# Patient Record
Sex: MALE | Race: WHITE | Employment: OTHER | URBAN - METROPOLITAN AREA
[De-identification: names, ages, dates, MRNs, and addresses within clinical notes are randomized per-mention and may not be internally consistent; named-entity substitution may affect disease eponyms.]

---

## 2017-03-08 PROBLEM — J96.02 ACUTE HYPERCAPNIC RESPIRATORY FAILURE (HCC): Status: ACTIVE | Noted: 2017-03-08

## 2017-06-15 ENCOUNTER — OFFICE VISIT (OUTPATIENT)
Dept: PULMONOLOGY | Age: 77
End: 2017-06-15

## 2017-06-15 VITALS
RESPIRATION RATE: 20 BRPM | TEMPERATURE: 97.5 F | HEIGHT: 72 IN | HEART RATE: 77 BPM | OXYGEN SATURATION: 96 % | DIASTOLIC BLOOD PRESSURE: 60 MMHG | BODY MASS INDEX: 26.57 KG/M2 | SYSTOLIC BLOOD PRESSURE: 110 MMHG | WEIGHT: 196.2 LBS

## 2017-06-15 DIAGNOSIS — J96.11 CHRONIC HYPOXEMIC RESPIRATORY FAILURE (HCC): ICD-10-CM

## 2017-06-15 DIAGNOSIS — J44.9 CHRONIC OBSTRUCTIVE PULMONARY DISEASE, UNSPECIFIED COPD TYPE (HCC): Primary | ICD-10-CM

## 2017-06-15 PROCEDURE — 1036F TOBACCO NON-USER: CPT | Performed by: INTERNAL MEDICINE

## 2017-06-15 PROCEDURE — G8427 DOCREV CUR MEDS BY ELIG CLIN: HCPCS | Performed by: INTERNAL MEDICINE

## 2017-06-15 PROCEDURE — 99204 OFFICE O/P NEW MOD 45 MIN: CPT | Performed by: INTERNAL MEDICINE

## 2017-06-15 PROCEDURE — 1123F ACP DISCUSS/DSCN MKR DOCD: CPT | Performed by: INTERNAL MEDICINE

## 2017-06-15 PROCEDURE — 3023F SPIROM DOC REV: CPT | Performed by: INTERNAL MEDICINE

## 2017-06-15 PROCEDURE — G8419 CALC BMI OUT NRM PARAM NOF/U: HCPCS | Performed by: INTERNAL MEDICINE

## 2017-06-15 PROCEDURE — G8926 SPIRO NO PERF OR DOC: HCPCS | Performed by: INTERNAL MEDICINE

## 2017-06-15 PROCEDURE — 4040F PNEUMOC VAC/ADMIN/RCVD: CPT | Performed by: INTERNAL MEDICINE

## 2017-06-15 RX ORDER — ALBUTEROL SULFATE 90 UG/1
2 AEROSOL, METERED RESPIRATORY (INHALATION) EVERY 6 HOURS PRN
Qty: 1 INHALER | Refills: 5 | Status: ON HOLD | OUTPATIENT
Start: 2017-06-15 | End: 2018-01-02 | Stop reason: SDUPTHER

## 2017-07-13 ENCOUNTER — HOSPITAL ENCOUNTER (OUTPATIENT)
Dept: PULMONOLOGY | Age: 77
Discharge: HOME | End: 2017-07-13
Attending: INTERNAL MEDICINE | Admitting: INTERNAL MEDICINE

## 2017-07-13 ENCOUNTER — OFFICE VISIT (OUTPATIENT)
Dept: PULMONOLOGY | Age: 77
End: 2017-07-13

## 2017-07-13 VITALS
TEMPERATURE: 97.5 F | BODY MASS INDEX: 26.95 KG/M2 | OXYGEN SATURATION: 98 % | SYSTOLIC BLOOD PRESSURE: 110 MMHG | WEIGHT: 199 LBS | RESPIRATION RATE: 18 BRPM | HEART RATE: 79 BPM | HEIGHT: 72 IN | DIASTOLIC BLOOD PRESSURE: 70 MMHG

## 2017-07-13 DIAGNOSIS — J96.11 CHRONIC HYPOXEMIC RESPIRATORY FAILURE (HCC): ICD-10-CM

## 2017-07-13 DIAGNOSIS — J44.9 COPD, VERY SEVERE (HCC): Primary | ICD-10-CM

## 2017-07-13 DIAGNOSIS — J44.9 CHRONIC OBSTRUCTIVE PULMONARY DISEASE (HCC): ICD-10-CM

## 2017-07-13 DIAGNOSIS — R06.09 DOE (DYSPNEA ON EXERTION): ICD-10-CM

## 2017-07-13 PROCEDURE — G8419 CALC BMI OUT NRM PARAM NOF/U: HCPCS | Performed by: INTERNAL MEDICINE

## 2017-07-13 PROCEDURE — G8926 SPIRO NO PERF OR DOC: HCPCS | Performed by: INTERNAL MEDICINE

## 2017-07-13 PROCEDURE — 1123F ACP DISCUSS/DSCN MKR DOCD: CPT | Performed by: INTERNAL MEDICINE

## 2017-07-13 PROCEDURE — 4040F PNEUMOC VAC/ADMIN/RCVD: CPT | Performed by: INTERNAL MEDICINE

## 2017-07-13 PROCEDURE — G8427 DOCREV CUR MEDS BY ELIG CLIN: HCPCS | Performed by: INTERNAL MEDICINE

## 2017-07-13 PROCEDURE — 99214 OFFICE O/P EST MOD 30 MIN: CPT | Performed by: INTERNAL MEDICINE

## 2017-07-13 PROCEDURE — 3023F SPIROM DOC REV: CPT | Performed by: INTERNAL MEDICINE

## 2017-07-13 PROCEDURE — 1036F TOBACCO NON-USER: CPT | Performed by: INTERNAL MEDICINE

## 2017-07-13 RX ORDER — ALBUTEROL SULFATE 2.5 MG/3ML
2.5 SOLUTION RESPIRATORY (INHALATION) ONCE
Status: COMPLETED | OUTPATIENT
Start: 2017-07-13 | End: 2017-07-13

## 2017-07-13 RX ORDER — FLUTICASONE FUROATE AND VILANTEROL 100; 25 UG/1; UG/1
1 POWDER RESPIRATORY (INHALATION) DAILY
Qty: 1 EACH | Refills: 5 | Status: ON HOLD | OUTPATIENT
Start: 2017-07-13 | End: 2018-01-02 | Stop reason: SDUPTHER

## 2017-07-13 RX ORDER — ALBUTEROL SULFATE 2.5 MG/3ML
2.5 SOLUTION RESPIRATORY (INHALATION) 4 TIMES DAILY
Qty: 120 EACH | Refills: 5 | Status: ON HOLD | OUTPATIENT
Start: 2017-07-13 | End: 2017-12-29 | Stop reason: ALTCHOICE

## 2017-07-13 RX ADMIN — ALBUTEROL SULFATE 2.5 MG: 2.5 SOLUTION RESPIRATORY (INHALATION) at 09:12

## 2017-07-19 ENCOUNTER — TELEPHONE (OUTPATIENT)
Dept: PULMONOLOGY | Age: 77
End: 2017-07-19

## 2017-07-19 DIAGNOSIS — R93.89 ABNORMAL CXR: Primary | ICD-10-CM

## 2017-07-21 ENCOUNTER — HOSPITAL ENCOUNTER (OUTPATIENT)
Dept: CT IMAGING | Facility: MEDICAL CENTER | Age: 77
Discharge: HOME | End: 2017-07-21
Attending: INTERNAL MEDICINE | Admitting: INTERNAL MEDICINE

## 2017-07-21 DIAGNOSIS — R93.89 ABNORMAL FINDINGS ON DIAGNOSTIC IMAGING OF OTHER SPECIFIED BODY STRUCTURES: ICD-10-CM

## 2017-07-21 DIAGNOSIS — R93.89 ABNORMAL CXR: ICD-10-CM

## 2017-07-24 ENCOUNTER — TELEPHONE (OUTPATIENT)
Dept: PULMONOLOGY | Age: 77
End: 2017-07-24

## 2017-07-24 RX ORDER — DOXYCYCLINE HYCLATE 100 MG/1
100 CAPSULE ORAL 2 TIMES DAILY
Qty: 14 CAPSULE | Refills: 0 | Status: SHIPPED | OUTPATIENT
Start: 2017-07-24 | End: 2017-07-31

## 2017-07-24 RX ORDER — PREDNISONE 10 MG/1
TABLET ORAL
Qty: 30 TABLET | Refills: 0 | Status: SHIPPED | OUTPATIENT
Start: 2017-07-24 | End: 2017-08-03

## 2017-07-28 ENCOUNTER — OFFICE VISIT (OUTPATIENT)
Dept: PULMONOLOGY | Age: 77
End: 2017-07-28

## 2017-07-28 VITALS
BODY MASS INDEX: 26.68 KG/M2 | WEIGHT: 197 LBS | TEMPERATURE: 97.6 F | SYSTOLIC BLOOD PRESSURE: 124 MMHG | DIASTOLIC BLOOD PRESSURE: 82 MMHG | HEIGHT: 72 IN | HEART RATE: 79 BPM | RESPIRATION RATE: 20 BRPM | OXYGEN SATURATION: 97 %

## 2017-07-28 DIAGNOSIS — R93.89 ABNORMAL CXR: Primary | ICD-10-CM

## 2017-07-28 DIAGNOSIS — J44.9 COPD, VERY SEVERE (HCC): ICD-10-CM

## 2017-07-28 DIAGNOSIS — J96.11 CHRONIC HYPOXEMIC RESPIRATORY FAILURE (HCC): ICD-10-CM

## 2017-07-28 PROCEDURE — 1123F ACP DISCUSS/DSCN MKR DOCD: CPT | Performed by: INTERNAL MEDICINE

## 2017-07-28 PROCEDURE — 1036F TOBACCO NON-USER: CPT | Performed by: INTERNAL MEDICINE

## 2017-07-28 PROCEDURE — G8419 CALC BMI OUT NRM PARAM NOF/U: HCPCS | Performed by: INTERNAL MEDICINE

## 2017-07-28 PROCEDURE — G8926 SPIRO NO PERF OR DOC: HCPCS | Performed by: INTERNAL MEDICINE

## 2017-07-28 PROCEDURE — 99214 OFFICE O/P EST MOD 30 MIN: CPT | Performed by: INTERNAL MEDICINE

## 2017-07-28 PROCEDURE — 3023F SPIROM DOC REV: CPT | Performed by: INTERNAL MEDICINE

## 2017-07-28 PROCEDURE — G8427 DOCREV CUR MEDS BY ELIG CLIN: HCPCS | Performed by: INTERNAL MEDICINE

## 2017-07-28 PROCEDURE — 4040F PNEUMOC VAC/ADMIN/RCVD: CPT | Performed by: INTERNAL MEDICINE

## 2017-07-28 RX ORDER — ALBUTEROL SULFATE 2.5 MG/3ML
2.5 SOLUTION RESPIRATORY (INHALATION) EVERY 6 HOURS PRN
Qty: 360 EACH | Refills: 3 | Status: SHIPPED | OUTPATIENT
Start: 2017-07-28 | End: 2018-06-15 | Stop reason: SDUPTHER

## 2017-12-28 ENCOUNTER — TELEPHONE (OUTPATIENT)
Dept: PULMONOLOGY | Age: 77
End: 2017-12-28

## 2017-12-28 RX ORDER — AZITHROMYCIN 250 MG/1
TABLET, FILM COATED ORAL
Qty: 1 PACKET | Refills: 0 | Status: ON HOLD | OUTPATIENT
Start: 2017-12-28 | End: 2018-01-03 | Stop reason: HOSPADM

## 2017-12-28 RX ORDER — PREDNISONE 10 MG/1
30 TABLET ORAL DAILY
Qty: 15 TABLET | Refills: 0 | Status: ON HOLD | OUTPATIENT
Start: 2017-12-28 | End: 2018-01-03 | Stop reason: HOSPADM

## 2017-12-28 NOTE — TELEPHONE ENCOUNTER
Do you have the following symptoms? Shortness of Breath  yes  Wheezing  yes  Cough  yes                  Cough Characteristics:                           Productive    yes                           Sputum Color    green                           Hemoptysis   No                            Consistency of sputum   Medium thick     Fever:    No  Temp:N/a  Chills/Sweats:  No  What other symptoms are you having?:  Chest pain    How long have you had these symptoms? 1 day     Pharmacy: [address] [place] [address] Review medications and allergies: Allergies? NKDA                   Currently on Antibiotics? (Drug/Dose/Frequency and how long on?) No                   Systemic Steroids? (Drug/Dose/Frequency and how long on?) No      Last sick call taken on N/a.   Meds prescribed were N/a    LOV: 7/28/17      ASSESSMENT:  · Very Severe COPD with exacerbation  · Abnormal CXR: chest ct showed a granuloma  · Chronic hypoxic respiratory failure  SUTTON due to the above  · Not addressed: Anibalib, DM2     PLAN:   · Incruse -needs refill  · Continue Breo 100mcg  · PRN albuterol  · Change  Albuterol nebs to q4hr PRN  · Supplemental O2 to 3lpm with exertion  · Pulmonary rehab referral placed in the past, but pt is not interested   · Prevnar 13 in October  · F/u in 6 months

## 2017-12-29 PROBLEM — J10.1 INFLUENZA A: Status: ACTIVE | Noted: 2017-12-29

## 2017-12-29 PROBLEM — J96.90 RESPIRATORY FAILURE (HCC): Status: ACTIVE | Noted: 2017-12-29

## 2018-01-02 RX ORDER — FLUTICASONE FUROATE AND VILANTEROL TRIFENATATE 100; 25 UG/1; UG/1
1 POWDER RESPIRATORY (INHALATION) DAILY
Qty: 1 EACH | Refills: 5 | Status: SHIPPED | OUTPATIENT
Start: 2018-01-02 | End: 2018-06-29 | Stop reason: SDUPTHER
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
## 2018-01-24 PROBLEM — R53.1 GENERALIZED WEAKNESS: Status: ACTIVE | Noted: 2018-01-24

## 2018-01-24 PROBLEM — D64.9 CHRONIC ANEMIA: Chronic | Status: ACTIVE | Noted: 2018-01-24

## 2018-01-24 PROBLEM — J96.02 ACUTE HYPERCAPNIC RESPIRATORY FAILURE (HCC): Status: RESOLVED | Noted: 2017-03-08 | Resolved: 2018-01-24

## 2018-01-24 PROBLEM — Z87.891 FORMER SMOKER: Chronic | Status: ACTIVE | Noted: 2018-01-24

## 2018-01-24 PROBLEM — J96.12 CHRONIC RESPIRATORY FAILURE WITH HYPOXIA AND HYPERCAPNIA (HCC): Chronic | Status: ACTIVE | Noted: 2017-07-13

## 2018-01-24 PROBLEM — R73.9 ACUTE HYPERGLYCEMIA: Status: ACTIVE | Noted: 2018-01-24

## 2018-01-24 PROBLEM — J44.9 COPD, VERY SEVERE (HCC): Status: RESOLVED | Noted: 2017-07-13 | Resolved: 2018-01-24

## 2018-01-24 PROBLEM — R26.2 AMBULATORY DYSFUNCTION: Chronic | Status: ACTIVE | Noted: 2018-01-24

## 2018-01-24 PROBLEM — J96.90 RESPIRATORY FAILURE (HCC): Status: RESOLVED | Noted: 2017-12-29 | Resolved: 2018-01-24

## 2018-01-24 PROBLEM — D69.6 THROMBOCYTOPENIA (HCC): Chronic | Status: ACTIVE | Noted: 2018-01-24
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
## 2018-01-24 PROBLEM — J96.11 CHRONIC HYPOXEMIC RESPIRATORY FAILURE (HCC): Chronic | Status: ACTIVE | Noted: 2017-07-13

## 2018-01-24 PROBLEM — J10.1 INFLUENZA A: Status: RESOLVED | Noted: 2017-12-29 | Resolved: 2018-01-24

## 2018-01-26 ENCOUNTER — TELEPHONE (OUTPATIENT)
Dept: PULMONOLOGY | Age: 78
End: 2018-01-26

## 2018-01-26 NOTE — TELEPHONE ENCOUNTER
Patients spouse cancelled appointment on 1/30/18 with  for 2-3 week hospital fua. Reason: IP at [facility]    Patient did not reschedule appointment.  Spouse stated that they will call to reschedule after patient is d/c      Last OV   ASSESSMENT:7/28/17  · Very Severe COPD with exacerbation  · Abnormal CXR: chest ct showed a granuloma  · Chronic hypoxic respiratory failure  SUTTON due to the above  · Not addressed: Afib, DM2     PLAN:   · Incruse -needs refill  · Continue Breo 100mcg  · PRN albuterol  · Change  Albuterol nebs to q4hr PRN  · Supplemental O2 to 3lpm with exertion  · Pulmonary rehab referral placed in the past, but pt is not interested   · Prevnar 13 in October  · F/u in 6 months

## 2018-01-31 RX ORDER — ALBUTEROL SULFATE 2.5 MG/3ML
2.5 SOLUTION RESPIRATORY (INHALATION) 4 TIMES DAILY
Qty: 300 ML | Refills: 5 | Status: SHIPPED | OUTPATIENT
Start: 2018-01-31 | End: 2018-02-14 | Stop reason: SDUPTHER

## 2018-01-31 RX ORDER — UMECLIDINIUM 62.5 UG/1
AEROSOL, POWDER ORAL
Qty: 1 EACH | Refills: 5 | Status: SHIPPED | OUTPATIENT
Start: 2018-01-31 | End: 2018-08-01 | Stop reason: SDUPTHER

## 2018-02-14 ENCOUNTER — OFFICE VISIT (OUTPATIENT)
Dept: PULMONOLOGY | Age: 78
End: 2018-02-14

## 2018-02-14 VITALS
SYSTOLIC BLOOD PRESSURE: 124 MMHG | DIASTOLIC BLOOD PRESSURE: 60 MMHG | TEMPERATURE: 98.4 F | BODY MASS INDEX: 27.09 KG/M2 | OXYGEN SATURATION: 97 % | RESPIRATION RATE: 18 BRPM | HEIGHT: 72 IN | HEART RATE: 76 BPM | WEIGHT: 200 LBS

## 2018-02-14 DIAGNOSIS — J44.1 COPD EXACERBATION (HCC): ICD-10-CM

## 2018-02-14 DIAGNOSIS — J43.8 OTHER EMPHYSEMA (HCC): Chronic | ICD-10-CM

## 2018-02-14 DIAGNOSIS — Z23 NEED FOR PNEUMOCOCCAL VACCINATION: Primary | ICD-10-CM

## 2018-02-14 PROCEDURE — G8598 ASA/ANTIPLAT THER USED: HCPCS | Performed by: INTERNAL MEDICINE

## 2018-02-14 PROCEDURE — G8926 SPIRO NO PERF OR DOC: HCPCS | Performed by: INTERNAL MEDICINE

## 2018-02-14 PROCEDURE — 3023F SPIROM DOC REV: CPT | Performed by: INTERNAL MEDICINE

## 2018-02-14 PROCEDURE — 1036F TOBACCO NON-USER: CPT | Performed by: INTERNAL MEDICINE

## 2018-02-14 PROCEDURE — 90670 PCV13 VACCINE IM: CPT | Performed by: INTERNAL MEDICINE

## 2018-02-14 PROCEDURE — 1111F DSCHRG MED/CURRENT MED MERGE: CPT | Performed by: INTERNAL MEDICINE

## 2018-02-14 PROCEDURE — G8427 DOCREV CUR MEDS BY ELIG CLIN: HCPCS | Performed by: INTERNAL MEDICINE

## 2018-02-14 PROCEDURE — 1123F ACP DISCUSS/DSCN MKR DOCD: CPT | Performed by: INTERNAL MEDICINE

## 2018-02-14 PROCEDURE — 4040F PNEUMOC VAC/ADMIN/RCVD: CPT | Performed by: INTERNAL MEDICINE

## 2018-02-14 PROCEDURE — G0009 ADMIN PNEUMOCOCCAL VACCINE: HCPCS | Performed by: INTERNAL MEDICINE

## 2018-02-14 PROCEDURE — 99214 OFFICE O/P EST MOD 30 MIN: CPT | Performed by: INTERNAL MEDICINE

## 2018-02-14 PROCEDURE — G8482 FLU IMMUNIZE ORDER/ADMIN: HCPCS | Performed by: INTERNAL MEDICINE

## 2018-02-14 PROCEDURE — G8419 CALC BMI OUT NRM PARAM NOF/U: HCPCS | Performed by: INTERNAL MEDICINE

## 2018-02-14 RX ORDER — ALBUTEROL SULFATE 2.5 MG/3ML
2.5 SOLUTION RESPIRATORY (INHALATION) 4 TIMES DAILY
Qty: 300 ML | Refills: 5 | Status: SHIPPED | OUTPATIENT
Start: 2018-02-14 | End: 2019-07-18 | Stop reason: SDUPTHER

## 2018-02-14 NOTE — PROGRESS NOTES
0.083% nebulizer solution, Take 3 mLs by nebulization every 6 hours as needed for Wheezing or Shortness of Breath [facility] hospital, Disp: 360 each, Rfl: 3    aspirin 81 MG tablet, Take 81 mg by mouth daily, Disp: , Rfl:     atenolol (TENORMIN) 100 MG tablet, Take 100 mg by mouth daily, Disp: , Rfl:     lisinopril (PRINIVIL;ZESTRIL) 20 MG tablet, Take 30 mg by mouth daily, Disp: , Rfl:     gabapentin (NEURONTIN) 300 MG capsule, Take 600 mg by mouth 3 times daily . , Disp: , Rfl:     OXYGEN, Inhale 2.5 L into the lungs nightly Pt wears 2- 2/12 L at night. 3 L while walking/ moving around, Disp: , Rfl:     doxazosin (CARDURA) 1 MG tablet, Take 1 tablet by mouth daily, Disp: 30 tablet, Rfl: 3    diltiazem (CARTIA XT) 240 MG ER capsule, Take 1 capsule by mouth daily, Disp: 30 capsule, Rfl: 3    metformin (GLUCOPHAGE) 500 MG tablet, Take 500 mg by mouth 4 times daily , Disp: , Rfl:     esomeprazole (NEXIUM) 40 MG capsule,  Take 40 mg by mouth every morning (before breakfast) Indications: take dos , Disp: , Rfl:     alendronate (FOSAMAX) 70 MG tablet, Take 70 mg by mouth every 7 days. , Disp: , Rfl:       Objective:   PHYSICAL EXAM:      VITALS:  /60   Pulse 76   Temp 98.4 °F (36.9 °C) (Oral)   Resp 18   Ht 6' (1.829 m)   Wt 200 lb (90.7 kg)   SpO2 97% Comment: 3 LPM NC  BMI 27.12 kg/m²   Constitutional: In no acute distress. Appears stated age. Well developed and nourished  Eyes: PERRL. No sclera icterus. No conjunctival injection. ENT: Oropharynx clear. Neck: Trachea midline. No thyroid tenderness. Lymph: No cervical LAD. No supraclavicular LAD. Resp: No accessory muscle use. No crackles. no wheezes. No rhonchi. CV: Regular rate. Regular rhythm. No murmur or rub. No lower extremity edema. Skin: Warm and dry. No nodules on exposed extremities. No rash on exposed extremities. Musc: No clubbing. No cyanosis. No synovitis or joint deformity in digits. Psych: Oriented x 3.  Mood and Severe COPD with recent exacerbation  · Chronic hypoxic respiratory failure  SUTTON due to the above  · Not addressed: Afib, DM2     PLAN:   · Incruse  · Continue Breo   · Albuterol nebs to q4hr PRN  · Prn albuterol nebs - send Rx to [facility]  · Supplemental O2 to 3lpm with exertion  Pulmonary rehab referral placed in the past, but pt is not interested   · Prevnar 13   · F/u in 3-4 months

## 2018-03-27 ENCOUNTER — TELEPHONE (OUTPATIENT)
Dept: PULMONOLOGY | Age: 78
End: 2018-03-27

## 2018-05-31 ENCOUNTER — OFFICE VISIT (OUTPATIENT)
Dept: PULMONOLOGY | Age: 78
End: 2018-05-31

## 2018-05-31 VITALS
BODY MASS INDEX: 27.55 KG/M2 | HEART RATE: 96 BPM | WEIGHT: 203.4 LBS | OXYGEN SATURATION: 98 % | HEIGHT: 72 IN | DIASTOLIC BLOOD PRESSURE: 60 MMHG | SYSTOLIC BLOOD PRESSURE: 130 MMHG | RESPIRATION RATE: 20 BRPM | TEMPERATURE: 97.9 F

## 2018-05-31 DIAGNOSIS — J44.9 COPD, SEVERE (HCC): Primary | ICD-10-CM

## 2018-05-31 DIAGNOSIS — J96.12 CHRONIC RESPIRATORY FAILURE WITH HYPOXIA AND HYPERCAPNIA (HCC): ICD-10-CM

## 2018-05-31 DIAGNOSIS — J96.11 CHRONIC RESPIRATORY FAILURE WITH HYPOXIA AND HYPERCAPNIA (HCC): ICD-10-CM

## 2018-05-31 PROCEDURE — 1123F ACP DISCUSS/DSCN MKR DOCD: CPT | Performed by: INTERNAL MEDICINE

## 2018-05-31 PROCEDURE — 99214 OFFICE O/P EST MOD 30 MIN: CPT | Performed by: INTERNAL MEDICINE

## 2018-05-31 PROCEDURE — 4040F PNEUMOC VAC/ADMIN/RCVD: CPT | Performed by: INTERNAL MEDICINE

## 2018-05-31 PROCEDURE — G8417 CALC BMI ABV UP PARAM F/U: HCPCS | Performed by: INTERNAL MEDICINE

## 2018-05-31 PROCEDURE — G8427 DOCREV CUR MEDS BY ELIG CLIN: HCPCS | Performed by: INTERNAL MEDICINE

## 2018-05-31 PROCEDURE — 1036F TOBACCO NON-USER: CPT | Performed by: INTERNAL MEDICINE

## 2018-05-31 PROCEDURE — 3023F SPIROM DOC REV: CPT | Performed by: INTERNAL MEDICINE

## 2018-05-31 PROCEDURE — G8598 ASA/ANTIPLAT THER USED: HCPCS | Performed by: INTERNAL MEDICINE

## 2018-05-31 PROCEDURE — G8926 SPIRO NO PERF OR DOC: HCPCS | Performed by: INTERNAL MEDICINE

## 2018-06-15 ENCOUNTER — TELEPHONE (OUTPATIENT)
Dept: PULMONOLOGY | Age: 78
End: 2018-06-15

## 2018-06-15 DIAGNOSIS — J44.9 COPD, SEVERE (HCC): Primary | Chronic | ICD-10-CM

## 2018-06-15 RX ORDER — ALBUTEROL SULFATE 2.5 MG/3ML
2.5 SOLUTION RESPIRATORY (INHALATION) EVERY 6 HOURS PRN
Qty: 360 EACH | Refills: 3 | Status: SHIPPED | OUTPATIENT
Start: 2018-06-15 | End: 2018-06-20 | Stop reason: SDUPTHER

## 2018-06-20 DIAGNOSIS — J44.9 COPD, SEVERE (HCC): Chronic | ICD-10-CM

## 2018-06-20 RX ORDER — ALBUTEROL SULFATE 2.5 MG/3ML
2.5 SOLUTION RESPIRATORY (INHALATION) EVERY 4 HOURS
Qty: 360 EACH | Refills: 3 | Status: SHIPPED | OUTPATIENT
Start: 2018-06-20 | End: 2018-06-29

## 2018-06-29 RX ORDER — FLUTICASONE FUROATE AND VILANTEROL TRIFENATATE 100; 25 UG/1; UG/1
1 POWDER RESPIRATORY (INHALATION) DAILY
Qty: 60 EACH | Refills: 5 | Status: SHIPPED | OUTPATIENT
Start: 2018-06-29 | End: 2018-09-18 | Stop reason: ALTCHOICE

## 2018-08-01 RX ORDER — UMECLIDINIUM 62.5 UG/1
AEROSOL, POWDER ORAL
Qty: 1 EACH | Refills: 5 | Status: SHIPPED | OUTPATIENT
Start: 2018-08-01 | End: 2018-09-18 | Stop reason: ALTCHOICE

## 2018-09-18 ENCOUNTER — OFFICE VISIT (OUTPATIENT)
Dept: PULMONOLOGY | Age: 78
End: 2018-09-18

## 2018-09-18 VITALS
DIASTOLIC BLOOD PRESSURE: 60 MMHG | OXYGEN SATURATION: 96 % | WEIGHT: 212 LBS | HEIGHT: 72 IN | BODY MASS INDEX: 28.71 KG/M2 | HEART RATE: 69 BPM | SYSTOLIC BLOOD PRESSURE: 120 MMHG

## 2018-09-18 DIAGNOSIS — Z23 NEED FOR INFLUENZA VACCINATION: Primary | ICD-10-CM

## 2018-09-18 DIAGNOSIS — J96.11 CHRONIC RESPIRATORY FAILURE WITH HYPOXIA AND HYPERCAPNIA (HCC): ICD-10-CM

## 2018-09-18 DIAGNOSIS — D69.6 THROMBOCYTOPENIA (HCC): ICD-10-CM

## 2018-09-18 DIAGNOSIS — J44.9 COPD, SEVERE (HCC): ICD-10-CM

## 2018-09-18 DIAGNOSIS — J96.12 CHRONIC RESPIRATORY FAILURE WITH HYPOXIA AND HYPERCAPNIA (HCC): ICD-10-CM

## 2018-09-18 PROCEDURE — 99214 OFFICE O/P EST MOD 30 MIN: CPT | Performed by: INTERNAL MEDICINE

## 2018-09-18 PROCEDURE — G0008 ADMIN INFLUENZA VIRUS VAC: HCPCS | Performed by: INTERNAL MEDICINE

## 2018-09-18 PROCEDURE — 1101F PT FALLS ASSESS-DOCD LE1/YR: CPT | Performed by: INTERNAL MEDICINE

## 2018-09-18 PROCEDURE — 4040F PNEUMOC VAC/ADMIN/RCVD: CPT | Performed by: INTERNAL MEDICINE

## 2018-09-18 PROCEDURE — G8926 SPIRO NO PERF OR DOC: HCPCS | Performed by: INTERNAL MEDICINE

## 2018-09-18 PROCEDURE — 1036F TOBACCO NON-USER: CPT | Performed by: INTERNAL MEDICINE

## 2018-09-18 PROCEDURE — G8417 CALC BMI ABV UP PARAM F/U: HCPCS | Performed by: INTERNAL MEDICINE

## 2018-09-18 PROCEDURE — 1123F ACP DISCUSS/DSCN MKR DOCD: CPT | Performed by: INTERNAL MEDICINE

## 2018-09-18 PROCEDURE — 90662 IIV NO PRSV INCREASED AG IM: CPT | Performed by: INTERNAL MEDICINE

## 2018-09-18 PROCEDURE — 3023F SPIROM DOC REV: CPT | Performed by: INTERNAL MEDICINE

## 2018-09-18 PROCEDURE — G8598 ASA/ANTIPLAT THER USED: HCPCS | Performed by: INTERNAL MEDICINE

## 2018-09-18 PROCEDURE — G8427 DOCREV CUR MEDS BY ELIG CLIN: HCPCS | Performed by: INTERNAL MEDICINE

## 2018-09-18 NOTE — PROGRESS NOTES
Vaccine Information Sheet, \"Influenza - Inactivated\"  given to Zenobia Asp, or parent/legal guardian of  Zenobia Willingham and verbalized understanding. Patient responses:    Have you ever had a reaction to a flu vaccine? No  Are you able to eat eggs without adverse effects? Yes  Do you have any current illness? No  Have you ever had Guillian Alloway Syndrome? No    Flu vaccine given per order. Please see immunization tab.

## 2018-09-18 NOTE — PROGRESS NOTES
[facility] Pulmonary, Critical Care, and Sleep    Outpatient Follow Up Note    CC: COPD  Consulting provider: [place] *    Interval History: 76 y.o. male no exacerbations or hospitalizations since last January influenza A. Dyspnea on exertion is unchanged. He uses his oxygen as prescribed. He drives benefit from increase and preop. He asked why he was taking Pulmicort which I was unaware. Initial HPI: regarding SOB. He was admittted several months ago at [facility] for a presumed COPD exacerbation. The patient has a history of COPD, Afib, DM2. Wheezes intermittently. Has a daily intermittent cough that is usually dry or sometimes productive of white to yellow sputum. The patient does not have SOB at rest but has SUTTON. Exercise tolerance on level ground is 1/2 block. He has trouble with showering and getting dressed. The patient has smoked 1.5 -2 PPD for 45 years. Quit in 2002.   Current Medications:    Current Outpatient Prescriptions:     budesonide (PULMICORT) 90 MCG/ACT AEPB inhaler, Inhale 1 puff into the lungs as needed, Disp: , Rfl:     INCRUSE ELLIPTA 62.5 MCG/INH AEPB, USE 1 INHALATION DAILY, Disp: 1 each, Rfl: 5    BREO ELLIPTA 100-25 MCG/INH AEPB inhaler, Inhale 1 puff into the lungs daily, Disp: 60 each, Rfl: 5    VENTOLIN  (90 Base) MCG/ACT inhaler, Inhale 2 puffs into the lungs every 6 hours as needed for Wheezing orShortness of Breath, Disp: 18 g, Rfl: 5    albuterol (PROVENTIL) (2.5 MG/3ML) 0.083% nebulizer solution, Take 3 mLs by nebulization 4 times daily COPD J44.9 [facility], Disp: 300 mL, Rfl: 5    vitamin D3 (CHOLECALCIFEROL) 5000 units TABS tablet, Take 1 tablet by mouth daily, Disp: 30 tablet, Rfl: 1    pravastatin (PRAVACHOL) 10 MG tablet, Take 10 mg by mouth daily, Disp: , Rfl:     atenolol (TENORMIN) 100 MG tablet, Take 100 mg by mouth daily, Disp: , Rfl:     lisinopril (PRINIVIL;ZESTRIL) 20 MG tablet, Take 20 mg by mouth daily , Disp: , Rfl:     gabapentin DM2     PLAN:   · Change Incruse and Breo to Trelegy  · Ok to stop Pulmicort as he is already on an inhaled steroid  · Albuterol nebs q4hr PRN  · Prn albuterol nebs   · Supplemental O2 to 3lpm with exertion  Pulmonary rehab referral placed in the past, but pt is not interested   · Pneumovax 23 and prevnar 13 UTD;  flu vaccine today  · F/u in 4 months

## 2018-11-08 ENCOUNTER — TELEPHONE (OUTPATIENT)
Dept: PULMONOLOGY | Age: 78
End: 2018-11-08

## 2018-11-12 ENCOUNTER — OFFICE VISIT (OUTPATIENT)
Dept: PULMONOLOGY | Age: 78
End: 2018-11-12
Payer: MEDICARE

## 2018-11-12 VITALS
WEIGHT: 204 LBS | HEIGHT: 70 IN | RESPIRATION RATE: 22 BRPM | HEART RATE: 75 BPM | OXYGEN SATURATION: 96 % | BODY MASS INDEX: 29.2 KG/M2 | SYSTOLIC BLOOD PRESSURE: 120 MMHG | TEMPERATURE: 97.6 F | DIASTOLIC BLOOD PRESSURE: 60 MMHG

## 2018-11-12 DIAGNOSIS — J06.9 ACUTE URI: Primary | ICD-10-CM

## 2018-11-12 DIAGNOSIS — J96.11 CHRONIC RESPIRATORY FAILURE WITH HYPOXIA AND HYPERCAPNIA (HCC): ICD-10-CM

## 2018-11-12 DIAGNOSIS — J96.12 CHRONIC RESPIRATORY FAILURE WITH HYPOXIA AND HYPERCAPNIA (HCC): ICD-10-CM

## 2018-11-12 DIAGNOSIS — J44.9 COPD, SEVERE (HCC): ICD-10-CM

## 2018-11-12 PROCEDURE — G8427 DOCREV CUR MEDS BY ELIG CLIN: HCPCS | Performed by: INTERNAL MEDICINE

## 2018-11-12 PROCEDURE — 1036F TOBACCO NON-USER: CPT | Performed by: INTERNAL MEDICINE

## 2018-11-12 PROCEDURE — 4040F PNEUMOC VAC/ADMIN/RCVD: CPT | Performed by: INTERNAL MEDICINE

## 2018-11-12 PROCEDURE — G8926 SPIRO NO PERF OR DOC: HCPCS | Performed by: INTERNAL MEDICINE

## 2018-11-12 PROCEDURE — 1101F PT FALLS ASSESS-DOCD LE1/YR: CPT | Performed by: INTERNAL MEDICINE

## 2018-11-12 PROCEDURE — 1123F ACP DISCUSS/DSCN MKR DOCD: CPT | Performed by: INTERNAL MEDICINE

## 2018-11-12 PROCEDURE — G8417 CALC BMI ABV UP PARAM F/U: HCPCS | Performed by: INTERNAL MEDICINE

## 2018-11-12 PROCEDURE — 3023F SPIROM DOC REV: CPT | Performed by: INTERNAL MEDICINE

## 2018-11-12 PROCEDURE — G8598 ASA/ANTIPLAT THER USED: HCPCS | Performed by: INTERNAL MEDICINE

## 2018-11-12 PROCEDURE — 99214 OFFICE O/P EST MOD 30 MIN: CPT | Performed by: INTERNAL MEDICINE

## 2018-11-12 PROCEDURE — G8482 FLU IMMUNIZE ORDER/ADMIN: HCPCS | Performed by: INTERNAL MEDICINE

## 2018-11-12 RX ORDER — DOXYCYCLINE HYCLATE 100 MG
100 TABLET ORAL 2 TIMES DAILY
Qty: 14 TABLET | Refills: 0 | Status: SHIPPED | OUTPATIENT
Start: 2018-11-12 | End: 2018-11-19

## 2018-11-12 NOTE — PROGRESS NOTES
  doxazosin (CARDURA) 1 MG tablet, Take 1 tablet by mouth daily, Disp: 30 tablet, Rfl: 3    diltiazem (CARTIA XT) 240 MG ER capsule, Take 1 capsule by mouth daily, Disp: 30 capsule, Rfl: 3    metformin (GLUCOPHAGE) 500 MG tablet, Take 500 mg by mouth 4 times daily , Disp: , Rfl:     esomeprazole (NEXIUM) 40 MG capsule,  Take 40 mg by mouth every morning (before breakfast) Indications: take dos , Disp: , Rfl:     alendronate (FOSAMAX) 70 MG tablet, Take 70 mg by mouth every 7 days. , Disp: , Rfl:     Objective:   PHYSICAL EXAM:    VITALS:  /60 (Site: Left Upper Arm, Position: Sitting, Cuff Size: Medium Adult)   Pulse 75   Temp 97.6 °F (36.4 °C) (Oral)   Resp 22   Ht 5' 10\" (1.778 m)   Wt 204 lb (92.5 kg)   SpO2 96% Comment: 3LPM pulsed  BMI 29.27 kg/m²   Constitutional: In no acute distress. Appears stated age. Well developed and nourished  Eyes: PERRL. No sclera icterus. No conjunctival injection. ENT: Oropharynx clear. Neck: Trachea midline. No thyroid tenderness. Lymph: No cervical LAD. No supraclavicular LAD. Resp: No accessory muscle use. No crackles. No wheezes. No rhonchi. CV: Regular rate. Regular rhythm. No murmur or rub. No lower extremity edema. Skin: Warm and dry. No nodules on exposed extremities. No rash on exposed extremities. Musc: No clubbing. No cyanosis. No synovitis or joint deformity in digits. Psych: Oriented x 3. Mood and affect normal. Intact judgment and insight. LABS:  Reviewed any pertinent new labs that are available. PFTs 7/13/17  FVC  (35%) FEV1 0.57 (18%)  FEV1/FVC ratio 37  TLC  (146%)  RV  (300%)   DLCO (25%) Bronchodilator response:+++    6MWT: 360ft, 3lpm     IMAGING:  I personally reviewed and interpreted the following today in the office:   7/21/17 Chest CT:   Mediastinum: Limited evaluation without IV contrast.  Heavy coronary artery   calcifications are noted. Elifredia Mew prominent precarinal lymph node measures 8   mm short axis diameter.

## 2019-03-21 ENCOUNTER — OFFICE VISIT (OUTPATIENT)
Dept: PULMONOLOGY | Age: 79
End: 2019-03-21
Payer: MEDICARE

## 2019-03-21 VITALS
OXYGEN SATURATION: 96 % | TEMPERATURE: 98 F | WEIGHT: 213 LBS | SYSTOLIC BLOOD PRESSURE: 116 MMHG | DIASTOLIC BLOOD PRESSURE: 68 MMHG | RESPIRATION RATE: 20 BRPM | BODY MASS INDEX: 29.82 KG/M2 | HEIGHT: 71 IN | HEART RATE: 74 BPM

## 2019-03-21 DIAGNOSIS — D69.6 THROMBOCYTOPENIA (HCC): ICD-10-CM

## 2019-03-21 DIAGNOSIS — J44.9 COPD, SEVERE (HCC): Primary | ICD-10-CM

## 2019-03-21 PROCEDURE — 99214 OFFICE O/P EST MOD 30 MIN: CPT | Performed by: INTERNAL MEDICINE

## 2019-03-21 PROCEDURE — G8417 CALC BMI ABV UP PARAM F/U: HCPCS | Performed by: INTERNAL MEDICINE

## 2019-03-21 PROCEDURE — G8482 FLU IMMUNIZE ORDER/ADMIN: HCPCS | Performed by: INTERNAL MEDICINE

## 2019-03-21 PROCEDURE — 1036F TOBACCO NON-USER: CPT | Performed by: INTERNAL MEDICINE

## 2019-03-21 PROCEDURE — G8427 DOCREV CUR MEDS BY ELIG CLIN: HCPCS | Performed by: INTERNAL MEDICINE

## 2019-03-21 PROCEDURE — G8926 SPIRO NO PERF OR DOC: HCPCS | Performed by: INTERNAL MEDICINE

## 2019-03-21 PROCEDURE — G8599 NO ASA/ANTIPLAT THER USE RNG: HCPCS | Performed by: INTERNAL MEDICINE

## 2019-03-21 PROCEDURE — 1123F ACP DISCUSS/DSCN MKR DOCD: CPT | Performed by: INTERNAL MEDICINE

## 2019-03-21 PROCEDURE — 3023F SPIROM DOC REV: CPT | Performed by: INTERNAL MEDICINE

## 2019-03-21 PROCEDURE — 1101F PT FALLS ASSESS-DOCD LE1/YR: CPT | Performed by: INTERNAL MEDICINE

## 2019-03-21 PROCEDURE — 4040F PNEUMOC VAC/ADMIN/RCVD: CPT | Performed by: INTERNAL MEDICINE

## 2019-04-26 NOTE — PROGRESS NOTES
PRE OP INSTRUCTION SHEET   1. Do not eat or drink anything after 12 midnight  prior to surgery. This includes no water, chewing gum or mints. 2. Take the following pills will a small sip of water (see MAR)                                        3. Aspirin, Ibuprofen, Advil, Naproxen, Vitamin E, fish oil and other Anti-inflammatory products should be stopped for 5 days before surgery or as directed by your physician. 4. Check with your Doctor regarding stopping Plavix, Coumadin, Lovenox, Fragmin or other blood thinners   5. Do not smoke, and do not drink any alcoholic beverages 24 hours prior to surgery. This includes NA Beer. 6. You may brush your teeth and gargle the morning of surgery. DO NOT SWALLOW WATER   7. You MUST make arrangements for a responsible adult to take you home after your surgery. You will not be allowed to leave alone or drive yourself home. It is strongly suggested someone stay with you the first 24 hrs. Your surgery will be cancelled if you do not have a ride home. 8. A parent/legal guardian must accompany a child scheduled for surgery and plan to stay at the hospital until the child is discharged. Please do not bring other children with you. 9. Please wear simple, loose fitting clothing to the hospital.  Rajan Nones not bring valuables (money, credit cards, checkbooks, etc.) Do not wear any makeup (including no eye makeup) or nail polish on your fingers or toes. 10. DO NOT wear any jewelry or piercings on day of surgery. All body piercing jewelry must be removed. 11. If you have dentures,glasses, or contacts they will be removed before going to the OR; we will provide you a container. 12. Please see your family doctor/and cardiologist for a history & physical and/or concerning medications. Bring any test results/reports from your physician's office. Have history and labs faxed to [phone].  Remember to bring Blood Bank bracelet on the day of surgery. 14. If you have a Living Will and Durable Power of  for Healthcare, please bring in a copy. 13. Notify your Surgeon if you develop any illness between now and surgery  time, cough, cold, fever, sore throat, nausea, vomiting, etc.  Please notify your surgeon if you experience dizziness, shortness of breath or blurred vision between now & the time of your surgery   16. DO NOT shave your operative site 96 hours prior to surgery. For face & neck surgery, men may use an electric razor 48 hours prior to surgery. 17. Shower with _x__Antibacterial soap (x_chlorhexidine for total joint  Pt's) shower two times before surgery.(the morning of and the night before. 18. To provide excellent care visitors will be limited to one in the room at any given time.   Please call pre admission testing if you any further questions [phone] or [phone]

## 2019-04-30 ENCOUNTER — ANESTHESIA EVENT (OUTPATIENT)
Dept: OPERATING ROOM | Age: 79
End: 2019-04-30
Payer: MEDICARE

## 2019-05-01 ENCOUNTER — HOSPITAL ENCOUNTER (OUTPATIENT)
Age: 79
Setting detail: OUTPATIENT SURGERY
Discharge: HOME | End: 2019-05-01
Attending: UROLOGY | Admitting: UROLOGY
Payer: MEDICARE

## 2019-05-01 ENCOUNTER — ANESTHESIA (OUTPATIENT)
Dept: OPERATING ROOM | Age: 79
End: 2019-05-01
Payer: MEDICARE

## 2019-05-01 VITALS
SYSTOLIC BLOOD PRESSURE: 98 MMHG | RESPIRATION RATE: 9 BRPM | DIASTOLIC BLOOD PRESSURE: 56 MMHG | OXYGEN SATURATION: 100 %

## 2019-05-01 VITALS
HEART RATE: 70 BPM | WEIGHT: 209 LBS | TEMPERATURE: 97 F | RESPIRATION RATE: 19 BRPM | BODY MASS INDEX: 28.31 KG/M2 | SYSTOLIC BLOOD PRESSURE: 114 MMHG | HEIGHT: 72 IN | DIASTOLIC BLOOD PRESSURE: 59 MMHG | OXYGEN SATURATION: 99 %

## 2019-05-01 LAB
ANION GAP SERPL CALCULATED.3IONS-SCNC: 10 MMOL/L (ref 3–16)
BUN BLDV-MCNC: 20 MG/DL (ref 7–20)
CALCIUM SERPL-MCNC: 9.1 MG/DL (ref 8.3–10.6)
CHLORIDE BLD-SCNC: 98 MMOL/L (ref 99–110)
CO2: 34 MMOL/L (ref 21–32)
CREAT SERPL-MCNC: 0.9 MG/DL (ref 0.8–1.3)
GFR AFRICAN AMERICAN: >60
GFR NON-AFRICAN AMERICAN: >60
GLUCOSE BLD-MCNC: 133 MG/DL (ref 70–99)
GLUCOSE BLD-MCNC: 167 MG/DL (ref 70–99)
GLUCOSE BLD-MCNC: 183 MG/DL (ref 70–99)
PERFORMED ON: ABNORMAL
PERFORMED ON: ABNORMAL
POTASSIUM SERPL-SCNC: 4.2 MMOL/L (ref 3.5–5.1)
SODIUM BLD-SCNC: 142 MMOL/L (ref 136–145)

## 2019-05-01 PROCEDURE — 3600000014 HC SURGERY LEVEL 4 ADDTL 15MIN: Performed by: UROLOGY

## 2019-05-01 PROCEDURE — 3600000004 HC SURGERY LEVEL 4 BASE: Performed by: UROLOGY

## 2019-05-01 PROCEDURE — 6370000000 HC RX 637 (ALT 250 FOR IP): Performed by: ANESTHESIOLOGY

## 2019-05-01 PROCEDURE — 7100000010 HC PHASE II RECOVERY - FIRST 15 MIN: Performed by: UROLOGY

## 2019-05-01 PROCEDURE — 7100000000 HC PACU RECOVERY - FIRST 15 MIN: Performed by: UROLOGY

## 2019-05-01 PROCEDURE — 2500000003 HC RX 250 WO HCPCS: Performed by: NURSE ANESTHETIST, CERTIFIED REGISTERED

## 2019-05-01 PROCEDURE — 36415 COLL VENOUS BLD VENIPUNCTURE: CPT

## 2019-05-01 PROCEDURE — 2580000003 HC RX 258: Performed by: ANESTHESIOLOGY

## 2019-05-01 PROCEDURE — 80048 BASIC METABOLIC PNL TOTAL CA: CPT

## 2019-05-01 PROCEDURE — 2580000003 HC RX 258: Performed by: UROLOGY

## 2019-05-01 PROCEDURE — 7100000001 HC PACU RECOVERY - ADDTL 15 MIN: Performed by: UROLOGY

## 2019-05-01 PROCEDURE — 6360000002 HC RX W HCPCS: Performed by: NURSE ANESTHETIST, CERTIFIED REGISTERED

## 2019-05-01 PROCEDURE — 3700000001 HC ADD 15 MINUTES (ANESTHESIA): Performed by: UROLOGY

## 2019-05-01 PROCEDURE — 3700000000 HC ANESTHESIA ATTENDED CARE: Performed by: UROLOGY

## 2019-05-01 PROCEDURE — 6370000000 HC RX 637 (ALT 250 FOR IP): Performed by: UROLOGY

## 2019-05-01 PROCEDURE — 6360000002 HC RX W HCPCS: Performed by: UROLOGY

## 2019-05-01 PROCEDURE — 2709999900 HC NON-CHARGEABLE SUPPLY: Performed by: UROLOGY

## 2019-05-01 PROCEDURE — 7100000011 HC PHASE II RECOVERY - ADDTL 15 MIN: Performed by: UROLOGY

## 2019-05-01 RX ORDER — LIDOCAINE HYDROCHLORIDE 10 MG/ML
1 INJECTION, SOLUTION EPIDURAL; INFILTRATION; INTRACAUDAL; PERINEURAL
Status: DISCONTINUED | OUTPATIENT
Start: 2019-05-01 | End: 2019-05-01 | Stop reason: HOSPADM

## 2019-05-01 RX ORDER — SODIUM CHLORIDE, SODIUM LACTATE, POTASSIUM CHLORIDE, CALCIUM CHLORIDE 600; 310; 30; 20 MG/100ML; MG/100ML; MG/100ML; MG/100ML
INJECTION, SOLUTION INTRAVENOUS CONTINUOUS
Status: DISCONTINUED | OUTPATIENT
Start: 2019-05-01 | End: 2019-05-01 | Stop reason: HOSPADM

## 2019-05-01 RX ORDER — SODIUM CHLORIDE 0.9 % (FLUSH) 0.9 %
10 SYRINGE (ML) INJECTION PRN
Status: DISCONTINUED | OUTPATIENT
Start: 2019-05-01 | End: 2019-05-01 | Stop reason: HOSPADM

## 2019-05-01 RX ORDER — OXYCODONE HYDROCHLORIDE AND ACETAMINOPHEN 5; 325 MG/1; MG/1
1 TABLET ORAL PRN
Status: COMPLETED | OUTPATIENT
Start: 2019-05-01 | End: 2019-05-01

## 2019-05-01 RX ORDER — PROPOFOL 10 MG/ML
INJECTION, EMULSION INTRAVENOUS PRN
Status: DISCONTINUED | OUTPATIENT
Start: 2019-05-01 | End: 2019-05-01 | Stop reason: SDUPTHER

## 2019-05-01 RX ORDER — CELECOXIB 200 MG/1
200 CAPSULE ORAL DAILY
Status: ON HOLD | COMMUNITY
End: 2020-04-09 | Stop reason: HOSPADM

## 2019-05-01 RX ORDER — OXYCODONE HYDROCHLORIDE AND ACETAMINOPHEN 5; 325 MG/1; MG/1
2 TABLET ORAL PRN
Status: COMPLETED | OUTPATIENT
Start: 2019-05-01 | End: 2019-05-01

## 2019-05-01 RX ORDER — LIDOCAINE HYDROCHLORIDE 20 MG/ML
JELLY TOPICAL PRN
Status: DISCONTINUED | OUTPATIENT
Start: 2019-05-01 | End: 2019-05-01 | Stop reason: ALTCHOICE

## 2019-05-01 RX ORDER — SULFAMETHOXAZOLE AND TRIMETHOPRIM 400; 80 MG/1; MG/1
1 TABLET ORAL 2 TIMES DAILY
Qty: 8 TABLET | Refills: 0 | Status: SHIPPED | OUTPATIENT
Start: 2019-05-01 | End: 2019-05-05

## 2019-05-01 RX ORDER — HYDRALAZINE HYDROCHLORIDE 20 MG/ML
5 INJECTION INTRAMUSCULAR; INTRAVENOUS EVERY 30 MIN PRN
Status: DISCONTINUED | OUTPATIENT
Start: 2019-05-01 | End: 2019-05-01 | Stop reason: HOSPADM

## 2019-05-01 RX ORDER — PHENAZOPYRIDINE HYDROCHLORIDE 200 MG/1
200 TABLET, FILM COATED ORAL 3 TIMES DAILY PRN
Qty: 15 TABLET | Refills: 0 | Status: SHIPPED | OUTPATIENT
Start: 2019-05-01 | End: 2019-05-06

## 2019-05-01 RX ORDER — LABETALOL HYDROCHLORIDE 5 MG/ML
5 INJECTION, SOLUTION INTRAVENOUS
Status: DISCONTINUED | OUTPATIENT
Start: 2019-05-01 | End: 2019-05-01 | Stop reason: HOSPADM

## 2019-05-01 RX ORDER — LIDOCAINE HYDROCHLORIDE 20 MG/ML
INJECTION, SOLUTION INFILTRATION; PERINEURAL PRN
Status: DISCONTINUED | OUTPATIENT
Start: 2019-05-01 | End: 2019-05-01 | Stop reason: SDUPTHER

## 2019-05-01 RX ORDER — MEPERIDINE HYDROCHLORIDE 25 MG/ML
12.5 INJECTION INTRAMUSCULAR; INTRAVENOUS; SUBCUTANEOUS EVERY 5 MIN PRN
Status: DISCONTINUED | OUTPATIENT
Start: 2019-05-01 | End: 2019-05-01 | Stop reason: HOSPADM

## 2019-05-01 RX ORDER — DIPHENHYDRAMINE HYDROCHLORIDE 50 MG/ML
6.25 INJECTION INTRAMUSCULAR; INTRAVENOUS
Status: DISCONTINUED | OUTPATIENT
Start: 2019-05-01 | End: 2019-05-01 | Stop reason: HOSPADM

## 2019-05-01 RX ORDER — MAGNESIUM HYDROXIDE 1200 MG/15ML
LIQUID ORAL PRN
Status: DISCONTINUED | OUTPATIENT
Start: 2019-05-01 | End: 2019-05-01 | Stop reason: ALTCHOICE

## 2019-05-01 RX ORDER — ONDANSETRON 2 MG/ML
INJECTION INTRAMUSCULAR; INTRAVENOUS PRN
Status: DISCONTINUED | OUTPATIENT
Start: 2019-05-01 | End: 2019-05-01 | Stop reason: SDUPTHER

## 2019-05-01 RX ORDER — SODIUM CHLORIDE 0.9 % (FLUSH) 0.9 %
10 SYRINGE (ML) INJECTION EVERY 12 HOURS SCHEDULED
Status: DISCONTINUED | OUTPATIENT
Start: 2019-05-01 | End: 2019-05-01 | Stop reason: HOSPADM

## 2019-05-01 RX ORDER — ONDANSETRON 2 MG/ML
4 INJECTION INTRAMUSCULAR; INTRAVENOUS EVERY 30 MIN PRN
Status: DISCONTINUED | OUTPATIENT
Start: 2019-05-01 | End: 2019-05-01 | Stop reason: HOSPADM

## 2019-05-01 RX ADMIN — PROPOFOL 100 MG: 10 INJECTION, EMULSION INTRAVENOUS at 13:30

## 2019-05-01 RX ADMIN — Medication 2 G: at 13:25

## 2019-05-01 RX ADMIN — SODIUM CHLORIDE, POTASSIUM CHLORIDE, SODIUM LACTATE AND CALCIUM CHLORIDE: 600; 310; 30; 20 INJECTION, SOLUTION INTRAVENOUS at 13:59

## 2019-05-01 RX ADMIN — OXYCODONE HYDROCHLORIDE AND ACETAMINOPHEN 1 TABLET: 5; 325 TABLET ORAL at 14:54

## 2019-05-01 RX ADMIN — ONDANSETRON 4 MG: 2 INJECTION, SOLUTION INTRAMUSCULAR; INTRAVENOUS at 13:37

## 2019-05-01 RX ADMIN — LIDOCAINE HYDROCHLORIDE 60 MG: 20 INJECTION, SOLUTION INFILTRATION; PERINEURAL at 13:30

## 2019-05-01 RX ADMIN — SODIUM CHLORIDE, POTASSIUM CHLORIDE, SODIUM LACTATE AND CALCIUM CHLORIDE: 600; 310; 30; 20 INJECTION, SOLUTION INTRAVENOUS at 13:23

## 2019-05-01 ASSESSMENT — PULMONARY FUNCTION TESTS
PIF_VALUE: 3
PIF_VALUE: 1
PIF_VALUE: 3
PIF_VALUE: 4
PIF_VALUE: 20
PIF_VALUE: 3
PIF_VALUE: 1
PIF_VALUE: 2
PIF_VALUE: 3
PIF_VALUE: 16
PIF_VALUE: 3
PIF_VALUE: 3
PIF_VALUE: 2
PIF_VALUE: 1
PIF_VALUE: 5
PIF_VALUE: 4

## 2019-05-01 ASSESSMENT — PAIN SCALES - GENERAL
PAINLEVEL_OUTOF10: 4
PAINLEVEL_OUTOF10: 0
PAINLEVEL_OUTOF10: 4

## 2019-05-01 ASSESSMENT — COPD QUESTIONNAIRES: CAT_SEVERITY: SEVERE

## 2019-05-01 ASSESSMENT — ENCOUNTER SYMPTOMS: SHORTNESS OF BREATH: 1

## 2019-05-01 NOTE — PROGRESS NOTES
Bedside report received from [facility] 26 and [address] [facility],Fourth Floor CRNA. Patient sleepy easy to arouse,BP on low side IV fluids, open wide, patient on 3 liters at the time at home. RN at bedside. Mara Shaikh

## 2019-05-01 NOTE — ANESTHESIA POSTPROCEDURE EVALUATION
05/01/2019 11:48 AM        K                        4.2                 05/01/2019 11:48 AM        CL                       98 (L)              05/01/2019 11:48 AM        CO2                      34 (H)              05/01/2019 11:48 AM        BUN                      20                  05/01/2019 11:48 AM        CREATININE               0.9                 05/01/2019 11:48 AM        GLUCOSE                  183 (H)             05/01/2019 11:48 AM   COAGS  Lab Results       Component                Value               Date/Time                  PROTIME                  13.1 (H)            01/25/2018 05:55 AM        INR                      1.16 (H)            01/25/2018 05:55 AM        APTT                     29.8                01/25/2018 05:55 AM     Intake & Output: In: 500 (I.V.:500)  Out: 0     Nausea & Vomiting:  No    Level of Consciousness:  Awake    Pain Assessment:  Adequate analgesia    Anesthesia Complications:  No apparent anesthetic complications    SUMMARY      Vital signs stable  OK to discharge from Stage I post anesthesia care.   Care transferred from Anesthesiology department on discharge from perioperative area

## 2019-05-01 NOTE — PROGRESS NOTES
Pt is alert and oriented, pt stable for discharge to home, iv out, cath intact pressure and dressing applied, pt tolerated well.  Pt and family received printed and verbal instructions for post opcare at per dr Elmer Gonzalez orders, pt fsbs was 133 in pacu phase, he drank 2 small diet ginerales,, snacked on amanda crackers and received one percocet po for pain rate 4 described as burning feeling, pt was placed back on his own home o2 oxygen tank and wheeled out via wheelchair by myself, pt stable for discharge

## 2019-05-01 NOTE — BRIEF OP NOTE
Brief Postoperative Note  ______________________________________________________________    Patient: Kelly Damon  YOB: 1940  MRN: [id number]  Date of Procedure: 5/1/2019    Pre-Op Diagnosis: URGENCY OF URINATION    Post-Op Diagnosis: Same       Procedure(s):  CYSTOSCOPY, RESECTION OF BLADDER NECK, URETHRAL DILATION    Anesthesia: General    Surgeon(s):  Meggan Gambino MD    Assistant:     Estimated Blood Loss (mL): less than 50     Complications: None    Specimens:   * No specimens in log *    Implants:  * No implants in log *      Drains: * No LDAs found *    Findings: BPH regrowth with ball valve, bladder neck contracture    Favio Michelle MD  Date: 5/1/2019  Time: 1:43 PM

## 2019-05-01 NOTE — ANESTHESIA PRE PROCEDURE
Department of Anesthesiology  Preprocedure Note       Name:  Sarita Valente   Age:  [de-identified] y.o.  :  1940                                          MRN:  [id number]         Date:  2019      Surgeon: Contreras Jerome):  Duane Flowers MD    Procedure: CYSTOSCOPY POSSIBLE BLADDER BIOPSY, POSSIBLE URETHRAL DILATION (N/A Bladder)    Medications prior to admission:   Prior to Admission medications    Medication Sig Start Date End Date Taking? Authorizing Provider   Fluticasone-Umeclidin-Vilant (Chapito Gambles) 100-62.5-25 MCG/INH AEPB INHALE 1 PUFF EVERY DAY 3/29/19  Yes John Koenig MD   VENTOLIN  (90 Base) MCG/ACT inhaler Inhale 2 puffs into the lungs every 6 hours as needed for Wheezing orShortness of Breath 18  Yes Saint Friar, MD   albuterol (PROVENTIL) (2.5 MG/3ML) 0.083% nebulizer solution Take 3 mLs by nebulization 4 times daily COPD J44.9 [facility] 18  Yes John Koenig MD   lactobacillus (CULTURELLE) capsule Take 1 capsule by mouth 2 times daily 18  Yes Vitor Guzman MD   vitamin D3 (CHOLECALCIFEROL) 5000 units TABS tablet Take 1 tablet by mouth daily 18  Yes Vitor Guzman MD   pravastatin (PRAVACHOL) 10 MG tablet Take 10 mg by mouth daily   Yes Historical Provider, MD   atenolol (TENORMIN) 100 MG tablet Take 100 mg by mouth daily   Yes Historical Provider, MD   lisinopril (PRINIVIL;ZESTRIL) 20 MG tablet Take 20 mg by mouth daily    Yes Historical Provider, MD   gabapentin (NEURONTIN) 300 MG capsule Take 600 mg by mouth 3 times daily . Yes Historical Provider, MD   OXYGEN Inhale 2.5 L into the lungs nightly Pt wears 2- 2/12 L at night.  3 L while walking/ moving around   Yes Historical Provider, MD   diltiazem (CARTIA XT) 240 MG ER capsule Take 1 capsule by mouth daily 10/12/15  Yes Kwasi Mejía MD   metformin (GLUCOPHAGE) 500 MG tablet Take 500 mg by mouth 4 times daily    Yes Historical Provider, MD   esomeprazole (NEXIUM) 40 MG capsule   Take 40 mg by mouth every morning (before breakfast) Indications: take dos    Yes Historical Provider, MD   alendronate (FOSAMAX) 70 MG tablet Take 70 mg by mouth every 7 days.      Yes Historical Provider, MD   doxazosin (CARDURA) 1 MG tablet Take 1 tablet by mouth daily 10/12/15   Edita Griffiths MD       Current medications:    Current Facility-Administered Medications   Medication Dose Route Frequency Provider Last Rate Last Dose    ceFAZolin (ANCEF) 2 g in sterile water 20 mL IV syringe  2 g Intravenous Once Duane Flowers MD        lactated ringers infusion   Intravenous Continuous Franca Darling MD        sodium chloride flush 0.9 % injection 10 mL  10 mL Intravenous 2 times per day Franca Darling MD        sodium chloride flush 0.9 % injection 10 mL  10 mL Intravenous PRN Franca Darling MD        lidocaine PF 1 % injection 1 mL  1 mL Intradermal Once PRN Franca Darling MD        oxyCODONE-acetaminophen (PERCOCET) 5-325 MG per tablet 1 tablet  1 tablet Oral PRN Farhan Rankin MD        Or    oxyCODONE-acetaminophen (PERCOCET) 5-325 MG per tablet 2 tablet  2 tablet Oral PRN Farhan Rankin MD        diphenhydrAMINE (BENADRYL) injection 6.25 mg  6.25 mg Intravenous Once PRN Farhan Rankin MD        ondansetron [facility]) injection 4 mg  4 mg Intravenous Q30 Min PRN Farhan Rankin MD        labetalol (NORMODYNE;TRANDATE) injection 5 mg  5 mg Intravenous Q15 Min PRN Farhan Rankin MD        hydrALAZINE (APRESOLINE) injection 5 mg  5 mg Intravenous Q30 Min PRN Farhan Rankin MD        meperidine (DEMEROL) injection 12.5 mg  12.5 mg Intravenous Q5 Min PRN Farhan Rankin MD           Allergies:  No Known Allergies    Problem List:    Patient Active Problem List   Diagnosis Code    HTN (hypertension) I10    COPD, severe ([facility] [address] J44.9    DM2 (diabetes mellitus, type 2) ([facility] [address] E11.9    HLD (hyperlipidemia) E78.5    PAF (paroxysmal atrial fibrillation) ([facility] [address] I48.0  Chronic respiratory failure with hypoxia and hypercapnia 2.5 L home O2 J96.11, J96.12    COPD exacerbation (HCC) J44.1    CAD (coronary artery disease) I25.10    Former smoker Z87.891    Acute hyperglycemia R73.9    Chronic normocytic anemia D64.9    Chronic thrombocytopenia D69.6    Falls at home W19. 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    ELAINE on CPAP     Osteoporosis     Skin cancer of face        Past Surgical History:        Procedure Laterality Date    BACK SURGERY      repair broken back L4 & L5    CYSTOSCOPY Right 10/9/15    RIght Ureteroscopy, Holmium Laser Lithotripsy with Stone Removal, Right Stent Placement    EYE SURGERY Left 2016    cataract removal    JOINT REPLACEMENT  2011    right knee    JOINT REPLACEMENT  2004    left knee    OTHER SURGICAL HISTORY  2015    wide excision basal cell carcinoma on the nose and bilateral auricles with frozen sections, plastic closure, full thickness skin graft    OTHER SURGICAL HISTORY  12/1/15    excision of lesion of lip    PROSTATE SURGERY      TURP  10/23/2015    with cystoscopy       Social History:    Social History     Tobacco Use    Smoking status: Former Smoker     Packs/day: 1.50     Years: 45.00     Pack years: 67.50     Last attempt to quit: 2005     Years since quittin.9    Smokeless tobacco: Never Used   Substance Use Topics    Alcohol use:  No                                Counseling given: Not Answered      Vital Signs (Current):   Vitals:    19 1509   Weight: 209 lb (94.8 kg)   Height: 6' (1.829 m)                                              BP Readings from Last 3 Encounters:   03/21/19 116/68   11/12/18 120/60   09/18/18 120/60       NPO Status:                                                                                 BMI:   Wt Readings from Last 3 Encounters:   04/26/19 209 lb (94.8 kg)   03/21/19 213 lb (96.6 kg)   11/12/18 204 lb (92.5 kg)     Body mass index is 28.35 kg/m². CBC:   Lab Results   Component Value Date    WBC 4.6 01/26/2018    RBC 3.52 01/26/2018    HGB 9.6 01/26/2018    HCT 28.6 01/26/2018    MCV 81.4 01/26/2018    RDW 16.2 01/26/2018    PLT 94 01/26/2018       CMP:   Lab Results   Component Value Date     01/26/2018    K 4.8 01/26/2018    CL 98 01/26/2018    CO2 29 01/26/2018    BUN 20 01/26/2018    CREATININE 0.8 01/26/2018    GFRAA >60 01/26/2018    GFRAA >60 07/01/2011    AGRATIO 1.3 01/24/2018    LABGLOM >60 01/26/2018    GLUCOSE 383 01/26/2018    PROT 6.4 01/24/2018    PROT 7.0 08/10/2010    CALCIUM 8.4 01/26/2018    BILITOT 0.4 01/24/2018    ALKPHOS 44 01/24/2018    AST 14 01/24/2018    ALT 17 01/24/2018       POC Tests: No results for input(s): POCGLU, POCNA, POCK, POCCL, POCBUN, POCHEMO, POCHCT in the last 72 hours.     Coags:   Lab Results   Component Value Date    PROTIME 13.1 01/25/2018    INR 1.16 01/25/2018    APTT 29.8 01/25/2018       HCG (If Applicable): No results found for: PREGTESTUR, PREGSERUM, HCG, HCGQUANT     ABGs:   Lab Results   Component Value Date    PHART 7.337 12/31/2017    PO2ART 43.7 12/31/2017    GEA5BXH 72.8 12/31/2017    VPY1SEI 38.1 12/31/2017    BEART 9.9 12/31/2017    W8NFXQVC 74.5 12/31/2017        Type & Screen (If Applicable):  Lab Results   Component Value Date    LABABO B 06/22/2011    [address] Positive 06/22/2011       Anesthesia Evaluation  Patient summary reviewed and Nursing notes reviewed no history of anesthetic complications:   Airway: Mallampati: II     Neck ROM: full   Dental:          Pulmonary:   (+) COPD (02 dependent): severe,  shortness of breath: chronic,  sleep apnea:                             Cardiovascular:    (+) hypertension:, CAD:,                   Neuro/Psych:               GI/Hepatic/Renal:             Endo/Other:    (+) Diabetes, . Abdominal:           Vascular:                                        Anesthesia Plan      general     ASA 4       Induction: intravenous. Anesthetic plan and risks discussed with patient. Plan discussed with CRNA.                   Marlon Banks MD   5/1/2019

## 2019-05-03 NOTE — OP NOTE
[address]                 [address] [facility], [address]                                OPERATIVE REPORT    PATIENT NAME: Roberth Jones                      :        1940  MED REC NO:   [id number]                          ROOM:  ACCOUNT NO:   [de-identified]                           ADMIT DATE: 2019  PROVIDER:     Ion Camejo MD    DATE OF PROCEDURE:  2019    PREOPERATIVE DIAGNOSIS:  Incomplete bladder emptying with outlet  obstruction. POSTOPERATIVE DIAGNOSIS:  Incomplete bladder emptying with outlet  obstruction. OPERATION PERFORMED:  Cystoscopy with transurethral resection and  incision of bladder neck contracture, ablation of prostate tissue. PRIMARY SURGEON:  Ion Camejo MD    ANESTHESIA:  General.    OPERATIVE FINDINGS:  1.  Bladder neck contracture. 2.  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 HISTORY:  This is an 42-year-old white male with a history of  obstructive and irritative voiding symptoms. He has a long history of  BPH-related problems and had undergone previous resection. He has now  presented with worsening obstructive voiding symptoms and was therefore  scheduled for a cystourethroscopy. DETAILS OF THE PROCEDURE:  After obtaining informed consent, the patient  was taken to the operative suite. He was given a general anesthetic and  placed on the operative table in a modified dorsal lithotomy position. Prepping and draping was done in a sterile fashion. Cystourethroscopy  was then performed with both 30 and 70-degree lenses. A ball valve of  regrowth of prostate tissue was noted at the bladder neck as well as a  tight bladder neck secondary to scar tissue. The scope was able to be  advanced beyond this after first dilated with [place] Jeannette sounds and  panendoscopy of the bladder was done with both 30 and 70-degree lenses. No significant mucosal lesions were noted.   The patient's bladder did  show diffuse trabeculation, but a biopsy was not needed to be performed  at this time. Ureteral orifices were orthotopic with clear efflux. At this point, using a Bugbee electrode, both cutting and cauterization  was done at the bladder neck contracture as well as ablation of the  small ball valve of prostate tissue. This effectively opened up the  outlet for a good urine flow. I decided not to place a Magana catheter  at this time as there was no further bleeding and the amount of  dissection was kept to the lateral aspects widening the bladder neck  contracture without difficulty. The patient's bladder was then drained  and lidocaine jelly was applied. He was taken back to the postop  recovery room in stable condition.         Tony Vazquez MD    D: 05/02/2019 17:52:40       T: 05/03/2019 2:47:47     [email or web address]  Job#: [id number]     Doc#: [id number]    CC:

## 2019-05-04 ENCOUNTER — HOSPITAL ENCOUNTER (EMERGENCY)
Age: 79
Discharge: HOME | End: 2019-05-04
Attending: EMERGENCY MEDICINE
Payer: MEDICARE

## 2019-05-04 ENCOUNTER — APPOINTMENT (OUTPATIENT)
Dept: GENERAL RADIOLOGY | Age: 79
End: 2019-05-04
Payer: MEDICARE

## 2019-05-04 VITALS
OXYGEN SATURATION: 94 % | BODY MASS INDEX: 39.28 KG/M2 | HEART RATE: 78 BPM | SYSTOLIC BLOOD PRESSURE: 110 MMHG | RESPIRATION RATE: 20 BRPM | HEIGHT: 72 IN | DIASTOLIC BLOOD PRESSURE: 56 MMHG | WEIGHT: 290 LBS

## 2019-05-04 DIAGNOSIS — S93.492A SPRAIN OF ANTERIOR TALOFIBULAR LIGAMENT OF LEFT ANKLE, INITIAL ENCOUNTER: Primary | ICD-10-CM

## 2019-05-04 PROCEDURE — 99283 EMERGENCY DEPT VISIT LOW MDM: CPT

## 2019-05-04 PROCEDURE — 73610 X-RAY EXAM OF ANKLE: CPT

## 2019-05-04 ASSESSMENT — PAIN DESCRIPTION - LOCATION: LOCATION: ANKLE;FOOT

## 2019-05-04 ASSESSMENT — PAIN DESCRIPTION - FREQUENCY: FREQUENCY: CONTINUOUS

## 2019-05-04 ASSESSMENT — PAIN DESCRIPTION - PAIN TYPE
TYPE: ACUTE PAIN
TYPE: ACUTE PAIN

## 2019-05-04 ASSESSMENT — PAIN SCALES - GENERAL
PAINLEVEL_OUTOF10: 10
PAINLEVEL_OUTOF10: 5

## 2019-05-04 ASSESSMENT — PAIN DESCRIPTION - PROGRESSION: CLINICAL_PROGRESSION: GRADUALLY WORSENING

## 2019-05-04 NOTE — ED PROVIDER NOTES
[facility]  eMERGENCYdEPARTMENT eNCOUnter      Pt Name: Leesa Chua  MRN: [id number]  [place] 1940  Date of evaluation: 5/4/2019  Provider:Chang Fuentes MD    [address] [place] [address]       Chief Complaint   Patient presents with    Ankle Pain     Twisted ankle a few days ago, pain continues to get worse and swollen          HISTORY OF PRESENT ILLNESS    Leesa Chua is a [de-identified] y.o. male who presents to the emergency department with L ankle pain. 2d ago he slipped as he was walking from the bathroom, rolled his L ankle. Now has 5/10 constant pain worse with walking, needs to use walker which is abnormal for him. No other associated symptoms. Not better with anything. Nursing Notes were reviewed. REVIEW OF SYSTEMS       Review of Systems    10 point review of systems was performed and was negative exceptas specifically noted in the HPI.       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    ELAINE on CPAP     Osteoporosis     Skin cancer of face          SURGICAL HISTORY       Past Surgical History:   Procedure Laterality Date    BACK SURGERY      repair broken back L4 & L5    CYSTOSCOPY Right 10/9/15    RIght Ureteroscopy, Holmium Laser Lithotripsy with Stone Removal, Right Stent Placement    CYSTOSCOPY  05/01/2019    CYSTOSCOPY, RESECTION OF BLADDER NECK, URETHRAL DILATION    CYSTOSCOPY W BIOPSY OF BLADDER N/A 5/1/2019    CYSTOSCOPY, RESECTION OF BLADDER NECK, URETHRAL DILATION performed by Arun Rubio MD at [facility] Left 6/12/2016    cataract removal    JOINT REPLACEMENT  6/29/2011    right knee    JOINT REPLACEMENT 11/2004    left knee    OTHER SURGICAL HISTORY  08/31/2015    wide excision basal cell carcinoma on the nose and bilateral auricles with frozen sections, plastic closure, full thickness skin graft    OTHER SURGICAL HISTORY  12/1/15    excision of lesion of lip    PROSTATE SURGERY      TURP  10/23/2015    with cystoscopy         CURRENT MEDICATIONS       Discharge Medication List as of 5/4/2019 12:07 PM      CONTINUE these medications which have NOT CHANGED    Details   celecoxib (CELEBREX) 200 MG capsule Take 200 mg by mouth dailyHistorical Med      phenazopyridine (PYRIDIUM) 200 MG tablet Take 1 tablet by mouth 3 times daily as needed for Pain, Disp-15 tablet, R-0Print      sulfamethoxazole-trimethoprim (BACTRIM) 400-80 MG per tablet Take 1 tablet by mouth 2 times daily for 4 days, Disp-8 tablet, R-0Print      Fluticasone-Umeclidin-Vilant (TRELEGY ELLIPTA) 100-62.5-25 MCG/INH AEPB INHALE 1 PUFF EVERY DAY, Disp-1 each, R-5Normal      VENTOLIN  (90 Base) MCG/ACT inhaler Inhale 2 puffs into the lungs every 6 hours as needed for Wheezing orShortness of Breath, Disp-18 g, R-5Normal      albuterol (PROVENTIL) (2.5 MG/3ML) 0.083% nebulizer solution Take 3 mLs by nebulization 4 times daily COPD J44.9 [employer], Disp-300 mL, R-5Print      lactobacillus (CULTURELLE) capsule Take 1 capsule by mouth 2 times daily, Disp-20 capsule, R-0Print      vitamin D3 (CHOLECALCIFEROL) 5000 units TABS tablet Take 1 tablet by mouth daily, Disp-30 tablet, R-1Print      pravastatin (PRAVACHOL) 10 MG tablet Take 10 mg by mouth dailyHistorical Med      atenolol (TENORMIN) 100 MG tablet Take 100 mg by mouth daily      lisinopril (PRINIVIL;ZESTRIL) 20 MG tablet Take 20 mg by mouth daily Historical Med      gabapentin (NEURONTIN) 300 MG capsule Take 600 mg by mouth 3 times daily . Historical Med      OXYGEN Inhale 2.5 L into the lungs nightly Pt wears 2- 2/12 L at night.  3 L while walking/ moving aroundHistorical Med      doxazosin sexual activity: None   Other Topics Concern    None   Social History Narrative    None       SCREENINGS   @MN([id number])@         PHYSICAL EXAM       ED Triage Vitals [05/04/19 1103]   BP Temp Temp src Pulse Resp SpO2 Height Weight   (!) 106/53 -- -- 82 18 94 % 6' (1.829 m) 290 lb (131.5 kg)       Physical Exam  General appearance: Alert, cooperative, no distress, appears stated age. Head:  Normocephalic, without obvious abnormality, atraumatic. HEENT: Mucous membranes moist.  Neck: Full ROM, trachea midline, no JVD  Lungs: No respiratory distress  Cardiovasular: Perfusing extremities  Abdomen: Nontender, no guarding  Extremities: TTP at the L Lateral malleolus of ankle and ATFL ligament, associated swelling without erythema or effusion  Skin: No rashes or lesions to exposed skin  Neurologic: Alert and oriented x3, motor grossly normal, clear speech    DIAGNOSTIC RESULTS     EKG:     RADIOLOGY:   Non-plain film images such as CT, Ultrasound and MRI are read by the radiologist.Plain radiographic images are visualized and preliminarily interpreted by the emergency physician with the below findings:    No fx    Interpretation per the Radiologist below, if available at the time of this note:    XR ANKLE LEFT (MIN 3 VIEWS)   Final Result   Soft tissue edema presumably reactive or posttraumatic. No acute osseous abnormality is identified. Note that if pain persists or worsens, follow-up imaging may be indicated. EDBEDSIDE ULTRASOUND:   Performed by Joanne Hendricks - none    LABS:  Labs Reviewed - No data to display    All other labs were within normal range or not returned as of this dictation. EMERGENCY DEPARTMENT COURSE and DIFFERENTIAL DIAGNOSIS/MDM:   Vitals:    Vitals:    05/04/19 1103 05/04/19 1213   BP: (!) 106/53 (!) 110/56   Pulse: 82 78   Resp: 18 20   SpO2: 94%    Weight: 290 lb (131.5 kg)    Height: 6' (1.829 m)        MDM  Pt presents with rolled ankle. At presentation VSS.  On exam there is swelling without gross deformity. Will perform XR. No fx on XR. Will dc to home with ace wrap. REASSESSMENT          CRITICAL CARE TIME   Total Critical Care time was 0 minutes, excluding separately reportable procedures. There was a high probability of clinically significant/life threatening deteriorationin the patient's condition which required my urgent intervention. CONSULTS:  None     PROCEDURES:  Unless otherwise noted below, none     Procedures    FINAL IMPRESSION      1.  Sprain of anterior talofibular ligament of left ankle, initial encounter          DISPOSITION/PLAN   DISPOSITION Decision To Discharge 05/04/2019 12:06:30 PM      PATIENT REFERRED TO:  Peyman Escalera MD  [address] [place] [address]  [phone]    Schedule an appointment as soon as possible for a visit         DISCHARGE MEDICATIONS:  Discharge Medication List as of 5/4/2019 12:07 PM             (Please note that portions of this note were completed with a voicerecognition program.  Efforts were made to edit the dictations but occasionally words are mis-transcribed.)    Zainab Parsons MD (electronically signed)  Attending Emergency Physician            Zainab Parsons MD  05/04/19 0222

## 2019-05-04 NOTE — ED NOTES
Ambulatory from department without difficulty. No distress noted. Pt instructed to follow up with family doctor or doctor referred by ED physician. Pt also given number to the Pt advocate. Pt reports no further needs or questions prior to discharge.      Bertha Onofre RN  05/04/19 6091

## 2019-07-18 RX ORDER — ALBUTEROL SULFATE 2.5 MG/3ML
2.5 SOLUTION RESPIRATORY (INHALATION) 4 TIMES DAILY
Qty: 300 ML | Refills: 5 | Status: SHIPPED | OUTPATIENT
Start: 2019-07-18 | End: 2019-07-22 | Stop reason: SDUPTHER

## 2019-07-18 RX ORDER — ALBUTEROL SULFATE 2.5 MG/3ML
2.5 SOLUTION RESPIRATORY (INHALATION) 4 TIMES DAILY
Qty: 300 ML | Refills: 5 | Status: CANCELLED | OUTPATIENT
Start: 2019-07-18

## 2019-07-22 DIAGNOSIS — J44.9 CHRONIC OBSTRUCTIVE PULMONARY DISEASE, UNSPECIFIED COPD TYPE (HCC): Primary | ICD-10-CM

## 2019-07-22 RX ORDER — ALBUTEROL SULFATE 2.5 MG/3ML
2.5 SOLUTION RESPIRATORY (INHALATION) 4 TIMES DAILY
Qty: 300 ML | Refills: 5 | Status: SHIPPED | OUTPATIENT
Start: 2019-07-22 | End: 2020-09-28

## 2019-07-22 NOTE — TELEPHONE ENCOUNTER
Patient needs refill of albuterol (PROVENTIL) (2.5 MG/3ML) 0.083% nebulizer solution sent to [facility]. Script sent to [facility] pharmacy was denied due to need for PA.     LOV: 3/21/19    ASSESSMENT:  · Very Severe COPD   · Chronic hypoxic respiratory failure  · SUTTON due to the above  · Chronic Thrombcytopenia  · Not addressed: Afib, DM2     PLAN:   · Continue Trelegy  · Albuterol nebs q4hr PRN  · Prn albuterol nebs   · Supplemental O2 to 3lpm with exertion  · Pulmonary rehab declined.    · Pneumovax 23 and prevnar 13 UTD;  flu vaccine UTD  · F/u in 6 months

## 2019-09-13 ENCOUNTER — OFFICE VISIT (OUTPATIENT)
Dept: PULMONOLOGY | Age: 79
End: 2019-09-13
Payer: MEDICARE

## 2019-09-13 VITALS
OXYGEN SATURATION: 97 % | HEIGHT: 72 IN | DIASTOLIC BLOOD PRESSURE: 72 MMHG | HEART RATE: 66 BPM | WEIGHT: 215 LBS | RESPIRATION RATE: 18 BRPM | BODY MASS INDEX: 29.12 KG/M2 | SYSTOLIC BLOOD PRESSURE: 132 MMHG

## 2019-09-13 DIAGNOSIS — J96.11 CHRONIC RESPIRATORY FAILURE WITH HYPOXIA AND HYPERCAPNIA (HCC): ICD-10-CM

## 2019-09-13 DIAGNOSIS — D69.6 THROMBOCYTOPENIA (HCC): ICD-10-CM

## 2019-09-13 DIAGNOSIS — J96.12 CHRONIC RESPIRATORY FAILURE WITH HYPOXIA AND HYPERCAPNIA (HCC): ICD-10-CM

## 2019-09-13 DIAGNOSIS — Z23 NEED FOR INFLUENZA VACCINATION: ICD-10-CM

## 2019-09-13 DIAGNOSIS — J44.9 COPD, SEVERE (HCC): Primary | ICD-10-CM

## 2019-09-13 PROCEDURE — G8926 SPIRO NO PERF OR DOC: HCPCS | Performed by: INTERNAL MEDICINE

## 2019-09-13 PROCEDURE — G8417 CALC BMI ABV UP PARAM F/U: HCPCS | Performed by: INTERNAL MEDICINE

## 2019-09-13 PROCEDURE — G0008 ADMIN INFLUENZA VIRUS VAC: HCPCS | Performed by: INTERNAL MEDICINE

## 2019-09-13 PROCEDURE — 1036F TOBACCO NON-USER: CPT | Performed by: INTERNAL MEDICINE

## 2019-09-13 PROCEDURE — G8427 DOCREV CUR MEDS BY ELIG CLIN: HCPCS | Performed by: INTERNAL MEDICINE

## 2019-09-13 PROCEDURE — 99214 OFFICE O/P EST MOD 30 MIN: CPT | Performed by: INTERNAL MEDICINE

## 2019-09-13 PROCEDURE — G8599 NO ASA/ANTIPLAT THER USE RNG: HCPCS | Performed by: INTERNAL MEDICINE

## 2019-09-13 PROCEDURE — 90662 IIV NO PRSV INCREASED AG IM: CPT | Performed by: INTERNAL MEDICINE

## 2019-09-13 PROCEDURE — 1123F ACP DISCUSS/DSCN MKR DOCD: CPT | Performed by: INTERNAL MEDICINE

## 2019-09-13 PROCEDURE — 3023F SPIROM DOC REV: CPT | Performed by: INTERNAL MEDICINE

## 2019-09-13 PROCEDURE — 4040F PNEUMOC VAC/ADMIN/RCVD: CPT | Performed by: INTERNAL MEDICINE

## 2019-09-13 NOTE — PROGRESS NOTES
MG tablet, Take 1 tablet by mouth daily, Disp: 30 tablet, Rfl: 3    diltiazem (CARTIA XT) 240 MG ER capsule, Take 1 capsule by mouth daily, Disp: 30 capsule, Rfl: 3    metformin (GLUCOPHAGE) 500 MG tablet, Take 500 mg by mouth 4 times daily , Disp: , Rfl:     esomeprazole (NEXIUM) 40 MG capsule,  Take 40 mg by mouth every morning (before breakfast) Indications: take dos , Disp: , Rfl:     alendronate (FOSAMAX) 70 MG tablet, Take 70 mg by mouth every 7 days. , Disp: , Rfl:     Objective:   PHYSICAL EXAM:    VITALS:  /72   Pulse 66   Resp 18   Ht 6' (1.829 m)   Wt 215 lb (97.5 kg)   SpO2 97% Comment: 3LPM  BMI 29.16 kg/m²   Constitutional: In no acute distress. Appears stated age. Well developed and nourished  Eyes: PERRL. No sclera icterus. No conjunctival injection. ENT: Oropharynx clear. Neck: Trachea midline. No thyroid tenderness. Lymph: No cervical LAD. No supraclavicular LAD. Resp: No accessory muscle use. Decreased BS. No crackles. No wheezes. No rhonchi. CV: Regular rate. Regular rhythm. No murmur or rub. No lower extremity edema. Skin: Warm and dry. No nodules on exposed extremities. No rash on exposed extremities. Musc: No clubbing. No cyanosis. No synovitis or joint deformity in digits. Psych: Oriented x 3. Mood and affect normal. Intact judgment and insight. LABS:  Reviewed any pertinent new labs that are available.     PFTs 7/13/17  FVC  (35%) FEV1 0.57 (18%)  FEV1/FVC ratio 37  TLC  (146%)  RV  (300%)   DLCO (25%) Bronchodilator response:+++    6MWT: 360ft, 3lpm     IMAGING:  I personally reviewed and interpreted the following today in the office:   7/21/17 Chest CT:   Mediastinum: Limited evaluation without IV contrast.  Heavy coronary artery   calcifications are noted. Marie Sree prominent precarinal lymph node measures 8   mm short axis diameter.       Lungs/pleura: Retained secretions are noted in the lower trachea and right   main stem bronchus.  Small calcified granuloma right upper lobe measures 8   mm, accounting for the plain film finding reported recent.  Mild bronchial   wall thickening in the lower lobes.  Minimal subpleural reticular opacities   right middle lobe, likely postinflammatory.  Moderate to severe centrilobular   type emphysematous changes are noted bilaterally.  Mild fibrotic changes   posterior right lower lobe.  No pleural effusion appreciated.       Upper Abdomen: Limited evaluation without acute finding detected.  Shrunken   gallbladder containing stones.       Soft Tissues/Bones: Incidental hemangioma in the T12 vertebral body.  No   acute bony abnormality detected.  Moderate diffuse degenerative disc disease. Moderate compressive deformity T5 vertebral body.  This appears similar to   appearance from chest x-ray 03/07/2017.           Impression   The questioned right suprahilar nodular density corresponds to a benign   calcified granuloma by chest CT scan.  No additional follow-up is necessary   for this.       Moderate to severe centrilobular type emphysema is noted.       Retained secretions within the lower trachea and right mainstem bronchus. Mild bronchial wall thickening in the lower lobes.       Scattered postinflammatory changes are noted within the lungs.        ASSESSMENT:  · Very Severe COPD   · Chronic hypoxic respiratory failure  · SUTTON due to the above  · Chronic Thrombcytopenia  · Not addressed: Afib, DM2     PLAN:   · Continue Trelegy  · Albuterol nebs q4hr PRN  · Prn albuterol nebs   · Supplemental O2 to 3lpm with exertion  Pulmonary rehab declined.    · Pneumovax 23 and prevnar 13 UTD;  flu vaccine UTD  · F/u in 6 months

## 2019-09-13 NOTE — PATIENT INSTRUCTIONS
Vaccine Information Statement    Influenza (Flu) Vaccine (Inactivated or Recombinant): What you need to know    Many Vaccine Information Statements are available in Greek and other languages. See www.immunize.org/vis  Hojas de Información Sobre Vacunas están disponibles en Español y en muchos otros idiomas. Visite www.immunize.org/vis    1. Why get vaccinated? Influenza (flu) is a contagious disease that spreads around the [place] every year, usually between October and May. Flu is caused by influenza viruses, and is spread mainly by coughing, sneezing, and close contact. Anyone can get flu. Flu strikes suddenly and can last several days. Symptoms vary by age, but can include:   fever/chills   sore throat   muscle aches   fatigue   cough   headache    runny or stuffy nose    Flu can also lead to pneumonia and blood infections, and cause diarrhea and seizures in children. If you have a medical condition, such as heart or lung disease, flu can make it worse. Flu is more dangerous for some people. Infants and young children, people 72years of age and older, pregnant women, and people with certain health conditions or a weakened immune system are at greatest risk. Each year thousands of people in the [facility] die from flu, and many more are hospitalized. Flu vaccine can:   keep you from getting flu,   make flu less severe if you do get it, and   keep you from spreading flu to your family and other people. 2. Inactivated and recombinant flu vaccines    A dose of flu vaccine is recommended every flu season. Children 6 months through 6years of age may need two doses during the same flu season. Everyone else needs only one dose each flu season.        Some inactivated flu vaccines contain a very small amount of a mercury-based preservative called thimerosal. Studies have not shown thimerosal in vaccines to be harmful, but flu vaccines that do not contain thimerosal are or 2 days. More serious problems following a flu shot can include the following:     There may be a small increased risk of Guillain-Barré Syndrome (GBS) after inactivated flu vaccine. This risk has been estimated at 1 or 2 additional cases per million people vaccinated. This is much lower than the risk of severe complications from flu, which can be prevented by flu vaccine.  Young children who get the flu shot along with pneumococcal vaccine (PCV13) and/or DTaP vaccine at the same time might be slightly more likely to have a seizure caused by fever. Ask your doctor for more information. Tell your doctor if a child who is getting flu vaccine has ever had a seizure. Problems that could happen after any injected vaccine:      People sometimes faint after a medical procedure, including vaccination. Sitting or lying down for about 15 minutes can help prevent fainting, and injuries caused by a fall. Tell your doctor if you feel dizzy, or have vision changes or ringing in the ears.  Some people get severe pain in the shoulder and have difficulty moving the arm where a shot was given. This happens very rarely.  Any medication can cause a severe allergic reaction. Such reactions from a vaccine are very rare, estimated at about 1 in a million doses, and would happen within a few minutes to a few hours after the vaccination. As with any medicine, there is a very remote chance of a vaccine causing a serious injury or death. The safety of vaccines is always being monitored. For more information, visit: www.cdc.gov/vaccinesafety/    5. What if there is a serious reaction? What should I look for?  Look for anything that concerns you, such as signs of a severe allergic reaction, very high fever, or unusual behavior.     Signs of a severe allergic reaction can include hives, swelling of the face and throat, difficulty breathing, a fast heartbeat, dizziness, and weakness - usually within a few minutes to a few hours after the vaccination. What should I do?  If you think it is a severe allergic reaction or other emergency that cant wait, call 9-1-1 and get the person to the nearest hospital. Otherwise, call your doctor.  Reactions should be reported to the Vaccine Adverse Event Reporting System (VAERS). Your doctor should file this report, or you can do it yourself through  the VAERS web site at www.vaers. [facility].gov, or by calling 9-101.941.2491. VAERS does not give medical advice. 6. The National Vaccine Injury Compensation Program    The [facility] Vaccine Injury Compensation Program (VICP) is a federal program that was created to compensate people who may have been injured by certain vaccines. Persons who believe they may have been injured by a vaccine can learn about the program and about filing a claim by calling 9-812.719.6023 or visiting the [employer] website at www.[facility].gov/vaccinecompensation. There is a time limit to file a claim for compensation. 7. How can I learn more?  Ask your healthcare provider. He or she can give you the vaccine package insert or suggest other sources of information.  Call your local or state health department.  Contact the Centers for Disease Control and Prevention (CDC):  - Call 0-760.763.8445 (1-800-CDC-INFO) or  - Visit CDCs website at www.cdc.gov/flu    Vaccine Information Statement   Inactivated Influenza Vaccine   8/7/2015  42 KAVITA Zia Wilian [id number]    Department of Health and Human Services  Centers for Disease Control and Prevention    Office Use Only

## 2020-02-12 ENCOUNTER — TELEPHONE (OUTPATIENT)
Dept: ENT CLINIC | Age: 80
End: 2020-02-12

## 2020-02-12 NOTE — TELEPHONE ENCOUNTER
Spoke with patient about referral we received from [address] [place] [address], pt will call back when he is ready to schedule.

## 2020-02-28 ENCOUNTER — OFFICE VISIT (OUTPATIENT)
Dept: ENT CLINIC | Age: 80
End: 2020-02-28
Payer: MEDICARE

## 2020-02-28 VITALS
DIASTOLIC BLOOD PRESSURE: 73 MMHG | BODY MASS INDEX: 29.93 KG/M2 | SYSTOLIC BLOOD PRESSURE: 143 MMHG | HEART RATE: 91 BPM | WEIGHT: 221 LBS | HEIGHT: 72 IN

## 2020-02-28 PROCEDURE — 99203 OFFICE O/P NEW LOW 30 MIN: CPT | Performed by: OTOLARYNGOLOGY

## 2020-02-28 PROCEDURE — 92511 NASOPHARYNGOSCOPY: CPT | Performed by: OTOLARYNGOLOGY

## 2020-02-28 PROCEDURE — G8482 FLU IMMUNIZE ORDER/ADMIN: HCPCS | Performed by: OTOLARYNGOLOGY

## 2020-02-28 PROCEDURE — 4040F PNEUMOC VAC/ADMIN/RCVD: CPT | Performed by: OTOLARYNGOLOGY

## 2020-02-28 PROCEDURE — G8427 DOCREV CUR MEDS BY ELIG CLIN: HCPCS | Performed by: OTOLARYNGOLOGY

## 2020-02-28 PROCEDURE — G8417 CALC BMI ABV UP PARAM F/U: HCPCS | Performed by: OTOLARYNGOLOGY

## 2020-02-28 PROCEDURE — 1123F ACP DISCUSS/DSCN MKR DOCD: CPT | Performed by: OTOLARYNGOLOGY

## 2020-02-28 PROCEDURE — 1036F TOBACCO NON-USER: CPT | Performed by: OTOLARYNGOLOGY

## 2020-02-28 PROCEDURE — 11104 PUNCH BX SKIN SINGLE LESION: CPT | Performed by: OTOLARYNGOLOGY

## 2020-02-28 RX ORDER — DOXAZOSIN MESYLATE 4 MG/1
TABLET ORAL
COMMUNITY
Start: 2020-01-31 | End: 2020-02-28

## 2020-02-28 RX ORDER — PIOGLITAZONEHYDROCHLORIDE 30 MG/1
30 TABLET ORAL DAILY
COMMUNITY

## 2020-02-28 ASSESSMENT — ENCOUNTER SYMPTOMS
VOICE CHANGE: 0
APNEA: 0
TROUBLE SWALLOWING: 0
EYE ITCHING: 0
SHORTNESS OF BREATH: 0
FACIAL SWELLING: 0
SINUS PRESSURE: 0
SORE THROAT: 0
COUGH: 0

## 2020-02-28 NOTE — PROGRESS NOTES
to take a sample from the pigmented lesion of the right temple. This was placed into formalin for pathological evaluation. The site was closed with 5-0 fast absorbing gut in interrupted fashion. *Patient tolerated the procedure well with no complications  *Patient instructed to place antibiotic ointment over the site three times daily for the next 4-5 days, then use vaseline    Rigid Nasopharyngoscopy    Preop:right serous otitis media  Postop:same  Anes: topical lidocaine with afrin  Consent: verbal  Description:  After obtaining verbal consent from the patient 4% lidocaine with afrin was sprayed into the nasal cavities. After allowing a time for anesthesia, a nasal endoscope was placed into the naris. The septum, inferior, and middle turbinates were examined. The middle meatus, and sphenoethmoid recess was examined bilaterally. The scope was passed to the nasopharynx. Examination revealed normal torus tubarius and eustachian tube opening. There was no evidence of concerning masses or lesions. The posterior nasopharyngeal wall was clear. The scope was removed. There were no complications with the procedure. The patient tolerated the procedure well. Assessment and Plan     1. Non-recurrent acute serous otitis media of right ear  -nasopharyngoscopy normal  -has already been given oral steroids and started on flonase  -continue flonase  -advised that if it does not resolve after 3 months we can perform in office myringotomy to drain persistent fluid    2. Pigmented skin lesion of uncertain nature  -biopsy performed today  -will call patient with results  - SURGICAL PATHOLOGY      I will call with results. Vinny Cadena, DO  2/28/20      Portions of this note were dictated using Dragon.  There may be linguistic errors secondary to the use of this program.

## 2020-03-17 ENCOUNTER — TELEPHONE (OUTPATIENT)
Dept: PULMONOLOGY | Age: 80
End: 2020-03-17

## 2020-03-31 RX ORDER — FLUTICASONE FUROATE, UMECLIDINIUM BROMIDE AND VILANTEROL TRIFENATATE 100; 62.5; 25 UG/1; UG/1; UG/1
POWDER RESPIRATORY (INHALATION)
Qty: 1 EACH | Refills: 5 | Status: SHIPPED | OUTPATIENT
Start: 2020-03-31 | End: 2020-10-01

## 2020-04-01 ENCOUNTER — HOSPITAL ENCOUNTER (INPATIENT)
Age: 80
LOS: 8 days | Discharge: HOME | DRG: 208 | End: 2020-04-09
Attending: EMERGENCY MEDICINE | Admitting: INTERNAL MEDICINE
Payer: MEDICARE

## 2020-04-01 ENCOUNTER — APPOINTMENT (OUTPATIENT)
Dept: GENERAL RADIOLOGY | Age: 80
DRG: 208 | End: 2020-04-01
Payer: MEDICARE

## 2020-04-01 PROBLEM — J96.22 ACUTE ON CHRONIC RESPIRATORY FAILURE WITH HYPOXIA AND HYPERCAPNIA (HCC): Status: ACTIVE | Noted: 2020-04-01

## 2020-04-01 PROBLEM — J96.21 ACUTE ON CHRONIC RESPIRATORY FAILURE WITH HYPOXIA AND HYPERCAPNIA (HCC): Status: ACTIVE | Noted: 2020-04-01

## 2020-04-01 LAB
A/G RATIO: 1.8 (ref 1.1–2.2)
ALBUMIN SERPL-MCNC: 4.2 G/DL (ref 3.4–5)
ALP BLD-CCNC: 48 U/L (ref 40–129)
ALT SERPL-CCNC: 12 U/L (ref 10–40)
ANION GAP SERPL CALCULATED.3IONS-SCNC: 10 MMOL/L (ref 3–16)
AST SERPL-CCNC: 14 U/L (ref 15–37)
BASE EXCESS ARTERIAL: 5.1 MMOL/L (ref -3–3)
BASE EXCESS VENOUS: 8.9 MMOL/L (ref -3–3)
BASOPHILS ABSOLUTE: 0 K/UL (ref 0–0.2)
BASOPHILS RELATIVE PERCENT: 0.2 %
BILIRUB SERPL-MCNC: 0.7 MG/DL (ref 0–1)
BUN BLDV-MCNC: 22 MG/DL (ref 7–20)
CALCIUM SERPL-MCNC: 8.3 MG/DL (ref 8.3–10.6)
CARBOXYHEMOGLOBIN ARTERIAL: 0.7 % (ref 0–1.5)
CARBOXYHEMOGLOBIN: 4 % (ref 0–1.5)
CHLORIDE BLD-SCNC: 98 MMOL/L (ref 99–110)
CO2: 37 MMOL/L (ref 21–32)
CREAT SERPL-MCNC: 1.3 MG/DL (ref 0.8–1.3)
EOSINOPHILS ABSOLUTE: 0 K/UL (ref 0–0.6)
EOSINOPHILS RELATIVE PERCENT: 0.3 %
GFR AFRICAN AMERICAN: >60
GFR NON-AFRICAN AMERICAN: 53
GLOBULIN: 2.3 G/DL
GLUCOSE BLD-MCNC: 195 MG/DL (ref 70–99)
HCO3 ARTERIAL: 34.6 MMOL/L (ref 21–29)
HCO3 VENOUS: 39 MMOL/L (ref 23–29)
HCT VFR BLD CALC: 30 % (ref 40.5–52.5)
HEMOGLOBIN, ART, EXTENDED: 10.1 G/DL (ref 13.5–17.5)
HEMOGLOBIN: 9.3 G/DL (ref 13.5–17.5)
LACTIC ACID, SEPSIS: 1.3 MMOL/L (ref 0.4–1.9)
LYMPHOCYTES ABSOLUTE: 1.1 K/UL (ref 1–5.1)
LYMPHOCYTES RELATIVE PERCENT: 20.1 %
MCH RBC QN AUTO: 26.3 PG (ref 26–34)
MCHC RBC AUTO-ENTMCNC: 31 G/DL (ref 31–36)
MCV RBC AUTO: 85 FL (ref 80–100)
METHEMOGLOBIN ARTERIAL: 0 %
METHEMOGLOBIN VENOUS: 0.3 %
MONOCYTES ABSOLUTE: 0.5 K/UL (ref 0–1.3)
MONOCYTES RELATIVE PERCENT: 8.7 %
NEUTROPHILS ABSOLUTE: 3.8 K/UL (ref 1.7–7.7)
NEUTROPHILS RELATIVE PERCENT: 70.7 %
O2 CONTENT ARTERIAL: 14 ML/DL
O2 CONTENT, VEN: 8 VOL %
O2 SAT, ARTERIAL: 96.4 %
O2 SAT, VEN: 44 %
O2 THERAPY: ABNORMAL
O2 THERAPY: ABNORMAL
PCO2 ARTERIAL: 85 MMHG (ref 35–45)
PCO2, VEN: 94.3 MMHG (ref 40–50)
PDW BLD-RTO: 17.6 % (ref 12.4–15.4)
PH ARTERIAL: 7.23 (ref 7.35–7.45)
PH VENOUS: 7.23 (ref 7.35–7.45)
PLATELET # BLD: 85 K/UL (ref 135–450)
PLATELET SLIDE REVIEW: ABNORMAL
PMV BLD AUTO: 8.8 FL (ref 5–10.5)
PO2 ARTERIAL: 104.7 MMHG (ref 75–108)
PO2, VEN: 30.1 MMHG (ref 25–40)
POTASSIUM REFLEX MAGNESIUM: 4.3 MMOL/L (ref 3.5–5.1)
PRO-BNP: 1618 PG/ML (ref 0–449)
PROCALCITONIN: 0.19 NG/ML (ref 0–0.15)
PROCALCITONIN: 0.26 NG/ML (ref 0–0.15)
RAPID INFLUENZA  B AGN: NEGATIVE
RAPID INFLUENZA A AGN: NEGATIVE
RBC # BLD: 3.53 M/UL (ref 4.2–5.9)
SODIUM BLD-SCNC: 145 MMOL/L (ref 136–145)
TCO2 ARTERIAL: 37.2 MMOL/L
TCO2 CALC VENOUS: 42 MMOL/L
TOTAL PROTEIN: 6.5 G/DL (ref 6.4–8.2)
TROPONIN: <0.01 NG/ML
WBC # BLD: 5.4 K/UL (ref 4–11)

## 2020-04-01 PROCEDURE — 84145 PROCALCITONIN (PCT): CPT

## 2020-04-01 PROCEDURE — 85025 COMPLETE CBC W/AUTO DIFF WBC: CPT

## 2020-04-01 PROCEDURE — 83880 ASSAY OF NATRIURETIC PEPTIDE: CPT

## 2020-04-01 PROCEDURE — 87804 INFLUENZA ASSAY W/OPTIC: CPT

## 2020-04-01 PROCEDURE — 83036 HEMOGLOBIN GLYCOSYLATED A1C: CPT

## 2020-04-01 PROCEDURE — 6370000000 HC RX 637 (ALT 250 FOR IP): Performed by: INTERNAL MEDICINE

## 2020-04-01 PROCEDURE — 2580000003 HC RX 258: Performed by: PHYSICIAN ASSISTANT

## 2020-04-01 PROCEDURE — 82803 BLOOD GASES ANY COMBINATION: CPT

## 2020-04-01 PROCEDURE — 36415 COLL VENOUS BLD VENIPUNCTURE: CPT

## 2020-04-01 PROCEDURE — 84484 ASSAY OF TROPONIN QUANT: CPT

## 2020-04-01 PROCEDURE — 6370000000 HC RX 637 (ALT 250 FOR IP): Performed by: PHYSICIAN ASSISTANT

## 2020-04-01 PROCEDURE — 71045 X-RAY EXAM CHEST 1 VIEW: CPT

## 2020-04-01 PROCEDURE — 96374 THER/PROPH/DIAG INJ IV PUSH: CPT

## 2020-04-01 PROCEDURE — 96375 TX/PRO/DX INJ NEW DRUG ADDON: CPT

## 2020-04-01 PROCEDURE — 36600 WITHDRAWAL OF ARTERIAL BLOOD: CPT

## 2020-04-01 PROCEDURE — 94761 N-INVAS EAR/PLS OXIMETRY MLT: CPT

## 2020-04-01 PROCEDURE — 83605 ASSAY OF LACTIC ACID: CPT

## 2020-04-01 PROCEDURE — 87040 BLOOD CULTURE FOR BACTERIA: CPT

## 2020-04-01 PROCEDURE — 2000000000 HC ICU R&B

## 2020-04-01 PROCEDURE — 2700000000 HC OXYGEN THERAPY PER DAY

## 2020-04-01 PROCEDURE — 80053 COMPREHEN METABOLIC PANEL: CPT

## 2020-04-01 PROCEDURE — 6360000002 HC RX W HCPCS: Performed by: PHYSICIAN ASSISTANT

## 2020-04-01 PROCEDURE — 94660 CPAP INITIATION&MGMT: CPT

## 2020-04-01 PROCEDURE — 93005 ELECTROCARDIOGRAM TRACING: CPT | Performed by: PHYSICIAN ASSISTANT

## 2020-04-01 PROCEDURE — 99285 EMERGENCY DEPT VISIT HI MDM: CPT

## 2020-04-01 RX ORDER — DEXTROSE MONOHYDRATE 25 G/50ML
12.5 INJECTION, SOLUTION INTRAVENOUS PRN
Status: DISCONTINUED | OUTPATIENT
Start: 2020-04-01 | End: 2020-04-09 | Stop reason: HOSPADM

## 2020-04-01 RX ORDER — ALBUTEROL SULFATE 90 UG/1
2 AEROSOL, METERED RESPIRATORY (INHALATION)
Status: DISCONTINUED | OUTPATIENT
Start: 2020-04-02 | End: 2020-04-04

## 2020-04-01 RX ORDER — 0.9 % SODIUM CHLORIDE 0.9 %
500 INTRAVENOUS SOLUTION INTRAVENOUS ONCE
Status: COMPLETED | OUTPATIENT
Start: 2020-04-01 | End: 2020-04-01

## 2020-04-01 RX ORDER — DOXYCYCLINE HYCLATE 100 MG
100 TABLET ORAL ONCE
Status: COMPLETED | OUTPATIENT
Start: 2020-04-01 | End: 2020-04-01

## 2020-04-01 RX ORDER — METHYLPREDNISOLONE SODIUM SUCCINATE 40 MG/ML
40 INJECTION, POWDER, LYOPHILIZED, FOR SOLUTION INTRAMUSCULAR; INTRAVENOUS EVERY 12 HOURS
Status: COMPLETED | OUTPATIENT
Start: 2020-04-02 | End: 2020-04-03

## 2020-04-01 RX ORDER — DEXTROSE MONOHYDRATE 50 MG/ML
100 INJECTION, SOLUTION INTRAVENOUS PRN
Status: DISCONTINUED | OUTPATIENT
Start: 2020-04-01 | End: 2020-04-09 | Stop reason: HOSPADM

## 2020-04-01 RX ORDER — ALENDRONATE SODIUM 70 MG/1
TABLET ORAL
COMMUNITY
Start: 2020-03-31

## 2020-04-01 RX ORDER — GABAPENTIN 300 MG/1
300 CAPSULE ORAL 3 TIMES DAILY
Status: DISCONTINUED | OUTPATIENT
Start: 2020-04-01 | End: 2020-04-09 | Stop reason: HOSPADM

## 2020-04-01 RX ORDER — ATORVASTATIN CALCIUM 10 MG/1
10 TABLET, FILM COATED ORAL NIGHTLY
Status: DISCONTINUED | OUTPATIENT
Start: 2020-04-01 | End: 2020-04-09 | Stop reason: HOSPADM

## 2020-04-01 RX ORDER — ACETAMINOPHEN 325 MG/1
650 TABLET ORAL EVERY 6 HOURS PRN
Status: DISCONTINUED | OUTPATIENT
Start: 2020-04-01 | End: 2020-04-09 | Stop reason: HOSPADM

## 2020-04-01 RX ORDER — ATORVASTATIN CALCIUM 10 MG/1
TABLET, FILM COATED ORAL
COMMUNITY
Start: 2020-03-31 | End: 2021-10-05 | Stop reason: SDUPTHER

## 2020-04-01 RX ORDER — PROMETHAZINE HYDROCHLORIDE 25 MG/1
12.5 TABLET ORAL EVERY 6 HOURS PRN
Status: DISCONTINUED | OUTPATIENT
Start: 2020-04-01 | End: 2020-04-09 | Stop reason: HOSPADM

## 2020-04-01 RX ORDER — FLUTICASONE PROPIONATE 50 MCG
2 SPRAY, SUSPENSION (ML) NASAL DAILY
COMMUNITY
Start: 2020-02-12

## 2020-04-01 RX ORDER — NICOTINE POLACRILEX 4 MG
15 LOZENGE BUCCAL PRN
Status: DISCONTINUED | OUTPATIENT
Start: 2020-04-01 | End: 2020-04-09 | Stop reason: HOSPADM

## 2020-04-01 RX ORDER — METHYLPREDNISOLONE SODIUM SUCCINATE 125 MG/2ML
60 INJECTION, POWDER, LYOPHILIZED, FOR SOLUTION INTRAMUSCULAR; INTRAVENOUS ONCE
Status: COMPLETED | OUTPATIENT
Start: 2020-04-01 | End: 2020-04-01

## 2020-04-01 RX ORDER — PANTOPRAZOLE SODIUM 40 MG/1
40 TABLET, DELAYED RELEASE ORAL
Status: DISCONTINUED | OUTPATIENT
Start: 2020-04-02 | End: 2020-04-09 | Stop reason: HOSPADM

## 2020-04-01 RX ORDER — ACETAMINOPHEN 650 MG/1
650 SUPPOSITORY RECTAL EVERY 6 HOURS PRN
Status: DISCONTINUED | OUTPATIENT
Start: 2020-04-01 | End: 2020-04-09 | Stop reason: HOSPADM

## 2020-04-01 RX ORDER — ONDANSETRON 2 MG/ML
4 INJECTION INTRAMUSCULAR; INTRAVENOUS EVERY 6 HOURS PRN
Status: DISCONTINUED | OUTPATIENT
Start: 2020-04-01 | End: 2020-04-09 | Stop reason: HOSPADM

## 2020-04-01 RX ORDER — SODIUM CHLORIDE 0.9 % (FLUSH) 0.9 %
10 SYRINGE (ML) INJECTION PRN
Status: DISCONTINUED | OUTPATIENT
Start: 2020-04-01 | End: 2020-04-09 | Stop reason: HOSPADM

## 2020-04-01 RX ORDER — GUAIFENESIN/DEXTROMETHORPHAN 100-10MG/5
5 SYRUP ORAL EVERY 4 HOURS PRN
Status: DISCONTINUED | OUTPATIENT
Start: 2020-04-01 | End: 2020-04-09 | Stop reason: HOSPADM

## 2020-04-01 RX ORDER — POLYETHYLENE GLYCOL 3350 17 G/17G
17 POWDER, FOR SOLUTION ORAL DAILY PRN
Status: DISCONTINUED | OUTPATIENT
Start: 2020-04-01 | End: 2020-04-09 | Stop reason: HOSPADM

## 2020-04-01 RX ORDER — SODIUM CHLORIDE 9 MG/ML
INJECTION, SOLUTION INTRAVENOUS CONTINUOUS
Status: DISCONTINUED | OUTPATIENT
Start: 2020-04-01 | End: 2020-04-02

## 2020-04-01 RX ORDER — DOXAZOSIN MESYLATE 4 MG/1
TABLET ORAL
Status: ON HOLD | COMMUNITY
Start: 2020-03-31 | End: 2020-04-09 | Stop reason: HOSPADM

## 2020-04-01 RX ORDER — ALBUTEROL SULFATE 90 UG/1
2 AEROSOL, METERED RESPIRATORY (INHALATION) EVERY 6 HOURS PRN
Status: DISCONTINUED | OUTPATIENT
Start: 2020-04-01 | End: 2020-04-07

## 2020-04-01 RX ORDER — ALBUTEROL SULFATE 90 UG/1
2 AEROSOL, METERED RESPIRATORY (INHALATION)
Status: DISCONTINUED | OUTPATIENT
Start: 2020-04-01 | End: 2020-04-01

## 2020-04-01 RX ORDER — SODIUM CHLORIDE 0.9 % (FLUSH) 0.9 %
10 SYRINGE (ML) INJECTION EVERY 12 HOURS SCHEDULED
Status: DISCONTINUED | OUTPATIENT
Start: 2020-04-01 | End: 2020-04-09 | Stop reason: HOSPADM

## 2020-04-01 RX ORDER — ASPIRIN 81 MG/1
81 TABLET, CHEWABLE ORAL DAILY
Status: DISCONTINUED | OUTPATIENT
Start: 2020-04-02 | End: 2020-04-08

## 2020-04-01 RX ORDER — LISINOPRIL 40 MG/1
TABLET ORAL
Status: ON HOLD | COMMUNITY
Start: 2020-03-31 | End: 2020-04-09 | Stop reason: HOSPADM

## 2020-04-01 RX ORDER — ALBUTEROL SULFATE 90 UG/1
2 AEROSOL, METERED RESPIRATORY (INHALATION) ONCE
Status: COMPLETED | OUTPATIENT
Start: 2020-04-01 | End: 2020-04-01

## 2020-04-01 RX ORDER — PREDNISONE 20 MG/1
40 TABLET ORAL
Status: DISCONTINUED | OUTPATIENT
Start: 2020-04-04 | End: 2020-04-04

## 2020-04-01 RX ADMIN — CEFTRIAXONE SODIUM 1 G: 1 INJECTION, POWDER, FOR SOLUTION INTRAMUSCULAR; INTRAVENOUS at 20:25

## 2020-04-01 RX ADMIN — SODIUM CHLORIDE 500 ML: 9 INJECTION, SOLUTION INTRAVENOUS at 20:26

## 2020-04-01 RX ADMIN — ALBUTEROL SULFATE 2 PUFF: 90 AEROSOL, METERED RESPIRATORY (INHALATION) at 19:01

## 2020-04-01 RX ADMIN — METHYLPREDNISOLONE SODIUM SUCCINATE 60 MG: 125 INJECTION, POWDER, FOR SOLUTION INTRAMUSCULAR; INTRAVENOUS at 19:38

## 2020-04-01 RX ADMIN — DOXYCYCLINE HYCLATE 100 MG: 100 TABLET, COATED ORAL at 20:25

## 2020-04-01 ASSESSMENT — ENCOUNTER SYMPTOMS
SORE THROAT: 0
COUGH: 1
SHORTNESS OF BREATH: 1
SPUTUM PRODUCTION: 1
VOMITING: 0
ABDOMINAL PAIN: 0

## 2020-04-01 NOTE — ED PROVIDER NOTES
excision of lesion of lip    PROSTATE SURGERY      TURP  10/23/2015    with cystoscopy         CURRENTMEDICATIONS       Previous Medications    ALBUTEROL (PROVENTIL) (2.5 MG/3ML) 0.083% NEBULIZER SOLUTION    Take 3 mLs by nebulization 4 times daily COPD J44.9 [employer]    ALENDRONATE (FOSAMAX) 70 MG TABLET        ASPIRIN 81 MG TABLET    Take 81 mg by mouth daily    ATENOLOL (TENORMIN) 100 MG TABLET    Take 100 mg by mouth daily    ATORVASTATIN (LIPITOR) 10 MG TABLET        CELECOXIB (CELEBREX) 200 MG CAPSULE    Take 200 mg by mouth daily    DOXAZOSIN (CARDURA) 4 MG TABLET        ESOMEPRAZOLE (NEXIUM) 40 MG CAPSULE      Take 40 mg by mouth every morning (before breakfast) Indications: take dos     FLUTICASONE (FLONASE) 50 MCG/ACT NASAL SPRAY        FLUTICASONE-UMECLIDIN-VILANT (TRELEGY ELLIPTA) 100-62.5-25 MCG/INH AEPB    INHALE 1 PUFF EVERY DAY    GABAPENTIN (NEURONTIN) 300 MG CAPSULE    Take 600 mg by mouth 3 times daily . LISINOPRIL (PRINIVIL;ZESTRIL) 40 MG TABLET        METFORMIN (GLUCOPHAGE) 500 MG TABLET    Take 500 mg by mouth 4 times daily     OXYGEN    Inhale 2.5 L into the lungs nightly Pt wears 2- 2/12 L at night. 3 L while walking/ moving around    PIOGLITAZONE (ACTOS) 30 MG TABLET    Take 30 mg by mouth daily    VENTOLIN  (90 BASE) MCG/ACT INHALER    Inhale 2 puffs into the lungs every 6 hours as needed for Wheezing orShortness of Breath         ALLERGIES     Patient has no known allergies.     FAMILYHISTORY       Family History   Problem Relation Age of Onset    Cancer Mother     Diabetes Mother     Heart Disease Mother     Cancer Father     Diabetes Sister     Cancer Brother           SOCIAL HISTORY       Social History     Socioeconomic History    Marital status:      Spouse name: Guy Wing    Number of children: 4    Years of education: None    Highest education level: None   Occupational History    None   Social Needs    Financial resource strain: None   Bent-Ángela range of motion and neck supple. Cardiovascular:      Rate and Rhythm: Normal rate and regular rhythm. Pulses:           Radial pulses are 2+ on the right side and 2+ on the left side. Posterior tibial pulses are 2+ on the right side and 2+ on the left side. Heart sounds: Normal heart sounds. Pulmonary:      Effort: Pulmonary effort is normal. No respiratory distress. Breath sounds: No stridor. Decreased breath sounds and wheezing (expiratory) present. No rales. Abdominal:      General: Bowel sounds are normal.      Palpations: Abdomen is soft. Abdomen is not rigid. Tenderness: There is no abdominal tenderness. There is no guarding or rebound. Musculoskeletal: Normal range of motion. Comments: Mild bilateral ankle edema, symmetric   Skin:     General: Skin is warm and dry. Neurological:      Mental Status: He is alert and oriented to person, place, and time. GCS: GCS eye subscore is 4. GCS verbal subscore is 5. GCS motor subscore is 6. Cranial Nerves: No cranial nerve deficit. Sensory: No sensory deficit. Motor: No abnormal muscle tone. Coordination: Coordination normal.   Psychiatric:         Speech: Speech normal.         Behavior: Behavior normal.         Thought Content: Thought content normal.         DIAGNOSTIC RESULTS   LABS:    Labs Reviewed   CBC WITH AUTO DIFFERENTIAL - Abnormal; Notable for the following components:       Result Value    RBC 3.53 (*)     Hemoglobin 9.3 (*)     Hematocrit 30.0 (*)     RDW 17.6 (*)     Platelets 85 (*)     All other components within normal limits    Narrative:     Performed at:  [facility]. [facility]  [address] [place] [address].  [place] [address] [address]   Phone [phone]   COMPREHENSIVE METABOLIC PANEL W/ REFLEX TO MG FOR LOW K - Abnormal; Notable for the following components:    Chloride 98 (*)     CO2 37 (*)     Glucose 195 (*)     BUN 22 (*)     GFR Non- 53 (*) AST 14 (*)     All other components within normal limits    Narrative:     Performed at:  [facility]. [facility]  [address] [place] [address]. [place], [facility] [address]   Phone [phone] - Abnormal; Notable for the following components:    Pro-BNP 1,618 (*)     All other components within normal limits    Narrative:     Performed at:  [facility]. [facility]  [address] [place] [address]. [place] [facility] [address]   Phone [phone]   BLOOD GAS, VENOUS - Abnormal; Notable for the following components:    pH, Joey 7.234 (*)     pCO2, Joey 94.3 (*)     HCO3, Venous 39.0 (*)     Base Excess, Joey 8.9 (*)     Carboxyhemoglobin 4.0 (*)     All other components within normal limits    Narrative:     Performed at:  [facility]. [facility]  [address] [place] [address]. [place] [facility] [address] [place]   Phone [phone]   RAPID INFLUENZA A/B ANTIGENS    Narrative:     Performed at:  [facility]. [facility]  [address] [place] [address]. [place] [facility] [address] [place]   Phone [phone]   CULTURE, BLOOD 1   CULTURE, BLOOD 2   LACTATE, SEPSIS    Narrative:     Performed at:  [facility]. [facility]  [address] [place] [address]. [place] [facility] [address] [place]   Phone [phone]   TROPONIN    Narrative:     Performed at:  [facility]. [facility]  [address] [place] [address].  [place] [facility]   Phone [phone]   LACTATE, SEPSIS   URINE RT REFLEX TO CULTURE   PROCALCITONIN   EMERGENT DISEASE PANEL     Results for orders placed or performed during the hospital encounter of 04/01/20   Rapid influenza A/B antigens   Result Value Ref Range    Rapid Influenza A Ag Negative Negative    Rapid Influenza B Ag Negative Negative   Lactate, Sepsis   Result Value Ref Range    Lactic Acid, Sepsis 1.3 0.4 - 1.9 mmol/L   CBC Auto Differential   Result Value Ref Range    WBC 5.4 4.0 - 11.0 EKG 12 Lead   Result Value Ref Range    Ventricular Rate 86 BPM    Atrial Rate 87 BPM    QRS Duration 66 ms    Q-T Interval 348 ms    QTc Calculation (Bazett) 416 ms    R Axis 73 degrees    T Axis 73 degrees    Diagnosis       Accelerated Junctional rhythmNonspecific ST abnormalityAbnormal ECGWhen compared with ECG of 02-JAN-2018 14:11,Junctional rhythm has replaced Sinus rhythm         All other labs were within normal range or not returned as of this dictation. EKG: All EKG's are interpreted by the Emergency Department Physician in the absence of a cardiologist.  Please see their note for interpretation of EKG. RADIOLOGY:   Non-plain film images such as CT, Ultrasound and MRI are read by the radiologist. Angela Fill radiographic images are visualized andpreliminarily interpreted by the  ED Provider with the below findings:        Interpretation perthe Radiologist below, if available at the time of this note:    XR CHEST PORTABLE   Final Result   Right pleural effusion and right basilar airspace opacification could   represent atelectasis or pneumonia           Xr Chest Portable    Result Date: 4/1/2020  EXAMINATION: ONE XRAY VIEW OF THE CHEST 4/1/2020 6:12 pm COMPARISON: 01/24/2018 HISTORY: ORDERING SYSTEM PROVIDED HISTORY: sob TECHNOLOGIST PROVIDED HISTORY: Reason for exam:->sob Reason for Exam: sob Acuity: Acute Type of Exam: Initial FINDINGS: The heart size is stable. Right basilar airspace opacification. No pulmonary vascular congestion or edema. Possible right pleural effusion.      Right pleural effusion and right basilar airspace opacification could represent atelectasis or pneumonia           PROCEDURES   Unless otherwise noted below, none     Procedures    CRITICAL CARE TIME   N/A    CONSULTS:  IP CONSULT TO HOSPITALIST      EMERGENCY DEPARTMENT COURSE and DIFFERENTIAL DIAGNOSIS/MDM:   Vitals:    Vitals:    04/01/20 1811 04/01/20 1841 04/01/20 1905 04/01/20 2006   BP:  (!) 100/58 111/89 (!) 104/50

## 2020-04-02 ENCOUNTER — APPOINTMENT (OUTPATIENT)
Dept: GENERAL RADIOLOGY | Age: 80
DRG: 208 | End: 2020-04-02
Payer: MEDICARE

## 2020-04-02 LAB
AMORPHOUS: ABNORMAL /HPF
BACTERIA: ABNORMAL /HPF
BASE EXCESS ARTERIAL: 8.3 MMOL/L (ref -3–3)
BASOPHILS ABSOLUTE: 0 K/UL (ref 0–0.2)
BASOPHILS RELATIVE PERCENT: 0 %
BILIRUBIN URINE: ABNORMAL
BLOOD, URINE: NEGATIVE
CARBOXYHEMOGLOBIN ARTERIAL: 1.1 % (ref 0–1.5)
CLARITY: CLEAR
COARSE CASTS, UA: ABNORMAL /LPF (ref 0–2)
COLOR: YELLOW
EKG ATRIAL RATE: 87 BPM
EKG DIAGNOSIS: NORMAL
EKG Q-T INTERVAL: 348 MS
EKG QRS DURATION: 66 MS
EKG QTC CALCULATION (BAZETT): 416 MS
EKG R AXIS: 73 DEGREES
EKG T AXIS: 73 DEGREES
EKG VENTRICULAR RATE: 86 BPM
EOSINOPHILS ABSOLUTE: 0 K/UL (ref 0–0.6)
EOSINOPHILS RELATIVE PERCENT: 0.1 %
EPITHELIAL CELLS, UA: ABNORMAL /HPF (ref 0–5)
GLUCOSE BLD-MCNC: 184 MG/DL (ref 70–99)
GLUCOSE BLD-MCNC: 188 MG/DL (ref 70–99)
GLUCOSE BLD-MCNC: 222 MG/DL (ref 70–99)
GLUCOSE BLD-MCNC: 225 MG/DL (ref 70–99)
GLUCOSE BLD-MCNC: 228 MG/DL (ref 70–99)
GLUCOSE URINE: NEGATIVE MG/DL
HCO3 ARTERIAL: 35.6 MMOL/L (ref 21–29)
HCT VFR BLD CALC: 28.7 % (ref 40.5–52.5)
HEMOGLOBIN, ART, EXTENDED: 9.4 G/DL (ref 13.5–17.5)
HEMOGLOBIN: 9 G/DL (ref 13.5–17.5)
HYALINE CASTS: ABNORMAL /LPF (ref 0–2)
KETONES, URINE: ABNORMAL MG/DL
LEUKOCYTE ESTERASE, URINE: NEGATIVE
LYMPHOCYTES ABSOLUTE: 0.5 K/UL (ref 1–5.1)
LYMPHOCYTES RELATIVE PERCENT: 14.4 %
MCH RBC QN AUTO: 26.7 PG (ref 26–34)
MCHC RBC AUTO-ENTMCNC: 31.3 G/DL (ref 31–36)
MCV RBC AUTO: 85.3 FL (ref 80–100)
METHEMOGLOBIN ARTERIAL: 0.3 %
MICROSCOPIC EXAMINATION: YES
MONOCYTES ABSOLUTE: 0.1 K/UL (ref 0–1.3)
MONOCYTES RELATIVE PERCENT: 3.8 %
MUCUS: ABNORMAL /LPF
NEUTROPHILS ABSOLUTE: 2.8 K/UL (ref 1.7–7.7)
NEUTROPHILS RELATIVE PERCENT: 81.7 %
NITRITE, URINE: NEGATIVE
O2 CONTENT ARTERIAL: 13 ML/DL
O2 SAT, ARTERIAL: 95.1 %
O2 THERAPY: ABNORMAL
PCO2 ARTERIAL: 67.2 MMHG (ref 35–45)
PDW BLD-RTO: 17.4 % (ref 12.4–15.4)
PERFORMED ON: ABNORMAL
PH ARTERIAL: 7.34 (ref 7.35–7.45)
PH UA: 5.5 (ref 5–8)
PLATELET # BLD: 69 K/UL (ref 135–450)
PMV BLD AUTO: 8.9 FL (ref 5–10.5)
PO2 ARTERIAL: 81.9 MMHG (ref 75–108)
PROTEIN UA: ABNORMAL MG/DL
RBC # BLD: 3.36 M/UL (ref 4.2–5.9)
RBC UA: ABNORMAL /HPF (ref 0–4)
SPECIFIC GRAVITY UA: >=1.03 (ref 1–1.03)
TCO2 ARTERIAL: 37.7 MMOL/L
URINE REFLEX TO CULTURE: ABNORMAL
URINE TYPE: ABNORMAL
UROBILINOGEN, URINE: 1 E.U./DL
WBC # BLD: 3.5 K/UL (ref 4–11)
WBC UA: ABNORMAL /HPF (ref 0–5)

## 2020-04-02 PROCEDURE — 2000000000 HC ICU R&B

## 2020-04-02 PROCEDURE — 87205 SMEAR GRAM STAIN: CPT

## 2020-04-02 PROCEDURE — 6360000002 HC RX W HCPCS: Performed by: INTERNAL MEDICINE

## 2020-04-02 PROCEDURE — 6370000000 HC RX 637 (ALT 250 FOR IP): Performed by: INTERNAL MEDICINE

## 2020-04-02 PROCEDURE — 99233 SBSQ HOSP IP/OBS HIGH 50: CPT | Performed by: INTERNAL MEDICINE

## 2020-04-02 PROCEDURE — 51798 US URINE CAPACITY MEASURE: CPT

## 2020-04-02 PROCEDURE — 81001 URINALYSIS AUTO W/SCOPE: CPT

## 2020-04-02 PROCEDURE — 87070 CULTURE OTHR SPECIMN AEROBIC: CPT

## 2020-04-02 PROCEDURE — 36600 WITHDRAWAL OF ARTERIAL BLOOD: CPT

## 2020-04-02 PROCEDURE — 82803 BLOOD GASES ANY COMBINATION: CPT

## 2020-04-02 PROCEDURE — 93010 ELECTROCARDIOGRAM REPORT: CPT | Performed by: INTERNAL MEDICINE

## 2020-04-02 PROCEDURE — 85025 COMPLETE CBC W/AUTO DIFF WBC: CPT

## 2020-04-02 PROCEDURE — 99291 CRITICAL CARE FIRST HOUR: CPT | Performed by: INTERNAL MEDICINE

## 2020-04-02 PROCEDURE — 94150 VITAL CAPACITY TEST: CPT

## 2020-04-02 PROCEDURE — 51702 INSERT TEMP BLADDER CATH: CPT

## 2020-04-02 PROCEDURE — 94761 N-INVAS EAR/PLS OXIMETRY MLT: CPT

## 2020-04-02 PROCEDURE — 94640 AIRWAY INHALATION TREATMENT: CPT

## 2020-04-02 PROCEDURE — 36415 COLL VENOUS BLD VENIPUNCTURE: CPT

## 2020-04-02 PROCEDURE — 2700000000 HC OXYGEN THERAPY PER DAY

## 2020-04-02 PROCEDURE — 2580000003 HC RX 258: Performed by: INTERNAL MEDICINE

## 2020-04-02 RX ADMIN — Medication 2 PUFF: at 23:11

## 2020-04-02 RX ADMIN — GABAPENTIN 300 MG: 300 CAPSULE ORAL at 20:42

## 2020-04-02 RX ADMIN — ATORVASTATIN CALCIUM 10 MG: 10 TABLET, FILM COATED ORAL at 20:42

## 2020-04-02 RX ADMIN — Medication 2 PUFF: at 23:10

## 2020-04-02 RX ADMIN — METHYLPREDNISOLONE SODIUM SUCCINATE 40 MG: 40 INJECTION, POWDER, FOR SOLUTION INTRAMUSCULAR; INTRAVENOUS at 20:42

## 2020-04-02 RX ADMIN — Medication 2 PUFF: at 19:45

## 2020-04-02 RX ADMIN — Medication 2 PUFF: at 15:47

## 2020-04-02 RX ADMIN — ATORVASTATIN CALCIUM 10 MG: 10 TABLET, FILM COATED ORAL at 00:39

## 2020-04-02 RX ADMIN — MUPIROCIN: 20 OINTMENT TOPICAL at 21:00

## 2020-04-02 RX ADMIN — Medication 2 PUFF: at 11:15

## 2020-04-02 RX ADMIN — INSULIN LISPRO 2 UNITS: 100 INJECTION, SOLUTION INTRAVENOUS; SUBCUTANEOUS at 00:40

## 2020-04-02 RX ADMIN — METHYLPREDNISOLONE SODIUM SUCCINATE 40 MG: 40 INJECTION, POWDER, FOR SOLUTION INTRAMUSCULAR; INTRAVENOUS at 08:46

## 2020-04-02 RX ADMIN — Medication 10 ML: at 20:53

## 2020-04-02 RX ADMIN — Medication 10 ML: at 20:48

## 2020-04-02 RX ADMIN — Medication 2 PUFF: at 08:07

## 2020-04-02 RX ADMIN — AZITHROMYCIN DIHYDRATE 500 MG: 500 INJECTION, POWDER, LYOPHILIZED, FOR SOLUTION INTRAVENOUS at 08:46

## 2020-04-02 RX ADMIN — GABAPENTIN 300 MG: 300 CAPSULE ORAL at 00:39

## 2020-04-02 RX ADMIN — INSULIN LISPRO 1 UNITS: 100 INJECTION, SOLUTION INTRAVENOUS; SUBCUTANEOUS at 20:44

## 2020-04-02 RX ADMIN — INSULIN LISPRO 2 UNITS: 100 INJECTION, SOLUTION INTRAVENOUS; SUBCUTANEOUS at 12:06

## 2020-04-02 RX ADMIN — INSULIN LISPRO 2 UNITS: 100 INJECTION, SOLUTION INTRAVENOUS; SUBCUTANEOUS at 16:09

## 2020-04-02 RX ADMIN — ENOXAPARIN SODIUM 40 MG: 40 INJECTION SUBCUTANEOUS at 16:09

## 2020-04-02 RX ADMIN — MUPIROCIN: 20 OINTMENT TOPICAL at 12:05

## 2020-04-02 RX ADMIN — CEFTRIAXONE SODIUM 1 G: 1 INJECTION, POWDER, FOR SOLUTION INTRAMUSCULAR; INTRAVENOUS at 20:42

## 2020-04-02 RX ADMIN — INSULIN LISPRO 2 UNITS: 100 INJECTION, SOLUTION INTRAVENOUS; SUBCUTANEOUS at 05:04

## 2020-04-02 RX ADMIN — Medication 10 ML: at 08:46

## 2020-04-02 RX ADMIN — SODIUM CHLORIDE: 9 INJECTION, SOLUTION INTRAVENOUS at 00:40

## 2020-04-02 RX ADMIN — Medication 10 ML: at 00:39

## 2020-04-02 ASSESSMENT — PAIN SCALES - GENERAL: PAINLEVEL_OUTOF10: 0

## 2020-04-02 NOTE — CARE COORDINATION
Case Management Assessment  Initial Evaluation    Date/Time of Evaluation: 4/2/2020 10:42 AM  Assessment Completed by: Suzanna Wilson    Patient Name: Vivian Urrutia  YOB: 1940  Diagnosis: Acute on chronic respiratory failure with hypoxia and hypercapnia ([facility] [address] [J96.21, J96.22]  Acute on chronic respiratory failure with hypoxia and hypercapnia ([facility] [address] [A35.02, J96.22]  Date / Time: 4/1/2020  6:06 PM  Admission status/Date:INPT 4/1/2020  Chart Reviewed: Yes      Patient Interviewed: No   Family Interviewed:  Yes - wife      Hospitalization in the last 30 days:  No    Contacts  :     Relationship to Patient:   Phone Number:    Alternate Contact:     Relationship to Patient:     Phone Number:    Met with:    Current PCP Chelsey    Financial  Medicare  Precert required for SNF : Y, N        3 night stay required - Y, N    ADLS  Support Systems: Spouse/Significant Other, Family Members, Children  Transportation: family    Meal Preparation: family    Housing  Home Environment: home with wife. Steps: 1  Plans to Return to Present Housing: Yes  Other Identified Issues: -    Home Care Information  Currently active with [address] : Yes - [facility] for hha/housekeeping     Passport/Waiver : Yes : [facility]                    Phone Number:  [phone]  Passport/Waiver Services: -   HHA       Durable Medical Equipment   DME Provider: Mirian Brady  Equipment: Walker_X_Cane_X__RTS___ BSC___Shower Chair_X__  02_X_ at _3.5___Liter(s)---Uses_cont_____HHN_X__ CPAP___ BiPap___ Hospital Bed___W/C____Other_pulse ox and port O2_______      Has Home O2 in place on admit:  Yes  Informed of need to bring portable home O2 tank on day of discharge for nursing to connect prior to leaving:   Yes  Verbalized agreement/Understanding:   Yes    Community Service Affiliation  Dialysis:  No    · Name:  · Location  · Dialysis Schedule:  · Phone:   · Fax:     Outpatient PT/OT: No Cancer Center: No     CHF Clinic: No     Pulmonary Rehab: No  Pain Clinic: No  Community Mental Health: No    Wound Clinic: No     Other: -    The Plan for Transition of Care is related to the following treatment goals: return home at discharge    DISCHARGE PLAN: reviewed chart and spoke with pt wife via telephone. Pt remains in ICU at this time. Pt wife states that pt lives at home with her and that pt is active with [facility] for [address]. Pt is active with [facility] for home O2, hhn. Wife states that plan is to return home at discharge and resume current services. Will follow for poss increase in home care needs PT/Nrsg. Explained Case Management role/services.

## 2020-04-02 NOTE — PROGRESS NOTES
Patient provided pillow. Report given to [facility] RN for transfer of care. Patient denies any needs currently.

## 2020-04-02 NOTE — H&P
Yes Historical Provider, MD   OXYGEN Inhale 2.5 L into the lungs nightly Pt wears 2- 2/12 L at night. 3 L while walking/ moving around   Yes Historical Provider, MD   metformin (GLUCOPHAGE) 500 MG tablet Take 500 mg by mouth 4 times daily    Yes Historical Provider, MD   esomeprazole (NEXIUM) 40 MG capsule   Take 40 mg by mouth every morning (before breakfast) Indications: take dos    Yes Historical Provider, MD       Allergies:  Patient has no known allergies. Social History:    TOBACCO:   reports that he quit smoking about 14 years ago. He has a 67.50 pack-year smoking history. He has never used smokeless tobacco.  ETOH:   reports no history of alcohol use. Family History:  Reviewed in detail and negative for DM, Early CAD, Cancer (except as below). Positive as follows:        Problem Relation Age of Onset    Cancer Mother     Diabetes Mother     Heart Disease Mother     Cancer Father     Diabetes Sister     Cancer Brother        REVIEW OF SYSTEMS:   Pertinent positives/negatives as follows: SOB/SUTTON, MS change, productive cough, pedal edema, myalgias, fatigue, and as discussed in HPI, otherwise a complete ROS performed and all other systems are negative  PHYSICAL EXAM PERFORMED:    BP (!) 104/50   Pulse 78   Temp 97.7 °F (36.5 °C) (Oral)   Resp 16   Ht 6' (1.829 m)   Wt 220 lb (99.8 kg)   SpO2 100%   BMI 29.84 kg/m²     GEN:  A&Ox3, mild increased WOB. Frequent coughing. HEENT:  NC/AT,EOMI, dry MM, no erythema/exudates or visible masses. CVS:  Normal S1,S2. RRR. Without M/G/R.   LUNG:   Bilat wheezes, diminished at bilat bases, R>L. No rales or rhonchi  ABD:  Soft, ND/NT, BS+ x4. Without G/R.  EXT: 2+ pulses, no c/c.  Bilat symmetrical LE edema to ankles. Brisk cap refill. PSY:  Thought process intact, affect appropriate. TERRA:  CN III-XII intact, moves all 4 spontaneously, sensory grossly intact. SKIN: No rash or lesions on visible skin.     Chart review shows recent radiographs:  Xr

## 2020-04-02 NOTE — PROGRESS NOTES
Patient has been unable to urinate in the urinal so patient was bladder scanned and showed 665 cc. Patient was agreeable to a vázquez catheter. 12 F vázquez catheter placed at this time and patient tolerated very well. Instant urine return noted 500 cc. Stat lock in place. Call light in reach will monitor. Patient states he feels much better and has more relief.

## 2020-04-02 NOTE — PROGRESS NOTES
Patient  is not able to demonstrated the ability to move from a reclining position to an upright position within the recliner. however patient is alert, oriented and able to provide informed consent.   [facility]

## 2020-04-02 NOTE — PROGRESS NOTES
Request to send COVID specimen to [facility] ([facility]) approved by ALISSA Cervantes [place] number [id number]. Will send today.

## 2020-04-02 NOTE — PROGRESS NOTES
Respiratory sputum collected and sent down to the lab per orders. Updated given to [facility], patients daughter via telephone. Call questions answered. She stated she would call back later in the day for more updates. Will monitor.

## 2020-04-02 NOTE — PROGRESS NOTES
Admit: 2020    Name:  Sahra Minor  Room:  3013013-  MRN:    [id number]    Critical Care Daily Progress Note for 2020     Interval History:       Currently on Vapotherm    Scheduled Meds:   aspirin  81 mg Oral Daily    atorvastatin  10 mg Oral Nightly    pantoprazole  40 mg Oral QAM AC    sodium chloride flush  10 mL Intravenous 2 times per day    azithromycin  500 mg Intravenous Q24H    And    cefTRIAXone (ROCEPHIN) IV  1 g Intravenous Q24H    insulin lispro  0-6 Units Subcutaneous Q6H    methylPREDNISolone  40 mg Intravenous Q12H    Followed by   Yamilet Abel ON 2020] predniSONE  40 mg Oral Daily with breakfast    gabapentin  300 mg Oral TID    albuterol sulfate HFA  2 puff Inhalation Q4H While awake    And    ipratropium  2 puff Inhalation Q4H While awake       Continuous Infusions:   sodium chloride 100 mL/hr at 20 0040    dextrose         PRN Meds:  albuterol sulfate HFA, sodium chloride flush, acetaminophen **OR** acetaminophen, polyethylene glycol, promethazine **OR** ondansetron, glucose, dextrose, glucagon (rDNA), dextrose, guaiFENesin-dextromethorphan                  Objective:     Temp  Av.5 °F (36.4 °C)  Min: 97.4 °F (36.3 °C)  Max: 97.7 °F (36.5 °C)  Pulse  Av.6  Min: 66  Max: 88  BP  Min: 91/62  Max: 132/69  SpO2  Av.8 %  Min: 89 %  Max: 100 %  FiO2   Av %  Min: 40 %  Max: 40 %  Patient Vitals for the past 4 hrs:   BP Temp Temp src Pulse Resp SpO2   20 1000 121/67 -- -- 81 22 98 %   20 0900 132/69 -- -- 85 26 93 %   20 0800 122/67 -- -- -- 24 95 %   20 0700 (!) 112/56 97.5 °F (36.4 °C) Axillary 84 27 94 %         Intake/Output Summary (Last 24 hours) at 2020 1053  Last data filed at 2020 0851  Gross per 24 hour   Intake 785 ml   Output --   Net 785 ml       Physical Exam:  General:  Awake, alert and oriented.  Appears to be not in any distress  Mucous Membranes:  Pink , anicteric  Neck: No JVD, no carotid bruit, no

## 2020-04-02 NOTE — PROGRESS NOTES
Patient resting in bed with call light in reach. Patient now on 8 LHF and tolerating well with o2 sats staying 98%. Will monitor.

## 2020-04-02 NOTE — PROGRESS NOTES
Patient resting in bed, remains on 8L per NC. NPC noted. Lungs decreased. BS+. Magana cath noted with yellow urine. No acute distress noted. Call light in reach. Patient denies any needs. Will continue to monitor.

## 2020-04-02 NOTE — CONSULTS
BLADDER N/A 5/1/2019    CYSTOSCOPY, RESECTION OF BLADDER NECK, URETHRAL DILATION performed by Sheron Power MD at [address] Left 6/12/2016    cataract removal    JOINT REPLACEMENT  6/29/2011    right knee    JOINT REPLACEMENT  11/2004    left knee    OTHER SURGICAL HISTORY  08/31/2015    wide excision basal cell carcinoma on the nose and bilateral auricles with frozen sections, plastic closure, full thickness skin graft    OTHER SURGICAL HISTORY  12/1/15    excision of lesion of lip    PROSTATE SURGERY      TURP  10/23/2015    with cystoscopy       FAMILY HISTORY:  family history includes Cancer in his brother, father, and mother; Diabetes in his mother and sister; Heart Disease in his mother. SOCIAL HISTORY:   reports that he quit smoking about 14 years ago. He has a 67.50 pack-year smoking history. He has never used smokeless tobacco.    Scheduled Meds:   aspirin  81 mg Oral Daily    atorvastatin  10 mg Oral Nightly    pantoprazole  40 mg Oral QAM AC    sodium chloride flush  10 mL Intravenous 2 times per day    azithromycin  500 mg Intravenous Q24H    And    cefTRIAXone (ROCEPHIN) IV  1 g Intravenous Q24H    insulin lispro  0-6 Units Subcutaneous Q6H    methylPREDNISolone  40 mg Intravenous Q12H    Followed by   Radha Tracy ON 4/4/2020] predniSONE  40 mg Oral Daily with breakfast    gabapentin  300 mg Oral TID    albuterol sulfate HFA  2 puff Inhalation Q4H While awake    And    ipratropium  2 puff Inhalation Q4H While awake     Continuous Infusions:   sodium chloride 100 mL/hr at 04/02/20 0040    dextrose       PRN Meds:  albuterol sulfate HFA, sodium chloride flush, acetaminophen **OR** acetaminophen, polyethylene glycol, promethazine **OR** ondansetron, glucose, dextrose, glucagon (rDNA), dextrose, guaiFENesin-dextromethorphan    ALLERGIES:  Patient has No Known Allergies.     REVIEW OF SYSTEMS:  Constitutional: Negative for fever  HENT: Negative for sore throat  Eyes: Negative for redness   Respiratory: + for dyspnea, + cough  Cardiovascular: Negative for chest pain  Gastrointestinal: Negative for vomiting, diarrhea   Genitourinary: Negative for hematuria   Musculoskeletal: Negative for arthralgias   Skin: Negative for rash  Neurological: Negative for syncope  Hematological: Negative for adenopathy  Psychiatric/Behavorial: Negative for anxiety    PHYSICAL EXAM:  Blood pressure 121/62, pulse 81, temperature 97.4 °F (36.3 °C), temperature source Axillary, resp. rate 20, height 6' (1.829 m), weight 215 lb (97.5 kg), SpO2 95 %.' on vapotherm 40%  Gen: mild distress. Normocephalic, atraumatic. Obese. Eyes: PERRL. No sclera icterus. No conjunctival injection. ENT: No discharge. Pharynx clear. Neck: Trachea midline. No obvious mass. Resp: + accessory muscle use. No crackles. few wheezes. No rhonchi. No dullness on percussion. CV: Regular rate. Regular rhythm. No murmur or rub. No edema. Peripheral pulses are 2+. Capillary refill is less than 3 seconds. GI: Non-tender. Non-distended. No hernia. Skin: Warm and dry. No nodule on exposed extremities. Lymph: No cervical LAD. No supraclavicular LAD. M/S: No cyanosis. No joint deformity. No clubbing. Neuro: Awake. Alert. Moves all four extremities. Psych: Oriented x 3. No anxiety. LABS:  CBC:   Recent Labs     04/01/20 1820 04/02/20  0424   WBC 5.4 3.5*   HGB 9.3* 9.0*   HCT 30.0* 28.7*   MCV 85.0 85.3   PLT 85* 69*     BMP:   Recent Labs     04/01/20 1820      K 4.3   CL 98*   CO2 37*   BUN 22*   CREATININE 1.3     LIVER PROFILE:   Recent Labs     04/01/20 1820   AST 14*   ALT 12   BILITOT 0.7   ALKPHOS 48     PT/INR: No results for input(s): PROTIME, INR in the last 72 hours. APTT: No results for input(s): APTT in the last 72 hours.   UA:No results for input(s): NITRITE, COLORU, PHUR, LABCAST, WBCUA, RBCUA, MUCUS, TRICHOMONAS, YEAST, BACTERIA, CLARITYU, SPECGRAV, LEUKOCYTESUR, UROBILINOGEN,

## 2020-04-02 NOTE — PROGRESS NOTES
Speech Language Pathology  SLP Eval and Treat       SLP acknowledged order for BSE, reviewed pt's chart, spoke with RN and Dr. Puma Rivas via phone. Will hold ST at present pending testing results. Will follow-up tomorrow (4/3) pending test results. RN aware.     Tracey Gonzales M.S. [address] [facility], [place]  Speech-language pathologist  [phone]

## 2020-04-02 NOTE — PROGRESS NOTES
RESPIRATORY THERAPY ASSESSMENT    Name:  [address] Record Number:  [id number]  Age: 78 y.o. Gender: male  : 1940  Today's Date:  2020  Room:  3013/3013-01    Assessment     Is the patient being admitted for a COPD or Asthma exacerbation? No   (If yes the patient will be seen every 4 hours for the first 24 hours and then reassessed)    Patient Admission Diagnosis      Allergies  No Known Allergies    Minimum Predicted Vital Capacity:               Actual Vital Capacity:                    Pulmonary History:COPD  Home Oxygen Therapy:  3.5 liters/min via nasal cannula  Home Respiratory Therapy:Albuterol, Umeclidinium Bromide/Vilanterol and Vilanterol/Fluticasone Furoate   Current Respiratory Therapy:  Albuterol 2PUFFS Q4W/A and Q6PRN,  Atrovent 2PUFFS  Q4W/A. Respiratory Severity Index(RSI)   Patients with orders for inhalation medications, oxygen, or any therapeutic treatment modality will be placed on Respiratory Protocol. They will be assessed with the first treatment and at least every 72 hours thereafter. The following severity scale will be used to determine frequency of treatment intervention.     Smoking History: Pulmonary Disease or Smoking History, Greater than 15 pack year = 2    Social History  Social History     Tobacco Use    Smoking status: Former Smoker     Packs/day: 1.50     Years: 45.00     Pack years: 67.50     Last attempt to quit: 2005     Years since quittin.8    Smokeless tobacco: Never Used   Substance Use Topics    Alcohol use: No    Drug use: No       Recent Surgical History: None = 0  Past Surgical History  Past Surgical History:   Procedure Laterality Date    BACK SURGERY      repair broken back L4 & L5    CYSTOSCOPY Right 10/9/15    RIght Ureteroscopy, Holmium Laser Lithotripsy with Stone Removal, Right Stent Placement    CYSTOSCOPY  2019    CYSTOSCOPY, RESECTION OF BLADDER NECK, URETHRAL DILATION    CYSTOSCOPY W BIOPSY OF

## 2020-04-03 ENCOUNTER — APPOINTMENT (OUTPATIENT)
Dept: GENERAL RADIOLOGY | Age: 80
DRG: 208 | End: 2020-04-03
Payer: MEDICARE

## 2020-04-03 LAB
ANION GAP SERPL CALCULATED.3IONS-SCNC: 10 MMOL/L (ref 3–16)
ANION GAP SERPL CALCULATED.3IONS-SCNC: 12 MMOL/L (ref 3–16)
BASE EXCESS ARTERIAL: 14.1 MMOL/L (ref -3–3)
BASOPHILS ABSOLUTE: 0 K/UL (ref 0–0.2)
BASOPHILS RELATIVE PERCENT: 0.2 %
BUN BLDV-MCNC: 28 MG/DL (ref 7–20)
BUN BLDV-MCNC: 29 MG/DL (ref 7–20)
CALCIUM SERPL-MCNC: 8.6 MG/DL (ref 8.3–10.6)
CALCIUM SERPL-MCNC: 8.6 MG/DL (ref 8.3–10.6)
CARBOXYHEMOGLOBIN ARTERIAL: 0.3 % (ref 0–1.5)
CHLORIDE BLD-SCNC: 96 MMOL/L (ref 99–110)
CHLORIDE BLD-SCNC: 97 MMOL/L (ref 99–110)
CO2: 31 MMOL/L (ref 21–32)
CO2: 36 MMOL/L (ref 21–32)
CREAT SERPL-MCNC: 0.7 MG/DL (ref 0.8–1.3)
CREAT SERPL-MCNC: 0.8 MG/DL (ref 0.8–1.3)
EKG ATRIAL RATE: 105 BPM
EKG DIAGNOSIS: NORMAL
EKG P AXIS: 55 DEGREES
EKG P-R INTERVAL: 186 MS
EKG Q-T INTERVAL: 318 MS
EKG QRS DURATION: 70 MS
EKG QTC CALCULATION (BAZETT): 420 MS
EKG R AXIS: 62 DEGREES
EKG T AXIS: 85 DEGREES
EKG VENTRICULAR RATE: 105 BPM
EOSINOPHILS ABSOLUTE: 0 K/UL (ref 0–0.6)
EOSINOPHILS RELATIVE PERCENT: 0 %
ESTIMATED AVERAGE GLUCOSE: 139.9 MG/DL
GFR AFRICAN AMERICAN: >60
GFR AFRICAN AMERICAN: >60
GFR NON-AFRICAN AMERICAN: >60
GFR NON-AFRICAN AMERICAN: >60
GLUCOSE BLD-MCNC: 171 MG/DL (ref 70–99)
GLUCOSE BLD-MCNC: 181 MG/DL (ref 70–99)
GLUCOSE BLD-MCNC: 188 MG/DL (ref 70–99)
GLUCOSE BLD-MCNC: 207 MG/DL (ref 70–99)
GLUCOSE BLD-MCNC: 218 MG/DL (ref 70–99)
GLUCOSE BLD-MCNC: 234 MG/DL (ref 70–99)
HBA1C MFR BLD: 6.5 %
HCO3 ARTERIAL: 43.3 MMOL/L (ref 21–29)
HCT VFR BLD CALC: 27.2 % (ref 40.5–52.5)
HCT VFR BLD CALC: 30.9 % (ref 40.5–52.5)
HEMOGLOBIN, ART, EXTENDED: 9.5 G/DL (ref 13.5–17.5)
HEMOGLOBIN: 8.7 G/DL (ref 13.5–17.5)
HEMOGLOBIN: 9.6 G/DL (ref 13.5–17.5)
LYMPHOCYTES ABSOLUTE: 0.7 K/UL (ref 1–5.1)
LYMPHOCYTES RELATIVE PERCENT: 15.1 %
MAGNESIUM: 1.9 MG/DL (ref 1.8–2.4)
MCH RBC QN AUTO: 26.3 PG (ref 26–34)
MCH RBC QN AUTO: 26.7 PG (ref 26–34)
MCHC RBC AUTO-ENTMCNC: 31 G/DL (ref 31–36)
MCHC RBC AUTO-ENTMCNC: 31.9 G/DL (ref 31–36)
MCV RBC AUTO: 83.7 FL (ref 80–100)
MCV RBC AUTO: 84.7 FL (ref 80–100)
METHEMOGLOBIN ARTERIAL: 0 %
MONOCYTES ABSOLUTE: 0.5 K/UL (ref 0–1.3)
MONOCYTES RELATIVE PERCENT: 9.7 %
NEUTROPHILS ABSOLUTE: 3.5 K/UL (ref 1.7–7.7)
NEUTROPHILS RELATIVE PERCENT: 75 %
O2 CONTENT ARTERIAL: 13 ML/DL
O2 SAT, ARTERIAL: 94.6 %
O2 THERAPY: ABNORMAL
PCO2 ARTERIAL: 90.9 MMHG (ref 35–45)
PDW BLD-RTO: 16.7 % (ref 12.4–15.4)
PDW BLD-RTO: 17.5 % (ref 12.4–15.4)
PERFORMED ON: ABNORMAL
PH ARTERIAL: 7.3 (ref 7.35–7.45)
PLATELET # BLD: 87 K/UL (ref 135–450)
PLATELET # BLD: 90 K/UL (ref 135–450)
PMV BLD AUTO: 7.9 FL (ref 5–10.5)
PMV BLD AUTO: 8.9 FL (ref 5–10.5)
PO2 ARTERIAL: 84.8 MMHG (ref 75–108)
POTASSIUM REFLEX MAGNESIUM: 4.2 MMOL/L (ref 3.5–5.1)
POTASSIUM SERPL-SCNC: 4.6 MMOL/L (ref 3.5–5.1)
RBC # BLD: 3.25 M/UL (ref 4.2–5.9)
RBC # BLD: 3.65 M/UL (ref 4.2–5.9)
SODIUM BLD-SCNC: 140 MMOL/L (ref 136–145)
SODIUM BLD-SCNC: 142 MMOL/L (ref 136–145)
TCO2 ARTERIAL: 46.1 MMOL/L
TROPONIN: <0.01 NG/ML
WBC # BLD: 4.7 K/UL (ref 4–11)
WBC # BLD: 5.8 K/UL (ref 4–11)

## 2020-04-03 PROCEDURE — 85027 COMPLETE CBC AUTOMATED: CPT

## 2020-04-03 PROCEDURE — 36415 COLL VENOUS BLD VENIPUNCTURE: CPT

## 2020-04-03 PROCEDURE — 6360000002 HC RX W HCPCS: Performed by: INTERNAL MEDICINE

## 2020-04-03 PROCEDURE — 6370000000 HC RX 637 (ALT 250 FOR IP): Performed by: INTERNAL MEDICINE

## 2020-04-03 PROCEDURE — 2700000000 HC OXYGEN THERAPY PER DAY

## 2020-04-03 PROCEDURE — 71045 X-RAY EXAM CHEST 1 VIEW: CPT

## 2020-04-03 PROCEDURE — 94640 AIRWAY INHALATION TREATMENT: CPT

## 2020-04-03 PROCEDURE — 84484 ASSAY OF TROPONIN QUANT: CPT

## 2020-04-03 PROCEDURE — 83735 ASSAY OF MAGNESIUM: CPT

## 2020-04-03 PROCEDURE — 85025 COMPLETE CBC W/AUTO DIFF WBC: CPT

## 2020-04-03 PROCEDURE — 93010 ELECTROCARDIOGRAM REPORT: CPT | Performed by: INTERNAL MEDICINE

## 2020-04-03 PROCEDURE — 99232 SBSQ HOSP IP/OBS MODERATE 35: CPT | Performed by: INTERNAL MEDICINE

## 2020-04-03 PROCEDURE — 94002 VENT MGMT INPAT INIT DAY: CPT

## 2020-04-03 PROCEDURE — 2000000000 HC ICU R&B

## 2020-04-03 PROCEDURE — 82803 BLOOD GASES ANY COMBINATION: CPT

## 2020-04-03 PROCEDURE — 5A1945Z RESPIRATORY VENTILATION, 24-96 CONSECUTIVE HOURS: ICD-10-PCS | Performed by: INTERNAL MEDICINE

## 2020-04-03 PROCEDURE — 0BH17EZ INSERTION OF ENDOTRACHEAL AIRWAY INTO TRACHEA, VIA NATURAL OR ARTIFICIAL OPENING: ICD-10-PCS | Performed by: INTERNAL MEDICINE

## 2020-04-03 PROCEDURE — 99291 CRITICAL CARE FIRST HOUR: CPT | Performed by: INTERNAL MEDICINE

## 2020-04-03 PROCEDURE — 93005 ELECTROCARDIOGRAM TRACING: CPT | Performed by: INTERNAL MEDICINE

## 2020-04-03 PROCEDURE — 80048 BASIC METABOLIC PNL TOTAL CA: CPT

## 2020-04-03 PROCEDURE — 94761 N-INVAS EAR/PLS OXIMETRY MLT: CPT

## 2020-04-03 PROCEDURE — 94750 HC PULMONARY COMPLIANCE STUDY: CPT

## 2020-04-03 PROCEDURE — 31500 INSERT EMERGENCY AIRWAY: CPT

## 2020-04-03 PROCEDURE — 2580000003 HC RX 258: Performed by: INTERNAL MEDICINE

## 2020-04-03 RX ORDER — POTASSIUM CHLORIDE 7.45 MG/ML
10 INJECTION INTRAVENOUS PRN
Status: DISCONTINUED | OUTPATIENT
Start: 2020-04-03 | End: 2020-04-09 | Stop reason: HOSPADM

## 2020-04-03 RX ORDER — MAGNESIUM SULFATE 1 G/100ML
1 INJECTION INTRAVENOUS PRN
Status: DISCONTINUED | OUTPATIENT
Start: 2020-04-03 | End: 2020-04-09 | Stop reason: HOSPADM

## 2020-04-03 RX ORDER — CARBOXYMETHYLCELLULOSE SODIUM 10 MG/ML
1 GEL OPHTHALMIC EVERY 4 HOURS
Status: DISCONTINUED | OUTPATIENT
Start: 2020-04-03 | End: 2020-04-07

## 2020-04-03 RX ORDER — MIDAZOLAM HYDROCHLORIDE 1 MG/ML
2 INJECTION INTRAMUSCULAR; INTRAVENOUS
Status: DISCONTINUED | OUTPATIENT
Start: 2020-04-03 | End: 2020-04-07

## 2020-04-03 RX ORDER — MAGNESIUM SULFATE 1 G/100ML
1 INJECTION INTRAVENOUS ONCE
Status: COMPLETED | OUTPATIENT
Start: 2020-04-03 | End: 2020-04-04

## 2020-04-03 RX ORDER — CHLORHEXIDINE GLUCONATE 0.12 MG/ML
15 RINSE ORAL 2 TIMES DAILY
Status: DISCONTINUED | OUTPATIENT
Start: 2020-04-03 | End: 2020-04-07

## 2020-04-03 RX ORDER — FENTANYL CITRATE 50 UG/ML
25 INJECTION, SOLUTION INTRAMUSCULAR; INTRAVENOUS
Status: DISCONTINUED | OUTPATIENT
Start: 2020-04-03 | End: 2020-04-07

## 2020-04-03 RX ORDER — MINERAL OIL AND WHITE PETROLATUM 150; 830 MG/G; MG/G
OINTMENT OPHTHALMIC EVERY 4 HOURS
Status: DISCONTINUED | OUTPATIENT
Start: 2020-04-03 | End: 2020-04-07

## 2020-04-03 RX ORDER — PROPOFOL 10 MG/ML
10 INJECTION, EMULSION INTRAVENOUS CONTINUOUS
Status: DISCONTINUED | OUTPATIENT
Start: 2020-04-03 | End: 2020-04-07

## 2020-04-03 RX ADMIN — Medication 10 ML: at 22:03

## 2020-04-03 RX ADMIN — Medication 2 PUFF: at 07:32

## 2020-04-03 RX ADMIN — MAGNESIUM SULFATE HEPTAHYDRATE 1 G: 1 INJECTION, SOLUTION INTRAVENOUS at 22:37

## 2020-04-03 RX ADMIN — PROPOFOL 50 MCG/KG/MIN: 10 INJECTION, EMULSION INTRAVENOUS at 20:46

## 2020-04-03 RX ADMIN — FENTANYL CITRATE 25 MCG: 50 INJECTION INTRAMUSCULAR; INTRAVENOUS at 20:03

## 2020-04-03 RX ADMIN — INSULIN LISPRO 1 UNITS: 100 INJECTION, SOLUTION INTRAVENOUS; SUBCUTANEOUS at 22:38

## 2020-04-03 RX ADMIN — GABAPENTIN 300 MG: 300 CAPSULE ORAL at 08:36

## 2020-04-03 RX ADMIN — Medication 2 PUFF: at 11:36

## 2020-04-03 RX ADMIN — INSULIN LISPRO 2 UNITS: 100 INJECTION, SOLUTION INTRAVENOUS; SUBCUTANEOUS at 16:00

## 2020-04-03 RX ADMIN — Medication 2 PUFF: at 15:24

## 2020-04-03 RX ADMIN — AZITHROMYCIN DIHYDRATE 500 MG: 500 INJECTION, POWDER, LYOPHILIZED, FOR SOLUTION INTRAVENOUS at 08:35

## 2020-04-03 RX ADMIN — ENOXAPARIN SODIUM 40 MG: 40 INJECTION SUBCUTANEOUS at 08:36

## 2020-04-03 RX ADMIN — METHYLPREDNISOLONE SODIUM SUCCINATE 40 MG: 40 INJECTION, POWDER, FOR SOLUTION INTRAMUSCULAR; INTRAVENOUS at 08:36

## 2020-04-03 RX ADMIN — PANTOPRAZOLE SODIUM 40 MG: 40 TABLET, DELAYED RELEASE ORAL at 08:36

## 2020-04-03 RX ADMIN — INSULIN LISPRO 2 UNITS: 100 INJECTION, SOLUTION INTRAVENOUS; SUBCUTANEOUS at 12:00

## 2020-04-03 RX ADMIN — Medication 10 ML: at 08:36

## 2020-04-03 RX ADMIN — MUPIROCIN: 20 OINTMENT TOPICAL at 08:35

## 2020-04-03 RX ADMIN — CEFTRIAXONE SODIUM 1 G: 1 INJECTION, POWDER, FOR SOLUTION INTRAMUSCULAR; INTRAVENOUS at 22:03

## 2020-04-03 RX ADMIN — ASPIRIN 81 MG 81 MG: 81 TABLET ORAL at 08:36

## 2020-04-03 RX ADMIN — GABAPENTIN 300 MG: 300 CAPSULE ORAL at 15:55

## 2020-04-03 RX ADMIN — INSULIN LISPRO 2 UNITS: 100 INJECTION, SOLUTION INTRAVENOUS; SUBCUTANEOUS at 08:00

## 2020-04-03 RX ADMIN — FENTANYL CITRATE 25 MCG: 50 INJECTION INTRAMUSCULAR; INTRAVENOUS at 22:23

## 2020-04-03 RX ADMIN — Medication 2 PUFF: at 22:55

## 2020-04-03 RX ADMIN — METHYLPREDNISOLONE SODIUM SUCCINATE 40 MG: 40 INJECTION, POWDER, FOR SOLUTION INTRAMUSCULAR; INTRAVENOUS at 22:02

## 2020-04-03 RX ADMIN — PROPOFOL 50 MCG/KG/MIN: 10 INJECTION, EMULSION INTRAVENOUS at 22:03

## 2020-04-03 ASSESSMENT — PAIN SCALES - GENERAL
PAINLEVEL_OUTOF10: 5
PAINLEVEL_OUTOF10: 10
PAINLEVEL_OUTOF10: 5

## 2020-04-03 ASSESSMENT — PULMONARY FUNCTION TESTS: PIF_VALUE: 33

## 2020-04-03 NOTE — PROGRESS NOTES
300 mg Oral TID    albuterol sulfate HFA  2 puff Inhalation Q4H While awake    And    ipratropium  2 puff Inhalation Q4H While awake     PRN Meds:  albuterol sulfate HFA, sodium chloride flush, acetaminophen **OR** acetaminophen, polyethylene glycol, promethazine **OR** ondansetron, glucose, dextrose, glucagon (rDNA), dextrose, guaiFENesin-dextromethorphan    Results:  CBC:   Recent Labs     04/01/20 1820 04/02/20  0424 04/03/20  0405   WBC 5.4 3.5* 5.8   HGB 9.3* 9.0* 9.6*   HCT 30.0* 28.7* 30.9*   MCV 85.0 85.3 84.7   PLT 85* 69* 87*     BMP:   Recent Labs     04/01/20 1820 04/03/20  0405    140   K 4.3 4.6   CL 98* 97*   CO2 37* 31   BUN 22* 28*   CREATININE 1.3 0.7*     LIVER PROFILE:   Recent Labs     04/01/20 1820   AST 14*   ALT 12   BILITOT 0.7   ALKPHOS 48     PT/INR: No results for input(s): PROTIME, INR in the last 72 hours. APTT: No results for input(s): APTT in the last 72 hours.   UA:  Recent Labs     04/02/20  1240   COLORU Yellow   PHUR 5.5   WBCUA 3-5   RBCUA 3-4   MUCUS 2+*   BACTERIA Rare*   CLARITYU Clear   SPECGRAV >=1.030   LEUKOCYTESUR Negative   UROBILINOGEN 1.0   BILIRUBINUR SMALL*   BLOODU Negative   GLUCOSEU Negative   AMORPHOUS 2+       Cultures:  Bld: ngtd  Sputum: pending  Rapid flu neg  covid19 pending      Films:  CXR: Patchy right greater than left bibasilar airspace opacities with right-sided small to moderate pleural effusion; in comparison to prior study from January 2020 right side is clearly worse with left side appears similar           ASSESSMENT:  ·  Acute respiratory failure with hypoxemia and hypercapnia  · [facility] associated pneumonia - probable gram negative, risk for methicillin resistant Staph aureus as well   · COPD with AE  · Acute encephalopathy likely from hypercarbia  · Acute kidney injury  · Thrombocytopenia  · DM with hyperglycemia  · CAD     PLAN:  · High flow Supplemental oxygen to maintain SaO2 >92%; wean as tolerated   · Antibiotics to cover

## 2020-04-03 NOTE — PROGRESS NOTES
home O2    Acute on chronic respiratory failure with hypercapnia (HCC)    COPD exacerbation (HCC)    Pneumonia, unspecified organism    Chronic thrombocytopenia    Acute on chronic respiratory failure with hypoxia and hypercapnia (HCC)  Resolved Problems:    * No resolved hospital problems. *    1. Acute on chronic hypoxic and hypercarbic respiratory failure. Secondary to acute COPD exacerbation and possible pneumonia. Currently on Vapotherm. Pulmonary consult. Now on high flow O2 NC. May have to go back on vapotherm    2. Acute COPD exacerbation. Continue IV Solu-Medrol. Use inhalers instead of nebulizers because of suspicion of possible COVID. 3.  Gram-positive pneumonia. Continue Rocephin and azithromycin. Obtain sputum cultures. Flu screen negative. Procalcitonin only mildly elevated. COVID pending. 4.  Acute metabolic encephalopathy. Likely related to hypercarbia. Resolved. 5.  Mild acute kidney injury secondary to dehydration. Resolved with IV fluids. 6.  Acute on chronic thrombocytopenia. Likely secondary to infection. Follow platelet levels. Hold heparin products. 7.  Hypertension. Hold home hypertension medications given soft blood pressure readings. 8.  Type 2 diabetes. Controlled. Sliding scale insulin. SCDs for DVT prophylaxis. Pepcid for GI prophylaxis. Droplet plus precautions.   Full code        Man Marx

## 2020-04-03 NOTE — CARE COORDINATION
INTERDISCIPLINARY PLAN OF CARE CONFERENCE    Date/Time: 4/3/2020 11:26 AM  Completed by: Kit Busch Case Management      Patient Name:  Mary Mulligan  YOB: 1940  Admitting Diagnosis: Acute on chronic respiratory failure with hypoxia and hypercapnia ([facility] [address] [J96.21, J96.22]  Acute on chronic respiratory failure with hypoxia and hypercapnia ([facility] [address] [U81.08, J96.22]     Admit Date/Time:  4/1/2020  6:06 PM    Chart reviewed. Interdisciplinary team met to discuss patient progress and discharge plans. Disciplines included Case Management, Nursing, and Dietitian. Current Status:ongoing,ICU monitoring    PT/OT recommendation:tbd    Anticipated Discharge Date: tbd  Expected D/C Disposition:  Home  Discharge Plan Comments: Reviewed chart. Pt from home with spouse. Pt now on 15 liters high flow O2. C-19 Pending results. CM to follow and develop d/c plan pending medical progress. The Plan for Transition of Care is related to the following treatment goals: home        Home O2 in place on admit: Yes  Pt informed of need to bring portable home O2 tank on day of discharge for nursing to connect prior to leaving:  Yes  Verbalized agreement/Understanding:   Yes

## 2020-04-03 NOTE — PROGRESS NOTES
Speech Language Pathology  SLP Eval and Treat       Hold evaluation today per RN due to pt's current respiratory status regardless of COVID-19 rule out. Will hold ST at present pending testing results. Will need permission from MD prior to seeing this patient due to COVID-19 rule out. Will follow-up tomorrow (4/4) pending test results. RN aware.     Nyasia Layton, [address] [facility] [address] Sapp  [id number]  Speech-Language Pathologist  Phone: [phone]  Speech Desk: [phone]

## 2020-04-04 ENCOUNTER — APPOINTMENT (OUTPATIENT)
Dept: GENERAL RADIOLOGY | Age: 80
DRG: 208 | End: 2020-04-04
Payer: MEDICARE

## 2020-04-04 LAB
BASE EXCESS ARTERIAL: 9.1 MMOL/L (ref -3–3)
CARBOXYHEMOGLOBIN ARTERIAL: 0.4 % (ref 0–1.5)
GLUCOSE BLD-MCNC: 179 MG/DL (ref 70–99)
GLUCOSE BLD-MCNC: 198 MG/DL (ref 70–99)
GLUCOSE BLD-MCNC: 200 MG/DL (ref 70–99)
GLUCOSE BLD-MCNC: 225 MG/DL (ref 70–99)
GLUCOSE BLD-MCNC: 230 MG/DL (ref 70–99)
GLUCOSE BLD-MCNC: 231 MG/DL (ref 70–99)
HCO3 ARTERIAL: 34.3 MMOL/L (ref 21–29)
HEMOGLOBIN, ART, EXTENDED: 9.1 G/DL (ref 13.5–17.5)
METHEMOGLOBIN ARTERIAL: 0.3 %
O2 CONTENT ARTERIAL: 12 ML/DL
O2 SAT, ARTERIAL: 93.9 %
O2 THERAPY: ABNORMAL
PCO2 ARTERIAL: 50.8 MMHG (ref 35–45)
PERFORMED ON: ABNORMAL
PH ARTERIAL: 7.45 (ref 7.35–7.45)
PO2 ARTERIAL: 67.6 MMHG (ref 75–108)
TCO2 ARTERIAL: 35.8 MMOL/L
TROPONIN: <0.01 NG/ML
TROPONIN: <0.01 NG/ML

## 2020-04-04 PROCEDURE — 6360000002 HC RX W HCPCS: Performed by: INTERNAL MEDICINE

## 2020-04-04 PROCEDURE — 87205 SMEAR GRAM STAIN: CPT

## 2020-04-04 PROCEDURE — 6370000000 HC RX 637 (ALT 250 FOR IP): Performed by: INTERNAL MEDICINE

## 2020-04-04 PROCEDURE — 36415 COLL VENOUS BLD VENIPUNCTURE: CPT

## 2020-04-04 PROCEDURE — 2700000000 HC OXYGEN THERAPY PER DAY

## 2020-04-04 PROCEDURE — 94003 VENT MGMT INPAT SUBQ DAY: CPT

## 2020-04-04 PROCEDURE — 84484 ASSAY OF TROPONIN QUANT: CPT

## 2020-04-04 PROCEDURE — 51702 INSERT TEMP BLADDER CATH: CPT

## 2020-04-04 PROCEDURE — 94640 AIRWAY INHALATION TREATMENT: CPT

## 2020-04-04 PROCEDURE — 31500 INSERT EMERGENCY AIRWAY: CPT

## 2020-04-04 PROCEDURE — 71045 X-RAY EXAM CHEST 1 VIEW: CPT

## 2020-04-04 PROCEDURE — 87070 CULTURE OTHR SPECIMN AEROBIC: CPT

## 2020-04-04 PROCEDURE — 2580000003 HC RX 258: Performed by: INTERNAL MEDICINE

## 2020-04-04 PROCEDURE — 94761 N-INVAS EAR/PLS OXIMETRY MLT: CPT

## 2020-04-04 PROCEDURE — 94750 HC PULMONARY COMPLIANCE STUDY: CPT

## 2020-04-04 PROCEDURE — 82803 BLOOD GASES ANY COMBINATION: CPT

## 2020-04-04 PROCEDURE — 99291 CRITICAL CARE FIRST HOUR: CPT | Performed by: INTERNAL MEDICINE

## 2020-04-04 PROCEDURE — 2000000000 HC ICU R&B

## 2020-04-04 RX ORDER — ALBUTEROL SULFATE 90 UG/1
2 AEROSOL, METERED RESPIRATORY (INHALATION) EVERY 4 HOURS
Status: DISCONTINUED | OUTPATIENT
Start: 2020-04-04 | End: 2020-04-07

## 2020-04-04 RX ADMIN — INSULIN LISPRO 4 UNITS: 100 INJECTION, SOLUTION INTRAVENOUS; SUBCUTANEOUS at 11:53

## 2020-04-04 RX ADMIN — INSULIN LISPRO 2 UNITS: 100 INJECTION, SOLUTION INTRAVENOUS; SUBCUTANEOUS at 17:52

## 2020-04-04 RX ADMIN — Medication 2 PUFF: at 03:17

## 2020-04-04 RX ADMIN — AZITHROMYCIN DIHYDRATE 500 MG: 500 INJECTION, POWDER, LYOPHILIZED, FOR SOLUTION INTRAVENOUS at 09:42

## 2020-04-04 RX ADMIN — Medication 15 ML: at 09:41

## 2020-04-04 RX ADMIN — PROPOFOL 50 MCG/KG/MIN: 10 INJECTION, EMULSION INTRAVENOUS at 11:51

## 2020-04-04 RX ADMIN — CARBOXYMETHYLCELLULOSE SODIUM 1 DROP: 10 GEL OPHTHALMIC at 23:31

## 2020-04-04 RX ADMIN — CARBOXYMETHYLCELLULOSE SODIUM 1 DROP: 10 GEL OPHTHALMIC at 00:57

## 2020-04-04 RX ADMIN — Medication 2 PUFF: at 19:30

## 2020-04-04 RX ADMIN — WHITE PETROLATUM 57.7 %-MINERAL OIL 31.9 % EYE OINTMENT: at 20:09

## 2020-04-04 RX ADMIN — CARBOXYMETHYLCELLULOSE SODIUM 1 DROP: 10 GEL OPHTHALMIC at 15:00

## 2020-04-04 RX ADMIN — Medication 2 PUFF: at 07:54

## 2020-04-04 RX ADMIN — CEFTRIAXONE SODIUM 1 G: 1 INJECTION, POWDER, FOR SOLUTION INTRAMUSCULAR; INTRAVENOUS at 20:36

## 2020-04-04 RX ADMIN — CARBOXYMETHYLCELLULOSE SODIUM 1 DROP: 10 GEL OPHTHALMIC at 11:52

## 2020-04-04 RX ADMIN — ASPIRIN 81 MG 81 MG: 81 TABLET ORAL at 09:42

## 2020-04-04 RX ADMIN — INSULIN LISPRO 2 UNITS: 100 INJECTION, SOLUTION INTRAVENOUS; SUBCUTANEOUS at 08:00

## 2020-04-04 RX ADMIN — ATORVASTATIN CALCIUM 10 MG: 10 TABLET, FILM COATED ORAL at 22:17

## 2020-04-04 RX ADMIN — GABAPENTIN 300 MG: 300 CAPSULE ORAL at 09:41

## 2020-04-04 RX ADMIN — Medication 2 PUFF: at 23:08

## 2020-04-04 RX ADMIN — WHITE PETROLATUM 57.7 %-MINERAL OIL 31.9 % EYE OINTMENT: at 03:13

## 2020-04-04 RX ADMIN — PROPOFOL 50 MCG/KG/MIN: 10 INJECTION, EMULSION INTRAVENOUS at 22:38

## 2020-04-04 RX ADMIN — Medication 10 ML: at 09:41

## 2020-04-04 RX ADMIN — ENOXAPARIN SODIUM 40 MG: 40 INJECTION SUBCUTANEOUS at 09:41

## 2020-04-04 RX ADMIN — Medication 10 ML: at 06:11

## 2020-04-04 RX ADMIN — GABAPENTIN 300 MG: 300 CAPSULE ORAL at 22:17

## 2020-04-04 RX ADMIN — PROPOFOL 50 MCG/KG/MIN: 10 INJECTION, EMULSION INTRAVENOUS at 16:20

## 2020-04-04 RX ADMIN — Medication 2 PUFF: at 07:53

## 2020-04-04 RX ADMIN — PROPOFOL 50 MCG/KG/MIN: 10 INJECTION, EMULSION INTRAVENOUS at 05:12

## 2020-04-04 RX ADMIN — Medication 10 ML: at 22:18

## 2020-04-04 RX ADMIN — CARBOXYMETHYLCELLULOSE SODIUM 1 DROP: 10 GEL OPHTHALMIC at 22:17

## 2020-04-04 RX ADMIN — WHITE PETROLATUM 57.7 %-MINERAL OIL 31.9 % EYE OINTMENT: at 11:52

## 2020-04-04 RX ADMIN — MUPIROCIN: 20 OINTMENT TOPICAL at 22:18

## 2020-04-04 RX ADMIN — CARBOXYMETHYLCELLULOSE SODIUM 1 DROP: 10 GEL OPHTHALMIC at 09:43

## 2020-04-04 RX ADMIN — Medication 2 PUFF: at 15:31

## 2020-04-04 RX ADMIN — Medication 15 ML: at 03:17

## 2020-04-04 RX ADMIN — WHITE PETROLATUM 57.7 %-MINERAL OIL 31.9 % EYE OINTMENT: at 16:21

## 2020-04-04 RX ADMIN — PREDNISONE 40 MG: 20 TABLET ORAL at 09:42

## 2020-04-04 RX ADMIN — PROPOFOL 50 MCG/KG/MIN: 10 INJECTION, EMULSION INTRAVENOUS at 01:42

## 2020-04-04 RX ADMIN — Medication 2 PUFF: at 11:23

## 2020-04-04 RX ADMIN — CARBOXYMETHYLCELLULOSE SODIUM 1 DROP: 10 GEL OPHTHALMIC at 11:51

## 2020-04-04 RX ADMIN — WHITE PETROLATUM 57.7 %-MINERAL OIL 31.9 % EYE OINTMENT: at 06:42

## 2020-04-04 RX ADMIN — WHITE PETROLATUM 57.7 %-MINERAL OIL 31.9 % EYE OINTMENT: at 23:32

## 2020-04-04 RX ADMIN — Medication 15 ML: at 22:17

## 2020-04-04 RX ADMIN — MUPIROCIN: 20 OINTMENT TOPICAL at 09:41

## 2020-04-04 RX ADMIN — FENTANYL CITRATE 25 MCG: 50 INJECTION INTRAMUSCULAR; INTRAVENOUS at 06:11

## 2020-04-04 RX ADMIN — CARBOXYMETHYLCELLULOSE SODIUM 1 DROP: 10 GEL OPHTHALMIC at 06:29

## 2020-04-04 ASSESSMENT — PULMONARY FUNCTION TESTS
PIF_VALUE: 25
PIF_VALUE: 20
PIF_VALUE: 32
PIF_VALUE: 22
PIF_VALUE: 25
PIF_VALUE: 19

## 2020-04-04 ASSESSMENT — PAIN SCALES - GENERAL: PAINLEVEL_OUTOF10: 5

## 2020-04-04 NOTE — PROGRESS NOTES
Shift assessment completed, see flowsheet. Pt sedated and mechanically ventilated. Lung sounds dimnished, Heart Sounds s1s2, NSR  on the monitor, Bowel Sounds +x4 , Pulses +2x4 , Foely patent to bsd. No signs of  pain or discomfort. IV's infusing per order . Call light within reach, bed in lowest position, side rails x 3 . Will continue to monitor.

## 2020-04-04 NOTE — PROGRESS NOTES
Pulmonary & Critical Care Medicine ICU Progress Note  CC: Acute respiratory failure with hypoxemia    Events of Last 24 hours: He had worsening resp distress and hypoxemia, emergently intubated. CXR shows bilateral ASD    Invasive Lines:   IV Line: Pivs    MV:  4/3  Vent Mode: AC/VC Rate Set: 18 bmp/Vt Ordered: 450 mL/ /FiO2 : 50 %  Recent Labs     04/03/20 2030 04/04/20  0455   PHART 7.296* 7.447   LXI2TLR 90.9* 50.8*   PO2ART 84.8 67.6*       IV:   propofol 50 mcg/kg/min (04/04/20 0512)    dextrose         EXAM:  BP (!) 124/57   Pulse 57   Temp 97.5 °F (36.4 °C) (Axillary)   Resp 18   Ht 6' (1.829 m)   Wt 202 lb 6.4 oz (91.8 kg)   SpO2 96%   BMI 27.45 kg/m²  on vent  Tmax: 97.6  CVP:      Intake/Output Summary (Last 24 hours) at 4/4/2020 0812  Last data filed at 4/4/2020 0512  Gross per 24 hour   Intake 390 ml   Output 975 ml   Net -585 ml     Gen: No distress. Normocephalic, atraumatic. Obese. Eyes: PERRL. No sclera icterus. No conjunctival injection. ENT: No discharge. Pharynx clear. ETT in place  Neck: Trachea midline. No obvious mass. Resp: No accessory muscle use. bilateral crackles. no wheezes. No rhonchi. No dullness on percussion. CV: Regular rate. Regular rhythm. No murmur or rub. No edema. GI: Non-tender. Non-distended. No hernia. Skin: Warm and dry. No nodule on exposed extremities. Lymph: No cervical LAD. No supraclavicular LAD. M/S: No cyanosis. No joint deformity. No clubbing.    Neuro: sedated, not moving extremities    Medications:   albuterol sulfate HFA  2 puff Inhalation Q4H    And    ipratropium  2 puff Inhalation Q4H    chlorhexidine  15 mL Mouth/Throat BID    carboxymethylcellulose PF  1 drop Both Eyes Q4H    And    artificial tears   Both Eyes Q4H    enoxaparin  40 mg Subcutaneous Daily    mupirocin   Nasal BID    insulin lispro  0-12 Units Subcutaneous TID WC    insulin lispro  0-6 Units Subcutaneous Nightly    aspirin  81 mg Oral Daily   

## 2020-04-04 NOTE — PROGRESS NOTES
04/03/20 2255   Vent Information   Vent Type 840   Vent Mode AC/VC   Vt Ordered 450 mL   Rate Set 18 bmp   Peak Flow 60 L/min   FiO2  60 %   Sensitivity 3   PEEP/CPAP 5   Humidification Source Heated wire   Humidification Temp 37   Humidification Temp Measured 37   Circuit Condensation Drained   Vent Patient Data   Peak Inspiratory Pressure 33 cmH2O   Mean Airway Pressure 11 cmH20   Rate Measured 18 br/min   Vt Exhaled 513 mL   Minute Volume 9.24 Liters   I:E Ratio 1:3.1   Plateau Pressure 19 FPU89   Static Compliance 37 mL/cmH2O   Dynamic Compliance 18 mL/cmH2O   Cough/Sputum   Sputum How Obtained Suctioned;Endotracheal   Cough Productive   Sputum Amount Small   Sputum Color Creamy   Tenacity Thick   Spontaneous Breathing Trial (SBT) RT Doc   Pulse 65   SpO2 100 %   Breath Sounds   Right Upper Lobe Diminished   Right Middle Lobe Diminished   Right Lower Lobe Diminished   Left Upper Lobe Diminished   Left Lower Lobe Diminished   Additional Respiratory  Assessments   Resp (!) 0   Position Semi-Sam's   Oral Care Completed? Yes   Oral Care Mouth suctioned   Subglottic Suction Done?  Yes   Alarm Settings   High Pressure Alarm 40 cmH2O   Low Minute Volume Alarm 5 L/min   Apnea (secs) 20 secs   High Respiratory Rate 40 br/min   Low Exhaled Vt  350 mL   ETT (adult)   Placement Date/Time: 04/03/20 1945   Preoxygenation: Yes  Tube Size: 8 mm  Laryngoscope: GlideScope  Location: Oral  Secured at: 23 cm  Measured From: Lips   Secured at 23 cm   Measured From Lips   ET Placement Left   Secured By Commercial tube galarza   Site Condition Dry

## 2020-04-04 NOTE — PROGRESS NOTES
RESPIRATORY THERAPY ASSESSMENT    Name:  [address] Record Number:  [id number]  Age: 78 y.o. Gender: male  : 1940  Today's Date:  2020  Room:  [facility]3013-01    Assessment     Is the patient being admitted for a COPD or Asthma exacerbation? No   (If yes the patient will be seen every 4 hours for the first 24 hours and then reassessed)    Patient Admission Diagnosis      Allergies  No Known Allergies    Minimum Predicted Vital Capacity:     On vent          Actual Vital Capacity:      On vent              Pulmonary History:COPD  Home Oxygen Therapy:  3.5L  Home Respiratory Therapy:albuterol hhn qid/trelegy ellipta/albuterol mdi prn   Current Respiratory Therapy:  Albuterol/atrovent 2 puffs q4wa  Treatment Type: MDI  Medications: Ipratropium Bromide    Respiratory Severity Index(RSI)   Patients with orders for inhalation medications, oxygen, or any therapeutic treatment modality will be placed on Respiratory Protocol. They will be assessed with the first treatment and at least every 72 hours thereafter. The following severity scale will be used to determine frequency of treatment intervention.     Smoking History: Pulmonary Disease or Smoking History, Greater than 15 pack year = 2    Social History  Social History     Tobacco Use    Smoking status: Former Smoker     Packs/day: 1.50     Years: 45.00     Pack years: 67.50     Last attempt to quit: 2005     Years since quittin.8    Smokeless tobacco: Never Used   Substance Use Topics    Alcohol use: No    Drug use: No       Recent Surgical History: None = 0  Past Surgical History  Past Surgical History:   Procedure Laterality Date    BACK SURGERY      repair broken back L4 & L5    CYSTOSCOPY Right 10/9/15    RIght Ureteroscopy, Holmium Laser Lithotripsy with Stone Removal, Right Stent Placement    CYSTOSCOPY  2019    CYSTOSCOPY, RESECTION OF BLADDER NECK, URETHRAL DILATION    CYSTOSCOPY W BIOPSY OF BLADDER N/A 2019 CYSTOSCOPY, RESECTION OF BLADDER NECK, URETHRAL DILATION performed by Brooke France MD at [address] Left 6/12/2016    cataract removal    JOINT REPLACEMENT  6/29/2011    right knee    JOINT REPLACEMENT  11/2004    left knee    OTHER SURGICAL HISTORY  08/31/2015    wide excision basal cell carcinoma on the nose and bilateral auricles with frozen sections, plastic closure, full thickness skin graft    OTHER SURGICAL HISTORY  12/1/15    excision of lesion of lip    PROSTATE SURGERY      TURP  10/23/2015    with cystoscopy       Level of Consciousness: Comatose = 4    Level of Activity: Bedridden, unresponsive or quadriplegic = 4    Respiratory Pattern: Regular Pattern; RR 8-20 = 0    Breath Sounds: Diminshed bilaterally and/or crackles = 2    Sputum  Sputum Color: Creamy, Tenacity: Thick, Sputum How Obtained: Suctioned, Endotracheal  Cough: Strong, productive = 1    Vital Signs   /65   Pulse 74   Temp 97.6 °F (36.4 °C) (Axillary)   Resp 18   Ht 6' (1.829 m)   Wt 214 lb 9.6 oz (97.3 kg)   SpO2 97%   BMI 29.10 kg/m²   SPO2 (COPD values may differ): Less than 86% on room air or greater than 92% on FiO2 greater than 50% = 4    Peak Flow (asthma only): not applicable = 0    RSI: 81-32 = Q4 (every four hours)        Plan       Goals: medication delivery and improve oxygenation    Patient/caregiver was educated on the proper method of use for Respiratory Care Devices: On vent      Level of patient/caregiver understanding able to:   ? Verbalize understanding   ? Demonstrate understanding       ? Teach back        ? Needs reinforcement       ? No available caregiver               ? Other:     Response to education:  On vent     Is patient being placed on Home Treatment Regimen? No     Does the patient have everything they need prior to discharge?   NA     Comments: chart reviewed and patient assessed    Plan of Care: change albuterol/atrovent q4wa to albuterol/atrovent q4 per assessment    Electronically signed by Rody Robin RCP on 4/4/2020 at 12:23 AM    Respiratory Protocol Guidelines     1. Assessment and treatment by Respiratory Therapy will be initiated for medication and therapeutic interventions upon initiation of aerosolized medication. 2. Physician will be contacted for respiratory rate (RR) greater than 35 breaths per minute. Therapy will be held for heart rate (HR) greater than 140 beats per minute, pending direction from physician. 3. Bronchodilators will be administered via Metered Dose Inhaler (MDI) with spacer when the following criteria are met:  a. Alert and cooperative     b. HR < 140 bpm  c. RR < 30 bpm                d. Can demonstrate a 2-3 second inspiratory hold  4. Bronchodilators will be administered via Hand Held Nebulizer [facility]) to patients when ANY of the following criteria are met  a. Incognizant or uncooperative          b. Patients treated with HHN at Home        c. Unable to demonstrate proper use of MDI with spacer     d. RR > 30 bpm   5. Bronchodilators will be delivered via Metered Dose Inhaler (MDI), HHN, Aerogen to intubated patients on mechanical ventilation. 6. Inhalation medication orders will be delivered and/or substituted as outlined below. Aerosolized Medications Ordering and Administration Guidelines:    1. All Medications will be ordered by a physician, and their frequency and/or modality will be adjusted as defined by the patients Respiratory Severity Index (RSI) score. 2. If the patient does not have documented COPD, consider discontinuing anticholinergics when RSI is less than 9.  3. If the bronchospasm worsens (increased RSI), then the bronchodilator frequency can be increased to a maximum of every 4 hours. If greater than every 4 hours is required, the physician will be contacted.   4. If the bronchospasm improves, the frequency of the bronchodilator can be decreased, based on the patient's RSI, but not less than home treatment

## 2020-04-04 NOTE — PROGRESS NOTES
Shift Summary    2030 Shift summary begins after intubation event. See notes from L. [address] [facility], RN. Abg drawn from right radial artery and sent to lab. 2049 Pco2 on Abg 90.9. Is going in and out of narrow complex SVT vs A fib.  2200 Sedated on propofol. ETT to ventilator. No further episodes of irregular or tachy rhythm. Care assumed by Worthy  with Jesus Torres as back up.

## 2020-04-04 NOTE — PROGRESS NOTES
Speech Language Pathology  Attempt Note    Attempted follow up evaluation. Spoke with RN, Patient intubated d/t decline in respiratory condition. Will follow up with patient after extubation. Thank you. Roman Rowland. RONALD.  [id number] [facility]  Speech-Language Pathologist [phone]

## 2020-04-04 NOTE — PROCEDURES
Pt was emergently intubated secondary resp distress. Pt was sedated with 80 mg Propofol and 100 mg succinylcholine. Pt was intubated in a single attempt using a Glide scope with a size 4 MAC in place. The tip of a size 8 ETT was inserted between the cords (visualized on Glide Scope) and then advanced off of the stylette into the air way to 24 cm from the lip. Cuff was inflated and secured. Since pt is a COVID r/o pt was rapidly intubated w/o ambubag or CO2 detector. Bilateral breath sounds auscultated. Vent settings provided to respiratory staff. OG was passed to 60 cm from the lip. CXR ordered and pending to verify tube placement. Addendum: tube retracted 1 cm. OG advanced 5 cm.

## 2020-04-04 NOTE — PROGRESS NOTES
04/04/20 1930   Vent Information   $Ventilation $Subsequent Day   Skin Assessment Clean, dry, & intact   Vent Type 840   Vent Mode AC/VC   Vt Ordered 400 mL   Rate Set 18 bmp   Peak Flow 60 L/min   FiO2  40 %   Sensitivity 3   PEEP/CPAP 5   Humidification Source Heated wire   Humidification Temp Measured 36.6   Circuit Condensation Drained   Vent Patient Data   Peak Inspiratory Pressure 25 cmH2O   Mean Airway Pressure 8.9 cmH20   Rate Measured 18 br/min   Vt Exhaled 433 mL   Minute Volume 8.8 Liters   I:E Ratio 1:3.1   Plateau Pressure 14 SYX23   Static Compliance 46 mL/cmH2O   Dynamic Compliance 23 mL/cmH2O   Cough/Sputum   Sputum How Obtained Endotracheal   Cough Productive   Sputum Amount Small   Sputum Color Creamy   Tenacity Thick   Spontaneous Breathing Trial (SBT) RT Doc   Pulse 58   SpO2 95 %   Breath Sounds   Right Upper Lobe Diminished   Right Middle Lobe Diminished   Right Lower Lobe Diminished   Left Upper Lobe Diminished   Left Lower Lobe Diminished   Additional Respiratory  Assessments   Resp 16   Alarm Settings   High Pressure Alarm 40 cmH2O   Low Minute Volume Alarm 5 L/min   Apnea (secs) 20 secs   High Respiratory Rate 40 br/min   Low Exhaled Vt  300 mL   Patient Observation   Observations 8ett 23 at lip

## 2020-04-05 ENCOUNTER — APPOINTMENT (OUTPATIENT)
Dept: GENERAL RADIOLOGY | Age: 80
DRG: 208 | End: 2020-04-05
Payer: MEDICARE

## 2020-04-05 LAB
ANION GAP SERPL CALCULATED.3IONS-SCNC: 11 MMOL/L (ref 3–16)
BASE EXCESS ARTERIAL: 12.4 MMOL/L (ref -3–3)
BASOPHILS ABSOLUTE: 0 K/UL (ref 0–0.2)
BASOPHILS RELATIVE PERCENT: 0.1 %
BLOOD CULTURE, ROUTINE: NORMAL
BUN BLDV-MCNC: 28 MG/DL (ref 7–20)
CALCIUM SERPL-MCNC: 8.7 MG/DL (ref 8.3–10.6)
CARBOXYHEMOGLOBIN ARTERIAL: 0.2 % (ref 0–1.5)
CHLORIDE BLD-SCNC: 95 MMOL/L (ref 99–110)
CO2: 35 MMOL/L (ref 21–32)
CREAT SERPL-MCNC: 0.7 MG/DL (ref 0.8–1.3)
CULTURE, BLOOD 2: NORMAL
CULTURE, RESPIRATORY: NORMAL
EOSINOPHILS ABSOLUTE: 0 K/UL (ref 0–0.6)
EOSINOPHILS RELATIVE PERCENT: 0.1 %
GFR AFRICAN AMERICAN: >60
GFR NON-AFRICAN AMERICAN: >60
GLUCOSE BLD-MCNC: 144 MG/DL (ref 70–99)
GLUCOSE BLD-MCNC: 156 MG/DL (ref 70–99)
GLUCOSE BLD-MCNC: 158 MG/DL (ref 70–99)
GLUCOSE BLD-MCNC: 162 MG/DL (ref 70–99)
GLUCOSE BLD-MCNC: 170 MG/DL (ref 70–99)
GLUCOSE BLD-MCNC: 176 MG/DL (ref 70–99)
GLUCOSE BLD-MCNC: 194 MG/DL (ref 70–99)
GLUCOSE BLD-MCNC: 198 MG/DL (ref 70–99)
GRAM STAIN RESULT: NORMAL
HCO3 ARTERIAL: 37.7 MMOL/L (ref 21–29)
HCT VFR BLD CALC: 28 % (ref 40.5–52.5)
HEMOGLOBIN, ART, EXTENDED: 9.6 G/DL (ref 13.5–17.5)
HEMOGLOBIN: 8.9 G/DL (ref 13.5–17.5)
LYMPHOCYTES ABSOLUTE: 1.2 K/UL (ref 1–5.1)
LYMPHOCYTES RELATIVE PERCENT: 29.1 %
MAGNESIUM: 2.1 MG/DL (ref 1.8–2.4)
MCH RBC QN AUTO: 26.5 PG (ref 26–34)
MCHC RBC AUTO-ENTMCNC: 31.8 G/DL (ref 31–36)
MCV RBC AUTO: 83.3 FL (ref 80–100)
METHEMOGLOBIN ARTERIAL: 0.3 %
MONOCYTES ABSOLUTE: 0.4 K/UL (ref 0–1.3)
MONOCYTES RELATIVE PERCENT: 10.5 %
NEUTROPHILS ABSOLUTE: 2.4 K/UL (ref 1.7–7.7)
NEUTROPHILS RELATIVE PERCENT: 60.2 %
O2 CONTENT ARTERIAL: 13 ML/DL
O2 SAT, ARTERIAL: 94.1 %
O2 THERAPY: ABNORMAL
PCO2 ARTERIAL: 53.9 MMHG (ref 35–45)
PDW BLD-RTO: 16.9 % (ref 12.4–15.4)
PERFORMED ON: ABNORMAL
PH ARTERIAL: 7.46 (ref 7.35–7.45)
PLATELET # BLD: 90 K/UL (ref 135–450)
PMV BLD AUTO: 8.5 FL (ref 5–10.5)
PO2 ARTERIAL: 67.8 MMHG (ref 75–108)
POTASSIUM REFLEX MAGNESIUM: 3.5 MMOL/L (ref 3.5–5.1)
RBC # BLD: 3.36 M/UL (ref 4.2–5.9)
SODIUM BLD-SCNC: 141 MMOL/L (ref 136–145)
TCO2 ARTERIAL: 39.4 MMOL/L
WBC # BLD: 4 K/UL (ref 4–11)

## 2020-04-05 PROCEDURE — 99291 CRITICAL CARE FIRST HOUR: CPT | Performed by: INTERNAL MEDICINE

## 2020-04-05 PROCEDURE — 2000000000 HC ICU R&B

## 2020-04-05 PROCEDURE — 2700000000 HC OXYGEN THERAPY PER DAY

## 2020-04-05 PROCEDURE — 6360000002 HC RX W HCPCS: Performed by: INTERNAL MEDICINE

## 2020-04-05 PROCEDURE — 6370000000 HC RX 637 (ALT 250 FOR IP): Performed by: INTERNAL MEDICINE

## 2020-04-05 PROCEDURE — 85025 COMPLETE CBC W/AUTO DIFF WBC: CPT

## 2020-04-05 PROCEDURE — 71045 X-RAY EXAM CHEST 1 VIEW: CPT

## 2020-04-05 PROCEDURE — 80048 BASIC METABOLIC PNL TOTAL CA: CPT

## 2020-04-05 PROCEDURE — 2580000003 HC RX 258: Performed by: INTERNAL MEDICINE

## 2020-04-05 PROCEDURE — 94761 N-INVAS EAR/PLS OXIMETRY MLT: CPT

## 2020-04-05 PROCEDURE — 94003 VENT MGMT INPAT SUBQ DAY: CPT

## 2020-04-05 PROCEDURE — 82803 BLOOD GASES ANY COMBINATION: CPT

## 2020-04-05 PROCEDURE — 36415 COLL VENOUS BLD VENIPUNCTURE: CPT

## 2020-04-05 PROCEDURE — 94640 AIRWAY INHALATION TREATMENT: CPT

## 2020-04-05 PROCEDURE — 83735 ASSAY OF MAGNESIUM: CPT

## 2020-04-05 PROCEDURE — 94750 HC PULMONARY COMPLIANCE STUDY: CPT

## 2020-04-05 RX ADMIN — ATORVASTATIN CALCIUM 10 MG: 10 TABLET, FILM COATED ORAL at 20:35

## 2020-04-05 RX ADMIN — Medication 2 PUFF: at 19:25

## 2020-04-05 RX ADMIN — WHITE PETROLATUM 57.7 %-MINERAL OIL 31.9 % EYE OINTMENT: at 06:05

## 2020-04-05 RX ADMIN — Medication 2 PUFF: at 03:08

## 2020-04-05 RX ADMIN — CARBOXYMETHYLCELLULOSE SODIUM 1 DROP: 10 GEL OPHTHALMIC at 20:35

## 2020-04-05 RX ADMIN — GABAPENTIN 300 MG: 300 CAPSULE ORAL at 07:54

## 2020-04-05 RX ADMIN — Medication 2 PUFF: at 03:09

## 2020-04-05 RX ADMIN — INSULIN LISPRO 2 UNITS: 100 INJECTION, SOLUTION INTRAVENOUS; SUBCUTANEOUS at 00:49

## 2020-04-05 RX ADMIN — Medication 10 ML: at 20:36

## 2020-04-05 RX ADMIN — PROPOFOL 50 MCG/KG/MIN: 10 INJECTION, EMULSION INTRAVENOUS at 08:50

## 2020-04-05 RX ADMIN — INSULIN LISPRO 2 UNITS: 100 INJECTION, SOLUTION INTRAVENOUS; SUBCUTANEOUS at 12:00

## 2020-04-05 RX ADMIN — Medication 10 ML: at 07:53

## 2020-04-05 RX ADMIN — MIDAZOLAM HYDROCHLORIDE 2 MG: 1 INJECTION, SOLUTION INTRAMUSCULAR; INTRAVENOUS at 17:14

## 2020-04-05 RX ADMIN — Medication 2 PUFF: at 11:34

## 2020-04-05 RX ADMIN — Medication 2 PUFF: at 22:44

## 2020-04-05 RX ADMIN — Medication 2 PUFF: at 11:35

## 2020-04-05 RX ADMIN — CARBOXYMETHYLCELLULOSE SODIUM 1 DROP: 10 GEL OPHTHALMIC at 07:55

## 2020-04-05 RX ADMIN — CARBOXYMETHYLCELLULOSE SODIUM 1 DROP: 10 GEL OPHTHALMIC at 12:00

## 2020-04-05 RX ADMIN — INSULIN LISPRO 2 UNITS: 100 INJECTION, SOLUTION INTRAVENOUS; SUBCUTANEOUS at 17:20

## 2020-04-05 RX ADMIN — ASPIRIN 81 MG 81 MG: 81 TABLET ORAL at 07:54

## 2020-04-05 RX ADMIN — AZITHROMYCIN DIHYDRATE 500 MG: 500 INJECTION, POWDER, LYOPHILIZED, FOR SOLUTION INTRAVENOUS at 07:54

## 2020-04-05 RX ADMIN — Medication 2 PUFF: at 15:13

## 2020-04-05 RX ADMIN — CEFTRIAXONE SODIUM 1 G: 1 INJECTION, POWDER, FOR SOLUTION INTRAMUSCULAR; INTRAVENOUS at 20:35

## 2020-04-05 RX ADMIN — PROPOFOL 50 MCG/KG/MIN: 10 INJECTION, EMULSION INTRAVENOUS at 05:32

## 2020-04-05 RX ADMIN — PROPOFOL 55 MCG/KG/MIN: 10 INJECTION, EMULSION INTRAVENOUS at 14:59

## 2020-04-05 RX ADMIN — GABAPENTIN 300 MG: 300 CAPSULE ORAL at 20:35

## 2020-04-05 RX ADMIN — Medication 2 PUFF: at 07:26

## 2020-04-05 RX ADMIN — MUPIROCIN: 20 OINTMENT TOPICAL at 20:36

## 2020-04-05 RX ADMIN — WHITE PETROLATUM 57.7 %-MINERAL OIL 31.9 % EYE OINTMENT: at 21:07

## 2020-04-05 RX ADMIN — ENOXAPARIN SODIUM 40 MG: 40 INJECTION SUBCUTANEOUS at 07:54

## 2020-04-05 RX ADMIN — CARBOXYMETHYLCELLULOSE SODIUM 1 DROP: 10 GEL OPHTHALMIC at 17:15

## 2020-04-05 RX ADMIN — Medication 15 ML: at 20:36

## 2020-04-05 RX ADMIN — WHITE PETROLATUM 57.7 %-MINERAL OIL 31.9 % EYE OINTMENT: at 03:00

## 2020-04-05 RX ADMIN — CARBOXYMETHYLCELLULOSE SODIUM 1 DROP: 10 GEL OPHTHALMIC at 05:14

## 2020-04-05 RX ADMIN — MUPIROCIN: 20 OINTMENT TOPICAL at 10:04

## 2020-04-05 RX ADMIN — MIDAZOLAM HYDROCHLORIDE 2 MG: 1 INJECTION, SOLUTION INTRAMUSCULAR; INTRAVENOUS at 10:03

## 2020-04-05 RX ADMIN — INSULIN LISPRO 2 UNITS: 100 INJECTION, SOLUTION INTRAVENOUS; SUBCUTANEOUS at 06:13

## 2020-04-05 RX ADMIN — Medication 15 ML: at 07:53

## 2020-04-05 RX ADMIN — PROPOFOL 50 MCG/KG/MIN: 10 INJECTION, EMULSION INTRAVENOUS at 01:37

## 2020-04-05 RX ADMIN — WHITE PETROLATUM 57.7 %-MINERAL OIL 31.9 % EYE OINTMENT: at 12:02

## 2020-04-05 ASSESSMENT — PULMONARY FUNCTION TESTS
PIF_VALUE: 23
PIF_VALUE: 22
PIF_VALUE: 21
PIF_VALUE: 24
PIF_VALUE: 26
PIF_VALUE: 19

## 2020-04-05 NOTE — PROGRESS NOTES
04/04/20 2308   Vent Information   Vent Type 840   Vent Mode AC/VC   Vt Ordered 400 mL   Rate Set 18 bmp   Peak Flow 60 L/min   FiO2  40 %   Sensitivity 3   PEEP/CPAP 5   Humidification Source Heated wire   Humidification Temp Measured 37   Circuit Condensation Drained   Vent Patient Data   Peak Inspiratory Pressure 22 cmH2O   Mean Airway Pressure 9 cmH20   Rate Measured 19 br/min   Vt Exhaled 475 mL   Minute Volume 8.9 Liters   I:E Ratio 1:3.3   Cough/Sputum   Sputum How Obtained Endotracheal   Cough Non-productive   Sputum Amount None   Spontaneous Breathing Trial (SBT) RT Doc   Pulse 56   SpO2 95 %   Breath Sounds   Right Upper Lobe Diminished   Right Middle Lobe Diminished   Right Lower Lobe Diminished   Left Upper Lobe Diminished   Left Lower Lobe Diminished   Additional Respiratory  Assessments   Resp 20   Alarm Settings   High Pressure Alarm 40 cmH2O   Low Minute Volume Alarm 5 L/min   Apnea (secs) 20 secs   High Respiratory Rate 40 br/min   Low Exhaled Vt  300 mL   Patient Observation   Observations 8ett 23 at lip

## 2020-04-05 NOTE — PROGRESS NOTES
present. Aortic valve appears tricuspid and sclerotic but opens adequately. There is mild tricuspid regurgitation. Systolic pulmonary artery pressure (SPAP) is estimated at 58 mmHg consistent   with moderate pulmonary hypertension (RA pressure 8 mmHg). Assessment & Plan: Active Problems:    Chronic respiratory failure with hypoxia and hypercapnia 2.5 L home O2    Acute on chronic respiratory failure with hypercapnia (HCC)    COPD exacerbation (HCC)    Pneumonia, unspecified organism    Chronic thrombocytopenia    Acute on chronic respiratory failure with hypoxia and hypercapnia (HCC)  Resolved Problems:    * No resolved hospital problems. *      Acute on chronic hypoxic and hypercarbic respiratory failure. Secondary to acute COPD exacerbation and possible pneumonia. Resp status decompensated and intubated night of 4/3-4/4        Acute COPD exacerbation. IV Solu-Medrol. Empiric Zithro and Rocephin. Gram-positive pneumonia. Continue Rocephin and azithromycin. Flu screen negative. Procalcitonin only mildly elevated. CXR with   cardiomegaly, bilateral effusions,   and diffuse airspace opacities. COVID reported negative. Acute metabolic encephalopathy. Likely related to hypercarbia. Awake, anxious during SBT, but able to follow commands. Mild acute kidney injury secondary to dehydration. Resolved with IV fluids. Acute on chronic thrombocytopenia. Likely secondary to infection. Follow platelet levels - stable in 90s. Hypertension. Hold home hypertension medications given soft blood pressure readings. Type 2 diabetes. Controlled. Sliding scale insulin. Lovenox for DVT prophylaxis. Pepcid for GI prophylaxis.       Full code        [employer]

## 2020-04-05 NOTE — PROGRESS NOTES
Shift Summary    2000 Assessment completed. Continues to be intubated to ventilator and sedated with Propofol. See assessment notes/flowsheet. 0000 No assessment changes. Remians sedated with Propofol 50 mcg/kg. min while intubated to ventilator. O2 sat has remained >90% on FiO2 40%. 0400 Labs drawn by . Continues to be sedated with propofol and intubated to ventilator. No other assessment changes at this time. 0520 ABG drawn from right radial artery and sent to lab.  0600 No further changes at this time.

## 2020-04-05 NOTE — PROGRESS NOTES
Pulmonary & Critical Care Medicine ICU Progress Note  CC: Acute respiratory failure with hypoxemia    Events of Last 24 hours: Patient lasted about 50 minutes on SBT. He has more thick resp secretions. Invasive Lines:   IV Line: Pivs    MV:  4/3  Vent Mode: AC/VC Rate Set: 18 bmp/Vt Ordered: 400 mL/ /FiO2 : 40 %  Recent Labs     04/04/20  0455 04/05/20  0520   PHART 7.447 7.463*   KVR2TLM 50.8* 53.9*   PO2ART 67.6* 67.8*       IV:   propofol Stopped (04/05/20 0745)    dextrose         EXAM:  BP (!) 123/59   Pulse 57   Temp 97.2 °F (36.2 °C) (Axillary)   Resp 19   Ht 5' 9\" (1.753 m)   Wt 201 lb 6.4 oz (91.4 kg)   SpO2 95%   BMI 29.74 kg/m²  on vent  Tmax: 98F  CVP:      Intake/Output Summary (Last 24 hours) at 4/5/2020 0827  Last data filed at 4/5/2020 0532  Gross per 24 hour   Intake 991.99 ml   Output 1475 ml   Net -483.01 ml     Gen: Mild distress. Normocephalic, atraumatic. Obese. Eyes: PERRL. No sclera icterus. No conjunctival injection. ENT: No discharge. Pharynx clear. ETT in place  Neck: Trachea midline. No obvious mass. Resp: + accessory muscle use. bilateral crackles. no wheezes. + rhonchi. No dullness on percussion. CV: Regular rate. Regular rhythm. No murmur or rub. No edema. GI: Non-tender. Non-distended. No hernia. Skin: Warm and dry. No nodule on exposed extremities. Lymph: No cervical LAD. No supraclavicular LAD. M/S: No cyanosis. No joint deformity. No clubbing.    Neuro: sedated, not moving extremities    Medications:   albuterol sulfate HFA  2 puff Inhalation Q4H    And    ipratropium  2 puff Inhalation Q4H    insulin lispro  0-12 Units Subcutaneous Q6H    chlorhexidine  15 mL Mouth/Throat BID    carboxymethylcellulose PF  1 drop Both Eyes Q4H    And    artificial tears   Both Eyes Q4H    enoxaparin  40 mg Subcutaneous Daily    mupirocin   Nasal BID    aspirin  81 mg Oral Daily    atorvastatin  10 mg Oral Nightly    pantoprazole  40 mg Oral QAM AC   

## 2020-04-06 ENCOUNTER — APPOINTMENT (OUTPATIENT)
Dept: GENERAL RADIOLOGY | Age: 80
DRG: 208 | End: 2020-04-06
Payer: MEDICARE

## 2020-04-06 LAB
ALBUMIN SERPL-MCNC: 3.4 G/DL (ref 3.4–5)
ANION GAP SERPL CALCULATED.3IONS-SCNC: 11 MMOL/L (ref 3–16)
ANION GAP SERPL CALCULATED.3IONS-SCNC: 12 MMOL/L (ref 3–16)
BASE EXCESS ARTERIAL: 11.5 MMOL/L (ref -3–3)
BASE EXCESS ARTERIAL: 13.1 MMOL/L (ref -3–3)
BASOPHILS ABSOLUTE: 0 K/UL (ref 0–0.2)
BASOPHILS RELATIVE PERCENT: 0.6 %
BUN BLDV-MCNC: 16 MG/DL (ref 7–20)
BUN BLDV-MCNC: 22 MG/DL (ref 7–20)
CALCIUM SERPL-MCNC: 8.5 MG/DL (ref 8.3–10.6)
CALCIUM SERPL-MCNC: 9.1 MG/DL (ref 8.3–10.6)
CARBOXYHEMOGLOBIN ARTERIAL: 0 % (ref 0–1.5)
CARBOXYHEMOGLOBIN ARTERIAL: 0.4 % (ref 0–1.5)
CHLORIDE BLD-SCNC: 93 MMOL/L (ref 99–110)
CHLORIDE BLD-SCNC: 95 MMOL/L (ref 99–110)
CO2: 35 MMOL/L (ref 21–32)
CO2: 36 MMOL/L (ref 21–32)
CREAT SERPL-MCNC: 0.6 MG/DL (ref 0.8–1.3)
CREAT SERPL-MCNC: 0.6 MG/DL (ref 0.8–1.3)
CULTURE, RESPIRATORY: NORMAL
EOSINOPHILS ABSOLUTE: 0 K/UL (ref 0–0.6)
EOSINOPHILS RELATIVE PERCENT: 0.9 %
GFR AFRICAN AMERICAN: >60
GFR AFRICAN AMERICAN: >60
GFR NON-AFRICAN AMERICAN: >60
GFR NON-AFRICAN AMERICAN: >60
GLUCOSE BLD-MCNC: 167 MG/DL (ref 70–99)
GLUCOSE BLD-MCNC: 174 MG/DL (ref 70–99)
GLUCOSE BLD-MCNC: 187 MG/DL (ref 70–99)
GLUCOSE BLD-MCNC: 225 MG/DL (ref 70–99)
GLUCOSE BLD-MCNC: 245 MG/DL (ref 70–99)
GLUCOSE BLD-MCNC: 254 MG/DL (ref 70–99)
GLUCOSE BLD-MCNC: 264 MG/DL (ref 70–99)
GLUCOSE BLD-MCNC: 264 MG/DL (ref 70–99)
GRAM STAIN RESULT: NORMAL
HCO3 ARTERIAL: 36.9 MMOL/L (ref 21–29)
HCO3 ARTERIAL: 38.5 MMOL/L (ref 21–29)
HCT VFR BLD CALC: 29.7 % (ref 40.5–52.5)
HEMOGLOBIN, ART, EXTENDED: 10.1 G/DL (ref 13.5–17.5)
HEMOGLOBIN, ART, EXTENDED: 10.5 G/DL (ref 13.5–17.5)
HEMOGLOBIN: 9.3 G/DL (ref 13.5–17.5)
LYMPHOCYTES ABSOLUTE: 1 K/UL (ref 1–5.1)
LYMPHOCYTES RELATIVE PERCENT: 25.2 %
Lab: NORMAL
MAGNESIUM: 1.7 MG/DL (ref 1.8–2.4)
MAGNESIUM: 1.7 MG/DL (ref 1.8–2.4)
MCH RBC QN AUTO: 25.3 PG (ref 26–34)
MCHC RBC AUTO-ENTMCNC: 31.3 G/DL (ref 31–36)
MCV RBC AUTO: 80.9 FL (ref 80–100)
METHEMOGLOBIN ARTERIAL: 0 %
METHEMOGLOBIN ARTERIAL: 0.3 %
MONOCYTES ABSOLUTE: 0.4 K/UL (ref 0–1.3)
MONOCYTES RELATIVE PERCENT: 9.3 %
NEUTROPHILS ABSOLUTE: 2.5 K/UL (ref 1.7–7.7)
NEUTROPHILS RELATIVE PERCENT: 64 %
O2 CONTENT ARTERIAL: 13 ML/DL
O2 CONTENT ARTERIAL: 14 ML/DL
O2 SAT, ARTERIAL: 94.2 %
O2 SAT, ARTERIAL: 94.7 %
O2 THERAPY: ABNORMAL
O2 THERAPY: ABNORMAL
PCO2 ARTERIAL: 53.5 MMHG (ref 35–45)
PCO2 ARTERIAL: 54.3 MMHG (ref 35–45)
PDW BLD-RTO: 17.1 % (ref 12.4–15.4)
PERFORMED ON: ABNORMAL
PH ARTERIAL: 7.46 (ref 7.35–7.45)
PH ARTERIAL: 7.47 (ref 7.35–7.45)
PHOSPHORUS: 3.4 MG/DL (ref 2.5–4.9)
PLATELET # BLD: 96 K/UL (ref 135–450)
PMV BLD AUTO: 8.5 FL (ref 5–10.5)
PO2 ARTERIAL: 68.5 MMHG (ref 75–108)
PO2 ARTERIAL: 70.4 MMHG (ref 75–108)
POTASSIUM REFLEX MAGNESIUM: 3.4 MMOL/L (ref 3.5–5.1)
POTASSIUM SERPL-SCNC: 3.9 MMOL/L (ref 3.5–5.1)
RBC # BLD: 3.67 M/UL (ref 4.2–5.9)
REPORT: NORMAL
SARS-COV-2: NOT DETECTED
SODIUM BLD-SCNC: 140 MMOL/L (ref 136–145)
SODIUM BLD-SCNC: 142 MMOL/L (ref 136–145)
TCO2 ARTERIAL: 38.6 MMOL/L
TCO2 ARTERIAL: 40.2 MMOL/L
THIS TEST SENT TO: NORMAL
TROPONIN: <0.01 NG/ML
WBC # BLD: 3.9 K/UL (ref 4–11)

## 2020-04-06 PROCEDURE — 6360000002 HC RX W HCPCS: Performed by: INTERNAL MEDICINE

## 2020-04-06 PROCEDURE — 2700000000 HC OXYGEN THERAPY PER DAY

## 2020-04-06 PROCEDURE — 83735 ASSAY OF MAGNESIUM: CPT

## 2020-04-06 PROCEDURE — 2580000003 HC RX 258: Performed by: HOSPITALIST

## 2020-04-06 PROCEDURE — 94640 AIRWAY INHALATION TREATMENT: CPT

## 2020-04-06 PROCEDURE — 93005 ELECTROCARDIOGRAM TRACING: CPT | Performed by: INTERNAL MEDICINE

## 2020-04-06 PROCEDURE — 99233 SBSQ HOSP IP/OBS HIGH 50: CPT | Performed by: INTERNAL MEDICINE

## 2020-04-06 PROCEDURE — 6370000000 HC RX 637 (ALT 250 FOR IP): Performed by: INTERNAL MEDICINE

## 2020-04-06 PROCEDURE — 2500000003 HC RX 250 WO HCPCS: Performed by: INTERNAL MEDICINE

## 2020-04-06 PROCEDURE — 84484 ASSAY OF TROPONIN QUANT: CPT

## 2020-04-06 PROCEDURE — 92610 EVALUATE SWALLOWING FUNCTION: CPT

## 2020-04-06 PROCEDURE — 82803 BLOOD GASES ANY COMBINATION: CPT

## 2020-04-06 PROCEDURE — 2580000003 HC RX 258: Performed by: INTERNAL MEDICINE

## 2020-04-06 PROCEDURE — 85025 COMPLETE CBC W/AUTO DIFF WBC: CPT

## 2020-04-06 PROCEDURE — 2000000000 HC ICU R&B

## 2020-04-06 PROCEDURE — 80069 RENAL FUNCTION PANEL: CPT

## 2020-04-06 PROCEDURE — 36415 COLL VENOUS BLD VENIPUNCTURE: CPT

## 2020-04-06 PROCEDURE — 94003 VENT MGMT INPAT SUBQ DAY: CPT

## 2020-04-06 PROCEDURE — 94761 N-INVAS EAR/PLS OXIMETRY MLT: CPT

## 2020-04-06 PROCEDURE — 71045 X-RAY EXAM CHEST 1 VIEW: CPT

## 2020-04-06 PROCEDURE — 99291 CRITICAL CARE FIRST HOUR: CPT | Performed by: INTERNAL MEDICINE

## 2020-04-06 RX ORDER — MAGNESIUM SULFATE IN WATER 40 MG/ML
2 INJECTION, SOLUTION INTRAVENOUS ONCE
Status: DISCONTINUED | OUTPATIENT
Start: 2020-04-06 | End: 2020-04-06

## 2020-04-06 RX ORDER — POTASSIUM CHLORIDE 7.45 MG/ML
10 INJECTION INTRAVENOUS
Status: DISPENSED | OUTPATIENT
Start: 2020-04-06 | End: 2020-04-06

## 2020-04-06 RX ORDER — SODIUM CHLORIDE 9 MG/ML
INJECTION, SOLUTION INTRAVENOUS ONCE
Status: COMPLETED | OUTPATIENT
Start: 2020-04-06 | End: 2020-04-06

## 2020-04-06 RX ADMIN — POTASSIUM CHLORIDE 10 MEQ: 7.46 INJECTION, SOLUTION INTRAVENOUS at 12:43

## 2020-04-06 RX ADMIN — POTASSIUM CHLORIDE 10 MEQ: 7.46 INJECTION, SOLUTION INTRAVENOUS at 15:58

## 2020-04-06 RX ADMIN — Medication 2 PUFF: at 07:35

## 2020-04-06 RX ADMIN — Medication 2 PUFF: at 23:02

## 2020-04-06 RX ADMIN — MAGNESIUM SULFATE 1 G: 1 INJECTION INTRAVENOUS at 22:00

## 2020-04-06 RX ADMIN — Medication 2 PUFF: at 10:53

## 2020-04-06 RX ADMIN — Medication 2 PUFF: at 07:36

## 2020-04-06 RX ADMIN — INSULIN LISPRO 9 UNITS: 100 INJECTION, SOLUTION INTRAVENOUS; SUBCUTANEOUS at 12:43

## 2020-04-06 RX ADMIN — CEFTRIAXONE SODIUM 1 G: 1 INJECTION, POWDER, FOR SOLUTION INTRAMUSCULAR; INTRAVENOUS at 20:47

## 2020-04-06 RX ADMIN — INSULIN LISPRO 3 UNITS: 100 INJECTION, SOLUTION INTRAVENOUS; SUBCUTANEOUS at 17:55

## 2020-04-06 RX ADMIN — WHITE PETROLATUM 57.7 %-MINERAL OIL 31.9 % EYE OINTMENT: at 00:09

## 2020-04-06 RX ADMIN — Medication 2 PUFF: at 19:45

## 2020-04-06 RX ADMIN — INSULIN LISPRO 2 UNITS: 100 INJECTION, SOLUTION INTRAVENOUS; SUBCUTANEOUS at 00:51

## 2020-04-06 RX ADMIN — INSULIN LISPRO 6 UNITS: 100 INJECTION, SOLUTION INTRAVENOUS; SUBCUTANEOUS at 22:50

## 2020-04-06 RX ADMIN — POTASSIUM CHLORIDE 10 MEQ: 7.46 INJECTION, SOLUTION INTRAVENOUS at 14:13

## 2020-04-06 RX ADMIN — Medication 10 ML: at 10:43

## 2020-04-06 RX ADMIN — MUPIROCIN: 20 OINTMENT TOPICAL at 14:30

## 2020-04-06 RX ADMIN — MAGNESIUM SULFATE 1 G: 1 INJECTION INTRAVENOUS at 20:46

## 2020-04-06 RX ADMIN — DEXMEDETOMIDINE HYDROCHLORIDE 0.2 MCG/KG/HR: 100 INJECTION, SOLUTION INTRAVENOUS at 05:51

## 2020-04-06 RX ADMIN — ENOXAPARIN SODIUM 40 MG: 40 INJECTION SUBCUTANEOUS at 14:28

## 2020-04-06 RX ADMIN — Medication 10 ML: at 22:58

## 2020-04-06 RX ADMIN — WHITE PETROLATUM 57.7 %-MINERAL OIL 31.9 % EYE OINTMENT: at 03:00

## 2020-04-06 RX ADMIN — Medication 2 PUFF: at 03:16

## 2020-04-06 RX ADMIN — SODIUM CHLORIDE: 9 INJECTION, SOLUTION INTRAVENOUS at 22:50

## 2020-04-06 RX ADMIN — Medication 2 PUFF: at 15:03

## 2020-04-06 RX ADMIN — INSULIN LISPRO 4 UNITS: 100 INJECTION, SOLUTION INTRAVENOUS; SUBCUTANEOUS at 06:57

## 2020-04-06 RX ADMIN — PROPOFOL 50 MCG/KG/MIN: 10 INJECTION, EMULSION INTRAVENOUS at 00:09

## 2020-04-06 RX ADMIN — PROPOFOL 50 MCG/KG/MIN: 10 INJECTION, EMULSION INTRAVENOUS at 03:40

## 2020-04-06 RX ADMIN — AZITHROMYCIN DIHYDRATE 500 MG: 500 INJECTION, POWDER, LYOPHILIZED, FOR SOLUTION INTRAVENOUS at 08:24

## 2020-04-06 RX ADMIN — CARBOXYMETHYLCELLULOSE SODIUM 1 DROP: 10 GEL OPHTHALMIC at 01:00

## 2020-04-06 RX ADMIN — Medication 2 PUFF: at 03:15

## 2020-04-06 RX ADMIN — POTASSIUM CHLORIDE 10 MEQ: 7.46 INJECTION, SOLUTION INTRAVENOUS at 10:41

## 2020-04-06 ASSESSMENT — PULMONARY FUNCTION TESTS
PIF_VALUE: 27
PIF_VALUE: 23

## 2020-04-06 NOTE — PROGRESS NOTES
flush  10 mL Intravenous 2 times per day    azithromycin  500 mg Intravenous Q24H    And    cefTRIAXone (ROCEPHIN) IV  1 g Intravenous Q24H    gabapentin  300 mg Oral TID     PRN Meds:  fentanNYL, midazolam, magnesium sulfate, potassium chloride, albuterol sulfate HFA, sodium chloride flush, acetaminophen **OR** acetaminophen, polyethylene glycol, promethazine **OR** ondansetron, glucose, dextrose, glucagon (rDNA), dextrose, guaiFENesin-dextromethorphan    Results:  CBC:   Recent Labs     04/03/20 2232 04/05/20 0420 04/06/20  0734   WBC 4.7 4.0 3.9*   HGB 8.7* 8.9* 9.3*   HCT 27.2* 28.0* 29.7*   MCV 83.7 83.3 80.9   PLT 90* 90* 96*     BMP:   Recent Labs     04/03/20 2232 04/05/20 0420 04/06/20  0734    141 140   K 4.2 3.5 3.4*   CL 96* 95* 93*   CO2 36* 35* 36*   BUN 29* 28* 22*   CREATININE 0.8 0.7* 0.6*     Cultures:  Bld: ngtd  Sputum 4/2: NRF  Rapid flu neg  Sputum 4/4: pending  covid19 negative        Films:  CXR 4/6:   Perhaps slightly improved aeration in the left base.  Otherwise no change in   bilateral pleuroparenchymal disease. ASSESSMENT:  · Acute respiratory failure with hypoxemia and hypercapnia  · RLL Healthcare associated pneumonia - probable gram negative, risk for methicillin resistant Staph aureus as well   · ARDS  · COPD with AE  · Acute encephalopathy likely from hypercarbia  · Acute kidney injury  · Thrombocytopenia  · DM with hyperglycemia  · CAD     PLAN:  · Mechanical ventilation per my orders. The ventilator was adjusted by me at the bedside for unstable, life threatening respiratory failure. · Target tidal volume to 4-6 ml/kg IBW per ARDSnet ARMA study (N Engl J Med. 2000;342(18):1301)  · Target plateau pressures <03 cm H2O  · Goal to pursue conservative fluid strategy.  Goal to keep patient I/Os even if possible  · HOB greater than 30 degrees at all times  · Daily SBT once FIO2<60% and PEEP = 5, patient arousable, hemodynamically stable  · IV Sedation with propofol

## 2020-04-06 NOTE — PROGRESS NOTES
this afternoon for a clinical bedside swallowing evaluation. He was able to participate and follow directions although he was often slow to respond. Overall swallow onset was generally timely. However, the pt demonstrated symptoms of reduced pharyngeal clearing and overt s/s of aspiration after the swallow in the form of wet coughing. This eventually after all presentations. His RR also tended to increase from low to mid 20 to high 20's to low 30's after PO trials. At this time it is recommended that the pt remain NPO with a swallowing re-evaluation to be performed tomorrow. See the rest of this report for more details. Treatment Plan  Requires SLP Intervention: Yes  Duration/Frequency of Treatment: 3-5 x week for LOS  D/C Recommendations: To be determined     Recommended Diet and Intervention  Diet Solids Recommendation: NPO  Liquid Consistency Recommendation: NPO  Recommended Form of Meds: Via alternative means of nutrition  Recommendations: NPO;Dysphagia treatment  Therapeutic Interventions: Patient/Family education; Therapeutic PO trials with SLP;Laryngeal exercises; Pharyngeal exercises    Compensatory Swallowing Strategies  Compensatory Swallowing Strategies: Other (comments)(Keep head of bed elevated; Good oral care at least 3 x day)    Treatment/Goals  Short-term Goals  Timeframe for Short-term Goals: 5 days (4/11/20)  Goal 1: The pt will tolerate PO trials of puree without s/s of aspiration 10/10  Goal 2: The pt will tolerate PO trials of honey-thick liquids without s/s of aspiration 10/10  Long-term Goals  Timeframe for Long-term Goals: 1 week (4/13/20)  Goal 1: The pt will tolerate his safest and least restrictive diet  Dysphagia Goals: The patient/caregiver will demonstrate understanding of compensatory strategies for improved swallowing safety. General  Chart Reviewed: Yes  Subjective  Subjective: The pt was able to generally participate.  He was slow to respond but able to follow some basic

## 2020-04-06 NOTE — PROGRESS NOTES
Admit: 2020    Name:  Larissa Kim  Room:  [facility]– [place]-  MRN:    [id number]    Critical Care Daily Progress Note for 2020     Interval History:       4/3  off vapotherm  Now on 15 L of O2  Very Short of breath at rest         resp status decompensated and he was intubated last night. Sedated with propofol - would not respond.   SBT this am - very anxious, tachycardia. Awake and follows commands during SBT. - he is on SBT. Awake and alert. No fever. Follows simple commands.         Scheduled Meds:   albuterol sulfate HFA  2 puff Inhalation Q4H    And    ipratropium  2 puff Inhalation Q4H    insulin lispro  0-12 Units Subcutaneous Q6H    chlorhexidine  15 mL Mouth/Throat BID    carboxymethylcellulose PF  1 drop Both Eyes Q4H    And    artificial tears   Both Eyes Q4H    enoxaparin  40 mg Subcutaneous Daily    mupirocin   Nasal BID    aspirin  81 mg Oral Daily    atorvastatin  10 mg Oral Nightly    pantoprazole  40 mg Oral QAM AC    sodium chloride flush  10 mL Intravenous 2 times per day    azithromycin  500 mg Intravenous Q24H    And    cefTRIAXone (ROCEPHIN) IV  1 g Intravenous Q24H    gabapentin  300 mg Oral TID       Continuous Infusions:   dexmedetomidine (PRECEDEX) IV infusion 0.6 mcg/kg/hr (20 0834)    propofol Stopped (20 0746)    dextrose         PRN Meds:  fentanNYL, midazolam, magnesium sulfate, potassium chloride, albuterol sulfate HFA, sodium chloride flush, acetaminophen **OR** acetaminophen, polyethylene glycol, promethazine **OR** ondansetron, glucose, dextrose, glucagon (rDNA), dextrose, guaiFENesin-dextromethorphan             Objective:     Temp  Av.4 °F (36.9 °C)  Min: 97.3 °F (36.3 °C)  Max: 99.6 °F (37.6 °C)  Pulse  Av.4  Min: 71  Max: 108  BP  Min: 115/65  Max: 151/88  SpO2  Av.9 %  Min: 93 %  Max: 100 %  FiO2   Av %  Min: 40 %  Max: 40 %  Patient Vitals for the past 4 hrs:   BP Temp Temp src Pulse Resp SpO2   20 0800 -- 97.3 °F (36.3 °C) Axillary -- -- --   04/06/20 0757 (!) 146/78 -- -- 102 (!) 32 96 %   04/06/20 0750 -- -- -- 90 26 95 %   04/06/20 0702 115/65 -- -- 88 19 94 %   04/06/20 0602 137/69 -- -- 92 30 93 %   04/06/20 0502 129/69 -- -- 91 14 97 %         Intake/Output Summary (Last 24 hours) at 4/6/2020 0837  Last data filed at 4/6/2020 0749  Gross per 24 hour   Intake 1487 ml   Output 2225 ml   Net -738 ml       Physical Exam:  General:  Intubated and sedated, no distress. Awake. Mucous Membranes:  Pink , anicteric  Neck: No JVD, no carotid bruit, no thyromegaly  Chest:  Mils diffuse wheeze, no crackles  Cardiovascular:  RRR S1S2 heard, no murmurs or gallops  Abdomen:  Soft, undistended, non tender, no organomegaly, BS present  Extremities: No edema or cyanosis. Distal pulses well felt  Neurological : unresponsive - awake on the vent. Can follow simple commands. Lab Data:  CBC:   Recent Labs     04/03/20 2232 04/05/20 0420 04/06/20  0734   WBC 4.7 4.0 3.9*   RBC 3.25* 3.36* 3.67*   HGB 8.7* 8.9* 9.3*   HCT 27.2* 28.0* 29.7*   MCV 83.7 83.3 80.9   RDW 16.7* 16.9* 17.1*   PLT 90* 90* 96*     BMP:   Recent Labs     04/03/20 2232 04/05/20 0420 04/06/20  0734    141 140   K 4.2 3.5 3.4*   CL 96* 95* 93*   CO2 36* 35* 36*   BUN 29* 28* 22*   CREATININE 0.8 0.7* 0.6*     BNP: No results for input(s): BNP in the last 72 hours. PT/INR: No results for input(s): PROTIME, INR in the last 72 hours. APTT:No results for input(s): APTT in the last 72 hours. CARDIAC ENZYMES:   Recent Labs     04/03/20  2202 04/04/20  0413 04/04/20  0958   TROPONINI <0.01 <0.01 <0.01     FASTING LIPID PANEL:No results found for: CHOL, HDL, TRIG  LIVER PROFILE:   No results for input(s): AST, ALT, ALB, BILIDIR, BILITOT, ALKPHOS in the last 72 hours.    CXR 4/1   Right pleural effusion and right basilar airspace opacification could  represent atelectasis or pneumonia    EKG 4/1  Normal sinus rhythmBaseline artifactNonspecific ST Controlled. Sliding scale insulin. Lovenox for DVT prophylaxis. Pepcid for GI prophylaxis.       Full code        Arlene Shelby 4/6/2020 8:40 AM

## 2020-04-06 NOTE — PROGRESS NOTES
Pt family, Niru Olga, called for update. Updated and aware that pt is on SAT/SBT and waiting for covid results still.      Pt having a lot of secretions in ETT that are being suctioned per request.

## 2020-04-06 NOTE — PROGRESS NOTES
04/06/20 0750   Vent Information   Skin Assessment Clean, dry, & intact   Vent Type 840   Vent Mode PS   Rate Set 33 bmp   Pressure Support 5 cmH20   FiO2  40 %   Sensitivity 2   PEEP/CPAP 5   Humidification Source Heated wire   Vent Patient Data   Rate Measured 29 br/min   Spontaneous  mL   Minute Volume 13.5 Liters   Spontaneous Breathing Trial (SBT) RT Doc   Pulse 90   SpO2 95 %   RSBI Calculated 51.33   Additional Respiratory  Assessments   Resp 26

## 2020-04-07 ENCOUNTER — APPOINTMENT (OUTPATIENT)
Dept: GENERAL RADIOLOGY | Age: 80
DRG: 208 | End: 2020-04-07
Payer: MEDICARE

## 2020-04-07 PROBLEM — I48.91 RAPID ATRIAL FIBRILLATION (HCC): Status: ACTIVE | Noted: 2020-04-07

## 2020-04-07 LAB
ANION GAP SERPL CALCULATED.3IONS-SCNC: 11 MMOL/L (ref 3–16)
BASOPHILS ABSOLUTE: 0 K/UL (ref 0–0.2)
BASOPHILS RELATIVE PERCENT: 0.2 %
BUN BLDV-MCNC: 15 MG/DL (ref 7–20)
CALCIUM SERPL-MCNC: 8.5 MG/DL (ref 8.3–10.6)
CHLORIDE BLD-SCNC: 98 MMOL/L (ref 99–110)
CO2: 31 MMOL/L (ref 21–32)
CREAT SERPL-MCNC: 0.6 MG/DL (ref 0.8–1.3)
EKG ATRIAL RATE: 312 BPM
EKG DIAGNOSIS: NORMAL
EKG Q-T INTERVAL: 290 MS
EKG QRS DURATION: 66 MS
EKG QTC CALCULATION (BAZETT): 429 MS
EKG R AXIS: 76 DEGREES
EKG T AXIS: 92 DEGREES
EKG VENTRICULAR RATE: 132 BPM
EOSINOPHILS ABSOLUTE: 0.1 K/UL (ref 0–0.6)
EOSINOPHILS RELATIVE PERCENT: 1.5 %
GFR AFRICAN AMERICAN: >60
GFR NON-AFRICAN AMERICAN: >60
GLUCOSE BLD-MCNC: 174 MG/DL (ref 70–99)
GLUCOSE BLD-MCNC: 185 MG/DL (ref 70–99)
GLUCOSE BLD-MCNC: 187 MG/DL (ref 70–99)
GLUCOSE BLD-MCNC: 192 MG/DL (ref 70–99)
GLUCOSE BLD-MCNC: 194 MG/DL (ref 70–99)
GLUCOSE BLD-MCNC: 200 MG/DL (ref 70–99)
GLUCOSE BLD-MCNC: 211 MG/DL (ref 70–99)
HCT VFR BLD CALC: 32.5 % (ref 40.5–52.5)
HCT VFR BLD CALC: 33.4 % (ref 40.5–52.5)
HEMOGLOBIN: 10.3 G/DL (ref 13.5–17.5)
HEMOGLOBIN: 10.5 G/DL (ref 13.5–17.5)
LV EF: 58 %
LVEF MODALITY: NORMAL
LYMPHOCYTES ABSOLUTE: 1.3 K/UL (ref 1–5.1)
LYMPHOCYTES RELATIVE PERCENT: 24.3 %
MCH RBC QN AUTO: 25.8 PG (ref 26–34)
MCHC RBC AUTO-ENTMCNC: 31.5 G/DL (ref 31–36)
MCV RBC AUTO: 82 FL (ref 80–100)
MONOCYTES ABSOLUTE: 0.5 K/UL (ref 0–1.3)
MONOCYTES RELATIVE PERCENT: 8.7 %
NEUTROPHILS ABSOLUTE: 3.6 K/UL (ref 1.7–7.7)
NEUTROPHILS RELATIVE PERCENT: 65.3 %
PDW BLD-RTO: 16.7 % (ref 12.4–15.4)
PERFORMED ON: ABNORMAL
PLATELET # BLD: 110 K/UL (ref 135–450)
PMV BLD AUTO: 8.3 FL (ref 5–10.5)
POTASSIUM REFLEX MAGNESIUM: 3.9 MMOL/L (ref 3.5–5.1)
RBC # BLD: 4.08 M/UL (ref 4.2–5.9)
SODIUM BLD-SCNC: 140 MMOL/L (ref 136–145)
TROPONIN: 0.01 NG/ML
TROPONIN: <0.01 NG/ML
TSH SERPL DL<=0.05 MIU/L-ACNC: 1.74 UIU/ML (ref 0.27–4.2)
WBC # BLD: 5.5 K/UL (ref 4–11)

## 2020-04-07 PROCEDURE — 99291 CRITICAL CARE FIRST HOUR: CPT | Performed by: INTERNAL MEDICINE

## 2020-04-07 PROCEDURE — 99233 SBSQ HOSP IP/OBS HIGH 50: CPT | Performed by: INTERNAL MEDICINE

## 2020-04-07 PROCEDURE — 93306 TTE W/DOPPLER COMPLETE: CPT

## 2020-04-07 PROCEDURE — 85018 HEMOGLOBIN: CPT

## 2020-04-07 PROCEDURE — 6370000000 HC RX 637 (ALT 250 FOR IP): Performed by: INTERNAL MEDICINE

## 2020-04-07 PROCEDURE — 99222 1ST HOSP IP/OBS MODERATE 55: CPT | Performed by: INTERNAL MEDICINE

## 2020-04-07 PROCEDURE — 93010 ELECTROCARDIOGRAM REPORT: CPT | Performed by: INTERNAL MEDICINE

## 2020-04-07 PROCEDURE — 80048 BASIC METABOLIC PNL TOTAL CA: CPT

## 2020-04-07 PROCEDURE — 84484 ASSAY OF TROPONIN QUANT: CPT

## 2020-04-07 PROCEDURE — 94640 AIRWAY INHALATION TREATMENT: CPT

## 2020-04-07 PROCEDURE — 2060000000 HC ICU INTERMEDIATE R&B

## 2020-04-07 PROCEDURE — 85014 HEMATOCRIT: CPT

## 2020-04-07 PROCEDURE — 6360000002 HC RX W HCPCS: Performed by: INTERNAL MEDICINE

## 2020-04-07 PROCEDURE — 84443 ASSAY THYROID STIM HORMONE: CPT

## 2020-04-07 PROCEDURE — 2700000000 HC OXYGEN THERAPY PER DAY

## 2020-04-07 PROCEDURE — 92526 ORAL FUNCTION THERAPY: CPT

## 2020-04-07 PROCEDURE — 2580000003 HC RX 258: Performed by: INTERNAL MEDICINE

## 2020-04-07 PROCEDURE — 2580000003 HC RX 258: Performed by: HOSPITALIST

## 2020-04-07 PROCEDURE — 94761 N-INVAS EAR/PLS OXIMETRY MLT: CPT

## 2020-04-07 PROCEDURE — 2500000003 HC RX 250 WO HCPCS: Performed by: INTERNAL MEDICINE

## 2020-04-07 PROCEDURE — 85025 COMPLETE CBC W/AUTO DIFF WBC: CPT

## 2020-04-07 PROCEDURE — 36415 COLL VENOUS BLD VENIPUNCTURE: CPT

## 2020-04-07 PROCEDURE — 2500000003 HC RX 250 WO HCPCS: Performed by: HOSPITALIST

## 2020-04-07 RX ORDER — ALBUTEROL SULFATE 90 UG/1
2 AEROSOL, METERED RESPIRATORY (INHALATION)
Status: DISCONTINUED | OUTPATIENT
Start: 2020-04-07 | End: 2020-04-09 | Stop reason: HOSPADM

## 2020-04-07 RX ORDER — ALBUTEROL SULFATE 90 UG/1
2 AEROSOL, METERED RESPIRATORY (INHALATION) EVERY 4 HOURS PRN
Status: DISCONTINUED | OUTPATIENT
Start: 2020-04-07 | End: 2020-04-09 | Stop reason: HOSPADM

## 2020-04-07 RX ADMIN — Medication 2 PUFF: at 11:34

## 2020-04-07 RX ADMIN — Medication 2 PUFF: at 20:31

## 2020-04-07 RX ADMIN — Medication 2 PUFF: at 14:53

## 2020-04-07 RX ADMIN — Medication 2 PUFF: at 07:17

## 2020-04-07 RX ADMIN — Medication 2 PUFF: at 20:33

## 2020-04-07 RX ADMIN — Medication 2 PUFF: at 07:16

## 2020-04-07 RX ADMIN — DILTIAZEM HYDROCHLORIDE 5 MG/HR: 5 INJECTION INTRAVENOUS at 00:15

## 2020-04-07 RX ADMIN — Medication 2 PUFF: at 23:54

## 2020-04-07 RX ADMIN — Medication 10 ML: at 21:20

## 2020-04-07 RX ADMIN — ENOXAPARIN SODIUM 90 MG: 100 INJECTION SUBCUTANEOUS at 21:19

## 2020-04-07 RX ADMIN — ENOXAPARIN SODIUM 40 MG: 40 INJECTION SUBCUTANEOUS at 08:39

## 2020-04-07 RX ADMIN — INSULIN LISPRO 3 UNITS: 100 INJECTION, SOLUTION INTRAVENOUS; SUBCUTANEOUS at 08:40

## 2020-04-07 RX ADMIN — Medication 10 ML: at 08:39

## 2020-04-07 RX ADMIN — INSULIN LISPRO 6 UNITS: 100 INJECTION, SOLUTION INTRAVENOUS; SUBCUTANEOUS at 05:31

## 2020-04-07 RX ADMIN — DILTIAZEM HYDROCHLORIDE 10 MG/HR: 5 INJECTION INTRAVENOUS at 21:35

## 2020-04-07 RX ADMIN — INSULIN LISPRO 6 UNITS: 100 INJECTION, SOLUTION INTRAVENOUS; SUBCUTANEOUS at 17:35

## 2020-04-07 RX ADMIN — DILTIAZEM HYDROCHLORIDE 10 MG/HR: 5 INJECTION INTRAVENOUS at 07:38

## 2020-04-07 RX ADMIN — INSULIN LISPRO 3 UNITS: 100 INJECTION, SOLUTION INTRAVENOUS; SUBCUTANEOUS at 12:55

## 2020-04-07 RX ADMIN — INSULIN LISPRO 3 UNITS: 100 INJECTION, SOLUTION INTRAVENOUS; SUBCUTANEOUS at 21:21

## 2020-04-07 RX ADMIN — CEFTRIAXONE SODIUM 1 G: 1 INJECTION, POWDER, FOR SOLUTION INTRAMUSCULAR; INTRAVENOUS at 21:19

## 2020-04-07 RX ADMIN — Medication 2 PUFF: at 23:52

## 2020-04-07 ASSESSMENT — PAIN SCALES - GENERAL
PAINLEVEL_OUTOF10: 0
PAINLEVEL_OUTOF10: 0

## 2020-04-07 NOTE — PROGRESS NOTES
given. Laryngeal elevation continues to appear to be reduced per palpitation. The pt tolerated initial trials but delayed wet coughing persisted following various PO textures given. With increasing number of trials the pt also began demonstrating watery eyes another indicator of possible aspiration. There was intermittent slight belching sounds post-swallow that may indicate possible backflow as a contributing factor. Reduced pharyngeal clearing is a concern. Considering that the pt's s/s of aspiration are continuing, despite given 24 hours post extubation and improved level of response, an MBS is recommended to R/O aspiration. Recommendations:  1. NPO at this time  2. MBS to R/O aspiration    Patient/Family/Caregiver Education:  Education was given re: recommendations, nature of dysphagia and recommendation for MBS    Compensatory Strategies:  · Keep head of bed elevated  · Good oral care at least 3 x day    Plan:  Continued daily Dysphagia treatment with goals per plan of care. If pt is discharged prior to next follow-up, this treatment note will serve as discharge summary. Treatment time: 23 minutes ([id number]);  Dysphagia treatment    Daphne Weldon, CCC-SLP  [id number]  Speech-Language Pathologist  Phone: [phone]

## 2020-04-07 NOTE — PROGRESS NOTES
Patient admitted to room 302-1 from ICU. Patient oriented to room, call light, bed rails, phone, lights and bathroom. Patient instructed about the schedule of the day including: vital sign frequency, lab draws, possible tests, frequency of MD and staff rounds, daily weights, I &O's and prescribed diet NPO, bed alarm in place, patient aware of placement and reason. Telemetry box in place, patient aware of placement and reason. Bed locked, in lowest position, side rails up 2/4, call light within reach. Recliner Assessment  Patient is able to demonstrated the ability to move from a reclining position to an upright position within the recliner. 4 Eyes Skin Assessment     The patient is being assess for   Transfer to New Unit    I agree that 2 RN's have performed a thorough Head to Toe Skin Assessment on the patient. ALL assessment sites listed below have been assessed. Areas assessed by both nurses:   [x]   Head, Face, and Ears   [x]   Shoulders, Back, and Chest, Abdomen  [x]   Arms, Elbows, and Hands   [x]   Coccyx, Sacrum, and Ischium  [x]   Legs, Feet, and Heels        Bruising to BUE.    **SHARE this note so that the co-signing nurse is able to place an eSignature      Co-signer eSignature: Electronically signed by Linda Ying RN on 4/7/20 at 7:55 PM EDT    Does the Patient have Skin Breakdown?   No          Santiago Prevention initiated:  Yes   Wound Care Orders initiated:  No      [facility] nurse consulted for Pressure Injury (Stage 3,4, Unstageable, DTI, NWPT, Complex wounds)and New or Established Ostomies:  No      Primary Nurse eSignature: Electronically signed by Ab Hinton RN on 4/7/20 at 5:07 PM EDT

## 2020-04-07 NOTE — PROGRESS NOTES
Admit: 2020    Name:  Shivam Rob  Room:  3013/3013-01  MRN:    [id number]    Critical Care Daily Progress Note for 2020     Interval History:       4/3  off vapotherm  Now on 15 L of O2  Very Short of breath at rest         resp status decompensated and he was intubated last night. Sedated with propofol - would not respond.   SBT this am - very anxious, tachycardia. Awake and follows commands during SBT. - he is on SBT. Awake and alert. No fever. Follows simple commands. -patient was extubated on 2020. He is awake and alert. Is on 4 L of oxygen. Last night patient went to rapid A. fib. Review of his chart shows that he has a history of A. fib in the past.  He saw cardiology 5 years ago. They recommended anticoagulation. I am not sure why the patient is not on anticoagulation. He denies any knowledge of A. fib.     Scheduled Meds:   albuterol sulfate HFA  2 puff Inhalation Q4H While awake    And    ipratropium  2 puff Inhalation Q4H While awake    enoxaparin  1 mg/kg Subcutaneous BID    insulin lispro  0-18 Units Subcutaneous Q4H    aspirin  81 mg Oral Daily    atorvastatin  10 mg Oral Nightly    pantoprazole  40 mg Oral QAM AC    sodium chloride flush  10 mL Intravenous 2 times per day    cefTRIAXone (ROCEPHIN) IV  1 g Intravenous Q24H    gabapentin  300 mg Oral TID       Continuous Infusions:   dilTIAZem (CARDIZEM) 125 mg in dextrose 5% 125 mL infusion 10 mg/hr (20 0738)    dextrose         PRN Meds:  albuterol sulfate HFA, magnesium sulfate, potassium chloride, sodium chloride flush, acetaminophen **OR** acetaminophen, polyethylene glycol, promethazine **OR** ondansetron, glucose, dextrose, glucagon (rDNA), dextrose, guaiFENesin-dextromethorphan             Objective:     Temp  Av.5 °F (36.9 °C)  Min: 98 °F (36.7 °C)  Max: 99.1 °F (37.3 °C)  Pulse  Av.9  Min: 85  Max: 156  BP  Min: 114/85  Max: 152/85  SpO2  Av.1 %  Min: 88 %  Max: 100

## 2020-04-07 NOTE — PROGRESS NOTES
RESPIRATORY THERAPY ASSESSMENT    Name:  [address] Record Number:  [id number]  Age: 78 y.o. Gender: male  : 1940  Today's Date:  2020  Room:  3013/3013-01    Assessment     Is the patient being admitted for a COPD or Asthma exacerbation? No   (If yes the patient will be seen every 4 hours for the first 24 hours and then reassessed)    Patient Admission Diagnosis      Allergies  No Known Allergies    Minimum Predicted Vital Capacity:               Actual Vital Capacity:                    Pulmonary History: COPD, ELAINE  Home Oxygen Therapy:   3 lpm  Home Respiratory Therapy: Albuterol QID, Albuterol prn, Trelegy Daily   Current Respiratory Therapy:   Albuterol Q4, Atrovent Q4, Albuterol prn   Treatment Type: MDI  Medications: Ipratropium Bromide    Respiratory Severity Index(RSI)   Patients with orders for inhalation medications, oxygen, or any therapeutic treatment modality will be placed on Respiratory Protocol. They will be assessed with the first treatment and at least every 72 hours thereafter. The following severity scale will be used to determine frequency of treatment intervention.     Smoking History: Mild Exacerbation = 3    Social History  Social History     Tobacco Use    Smoking status: Former Smoker     Packs/day: 1.50     Years: 45.00     Pack years: 67.50     Last attempt to quit: 2005     Years since quittin.8    Smokeless tobacco: Never Used   Substance Use Topics    Alcohol use: No    Drug use: No       Recent Surgical History: None = 0  Past Surgical History  Past Surgical History:   Procedure Laterality Date    BACK SURGERY      repair broken back L4 & L5    CYSTOSCOPY Right 10/9/15    RIght Ureteroscopy, Holmium Laser Lithotripsy with Stone Removal, Right Stent Placement    CYSTOSCOPY  2019    CYSTOSCOPY, RESECTION OF BLADDER NECK, URETHRAL DILATION    CYSTOSCOPY W BIOPSY OF BLADDER N/A 2019    CYSTOSCOPY, RESECTION OF BLADDER NECK, URETHRAL DILATION performed by Maia Navas MD at [address] Left 6/12/2016    cataract removal    JOINT REPLACEMENT  6/29/2011    right knee    JOINT REPLACEMENT  11/2004    left knee    OTHER SURGICAL HISTORY  08/31/2015    wide excision basal cell carcinoma on the nose and bilateral auricles with frozen sections, plastic closure, full thickness skin graft    OTHER SURGICAL HISTORY  12/1/15    excision of lesion of lip    PROSTATE SURGERY      TURP  10/23/2015    with cystoscopy       Level of Consciousness: Alert, Oriented, and Cooperative = 0    Level of Activity: Mostly sedentary, minimal walking = 2    Respiratory Pattern: Dyspnea with exertion;Irregular pattern;or RR less than 6 = 2    Breath Sounds: Diminshed bilaterally and/or crackles = 2    Sputum  Sputum Color: Yellow, Clear, Cloudy, Tenacity: Thick, Sputum How Obtained: Spontaneous cough  Cough: Strong, productive = 1    Vital Signs   /75   Pulse 141   Temp 98.2 °F (36.8 °C) (Oral)   Resp 25   Ht 5' 9\" (1.753 m)   Wt 201 lb 6.4 oz (91.4 kg)   SpO2 97%   BMI 29.74 kg/m²   SPO2 (COPD values may differ): 86-87% on room air or greater than 92% on FiO2 35- 50% = 3    Peak Flow (asthma only): not applicable = 0    RSI: 04-19 = Q6H or QID and Q4HPRN for dyspnea        Plan       Goals:  Medication delivery     Patient/caregiver was educated on the proper method of use for Respiratory Care Devices:  Yes      Level of patient/caregiver understanding able to:   ? Verbalize understanding   ? Demonstrate understanding       ? Teach back        ? Needs reinforcement       ? No available caregiver               ? Other:     Response to education:  Very Good     Is patient being placed on Home Treatment Regimen? No     Does the patient have everything they need prior to discharge? NA     Comments:  Chart reviewed, patient assessed    Plan of Care:  Albuterol Q4WA, Atrovent Q4WA, Albuterol prn     Electronically signed by frequency. 5. Bronchodilator(s) will be discontinued if patient has a RSI less than 9 and has received no scheduled or as needed treatment for 72  Hrs. Patients Ordered on a Mucolytic Agent:    1. Must always be administered with a bronchodilator. 2. Discontinue if patient experiences worsened bronchospasm, or secretions have lessened to the point that the patient is able to clear them with a cough. Anti-inflammatory and Combination Medications:    1. If the patient lacks prior history of lung disease, is not using inhaled anti-inflammatory medication at home, and lacks wheezing by examination or by history for at least 24 hours, contact physician for possible discontinuation.

## 2020-04-07 NOTE — FLOWSHEET NOTE
04/07/20 1635   Vital Signs   Temp 98.2 °F (36.8 °C)   Temp Source Oral   Pulse 102   Heart Rate Source Monitor   Resp 18   /88   BP Location Left Arm   BP Upper/Lower Upper   MAP (mmHg) 103   Patient Position Semi fowlers   Level of Consciousness 0   MEWS Score 2   Patient Currently in Pain No   Pain Assessment   Pain Assessment 0-10   Pain Level 0   Oxygen Therapy   SpO2 96 %   Pulse Oximeter Device Mode Intermittent   Pulse Oximeter Device Location Finger   O2 Device Nasal cannula   O2 Flow Rate (L/min) 4 L/min   Patient resting quietly in bed. No s/s of distress noted. Denies needs at this time. Call light within reach. Will continue to monitor.

## 2020-04-07 NOTE — PROGRESS NOTES
Pulmonary & Critical Care Medicine ICU Progress Note    CC:Acute hypoxic respiratory failure    Events of Last 24 hours: Tolerated extubation well. Developed A. fib RVR overnight. Dilt gtt    Invasive Lines: IV: piv    MV: 4/3-4/6  Vent Mode: PS Rate Set: 33 bmp/Vt Ordered: 400 mL/ /FiO2 : 40 %  Recent Labs     04/06/20  0505 04/06/20  1004   PHART 7.457* 7.469*   UDF3YYI 53.5* 54.3*   PO2ART 68.5* 70.4*       IV:   dilTIAZem (CARDIZEM) 125 mg in dextrose 5% 125 mL infusion 10 mg/hr (04/07/20 0738)    dexmedetomidine (PRECEDEX) IV infusion 0.6 mcg/kg/hr (04/06/20 0834)    propofol Stopped (04/06/20 0746)    dextrose         Vitals:  /62   Pulse 111   Temp 98.4 °F (36.9 °C) (Oral)   Resp 26   Ht 5' 9\" (1.753 m)   Wt 201 lb 6.4 oz (91.4 kg)   SpO2 95%   BMI 29.74 kg/m²   on 4lpm    Intake/Output Summary (Last 24 hours) at 4/7/2020 0806  Last data filed at 4/7/2020 0354  Gross per 24 hour   Intake 947 ml   Output 2480 ml   Net -1533 ml     EXAM:  Constitutional: ill appearing. Eyes: PERRL. No sclera icterus. ENT: Normal Nose. Normal Tongue. Neck:  Trachea is midline. No thyroid tenderness. Respiratory: No accessory muscle usage. decreased breath sounds. No wheezes. No rales. No Rhonchi. Cardiovascular: Normal S1S2. Rolando Standish No murmur. No digit cyanosis. No digit clubbing. No LE edema. GI: Non-tender. Non-distended. Normal bowel sounds. No masses. No umbilical hernia. Skin: No rash on the exposed extremities. No Nodules on exposed extremities. Neuro: Alert and oriented. Follows commands. Moves all extremities. Psych:  No agitation, no anxiety.     Scheduled Meds:   albuterol sulfate HFA  2 puff Inhalation Q4H While awake    And    ipratropium  2 puff Inhalation Q4H While awake    insulin lispro  0-18 Units Subcutaneous Q4H    chlorhexidine  15 mL Mouth/Throat BID    enoxaparin  40 mg Subcutaneous Daily    mupirocin   Nasal BID    aspirin  81 mg Oral Daily    atorvastatin  10 mg Oral Nightly    pantoprazole  40 mg Oral QAM AC    sodium chloride flush  10 mL Intravenous 2 times per day    cefTRIAXone (ROCEPHIN) IV  1 g Intravenous Q24H    gabapentin  300 mg Oral TID     PRN Meds:  albuterol sulfate HFA, magnesium sulfate, potassium chloride, sodium chloride flush, acetaminophen **OR** acetaminophen, polyethylene glycol, promethazine **OR** ondansetron, glucose, dextrose, glucagon (rDNA), dextrose, guaiFENesin-dextromethorphan    Results:  CBC:   Recent Labs     04/05/20  0420 04/06/20  0734 04/07/20  0006 04/07/20  0753   WBC 4.0 3.9*  --  5.5   HGB 8.9* 9.3* 10.3* 10.5*   HCT 28.0* 29.7* 32.5* 33.4*   MCV 83.3 80.9  --  82.0   PLT 90* 96*  --  110*     BMP:   Recent Labs     04/05/20 0420 04/06/20  0734 04/06/20 2009    140 142   K 3.5 3.4* 3.9   CL 95* 93* 95*   CO2 35* 36* 35*   PHOS  --   --  3.4   BUN 28* 22* 16   CREATININE 0.7* 0.6* 0.6*     Cultures:  Bld: ngtd  Sputum 4/2: NRF  Rapid flu neg  Sputum 4/4: NRF  covid19 negative        Films:  CXR 4/6:   Perhaps slightly improved aeration in the left base.  Otherwise no change in   bilateral pleuroparenchymal disease.        ASSESSMENT:  · Acute respiratory failure with hypoxemia and hypercapnia  · RLL Healthcare associated pneumonia - probable gram negative, risk for methicillin resistant Staph aureus as well   · ARDS  · COPD with AE  · Acute encephalopathy likely from hypercarbia  · Acute kidney injury  · Thrombocytopenia  · Afib RVR  · DM with hyperglycemia  · CAD     PLAN:  Supplemental oxygen to maintain SaO2 >92%; wean as tolerated   · Antibiotics to cover CAP (D5 Ctx and azithromycin)  · Follow sputum and blood cultures  · Inhaled bronchodilators - MDI or duonebs  · MSSI and fsbg  · Dilt gtt  · ICU care - enoxaparin, bactroban  · Total critical care time caring for this patient with life threatening, unstable organ failure, including direct patient contact, management of life support systems, review of data including imaging and labs, discussions with other team members and physicians at least 32 minutes so far today, excluding procedures.

## 2020-04-07 NOTE — PROGRESS NOTES
today  · Wound Type: None  · Current Nutrition Therapies:  · Oral Diet Orders: NPO   · Oral Diet intake: NPO  · Oral Nutrition Supplement (ONS) Orders: None  · ONS intake: NPO  · Anthropometric Measures:  · Ht: 5' 9\" (175.3 cm)   · Current Body Wt: 201 lb 6 oz (91.3 kg)(bed)  · Admission Body Wt: 202 lb (91.6 kg)  · Usual Body Wt: (unknown)  · % Weight Change: wt stable during admission; wt hx with variance   · Ideal Body Wt: 178 lb (80.7 kg), % Ideal Body 113%  · BMI Classification: BMI 25.0 - 29.9 Overweight(29.8kg/m2)    Nutrition Interventions:   Continue NPO  Continued Inpatient Monitoring, Coordination of Care, Coordination of Community Care    Nutrition Evaluation:   · Evaluation: Progressing toward goals   · Goals: Pt to adhere to NPO. Wt stability and skin intact.     · Monitoring: Nutrition Progression, Skin Integrity, Weight, Pertinent Labs, Chewing/Swallowing, Monitor Bowel Function      Electronically signed by Leopoldo Avila RD, LD on 4/7/20 at 1:54 PM EDT    Contact Number: [phone]

## 2020-04-07 NOTE — PLAN OF CARE
Nutrition Problem: Inadequate oral intake  Intervention: Food and/or Nutrient Delivery: Continue NPO until medically cleared for diet advancement. Nutritional Goals: Pt to adhere to NPO. Wt stability and skin intact.

## 2020-04-07 NOTE — CONSULTS
endocrine, Psychiatric  Physical Examination:    Vitals:    04/07/20 1000   BP: (!) 145/65   Pulse: 104   Resp: 26   Temp:    SpO2: 92%    Weight: 201 lb 6.4 oz (91.4 kg)         General Appearance:  Alert, cooperative, no distress, appears stated age   Head:  Normocephalic, without obvious abnormality, atraumatic   Eyes:  PERRL, conjunctiva/corneas clear       Nose: Nares normal, no drainage or sinus tenderness   Throat: Lips, mucosa, and tongue normal   Neck: Supple, symmetrical, trachea midline, no adenopathy, thyroid: not enlarged, symmetric, no tenderness/mass/nodules, no carotid bruit or JVD       Lungs:   Clear to auscultation on left but diffuse crackles inright lung, respirations unlabored   Chest Wall:  No tenderness or deformity   Heart:  Irreg irregular rate and rhythm, S1, S2 normal, no murmur, rub or gallop   Abdomen:   Soft, obese non-tender, bowel sounds active all four quadrants,  no masses, no organomegaly           Extremities: Extremities normal, atraumatic, no cyanosis or edema   Pulses: 2+ and symmetric   Skin: Skin color, texture, turgor normal, no rashes or lesions   Pysch: Normal mood and affect   Neurologic: Normal gross motor and sensory exam.         Labs  CBC:   Lab Results   Component Value Date    WBC 5.5 04/07/2020    RBC 4.08 04/07/2020    HGB 10.5 04/07/2020    HCT 33.4 04/07/2020    MCV 82.0 04/07/2020    RDW 16.7 04/07/2020     04/07/2020     CMP:    Lab Results   Component Value Date     04/07/2020    K 3.9 04/07/2020    CL 98 04/07/2020    CO2 31 04/07/2020    BUN 15 04/07/2020    CREATININE 0.6 04/07/2020    GFRAA >60 04/07/2020    GFRAA >60 07/01/2011    AGRATIO 1.8 04/01/2020    LABGLOM >60 04/07/2020    GLUCOSE 194 04/07/2020    PROT 6.5 04/01/2020    PROT 7.0 08/10/2010    CALCIUM 8.5 04/07/2020    BILITOT 0.7 04/01/2020    ALKPHOS 48 04/01/2020    AST 14 04/01/2020    ALT 12 04/01/2020     PT/INR:  No results found for: PTINR  I have reviewed the following

## 2020-04-07 NOTE — PROGRESS NOTES
Spoke with the patients wife and updated her on the patients move to the PCU she verbalized understanding. After she hung up the patient stated that his stomach was upset and then vomited. After vomiting he verbalized that he was feeling better. Linens changed and the patient was cleaned up.

## 2020-04-08 ENCOUNTER — APPOINTMENT (OUTPATIENT)
Dept: GENERAL RADIOLOGY | Age: 80
DRG: 208 | End: 2020-04-08
Payer: MEDICARE

## 2020-04-08 LAB
ANION GAP SERPL CALCULATED.3IONS-SCNC: 13 MMOL/L (ref 3–16)
BASOPHILS ABSOLUTE: 0 K/UL (ref 0–0.2)
BASOPHILS RELATIVE PERCENT: 0.5 %
BUN BLDV-MCNC: 23 MG/DL (ref 7–20)
CALCIUM SERPL-MCNC: 8.7 MG/DL (ref 8.3–10.6)
CHLORIDE BLD-SCNC: 97 MMOL/L (ref 99–110)
CO2: 30 MMOL/L (ref 21–32)
CREAT SERPL-MCNC: 0.7 MG/DL (ref 0.8–1.3)
EOSINOPHILS ABSOLUTE: 0.1 K/UL (ref 0–0.6)
EOSINOPHILS RELATIVE PERCENT: 1.5 %
GFR AFRICAN AMERICAN: >60
GFR NON-AFRICAN AMERICAN: >60
GLUCOSE BLD-MCNC: 175 MG/DL (ref 70–99)
GLUCOSE BLD-MCNC: 183 MG/DL (ref 70–99)
GLUCOSE BLD-MCNC: 191 MG/DL (ref 70–99)
GLUCOSE BLD-MCNC: 195 MG/DL (ref 70–99)
GLUCOSE BLD-MCNC: 238 MG/DL (ref 70–99)
HCT VFR BLD CALC: 33.2 % (ref 40.5–52.5)
HEMOGLOBIN: 10.5 G/DL (ref 13.5–17.5)
LYMPHOCYTES ABSOLUTE: 1.4 K/UL (ref 1–5.1)
LYMPHOCYTES RELATIVE PERCENT: 23.3 %
MCH RBC QN AUTO: 25.9 PG (ref 26–34)
MCHC RBC AUTO-ENTMCNC: 31.6 G/DL (ref 31–36)
MCV RBC AUTO: 81.8 FL (ref 80–100)
MONOCYTES ABSOLUTE: 0.5 K/UL (ref 0–1.3)
MONOCYTES RELATIVE PERCENT: 9.1 %
NEUTROPHILS ABSOLUTE: 3.9 K/UL (ref 1.7–7.7)
NEUTROPHILS RELATIVE PERCENT: 65.6 %
PDW BLD-RTO: 16.3 % (ref 12.4–15.4)
PERFORMED ON: ABNORMAL
PLATELET # BLD: 128 K/UL (ref 135–450)
PMV BLD AUTO: 8.9 FL (ref 5–10.5)
POTASSIUM REFLEX MAGNESIUM: 4 MMOL/L (ref 3.5–5.1)
RBC # BLD: 4.06 M/UL (ref 4.2–5.9)
SODIUM BLD-SCNC: 140 MMOL/L (ref 136–145)
WBC # BLD: 6 K/UL (ref 4–11)

## 2020-04-08 PROCEDURE — 99232 SBSQ HOSP IP/OBS MODERATE 35: CPT | Performed by: INTERNAL MEDICINE

## 2020-04-08 PROCEDURE — 6370000000 HC RX 637 (ALT 250 FOR IP): Performed by: INTERNAL MEDICINE

## 2020-04-08 PROCEDURE — 74230 X-RAY XM SWLNG FUNCJ C+: CPT

## 2020-04-08 PROCEDURE — 2580000003 HC RX 258: Performed by: INTERNAL MEDICINE

## 2020-04-08 PROCEDURE — 36415 COLL VENOUS BLD VENIPUNCTURE: CPT

## 2020-04-08 PROCEDURE — 94761 N-INVAS EAR/PLS OXIMETRY MLT: CPT

## 2020-04-08 PROCEDURE — 2060000000 HC ICU INTERMEDIATE R&B

## 2020-04-08 PROCEDURE — 2700000000 HC OXYGEN THERAPY PER DAY

## 2020-04-08 PROCEDURE — 6360000002 HC RX W HCPCS: Performed by: INTERNAL MEDICINE

## 2020-04-08 PROCEDURE — 97530 THERAPEUTIC ACTIVITIES: CPT

## 2020-04-08 PROCEDURE — 92611 MOTION FLUOROSCOPY/SWALLOW: CPT

## 2020-04-08 PROCEDURE — 99233 SBSQ HOSP IP/OBS HIGH 50: CPT | Performed by: INTERNAL MEDICINE

## 2020-04-08 PROCEDURE — 2580000003 HC RX 258

## 2020-04-08 PROCEDURE — 97162 PT EVAL MOD COMPLEX 30 MIN: CPT

## 2020-04-08 PROCEDURE — 94640 AIRWAY INHALATION TREATMENT: CPT

## 2020-04-08 PROCEDURE — 85025 COMPLETE CBC W/AUTO DIFF WBC: CPT

## 2020-04-08 PROCEDURE — 80048 BASIC METABOLIC PNL TOTAL CA: CPT

## 2020-04-08 PROCEDURE — 97116 GAIT TRAINING THERAPY: CPT

## 2020-04-08 PROCEDURE — 2500000003 HC RX 250 WO HCPCS: Performed by: INTERNAL MEDICINE

## 2020-04-08 RX ORDER — SODIUM CHLORIDE 9 MG/ML
INJECTION, SOLUTION INTRAVENOUS
Status: COMPLETED
Start: 2020-04-08 | End: 2020-04-08

## 2020-04-08 RX ORDER — DILTIAZEM HYDROCHLORIDE 120 MG/1
120 CAPSULE, COATED, EXTENDED RELEASE ORAL DAILY
Status: DISCONTINUED | OUTPATIENT
Start: 2020-04-08 | End: 2020-04-09 | Stop reason: HOSPADM

## 2020-04-08 RX ADMIN — ATORVASTATIN CALCIUM 10 MG: 10 TABLET, FILM COATED ORAL at 20:56

## 2020-04-08 RX ADMIN — GABAPENTIN 300 MG: 300 CAPSULE ORAL at 20:56

## 2020-04-08 RX ADMIN — Medication 10 ML: at 20:57

## 2020-04-08 RX ADMIN — INSULIN LISPRO 3 UNITS: 100 INJECTION, SOLUTION INTRAVENOUS; SUBCUTANEOUS at 20:58

## 2020-04-08 RX ADMIN — ENOXAPARIN SODIUM 90 MG: 100 INJECTION SUBCUTANEOUS at 09:45

## 2020-04-08 RX ADMIN — Medication 2 PUFF: at 23:00

## 2020-04-08 RX ADMIN — CEFTRIAXONE SODIUM 1 G: 1 INJECTION, POWDER, FOR SOLUTION INTRAMUSCULAR; INTRAVENOUS at 20:57

## 2020-04-08 RX ADMIN — INSULIN LISPRO 3 UNITS: 100 INJECTION, SOLUTION INTRAVENOUS; SUBCUTANEOUS at 09:46

## 2020-04-08 RX ADMIN — GABAPENTIN 300 MG: 300 CAPSULE ORAL at 15:26

## 2020-04-08 RX ADMIN — Medication 2 PUFF: at 06:58

## 2020-04-08 RX ADMIN — INSULIN LISPRO 6 UNITS: 100 INJECTION, SOLUTION INTRAVENOUS; SUBCUTANEOUS at 17:04

## 2020-04-08 RX ADMIN — Medication 2 PUFF: at 11:06

## 2020-04-08 RX ADMIN — Medication 2 PUFF: at 11:05

## 2020-04-08 RX ADMIN — Medication 2 PUFF: at 15:53

## 2020-04-08 RX ADMIN — Medication 2 PUFF: at 19:13

## 2020-04-08 RX ADMIN — INSULIN LISPRO 3 UNITS: 100 INJECTION, SOLUTION INTRAVENOUS; SUBCUTANEOUS at 11:50

## 2020-04-08 RX ADMIN — APIXABAN 5 MG: 5 TABLET, FILM COATED ORAL at 20:56

## 2020-04-08 RX ADMIN — INSULIN LISPRO 3 UNITS: 100 INJECTION, SOLUTION INTRAVENOUS; SUBCUTANEOUS at 00:23

## 2020-04-08 RX ADMIN — DILTIAZEM HYDROCHLORIDE 10 MG/HR: 5 INJECTION INTRAVENOUS at 07:08

## 2020-04-08 RX ADMIN — INSULIN LISPRO 3 UNITS: 100 INJECTION, SOLUTION INTRAVENOUS; SUBCUTANEOUS at 05:11

## 2020-04-08 RX ADMIN — DILTIAZEM HYDROCHLORIDE 120 MG: 120 CAPSULE, COATED, EXTENDED RELEASE ORAL at 17:04

## 2020-04-08 RX ADMIN — Medication 2 PUFF: at 19:12

## 2020-04-08 RX ADMIN — SODIUM CHLORIDE: 9 INJECTION, SOLUTION INTRAVENOUS at 21:00

## 2020-04-08 ASSESSMENT — PAIN DESCRIPTION - LOCATION: LOCATION: BACK

## 2020-04-08 ASSESSMENT — PAIN DESCRIPTION - ONSET: ONSET: ON-GOING

## 2020-04-08 ASSESSMENT — PAIN DESCRIPTION - ORIENTATION: ORIENTATION: LOWER

## 2020-04-08 ASSESSMENT — PAIN - FUNCTIONAL ASSESSMENT: PAIN_FUNCTIONAL_ASSESSMENT: ACTIVITIES ARE NOT PREVENTED

## 2020-04-08 ASSESSMENT — PAIN DESCRIPTION - PROGRESSION: CLINICAL_PROGRESSION: NOT CHANGED

## 2020-04-08 ASSESSMENT — PAIN DESCRIPTION - PAIN TYPE: TYPE: ACUTE PAIN

## 2020-04-08 ASSESSMENT — PAIN SCALES - GENERAL
PAINLEVEL_OUTOF10: 6
PAINLEVEL_OUTOF10: 0

## 2020-04-08 ASSESSMENT — PAIN DESCRIPTION - FREQUENCY: FREQUENCY: INTERMITTENT

## 2020-04-08 ASSESSMENT — PAIN DESCRIPTION - DESCRIPTORS: DESCRIPTORS: ACHING

## 2020-04-08 NOTE — PROGRESS NOTES
Pt is lying in bed with their eyes closed. Respirations are easy and even. Call light within reach bed in lowest position with the wheels locked. Will continue to monitor.  Sharan Owen

## 2020-04-08 NOTE — PROGRESS NOTES
(36.6 °C)  Max: 98.7 °F (37.1 °C)  Pulse  Av  Min: 70  Max: 116  BP  Min: 131/70  Max: 143/78  SpO2  Av.3 %  Min: 93 %  Max: 97 %  Patient Vitals for the past 4 hrs:   BP Temp Temp src Pulse Resp SpO2   20 1433 131/70 97.9 °F (36.6 °C) Oral 79 18 95 %         Intake/Output Summary (Last 24 hours) at 2020 1552  Last data filed at 2020 1241  Gross per 24 hour   Intake 0 ml   Output 900 ml   Net -900 ml       Physical Exam:  General: He is extubated. He is awake and alert. Mucous Membranes:  Pink , anicteric  Neck: No JVD, no carotid bruit, no thyromegaly  Chest: Wheezes or rhonchi. Diminished breath sounds at bases. Cardiovascular: regular rate irregularly irregular rhythm. S1S2 heard, no murmurs or gallops  Abdomen:  Soft, undistended, non tender, no organomegaly, BS present  Extremities: No edema or cyanosis. Distal pulses well felt  Neurological.  Awake and alert. Moves all 4 extremities      Lab Data:  CBC:   Recent Labs     20  0734 20  0006 20  0753 20  0440   WBC 3.9*  --  5.5 6.0   RBC 3.67*  --  4.08* 4.06*   HGB 9.3* 10.3* 10.5* 10.5*   HCT 29.7* 32.5* 33.4* 33.2*   MCV 80.9  --  82.0 81.8   RDW 17.1*  --  16.7* 16.3*   PLT 96*  --  110* 128*     BMP:   Recent Labs     20  2009 20  0753 20  0440    140 140   K 3.9 3.9 4.0   CL 95* 98* 97*   CO2 35* 31 30   PHOS 3.4  --   --    BUN 16 15 23*   CREATININE 0.6* 0.6* 0.7*     BNP: No results for input(s): BNP in the last 72 hours. PT/INR: No results for input(s): PROTIME, INR in the last 72 hours. APTT:No results for input(s): APTT in the last 72 hours. CARDIAC ENZYMES:   Recent Labs     20  0132 20  0753   TROPONINI <0.01 0.01 <0.01     FASTING LIPID PANEL:No results found for: CHOL, HDL, TRIG  LIVER PROFILE:   No results for input(s): AST, ALT, ALB, BILIDIR, BILITOT, ALKPHOS in the last 72 hours.    CXR    Right pleural effusion and right basilar airspace opacification could  represent atelectasis or pneumonia    EKG 4/1  Normal sinus rhythmBaseline artifactNonspecific ST abnormality             Atrial fibrillation with rapid ventricular responseNonspecific ST abnormality , probably digitalis effectAbnormal ECGWhen compared with ECG of 03-APR-2020 20:03,Atrial fibrillation has replaced Sinus rhythmConfirmed by Krzysztof James MD, Rinda Smoker ([id number]) on 4/7/2020 6:49:24        ECHO  10/2015   Normal left ventricle size, wall thickness and systolic function with an   estimated ejection fraction of 55%. No regional wall motion abnormalities   are seen. Normal diastolic function. Mild mitral regurgitation is present. Aortic valve appears tricuspid and sclerotic but opens adequately. There is mild tricuspid regurgitation. Systolic pulmonary artery pressure (SPAP) is estimated at 58 mmHg consistent   with moderate pulmonary hypertension (RA pressure 8 mmHg). Assessment & Plan:     Principal Problem:    Acute on chronic respiratory failure with hypercapnia (HCC)  Active Problems:    Chronic respiratory failure with hypoxia and hypercapnia 2.5 L home O2    COPD exacerbation (HCC)    Pneumonia, unspecified organism    Chronic thrombocytopenia    Acute on chronic respiratory failure with hypoxia and hypercapnia (HCC)    Pleural effusion    Rapid atrial fibrillation (HCC)  Resolved Problems:    * No resolved hospital problems. *      Acute on chronic hypoxic and hypercarbic respiratory failure. Secondary to acute COPD exacerbation and possible pneumonia. Resp status decompensated and intubated night of 4/3-4/4  -He was extubated on 4/6/2020   atrial fibrillation. Review of his chart shows that he has had prior A. fib. He was started on  Lovenox 1 mg/kg twice daily. Cardiology consulted. Change to Eliquis. Stop IV Cardizem and start p.o. Cardizem. Acute COPD exacerbation. Empiric Zithro and Rocephin. Finished with antibiotics. Gram-positive pneumonia. Continue Rocephin and azithromycin. Flu screen negative. Procalcitonin only mildly elevated. CXR with   cardiomegaly, bilateral effusions,   and diffuse airspace opacities. COVID reported negative. Acute metabolic encephalopathy. Appears resolved. Likely related to hypercarbia. Mild acute kidney injury secondary to dehydration. Resolved with IV fluids. Acute on chronic thrombocytopenia. Likely secondary to infection. Follow platelet levels -        Hypertension. Hold home hypertension medications given soft blood pressure readings. Type 2 diabetes. Controlled. Sliding scale insulin. Personally spoke to the radiologist.  Jose Miguel King was ordered. He will be able to start a diet now. Discharge planning. Lovenox for DVT prophylaxis. Pepcid for GI prophylaxis. Full code      Transfer to PCU.     Hannah Alex 4/8/2020 3:52 PM

## 2020-04-08 NOTE — PLAN OF CARE
Problem: Falls - Risk of:  Goal: Will remain free from falls  Description: Will remain free from falls  Outcome: Ongoing  Goal: Absence of physical injury  Description: Absence of physical injury  Outcome: Ongoing     Problem: Restraint Use - Nonviolent/Non-Self-Destructive Behavior:  Goal: Absence of restraint indications  Description: Absence of restraint indications  Outcome: Ongoing  Goal: Absence of restraint-related injury  Description: Absence of restraint-related injury  Outcome: Ongoing     Problem: Nutrition  Goal: Optimal nutrition therapy  Outcome: Ongoing  Goal: Understanding of nutritional guidelines  Outcome: Ongoing     Problem: Pain:  Goal: Pain level will decrease  Description: Pain level will decrease  Outcome: Ongoing  Goal: Control of acute pain  Description: Control of acute pain  Outcome: Ongoing  Goal: Control of chronic pain  Description: Control of chronic pain  Outcome: Ongoing

## 2020-04-08 NOTE — PROGRESS NOTES
CL 95* 98* 97*   CO2 35* 31 30   PHOS 3.4  --   --    BUN 16 15 23*   CREATININE 0.6* 0.6* 0.7*     Cultures:  Bld: ngtd  Sputum 4/2: NRF  Rapid flu neg  Sputum 4/4: NRF  covid19 negative        Films:  CXR 4/6:   Perhaps slightly improved aeration in the left base.  Otherwise no change in   bilateral pleuroparenchymal disease.        ASSESSMENT:  · Acute respiratory failure with hypoxemia and hypercapnia  · RLL Healthcare associated pneumonia - probable gram negative, risk for methicillin resistant Staph aureus as well   · ARDS  · COPD with AE  · Acute encephalopathy likely from hypercarbia  · Acute kidney injury  · Thrombocytopenia  · Afib RVR  · DM with hyperglycemia  · CAD     PLAN:  Supplemental oxygen to maintain SaO2 >92%; wean as tolerated   · Antibiotics to cover CAP (D6 Ctx and azithromycin is completed)  · Follow sputum and blood cultures  · Inhaled bronchodilators - MDI or duonebs  · MSSI and fsbg  · Dilt gtt per cards

## 2020-04-08 NOTE — PROGRESS NOTES
, share responsibility of cooking   HHA 4x/wk - assist with cleaning, laundry, grocery shopping  Pt. Fully independent for transfers and gait and walked with No Device  History of falls No - denies in past 3 mo    Pain   Yes  Location: Back - chronic  Ratin /10  Pain Medicine Status: Denies need    Cognition    A&O Person, Place and Time (year, not month)  Able to follow 2 step commands    Subjective  Patient sitting up in chair with no family present  Pt agreeable to this PT eval & tx. Lower Extremity ROM / Strength    AROM WFL: Yes  ROM limitations:     Strength Assessment (measured on a 0-5 scale):  R LE   Quad   4+   Ant Tib  4+   Hamstring 4+   Iliopsoas 4  L LE  Quad   4+   Ant Tib  4+   Hamstring 4+   Iliopsoas 4    Lower Extremity Sensation    Diminished in bilateral feet. Pt reports history of neuropathy    Lower Extremity Proprioception:   WFL    Coordination and Tone  WFL    Balance  Static Sitting:  Good -  in chair   Tolerance:   Dynamic Sitting:  Fair +  Static Standing: Fair +   Tolerance:   Dynamic Standing: Fair     Bed Mobility   Supine to Sit:   Not Tested  Sit to Supine:  Not Tested  Rolling:   Not Tested  Scooting at EOB: Not Tested  Scooting to [facility]:  Not Tested    Transfer Training     Sit to stand:   CGA  Stand to sit:   CGA  Bed to Chair:  Not Tested with use of N/A    Gait gait completed as indicated below  Distance:  32 ft  Deviations (firm surface/linoleum): decreased cindy, Decreased step length on R, Decreased step length on L, Decreased step height on R, Decreased step height on L and forward flexed posture   Assistive Device Used:  rolling walker (RW)  Level of Assist: CGA  Comment: Pt self terminating ambulation distance 2/2 to weakness and fatigue in LEs    Stair Training deferred, pt unsafe/not appropriate to complete stairs at this time  Pt ascended/descended  stairs with N/A with N/A, and use of N/A.   Pattern: N/A    Activity Tolerance   Pt completed therapy session with progressive gait training, balance training, and family/patient education. Miles Minor, PT, DPT #[id number]    If patient discharges from this facility prior to next visit, this note will serve as the Discharge Summary.

## 2020-04-08 NOTE — PROGRESS NOTES
[address] Daily Progress Note      Admit Date:  4/1/2020    Subjective:  Mr. Viola Bishop is seen for paroxysmal atrial fibrillations  Patient failed swallow eval yesterday. He is waiting for reeval today. Denies chest pain, shortness of breath, edema, dizziness, palpitations and syncope.       On admission by Dr. Curtis Mullins  Reason for Consultation/Chief Complaint: \"I have been having  Shortness of breath\"     On admission by Dr. 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 He is being treated for acute exacerbation of COPD. Last night he went in to afib RVR. He is currently on cardizem drip rate is controlled. He is NPO waiting swallow eval.  He is known to have PAF documented in 2015  Denies chest pain, shortness of breath, edema, dizziness, palpitations and syncope.     ROS:  12 point ROS negative in all areas as listed below except as in [address]  Constitutional, EENT, Cardiovascular, pulmonary, GI, , Musculoskeletal, skin, neurological, hematological, endocrine, Psychiatric    Past Medical History:   Diagnosis Date    Arthritis     CAD (coronary artery disease)     [facility]     Diabetes mellitus 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    ELAINE on CPAP     Osteoporosis     Skin cancer of face      Past Surgical History:   Procedure Laterality Date    BACK SURGERY      repair broken back L4 & L5    CYSTOSCOPY Right 10/9/15    RIght Ureteroscopy, Holmium Laser Lithotripsy with Stone Removal, Right Stent Placement    CYSTOSCOPY  05/01/2019    CYSTOSCOPY, RESECTION OF BLADDER NECK, URETHRAL DILATION    CYSTOSCOPY W BIOPSY OF BLADDER N/A 5/1/2019    CYSTOSCOPY, RESECTION OF BLADDER NECK, URETHRAL DILATION performed by Steve Crockett MD at [facility] Left 6/12/2016    cataract removal    JOINT REPLACEMENT  6/29/2011    right knee    JOINT REPLACEMENT  11/2004    left knee    OTHER SURGICAL HISTORY  08/31/2015    wide excision basal cell carcinoma on the nose and bilateral auricles with frozen sections, plastic closure, full thickness skin graft    OTHER SURGICAL HISTORY  12/1/15    excision of lesion of lip    PROSTATE SURGERY      TURP  10/23/2015    with cystoscopy       Objective:   /70   Pulse 88   Temp 98.7 °F (37.1 °C) (Oral)   Resp 16   Ht 5' 9\" (1.753 m)   Wt 201 lb 4.5 oz (91.3 kg)   SpO2 93%   BMI 29.72 kg/m²       Intake/Output Summary (Last 24 hours) at 4/8/2020 0537  Last data filed at 4/8/2020 0359  Gross per 24 hour   Intake --   Output 450 ml   Net -450 ml       TELEMETRY: afib rate is controlled    Physical Exam:  General: No Respiratory distress, appears well developed and well nourished. Eyes:  Sclera nonicteric  Nose/Sinuses:  negative findings: nose shows no deformity, asymmetry, or inflammation, nasal mucosa normal, septum midline with no perforation or bleeding  Back:  no pain to palpation  Joint:  no active joint inflammation  Musculoskeletal:  negative  Skin:  Warm and dry  Neck:  Negative for JVD and Carotid Bruits. Chest:  Exp rhonchi  to auscultation, respiration easy  Cardiovascular: irreg irreg S2 normal, no murmur, no rub or thrill.   Abdomen:  Soft normal liver and spleen  Extremities:   No edema, clubbing, cyanosis,  Neuro: intact    Medications:    albuterol sulfate HFA  2 puff Inhalation Q4H While awake    And    ipratropium  2 puff Inhalation Q4H While awake    enoxaparin  1 mg/kg Subcutaneous BID    insulin lispro  0-18 Units Subcutaneous Q4H    aspirin  81 mg Oral Daily    atorvastatin  10 mg Oral Nightly    pantoprazole  40 mg Oral QAM AC    sodium chloride flush  10 mL Intravenous 2 times per day    cefTRIAXone (ROCEPHIN) IV  1 g Intravenous Q24H    gabapentin  300 mg Oral TID      dilTIAZem (CARDIZEM) 125 mg in dextrose 5% 125 mL infusion 10 mg/hr (20 2135)    dextrose       albuterol sulfate HFA, magnesium sulfate, potassium chloride, sodium chloride flush, acetaminophen **OR** acetaminophen, polyethylene glycol, promethazine **OR** ondansetron, glucose, dextrose, glucagon (rDNA), dextrose, guaiFENesin-dextromethorphan    Lab Data:  CBC:   Recent Labs     20  0734 20  0006 20  0753 20  0440   WBC 3.9*  --  5.5 6.0   HGB 9.3* 10.3* 10.5* 10.5*   HCT 29.7* 32.5* 33.4* 33.2*   MCV 80.9  --  82.0 81.8   PLT 96*  --  110* 128*     BMP:   Recent Labs     20  2009 20  0753 20  0440    140 140   K 3.9 3.9 4.0   CL 95* 98* 97*   CO2 35* 31 30   PHOS 3.4  --   --    BUN 16 15 23*   CREATININE 0.6* 0.6* 0.7*     LIVER PROFILE: No results for input(s): AST, ALT, LIPASE, BILIDIR, BILITOT, ALKPHOS in the last 72 hours. Invalid input(s): AMYLASE,  ALB  PT/INR: No results for input(s): PROTIME, INR in the last 72 hours. APTT: No results for input(s): APTT in the last 72 hours. BNP:  No results for input(s): BNP in the last 72 hours. IMAGING:   I have reviewed the following tests and documented in this encounter as follows:     Summary of echo doppler of heart 20   Left ventricular systolic function is normal with ejection fraction   estimated at 55-60 %.   No regional wall motion abnormalities are noted.   The left ventricular size and wall thickness were not well visualized for   measurement.   Normal left ventricular diastolic filling pressure.   The right atrium is mildly dilated.   Mild mitral regurgitation.   Aortic valve sclerosis without aortic stenosis.   Mild tricuspid regurgitation.   Normal systolic pulmonary artery pressure (SPAP) estimated at 36 mmHg (RA   pressure 8 mmHg).      [id number]  I have reviewed EKG with the following interpretation:  Impression: Atrial

## 2020-04-08 NOTE — PROGRESS NOTES
Bedside report and Pt care transferred to [facility]. Pt denies any assistance at this time.

## 2020-04-08 NOTE — PROCEDURES
INSTRUMENTAL SWALLOW REPORT  MODIFIED BARIUM SWALLOW    NAME: Shireen Cuevas   : 1940  MRN: [id number]       Date of Eval: 2020              Referring Diagnosis(es): Referring Diagnosis: Oropharyngeal Dysphagia (R13.12)    Past Medical History:  has a past medical history of Arthritis, CAD (coronary artery disease), COPD (chronic obstructive pulmonary disease) ([facility] [address] Diabetes mellitus ([facility] [address] Hyperlipidemia, Hypertension, Influenza A, Kidney calculi, MDRO (multiple drug resistant organisms) resistance, ELAINE on CPAP, Osteoporosis, and Skin cancer of face. Past Surgical History:  has a past surgical history that includes Prostate surgery; eye surgery (Left, 2016); other surgical history (2015); Cystoscopy (Right, 10/9/15); joint replacement (2011); joint replacement (2004); TURP (10/23/2015); other surgical history (12/1/15); back surgery; Cystocopy (2019); and cystoscopy w biopsy of bladder (N/A, 2019). Current Diet Solid Consistency: NPO  Current Diet Liquid Consistency: NPO    Type of Study: Initial MBS     Recent CXR/CT of Chest: 20  Impression   Perhaps slightly improved aeration in the left base.  Otherwise no change in   bilateral pleuroparenchymal disease.         Patient Complaints/Reason for Referral:  Shireen Cuveas was referred for a MBS to assess the efficiency of his/her swallow function, assess for aspiration, and to make recommendations regarding safe dietary consistencies, effective compensatory strategies, and safe eating environment. Patient complaints: n/a    Behavior/Cognition/Vision/Hearing:  Behavior/Cognition: Alert;Distractible;Requires cueing    Impressions: The pt demonstrates mild oropharyngeal dysphagia marked by premature pharyngeal entry prior to swallow onset. He presented with trace laryngeal penetration when taking rapid and successive sips via cup.  This penetration was shallow and self-clearing with no laryngeal stain monitoring    Education:   Patient Education: Education was given re: results and recommendations including recommended diet and swallowing precautions  Patient Education Response: Demonstrated understanding    Prognosis  Prognosis for safe diet advancement: good  Barriers to reach goals: age;behavior  Duration/Frequency of Treatment  Duration/Frequency of Treatment: 3-5 x week for LOS  Safety Devices  Safety Devices in place: Not Applicable    Goals:    Long Term:   Timeframe for Long-term Goals: 1 week (4/13/20)  Goal 1: The pt will tolerate his safest and least restrictive diet     Short Term:     Goal 1: The pt will tolerate his current diet (Soft and Bite sized with thins liquids) without s/s of aspiration or other dysphagia 10/10  Goal 2: The pt will tolerate regular solids trials without s/s of aspiration or significant dysphagia 10/10    Oral Preparation / Oral Phase  Oral Phase: Impaired  Oral Phase - Major Contributing Deficits  Poor Mastication: Reg solid; Soft solid  Weak Lingual Manipulation: Reg solid; Soft solid  Reduced Bolus Control: Thin cup; Thin straw  Premature Bolus Loss to Pharynx: Thin cup; Thin straw    Pharyngeal Phase  Pharyngeal Phase: Impaired  Pharyngeal Phase - Major Contributing Deficits  Premature Spillage to Valleculae: Thin straw; Thin cup;Reg solid(In rapid successive sips)  Pooling Valleculae: Thin cup; Thin straw;Reg solid  Shallow Penetration During: Thin cup(Only in successive sips)  Deep Penetration During: Thin cup  No Cough Reflex: Thin cup  Pharyngeal Residue - Valleculae: Thin cup; Thin straw(Minimal)  Pharyngeal Residue - Pyriform: Thin cup; Thin straw(Minimal)     Esophageal Phase  Esophageal Screen: WNL    Pain   Patient Currently in Pain: Denies  Pain Level: 6  Pain Type: Acute pain  Pain Location: Back      Therapy Time:   Individual Concurrent Group Co-treatment   Time In 1122         Time Out 1138         Minutes [address], [place], CCC-SLP [id number]  Speech-Language Pathologist  Phone: [phone]  Speech Desk: [phone]    4/8/2020, 1:54 PM

## 2020-04-09 VITALS
TEMPERATURE: 98 F | WEIGHT: 198.5 LBS | BODY MASS INDEX: 29.4 KG/M2 | SYSTOLIC BLOOD PRESSURE: 137 MMHG | HEART RATE: 92 BPM | HEIGHT: 69 IN | RESPIRATION RATE: 18 BRPM | DIASTOLIC BLOOD PRESSURE: 63 MMHG | OXYGEN SATURATION: 98 %

## 2020-04-09 LAB
ANION GAP SERPL CALCULATED.3IONS-SCNC: 11 MMOL/L (ref 3–16)
BASOPHILS ABSOLUTE: 0 K/UL (ref 0–0.2)
BASOPHILS RELATIVE PERCENT: 0.5 %
BUN BLDV-MCNC: 24 MG/DL (ref 7–20)
CALCIUM SERPL-MCNC: 8.5 MG/DL (ref 8.3–10.6)
CHLORIDE BLD-SCNC: 96 MMOL/L (ref 99–110)
CO2: 32 MMOL/L (ref 21–32)
CREAT SERPL-MCNC: 0.7 MG/DL (ref 0.8–1.3)
EOSINOPHILS ABSOLUTE: 0.1 K/UL (ref 0–0.6)
EOSINOPHILS RELATIVE PERCENT: 2.2 %
GFR AFRICAN AMERICAN: >60
GFR NON-AFRICAN AMERICAN: >60
GLUCOSE BLD-MCNC: 170 MG/DL (ref 70–99)
GLUCOSE BLD-MCNC: 171 MG/DL (ref 70–99)
GLUCOSE BLD-MCNC: 176 MG/DL (ref 70–99)
GLUCOSE BLD-MCNC: 200 MG/DL (ref 70–99)
GLUCOSE BLD-MCNC: 257 MG/DL (ref 70–99)
HCT VFR BLD CALC: 31.5 % (ref 40.5–52.5)
HEMOGLOBIN: 10 G/DL (ref 13.5–17.5)
LYMPHOCYTES ABSOLUTE: 1.4 K/UL (ref 1–5.1)
LYMPHOCYTES RELATIVE PERCENT: 28.6 %
MCH RBC QN AUTO: 26.1 PG (ref 26–34)
MCHC RBC AUTO-ENTMCNC: 31.6 G/DL (ref 31–36)
MCV RBC AUTO: 82.7 FL (ref 80–100)
MONOCYTES ABSOLUTE: 0.6 K/UL (ref 0–1.3)
MONOCYTES RELATIVE PERCENT: 11.4 %
NEUTROPHILS ABSOLUTE: 2.9 K/UL (ref 1.7–7.7)
NEUTROPHILS RELATIVE PERCENT: 57.3 %
PDW BLD-RTO: 16.4 % (ref 12.4–15.4)
PERFORMED ON: ABNORMAL
PLATELET # BLD: 112 K/UL (ref 135–450)
PMV BLD AUTO: 9 FL (ref 5–10.5)
POTASSIUM REFLEX MAGNESIUM: 3.6 MMOL/L (ref 3.5–5.1)
RBC # BLD: 3.81 M/UL (ref 4.2–5.9)
SODIUM BLD-SCNC: 139 MMOL/L (ref 136–145)
WBC # BLD: 5 K/UL (ref 4–11)

## 2020-04-09 PROCEDURE — 97535 SELF CARE MNGMENT TRAINING: CPT

## 2020-04-09 PROCEDURE — 80048 BASIC METABOLIC PNL TOTAL CA: CPT

## 2020-04-09 PROCEDURE — 85025 COMPLETE CBC W/AUTO DIFF WBC: CPT

## 2020-04-09 PROCEDURE — 6370000000 HC RX 637 (ALT 250 FOR IP): Performed by: INTERNAL MEDICINE

## 2020-04-09 PROCEDURE — 94761 N-INVAS EAR/PLS OXIMETRY MLT: CPT

## 2020-04-09 PROCEDURE — 92526 ORAL FUNCTION THERAPY: CPT

## 2020-04-09 PROCEDURE — 97166 OT EVAL MOD COMPLEX 45 MIN: CPT

## 2020-04-09 PROCEDURE — 97530 THERAPEUTIC ACTIVITIES: CPT

## 2020-04-09 PROCEDURE — 36415 COLL VENOUS BLD VENIPUNCTURE: CPT

## 2020-04-09 PROCEDURE — 99233 SBSQ HOSP IP/OBS HIGH 50: CPT | Performed by: INTERNAL MEDICINE

## 2020-04-09 PROCEDURE — 99239 HOSP IP/OBS DSCHRG MGMT >30: CPT | Performed by: INTERNAL MEDICINE

## 2020-04-09 PROCEDURE — 99232 SBSQ HOSP IP/OBS MODERATE 35: CPT | Performed by: INTERNAL MEDICINE

## 2020-04-09 PROCEDURE — 2700000000 HC OXYGEN THERAPY PER DAY

## 2020-04-09 PROCEDURE — 94640 AIRWAY INHALATION TREATMENT: CPT

## 2020-04-09 RX ORDER — DILTIAZEM HYDROCHLORIDE 180 MG/1
180 CAPSULE, COATED, EXTENDED RELEASE ORAL DAILY
Qty: 30 CAPSULE | Refills: 3 | Status: SHIPPED | OUTPATIENT
Start: 2020-04-09 | End: 2020-05-07

## 2020-04-09 RX ADMIN — PANTOPRAZOLE SODIUM 40 MG: 40 TABLET, DELAYED RELEASE ORAL at 06:13

## 2020-04-09 RX ADMIN — Medication 2 PUFF: at 11:02

## 2020-04-09 RX ADMIN — APIXABAN 5 MG: 5 TABLET, FILM COATED ORAL at 08:48

## 2020-04-09 RX ADMIN — INSULIN LISPRO 9 UNITS: 100 INJECTION, SOLUTION INTRAVENOUS; SUBCUTANEOUS at 12:01

## 2020-04-09 RX ADMIN — Medication 2 PUFF: at 07:13

## 2020-04-09 RX ADMIN — Medication 2 PUFF: at 15:11

## 2020-04-09 RX ADMIN — Medication 2 PUFF: at 11:03

## 2020-04-09 RX ADMIN — GABAPENTIN 300 MG: 300 CAPSULE ORAL at 14:52

## 2020-04-09 RX ADMIN — GABAPENTIN 300 MG: 300 CAPSULE ORAL at 08:48

## 2020-04-09 RX ADMIN — INSULIN LISPRO 6 UNITS: 100 INJECTION, SOLUTION INTRAVENOUS; SUBCUTANEOUS at 00:14

## 2020-04-09 RX ADMIN — DILTIAZEM HYDROCHLORIDE 120 MG: 120 CAPSULE, COATED, EXTENDED RELEASE ORAL at 08:48

## 2020-04-09 RX ADMIN — INSULIN LISPRO 3 UNITS: 100 INJECTION, SOLUTION INTRAVENOUS; SUBCUTANEOUS at 04:27

## 2020-04-09 RX ADMIN — INSULIN LISPRO 3 UNITS: 100 INJECTION, SOLUTION INTRAVENOUS; SUBCUTANEOUS at 08:48

## 2020-04-09 NOTE — DISCHARGE INSTR - COC
Continuity of Care Form    Patient Name: Michelle Bradshaw   :  1940  MRN:  [id number]    Admit date:  2020  Discharge date:  2020    Code Status Order: Full Code   Advance Directives:   Advance Care Flowsheet Documentation     Date/Time Healthcare Directive Type of Healthcare Directive Copy in [address] Agent's Name Healthcare Agent's Phone Number    20 [phone]  No, patient does not have an advance directive for healthcare treatment -- -- -- -- --          Admitting Physician:  Daisy Macias MD  PCP: Padmini Dominique MD    Discharging Nurse: [facility] Unit/Room#: /9112-87  Discharging Unit Phone Number: ***    Emergency Contact:   Extended Emergency Contact Information  Primary Emergency Contact: [facility]  Address: [address] [place] [address] Phone: [phone]  Mobile Phone: [phone]  Relation: Spouse  Secondary Emergency Contact: [address] [place] [address] Phone: [phone]  Relation: Child    Past Surgical History:  Past Surgical History:   Procedure Laterality Date    BACK SURGERY      repair broken back L4 & L5    CYSTOSCOPY Right 10/9/15    RIght Ureteroscopy, Holmium Laser Lithotripsy with Stone Removal, Right Stent Placement    CYSTOSCOPY  2019    CYSTOSCOPY, RESECTION OF BLADDER NECK, URETHRAL DILATION    CYSTOSCOPY W BIOPSY OF BLADDER N/A 2019    CYSTOSCOPY, RESECTION OF BLADDER NECK, URETHRAL DILATION performed by Bridgett Petersen MD at [address] Left 2016    cataract removal    JOINT REPLACEMENT  2011    right knee    JOINT REPLACEMENT  2004    left knee    OTHER SURGICAL HISTORY  2015    wide excision basal cell carcinoma on the nose and bilateral auricles with frozen sections, plastic closure, full thickness skin graft    OTHER SURGICAL HISTORY  12/1/15    excision of lesion of lip    PROSTATE SURGERY      TURP  10/23/2015    with cystoscopy       Immunization History:   Immunization History   Administered Date(s) Administered    Influenza Vaccine, unspecified formulation 11/18/2009, 10/14/2010, 11/08/2011, 09/21/2012, 10/18/2013, 10/07/2016    Influenza Virus Vaccine 10/12/2015, 10/07/2016, 10/18/2017    Influenza Whole 10/01/2010    Influenza, High Dose (Fluzone 65 yrs and older) 09/18/2018, 09/13/2019    Influenza, Esperanza Factor, IM, PF (6 mo and older Fluzone, Flulaval, Fluarix, and 3 yrs and older Afluria) 11/01/2017, 08/28/2018    Pneumococcal Conjugate 13-valent (Wqnwafn12) 08/02/2017, 02/14/2018    Pneumococcal Conjugate 7-valent (Prevnar7) 10/01/2006    Pneumococcal Polysaccharide (Dbvurzruf44) 10/24/2015, 10/07/2016    Td vaccine (adult) 09/21/2012       Active Problems:  Patient Active Problem List   Diagnosis Code    HTN (hypertension) I10    COPD, severe ([facility] J44.9    DM2 (diabetes mellitus, type 2) ([facility] E11.9    HLD (hyperlipidemia) E78.5    PAF (paroxysmal atrial fibrillation) ([facility]) I48.0    Chronic respiratory failure with hypoxia and hypercapnia 2.5 L home O2 J96.11, J96.12    Acute on chronic respiratory failure with hypercapnia ([facility]) J96.22    COPD exacerbation ([facility]) J44.1    Pneumonia, unspecified organism J18.9    CAD (coronary artery disease) I25.10    Former smoker Z87.891    Acute hyperglycemia R73.9    Chronic normocytic anemia D64.9    Chronic thrombocytopenia D69.6    Falls at home W19. Carmelina Oren, Y92.009    Ambulatory dysfunction R26.2    Generalized weakness R53.1    Acute on chronic respiratory failure with hypoxia and hypercapnia ([facility]) J96.21, J96.22    Pleural effusion J90    Rapid atrial fibrillation ([facility]) I48.91       Isolation/Infection:   Isolation          No Isolation        Patient Infection Status     Infection Onset Added Last Indicated Last Indicated By Review Planned Expiration Resolved Resolved By    None active    Resolved    COVID-19

## 2020-04-09 NOTE — PROGRESS NOTES
Update given to [facility] patients granddaughter via telephone at this time. All questions answered. Patient informed update given. Patient sitting up in the chair. Denies any needs at this time. Call light in reach will monitor.

## 2020-04-09 NOTE — PROGRESS NOTES
stenosis.   Mild tricuspid regurgitation.   Normal systolic pulmonary artery pressure (SPAP) estimated at 36 mmHg (RA   pressure 8 mmHg).    EK20  I have reviewed EKG with the following interpretation:  Impression: Atrial fibrillation with rapid ventricular responseNonspecific ST abnormality , probably digitalis effectAbnormal ECGWhen compared with ECG of 2020 20:03,Atrial fibrillation has replaced Sinus rhythm Confirmed by Timmy Vásquez MD, [address] () on 2020 6:49:24 AM      CXR 20  FINDINGS: Endotracheal and enteric tubes remain in place.  Bilateral pleural effusions are again seen with bilateral airspace opacities.  Aeration in the left base may be somewhat improved.  There is no appreciable change on the right.  The heart size is stable.  There is no discernible pneumothorax.      EKG 4/3/20  Sinus tachycardiaNonspecific ST abnormalityAbnormal ECGWhen compared with ECG of 2020 18:14,No significant change was found Confirmed by Timmy Vásquez MD, [address] () on 4/3/2020 8:36:04 PM     ECHO 10/12/15  Summary   Normal left ventricle size, wall thickness and systolic function with an   estimated ejection fraction of 55%. No regional wall motion abnormalities   are seen. Normal diastolic function.   Mild mitral regurgitation is present.   Aortic valve appears tricuspid and sclerotic but opens adequately. mild tricuspid regurgitation.   Systolic pulmonary artery pressure (SPAP) is estimated at 58 mmHg consistent with moderate pulmonary hypertension (RA pressure 8 mmHg).         Assessment:  Paroxysmal afib CHADSVASC 4 resolved  Thrombocytopenia anemia stable  COPD with acute exacerbation and acute resp failure  Dysphagia resolved  He is euthyroid  Patient Active Problem List    Diagnosis Date Noted    DM2 (diabetes mellitus, type 2) ([facility]      Priority: Medium    HTN (hypertension)      Priority: Low    COPD, severe ([facility]      Priority: Low    HLD (hyperlipidemia)      Priority: Low    Rapid atrial fibrillation ([facility] 04/07/2020    Pleural effusion     Acute on chronic respiratory failure with hypoxia and hypercapnia ([facility] 04/01/2020    Former smoker 01/24/2018    Acute hyperglycemia 01/24/2018    Chronic normocytic anemia 01/24/2018    Chronic thrombocytopenia 01/24/2018    Falls at home 01/24/2018    Ambulatory dysfunction 01/24/2018    Generalized weakness 01/24/2018    CAD (coronary artery disease)     Pneumonia, unspecified organism     Acute on chronic respiratory failure with hypercapnia (HCC)     COPD exacerbation (HCC)     Chronic respiratory failure with hypoxia and hypercapnia 2.5 L home O2 07/13/2017    PAF (paroxysmal atrial fibrillation) ([facility] 10/11/2015       Plan:  1. Switched over to  po anticoagulants and PO cardizem. He is anemic and thrombocytopenic he is 78 yrs old consider lower dose of apixaban 2.5mg BID  2. I will set follow up in office with  Dr Charlette Castillo in about 4 weeks, Patient has agreed to follow up  3.  Will sign off    Core Measures:  · Discharge instructions:   · LVEF documented: 55  · ACEI for LV dysfunction: NA  · Smoking Cessation: discussed  I have spent 25  minutes of face to face time with the patient with more than 50% spent counseling and coordinating care for this patient    Beltran Jackson MD 4/9/2020 7:41 AM

## 2020-04-09 NOTE — PROGRESS NOTES
Patient educated on discharge instructions as well as new medications use, dosage, administration and possible side effects. Patient verified knowledge. IV removed without difficulty and dry dressing in place. Telemetry monitor removed and returned to [address]. Patient has all personal belongings waiting for ride to arrive to put in for transport.

## 2020-04-09 NOTE — CARE COORDINATION
[facility]  [facility] Notified [facility] clinical team of referral and plan for discharge home today. Telephone call to pt to follow up. Spoke with pt's spouse, Sheila Larson. Agreeable to [facility] for SN, PT/OT. Verified demographics. Completed COVID 19 screening. Noted negative COVID lab in hospital.    Faxed referral with orders to [facility] for [facility].     Electronically signed by Jodi Romero RN on 4/9/2020 at 3:09 PM

## 2020-04-09 NOTE — CARE COORDINATION
DISCHARGE ORDER  Date/Time 2020 2:23 PM  Completed by: Serenity Moreno, Case Management    Patient Name: Thony Cook    : 1940  Admitting Diagnosis: Acute on chronic respiratory failure with hypoxia and hypercapnia ([facility] [J96.21, J96.22]  Acute on chronic respiratory failure with hypoxia and hypercapnia ([address] [J96.21, J96.22]      Admit order Date and Status:2020 inpt  (verify MD's last order for status of admission)      Noted discharge order. Confirmed discharge plan  : Yes  with whom__pt________  If pt confirmed DC plan does family need to be contacted by CM Yes if yes who_spouse___  Discharge Plan: Chart reviewed and role of dcp explained. Spoke with pt via telephone regarding discharge and pt continues to refuse STR and wants to return home with spouse and is agreeable to [facility].. Pt given list of HC agencies over the phone and pt would like [facility] for SN/PT/OT. TC to [facility] with [facility] and she is aware pt will discharge home today. Call to [facility], pt preferred pharmacy, cost of pt's Eliquis will be $0, pt is aware. TC to spouse with pt's permission, she is aware pt will return home with [facility]., she states dtr will pick the pt up and will have portable O2 tank from home with her. No further dc needs voiced or identified. AVS faxed to Corina Johnson/[facility] at this time. Reviewed chart. Role of discharge planner explained and patient verbalized understanding. Discharge order is noted. Has Home O2 in place on admit:  Yes  Informed of need to bring portable home O2 tank on day of discharge for nursing to connect prior to leaving:   Yes  Verbalized agreement/Understanding:   Yes  Pt is being d/c'd to home today. Pt's O2 sats are 98% on 3L. Discharge timeout done with Faith Perez. All discharge needs and concerns addressed.

## 2020-04-09 NOTE — PROGRESS NOTES
Physical Therapy    Patient was approached for PT session today this PM. Patient refused PT skilled services at this time. Patient reported he would like to continue home health PT services. Patient has discharge order placed for today. No charge.    Matilde Vazquez, [id number]

## 2020-04-09 NOTE — PLAN OF CARE
Nutrition Problem: Inadequate oral intake  Intervention: Food and/or Nutrient Delivery: Continue current diet, Start ONS  Nutritional Goals: Pt to consume >50% of meals/supplement. Wt stability. Improved glycemic control.

## 2020-04-09 NOTE — DISCHARGE SUMMARY
COVID reported negative.        # Acute metabolic encephalopathy. Appears resolved. Likely related to hypercarbia.       # Mild acute kidney injury secondary to dehydration. Resolved with IV fluids.        # Acute on chronic thrombocytopenia. Likely secondary to infection. Followed platelet levels - 088 at d/c.        # Hypertension. Held home hypertension medications given soft blood pressure readings.        # Type 2 diabetes. Controlled. Used Sliding scale insulin.     Personally spoke to the radiologist.  Lamaralyarely Luria was ordered. He will be able to start a diet now.     Lovenox for DVT prophylaxis. D/c home. Procedures (Please Review Full Report for Details)  Modified barium swallow  Intubation     Consults    Pulmonology   Cardiology    Physical Exam at Discharge:    /63   Pulse 92   Temp 98 °F (36.7 °C) (Oral)   Resp 18   Ht 5' 9\" (1.753 m)   Wt 198 lb 8 oz (90 kg)   SpO2 98%   BMI 29.31 kg/m²     General: He is extubated. He is awake and alert. Mucous Membranes:  Pink , anicteric  Neck: No JVD, no carotid bruit, no thyromegaly  Chest: Wheezes or rhonchi. Diminished breath sounds at bases. Cardiovascular: regular rate irregularly irregular rhythm. S1S2 heard, no murmurs or gallops  Abdomen:  Soft, undistended, non tender, no organomegaly, BS present  Extremities: No edema or cyanosis. Distal pulses well felt  Neurological.  Awake and alert.   Moves all 4 extremities    CBC:   Recent Labs     04/07/20  0753 04/08/20 0440 04/09/20  0439   WBC 5.5 6.0 5.0   HGB 10.5* 10.5* 10.0*   HCT 33.4* 33.2* 31.5*   MCV 82.0 81.8 82.7   * 128* 112*     BMP:   Recent Labs     04/06/20 2009 04/07/20  0753 04/08/20  0440 04/09/20  0439    140 140 139   K 3.9 3.9 4.0 3.6   CL 95* 98* 97* 96*   CO2 35* 31 30 32   PHOS 3.4  --   --   --    BUN 16 15 23* 24*   CREATININE 0.6* 0.6* 0.7* 0.7*       CARDIAC ENZYMES  Recent Labs     04/06/20 2009 04/07/20  0132 04/07/20  0753   TROPONINI START taking these medications    apixaban 5 MG Tabs tablet  Commonly known as:  ELIQUIS  Take 1 tablet by mouth 2 times daily     dilTIAZem 180 MG extended release capsule  Commonly known as:  Cartia XT  Take 1 capsule by mouth daily        CONTINUE taking these medications    * albuterol (2.5 MG/3ML) 0.083% nebulizer solution  Commonly known as:  PROVENTIL  Take 3 mLs by nebulization 4 times daily COPD J44.9 [employer]     * Ventolin  (90 Base) MCG/ACT inhaler  Generic drug:  albuterol sulfate HFA  Inhale 2 puffs into the lungs every 6 hours as needed for Wheezing orShortness of Breath     alendronate 70 MG tablet  Commonly known as:  FOSAMAX     atorvastatin 10 MG tablet  Commonly known as:  LIPITOR     esomeprazole 40 MG delayed release capsule  Commonly known as:  NEXIUM     fluticasone 50 MCG/ACT nasal spray  Commonly known as:  FLONASE     gabapentin 300 MG capsule  Commonly known as:  NEURONTIN     metFORMIN 500 MG tablet  Commonly known as:  GLUCOPHAGE     OXYGEN     pioglitazone 30 MG tablet  Commonly known as:  ACTOS     Trelegy Ellipta 100-62.5-25 MCG/INH Aepb  Generic drug:  fluticasone-umeclidin-vilant  INHALE 1 PUFF EVERY DAY         * This list has 2 medication(s) that are the same as other medications prescribed for you. Read the directions carefully, and ask your doctor or other care provider to review them with you.             STOP taking these medications    aspirin 81 MG tablet     atenolol 100 MG tablet  Commonly known as:  TENORMIN     celecoxib 200 MG capsule  Commonly known as:  CELEBREX     doxazosin 4 MG tablet  Commonly known as:  CARDURA     lisinopril 40 MG tablet  Commonly known as:  PRINIVIL;ZESTRIL           Where to Get Your Medications      These medications were sent to [address] [place] [address], [address] [place] [address] [phone]  [facility], [address]    Phone:  [phone]   · apixaban 5 MG Tabs tablet  · dilTIAZem

## 2020-04-09 NOTE — PROGRESS NOTES
Pt resting in chair in room watching TV. All meds given per [facility]. Shift assessment complete. Pt denies any pain at this time. Pt ambulated from recliner to doorway and back x2. Call light within reach. All belongings in reach. Will continue to monitor and assess.

## 2020-04-10 ENCOUNTER — CARE COORDINATION (OUTPATIENT)
Dept: CASE MANAGEMENT | Age: 80
End: 2020-04-10

## 2020-04-16 ENCOUNTER — APPOINTMENT (OUTPATIENT)
Dept: GENERAL RADIOLOGY | Age: 80
DRG: 208 | End: 2020-04-16
Payer: MEDICARE

## 2020-04-16 ENCOUNTER — HOSPITAL ENCOUNTER (INPATIENT)
Age: 80
LOS: 4 days | Discharge: HOME | DRG: 208 | End: 2020-04-20
Attending: EMERGENCY MEDICINE | Admitting: INTERNAL MEDICINE
Payer: MEDICARE

## 2020-04-16 LAB
A/G RATIO: 1.3 (ref 1.1–2.2)
ALBUMIN SERPL-MCNC: 4.4 G/DL (ref 3.4–5)
ALP BLD-CCNC: 59 U/L (ref 40–129)
ALT SERPL-CCNC: 22 U/L (ref 10–40)
AMORPHOUS: ABNORMAL /HPF
ANION GAP SERPL CALCULATED.3IONS-SCNC: 11 MMOL/L (ref 3–16)
AST SERPL-CCNC: 27 U/L (ref 15–37)
BACTERIA: ABNORMAL /HPF
BASE EXCESS ARTERIAL: 2.2 MMOL/L (ref -3–3)
BASE EXCESS ARTERIAL: 2.4 MMOL/L (ref -3–3)
BASE EXCESS VENOUS: 0.1 MMOL/L (ref -3–3)
BASOPHILS ABSOLUTE: 0 K/UL (ref 0–0.2)
BASOPHILS RELATIVE PERCENT: 0.1 %
BILIRUB SERPL-MCNC: 0.5 MG/DL (ref 0–1)
BILIRUBIN URINE: NEGATIVE
BLOOD, URINE: ABNORMAL
BUN BLDV-MCNC: 29 MG/DL (ref 7–20)
CALCIUM SERPL-MCNC: 9.3 MG/DL (ref 8.3–10.6)
CARBOXYHEMOGLOBIN ARTERIAL: 0.2 % (ref 0–1.5)
CARBOXYHEMOGLOBIN ARTERIAL: 0.6 % (ref 0–1.5)
CARBOXYHEMOGLOBIN: 2.4 % (ref 0–1.5)
CHLORIDE BLD-SCNC: 101 MMOL/L (ref 99–110)
CLARITY: CLEAR
CO2: 32 MMOL/L (ref 21–32)
COARSE CASTS, UA: ABNORMAL /LPF (ref 0–2)
COLOR: YELLOW
CREAT SERPL-MCNC: 1.1 MG/DL (ref 0.8–1.3)
CRYSTALS, UA: ABNORMAL /HPF
EKG ATRIAL RATE: 141 BPM
EKG DIAGNOSIS: NORMAL
EKG Q-T INTERVAL: 312 MS
EKG QRS DURATION: 76 MS
EKG QTC CALCULATION (BAZETT): 472 MS
EKG R AXIS: 81 DEGREES
EKG T AXIS: 113 DEGREES
EKG VENTRICULAR RATE: 138 BPM
EOSINOPHILS ABSOLUTE: 0 K/UL (ref 0–0.6)
EOSINOPHILS RELATIVE PERCENT: 0 %
GFR AFRICAN AMERICAN: >60
GFR NON-AFRICAN AMERICAN: >60
GLOBULIN: 3.5 G/DL
GLUCOSE BLD-MCNC: 120 MG/DL (ref 70–99)
GLUCOSE BLD-MCNC: 187 MG/DL (ref 70–99)
GLUCOSE BLD-MCNC: 198 MG/DL (ref 70–99)
GLUCOSE BLD-MCNC: 378 MG/DL (ref 70–99)
GLUCOSE URINE: 500 MG/DL
HCO3 ARTERIAL: 28.5 MMOL/L (ref 21–29)
HCO3 ARTERIAL: 32.6 MMOL/L (ref 21–29)
HCO3 VENOUS: 30.1 MMOL/L (ref 23–29)
HCT VFR BLD CALC: 35 % (ref 40.5–52.5)
HEMOGLOBIN, ART, EXTENDED: 11.2 G/DL (ref 13.5–17.5)
HEMOGLOBIN, ART, EXTENDED: 9.8 G/DL (ref 13.5–17.5)
HEMOGLOBIN: 10.9 G/DL (ref 13.5–17.5)
KETONES, URINE: NEGATIVE MG/DL
LACTIC ACID: 2.1 MMOL/L (ref 0.4–2)
LEUKOCYTE ESTERASE, URINE: NEGATIVE
LYMPHOCYTES ABSOLUTE: 1.1 K/UL (ref 1–5.1)
LYMPHOCYTES RELATIVE PERCENT: 15.7 %
MCH RBC QN AUTO: 26 PG (ref 26–34)
MCHC RBC AUTO-ENTMCNC: 31.3 G/DL (ref 31–36)
MCV RBC AUTO: 83 FL (ref 80–100)
METHEMOGLOBIN ARTERIAL: 0.3 %
METHEMOGLOBIN ARTERIAL: 0.3 %
METHEMOGLOBIN VENOUS: 0 %
MICROSCOPIC EXAMINATION: YES
MONOCYTES ABSOLUTE: 0.2 K/UL (ref 0–1.3)
MONOCYTES RELATIVE PERCENT: 2.6 %
NEUTROPHILS ABSOLUTE: 5.9 K/UL (ref 1.7–7.7)
NEUTROPHILS RELATIVE PERCENT: 81.6 %
NITRITE, URINE: NEGATIVE
O2 CONTENT ARTERIAL: 13 ML/DL
O2 CONTENT ARTERIAL: 16 ML/DL
O2 CONTENT, VEN: 14 VOL %
O2 SAT, ARTERIAL: 95.4 %
O2 SAT, ARTERIAL: 97.9 %
O2 SAT, VEN: 79 %
O2 THERAPY: ABNORMAL
PCO2 ARTERIAL: 53.1 MMHG (ref 35–45)
PCO2 ARTERIAL: 87.1 MMHG (ref 35–45)
PCO2, VEN: 81 MMHG (ref 40–50)
PDW BLD-RTO: 17.1 % (ref 12.4–15.4)
PERFORMED ON: ABNORMAL
PH ARTERIAL: 7.19 (ref 7.35–7.45)
PH ARTERIAL: 7.35 (ref 7.35–7.45)
PH UA: 5.5 (ref 5–8)
PH VENOUS: 7.19 (ref 7.35–7.45)
PLATELET # BLD: 151 K/UL (ref 135–450)
PMV BLD AUTO: 8.7 FL (ref 5–10.5)
PO2 ARTERIAL: 134.4 MMHG (ref 75–108)
PO2 ARTERIAL: 81.9 MMHG (ref 75–108)
PO2, VEN: 54 MMHG (ref 25–40)
POTASSIUM REFLEX MAGNESIUM: 5.1 MMOL/L (ref 3.5–5.1)
PRO-BNP: 5654 PG/ML (ref 0–449)
PROTEIN UA: ABNORMAL MG/DL
RAPID INFLUENZA  B AGN: NEGATIVE
RAPID INFLUENZA A AGN: NEGATIVE
RBC # BLD: 4.21 M/UL (ref 4.2–5.9)
RBC UA: ABNORMAL /HPF (ref 0–4)
SODIUM BLD-SCNC: 144 MMOL/L (ref 136–145)
SPECIFIC GRAVITY UA: >=1.03 (ref 1–1.03)
TCO2 ARTERIAL: 30.1 MMOL/L
TCO2 ARTERIAL: 35.3 MMOL/L
TCO2 CALC VENOUS: 33 MMOL/L
TOTAL PROTEIN: 7.9 G/DL (ref 6.4–8.2)
TROPONIN: 0.01 NG/ML
TROPONIN: 0.02 NG/ML
TROPONIN: 0.03 NG/ML
URINE REFLEX TO CULTURE: ABNORMAL
URINE TYPE: ABNORMAL
UROBILINOGEN, URINE: 0.2 E.U./DL
WBC # BLD: 7.2 K/UL (ref 4–11)
WBC UA: ABNORMAL /HPF (ref 0–5)

## 2020-04-16 PROCEDURE — 36415 COLL VENOUS BLD VENIPUNCTURE: CPT

## 2020-04-16 PROCEDURE — 6360000002 HC RX W HCPCS: Performed by: EMERGENCY MEDICINE

## 2020-04-16 PROCEDURE — C9113 INJ PANTOPRAZOLE SODIUM, VIA: HCPCS | Performed by: INTERNAL MEDICINE

## 2020-04-16 PROCEDURE — 96375 TX/PRO/DX INJ NEW DRUG ADDON: CPT

## 2020-04-16 PROCEDURE — 89220 SPUTUM SPECIMEN COLLECTION: CPT

## 2020-04-16 PROCEDURE — 2580000003 HC RX 258: Performed by: EMERGENCY MEDICINE

## 2020-04-16 PROCEDURE — 6370000000 HC RX 637 (ALT 250 FOR IP): Performed by: INTERNAL MEDICINE

## 2020-04-16 PROCEDURE — 82803 BLOOD GASES ANY COMBINATION: CPT

## 2020-04-16 PROCEDURE — 93005 ELECTROCARDIOGRAM TRACING: CPT | Performed by: EMERGENCY MEDICINE

## 2020-04-16 PROCEDURE — 85025 COMPLETE CBC W/AUTO DIFF WBC: CPT

## 2020-04-16 PROCEDURE — 96365 THER/PROPH/DIAG IV INF INIT: CPT

## 2020-04-16 PROCEDURE — U0002 COVID-19 LAB TEST NON-CDC: HCPCS

## 2020-04-16 PROCEDURE — 2580000003 HC RX 258: Performed by: INTERNAL MEDICINE

## 2020-04-16 PROCEDURE — 83036 HEMOGLOBIN GLYCOSYLATED A1C: CPT

## 2020-04-16 PROCEDURE — 2500000003 HC RX 250 WO HCPCS: Performed by: EMERGENCY MEDICINE

## 2020-04-16 PROCEDURE — 83605 ASSAY OF LACTIC ACID: CPT

## 2020-04-16 PROCEDURE — 5A1935Z RESPIRATORY VENTILATION, LESS THAN 24 CONSECUTIVE HOURS: ICD-10-PCS | Performed by: EMERGENCY MEDICINE

## 2020-04-16 PROCEDURE — 94750 HC PULMONARY COMPLIANCE STUDY: CPT

## 2020-04-16 PROCEDURE — 6360000002 HC RX W HCPCS

## 2020-04-16 PROCEDURE — 94761 N-INVAS EAR/PLS OXIMETRY MLT: CPT

## 2020-04-16 PROCEDURE — 80053 COMPREHEN METABOLIC PANEL: CPT

## 2020-04-16 PROCEDURE — 71045 X-RAY EXAM CHEST 1 VIEW: CPT

## 2020-04-16 PROCEDURE — 87205 SMEAR GRAM STAIN: CPT

## 2020-04-16 PROCEDURE — 81001 URINALYSIS AUTO W/SCOPE: CPT

## 2020-04-16 PROCEDURE — 87070 CULTURE OTHR SPECIMN AEROBIC: CPT

## 2020-04-16 PROCEDURE — 99285 EMERGENCY DEPT VISIT HI MDM: CPT

## 2020-04-16 PROCEDURE — 31500 INSERT EMERGENCY AIRWAY: CPT

## 2020-04-16 PROCEDURE — 94640 AIRWAY INHALATION TREATMENT: CPT

## 2020-04-16 PROCEDURE — 5A09357 ASSISTANCE WITH RESPIRATORY VENTILATION, LESS THAN 24 CONSECUTIVE HOURS, CONTINUOUS POSITIVE AIRWAY PRESSURE: ICD-10-PCS | Performed by: EMERGENCY MEDICINE

## 2020-04-16 PROCEDURE — 94002 VENT MGMT INPAT INIT DAY: CPT

## 2020-04-16 PROCEDURE — 6360000002 HC RX W HCPCS: Performed by: INTERNAL MEDICINE

## 2020-04-16 PROCEDURE — 84484 ASSAY OF TROPONIN QUANT: CPT

## 2020-04-16 PROCEDURE — 96368 THER/DIAG CONCURRENT INF: CPT

## 2020-04-16 PROCEDURE — 99221 1ST HOSP IP/OBS SF/LOW 40: CPT | Performed by: INTERNAL MEDICINE

## 2020-04-16 PROCEDURE — 74018 RADEX ABDOMEN 1 VIEW: CPT

## 2020-04-16 PROCEDURE — 99291 CRITICAL CARE FIRST HOUR: CPT | Performed by: INTERNAL MEDICINE

## 2020-04-16 PROCEDURE — 2700000000 HC OXYGEN THERAPY PER DAY

## 2020-04-16 PROCEDURE — 83880 ASSAY OF NATRIURETIC PEPTIDE: CPT

## 2020-04-16 PROCEDURE — 87040 BLOOD CULTURE FOR BACTERIA: CPT

## 2020-04-16 PROCEDURE — 87804 INFLUENZA ASSAY W/OPTIC: CPT

## 2020-04-16 PROCEDURE — 93010 ELECTROCARDIOGRAM REPORT: CPT | Performed by: INTERNAL MEDICINE

## 2020-04-16 PROCEDURE — 0BH17EZ INSERTION OF ENDOTRACHEAL AIRWAY INTO TRACHEA, VIA NATURAL OR ARTIFICIAL OPENING: ICD-10-PCS | Performed by: EMERGENCY MEDICINE

## 2020-04-16 PROCEDURE — 87077 CULTURE AEROBIC IDENTIFY: CPT

## 2020-04-16 PROCEDURE — 2000000000 HC ICU R&B

## 2020-04-16 PROCEDURE — 99223 1ST HOSP IP/OBS HIGH 75: CPT | Performed by: INTERNAL MEDICINE

## 2020-04-16 RX ORDER — DEXTROSE MONOHYDRATE 25 G/50ML
12.5 INJECTION, SOLUTION INTRAVENOUS PRN
Status: DISCONTINUED | OUTPATIENT
Start: 2020-04-16 | End: 2020-04-20 | Stop reason: HOSPADM

## 2020-04-16 RX ORDER — SUCCINYLCHOLINE CHLORIDE 20 MG/ML
150 INJECTION INTRAMUSCULAR; INTRAVENOUS ONCE
Status: COMPLETED | OUTPATIENT
Start: 2020-04-16 | End: 2020-04-16

## 2020-04-16 RX ORDER — MIDAZOLAM HYDROCHLORIDE 1 MG/ML
2 INJECTION INTRAMUSCULAR; INTRAVENOUS
Status: DISCONTINUED | OUTPATIENT
Start: 2020-04-16 | End: 2020-04-18

## 2020-04-16 RX ORDER — DILTIAZEM HYDROCHLORIDE 5 MG/ML
10 INJECTION INTRAVENOUS ONCE
Status: COMPLETED | OUTPATIENT
Start: 2020-04-16 | End: 2020-04-16

## 2020-04-16 RX ORDER — FENTANYL CITRATE 50 UG/ML
INJECTION, SOLUTION INTRAMUSCULAR; INTRAVENOUS
Status: DISPENSED
Start: 2020-04-16 | End: 2020-04-16

## 2020-04-16 RX ORDER — IPRATROPIUM BROMIDE AND ALBUTEROL SULFATE 2.5; .5 MG/3ML; MG/3ML
1 SOLUTION RESPIRATORY (INHALATION) EVERY 4 HOURS PRN
Status: DISCONTINUED | OUTPATIENT
Start: 2020-04-16 | End: 2020-04-20 | Stop reason: HOSPADM

## 2020-04-16 RX ORDER — GABAPENTIN 400 MG/1
400 CAPSULE ORAL 3 TIMES DAILY
Status: DISCONTINUED | OUTPATIENT
Start: 2020-04-16 | End: 2020-04-20 | Stop reason: HOSPADM

## 2020-04-16 RX ORDER — NICOTINE POLACRILEX 4 MG
15 LOZENGE BUCCAL PRN
Status: DISCONTINUED | OUTPATIENT
Start: 2020-04-16 | End: 2020-04-20 | Stop reason: HOSPADM

## 2020-04-16 RX ORDER — PANTOPRAZOLE SODIUM 40 MG/1
40 TABLET, DELAYED RELEASE ORAL
Status: DISCONTINUED | OUTPATIENT
Start: 2020-04-16 | End: 2020-04-16

## 2020-04-16 RX ORDER — SODIUM CHLORIDE 9 MG/ML
INJECTION, SOLUTION INTRAVENOUS CONTINUOUS
Status: DISCONTINUED | OUTPATIENT
Start: 2020-04-16 | End: 2020-04-17

## 2020-04-16 RX ORDER — ACETAMINOPHEN 325 MG/1
650 TABLET ORAL EVERY 6 HOURS PRN
Status: DISCONTINUED | OUTPATIENT
Start: 2020-04-16 | End: 2020-04-20 | Stop reason: HOSPADM

## 2020-04-16 RX ORDER — ONDANSETRON 2 MG/ML
4 INJECTION INTRAMUSCULAR; INTRAVENOUS EVERY 6 HOURS PRN
Status: DISCONTINUED | OUTPATIENT
Start: 2020-04-16 | End: 2020-04-20 | Stop reason: HOSPADM

## 2020-04-16 RX ORDER — SODIUM CHLORIDE 0.9 % (FLUSH) 0.9 %
10 SYRINGE (ML) INJECTION EVERY 12 HOURS SCHEDULED
Status: DISCONTINUED | OUTPATIENT
Start: 2020-04-16 | End: 2020-04-20 | Stop reason: HOSPADM

## 2020-04-16 RX ORDER — PANTOPRAZOLE SODIUM 40 MG/10ML
40 INJECTION, POWDER, LYOPHILIZED, FOR SOLUTION INTRAVENOUS DAILY
Status: DISCONTINUED | OUTPATIENT
Start: 2020-04-16 | End: 2020-04-20 | Stop reason: HOSPADM

## 2020-04-16 RX ORDER — PROMETHAZINE HYDROCHLORIDE 25 MG/1
12.5 TABLET ORAL EVERY 6 HOURS PRN
Status: DISCONTINUED | OUTPATIENT
Start: 2020-04-16 | End: 2020-04-20 | Stop reason: HOSPADM

## 2020-04-16 RX ORDER — FENTANYL CITRATE 50 UG/ML
100 INJECTION, SOLUTION INTRAMUSCULAR; INTRAVENOUS ONCE
Status: COMPLETED | OUTPATIENT
Start: 2020-04-16 | End: 2020-04-16

## 2020-04-16 RX ORDER — ATORVASTATIN CALCIUM 10 MG/1
10 TABLET, FILM COATED ORAL NIGHTLY
Status: DISCONTINUED | OUTPATIENT
Start: 2020-04-16 | End: 2020-04-20 | Stop reason: HOSPADM

## 2020-04-16 RX ORDER — FENTANYL CITRATE 50 UG/ML
50 INJECTION, SOLUTION INTRAMUSCULAR; INTRAVENOUS
Status: DISCONTINUED | OUTPATIENT
Start: 2020-04-16 | End: 2020-04-20 | Stop reason: HOSPADM

## 2020-04-16 RX ORDER — ALBUTEROL SULFATE 90 UG/1
2 AEROSOL, METERED RESPIRATORY (INHALATION)
Status: DISCONTINUED | OUTPATIENT
Start: 2020-04-16 | End: 2020-04-16

## 2020-04-16 RX ORDER — SUCCINYLCHOLINE CHLORIDE 20 MG/ML
INJECTION INTRAMUSCULAR; INTRAVENOUS
Status: DISCONTINUED
Start: 2020-04-16 | End: 2020-04-16 | Stop reason: WASHOUT

## 2020-04-16 RX ORDER — ETOMIDATE 2 MG/ML
30 INJECTION, SOLUTION INTRAVENOUS ONCE
Status: COMPLETED | OUTPATIENT
Start: 2020-04-16 | End: 2020-04-16

## 2020-04-16 RX ORDER — ACETAMINOPHEN 650 MG/1
650 SUPPOSITORY RECTAL EVERY 6 HOURS PRN
Status: DISCONTINUED | OUTPATIENT
Start: 2020-04-16 | End: 2020-04-20 | Stop reason: HOSPADM

## 2020-04-16 RX ORDER — ETOMIDATE 2 MG/ML
INJECTION, SOLUTION INTRAVENOUS
Status: DISPENSED
Start: 2020-04-16 | End: 2020-04-16

## 2020-04-16 RX ORDER — POLYETHYLENE GLYCOL 3350 17 G/17G
17 POWDER, FOR SOLUTION ORAL DAILY PRN
Status: DISCONTINUED | OUTPATIENT
Start: 2020-04-16 | End: 2020-04-20 | Stop reason: HOSPADM

## 2020-04-16 RX ORDER — IPRATROPIUM BROMIDE AND ALBUTEROL SULFATE 2.5; .5 MG/3ML; MG/3ML
1 SOLUTION RESPIRATORY (INHALATION)
Status: DISCONTINUED | OUTPATIENT
Start: 2020-04-16 | End: 2020-04-17

## 2020-04-16 RX ORDER — SODIUM CHLORIDE 0.9 % (FLUSH) 0.9 %
10 SYRINGE (ML) INJECTION PRN
Status: DISCONTINUED | OUTPATIENT
Start: 2020-04-16 | End: 2020-04-20 | Stop reason: HOSPADM

## 2020-04-16 RX ORDER — PROPOFOL 10 MG/ML
10 INJECTION, EMULSION INTRAVENOUS
Status: DISCONTINUED | OUTPATIENT
Start: 2020-04-16 | End: 2020-04-17 | Stop reason: ALTCHOICE

## 2020-04-16 RX ORDER — PROPAFENONE HYDROCHLORIDE 150 MG/1
150 TABLET, FILM COATED ORAL EVERY 8 HOURS SCHEDULED
Status: DISCONTINUED | OUTPATIENT
Start: 2020-04-16 | End: 2020-04-20 | Stop reason: HOSPADM

## 2020-04-16 RX ORDER — LEVOFLOXACIN 5 MG/ML
500 INJECTION, SOLUTION INTRAVENOUS ONCE
Status: COMPLETED | OUTPATIENT
Start: 2020-04-16 | End: 2020-04-16

## 2020-04-16 RX ORDER — DEXTROSE MONOHYDRATE 50 MG/ML
100 INJECTION, SOLUTION INTRAVENOUS PRN
Status: DISCONTINUED | OUTPATIENT
Start: 2020-04-16 | End: 2020-04-20 | Stop reason: HOSPADM

## 2020-04-16 RX ORDER — PROPOFOL 10 MG/ML
INJECTION, EMULSION INTRAVENOUS
Status: COMPLETED
Start: 2020-04-16 | End: 2020-04-16

## 2020-04-16 RX ORDER — PROPOFOL 10 MG/ML
50 INJECTION, EMULSION INTRAVENOUS ONCE
Status: COMPLETED | OUTPATIENT
Start: 2020-04-16 | End: 2020-04-16

## 2020-04-16 RX ADMIN — ATORVASTATIN CALCIUM 10 MG: 10 TABLET, FILM COATED ORAL at 21:11

## 2020-04-16 RX ADMIN — IPRATROPIUM BROMIDE AND ALBUTEROL SULFATE 1 AMPULE: .5; 3 SOLUTION RESPIRATORY (INHALATION) at 15:15

## 2020-04-16 RX ADMIN — SUCCINYLCHOLINE CHLORIDE 150 MG: 20 INJECTION INTRAMUSCULAR; INTRAVENOUS at 06:44

## 2020-04-16 RX ADMIN — PROPOFOL 1000 MG: 10 INJECTION, EMULSION INTRAVENOUS at 07:00

## 2020-04-16 RX ADMIN — GABAPENTIN 400 MG: 400 CAPSULE ORAL at 21:11

## 2020-04-16 RX ADMIN — SODIUM CHLORIDE: 9 INJECTION, SOLUTION INTRAVENOUS at 12:46

## 2020-04-16 RX ADMIN — IPRATROPIUM BROMIDE AND ALBUTEROL SULFATE 1 AMPULE: .5; 3 SOLUTION RESPIRATORY (INHALATION) at 11:52

## 2020-04-16 RX ADMIN — INSULIN LISPRO 3 UNITS: 100 INJECTION, SOLUTION INTRAVENOUS; SUBCUTANEOUS at 12:30

## 2020-04-16 RX ADMIN — IPRATROPIUM BROMIDE AND ALBUTEROL SULFATE 1 AMPULE: .5; 3 SOLUTION RESPIRATORY (INHALATION) at 18:52

## 2020-04-16 RX ADMIN — INSULIN LISPRO 3 UNITS: 100 INJECTION, SOLUTION INTRAVENOUS; SUBCUTANEOUS at 16:37

## 2020-04-16 RX ADMIN — FENTANYL CITRATE 100 MCG: 50 INJECTION, SOLUTION INTRAMUSCULAR; INTRAVENOUS at 07:15

## 2020-04-16 RX ADMIN — VANCOMYCIN HYDROCHLORIDE 1.5 G: 100 INJECTION, POWDER, LYOPHILIZED, FOR SOLUTION INTRAVENOUS at 14:06

## 2020-04-16 RX ADMIN — DILTIAZEM HYDROCHLORIDE 5 MG/HR: 5 INJECTION INTRAVENOUS at 04:20

## 2020-04-16 RX ADMIN — IPRATROPIUM BROMIDE AND ALBUTEROL SULFATE 1 AMPULE: .5; 3 SOLUTION RESPIRATORY (INHALATION) at 23:26

## 2020-04-16 RX ADMIN — GABAPENTIN 400 MG: 400 CAPSULE ORAL at 16:27

## 2020-04-16 RX ADMIN — APIXABAN 5 MG: 5 TABLET, FILM COATED ORAL at 21:11

## 2020-04-16 RX ADMIN — DILTIAZEM HYDROCHLORIDE 10 MG: 5 INJECTION INTRAVENOUS at 04:21

## 2020-04-16 RX ADMIN — PROPAFENONE HYDROCHLORIDE 150 MG: 150 TABLET, FILM COATED ORAL at 16:08

## 2020-04-16 RX ADMIN — PANTOPRAZOLE SODIUM 40 MG: 40 INJECTION, POWDER, FOR SOLUTION INTRAVENOUS at 12:45

## 2020-04-16 RX ADMIN — PIPERACILLIN AND TAZOBACTAM 3.38 G: 3; .375 INJECTION, POWDER, FOR SOLUTION INTRAVENOUS at 04:47

## 2020-04-16 RX ADMIN — APIXABAN 5 MG: 5 TABLET, FILM COATED ORAL at 16:26

## 2020-04-16 RX ADMIN — PROPOFOL 45 MCG/KG/MIN: 10 INJECTION, EMULSION INTRAVENOUS at 14:06

## 2020-04-16 RX ADMIN — ETOMIDATE 30 MG: 2 INJECTION INTRAVENOUS at 06:44

## 2020-04-16 RX ADMIN — PROPOFOL 40 MCG/KG/MIN: 10 INJECTION, EMULSION INTRAVENOUS at 21:51

## 2020-04-16 RX ADMIN — CEFEPIME 2 G: 2 INJECTION, POWDER, FOR SOLUTION INTRAVENOUS at 12:45

## 2020-04-16 RX ADMIN — PROPOFOL 45 MCG/KG/MIN: 10 INJECTION, EMULSION INTRAVENOUS at 18:08

## 2020-04-16 RX ADMIN — LEVOFLOXACIN 500 MG: 5 INJECTION, SOLUTION INTRAVENOUS at 05:27

## 2020-04-16 RX ADMIN — PROPOFOL 10 MCG/KG/MIN: 10 INJECTION, EMULSION INTRAVENOUS at 12:00

## 2020-04-16 ASSESSMENT — PULMONARY FUNCTION TESTS
PIF_VALUE: 29
PIF_VALUE: 32
PIF_VALUE: 33
PIF_VALUE: 31
PIF_VALUE: 33
PIF_VALUE: 35
PIF_VALUE: 32
PIF_VALUE: 32
PIF_VALUE: 30
PIF_VALUE: 33
PIF_VALUE: 33
PIF_VALUE: 31
PIF_VALUE: 33

## 2020-04-16 ASSESSMENT — PAIN SCALES - GENERAL: PAINLEVEL_OUTOF10: 10

## 2020-04-16 NOTE — CONSULTS
asymptomatic afib. He has a AZW4YM6-Owrb score of 4. Placed on eliquis for [facility]. Note recent admit for COPD exacerbation and was seen by my partner Dr. Marina Johnson 4/9/20 for PAF. Most recent ECHO 4/7/20 showed EF 55-60%; Mild MR, TR; Aortic valve sclerosis (no significant change from 10/15 study). Now presents with reported worsening SOB. Found to be hypoxic initially requiring 12L NC oxygen, then Bipap, and eventually intubated. Admit EKG revealed afib with RVR 138bpm; nonspecific ST change (no change from 4/7/20 EKG but 4/3/20 EKG NSR). Note CXR revealed worsening multi-focal PNA. Note BNP 5654 (1618 on 4/1/20); troponin 0.01, 0.03; BUN/Cr=29/1.1; K+ 5.1. Unable to give ROS. Diagnosis of recurrent atrial fibrillation with RVR in elderly male with hx PAF and current respiratory failure from multi-focal PNA which is likely exacerbating atrial arrhythmia. Recs:  1. Start anti-arrhythmic propafenone 150mg po TID to help maintain NSR as best possible. 2. Continue eliquis 5mg BID for AC given PAF and CHADs-vasc=4.  3. No need for cardiac testing at this time. Elevated BNP and Ari can be seen with infxn. Can be poorer prognostic sign if he ends up having Covid-19.   4. Follow telemetry.   5. Treat PNA and COPD as appropriate with vent mgt per ICU team.      Patient Active Problem List   Diagnosis    HTN (hypertension)    COPD, severe ([address]    DM2 (diabetes mellitus, type 2) ([address]    HLD (hyperlipidemia)    PAF (paroxysmal atrial fibrillation) ([address]    Chronic respiratory failure with hypoxia and hypercapnia 2.5 L home O2    Acute on chronic respiratory failure with hypercapnia (HCC)    COPD exacerbation (HCC)    Pneumonia, unspecified organism    CAD (coronary artery disease)    Former smoker    Acute hyperglycemia    Chronic normocytic anemia    Chronic thrombocytopenia    Falls at home    Ambulatory dysfunction    Generalized weakness    Acute on chronic respiratory failure with hypoxia and hypercapnia (HCC)    Pleural effusion    Rapid atrial fibrillation [facility])       Thank you for allowing to us to participate in the care or Cortney Sarabia. Further evaluation will be based upon the patient's clinical course and testing results.

## 2020-04-16 NOTE — ED PROVIDER NOTES
Emergency Department Attending Note    Taz Mcclendon MD    Date of ED VIsit: 4/16/2020    CHIEF COMPLAINT  Shortness of Breath      HISTORY OF PRESENT ILLNESS  Mayco Lafleur is a 78 y.o. male  With Vital signs of BP (!) 187/103   Pulse 136   Temp 97.3 °F (36.3 °C) (Skin)   Resp 29   Ht 5' 9\" (1.753 m)   Wt 200 lb (90.7 kg)   SpO2 93%   BMI 29.53 kg/m²  who presents to the ED with a complaint of shortness of breath. Patient seen and evaluated in room 3. Patient comes in by EMS with a complaint of shortness of breath. He denies any chest pain. Started last evening he has been fighting it all the night but it got significantly worse in the earlier hours of the morning that is what prompted him to call EMS. He denies any fevers or chills. No significant cough. Pressure or heaviness. He carries a history of paroxysmal A. fib and appears to be in A. fib here in the emergency department. Patient is on 3-1/2 L of nasal cannula oxygen at home for his COPD. He also carries a history of coronary artery disease hypertension diabetes previous respiratory failure. No other complaints, modifying factors or associated symptoms. On a revisit to the patient he states that he feels much better with the oxygen mask at 12 L. He feels much more relieved and is not breathing as different is harder. Spite that I asked him if he wants to be intubated if he gets sicker and he says that he really does not want to be intubated if he does not have to be but if we ultimately have to intubate him then we can.   Patient was COVID-19 negative during his previous hospitalization    Patients Past medical history reviewed and listed below  Past Medical History:   Diagnosis Date    Arthritis     CAD (coronary artery disease)     [facility]     Diabetes mellitus ([facility]     Hyperlipidemia     Hypertension     Influenza A 12/29/2017    Kidney calculi     MDRO (multiple drug resistant oximeter which is reading 100% while on simple mask at 12 L    [DL]   0428 5654   Brain Natriuretic Peptide(!):    Pro-BNP 5,654(!) [DL]   0428 Pro-BNP(!): 5,654 [DL]   0429 Patient's troponin was 0.01   Troponin:    Troponin 0.01 [DL]   0430 IMPRESSION:  Left mid and lower lung opacities are seen which have worsened since the  prior study. Findings could be related to pneumonia. Atypical/viral  pneumonia cannot be excluded.     Small right pleural effusion.     COPD. XR CHEST PORTABLE [DL]   0162 Patient's arterial blood   Blood gas, arterial(!!):    pH, Arterial 7.191(!!)   pCO2, Arterial 87.1(!!)   pO2, Arterial 134.4(!)   HCO3, Arterial 32.6(!)   Base Excess, Arterial 2.4   Hemoglobin, Art, Extended 11.2(!)   O2 Sat, Arterial 97.9   Carboxyhgb, Arterial 0.6   Methemoglobin, Arterial 0.3   TCO2 (calc), Art 35.3   O2 Content, Arterial 16   O2 Therapy Unknown [DL]   0514 BP(!): 187/103 [DL]   0515 Pulse: 136 [DL]   0515 SpO2: 100 % [DL]   0515 Temp: 97.3 °F (36.3 °C) [DL]   0544 I revisited the patient he looked better from his respiratory status despite his numbers. But despite that and that she is changes from venous blood gas to arterial blood gas indicates that he needs either BiPAP or intubation so I approached him again regarding intubation and he was not interested although he did say he would think about it so I am going to revisit him in a little bit after a trial run on BiPAP to see if he is willing to undergo intubation again. I think he has fears from when he was previously intubated during his previous hospitalization. [DL]   6601 I just went to see the patient again he still not on BiPAP and I think his plan was to try BiPAP to see if that improves things before he makes a decision about intubation. [DL]   [id number] Patient's settings for bilevel will be 15/5 with an FiO2 of 40% soon as backup applied    [DL]   0603 Patient finally on BiPAP and that seems to be tolerating it well.   He is

## 2020-04-16 NOTE — PROGRESS NOTES
Pharmacy Note  Vancomycin Consult    Cortney Sarabia is a 78 y.o. male started on Vancomycin for HCAP; consult received from Dr. Hayder Cook to manage therapy. Also receiving the following antibiotics: cefepime. Patient Active Problem List   Diagnosis    HTN (hypertension)    COPD, severe (HCC)    DM2 (diabetes mellitus, type 2) ([facility]    HLD (hyperlipidemia)    PAF (paroxysmal atrial fibrillation) ([facility])    Chronic respiratory failure with hypoxia and hypercapnia 2.5 L home O2    Acute on chronic respiratory failure with hypercapnia (HCC)    COPD exacerbation ([facility])    Pneumonia, unspecified organism    CAD (coronary artery disease)    Former smoker    Acute hyperglycemia    Chronic normocytic anemia    Chronic thrombocytopenia    Falls at home    Ambulatory dysfunction    Generalized weakness    Acute on chronic respiratory failure with hypoxia and hypercapnia ([facility])    Pleural effusion    Rapid atrial fibrillation ([facility])       Allergies:  Patient has no known allergies. Temp max:     Recent Labs     04/16/20  0348   BUN 29*       Recent Labs     04/16/20  0348   CREATININE 1.1       Recent Labs     04/16/20  0348   WBC 7.2         Intake/Output Summary (Last 24 hours) at 4/16/2020 1253  Last data filed at 4/16/2020 1130  Gross per 24 hour   Intake 56.92 ml   Output 495 ml   Net -438.08 ml       Culture Date      Source                       Results  NGTD    Ht Readings from Last 1 Encounters:   04/16/20 5' 9\" (1.753 m)        Wt Readings from Last 1 Encounters:   04/16/20 202 lb 9.6 oz (91.9 kg)         Body mass index is 29.92 kg/m². Estimated Creatinine Clearance: 59 mL/min (based on SCr of 1.1 mg/dL). Goal Trough Level: 15-18mcg/mL    Assessment/Plan:  Will initiate Vancomycin 1500mg q18h,. Trough to be on 4/18 at 1930. Thank you for the consult. Will continue to follow.   Marga Hernández Pharm D 2/80/152967:58 PM  .

## 2020-04-16 NOTE — PROGRESS NOTES
04/16/20 1853   Vent Information   $Ventilation $Initial Day   Skin Assessment Clean, dry, & intact   Vent Type 840   Vent Mode AC/VC   Vt Ordered 450 mL   Rate Set 16 bmp   Peak Flow 60 L/min   Pressure Support 0 cmH20   FiO2  50 %   SpO2 98 %   SpO2/FiO2 ratio 196   Sensitivity 2   PEEP/CPAP 5   I Time/ I Time % 0 s   Humidification Source Heated wire   Humidification Temp 36.9   Circuit Condensation Drained   Vent Patient Data   High Peep/I Pressure 0   Peak Inspiratory Pressure 33 cmH2O   Mean Airway Pressure 10 cmH20   Rate Measured 16 br/min   Vt Exhaled 482 mL   Minute Volume 7.71 Liters   I:E Ratio 1:3.60   Plateau Pressure 15 WTZ49   Static Compliance 48 mL/cmH2O   Dynamic Compliance 17 mL/cmH2O   Cough/Sputum   Sputum How Obtained Endotracheal;Suctioned   Cough Productive   Sputum Amount Moderate   Sputum Color Creamy   Tenacity Thick   Spontaneous Breathing Trial (SBT) RT Doc   Pulse 69   Breath Sounds   Right Upper Lobe Diminished   Right Middle Lobe Diminished   Right Lower Lobe Diminished   Left Upper Lobe Diminished   Left Lower Lobe Diminished   Additional Respiratory  Assessments   Resp 16   Position Semi-Sam's   Alarm Settings   High Pressure Alarm 50 cmH2O   Low Minute Volume Alarm 4.5 L/min   Apnea (secs) 20 secs   High Respiratory Rate 45 br/min   Patient Observation   Observations ETT SIZE 7.5, SECURED AT 25 LIP LINE.    WATER BAG GOOD.         04/16/20 1853   Vent Information   $Ventilation $Initial Day   Skin Assessment Clean, dry, & intact   Vent Type 840   Vent Mode AC/VC   Vt Ordered 450 mL   Rate Set 16 bmp   Peak Flow 60 L/min   Pressure Support 0 cmH20   FiO2  50 %   SpO2 98 %   SpO2/FiO2 ratio 196   Sensitivity 2   PEEP/CPAP 5   I Time/ I Time % 0 s   Humidification Source Heated wire   Humidification Temp 36.9   Circuit Condensation Drained   Vent Patient Data   High Peep/I Pressure 0   Peak Inspiratory Pressure 33 cmH2O   Mean Airway Pressure 10 cmH20   Rate Measured 16 br/min

## 2020-04-16 NOTE — PROGRESS NOTES
Spoke with Kamaljit Sullivan, regarding Cardizem gtt. Informed him gtt is off at this time. Per Dr. Arron Grande, d/c gtt and start Rythmol 150 mg tid. Order placed.  Nieves Lyons

## 2020-04-16 NOTE — PROGRESS NOTES
will be initiated for medication and therapeutic interventions upon initiation of aerosolized medication. 2. Physician will be contacted for respiratory rate (RR) greater than 35 breaths per minute. Therapy will be held for heart rate (HR) greater than 140 beats per minute, pending direction from physician. 3. Bronchodilators will be administered via Metered Dose Inhaler (MDI) with spacer when the following criteria are met:  a. Alert and cooperative     b. HR < 140 bpm  c. RR < 30 bpm                d. Can demonstrate a 2-3 second inspiratory hold  4. Bronchodilators will be administered via Hand Held Nebulizer [place] [facility]) to patients when ANY of the following criteria are met  a. Incognizant or uncooperative          b. Patients treated with HHN at Home        c. Unable to demonstrate proper use of MDI with spacer     d. RR > 30 bpm   5. Bronchodilators will be delivered via Metered Dose Inhaler (MDI), HHN, Aerogen to intubated patients on mechanical ventilation. 6. Inhalation medication orders will be delivered and/or substituted as outlined below. Aerosolized Medications Ordering and Administration Guidelines:    1. All Medications will be ordered by a physician, and their frequency and/or modality will be adjusted as defined by the patients Respiratory Severity Index (RSI) score. 2. If the patient does not have documented COPD, consider discontinuing anticholinergics when RSI is less than 9.  3. If the bronchospasm worsens (increased RSI), then the bronchodilator frequency can be increased to a maximum of every 4 hours. If greater than every 4 hours is required, the physician will be contacted. 4. If the bronchospasm improves, the frequency of the bronchodilator can be decreased, based on the patient's RSI, but not less than home treatment regimen frequency.   5. Bronchodilator(s) will be discontinued if patient has a RSI less than 9 and has received no scheduled or as needed treatment for 72

## 2020-04-16 NOTE — H&P
Hospital Medicine History & Physical      PCP: Jose Miguel Hinton MD    Date of Admission: 4/16/2020    Date of Service: Pt seen/examined on 4/16/2020     Chief Complaint:    Chief Complaint   Patient presents with    Shortness of Breath       History Of Present Illness: The patient is a 78 y.o. male with hypertension, hyperlipidemia, ELAINE on CPAP, DM, COPD, CAD, paroxysmal atrial fibrillation, and arthritis who presents to [facility] with c/o shortness of breath. He states he significantly worsened overnight. He called EMS this morning. Denies fevers, chills, or significant cough. He reports chest pressure and heaviness. He is on 3.5 L O2 chronically. He tested negative for COVID during previous admission. He was admitted to [facility] 4/1-4/9 for COPD exacerbation and pneumonia. Patient was hypoxic and in respiratory distress in the ER, in rapid A. Fib. BiPAP attempted, unsuccessful, patient subsequently intubated, and admitted to ICU. BNP 5,654. Troponin 0.03. ABG pH7.1, pCO2 87.1. Patient intubated in the ED. CXR with worsening multifocal pneumonia. Initially in rapid A. Fib, heart rate controlled after being placed on Cardizem drip. Admitted to ICU. Pulmonology consulted. Patient seen, intubated, sedated, on mechanical ventilation. Droplet plus precautions. Rule out COVID.   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    ELAINE on CPAP     Osteoporosis     Skin cancer of face        Past Surgical History:        Procedure Laterality Date    BACK SURGERY      repair broken back L4 & L5    CYSTOSCOPY Right 10/9/15    RIght Ureteroscopy, 144   K 5.1      CO2 32   BUN 29*   CREATININE 1.1     LIVER PROFILE:   Recent Labs     04/16/20  0348   AST 27   ALT 22   BILITOT 0.5   ALKPHOS 59       CARDIAC ENZYMES  Recent Labs     04/16/20  0348   TROPONINI 0.01       U/A:    Lab Results   Component Value Date    COLORU Yellow 04/02/2020    WBCUA 3-5 04/02/2020    RBCUA 3-4 04/02/2020    MUCUS 2+ 04/02/2020    BACTERIA Rare 04/02/2020    CLARITYU Clear 04/02/2020    SPECGRAV >=1.030 04/02/2020    LEUKOCYTESUR Negative 04/02/2020    BLOODU Negative 04/02/2020    GLUCOSEU Negative 04/02/2020    AMORPHOUS 2+ 04/02/2020       ABG    Lab Results   Component Value Date    LPT5GPK 28.5 04/16/2020    BEART 2.2 04/16/2020    T0XQDDMH 95.4 04/16/2020    PHART 7.347 04/16/2020    SMA4VVQ 53.1 04/16/2020    PO2ART 81.9 04/16/2020    WGO2ZGG 30.1 04/16/2020       CULTURES  Rapid influenza- pending  Sputum- pending  Blood- pending  COVID- pending    EKG:  I have reviewed the EKG with the following interpretation:   Atrial fibrillation with rapid ventricular response, Nonspecific ST abnormality    XR CHEST PORTABLE   Final Result   1. Satisfactory ETT placement. 2. Worsening multifocal pneumonia. XR CHEST PORTABLE   Preliminary Result   Left mid and lower lung opacities are seen which have worsened since the   prior study. Findings could be related to pneumonia. Atypical/viral   pneumonia cannot be excluded. Small right pleural effusion. COPD. XR CHEST PORTABLE    (Results Pending)     Echo 4/7/2020   Summary   Left ventricular systolic function is normal with ejection fraction   estimated at 55-60 %. No regional wall motion abnormalities are noted. The left ventricular size and wall thickness were not well visualized for   measurement. Normal left ventricular diastolic filling pressure. The right atrium is mildly dilated. Mild mitral regurgitation. Aortic valve sclerosis without aortic stenosis.    Mild tricuspid regurgitation. Normal systolic pulmonary artery pressure (SPAP) estimated at 36 mmHg (RA   pressure 8 mmHg). Active Problems:    Acute on chronic respiratory failure with hypoxia and hypercapnia (HCC)  Resolved Problems:    * No resolved hospital problems. *        ASSESSMENT/PLAN:  # Acute on chronic hypoxic and hypercarbic respiratory failure.  Secondary to acute # COPD exacerbation and HCAP  - rule out COVID  -  Intubated in ED. Remains on mechanical ventilation.   -Seen by pulmonary critical care    # HCAP, gram negative organism risk for MRSA  # Rule out COVID, also has COPD  - Vanc, Cefepime D#1. # Atrial fibrillation, RVR  # Paroxysmal Atrial fibrillation  - Cardiology consulted  - monitor on tele  - Diltiazem drip  - Eliquis for AC. # Elevated troponin  - possibly related to demand ischemia, A-fib RVR  - 0.03.  - seen by cardiology  - monitor on telemetry. # Lactic acidosis  - due to above  - IVFs, antibiotics. Trend lactic. #  Hypertension  - on Diltiazem drip  - Labetalol prn. # Type 2 diabetes. - Use Sliding scale insulin    # Hyperlipidemia  - on Atorvastatin.        DVT Prophylaxis: Eliquis  Diet: Diet NPO Effective Now Exceptions are: Ice Chips, Sips with Meds  Code Status: Full Code        Karon Hooper MD   4/16/2020

## 2020-04-17 ENCOUNTER — APPOINTMENT (OUTPATIENT)
Dept: GENERAL RADIOLOGY | Age: 80
DRG: 208 | End: 2020-04-17
Payer: MEDICARE

## 2020-04-17 LAB
A/G RATIO: 1.2 (ref 1.1–2.2)
ALBUMIN SERPL-MCNC: 3.2 G/DL (ref 3.4–5)
ALP BLD-CCNC: 43 U/L (ref 40–129)
ALT SERPL-CCNC: 19 U/L (ref 10–40)
ANION GAP SERPL CALCULATED.3IONS-SCNC: 8 MMOL/L (ref 3–16)
ANISOCYTOSIS: ABNORMAL
AST SERPL-CCNC: 17 U/L (ref 15–37)
BASE EXCESS ARTERIAL: 7.7 MMOL/L (ref -3–3)
BASE EXCESS ARTERIAL: 8.1 MMOL/L (ref -3–3)
BASOPHILS ABSOLUTE: 0 K/UL (ref 0–0.2)
BASOPHILS RELATIVE PERCENT: 0.5 %
BILIRUB SERPL-MCNC: 0.5 MG/DL (ref 0–1)
BUN BLDV-MCNC: 23 MG/DL (ref 7–20)
CALCIUM SERPL-MCNC: 8.2 MG/DL (ref 8.3–10.6)
CARBOXYHEMOGLOBIN ARTERIAL: 0.3 % (ref 0–1.5)
CARBOXYHEMOGLOBIN ARTERIAL: 0.3 % (ref 0–1.5)
CHLORIDE BLD-SCNC: 98 MMOL/L (ref 99–110)
CO2: 32 MMOL/L (ref 21–32)
CREAT SERPL-MCNC: 1 MG/DL (ref 0.8–1.3)
EKG ATRIAL RATE: 76 BPM
EKG DIAGNOSIS: NORMAL
EKG P AXIS: 89 DEGREES
EKG P-R INTERVAL: 176 MS
EKG Q-T INTERVAL: 374 MS
EKG QRS DURATION: 64 MS
EKG QTC CALCULATION (BAZETT): 420 MS
EKG R AXIS: 59 DEGREES
EKG T AXIS: 79 DEGREES
EKG VENTRICULAR RATE: 76 BPM
EOSINOPHILS ABSOLUTE: 0 K/UL (ref 0–0.6)
EOSINOPHILS RELATIVE PERCENT: 0.8 %
ESTIMATED AVERAGE GLUCOSE: 142.7 MG/DL
GFR AFRICAN AMERICAN: >60
GFR NON-AFRICAN AMERICAN: >60
GLOBULIN: 2.6 G/DL
GLUCOSE BLD-MCNC: 152 MG/DL (ref 70–99)
GLUCOSE BLD-MCNC: 169 MG/DL (ref 70–99)
GLUCOSE BLD-MCNC: 172 MG/DL (ref 70–99)
GLUCOSE BLD-MCNC: 182 MG/DL (ref 70–99)
GLUCOSE BLD-MCNC: 216 MG/DL (ref 70–99)
GLUCOSE BLD-MCNC: 272 MG/DL (ref 70–99)
HBA1C MFR BLD: 6.6 %
HCO3 ARTERIAL: 33.7 MMOL/L (ref 21–29)
HCO3 ARTERIAL: 34.3 MMOL/L (ref 21–29)
HCT VFR BLD CALC: 26.2 % (ref 40.5–52.5)
HEMOGLOBIN, ART, EXTENDED: 10.2 G/DL (ref 13.5–17.5)
HEMOGLOBIN, ART, EXTENDED: 8.9 G/DL (ref 13.5–17.5)
HEMOGLOBIN: 8.3 G/DL (ref 13.5–17.5)
HYPOCHROMIA: ABNORMAL
LYMPHOCYTES ABSOLUTE: 1.1 K/UL (ref 1–5.1)
LYMPHOCYTES RELATIVE PERCENT: 31.2 %
MAGNESIUM: 1.5 MG/DL (ref 1.8–2.4)
MCH RBC QN AUTO: 26 PG (ref 26–34)
MCHC RBC AUTO-ENTMCNC: 31.8 G/DL (ref 31–36)
MCV RBC AUTO: 81.9 FL (ref 80–100)
METHEMOGLOBIN ARTERIAL: 0.3 %
METHEMOGLOBIN ARTERIAL: 0.3 %
MONOCYTES ABSOLUTE: 0.3 K/UL (ref 0–1.3)
MONOCYTES RELATIVE PERCENT: 7.9 %
NEUTROPHILS ABSOLUTE: 2.1 K/UL (ref 1.7–7.7)
NEUTROPHILS RELATIVE PERCENT: 59.6 %
O2 CONTENT ARTERIAL: 12 ML/DL
O2 CONTENT ARTERIAL: 14 ML/DL
O2 SAT, ARTERIAL: 95.3 %
O2 SAT, ARTERIAL: 97 %
O2 THERAPY: ABNORMAL
O2 THERAPY: ABNORMAL
PCO2 ARTERIAL: 56 MMHG (ref 35–45)
PCO2 ARTERIAL: 57.2 MMHG (ref 35–45)
PDW BLD-RTO: 17.2 % (ref 12.4–15.4)
PERFORMED ON: ABNORMAL
PH ARTERIAL: 7.4 (ref 7.35–7.45)
PH ARTERIAL: 7.4 (ref 7.35–7.45)
PHOSPHORUS: 2.6 MG/DL (ref 2.5–4.9)
PLATELET # BLD: 87 K/UL (ref 135–450)
PLATELET SLIDE REVIEW: ABNORMAL
PMV BLD AUTO: 8.4 FL (ref 5–10.5)
PO2 ARTERIAL: 78.4 MMHG (ref 75–108)
PO2 ARTERIAL: 94 MMHG (ref 75–108)
POTASSIUM REFLEX MAGNESIUM: 4.1 MMOL/L (ref 3.5–5.1)
POTASSIUM SERPL-SCNC: 4.1 MMOL/L (ref 3.5–5.1)
RBC # BLD: 3.2 M/UL (ref 4.2–5.9)
SARS-COV-2, PCR: NOT DETECTED
SODIUM BLD-SCNC: 138 MMOL/L (ref 136–145)
TCO2 ARTERIAL: 35.4 MMOL/L
TCO2 ARTERIAL: 36.1 MMOL/L
TOTAL PROTEIN: 5.8 G/DL (ref 6.4–8.2)
TROPONIN: 0.02 NG/ML
WBC # BLD: 3.6 K/UL (ref 4–11)

## 2020-04-17 PROCEDURE — 99232 SBSQ HOSP IP/OBS MODERATE 35: CPT | Performed by: INTERNAL MEDICINE

## 2020-04-17 PROCEDURE — 93010 ELECTROCARDIOGRAM REPORT: CPT | Performed by: INTERNAL MEDICINE

## 2020-04-17 PROCEDURE — 99291 CRITICAL CARE FIRST HOUR: CPT | Performed by: INTERNAL MEDICINE

## 2020-04-17 PROCEDURE — 94750 HC PULMONARY COMPLIANCE STUDY: CPT

## 2020-04-17 PROCEDURE — 94003 VENT MGMT INPAT SUBQ DAY: CPT

## 2020-04-17 PROCEDURE — 36415 COLL VENOUS BLD VENIPUNCTURE: CPT

## 2020-04-17 PROCEDURE — 94640 AIRWAY INHALATION TREATMENT: CPT

## 2020-04-17 PROCEDURE — 6370000000 HC RX 637 (ALT 250 FOR IP): Performed by: INTERNAL MEDICINE

## 2020-04-17 PROCEDURE — 6360000002 HC RX W HCPCS: Performed by: INTERNAL MEDICINE

## 2020-04-17 PROCEDURE — 2580000003 HC RX 258: Performed by: INTERNAL MEDICINE

## 2020-04-17 PROCEDURE — 83735 ASSAY OF MAGNESIUM: CPT

## 2020-04-17 PROCEDURE — 82803 BLOOD GASES ANY COMBINATION: CPT

## 2020-04-17 PROCEDURE — C9113 INJ PANTOPRAZOLE SODIUM, VIA: HCPCS | Performed by: INTERNAL MEDICINE

## 2020-04-17 PROCEDURE — 36600 WITHDRAWAL OF ARTERIAL BLOOD: CPT

## 2020-04-17 PROCEDURE — 85025 COMPLETE CBC W/AUTO DIFF WBC: CPT

## 2020-04-17 PROCEDURE — 80053 COMPREHEN METABOLIC PANEL: CPT

## 2020-04-17 PROCEDURE — 2000000000 HC ICU R&B

## 2020-04-17 PROCEDURE — 84100 ASSAY OF PHOSPHORUS: CPT

## 2020-04-17 PROCEDURE — 93005 ELECTROCARDIOGRAM TRACING: CPT | Performed by: INTERNAL MEDICINE

## 2020-04-17 PROCEDURE — 2500000003 HC RX 250 WO HCPCS: Performed by: INTERNAL MEDICINE

## 2020-04-17 PROCEDURE — 71045 X-RAY EXAM CHEST 1 VIEW: CPT

## 2020-04-17 PROCEDURE — 94761 N-INVAS EAR/PLS OXIMETRY MLT: CPT

## 2020-04-17 PROCEDURE — 99232 SBSQ HOSP IP/OBS MODERATE 35: CPT | Performed by: NURSE PRACTITIONER

## 2020-04-17 PROCEDURE — 84484 ASSAY OF TROPONIN QUANT: CPT

## 2020-04-17 PROCEDURE — 2700000000 HC OXYGEN THERAPY PER DAY

## 2020-04-17 RX ORDER — FENTANYL CITRATE 50 UG/ML
INJECTION, SOLUTION INTRAMUSCULAR; INTRAVENOUS
Status: DISCONTINUED
Start: 2020-04-17 | End: 2020-04-17 | Stop reason: ALTCHOICE

## 2020-04-17 RX ORDER — MAGNESIUM SULFATE IN WATER 40 MG/ML
2 INJECTION, SOLUTION INTRAVENOUS ONCE
Status: COMPLETED | OUTPATIENT
Start: 2020-04-17 | End: 2020-04-17

## 2020-04-17 RX ORDER — METHYLPREDNISOLONE SODIUM SUCCINATE 40 MG/ML
40 INJECTION, POWDER, LYOPHILIZED, FOR SOLUTION INTRAMUSCULAR; INTRAVENOUS EVERY 12 HOURS
Status: DISCONTINUED | OUTPATIENT
Start: 2020-04-17 | End: 2020-04-18

## 2020-04-17 RX ORDER — IPRATROPIUM BROMIDE AND ALBUTEROL SULFATE 2.5; .5 MG/3ML; MG/3ML
1 SOLUTION RESPIRATORY (INHALATION) 2 TIMES DAILY
Status: DISCONTINUED | OUTPATIENT
Start: 2020-04-17 | End: 2020-04-20 | Stop reason: HOSPADM

## 2020-04-17 RX ORDER — FUROSEMIDE 10 MG/ML
40 INJECTION INTRAMUSCULAR; INTRAVENOUS DAILY
Status: DISCONTINUED | OUTPATIENT
Start: 2020-04-17 | End: 2020-04-19

## 2020-04-17 RX ADMIN — GABAPENTIN 400 MG: 400 CAPSULE ORAL at 08:51

## 2020-04-17 RX ADMIN — GABAPENTIN 400 MG: 400 CAPSULE ORAL at 20:46

## 2020-04-17 RX ADMIN — MIDAZOLAM HYDROCHLORIDE 2 MG: 1 INJECTION, SOLUTION INTRAMUSCULAR; INTRAVENOUS at 01:02

## 2020-04-17 RX ADMIN — MIDAZOLAM HYDROCHLORIDE 2 MG: 1 INJECTION, SOLUTION INTRAMUSCULAR; INTRAVENOUS at 02:20

## 2020-04-17 RX ADMIN — PROPAFENONE HYDROCHLORIDE 150 MG: 150 TABLET, FILM COATED ORAL at 14:52

## 2020-04-17 RX ADMIN — IPRATROPIUM BROMIDE AND ALBUTEROL SULFATE 1 AMPULE: .5; 3 SOLUTION RESPIRATORY (INHALATION) at 07:21

## 2020-04-17 RX ADMIN — PANTOPRAZOLE SODIUM 40 MG: 40 INJECTION, POWDER, FOR SOLUTION INTRAVENOUS at 08:50

## 2020-04-17 RX ADMIN — APIXABAN 5 MG: 5 TABLET, FILM COATED ORAL at 08:50

## 2020-04-17 RX ADMIN — PROPAFENONE HYDROCHLORIDE 150 MG: 150 TABLET, FILM COATED ORAL at 20:46

## 2020-04-17 RX ADMIN — IPRATROPIUM BROMIDE AND ALBUTEROL SULFATE 1 AMPULE: .5; 3 SOLUTION RESPIRATORY (INHALATION) at 11:52

## 2020-04-17 RX ADMIN — Medication 10 ML: at 08:50

## 2020-04-17 RX ADMIN — MUPIROCIN: 20 OINTMENT TOPICAL at 20:47

## 2020-04-17 RX ADMIN — INSULIN LISPRO 6 UNITS: 100 INJECTION, SOLUTION INTRAVENOUS; SUBCUTANEOUS at 17:21

## 2020-04-17 RX ADMIN — MUPIROCIN: 20 OINTMENT TOPICAL at 09:15

## 2020-04-17 RX ADMIN — APIXABAN 5 MG: 5 TABLET, FILM COATED ORAL at 20:46

## 2020-04-17 RX ADMIN — PROPOFOL 40 MCG/KG/MIN: 10 INJECTION, EMULSION INTRAVENOUS at 06:57

## 2020-04-17 RX ADMIN — INSULIN LISPRO 3 UNITS: 100 INJECTION, SOLUTION INTRAVENOUS; SUBCUTANEOUS at 12:17

## 2020-04-17 RX ADMIN — IPRATROPIUM BROMIDE AND ALBUTEROL SULFATE 1 AMPULE: .5; 3 SOLUTION RESPIRATORY (INHALATION) at 19:36

## 2020-04-17 RX ADMIN — ATORVASTATIN CALCIUM 10 MG: 10 TABLET, FILM COATED ORAL at 20:46

## 2020-04-17 RX ADMIN — FUROSEMIDE 40 MG: 10 INJECTION, SOLUTION INTRAMUSCULAR; INTRAVENOUS at 11:06

## 2020-04-17 RX ADMIN — METHYLPREDNISOLONE SODIUM SUCCINATE 40 MG: 40 INJECTION, POWDER, FOR SOLUTION INTRAMUSCULAR; INTRAVENOUS at 23:59

## 2020-04-17 RX ADMIN — CEFEPIME 2 G: 2 INJECTION, POWDER, FOR SOLUTION INTRAVENOUS at 23:59

## 2020-04-17 RX ADMIN — MAGNESIUM SULFATE HEPTAHYDRATE 2 G: 40 INJECTION, SOLUTION INTRAVENOUS at 11:07

## 2020-04-17 RX ADMIN — LABETALOL HYDROCHLORIDE 10 MG: 5 INJECTION, SOLUTION INTRAVENOUS at 16:05

## 2020-04-17 RX ADMIN — PROPOFOL 40 MCG/KG/MIN: 10 INJECTION, EMULSION INTRAVENOUS at 03:11

## 2020-04-17 RX ADMIN — INSULIN LISPRO 3 UNITS: 100 INJECTION, SOLUTION INTRAVENOUS; SUBCUTANEOUS at 09:01

## 2020-04-17 RX ADMIN — GABAPENTIN 400 MG: 400 CAPSULE ORAL at 14:52

## 2020-04-17 RX ADMIN — CEFEPIME 2 G: 2 INJECTION, POWDER, FOR SOLUTION INTRAVENOUS at 13:21

## 2020-04-17 RX ADMIN — METHYLPREDNISOLONE SODIUM SUCCINATE 40 MG: 40 INJECTION, POWDER, FOR SOLUTION INTRAMUSCULAR; INTRAVENOUS at 11:06

## 2020-04-17 RX ADMIN — VANCOMYCIN HYDROCHLORIDE 1.5 G: 100 INJECTION, POWDER, LYOPHILIZED, FOR SOLUTION INTRAVENOUS at 08:50

## 2020-04-17 RX ADMIN — FENTANYL CITRATE 50 MCG: 50 INJECTION, SOLUTION INTRAMUSCULAR; INTRAVENOUS at 05:06

## 2020-04-17 RX ADMIN — Medication 10 ML: at 20:46

## 2020-04-17 RX ADMIN — SODIUM CHLORIDE: 9 INJECTION, SOLUTION INTRAVENOUS at 01:05

## 2020-04-17 RX ADMIN — CEFEPIME 2 G: 2 INJECTION, POWDER, FOR SOLUTION INTRAVENOUS at 00:12

## 2020-04-17 ASSESSMENT — PULMONARY FUNCTION TESTS
PIF_VALUE: 28
PIF_VALUE: 34
PIF_VALUE: 29
PIF_VALUE: 30
PIF_VALUE: 27
PIF_VALUE: 32
PIF_VALUE: 11
PIF_VALUE: 30
PIF_VALUE: 29
PIF_VALUE: 11
PIF_VALUE: 33
PIF_VALUE: 33
PIF_VALUE: 27
PIF_VALUE: 30
PIF_VALUE: 28

## 2020-04-17 ASSESSMENT — PAIN SCALES - GENERAL
PAINLEVEL_OUTOF10: 0

## 2020-04-17 ASSESSMENT — ENCOUNTER SYMPTOMS: SHORTNESS OF BREATH: 0

## 2020-04-17 NOTE — DISCHARGE INSTR - COC
Continuity of Care Form    Patient Name: Edilson Garzon   :  1940  MRN:  [id number]    Admit date:  2020  Discharge date:  2020    Code Status Order: Full Code   Advance Directives:   Advance Care Flowsheet Documentation     Date/Time Healthcare Directive Type of Healthcare Directive Copy in [address] Agent's Name Healthcare Agent's Phone Number    20 1036  No, patient does not have an advance directive for healthcare treatment -- -- -- -- --    20 1015  No, patient does not have an advance directive for healthcare treatment -- -- -- -- --          Admitting Physician:  Joel Marino MD  PCP: Sandra Pham MD    Discharging Nurse: [facility] Unit/Room#: 7662/6536-88  [address] [facility] Unit Phone Number: [phone]    Emergency Contact:   Extended Emergency Contact Information  Primary Emergency Contact: [facility]  Address: [address] [place] [address] Phone: [phone]  Mobile Phone: [phone]  Relation: Spouse  Secondary Emergency Contact: [address] [place] [address] Phone: [phone]  Relation: Child    Past Surgical History:  Past Surgical History:   Procedure Laterality Date    BACK SURGERY      repair broken back L4 & L5    CYSTOSCOPY Right 10/9/15    RIght Ureteroscopy, Holmium Laser Lithotripsy with Stone Removal, Right Stent Placement    CYSTOSCOPY  2019    CYSTOSCOPY, RESECTION OF BLADDER NECK, URETHRAL DILATION    CYSTOSCOPY W BIOPSY OF BLADDER N/A 2019    CYSTOSCOPY, RESECTION OF BLADDER NECK, URETHRAL DILATION performed by Yannick Santana MD at [address] [facility] [address] Left 2016    cataract removal    JOINT REPLACEMENT  2011    right knee    JOINT REPLACEMENT  2004    left knee    OTHER SURGICAL HISTORY  2015    wide excision basal cell carcinoma on the nose and bilateral auricles with frozen sections, plastic closure, full thickness skin graft    OTHER SURGICAL HISTORY  12/1/15    excision of lesion of lip    PROSTATE SURGERY      TURP  10/23/2015    with cystoscopy       Immunization History:   Immunization History   Administered Date(s) Administered    Influenza Vaccine, unspecified formulation 11/18/2009, 10/14/2010, 11/08/2011, 09/21/2012, 10/18/2013, 10/07/2016    Influenza Virus Vaccine 10/12/2015, 10/07/2016, 10/18/2017    Influenza Whole 10/01/2010    Influenza, High Dose (Fluzone 65 yrs and older) 09/18/2018, 09/13/2019    Influenza, Celeste Cutter, IM, PF (6 mo and older Fluzone, Flulaval, Fluarix, and 3 yrs and older Afluria) 11/01/2017, 08/28/2018    Pneumococcal Conjugate 13-valent ([id number]) 08/02/2017, 02/14/2018    Pneumococcal Conjugate 7-valent (Prevnar7) 10/01/2006    Pneumococcal Polysaccharide ([id number]) 10/24/2015, 10/07/2016    Td vaccine (adult) 09/21/2012       Active Problems:  Patient Active Problem List   Diagnosis Code    HTN (hypertension) I10    COPD, severe ([facility] J44.9    DM2 (diabetes mellitus, type 2) ([facility] E11.9    HLD (hyperlipidemia) E78.5    PAF (paroxysmal atrial fibrillation) ([facility]) I48.0    Chronic respiratory failure with hypoxia and hypercapnia 2.5 L home O2 J96.11, J96.12    Acute on chronic respiratory failure with hypercapnia ([facility]) J96.22    COPD exacerbation ([facility]) J44.1    Pneumonia, unspecified organism J18.9    CAD (coronary artery disease) I25.10    Former smoker Z87.891    Acute hyperglycemia R73.9    Chronic normocytic anemia D64.9    Chronic thrombocytopenia D69.6    Falls at home W19. Macdonald Patrick, Y92.009    Ambulatory dysfunction R26.2    Generalized weakness R53.1    Acute on chronic respiratory failure with hypoxia and hypercapnia ([facility]) J96.21, J96.22    Pleural effusion J90    Rapid atrial fibrillation ([facility]) I48.91    Atrial fibrillation with RVR ([facility]) I48.91    Acute respiratory failure with hypoxia and hypercapnia ([facility]) J96.01, J96.02    Pneumonia due to organism J18.9    COPD with acute exacerbation ([facility] J44.1    Acute encephalopathy G93.40    Hyperglycemia R73.9    HCAP (healthcare-associated pneumonia) J18.9       Isolation/Infection:   Isolation          No Isolation        Patient Infection Status     Infection Onset Added Last Indicated Last Indicated By Review Planned Expiration Resolved Resolved By    None active    Resolved    COVID-19 Rule Out 04/16/20 04/16/20 04/16/20 COVID-19 (Ordered)   04/17/20 Rule-Out Test Resulted    COVID-19 Rule Out 04/01/20 04/01/20 04/01/20 Emergent Disease Panel (Ordered)   04/03/20 Pasha Gonzales RN    MDRO (multi-drug resistant organism)  03/13/17 03/13/17 Tresa Loo RN   01/01/18 Tresa Loo RN          Nurse Assessment:  Last Vital Signs: BP (!) 158/88   Pulse 101   Temp 98.9 °F (37.2 °C) (Oral)   Resp 25   Ht 5' 9\" (1.753 m)   Wt 202 lb 6.4 oz (91.8 kg)   SpO2 94%   BMI 29.89 kg/m²     Last documented pain score (0-10 scale): Pain Level: 0  Last Weight:   Wt Readings from Last 1 Encounters:   04/17/20 202 lb 6.4 oz (91.8 kg)     Mental Status:  oriented and alert    IV Access:  - None    Nursing Mobility/ADLs:  Walking   Assisted  Transfer  Assisted  Bathing  Assisted  Dressing  Assisted  Toileting  Assisted  Feeding  Assisted  Med Admin  Independent  Med Delivery   Whole    Wound Care Documentation and Therapy:        Elimination:  Continence:   · Bowel: Yes  · Bladder: Yes  Urinary Catheter: None   Colostomy/Ileostomy/Ileal Conduit: No       Date of Last BM: 4/19/20    Intake/Output Summary (Last 24 hours) at 4/17/2020 1644  Last data filed at 4/17/2020 1332  Gross per 24 hour   Intake 1640.02 ml   Output 5500 ml   Net -3859.98 ml     I/O last 3 completed shifts: In: 1882.5 [I.V.:1782.5; IV Piggyback:100]  Out: 9760 [Urine:5600]    Safety Concerns:      At Risk for Falls    Impairments/Disabilities:      None    Nutrition Therapy:  Current Nutrition Therapy:   - Oral Diet:  Carb Control 4 carbs/meal (1800kcals/day)    Routes of Feeding: Oral  Liquids: Thin Liquids  Daily Fluid Restriction: no  Last Modified Barium Swallow with Video (Video Swallowing Test): not done    Treatments at the Time of Hospital Discharge:   Respiratory Treatments: see MAR  Oxygen Therapy:  is on oxygen at 3 L/min per nasal cannula. Ventilator:    - No ventilator support    Rehab Therapies: Physical Therapy  Weight Bearing Status/Restrictions: No weight bearing restirctions  Other Medical Equipment (for information only, NOT a DME order):  walker  Other Treatments: ***    Patient's personal belongings (please select all that are sent with patient):  {[facility] DME Belongings:[id number]}    RN SIGNATURE:  Electronically signed by Dale Pathak RN on 4/20/20 at 11:55 AM EDT    CASE MANAGEMENT/SOCIAL WORK SECTION    Inpatient Status Date: ***    Readmission Risk Assessment Score:  Readmission Risk              Risk of Unplanned Readmission:        26           Discharging to Facility/ Agency   · Name: [facility]  · Address:  · Phone:[phone]  · Fax:[phone]    Dialysis Facility (if applicable)   · Name:  · Address:  · Dialysis Schedule:  · Phone:  · Fax:    / signature: Electronically signed by Compa Gomez RN on 4/20/20 at 11:14 AM EDT    PHYSICIAN SECTION    Prognosis: Good    Condition at Discharge: Stable    Rehab Potential (if transferring to Rehab):     Recommended Labs or Other Treatments After Discharge:     Physician Certification: I certify the above information and transfer of Jacy Yarbrough  is necessary for the continuing treatment of the diagnosis listed and that he requires [address] for less 30 days.      Update Admission H&P: No change in H&P    PHYSICIAN SIGNATURE:  MABEL Altamirano MD/Electronically signed by Compa Gomez RN on 4/20/20 at 11:15 AM EDT

## 2020-04-17 NOTE — PROGRESS NOTES
04/17/20 0249   Vent Information   Vent Type 840   Vent Mode AC/VC   Vt Ordered 450 mL   Rate Set 16 bmp   Peak Flow 60 L/min   Pressure Support 0 cmH20   FiO2  50 %   SpO2 96 %   SpO2/FiO2 ratio 192   Sensitivity 2   PEEP/CPAP 5   I Time/ I Time % 0 s   Humidification Source Heated wire   Humidification Temp 37   Vent Patient Data   High Peep/I Pressure 0   Peak Inspiratory Pressure 30 cmH2O   Mean Airway Pressure 9.9 cmH20   Rate Measured 17 br/min   Vt Exhaled 495 mL   Minute Volume 7.92 Liters   I:E Ratio 1:3.50   Cough/Sputum   Sputum How Obtained Endotracheal;Suctioned   Cough Productive   Sputum Amount Moderate   Sputum Color Creamy   Tenacity Thick   Spontaneous Breathing Trial (SBT) RT Doc   Pulse 86   Breath Sounds   Right Upper Lobe Diminished   Right Middle Lobe Diminished   Right Lower Lobe Diminished   Left Upper Lobe Diminished   Left Lower Lobe Diminished   Additional Respiratory  Assessments   Resp 17   Position Semi-Sam's   Alarm Settings   High Pressure Alarm 50 cmH2O   Low Minute Volume Alarm 4.5 L/min   Apnea (secs) 20 secs   High Respiratory Rate 45 br/min   Patient Observation   Observations ETT SIZE 7.5, SECURED AT 25 LIP LINE. AMBU BAG ON VENT.  WATER BAG GOOD.

## 2020-04-17 NOTE — PROGRESS NOTES
Pulmonary & Critical Care Medicine ICU Progress Note    CC: Respiratory failure    Events of Last 24 hours:   FiO2 40%  PEEP 5  Propofol 40 mcg/kg/min   NS 75 cc/hr     Invasive Lines: IV: PIVs     MV:     Vent Mode: AC/VC Rate Set: 16 bmp/Vt Ordered: 450 mL/ /FiO2 : 40 %  Recent Labs     20  0800 20  0545   PHART 7.347* 7.397   ROP6VPO 53.1* 56.0*   PO2ART 81.9 94.0       IV:   dextrose      sodium chloride 75 mL/hr at 20 0105    propofol 40 mcg/kg/min (20 0657)       Vitals:  Blood pressure (!) 145/71, pulse 109, temperature 98.3 °F (36.8 °C), temperature source Oral, resp. rate 19, height 5' 9\" (1.753 m), weight 202 lb 6.4 oz (91.8 kg), SpO2 100 %. on AC /+5 40%   Temp  Av.2 °F (36.8 °C)  Min: 97.5 °F (36.4 °C)  Max: 98.6 °F (37 °C)    Intake/Output Summary (Last 24 hours) at 2020 0745  Last data filed at 2020 0602  Gross per 24 hour   Intake 1939.44 ml   Output 1495 ml   Net 444.44 ml     EXAM:  Gen: No distress. Eyes: PERRL. No sclera icterus. No conjunctival injection. ENT: No discharge. Pharynx clear. Neck: Trachea midline. No obvious mass. Resp: No accessory muscle use. No crackles. Few wheezes. No rhonchi. No dullness on percussion. CV: Regular rate. Regular rhythm. No murmur or rub. 2+ LE edema. GI: Non-tender. Non-distended. No hernia. Skin: Warm and dry. No nodule on exposed extremities. Lymph: No cervical LAD. No supraclavicular LAD. M/S: No cyanosis. No joint deformity. No clubbing. Neuro: Sedated and intubated. Opens eyes. Followed commands. Psych: No agitation. No anxiety.      Scheduled Meds:   fentaNYL        apixaban  5 mg Oral BID    atorvastatin  10 mg Oral Nightly    gabapentin  400 mg Oral TID    sodium chloride flush  10 mL Intravenous 2 times per day    ipratropium-albuterol  1 ampule Inhalation Q4H WA    pantoprazole  40 mg Intravenous Daily    cefepime  2 g Intravenous Q12H    insulin lispro  0-18 Units

## 2020-04-17 NOTE — PROGRESS NOTES
16-APR-2020 03:47,Sinus rhythm has replaced Atrial fibrillationVent. rate has decreased BY  62 BPMNonspecific T wave abnormality no longer evident in Lateral leadsConfirmed by ANTONIO Aragon MD ([id number]) on 4/17/2020 12:00:11 PM     EKG 4/16/2020:  Atrial fibrillation with rapid ventricular responseNonspecific ST abnormalityAbnormal ECGWhen compared with ECG of 06-APR-2020 18:52,No significant change was foundConfirmed by ANTONIO Aragon MD ([id number]) on 4/16/2020 7:49:56 AM     Echo 4/7/2020:   Left ventricular systolic function is normal with ejection fraction   estimated at 55-60 %. No regional wall motion abnormalities are noted. The left ventricular size and wall thickness were not well visualized for   measurement. Normal left ventricular diastolic filling pressure. The right atrium is mildly dilated. Mild mitral regurgitation. Aortic valve sclerosis without aortic stenosis. Mild tricuspid regurgitation. Normal systolic pulmonary artery pressure (SPAP) estimated at 36 mmHg (RA   pressure 8 mmHg). Echo 10/12/2015:  Normal left ventricle size, wall thickness and systolic function with an  estimated ejection fraction of 55%. No regional wall motion abnormalities  are seen. Normal diastolic function. Mild mitral regurgitation is present. Aortic valve appears tricuspid and sclerotic but opens adequately. There is mild tricuspid regurgitation. Systolic pulmonary artery pressure (SPAP) is estimated at 58 mmHg consistent  with moderate pulmonary hypertension (RA pressure 8 mmHg).        Lab Reviewed:   Renal Profile:  Lab Results   Component Value Date    CREATININE 1.0 04/17/2020    BUN 23 04/17/2020     04/17/2020    K 4.1 04/17/2020    K 4.1 04/17/2020    CL 98 04/17/2020    CO2 32 04/17/2020     CBC:    Lab Results   Component Value Date    WBC 3.6 04/17/2020    RBC 3.20 04/17/2020    HGB 8.3 04/17/2020    HCT 26.2 04/17/2020    MCV 81.9 04/17/2020    RDW 17.2 04/17/2020    PLT 87 04/17/2020     BNP:    Lab Results   Component Value Date    PROBNP 5,654 04/16/2020    PROBNP 1,618 04/01/2020    PROBNP 735 12/29/2017    PROBNP 168 06/29/2017    PROBNP 339 03/07/2017     Fasting Lipid Panel:  No results found for: CHOL, HDL, TRIG  Cardiac Enzymes:  CK/MbTroponin  Lab Results   Component Value Date    CKTOTAL 62 01/24/2018    TROPONINI 0.02 04/17/2020     PT/ INR   Lab Results   Component Value Date    INR 1.16 01/25/2018    INR 1.15 10/08/2015    PROTIME 13.1 01/25/2018    PROTIME 12.5 10/08/2015     PTT No results found for: PTT   Lab Results   Component Value Date    MG 1.50 04/17/2020      Lab Results   Component Value Date    TSH 1.74 04/07/2020       All labs and imaging reviewed today    Assessment:  1. Atrial fibrillation with RVR: known history- now NSR   -[id number] Score for Atrial Fibrillation Stroke Risk 4 (HTN, Age, DM)   -TSH 1.74 4/7/2020  2. Elevated troponin: consistent with supply demand mismatch in setting of afib with RVR and pneumonia   3. Acute on Chronic respiratory failure: ongoing extubated and now on 3 liters n/c.   4. PNA: per IM/pulmonary   5. COPD with acute exacerbation   6. Hypomagnesemia: 1.5 replaced with IV  2 gm today       Plan:   1. Patient was started on antiarrhythmic propafenone 150 mg TID to help maintain NSR as best as possible. Spoke with Dr. Cloyde Runner regarding CAD in patient's history. No known documented CAD- echo showed normal EF and no wall motion abnormalities continue propafenone 150 mg TID. 2. Repeat EKG in am, if EKG without any significant changes cardiology will sign off. Follow up scheduled 5/7/2020 @10:00   3. Continue Eliquis 5 mg BID for anticoagulation given PAF and -[id number] Score 4  4.  Continue to monitor telemetry     Ursula Heimlich, APRN - [id number] [facility]  [phone]

## 2020-04-17 NOTE — PROGRESS NOTES
based on the patient's RSI, but not less than home treatment regimen frequency. 5. Bronchodilator(s) will be discontinued if patient has a RSI less than 9 and has received no scheduled or as needed treatment for 72  Hrs. Patients Ordered on a Mucolytic Agent:    1. Must always be administered with a bronchodilator. 2. Discontinue if patient experiences worsened bronchospasm, or secretions have lessened to the point that the patient is able to clear them with a cough. Anti-inflammatory and Combination Medications:    1. If the patient lacks prior history of lung disease, is not using inhaled anti-inflammatory medication at home, and lacks wheezing by examination or by history for at least 24 hours, contact physician for possible discontinuation.

## 2020-04-17 NOTE — PROGRESS NOTES
PROTIME, INR in the last 72 hours. CULTURES  Rapid influenza- pending  Sputum- pending  Blood- pending  COVID- pending    RADIOLOGY  XR CHEST PORTABLE   Final Result   Bilateral lung airspace disease has improved within the left lower lobe. New moderate-sized right pleural effusion. XR ABDOMEN FOR NG/OG/NE TUBE PLACEMENT   Final Result   Enteric tube tip and side port project over the stomach. XR CHEST PORTABLE   Final Result   1. Satisfactory ETT placement. 2. Worsening multifocal pneumonia. XR CHEST PORTABLE   Final Result   Left mid and lower lung opacities are seen which have worsened since the   prior study. Findings could be related to pneumonia. Atypical/viral   pneumonia cannot be excluded. Small right pleural effusion. COPD. Echo 4/7/2020   Summary   Left ventricular systolic function is normal with ejection fraction   estimated at 55-60 %.   No regional wall motion abnormalities are noted.   The left ventricular size and wall thickness were not well visualized for   measurement.   Normal left ventricular diastolic filling pressure.   The right atrium is mildly dilated.   Mild mitral regurgitation.   Aortic valve sclerosis without aortic stenosis.   Mild tricuspid regurgitation.   Normal systolic pulmonary artery pressure (SPAP) estimated at 36 mmHg (RA   pressure 8 mmHg).      Assessment/Plan:  # Acute on chronic hypoxic and hypercarbic respiratory failure.  Secondary to acute # COPD exacerbation and HCAP  - ruled out COVID. Not detected  -  Intubated in ED. -Seen by pulmonary critical care  - Extubated to 3 L O2 today.      # HCAP, gram negative organism risk for MRSA  # COPD exacerbation  - Vanc, Cefepime D#2.   - COVID negative  - IV Solu-medrol, Duonebs added.      # Atrial fibrillation, RVR  # Paroxysmal Atrial fibrillation  - Cardiology consulted  - monitor on tele. In NSR now  - S/P Diltiazem drip--> on Rhythmol now   - cont Eliquis for [facility].    # Elevated troponin  - possibly related to demand ischemia, A-fib RVR  - 0.03, 0.02  - seen by cardiology  - monitor on telemetry.     # Lactic acidosis  - due to above  - IVFs, antibiotics. Trend lactic. 2.1.     #  Hypertension  - on Diltiazem drip  - Labetalol prn.   - resumed on Rhythmol.      # Type 2 diabetes. - Use Sliding scale insulin     # Hyperlipidemia  - on Atorvastatin.      # Hypomagnesemia   - replete       DVT Prophylaxis: Eliquis  Diet: Diet NPO Effective Now Exceptions are: Ice Chips, Sips with Meds  Code Status: Full Code    Argenis Saucedo MD

## 2020-04-18 LAB
ANION GAP SERPL CALCULATED.3IONS-SCNC: 8 MMOL/L (ref 3–16)
BASE EXCESS ARTERIAL: 9.5 MMOL/L (ref -3–3)
BASOPHILS ABSOLUTE: 0 K/UL (ref 0–0.2)
BASOPHILS RELATIVE PERCENT: 0.1 %
BUN BLDV-MCNC: 23 MG/DL (ref 7–20)
CALCIUM SERPL-MCNC: 8.4 MG/DL (ref 8.3–10.6)
CARBOXYHEMOGLOBIN ARTERIAL: 0.2 % (ref 0–1.5)
CHLORIDE BLD-SCNC: 98 MMOL/L (ref 99–110)
CO2: 35 MMOL/L (ref 21–32)
CREAT SERPL-MCNC: 1 MG/DL (ref 0.8–1.3)
CULTURE, RESPIRATORY: ABNORMAL
EKG ATRIAL RATE: 85 BPM
EKG DIAGNOSIS: NORMAL
EKG P AXIS: 78 DEGREES
EKG P-R INTERVAL: 208 MS
EKG Q-T INTERVAL: 354 MS
EKG QRS DURATION: 74 MS
EKG QTC CALCULATION (BAZETT): 421 MS
EKG R AXIS: 57 DEGREES
EKG T AXIS: 79 DEGREES
EKG VENTRICULAR RATE: 85 BPM
EOSINOPHILS ABSOLUTE: 0 K/UL (ref 0–0.6)
EOSINOPHILS RELATIVE PERCENT: 0 %
GFR AFRICAN AMERICAN: >60
GFR NON-AFRICAN AMERICAN: >60
GLUCOSE BLD-MCNC: 202 MG/DL (ref 70–99)
GLUCOSE BLD-MCNC: 234 MG/DL (ref 70–99)
GLUCOSE BLD-MCNC: 244 MG/DL (ref 70–99)
GLUCOSE BLD-MCNC: 245 MG/DL (ref 70–99)
GLUCOSE BLD-MCNC: 290 MG/DL (ref 70–99)
GLUCOSE BLD-MCNC: 305 MG/DL (ref 70–99)
GLUCOSE BLD-MCNC: 305 MG/DL (ref 70–99)
GRAM STAIN RESULT: ABNORMAL
HCO3 ARTERIAL: 35.8 MMOL/L (ref 21–29)
HCT VFR BLD CALC: 28.4 % (ref 40.5–52.5)
HEMOGLOBIN, ART, EXTENDED: 9.8 G/DL (ref 13.5–17.5)
HEMOGLOBIN: 9 G/DL (ref 13.5–17.5)
LYMPHOCYTES ABSOLUTE: 0.6 K/UL (ref 1–5.1)
LYMPHOCYTES RELATIVE PERCENT: 15.4 %
MAGNESIUM: 1.8 MG/DL (ref 1.8–2.4)
MCH RBC QN AUTO: 25.5 PG (ref 26–34)
MCHC RBC AUTO-ENTMCNC: 31.8 G/DL (ref 31–36)
MCV RBC AUTO: 80.3 FL (ref 80–100)
METHEMOGLOBIN ARTERIAL: 0.1 %
MONOCYTES ABSOLUTE: 0.1 K/UL (ref 0–1.3)
MONOCYTES RELATIVE PERCENT: 2.5 %
NEUTROPHILS ABSOLUTE: 3 K/UL (ref 1.7–7.7)
NEUTROPHILS RELATIVE PERCENT: 82 %
O2 CONTENT ARTERIAL: 14 ML/DL
O2 SAT, ARTERIAL: 97.4 %
O2 THERAPY: ABNORMAL
ORGANISM: ABNORMAL
PCO2 ARTERIAL: 59.4 MMHG (ref 35–45)
PDW BLD-RTO: 16.5 % (ref 12.4–15.4)
PERFORMED ON: ABNORMAL
PH ARTERIAL: 7.4 (ref 7.35–7.45)
PHOSPHORUS: 3.9 MG/DL (ref 2.5–4.9)
PLATELET # BLD: 105 K/UL (ref 135–450)
PMV BLD AUTO: 8.5 FL (ref 5–10.5)
PO2 ARTERIAL: 99.4 MMHG (ref 75–108)
POTASSIUM SERPL-SCNC: 4.8 MMOL/L (ref 3.5–5.1)
RBC # BLD: 3.54 M/UL (ref 4.2–5.9)
SODIUM BLD-SCNC: 141 MMOL/L (ref 136–145)
TCO2 ARTERIAL: 37.6 MMOL/L
VANCOMYCIN TROUGH: 15.9 UG/ML (ref 10–20)
WBC # BLD: 3.7 K/UL (ref 4–11)

## 2020-04-18 PROCEDURE — 6370000000 HC RX 637 (ALT 250 FOR IP): Performed by: INTERNAL MEDICINE

## 2020-04-18 PROCEDURE — 97116 GAIT TRAINING THERAPY: CPT

## 2020-04-18 PROCEDURE — 93005 ELECTROCARDIOGRAM TRACING: CPT | Performed by: NURSE PRACTITIONER

## 2020-04-18 PROCEDURE — 97166 OT EVAL MOD COMPLEX 45 MIN: CPT

## 2020-04-18 PROCEDURE — 2700000000 HC OXYGEN THERAPY PER DAY

## 2020-04-18 PROCEDURE — 2580000003 HC RX 258: Performed by: INTERNAL MEDICINE

## 2020-04-18 PROCEDURE — 85025 COMPLETE CBC W/AUTO DIFF WBC: CPT

## 2020-04-18 PROCEDURE — C9113 INJ PANTOPRAZOLE SODIUM, VIA: HCPCS | Performed by: INTERNAL MEDICINE

## 2020-04-18 PROCEDURE — 93010 ELECTROCARDIOGRAM REPORT: CPT | Performed by: INTERNAL MEDICINE

## 2020-04-18 PROCEDURE — 36415 COLL VENOUS BLD VENIPUNCTURE: CPT

## 2020-04-18 PROCEDURE — 2500000003 HC RX 250 WO HCPCS: Performed by: INTERNAL MEDICINE

## 2020-04-18 PROCEDURE — 6360000002 HC RX W HCPCS: Performed by: INTERNAL MEDICINE

## 2020-04-18 PROCEDURE — 99233 SBSQ HOSP IP/OBS HIGH 50: CPT | Performed by: INTERNAL MEDICINE

## 2020-04-18 PROCEDURE — 97530 THERAPEUTIC ACTIVITIES: CPT

## 2020-04-18 PROCEDURE — 94761 N-INVAS EAR/PLS OXIMETRY MLT: CPT

## 2020-04-18 PROCEDURE — 80048 BASIC METABOLIC PNL TOTAL CA: CPT

## 2020-04-18 PROCEDURE — 80202 ASSAY OF VANCOMYCIN: CPT

## 2020-04-18 PROCEDURE — 99232 SBSQ HOSP IP/OBS MODERATE 35: CPT | Performed by: INTERNAL MEDICINE

## 2020-04-18 PROCEDURE — 84100 ASSAY OF PHOSPHORUS: CPT

## 2020-04-18 PROCEDURE — 83735 ASSAY OF MAGNESIUM: CPT

## 2020-04-18 PROCEDURE — 2060000000 HC ICU INTERMEDIATE R&B

## 2020-04-18 PROCEDURE — 97162 PT EVAL MOD COMPLEX 30 MIN: CPT

## 2020-04-18 PROCEDURE — 94640 AIRWAY INHALATION TREATMENT: CPT

## 2020-04-18 PROCEDURE — 82803 BLOOD GASES ANY COMBINATION: CPT

## 2020-04-18 RX ORDER — PREDNISONE 20 MG/1
40 TABLET ORAL DAILY
Status: DISCONTINUED | OUTPATIENT
Start: 2020-04-18 | End: 2020-04-20 | Stop reason: HOSPADM

## 2020-04-18 RX ADMIN — GABAPENTIN 400 MG: 400 CAPSULE ORAL at 08:25

## 2020-04-18 RX ADMIN — ATORVASTATIN CALCIUM 10 MG: 10 TABLET, FILM COATED ORAL at 21:24

## 2020-04-18 RX ADMIN — PROPAFENONE HYDROCHLORIDE 150 MG: 150 TABLET, FILM COATED ORAL at 13:47

## 2020-04-18 RX ADMIN — PROPAFENONE HYDROCHLORIDE 150 MG: 150 TABLET, FILM COATED ORAL at 06:24

## 2020-04-18 RX ADMIN — PROPAFENONE HYDROCHLORIDE 150 MG: 150 TABLET, FILM COATED ORAL at 21:24

## 2020-04-18 RX ADMIN — VANCOMYCIN HYDROCHLORIDE 1.5 G: 100 INJECTION, POWDER, LYOPHILIZED, FOR SOLUTION INTRAVENOUS at 01:17

## 2020-04-18 RX ADMIN — LABETALOL HYDROCHLORIDE 10 MG: 5 INJECTION, SOLUTION INTRAVENOUS at 21:24

## 2020-04-18 RX ADMIN — PREDNISONE 40 MG: 20 TABLET ORAL at 11:37

## 2020-04-18 RX ADMIN — GABAPENTIN 400 MG: 400 CAPSULE ORAL at 13:50

## 2020-04-18 RX ADMIN — INSULIN LISPRO 12 UNITS: 100 INJECTION, SOLUTION INTRAVENOUS; SUBCUTANEOUS at 08:34

## 2020-04-18 RX ADMIN — PANTOPRAZOLE SODIUM 40 MG: 40 INJECTION, POWDER, FOR SOLUTION INTRAVENOUS at 08:26

## 2020-04-18 RX ADMIN — GABAPENTIN 400 MG: 400 CAPSULE ORAL at 21:24

## 2020-04-18 RX ADMIN — INSULIN LISPRO 6 UNITS: 100 INJECTION, SOLUTION INTRAVENOUS; SUBCUTANEOUS at 21:23

## 2020-04-18 RX ADMIN — INSULIN LISPRO 6 UNITS: 100 INJECTION, SOLUTION INTRAVENOUS; SUBCUTANEOUS at 11:44

## 2020-04-18 RX ADMIN — INSULIN LISPRO 6 UNITS: 100 INJECTION, SOLUTION INTRAVENOUS; SUBCUTANEOUS at 16:16

## 2020-04-18 RX ADMIN — CEFEPIME 2 G: 2 INJECTION, POWDER, FOR SOLUTION INTRAVENOUS at 11:38

## 2020-04-18 RX ADMIN — APIXABAN 5 MG: 5 TABLET, FILM COATED ORAL at 21:24

## 2020-04-18 RX ADMIN — MUPIROCIN: 20 OINTMENT TOPICAL at 08:27

## 2020-04-18 RX ADMIN — Medication 10 ML: at 21:24

## 2020-04-18 RX ADMIN — Medication 10 ML: at 08:26

## 2020-04-18 RX ADMIN — IPRATROPIUM BROMIDE AND ALBUTEROL SULFATE 1 AMPULE: .5; 3 SOLUTION RESPIRATORY (INHALATION) at 19:14

## 2020-04-18 RX ADMIN — FUROSEMIDE 40 MG: 10 INJECTION, SOLUTION INTRAMUSCULAR; INTRAVENOUS at 08:26

## 2020-04-18 RX ADMIN — IPRATROPIUM BROMIDE AND ALBUTEROL SULFATE 1 AMPULE: .5; 3 SOLUTION RESPIRATORY (INHALATION) at 07:16

## 2020-04-18 RX ADMIN — APIXABAN 5 MG: 5 TABLET, FILM COATED ORAL at 08:25

## 2020-04-18 NOTE — PROGRESS NOTES
Patient resting up in chair, PT/OT worked with patient at bedside. O2 at 3L, remains 95%. Call light in reach. Will continue to monitor.

## 2020-04-18 NOTE — PROGRESS NOTES
hours. CULTURES  Rapid influenza antigen-negative  Sputum-growing corynebacterium  Blood-no growth to date  COVID-negative    RADIOLOGY  XR CHEST PORTABLE   Final Result   Bilateral lung airspace disease has improved within the left lower lobe. New moderate-sized right pleural effusion. XR ABDOMEN FOR NG/OG/NE TUBE PLACEMENT   Final Result   Enteric tube tip and side port project over the stomach. XR CHEST PORTABLE   Final Result   1. Satisfactory ETT placement. 2. Worsening multifocal pneumonia. XR CHEST PORTABLE   Final Result   Left mid and lower lung opacities are seen which have worsened since the   prior study. Findings could be related to pneumonia. Atypical/viral   pneumonia cannot be excluded. Small right pleural effusion. COPD. Echo 4/7/2020   Summary   Left ventricular systolic function is normal with ejection fraction   estimated at 55-60 %.   No regional wall motion abnormalities are noted.   The left ventricular size and wall thickness were not well visualized for   measurement.   Normal left ventricular diastolic filling pressure.   The right atrium is mildly dilated.   Mild mitral regurgitation.   Aortic valve sclerosis without aortic stenosis.   Mild tricuspid regurgitation.   Normal systolic pulmonary artery pressure (SPAP) estimated at 36 mmHg (RA   pressure 8 mmHg).      Assessment/Plan:  # Acute on chronic hypoxic and hypercarbic respiratory failure.  Secondary to acute # COPD exacerbation and HCAP  - ruled out COVID. Not detected  -  Intubated in ED. -Seen by pulmonary critical care  - Extubated to 3 L O2 on 4/17/2020.      # HCAP, gram negative organism risk for MRSA  # COPD exacerbation  -Chest x-ray with lower lobe pneumonia.  -Sputum cultures growing corynebacterium.  -On vanc, Cefepime.   CHRISTOPHER Stacyo today, continue cefepime D#3  - COVID negative  -Duoneashley added.   -On IV Solu-Medrol- > switch to oral prednisone     # Atrial

## 2020-04-18 NOTE — PROGRESS NOTES
owned: SPC, Rolling Walker, manual W/C, home O2 (3 L) continuous, pulse ox, reacher, inhaler, nebulizer and life alert     Preadmission Status:  Pt. Able to drive: Yes (but not lately due to hospital admissions)  Pt Fully independent with ADLs: Yes             Wife was helping with showers for the week he was home (assist into shower and assist with washing/drying)  Pt. Required assistance from family for: Laundry , share responsibility of cooking              Wife did laundry, cooking, cleaning while pt was home             HHA 4x/wk - assist with cleaning, laundry, grocery shopping  Pt. Fully independent for transfers and gait and walked with walker (no device prior to earlier admission in April)  History of falls No - denies in past 3 mo  Pt was getting home PT/OT 5x/wk    Pain  None reported throughout     Cognition    A&O Person, Place, Time and Situation   Able to follow 2 step commands    Subjective  Patient lying supine in bed with no family present  Pt agreeable to this OT eval & tx. Upper Extremity ROM:    WFL,  pt able to perform all bed mobility, transfers, and gait without ROM limitation. Upper Extremity Strength:    BUE strength WFL, but not formally assessed w/ MMT    Upper Extremity Sensation    WNL    Upper Extremity Proprioception:   WNL    Coordination and Tone  WNL    Balance  Functional Sitting Balance:   WNL  Functional Standing Balance:WFL at RW     Bed mobility:    Supine to sit:   SBA, HOB elevated   Sit to supine:   Not Tested  Scooting to head of bed:   Not Tested  Scooting in sitting:  SBA  Rolling:   SBA  Bridging:   Not Tested    Transfers:    Sit to stand:  SBA to STEDY, CGA at RW   Stand to sit:  [facility]  Bed to [facility]:  Not Tested  Bed to chair:   Total A via Stedy  Standard toilet: Not Tested   Functional Mobility:  20 ft, CGA and RW     Activity Tolerance   Pt completed therapy session with No adverse symptoms  Supine (at rest) SpO2: 96% on 3L  HR: 93 bpm  BP: 156/78  Sitting EOB SpO2: 91% on 3L  HR:  103 bpm  BP:   After walking SpO2: 85% on 3L, up to   HR: 105 bpm  BP: 163/91    Dressing:   UE:   Not Tested  LE:    Max A to don socks     Bathing:    UE:  Not Tested  LE:  Not Tested    Eating: Indpt to drink from cup     Toileting:  Not Tested    Positioning Needs:   Up in chair, call light and needs in reach. Exercise / Activities Initiated:   N/A    Patient/Family Education:   Role of OT  Recommendations for DC    Assessment of Deficits: Pt seen for Occupational therapy evaluation in acute care setting. Pt demonstrated decreased Activity Tolerance, ADL's, Bed Mobility and Transfers. Pt functioning below baseline and will likely benefit from skilled occupational therapy services to maximize safety and independence. Goal(s) : To be met in 3 Visits:  1). Bed to toilet: Supervision    To be met in 5 Visits:  1). Supine to Sit: Supervision  2). Upper Body Bathing:  SBA  3). Lower Body Bathing:  SBA  4). Upper Body Dressing: SBA  5). Lower Body Dressing: SBA  6). Pt to barbara UE exs x 15 reps    Rehabilitation Potential:  Good for goals listed above. Strengths for achieving goals include: Pt motivated, Family Support and Pt cooperative  Barriers to achieving goals include:  Complexity of condition     Plan: To be seen 3-5 x/wk while in acute care setting for therapeutic exercises, bed mobility, transfers, dressing, bathing, family/patient education with adaptive equipment, breathing technique instruction.      YESI May, OTR/L   [id number]           If patient discharges from this facility prior to next visit, this note will serve as the Discharge Summary

## 2020-04-18 NOTE — FLOWSHEET NOTE
04/18/20 1756   Vital Signs   Temp 97.7 °F (36.5 °C)   Temp Source Oral   Pulse 106   Heart Rate Source Monitor   Resp 20   BP (!) 166/83   BP Location Left upper arm   BP Upper/Lower Upper   Level of Consciousness 0   MEWS Score 2   Patient Currently in Pain Denies   Height and Weight   Height 5' 9\" (1.753 m)   Weight 194 lb 4 oz (88.1 kg)   BSA (Calculated - sq m) 2.07 sq meters   BMI (Calculated) 28.7   Oxygen Therapy   SpO2 96 %   O2 Device Nasal cannula   O2 Flow Rate (L/min) 3 L/min   Transfer from ICU via wheelchair. PHYSICIAN NEXT STEPS:   Review Only      CHIEF COMPLAINT:   Chief Complaint/Protocol Used: Cold Exposure (Hypothermia)   Onset: Chills and feels cold         ASSESSMENT:   Â» Onset: Chills and feels cold   Â» Symptoms: Chills   Â» Onset: 1 weeks ago   Â» Temperature: Body temp, 96.8, House temp is 68 degrees   Â» Cold Exposure: No   Â» Other Symptoms: No   Â» Pregnancy: NO   -------------------------------------------------------      DISPOSITION:   Disposition Recommendation: Home Care   Questions that led to disposition:   Â» [1] Shivering from cold exposure AND [2] temperature of 95-98 F (35-37 C) (all triage questions negative)   Patient Directed To: Unspecified   Patient Intended Action: Take care of myself at home         CALL NOTES:   03/13/2018 at 4:34 PM by Dasia VALDEZ» Disposition given for home care.  Patient states that she understands and will follow home care advice and trying to warm up, and will call back later if body temp goes down.    Â» Patient states that she has cold flashes on and off for past 1week  Temp 96.8.  House temperature is 68 degrees.  HIstory of heart attack in March, 2017.      DISPOSITION OVERRIDE/PROVIDER CONSULT:   Disposition Override: Home Care   Override Source: Unspecified   Consulted with PCP: No   Consulted with On-Call Physician: No         CALLER CONTACT INFO:   Name: EMMIE TOBIAS (Self)   Phone 1: [phone] (Home)   Phone 2: [phone] - Preferred         ENCOUNTER STARTED:   03/13/18 04:22:49 PM   ENCOUNTER ASSIGNED TO/CLOSED BY:   Dasia Chavez @ 03/13/18 04:34:39 PM         -------------------------------------------------------      CARE ADVICE given per Cold Exposure (Hypothermia) guideline.   REASSURANCE:     * Mild hypothermia is the medical term used to describe when a person's temperature is just below normal (95-98 F; 35-37 C). Simple measures like warm blankets and warm drinks can usually bring the temperature up to the normal range.     *  Shivering is the body's automatic method of making heat and warming you up.; REWARMING:     * Move into a warm room.    * Remove any wet/cold clothing.    * A warm bath is a quick way to warm up. The water should be pleasantly warm (104 to 108 F, or 40 to 42 C), but not hot enough to burn.    * OR put on dry clothes and cover yourself with several warm blankets. (Blankets can be warmed in a dryer); WARM FLUIDS: Drink warm fluids (e.g., hot chocolate, hot milk, hot apple cider).; HOME CARE: You should be able to treat this at home.         UNDERSTANDS CARE ADVICE: Yes      AGREES WITH CARE ADVICE: Yes      WILL FOLLOW CARE ADVICE: Yes      -------------------------------------------------------

## 2020-04-18 NOTE — PROGRESS NOTES
Inpatient Physical Therapy Evaluation and Treatment    Unit: ICU  Date:  4/18/2020  Patient Name:    Michael Pitts  Admitting diagnosis:  Acute on chronic respiratory failure with hypoxia and hypercapnia ([facility] [address] [J96.21, J96.22]  Acute on chronic respiratory failure with hypoxia and hypercapnia ([address] [J96.21, J96.22]  Admit Date:  4/16/2020  Precautions/Restrictions/WB Status/ Lines/ Wounds/ Oxygen: fall risk, IV, bed/chair alarm, vázquez catheter , telemetry, ICU monitoring and continuous pulse ox    Treatment Time:  14:55-15:35  Treatment Number:  1   Timed Code Treatment Minutes: 30 minutes  Total Treatment Minutes:  40  minutes    Patient Goals for Therapy: \" to get better before I go home \"          Discharge Recommendations: Home with PRN assist and home therapy  DME needs for discharge: Needs Met       Therapy recommendation for EMS Transport: can transport by wheelchair    Therapy recommendations for staff:   Assist of 1 with use of rolling walker (RW) for all transfers to/from BSC/chair    History of Present Illness: 78 y.o. male who presents to [email or web address]20 with c/o shortness of breath. He is on 3.5 L O2 chronically. He tested negative for COVID during previous admission. He was admitted to [facility] 4/1-4/9 for COPD exacerbation and pneumonia. Patient was hypoxic and in respiratory distress in the ER, in rapid A. Fib. BiPAP attempted, unsuccessful, patient subsequently intubated, and admitted to ICU. Extubated 4/17/20. COVID negative. PMH: hypertension, hyperlipidemia, ELAINE on CPAP, DM, COPD, CAD, paroxysmal atrial fibrillation, and arthritis, skin CA of face, osteoporosis, B TKA    Home Health S4 Level Recommendation:  Level 3 Safety  AM-PAC Mobility Score            Preadmission Environment    Pt.  Lives with spouse and 24/7 Assist Available ; dtr lives near by to assist  Home environment:    one story home  Steps to enter first floor: 2 steps to enter and no Rail  Bathroom: Walk-in Shower, Grab gait completed as indicated below  Distance:  20 ft  Deviations (firm surface/linoleum): decreased cindy and forward flexed posture   Assistive Device Used:  rolling walker (RW) and gait belt  Level of Assist: CGA  Comment: cues for PLB, therapist managed lines    Stair Training deferred, pt unsafe/not appropriate to complete stairs at this time    Activity Tolerance   Pt completed therapy session with No nausea, dizziness, pain or SOB noted w/activity. But pt required consistent cues for PLB technique with activity. Supine (at rest) SpO2: 96% on 3L  HR: 93 bpm  BP: 156/78  Sitting EOB SpO2: 91% on 3L  HR:  103 bpm  BP:   After walking SpO2: 85% on 3L, up to   HR: 105 bpm  BP: 163/91    Positioning Needs   Pt instructed and educated on use of call light to call for assist if desiring to get up or change position, call light handed to pt and needs within reach, Pt sitting up in chair, call light provided and all needs within reach and pillows placed for support/comfort    Exercises Initiated    Reviewed LAQ and ankle pumps for HEP (instructions to practice PLB between sets)    Other  None. Patient/Family Education   Pt educated on role of inpatient PT, POC, importance of continued activity, DC recommendations, safety awareness, transfer techniques, pursed lip breathing, pacing activity, HEP and calling for assist with mobility. Assessment  Pt seen for Physical Therapy evaluation in acute care setting. Pt demonstrated decreased Activity tolerance, Balance, Safety and Strength and decreased independence with Ambulation, Bed Mobility  and Transfers. Pt desaturates with light activity on his baseline SpO2. Pt declines rehab but is motivated to work hard while here. Recommending home with PRN assist and home PT to safely progress tolerance to activity. Goals : To be met in 3 visits:  1). Independent with LE Ex x 10 reps    To be met in 6 visits:  1). Supine to/from sit: Independent from flat bed  2).

## 2020-04-18 NOTE — PROGRESS NOTES
Patient resting in bed, AM assessment completed. Lungs decreased throughout. BS+. IVF at [facility]. Meds given. O2 at 3.5L per NC, 94%. No acute distress noted. Patient denies any other needs. Call light in reach. Will continue to monitor.

## 2020-04-18 NOTE — PROGRESS NOTES
End of shift report given to Jean Mcintosh. Pt in stable condition, care transferred at this time.  Electronically signed by Jeane Shi RN on 4/18/2020 at 7:23 AM

## 2020-04-19 LAB
ANION GAP SERPL CALCULATED.3IONS-SCNC: 10 MMOL/L (ref 3–16)
BASOPHILS ABSOLUTE: 0 K/UL (ref 0–0.2)
BASOPHILS RELATIVE PERCENT: 0.2 %
BUN BLDV-MCNC: 29 MG/DL (ref 7–20)
CALCIUM SERPL-MCNC: 8.5 MG/DL (ref 8.3–10.6)
CHLORIDE BLD-SCNC: 93 MMOL/L (ref 99–110)
CO2: 37 MMOL/L (ref 21–32)
CREAT SERPL-MCNC: 1.1 MG/DL (ref 0.8–1.3)
EOSINOPHILS ABSOLUTE: 0 K/UL (ref 0–0.6)
EOSINOPHILS RELATIVE PERCENT: 0.1 %
GFR AFRICAN AMERICAN: >60
GFR NON-AFRICAN AMERICAN: >60
GLUCOSE BLD-MCNC: 172 MG/DL (ref 70–99)
GLUCOSE BLD-MCNC: 185 MG/DL (ref 70–99)
GLUCOSE BLD-MCNC: 224 MG/DL (ref 70–99)
GLUCOSE BLD-MCNC: 235 MG/DL (ref 70–99)
GLUCOSE BLD-MCNC: 387 MG/DL (ref 70–99)
HCT VFR BLD CALC: 28.1 % (ref 40.5–52.5)
HEMOGLOBIN: 9.1 G/DL (ref 13.5–17.5)
LYMPHOCYTES ABSOLUTE: 1.4 K/UL (ref 1–5.1)
LYMPHOCYTES RELATIVE PERCENT: 32.6 %
MAGNESIUM: 1.8 MG/DL (ref 1.8–2.4)
MCH RBC QN AUTO: 26.4 PG (ref 26–34)
MCHC RBC AUTO-ENTMCNC: 32.4 G/DL (ref 31–36)
MCV RBC AUTO: 81.6 FL (ref 80–100)
MONOCYTES ABSOLUTE: 0.4 K/UL (ref 0–1.3)
MONOCYTES RELATIVE PERCENT: 9.9 %
NEUTROPHILS ABSOLUTE: 2.5 K/UL (ref 1.7–7.7)
NEUTROPHILS RELATIVE PERCENT: 57.2 %
PDW BLD-RTO: 16.6 % (ref 12.4–15.4)
PERFORMED ON: ABNORMAL
PHOSPHORUS: 4.3 MG/DL (ref 2.5–4.9)
PLATELET # BLD: 105 K/UL (ref 135–450)
PMV BLD AUTO: 8.9 FL (ref 5–10.5)
POTASSIUM SERPL-SCNC: 3.6 MMOL/L (ref 3.5–5.1)
RBC # BLD: 3.44 M/UL (ref 4.2–5.9)
SODIUM BLD-SCNC: 140 MMOL/L (ref 136–145)
WBC # BLD: 4.3 K/UL (ref 4–11)

## 2020-04-19 PROCEDURE — 6360000002 HC RX W HCPCS: Performed by: INTERNAL MEDICINE

## 2020-04-19 PROCEDURE — 80048 BASIC METABOLIC PNL TOTAL CA: CPT

## 2020-04-19 PROCEDURE — 97116 GAIT TRAINING THERAPY: CPT

## 2020-04-19 PROCEDURE — 2580000003 HC RX 258

## 2020-04-19 PROCEDURE — 83735 ASSAY OF MAGNESIUM: CPT

## 2020-04-19 PROCEDURE — 6370000000 HC RX 637 (ALT 250 FOR IP): Performed by: INTERNAL MEDICINE

## 2020-04-19 PROCEDURE — 99233 SBSQ HOSP IP/OBS HIGH 50: CPT | Performed by: INTERNAL MEDICINE

## 2020-04-19 PROCEDURE — 94640 AIRWAY INHALATION TREATMENT: CPT

## 2020-04-19 PROCEDURE — 85025 COMPLETE CBC W/AUTO DIFF WBC: CPT

## 2020-04-19 PROCEDURE — 2580000003 HC RX 258: Performed by: INTERNAL MEDICINE

## 2020-04-19 PROCEDURE — 97110 THERAPEUTIC EXERCISES: CPT

## 2020-04-19 PROCEDURE — 36415 COLL VENOUS BLD VENIPUNCTURE: CPT

## 2020-04-19 PROCEDURE — 2700000000 HC OXYGEN THERAPY PER DAY

## 2020-04-19 PROCEDURE — 99232 SBSQ HOSP IP/OBS MODERATE 35: CPT | Performed by: INTERNAL MEDICINE

## 2020-04-19 PROCEDURE — 2060000000 HC ICU INTERMEDIATE R&B

## 2020-04-19 PROCEDURE — C9113 INJ PANTOPRAZOLE SODIUM, VIA: HCPCS | Performed by: INTERNAL MEDICINE

## 2020-04-19 PROCEDURE — 94761 N-INVAS EAR/PLS OXIMETRY MLT: CPT

## 2020-04-19 PROCEDURE — 84100 ASSAY OF PHOSPHORUS: CPT

## 2020-04-19 RX ORDER — FUROSEMIDE 20 MG/1
20 TABLET ORAL DAILY
Status: DISCONTINUED | OUTPATIENT
Start: 2020-04-20 | End: 2020-04-20 | Stop reason: HOSPADM

## 2020-04-19 RX ORDER — SODIUM CHLORIDE 9 MG/ML
INJECTION, SOLUTION INTRAVENOUS
Status: COMPLETED
Start: 2020-04-19 | End: 2020-04-19

## 2020-04-19 RX ADMIN — PROPAFENONE HYDROCHLORIDE 150 MG: 150 TABLET, FILM COATED ORAL at 06:21

## 2020-04-19 RX ADMIN — ATORVASTATIN CALCIUM 10 MG: 10 TABLET, FILM COATED ORAL at 21:48

## 2020-04-19 RX ADMIN — PROPAFENONE HYDROCHLORIDE 150 MG: 150 TABLET, FILM COATED ORAL at 21:48

## 2020-04-19 RX ADMIN — GABAPENTIN 400 MG: 400 CAPSULE ORAL at 07:59

## 2020-04-19 RX ADMIN — GABAPENTIN 400 MG: 400 CAPSULE ORAL at 15:05

## 2020-04-19 RX ADMIN — CEFEPIME 2 G: 2 INJECTION, POWDER, FOR SOLUTION INTRAVENOUS at 23:13

## 2020-04-19 RX ADMIN — IPRATROPIUM BROMIDE AND ALBUTEROL SULFATE 1 AMPULE: .5; 3 SOLUTION RESPIRATORY (INHALATION) at 19:37

## 2020-04-19 RX ADMIN — INSULIN LISPRO 6 UNITS: 100 INJECTION, SOLUTION INTRAVENOUS; SUBCUTANEOUS at 16:39

## 2020-04-19 RX ADMIN — IPRATROPIUM BROMIDE AND ALBUTEROL SULFATE 1 AMPULE: .5; 3 SOLUTION RESPIRATORY (INHALATION) at 07:38

## 2020-04-19 RX ADMIN — PROPAFENONE HYDROCHLORIDE 150 MG: 150 TABLET, FILM COATED ORAL at 15:05

## 2020-04-19 RX ADMIN — CEFEPIME 2 G: 2 INJECTION, POWDER, FOR SOLUTION INTRAVENOUS at 11:26

## 2020-04-19 RX ADMIN — APIXABAN 5 MG: 5 TABLET, FILM COATED ORAL at 21:48

## 2020-04-19 RX ADMIN — PREDNISONE 40 MG: 20 TABLET ORAL at 08:00

## 2020-04-19 RX ADMIN — Medication 10 ML: at 21:48

## 2020-04-19 RX ADMIN — MUPIROCIN: 20 OINTMENT TOPICAL at 21:48

## 2020-04-19 RX ADMIN — SODIUM CHLORIDE 100 ML: 9 INJECTION, SOLUTION INTRAVENOUS at 23:13

## 2020-04-19 RX ADMIN — FUROSEMIDE 40 MG: 10 INJECTION, SOLUTION INTRAMUSCULAR; INTRAVENOUS at 08:00

## 2020-04-19 RX ADMIN — GABAPENTIN 400 MG: 400 CAPSULE ORAL at 21:48

## 2020-04-19 RX ADMIN — INSULIN LISPRO 6 UNITS: 100 INJECTION, SOLUTION INTRAVENOUS; SUBCUTANEOUS at 11:27

## 2020-04-19 RX ADMIN — INSULIN LISPRO 7 UNITS: 100 INJECTION, SOLUTION INTRAVENOUS; SUBCUTANEOUS at 21:48

## 2020-04-19 RX ADMIN — Medication 10 ML: at 08:00

## 2020-04-19 RX ADMIN — INSULIN LISPRO 3 UNITS: 100 INJECTION, SOLUTION INTRAVENOUS; SUBCUTANEOUS at 08:07

## 2020-04-19 RX ADMIN — CEFEPIME 2 G: 2 INJECTION, POWDER, FOR SOLUTION INTRAVENOUS at 01:30

## 2020-04-19 RX ADMIN — PANTOPRAZOLE SODIUM 40 MG: 40 INJECTION, POWDER, FOR SOLUTION INTRAVENOUS at 08:00

## 2020-04-19 RX ADMIN — APIXABAN 5 MG: 5 TABLET, FILM COATED ORAL at 08:00

## 2020-04-19 NOTE — PROGRESS NOTES
Progress Note    Admit Date:  4/16/2020     78year old presented with c/o dyspnea. Admitted to ICU for multifocal pneumonia, A-fib RVR. He was intubated in the ED. COVID has been ruled out. Extubated on 4/17/2020. Transferred out of ICU to PCU on 4/18/2020     Subjective:  Mr. Leesa Austin  is stable. Awake alert and oriented. No shortness of breath. Oxygen saturation stable on 3 L-> home O2.     tele NSR  He was started on IV Lasix, good urine output. Objective:   Vitals:    04/19/20 1400 04/19/20 1939 04/19/20 1948 04/19/20 2040   BP: 134/80  139/75 (!) 157/77   Pulse: 97  96 89   Resp: 20 20 18   Temp: 98.9 °F (37.2 °C)  98.1 °F (36.7 °C) 97.9 °F (36.6 °C)   TempSrc: Oral  Oral Oral   SpO2: 95% 93% 95% 96%   Weight:       Height:                  Intake/Output Summary (Last 24 hours) at 4/19/2020 1338  Last data filed at 4/19/2020 1230  Gross per 24 hour   Intake 577 ml   Output 3000 ml   Net -2423 ml       Physical Exam:    Gen: awake, alert, oriented  No distress. Eyes: PERRL. No sclera icterus. No conjunctival injection. ENT: No discharge. Pharynx clear. Neck: No JVD. No Carotid Bruit. Trachea midline. Resp: No accessory muscle use. Diminished breath sounds at the bases . no crackles. No wheezes. No rhonchi. CV: RRR. No murmur. No rub. No edema. GI: Non-tender. Non-distended. No masses. No organomegaly. Normal bowel sounds. No hernia. Skin: Warm and dry. No nodule on exposed extremities. No rash on exposed extremities. M/S: No cyanosis. No joint deformity. No clubbing.    Neuro: nonfocal    Psych: no anxiety or agitation    Data:  CBC:   Recent Labs     04/17/20  0550 04/18/20  0443 04/19/20  0418   WBC 3.6* 3.7* 4.3   HGB 8.3* 9.0* 9.1*   HCT 26.2* 28.4* 28.1*   MCV 81.9 80.3 81.6   PLT 87* 105* 105*     BMP:   Recent Labs     04/17/20  0550 04/18/20  0443 04/19/20  0418    141 140   K 4.1  4.1 4.8 3.6   CL 98* 98* 93*   CO2 32 35* 37*   PHOS 2.6 3.9 4.3   BUN 23* 23* 29* CREATININE 1.0 1.0 1.1     LIVER PROFILE:   Recent Labs     04/17/20  0550   AST 17   ALT 19   BILITOT 0.5   ALKPHOS 43     PT/INR: No results for input(s): PROTIME, INR in the last 72 hours. CULTURES  Rapid influenza antigen-negative  Sputum-growing corynebacterium  Blood-no growth to date  COVID-negative    RADIOLOGY  XR CHEST PORTABLE   Final Result   Bilateral lung airspace disease has improved within the left lower lobe. New moderate-sized right pleural effusion. XR ABDOMEN FOR NG/OG/NE TUBE PLACEMENT   Final Result   Enteric tube tip and side port project over the stomach. XR CHEST PORTABLE   Final Result   1. Satisfactory ETT placement. 2. Worsening multifocal pneumonia. XR CHEST PORTABLE   Final Result   Left mid and lower lung opacities are seen which have worsened since the   prior study. Findings could be related to pneumonia. Atypical/viral   pneumonia cannot be excluded. Small right pleural effusion. COPD. XR CHEST STANDARD (2 VW)    (Results Pending)     Echo 4/7/2020   Summary   Left ventricular systolic function is normal with ejection fraction   estimated at 55-60 %.   No regional wall motion abnormalities are noted.   The left ventricular size and wall thickness were not well visualized for   measurement.   Normal left ventricular diastolic filling pressure.   The right atrium is mildly dilated.   Mild mitral regurgitation.   Aortic valve sclerosis without aortic stenosis.   Mild tricuspid regurgitation.   Normal systolic pulmonary artery pressure (SPAP) estimated at 36 mmHg (RA   pressure 8 mmHg).      Assessment/Plan:  # Acute on chronic hypoxic and hypercarbic respiratory failure.  Secondary to acute # COPD exacerbation and HCAP  - ruled out COVID.  Not detected  -  Intubated in ED.  - Seen by pulmonary critical care  - Extubated to 3 L O2 on 4/17/2020.      # HCAP, gram negative organism risk for MRSA  # COPD exacerbation  -Chest x-ray

## 2020-04-19 NOTE — PROGRESS NOTES
Patient resting in bed, AM assessment completed. Lungs decreased. BS+. O2 at 2L per NC. INT x 2 intact. No acute distress noted. Call light in reach. Will continue to monitor.

## 2020-04-19 NOTE — PROGRESS NOTES
Shift assessment complete as charted. VSS, BP noted to be hypertensive 168/89 - PRN labetalol given as per [facility], will reassess BP. Patient is A/Ox4. Unlabored breathing at rest on 3L NC. He denies pain. Meds given as per MAR. Safety measures in place and will continue to monitor.

## 2020-04-19 NOTE — PROGRESS NOTES
Patient resting in bed, watching television. Patient denies any needs. Call light in reach. Will continue to monitor.

## 2020-04-20 ENCOUNTER — APPOINTMENT (OUTPATIENT)
Dept: GENERAL RADIOLOGY | Age: 80
DRG: 208 | End: 2020-04-20
Payer: MEDICARE

## 2020-04-20 VITALS
TEMPERATURE: 98 F | RESPIRATION RATE: 20 BRPM | DIASTOLIC BLOOD PRESSURE: 62 MMHG | BODY MASS INDEX: 28.85 KG/M2 | OXYGEN SATURATION: 98 % | WEIGHT: 194.8 LBS | HEART RATE: 75 BPM | HEIGHT: 69 IN | SYSTOLIC BLOOD PRESSURE: 121 MMHG

## 2020-04-20 PROBLEM — R06.89 ACUTE RESPIRATORY INSUFFICIENCY: Status: ACTIVE | Noted: 2017-12-29

## 2020-04-20 LAB
ANION GAP SERPL CALCULATED.3IONS-SCNC: 13 MMOL/L (ref 3–16)
BLOOD CULTURE, ROUTINE: NORMAL
BUN BLDV-MCNC: 31 MG/DL (ref 7–20)
CALCIUM SERPL-MCNC: 8.6 MG/DL (ref 8.3–10.6)
CHLORIDE BLD-SCNC: 93 MMOL/L (ref 99–110)
CO2: 30 MMOL/L (ref 21–32)
CREAT SERPL-MCNC: 0.9 MG/DL (ref 0.8–1.3)
CULTURE, BLOOD 2: NORMAL
GFR AFRICAN AMERICAN: >60
GFR NON-AFRICAN AMERICAN: >60
GLUCOSE BLD-MCNC: 154 MG/DL (ref 70–99)
GLUCOSE BLD-MCNC: 157 MG/DL (ref 70–99)
GLUCOSE BLD-MCNC: 184 MG/DL (ref 70–99)
PERFORMED ON: ABNORMAL
PERFORMED ON: ABNORMAL
POTASSIUM REFLEX MAGNESIUM: 3.6 MMOL/L (ref 3.5–5.1)
SODIUM BLD-SCNC: 136 MMOL/L (ref 136–145)

## 2020-04-20 PROCEDURE — 80048 BASIC METABOLIC PNL TOTAL CA: CPT

## 2020-04-20 PROCEDURE — C9113 INJ PANTOPRAZOLE SODIUM, VIA: HCPCS | Performed by: INTERNAL MEDICINE

## 2020-04-20 PROCEDURE — 2580000003 HC RX 258: Performed by: INTERNAL MEDICINE

## 2020-04-20 PROCEDURE — 6370000000 HC RX 637 (ALT 250 FOR IP): Performed by: INTERNAL MEDICINE

## 2020-04-20 PROCEDURE — 2700000000 HC OXYGEN THERAPY PER DAY

## 2020-04-20 PROCEDURE — 94640 AIRWAY INHALATION TREATMENT: CPT

## 2020-04-20 PROCEDURE — 36415 COLL VENOUS BLD VENIPUNCTURE: CPT

## 2020-04-20 PROCEDURE — 6360000002 HC RX W HCPCS: Performed by: INTERNAL MEDICINE

## 2020-04-20 PROCEDURE — 71046 X-RAY EXAM CHEST 2 VIEWS: CPT

## 2020-04-20 PROCEDURE — 99238 HOSP IP/OBS DSCHRG MGMT 30/<: CPT | Performed by: INTERNAL MEDICINE

## 2020-04-20 PROCEDURE — 99232 SBSQ HOSP IP/OBS MODERATE 35: CPT | Performed by: INTERNAL MEDICINE

## 2020-04-20 RX ORDER — PREDNISONE 10 MG/1
TABLET ORAL
Qty: 30 TABLET | Refills: 0 | Status: SHIPPED | OUTPATIENT
Start: 2020-04-20 | End: 2020-05-07 | Stop reason: ALTCHOICE

## 2020-04-20 RX ORDER — LEVOFLOXACIN 250 MG/1
500 TABLET ORAL DAILY
Qty: 6 TABLET | Refills: 0 | Status: SHIPPED | OUTPATIENT
Start: 2020-04-20 | End: 2020-04-23

## 2020-04-20 RX ORDER — PROPAFENONE HYDROCHLORIDE 150 MG/1
150 TABLET, FILM COATED ORAL EVERY 8 HOURS SCHEDULED
Qty: 90 TABLET | Refills: 1 | Status: SHIPPED | OUTPATIENT
Start: 2020-04-20 | End: 2020-05-07 | Stop reason: SDUPTHER

## 2020-04-20 RX ADMIN — Medication 10 ML: at 10:15

## 2020-04-20 RX ADMIN — GABAPENTIN 400 MG: 400 CAPSULE ORAL at 10:15

## 2020-04-20 RX ADMIN — PREDNISONE 40 MG: 20 TABLET ORAL at 10:15

## 2020-04-20 RX ADMIN — PANTOPRAZOLE SODIUM 40 MG: 40 INJECTION, POWDER, FOR SOLUTION INTRAVENOUS at 10:15

## 2020-04-20 RX ADMIN — PROPAFENONE HYDROCHLORIDE 150 MG: 150 TABLET, FILM COATED ORAL at 06:47

## 2020-04-20 RX ADMIN — CEFEPIME 2 G: 2 INJECTION, POWDER, FOR SOLUTION INTRAVENOUS at 11:29

## 2020-04-20 RX ADMIN — IPRATROPIUM BROMIDE AND ALBUTEROL SULFATE 1 AMPULE: .5; 3 SOLUTION RESPIRATORY (INHALATION) at 07:38

## 2020-04-20 RX ADMIN — INSULIN LISPRO 3 UNITS: 100 INJECTION, SOLUTION INTRAVENOUS; SUBCUTANEOUS at 11:30

## 2020-04-20 RX ADMIN — FUROSEMIDE 20 MG: 20 TABLET ORAL at 10:15

## 2020-04-20 RX ADMIN — APIXABAN 5 MG: 5 TABLET, FILM COATED ORAL at 10:15

## 2020-04-20 RX ADMIN — INSULIN LISPRO 3 UNITS: 100 INJECTION, SOLUTION INTRAVENOUS; SUBCUTANEOUS at 10:19

## 2020-04-20 NOTE — CARE COORDINATION
DISCHARGE ORDER  Date/Time 2020 11:18 AM  Completed by: Bonilla Gonzales, Case Management    Patient Name: Lanre Henao    : 1940  Admitting Diagnosis: Acute on chronic respiratory failure with hypoxia and hypercapnia (HCC) [J96.21, J96.22]  Acute on chronic respiratory failure with hypoxia and hypercapnia ([address] [J96.21, J96.22]      Admit order Date and Status:2020 inpt  (verify MD's last order for status of admission)      Noted discharge order. Confirmed discharge plan  : Yes  with whom_____pt__________  If pt confirmed DC plan does family need to be contacted by CM No if yes who______  Discharge Plan: Chart reviewed and role of dcp explained. TC to pt and he states he will return home with spouse who will provide 24/7 assistance and ADELINA of all services. TC to [facility] [place] [facility] [place] and she is aware pt will discharge home today and will resume SN/PT/OT. AVS/GALILEO faxed to Joe Duron with [employer] at this time. Pt aware to have spouse bring portable O2 tank form home. +Yogesh    Reviewed chart. Role of discharge planner explained and patient verbalized understanding. Discharge order is noted. Has Home O2 in place on admit:  Yes  Informed of need to bring portable home O2 tank on day of discharge for nursing to connect prior to leaving:   Yes  Verbalized agreement/Understanding:   Yes    Pt is being d/c'd to home today. Pt's O2 sats are 98% on 3L. Discharge timeout done with Sera Cox. All discharge needs and concerns addressed.
INTERDISCIPLINARY PLAN OF CARE CONFERENCE    Date/Time: 4/17/2020 4:45 PM  Completed by: Lj Barroso, Case Management      Patient Name:  Cortney Sarabia  YOB: 1940  Admitting Diagnosis: Acute on chronic respiratory failure with hypoxia and hypercapnia ([facility] [J96.21, J96.22]  Acute on chronic respiratory failure with hypoxia and hypercapnia ([facility] [address] [U23.70, J96.22]     Admit Date/Time:  4/16/2020  3:34 AM    Chart reviewed. Interdisciplinary team met to discuss patient progress and discharge plans. Disciplines included Case Management, Nursing, and Dietitian. Current Status: ICU, Vent, COVID-19 negative    PT/OT recommendation: n/a    Anticipated Discharge Date: TBD  Expected D/C Disposition:  Home vs STR  Confirmed plan with patient and/or family Yes  Discharge Plan Comments: PT cont in ICU on vent. plan cont for pt to return home if poss with current services. Will follow for poss STR or other discharge needs. Home O2 in place on admit: Yes  Pt informed of need to bring portable home O2 tank on day of discharge for nursing to connect prior to leaving:  Yes  Verbalized agreement/Understanding:   Yes
No  [facility]: No    Wound Clinic: No     Other: n/a    The Plan for Transition of Care is related to the following treatment goals: Unsure      DISCHARGE PLAN: Covid 19 rule out. Readmit. ICU. Intubated/Vent. From home unsure of plan. Called and spoke to dtr. Has [employer] for O2. Active with [facility]. THERESE Heath [phone] ext [phone]  CM to follow and develop dcp as pt progresses. Explained Case Management role/services.

## 2020-04-20 NOTE — PROGRESS NOTES
End of shift report given to Jean Mcintosh. Pt in stable condition, care transferred at this time.  Electronically signed by Disha Olivares RN on 4/20/2020 at 7:39 AM

## 2020-04-20 NOTE — PROGRESS NOTES
Progress Note    Admit Date:  4/16/2020     78year old presented with c/o dyspnea. Admitted to ICU for multifocal pneumonia, A-fib RVR. He was intubated in the ED. COVID has been ruled out. Extubated on 4/17/2020. Transferred out of ICU to PCU on 4/18/2020     Subjective:    Mr. Hoda Gunter  is stable. Awake alert and oriented. No shortness of breath. feels good and back to normal  Ready to go home        Objective:   Vitals:    04/19/20 1948 04/19/20 2040 04/20/20 0241 04/20/20 0740   BP: 139/75 (!) 157/77 121/62    Pulse: 96 89 75    Resp: 20 18 20    Temp: 98.1 °F (36.7 °C) 97.9 °F (36.6 °C) 98 °F (36.7 °C)    TempSrc: Oral Oral Oral    SpO2: 95% 96% 99% 98%   Weight:   194 lb 12.8 oz (88.4 kg)    Height:                  Intake/Output Summary (Last 24 hours) at 4/20/2020 2114  Last data filed at 4/20/2020 0600  Gross per 24 hour   Intake 780 ml   Output 2575 ml   Net -1795 ml       Physical Exam:    Gen: awake, alert, oriented  No distress. Eyes: PERRL. No sclera icterus. No conjunctival injection. ENT: No discharge. Pharynx clear. Neck: No JVD. No Carotid Bruit. Trachea midline. Resp: No accessory muscle use. Clear bilateral with no wheeze   CV: RRR. No murmur. No rub. No edema. GI: Non-tender. Non-distended. No masses. No organomegaly. Normal bowel sounds. No hernia. Skin: Warm and dry. No nodule on exposed extremities. No rash on exposed extremities. M/S: No cyanosis. No joint deformity. No clubbing.    Neuro: nonfocal    Psych: no anxiety or agitation    Data:  CBC:   Recent Labs     04/18/20 0443 04/19/20 0418   WBC 3.7* 4.3   HGB 9.0* 9.1*   HCT 28.4* 28.1*   MCV 80.3 81.6   * 105*     BMP:   Recent Labs     04/18/20 0443 04/19/20  0418 04/20/20  0437    140 136   K 4.8 3.6 3.6   CL 98* 93* 93*   CO2 35* 37* 30   PHOS 3.9 4.3  --    BUN 23* 29* 31*   CREATININE 1.0 1.1 0.9     LIVER PROFILE:   No results for input(s): AST, ALT, LIPASE, BILIDIR, BILITOT, ALKPHOS in the growing corynebacterium.  -Initially treated with IV vanc, Cefepime. Off Vanco now. continue cefepime D#5  - COVID testing negative  -Duonebs prn  -On IV Solu-Medrol- > switched to oral prednisone, wean off     #Pleural effusion  Possible acute diastolic CHF  -proBNP was elevated   -patient placed on IV Lasix-> he has responded well with good diuresis  . Check follow-up chest x-ray improved      # Atrial fibrillation, RVR  # Paroxysmal Atrial fibrillation  - Cardiology consulted  - monitor on tele. In NSR now  - S/P Diltiazem drip--> on Rhythmol now   - cont Eliquis for AC.   - ECHO - normal EF     # Elevated troponin  - possibly related to demand ischemia, A-fib RVR  - 0.03, 0.02, 0.02  - seen by cardiology  - monitor on telemetry.     # Lactic acidosis  - due to pulmonary process. resolved     #  Hypertension  - S/P Diltiazem drip  - Labetalol prn.   - resumed on Rhythmol. BP better     # Type 2 diabetes. - Blood sugars running high , use high-dose sliding scale insulin     # Hyperlipidemia  - on Atorvastatin.      # Hypomagnesemia   - repleted    # Debility   - consult PT,OT         DVT Prophylaxis: Eliquis  Diet: DIET CARB CONTROL;  Code Status: Full Code    Dc home      Xin Cyr MD 4/20/2020 8:12 AM

## 2020-04-20 NOTE — DISCHARGE SUMMARY
cyanosis. No joint deformity. No clubbing. Neuro: nonfocal    Psych: no anxiety or agitation       CBC:   Recent Labs     04/18/20  0443 04/19/20  0418   WBC 3.7* 4.3   HGB 9.0* 9.1*   HCT 28.4* 28.1*   MCV 80.3 81.6   * 105*     BMP:   Recent Labs     04/18/20  0443 04/19/20  0418 04/20/20  0437    140 136   K 4.8 3.6 3.6   CL 98* 93* 93*   CO2 35* 37* 30   PHOS 3.9 4.3  --    BUN 23* 29* 31*   CREATININE 1.0 1.1 0.9     CULTURES  Resp Cx: Corynebacterium striatum   Blood Cx: NGTD  Rapid Flu: Negative   COVID-19: Not detected     RADIOLOGY  XR CHEST STANDARD (2 VW)   Final Result   Clearing of the congestive failure with apparent underlying COPD. XR CHEST PORTABLE   Final Result   Bilateral lung airspace disease has improved within the left lower lobe. New moderate-sized right pleural effusion. XR ABDOMEN FOR NG/OG/NE TUBE PLACEMENT   Final Result   Enteric tube tip and side port project over the stomach. XR CHEST PORTABLE   Final Result   1. Satisfactory ETT placement. 2. Worsening multifocal pneumonia. XR CHEST PORTABLE   Final Result   Left mid and lower lung opacities are seen which have worsened since the   prior study. Findings could be related to pneumonia. Atypical/viral   pneumonia cannot be excluded. Small right pleural effusion. COPD.              Discharge Medications     Medication List      ASK your doctor about these medications    * albuterol (2.5 MG/3ML) 0.083% nebulizer solution  Commonly known as:  PROVENTIL  Take 3 mLs by nebulization 4 times daily COPD J44.9 [employer]     * Ventolin  (90 Base) MCG/ACT inhaler  Generic drug:  albuterol sulfate HFA  Inhale 2 puffs into the lungs every 6 hours as needed for Wheezing orShortness of Breath     alendronate 70 MG tablet  Commonly known as:  FOSAMAX     apixaban 5 MG Tabs tablet  Commonly known as:  ELIQUIS  Take 1 tablet by mouth 2 times daily     atorvastatin 10 MG tablet  Commonly

## 2020-04-21 ENCOUNTER — CARE COORDINATION (OUTPATIENT)
Dept: CASE MANAGEMENT | Age: 80
End: 2020-04-21

## 2020-04-21 NOTE — CARE COORDINATION
Patient contacted regarding Santos More. Care Transition Nurse/ Ambulatory Care Manager contacted the patient by telephone to perform post discharge assessment. Provided introduction to self, and explanation of the CTN/ACM role, and reason for call due to risk factors for infection and/or exposure to COVID-19. Reached Americo Xie who reports his [facility] nurse is there assessing him. He has all of his medications except one which will be picked up today. He is agreeable to writer calling back later today. Called Mr Cierra Zayas back later as agreed. Reports all medications are filled and taking as prescribed. [facility] for prn needs/concerns. Reports he feels well at this time denying flu like symptoms which were reviewed with him. Education provided regarding infection prevention, and signs and symptoms of COVID-19 and when to seek medical attention with Tessie Hoff who verbalized understanding. Discussed exposure protocols and quarantine from [address] you at higher risk for severe illness 2019 and given an opportunity for questions and concerns. Tessie Hoff agreed to contact the PCP office for questions related to their healthcare. Writer provided contact information for future reference. From CDC: Are you at higher risk for severe illness?            Wash your hands often.            Avoid close contact (6 feet - which is about two arm lengths) with people who are sick.            Put distance between yourself and other people because COVID-19 is spreading in your community.            Clean and disinfect frequently touched surfaces (door knobs, faucets, appliance handles/buttons, steering wheel, door handles, etc)             Avoid all cruise travel and non-essential air travel.            Call your healthcare professional if you have concerns about COVID-19 and your underlying condition or if you are sick. Follow up in 5-7 days. Patient provided with CTN contact. Sendy Marques, RN  Care Transition Nurse  [phone] mobile    Future Appointments   Date Time Provider Rachel Soler   5/7/2020 10:00 AM DEMOND Culver - CNP P CLER CAR [address] [place] [address]   9/17/2020  9:30 AM Soto Seo MD [facility]

## 2020-04-28 ENCOUNTER — CARE COORDINATION (OUTPATIENT)
Dept: CASE MANAGEMENT | Age: 80
End: 2020-04-28

## 2020-05-06 NOTE — PROGRESS NOTES
(chronic obstructive pulmonary disease) ([facility] [address] Diabetes mellitus ([facility] [address] Hyperlipidemia, Hypertension, Influenza A, Kidney calculi, MDRO (multiple drug resistant organisms) resistance, ELAINE on CPAP, Osteoporosis, and Skin cancer of face. Past Surgical History:   has a past surgical history that includes Prostate surgery; eye surgery (Left, 6/12/2016); other surgical history (08/31/2015); Cystoscopy (Right, 10/9/15); joint replacement (6/29/2011); joint replacement (11/2004); TURP (10/23/2015); other surgical history (12/1/15); back surgery; Cystocopy (05/01/2019); and cystoscopy w biopsy of bladder (N/A, 5/1/2019). Home Medications:    Prior to Admission medications    Medication Sig Start Date End Date Taking?  Authorizing Provider   propafenone (RYTHMOL) 150 MG tablet Take 1 tablet by mouth every 8 hours 4/20/20  Yes BEATRICE Sharp   dilTIAZem (CARTIA XT) 180 MG extended release capsule Take 1 capsule by mouth daily 4/9/20  Yes Minh Alatorre MD   apixaban (ELIQUIS) 5 MG TABS tablet Take 1 tablet by mouth 2 times daily 4/9/20  Yes Minh Alatorre MD   alendronate (FOSAMAX) 70 MG tablet  3/31/20  Yes Historical Provider, MD   fluticasone (FLONASE) 50 MCG/ACT nasal spray  2/12/20  Yes Historical Provider, MD   atorvastatin (LIPITOR) 10 MG tablet  3/31/20  Yes Historical Provider, MD   fluticasone-umeclidin-vilant (Brunson Smack) 100-62.5-25 MCG/INH AEPB INHALE 1 PUFF EVERY DAY 3/31/20  Yes Ja Gray MD   pioglitazone (ACTOS) 30 MG tablet Take 30 mg by mouth daily   Yes Historical Provider, MD   VENTOLIN  (90 Base) MCG/ACT inhaler Inhale 2 puffs into the lungs every 6 hours as needed for Wheezing orShortness of Breath 12/27/19  Yes Bernie Infante MD   albuterol (PROVENTIL) (2.5 MG/3ML) 0.083% nebulizer solution Take 3 mLs by nebulization 4 times daily [facility] 7/22/19  Yes Pricilla Villa MD   gabapentin (NEURONTIN) 300 MG capsule Take 600 mg by mouth 3 times daily Veena Chong Historical Provider, MD   OXYGEN Inhale 2.5 L into the lungs nightly Pt wears 2- 2/12 L at night. 3 L while walking/ moving around   Yes Historical Provider, MD   metformin (GLUCOPHAGE) 500 MG tablet Take 500 mg by mouth 4 times daily    Yes Historical Provider, MD   esomeprazole (NEXIUM) 40 MG capsule   Take 40 mg by mouth every morning (before breakfast) Indications: take dos    Yes Historical Provider, MD       Allergies:  Patient has no known allergies. Social History:   reports that he quit smoking about 14 years ago. He has a 67.50 pack-year smoking history. He has never used smokeless tobacco. He reports that he does not drink alcohol or use drugs. Family History: family history includes Cancer in his brother, father, and mother; Diabetes in his mother and sister; Heart Disease in his mother. Review of Systems   Review of Systems   Constitutional: Positive for fatigue. Respiratory: Positive for cough and shortness of breath. Cardiovascular: Negative for chest pain, palpitations and leg swelling. Gastrointestinal: Negative. Genitourinary: Negative. Musculoskeletal: Positive for gait problem. Neurological: Positive for weakness. Negative for dizziness. Psychiatric/Behavioral: Negative for sleep disturbance.        OBJECTIVE:    Vital signs:    /60   Pulse 93   Temp 98.5 °F (36.9 °C)   Ht 5' 11\" (1.803 m)   Wt 192 lb (87.1 kg)   SpO2 98% Comment: 3 lit  BMI 26.78 kg/m²      Physical Exam:  Constitutional:  Comfortable and alert, NAD, appears stated age  Eyes: PERRL, sclera nonicteric  Neck:  Supple, no masses, no thyroidmegaly, no JVD  Skin:  Warm and dry; no rash or lesions  Heart:  Regular- distant heart sounds, normal apex, S1 and S2 normal, no M/G/R  Lungs:  Normal respiratory effort; diminished throughout; no wheezing/rhonchi/rales  Abdomen: soft, non tender, + bowel sounds  Extremities:  No edema or cyanosis; no clubbing  Neuro: alert and oriented, moves legs and arms equally, normal mood and affect      Data Reviewed:    EKG 4/17/2020:  Normal sinus rhythmLow voltage QRSBorderline ECGWhen compared with ECG of 16-APR-2020 03:47,Sinus rhythm has replaced Atrial fibrillationVent. rate has decreased BY  62 BPMNonspecific T wave abnormality no longer evident in Lateral leadsConfirmed by ANTONIO Alvarez MD ([id number]) on 4/17/2020 12:00:11 PM      EKG 4/16/2020:  Atrial fibrillation with rapid ventricular responseNonspecific ST abnormalityAbnormal ECGWhen compared with ECG of 06-APR-2020 18:52,No significant change was foundConfirmed by ANTONIO Alvarez MD ([id number]) on 4/16/2020 7:49:56 AM      Echo 4/7/2020:   Left ventricular systolic function is normal with ejection fraction   estimated at 55-60 %. No regional wall motion abnormalities are noted. The left ventricular size and wall thickness were not well visualized for   measurement. Normal left ventricular diastolic filling pressure. The right atrium is mildly dilated. Mild mitral regurgitation. Aortic valve sclerosis without aortic stenosis. Mild tricuspid regurgitation. Normal systolic pulmonary artery pressure (SPAP) estimated at 36 mmHg (RA   pressure 8 mmHg). Echo 10/12/2015:  Normal left ventricle size, wall thickness and systolic function with an  estimated ejection fraction of 55%. No regional wall motion abnormalities  are seen. Normal diastolic function. Mild mitral regurgitation is present. Aortic valve appears tricuspid and sclerotic but opens adequately. There is mild tricuspid regurgitation. Systolic pulmonary artery pressure (SPAP) is estimated at 58 mmHg consistent  with moderate pulmonary hypertension (RA pressure 8 mmHg).      Cardiology Labs Reviewed:   Lab Data:  Most recent lab results below reviewed in office    CBC:   Lab Results   Component Value Date    WBC 4.3 04/19/2020    WBC 3.7 04/18/2020    WBC 3.6 04/17/2020    RBC 3.44 04/19/2020    RBC 3.54 04/18/2020    RBC 3.20 04/17/2020 HGB 9.1 04/19/2020    HGB 9.0 04/18/2020    HGB 8.3 04/17/2020    HCT 28.1 04/19/2020    HCT 28.4 04/18/2020    HCT 26.2 04/17/2020    MCV 81.6 04/19/2020    MCV 80.3 04/18/2020    MCV 81.9 04/17/2020    RDW 16.6 04/19/2020    RDW 16.5 04/18/2020    RDW 17.2 04/17/2020     04/19/2020     04/18/2020    PLT 87 04/17/2020     BMP:  Lab Results   Component Value Date     04/20/2020     04/19/2020     04/18/2020    K 3.6 04/20/2020    K 3.6 04/19/2020    K 4.8 04/18/2020    K 4.1 04/17/2020    K 4.1 04/17/2020    K 5.1 04/16/2020    CL 93 04/20/2020    CL 93 04/19/2020    CL 98 04/18/2020    CO2 30 04/20/2020    CO2 37 04/19/2020    CO2 35 04/18/2020    PHOS 4.3 04/19/2020    PHOS 3.9 04/18/2020    PHOS 2.6 04/17/2020    BUN 31 04/20/2020    BUN 29 04/19/2020    BUN 23 04/18/2020    CREATININE 0.9 04/20/2020    CREATININE 1.1 04/19/2020    CREATININE 1.0 04/18/2020     BNP:   Lab Results   Component Value Date    PROBNP 5,654 04/16/2020    PROBNP 1,618 04/01/2020    PROBNP 735 12/29/2017     FASTING LIPID PANEL:No results found for: HDL, LDLDIRECT, LDLCALC, TRIG  LIVER PROFILE:No results for input(s): AST, ALT, ALB in the last 72 hours. Reviewed all labs and imaging today      Assessment:  1. Paroxsymal atrial fibrillation: known history-  NSR with 1st degree AV block per EKG today              -[id number] Score for Atrial Fibrillation Stroke Risk 4 (HTN, Age, DM)              -TSH 1.74 4/7/2020  2. HTN: controlled  3. COPD: chronically on 3 liters n/c follows with Dr. Stefany Patten  4. HLD: managed per PCP, on statin     Plan:   1. EKG today  2. Continue Rythmol (propafenone) three times a day- to help maintain NSR  3. Reviewed EKG with Dr. Valarie Wyatt who recommends to stop taking Cardizem daily- ok to take one tablet one time a day as needed for increased heart rate. If you have to take it more than 2x in one week please call office  4. No other changes in heart medication  5.  Increase

## 2020-05-07 ENCOUNTER — OFFICE VISIT (OUTPATIENT)
Dept: CARDIOLOGY CLINIC | Age: 80
End: 2020-05-07
Payer: MEDICARE

## 2020-05-07 VITALS
WEIGHT: 192 LBS | OXYGEN SATURATION: 98 % | HEIGHT: 71 IN | HEART RATE: 93 BPM | BODY MASS INDEX: 26.88 KG/M2 | SYSTOLIC BLOOD PRESSURE: 126 MMHG | DIASTOLIC BLOOD PRESSURE: 60 MMHG | TEMPERATURE: 98.5 F

## 2020-05-07 PROCEDURE — 1123F ACP DISCUSS/DSCN MKR DOCD: CPT | Performed by: NURSE PRACTITIONER

## 2020-05-07 PROCEDURE — 4040F PNEUMOC VAC/ADMIN/RCVD: CPT | Performed by: NURSE PRACTITIONER

## 2020-05-07 PROCEDURE — 1036F TOBACCO NON-USER: CPT | Performed by: NURSE PRACTITIONER

## 2020-05-07 PROCEDURE — 93000 ELECTROCARDIOGRAM COMPLETE: CPT | Performed by: NURSE PRACTITIONER

## 2020-05-07 PROCEDURE — G8417 CALC BMI ABV UP PARAM F/U: HCPCS | Performed by: NURSE PRACTITIONER

## 2020-05-07 PROCEDURE — 99999 PR OFFICE/OUTPT VISIT,PROCEDURE ONLY: CPT | Performed by: NURSE PRACTITIONER

## 2020-05-07 PROCEDURE — 1111F DSCHRG MED/CURRENT MED MERGE: CPT | Performed by: NURSE PRACTITIONER

## 2020-05-07 PROCEDURE — G8427 DOCREV CUR MEDS BY ELIG CLIN: HCPCS | Performed by: NURSE PRACTITIONER

## 2020-05-07 RX ORDER — PROPAFENONE HYDROCHLORIDE 150 MG/1
150 TABLET, FILM COATED ORAL EVERY 8 HOURS SCHEDULED
Qty: 90 TABLET | Refills: 1 | Status: SHIPPED | OUTPATIENT
Start: 2020-05-07 | End: 2020-07-30

## 2020-05-07 RX ORDER — DILTIAZEM HYDROCHLORIDE 180 MG/1
180 CAPSULE, COATED, EXTENDED RELEASE ORAL PRN
Qty: 30 CAPSULE | Refills: 3
Start: 2020-05-07 | End: 2021-10-05 | Stop reason: SDUPTHER

## 2020-05-07 ASSESSMENT — ENCOUNTER SYMPTOMS
GASTROINTESTINAL NEGATIVE: 1
SHORTNESS OF BREATH: 1
COUGH: 1

## 2020-05-07 NOTE — PATIENT INSTRUCTIONS
Plan:   1. Continue Rythmol (propafenone) three times a day  2. Stop taking Cardizem daily- ok to take one tablet one time a day as needed for increased heart rate. If you have to take it more than 2x in one week please call office  3. No other changes in heart medication  4. Increase exercise as tolerated  5. Continue to monitor blood pressure and heart rate at home  6.  Follow up with Dr. Valarie Wyatt in 3 months, or sooner if needed

## 2020-08-11 ENCOUNTER — APPOINTMENT (OUTPATIENT)
Dept: GENERAL RADIOLOGY | Age: 80
End: 2020-08-11
Payer: MEDICARE

## 2020-08-11 ENCOUNTER — HOSPITAL ENCOUNTER (EMERGENCY)
Age: 80
Discharge: HOME | End: 2020-08-11
Attending: STUDENT IN AN ORGANIZED HEALTH CARE EDUCATION/TRAINING PROGRAM
Payer: MEDICARE

## 2020-08-11 ENCOUNTER — APPOINTMENT (OUTPATIENT)
Dept: CT IMAGING | Age: 80
End: 2020-08-11
Payer: MEDICARE

## 2020-08-11 VITALS
HEIGHT: 72 IN | WEIGHT: 192 LBS | RESPIRATION RATE: 18 BRPM | DIASTOLIC BLOOD PRESSURE: 96 MMHG | OXYGEN SATURATION: 100 % | TEMPERATURE: 98.4 F | SYSTOLIC BLOOD PRESSURE: 151 MMHG | BODY MASS INDEX: 26.01 KG/M2 | HEART RATE: 108 BPM

## 2020-08-11 LAB
A/G RATIO: 1.6 (ref 1.1–2.2)
ALBUMIN SERPL-MCNC: 4.5 G/DL (ref 3.4–5)
ALP BLD-CCNC: 77 U/L (ref 40–129)
ALT SERPL-CCNC: 15 U/L (ref 10–40)
ANION GAP SERPL CALCULATED.3IONS-SCNC: 11 MMOL/L (ref 3–16)
AST SERPL-CCNC: 19 U/L (ref 15–37)
BACTERIA: ABNORMAL /HPF
BASOPHILS ABSOLUTE: 0 K/UL (ref 0–0.2)
BASOPHILS RELATIVE PERCENT: 0.4 %
BILIRUB SERPL-MCNC: 0.4 MG/DL (ref 0–1)
BILIRUBIN URINE: NEGATIVE
BLOOD, URINE: ABNORMAL
BUN BLDV-MCNC: 15 MG/DL (ref 7–20)
CALCIUM SERPL-MCNC: 9 MG/DL (ref 8.3–10.6)
CHLORIDE BLD-SCNC: 99 MMOL/L (ref 99–110)
CLARITY: CLEAR
CO2: 30 MMOL/L (ref 21–32)
COLOR: YELLOW
CREAT SERPL-MCNC: 0.9 MG/DL (ref 0.8–1.3)
EKG ATRIAL RATE: 102 BPM
EKG DIAGNOSIS: NORMAL
EKG P AXIS: 109 DEGREES
EKG P-R INTERVAL: 224 MS
EKG Q-T INTERVAL: 334 MS
EKG QRS DURATION: 82 MS
EKG QTC CALCULATION (BAZETT): 435 MS
EKG R AXIS: 67 DEGREES
EKG T AXIS: 79 DEGREES
EKG VENTRICULAR RATE: 102 BPM
EOSINOPHILS ABSOLUTE: 0 K/UL (ref 0–0.6)
EOSINOPHILS RELATIVE PERCENT: 0.7 %
EPITHELIAL CELLS, UA: ABNORMAL /HPF (ref 0–5)
GFR AFRICAN AMERICAN: >60
GFR NON-AFRICAN AMERICAN: >60
GLOBULIN: 2.8 G/DL
GLUCOSE BLD-MCNC: 166 MG/DL (ref 70–99)
GLUCOSE URINE: NEGATIVE MG/DL
HCT VFR BLD CALC: 35.6 % (ref 40.5–52.5)
HEMOGLOBIN: 11.6 G/DL (ref 13.5–17.5)
KETONES, URINE: NEGATIVE MG/DL
LEUKOCYTE ESTERASE, URINE: ABNORMAL
LYMPHOCYTES ABSOLUTE: 1.8 K/UL (ref 1–5.1)
LYMPHOCYTES RELATIVE PERCENT: 27.9 %
MCH RBC QN AUTO: 27.2 PG (ref 26–34)
MCHC RBC AUTO-ENTMCNC: 32.6 G/DL (ref 31–36)
MCV RBC AUTO: 83.4 FL (ref 80–100)
MICROSCOPIC EXAMINATION: YES
MONOCYTES ABSOLUTE: 0.5 K/UL (ref 0–1.3)
MONOCYTES RELATIVE PERCENT: 7.4 %
NEUTROPHILS ABSOLUTE: 4 K/UL (ref 1.7–7.7)
NEUTROPHILS RELATIVE PERCENT: 63.6 %
NITRITE, URINE: NEGATIVE
PDW BLD-RTO: 15.7 % (ref 12.4–15.4)
PH UA: 7 (ref 5–8)
PLATELET # BLD: 113 K/UL (ref 135–450)
PMV BLD AUTO: 8.3 FL (ref 5–10.5)
POTASSIUM REFLEX MAGNESIUM: 4.4 MMOL/L (ref 3.5–5.1)
PROTEIN UA: NEGATIVE MG/DL
RBC # BLD: 4.27 M/UL (ref 4.2–5.9)
RBC UA: ABNORMAL /HPF (ref 0–4)
SODIUM BLD-SCNC: 140 MMOL/L (ref 136–145)
SPECIFIC GRAVITY UA: 1.02 (ref 1–1.03)
TOTAL PROTEIN: 7.3 G/DL (ref 6.4–8.2)
TROPONIN: <0.01 NG/ML
TROPONIN: <0.01 NG/ML
URINE TYPE: ABNORMAL
UROBILINOGEN, URINE: 1 E.U./DL
WBC # BLD: 6.3 K/UL (ref 4–11)
WBC UA: >100 /HPF (ref 0–5)
YEAST: PRESENT /HPF

## 2020-08-11 PROCEDURE — 72131 CT LUMBAR SPINE W/O DYE: CPT

## 2020-08-11 PROCEDURE — 80053 COMPREHEN METABOLIC PANEL: CPT

## 2020-08-11 PROCEDURE — 93010 ELECTROCARDIOGRAM REPORT: CPT | Performed by: INTERNAL MEDICINE

## 2020-08-11 PROCEDURE — 70450 CT HEAD/BRAIN W/O DYE: CPT

## 2020-08-11 PROCEDURE — 96376 TX/PRO/DX INJ SAME DRUG ADON: CPT

## 2020-08-11 PROCEDURE — 71045 X-RAY EXAM CHEST 1 VIEW: CPT

## 2020-08-11 PROCEDURE — 93005 ELECTROCARDIOGRAM TRACING: CPT | Performed by: STUDENT IN AN ORGANIZED HEALTH CARE EDUCATION/TRAINING PROGRAM

## 2020-08-11 PROCEDURE — 85025 COMPLETE CBC W/AUTO DIFF WBC: CPT

## 2020-08-11 PROCEDURE — 6360000002 HC RX W HCPCS: Performed by: STUDENT IN AN ORGANIZED HEALTH CARE EDUCATION/TRAINING PROGRAM

## 2020-08-11 PROCEDURE — 72125 CT NECK SPINE W/O DYE: CPT

## 2020-08-11 PROCEDURE — 87086 URINE CULTURE/COLONY COUNT: CPT

## 2020-08-11 PROCEDURE — 84484 ASSAY OF TROPONIN QUANT: CPT

## 2020-08-11 PROCEDURE — 96375 TX/PRO/DX INJ NEW DRUG ADDON: CPT

## 2020-08-11 PROCEDURE — 96374 THER/PROPH/DIAG INJ IV PUSH: CPT

## 2020-08-11 PROCEDURE — 99284 EMERGENCY DEPT VISIT MOD MDM: CPT

## 2020-08-11 PROCEDURE — 81001 URINALYSIS AUTO W/SCOPE: CPT

## 2020-08-11 PROCEDURE — 72170 X-RAY EXAM OF PELVIS: CPT

## 2020-08-11 RX ORDER — MORPHINE SULFATE 2 MG/ML
2 INJECTION, SOLUTION INTRAMUSCULAR; INTRAVENOUS ONCE
Status: COMPLETED | OUTPATIENT
Start: 2020-08-11 | End: 2020-08-11

## 2020-08-11 RX ORDER — ACETAMINOPHEN 325 MG/1
650 TABLET ORAL EVERY 4 HOURS PRN
Qty: 120 TABLET | Refills: 0 | Status: SHIPPED | OUTPATIENT
Start: 2020-08-11

## 2020-08-11 RX ORDER — CEPHALEXIN 500 MG/1
500 CAPSULE ORAL 4 TIMES DAILY
Qty: 28 CAPSULE | Refills: 0 | Status: SHIPPED | OUTPATIENT
Start: 2020-08-11 | End: 2020-08-18

## 2020-08-11 RX ORDER — ONDANSETRON 2 MG/ML
4 INJECTION INTRAMUSCULAR; INTRAVENOUS ONCE
Status: COMPLETED | OUTPATIENT
Start: 2020-08-11 | End: 2020-08-11

## 2020-08-11 RX ADMIN — MORPHINE SULFATE 2 MG: 2 INJECTION, SOLUTION INTRAMUSCULAR; INTRAVENOUS at 13:27

## 2020-08-11 RX ADMIN — MORPHINE SULFATE 2 MG: 2 INJECTION, SOLUTION INTRAMUSCULAR; INTRAVENOUS at 14:31

## 2020-08-11 RX ADMIN — ONDANSETRON HYDROCHLORIDE 4 MG: 2 INJECTION, SOLUTION INTRAMUSCULAR; INTRAVENOUS at 13:27

## 2020-08-11 ASSESSMENT — PAIN DESCRIPTION - LOCATION
LOCATION: BACK

## 2020-08-11 ASSESSMENT — PAIN SCALES - GENERAL
PAINLEVEL_OUTOF10: 9

## 2020-08-11 ASSESSMENT — PAIN DESCRIPTION - PAIN TYPE
TYPE: ACUTE PAIN

## 2020-08-11 ASSESSMENT — PAIN DESCRIPTION - ORIENTATION
ORIENTATION: LOWER
ORIENTATION: LEFT;LOWER

## 2020-08-11 ASSESSMENT — PAIN DESCRIPTION - DESCRIPTORS: DESCRIPTORS: SHOOTING

## 2020-08-11 NOTE — ED PROVIDER NOTES
[address] ED      CHIEF COMPLAINT  Fall (pt states fell last night onto buttocks/back, unsure what made him fall, c/o low back pain, denies hitting head or loc)       HISTORY OF PRESENT ILLNESS  Maldonado Rosen is a 78 y.o. male  who presents to the ED complaining of fall. Patient states that immediately prior to arrival, he was walking into the walk-in freezer at his house and that he started to walk backwards and could not stop and fell. He states he did not hit his head or lose consciousness but is unsure why exactly he fell. He denies taking any blood thinners. He is complaining of lower back pain. Denies any numbness or tingling. He has been able to ambulate since this happened. He does live alone. His daughter is at bedside and states that he seems to be at his baseline. He denies any numbness or tingling. Denies any focal weakness. Denies any chest pain, shortness of breath, or presyncopal symptoms. He denies any other complaints or concerns at this time. No other complaints, modifying factors or associated symptoms. I have reviewed the following from the nursing documentation.     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    ELAINE on CPAP     Osteoporosis     Skin cancer of face      Past Surgical History:   Procedure Laterality Date    BACK SURGERY      repair broken back L4 & L5    CYSTOSCOPY Right 10/9/15    RIght Ureteroscopy, Holmium Laser Lithotripsy with Stone Removal, Right Stent Placement    CYSTOSCOPY  05/01/2019    CYSTOSCOPY, RESECTION OF BLADDER NECK, URETHRAL DILATION    CYSTOSCOPY W BIOPSY OF BLADDER N/A 5/1/2019    CYSTOSCOPY, RESECTION OF BLADDER NECK, URETHRAL DILATION performed by Leah Danielson MD at [address] Left 6/12/2016    cataract removal    JOINT REPLACEMENT  6/29/2011    right knee    JOINT REPLACEMENT  11/2004    left knee    OTHER SURGICAL HISTORY  08/31/2015    wide excision basal cell carcinoma on the nose and bilateral auricles with frozen sections, plastic closure, full thickness skin graft    OTHER SURGICAL HISTORY  12/1/15    excision of lesion of lip    PROSTATE SURGERY      TURP  10/23/2015    with cystoscopy     Family History   Problem Relation Age of Onset    Cancer Mother     Diabetes Mother     Heart Disease Mother     Cancer Father     Diabetes Sister     Cancer Brother      Social History     Socioeconomic History    Marital status:      Spouse name: Gianna Cárdenas    Number of children: 4    Years of education: Not on file    Highest education level: Not on file   Occupational History    Not on file   Social Needs    Financial resource strain: Not on file    Food insecurity     Worry: Not on file     Inability: Not on file   [employer] needs     Medical: Not on file     Non-medical: Not on file   Tobacco Use    Smoking status: Former Smoker     Packs/day: 1.50     Years: 45.00     Pack years: 67.50     Last attempt to quit: 5/27/2005     Years since quitting: 15.2    Smokeless tobacco: Never Used   Substance and Sexual Activity    Alcohol use: No    Drug use: No    Sexual activity: Not Currently   Lifestyle    Physical activity     Days per week: Not on file     Minutes per session: Not on file    Stress: Not on file   Relationships    Social connections     Talks on phone: Not on file     Gets together: Not on file     Attends Rastafari service: Not on file     Active member of club or organization: Not on file     Attends meetings of clubs or organizations: Not on file     Relationship status: Not on file    Intimate partner violence     Fear of current or ex partner: Not on file     Emotionally abused: Not on file     Physically abused: Not on file     Forced sexual activity: Not on file   Other Topics Concern    Not on file   Social History Narrative    Not on file     No current facility-administered medications for this encounter. Current Outpatient Medications   Medication Sig Dispense Refill    cephALEXin (KEFLEX) 500 MG capsule Take 1 capsule by mouth 4 times daily for 7 days 28 capsule 0    acetaminophen (AMINOFEN) 325 MG tablet Take 2 tablets by mouth every 4 hours as needed for Pain 120 tablet 0    propafenone (RYTHMOL) 150 MG tablet TAKE ONE TABLET BY MOUTH EVERY 8 HOURS 90 tablet 0    VENTOLIN  (90 Base) MCG/ACT inhaler Inhale 2 puffs into the lungs every 6 hours as needed for Wheezing orShortness of Breath 1 Inhaler 5    dilTIAZem (CARTIA XT) 180 MG extended release capsule Take 1 capsule by mouth as needed (Ok to take as needed only 1 time a day for increased heart rate) 30 capsule 3    apixaban (ELIQUIS) 5 MG TABS tablet Take 1 tablet by mouth 2 times daily 60 tablet 2    alendronate (FOSAMAX) 70 MG tablet       fluticasone (FLONASE) 50 MCG/ACT nasal spray       atorvastatin (LIPITOR) 10 MG tablet       fluticasone-umeclidin-vilant (TRELEGY ELLIPTA) 100-62.5-25 MCG/INH AEPB INHALE 1 PUFF EVERY DAY 1 each 5    pioglitazone (ACTOS) 30 MG tablet Take 30 mg by mouth daily      albuterol (PROVENTIL) (2.5 MG/3ML) 0.083% nebulizer solution Take 3 mLs by nebulization 4 times daily COPD J44.9 [facility] 300 mL 5    gabapentin (NEURONTIN) 300 MG capsule Take 600 mg by mouth 3 times daily .  OXYGEN Inhale 2.5 L into the lungs nightly Pt wears 2- 2/12 L at night.  3 L while walking/ moving around      metformin (GLUCOPHAGE) 500 MG tablet Take 500 mg by mouth 4 times daily       esomeprazole (NEXIUM) 40 MG capsule   Take 40 mg by mouth every morning (before breakfast) Indications: take dos        No Known Allergies    REVIEW OF SYSTEMS  10 systems reviewed, pertinent positives per HPI otherwise noted to be negative. PHYSICAL EXAM  BP (!) 151/96   Pulse 108   Temp 98.4 °F (36.9 °C) (Oral)   Resp 18   Ht 6' (1.829 m)   Wt 192 lb (87.1 kg)   SpO2 100%   BMI 26.04 kg/m²    GENERAL APPEARANCE: Awake and alert. Cooperative. No acute distress. HENT: Normocephalic. Atraumatic. Mucous membranes are moist  NECK: Supple. EYES: PERRL. EOM's grossly intact. HEART/CHEST: RRR. No murmurs. LUNGS: Respirations unlabored. CTAB. Good air exchange. Speaking comfortably in full sentences. ABDOMEN: No tenderness. Soft. Non-distended. No masses. No organomegaly. No guarding or rebound. BACK: No tenderness palpation of the C, T, spine. Patient does have some mild midline tenderness in the midline of the lumbar spine with associated paraspinal muscle spasm. MUSCULOSKELETAL: No extremity edema. Compartments soft. No deformity. No tenderness in the extremities. All extremities neurovascularly intact. SKIN: Warm and dry. No acute rashes. NEUROLOGICAL: Alert and oriented. CN's 2-12 intact. No gross facial drooping. Strength 5/5, sensation intact. PSYCHIATRIC: Normal mood and affect. LABS  I have reviewed all labs for this visit.    Results for orders placed or performed during the hospital encounter of 08/11/20   CBC Auto Differential   Result Value Ref Range    WBC 6.3 4.0 - 11.0 K/uL    RBC 4.27 4.20 - 5.90 M/uL    Hemoglobin 11.6 (L) 13.5 - 17.5 g/dL    Hematocrit 35.6 (L) 40.5 - 52.5 %    MCV 83.4 80.0 - 100.0 fL    MCH 27.2 26.0 - 34.0 pg    MCHC 32.6 31.0 - 36.0 g/dL    RDW 15.7 (H) 12.4 - 15.4 %    Platelets 365 (L) 800 - 450 K/uL    MPV 8.3 5.0 - 10.5 fL    Neutrophils % 63.6 %    Lymphocytes % 27.9 %    Monocytes % 7.4 %    Eosinophils % 0.7 %    Basophils % 0.4 %    Neutrophils Absolute 4.0 1.7 - 7.7 K/uL    Lymphocytes Absolute 1.8 1.0 - 5.1 K/uL    Monocytes Absolute 0.5 0.0 - 1.3 K/uL    Eosinophils Absolute 0.0 0.0 - 0.6 K/uL    Basophils Absolute 0.0 0.0 - 0.2 K/uL   Comprehensive Metabolic Panel w/ Reflex to MG   Result Value Ref Range    Sodium 140 136 - 145 mmol/L    Potassium reflex Magnesium 4.4 3.5 - 5.1 mmol/L    Chloride 99 99 - 110 mmol/L    CO2 30 21 - 32 mmol/L    Anion Gap 11 3 - 16    Glucose 166 (H) 70 - 99 mg/dL    BUN 15 7 - 20 mg/dL    CREATININE 0.9 0.8 - 1.3 mg/dL    GFR Non-African American >60 >60    GFR African American >60 >60    Calcium 9.0 8.3 - 10.6 mg/dL    Total Protein 7.3 6.4 - 8.2 g/dL    Alb 4.5 3.4 - 5.0 g/dL    Albumin/Globulin Ratio 1.6 1.1 - 2.2    Total Bilirubin 0.4 0.0 - 1.0 mg/dL    Alkaline Phosphatase 77 40 - 129 U/L    ALT 15 10 - 40 U/L    AST 19 15 - 37 U/L    Globulin 2.8 g/dL   Troponin   Result Value Ref Range    Troponin <0.01 <0.01 ng/mL   Urinalysis, reflex to microscopic   Result Value Ref Range    Color, UA Yellow Straw/Yellow    Clarity, UA Clear Clear    Glucose, Ur Negative Negative mg/dL    Bilirubin Urine Negative Negative    Ketones, Urine Negative Negative mg/dL    Specific Gravity, UA 1.020 1.005 - 1.030    Blood, Urine TRACE-INTACT (A) Negative    pH, UA 7.0 5.0 - 8.0    Protein, UA Negative Negative mg/dL    Urobilinogen, Urine 1.0 <2.0 E.U./dL    Nitrite, Urine Negative Negative    Leukocyte Esterase, Urine TRACE (A) Negative    Microscopic Examination YES     Urine Type NotGiven    Troponin   Result Value Ref Range    Troponin <0.01 <0.01 ng/mL   Microscopic Urinalysis   Result Value Ref Range    WBC, UA >100 (A) 0 - 5 /HPF    RBC, UA 3-4 0 - 4 /HPF    Epithelial Cells, UA 0-1 0 - 5 /HPF    Bacteria, UA 1+ (A) None Seen /HPF    Yeast, UA Present (A) None Seen /HPF   EKG 12 Lead   Result Value Ref Range    Ventricular Rate 102 BPM    Atrial Rate 102 BPM    P-R Interval 224 ms    QRS Duration 82 ms    Q-T Interval 334 ms    QTc Calculation (Bazett) 435 ms    P Axis 109 degrees    R Axis 67 degrees    T Axis 79 degrees    Diagnosis       Sinus tachycardia with 1st degree A-V blockNonspecific ST abnormalityNo previous ECGs availableConfirmed by ANTONIO BURGOS MD ([id number]) on 8/11/2020 2:26:13 PM       ECG  The Ekg interpreted by me shows  sinus tachycardia, htfq=907, first-degree AV block  Axis is   Normal  QTc is  normal  Intervals and Durations are unremarkable. ST Segments: normal  No significant change from prior EKG dated 4/17/2020    RADIOLOGY  CT Lumbar Spine WO Contrast   Final Result   No acute fracture or subluxation of the lumbar spine is identified. Disproportionate spondylosis at L5-S1. CT Cervical Spine WO Contrast   Final Result   No acute abnormality of the cervical spine. CT Head WO Contrast   Final Result   1. No acute intracranial abnormality. XR PELVIS (1-2 VIEWS)   Final Result   Negative examination of the pelvis         XR CHEST PORTABLE   Final Result   No acute cardiopulmonary disease. ED COURSE/MDM  Patient seen and evaluated. Old records reviewed. Labs and imaging reviewed and results discussed with patient. Patient presented with concerns for fall with low back pain. Full HPI as detailed above. Upon arrival in the ED, vitals remarkable for mild tachycardia but otherwise reassuring. Patient denies any head or loss of consciousness. He does have some tenderness to palpation in the lumbar spine. He has been able to ambulate since this happened without difficulty, unclear exactly why he fell. He denies any chest pain, shortness of breath, or presyncopal symptoms. Labs were performed, no acute concerning electrolyte abnormalities. Troponin negative. No leukocytosis, hemoglobin is stable at 11.6. EKG with sinus tachycardia with a rate of 102. Patient was given pain control here in the department. CT of the head, C-spine, and L-spine were performed which did not show any evidence of acute fractures. UA did show trace leukocyte esterase and states that he has had some pain with urination.   He will be treated for UTI. He was able ambulate without difficulty throughout his course of stay in the ED. Repeat troponin was initially found to be negative. His urine out will be sent for culture. Findings were discussed with the patient is comfortable and in agreement with plan of care. He will be discharged. He is given return precautions for the ED and advised to follow-up with his PCP. During the patient's ED course, the patient was given:  Medications   morphine (PF) injection 2 mg (2 mg Intravenous Given 8/11/20 1327)   ondansetron (ZOFRAN) injection 4 mg (4 mg Intravenous Given 8/11/20 1327)   morphine (PF) injection 2 mg (2 mg Intravenous Given 8/11/20 1431)        CLINICAL IMPRESSION  1. Fall, initial encounter    2. Strain of lumbar region, initial encounter    3. Acute cystitis without hematuria        Blood pressure (!) 151/96, pulse 108, temperature 98.4 °F (36.9 °C), temperature source Oral, resp. rate 18, height 6' (1.829 m), weight 192 lb (87.1 kg), SpO2 100 %. DISPOSITION  Berhane Raphael was discharged to home in good condition. Patient was given scripts for the following medications. I counseled patient how to take these medications. New Prescriptions    ACETAMINOPHEN (AMINOFEN) 325 MG TABLET    Take 2 tablets by mouth every 4 hours as needed for Pain    CEPHALEXIN (KEFLEX) 500 MG CAPSULE    Take 1 capsule by mouth 4 times daily for 7 days       Follow-up with:  Wendy Valencia MD  [address] [phone]    Schedule an appointment as soon as possible for a visit       [facility] [place] ED  [address] [facility]  Go to   If symptoms worsen      DISCLAIMER: This chart was created using Dragon dictation software. Efforts were made by me to ensure accuracy, however some errors may be present due to limitations of this technology and occasionally words are not transcribed correctly.         Julian Hardin,

## 2020-08-11 NOTE — ED NOTES
Patient ambulated in hallway with standby assist only on his regular 3LNC for about 100 feet. Patient had steady gait complained of soreness but states overall he felt okay.      Ramon Barrett RN  08/11/20 [id number]

## 2020-08-12 ENCOUNTER — VIRTUAL VISIT (OUTPATIENT)
Dept: CARDIOLOGY CLINIC | Age: 80
End: 2020-08-12
Payer: MEDICARE

## 2020-08-12 LAB — URINE CULTURE, ROUTINE: NORMAL

## 2020-08-12 PROCEDURE — 99213 OFFICE O/P EST LOW 20 MIN: CPT | Performed by: INTERNAL MEDICINE

## 2020-08-12 NOTE — PROGRESS NOTES
albuterol (PROVENTIL) (2.5 MG/3ML) 0.083% nebulizer solution Take 3 mLs by nebulization 4 times daily COPD J44.9 [facility] Yes Annalee Mcclendon MD   gabapentin (NEURONTIN) 300 MG capsule Take 600 mg by mouth 3 times daily . Yes Historical Provider, MD   OXYGEN Inhale 2.5 L into the lungs nightly Pt wears 2- 2/12 L at night. 3 L while walking/ moving around Yes Historical Provider, MD   metformin (GLUCOPHAGE) 500 MG tablet Take 500 mg by mouth 4 times daily  Yes Historical Provider, MD   esomeprazole (NEXIUM) 40 MG capsule   Take 40 mg by mouth every morning (before breakfast) Indications: take dos  Yes Historical Provider, MD   acetaminophen (AMINOFEN) 325 MG tablet Take 2 tablets by mouth every 4 hours as needed for Pain  Galeas Hides, MD   dilTIAZem (CARTIA XT) 180 MG extended release capsule Take 1 capsule by mouth as needed ([id number] to take as needed only 1 time a day for increased heart rate)  Octavio Pratt, APRN - CNP   alendronate (FOSAMAX) 70 MG tablet   Historical Provider, MD   fluticasone-umeclidin-vilant (Koffi [place]) 100-62.5-25 MCG/INH AEPB INHALE 1 PUFF 46 Jamese Александр De La Rosa MD       Social History     Tobacco Use    Smoking status: Former Smoker     Packs/day: 1.50     Years: 45.00     Pack years: 67.50     Last attempt to quit: 5/27/2005     Years since quitting: 15.2    Smokeless tobacco: Never Used   Substance Use Topics    Alcohol use: No    Drug use:  No            PHYSICAL EXAMINATION:  [ INSTRUCTIONS:  \"[x]\" Indicates a positive item  \"[]\" Indicates a negative item  -- DELETE ALL ITEMS NOT EXAMINED]  Vital Signs: (As obtained by patient/caregiver or practitioner observation)    Blood pressure- 133/95 Heart rate-  99  Respiratory rate-    Temperature-  Pulse99 oximetry-     Constitutional: [] Appears well-developed and well-nourished [] No apparent distress      [] Abnormal-   Mental status  [] Alert and awake  [] Oriented to person/place/time []Able to follow commands      Eyes:  EOM    [] Normal  [] Abnormal-  Sclera  []  Normal  [] Abnormal -         Discharge []  None visible  [] Abnormal -    HENT:   [] Normocephalic, atraumatic.   [] Abnormal   [] Mouth/Throat: Mucous membranes are moist.     External Ears [] Normal  [] Abnormal-     Neck: [] No visualized mass     Pulmonary/Chest: [] Respiratory effort normal.  [] No visualized signs of difficulty breathing or respiratory distress        [] Abnormal-      Musculoskeletal:   [] Normal gait with no signs of ataxia         [] Normal range of motion of neck        [] Abnormal-       Neurological:        [] No Facial Asymmetry (Cranial nerve 7 motor function) (limited exam to video visit)          [] No gaze palsy        [] Abnormal-         Skin:        [] No significant exanthematous lesions or discoloration noted on facial skin         [] Abnormal-            Psychiatric:       [] Normal Affect [] No Hallucinations        [] Abnormal-     Other pertinent observable physical exam findings-   Imaging   EKG 20  Sinus tachycardia with 1st degree A-V blockNonspecific ST abnormalityNo previous ECGs availableConfirmed by ANTONIO BURGOS MD ([id number]) on 2020 2:26:13 PM     EK20 I have reviewed EKG with the following interpretation:  Impression:  See HPI Atrial fibrillation with rapid ventricular responseNonspecific ST abnormalityAbnormal ECGWhen compared with ECG of 2020 18:52,No significant change was foundConfirmed by ANTONIO Fitch MD ([id number]) on 2020 7:49:56 AM     CXR  20  Left mid and lower lung opacities are seen which have worsened since the prior study.  Findings could be related to pneumonia.  Atypical/viral pneumonia cannot be excluded.   Small right pleural effusion.   COPD.      EK20  I have reviewed EKG with the following interpretation:  Impression: Atrial fibrillation with rapid ventricular responseNonspecific ST abnormality , probably digitalis effectAbnormal ECGWhen compared with ECG of 2020 20: 03,Atrial fibrillation has replaced Sinus rhythm Confirmed by Chinedu Hdz MD, [address] ([id number]) on 4/7/2020 6:49:24 AM      [facility] 10/12/15 Summary   Normal left ventricle size, wall thickness and systolic function with an   estimated ejection fraction of 55%. No regional wall motion abnormalities   are seen. Normal diastolic function.   Mild mitral regurgitation is present.   Aortic valve appears tricuspid and sclerotic but opens adequately.   mild tricuspid regurgitation.   Systolic pulmonary artery pressure (SPAP) is estimated at 58 mmHg consistent with moderate pulmonary hypertension (RA pressure 8 mmHg).     ECHO 4.6.20   Left ventricular systolic function is normal with EF estimated at 55-60 %.   No regional wall motion abnormalities are noted.   The left ventricular size and wall thickness were not well visualized for measurement.   Normal left ventricular diastolic filling pressure.   The right atrium is mildly dilated.   Mild mitral regurgitation.   Aortic valve sclerosis without aortic stenosis.   Mild tricuspid regurgitation.   Normal systolic pulmonary artery pressure (SPAP) estimated at 36 mmHg (RA pressure 8 mmHg). ASSESSMENT/PLAN:  1. PAF (paroxysmal atrial fibrillation) ([address]    2. Essential hypertension          Margarita López is a 78 y.o. male being evaluated by a Virtual Visit (video visit) encounter to address concerns as mentioned above. A caregiver was present when appropriate. Due to this being a TeleHealth encounter (During JIF-81 public health emergency), evaluation of the following organ systems was limited: Vitals/Constitutional/EENT/Resp/CV/GI//MS/Neuro/Skin/Heme-Lymph-Imm.   Pursuant to the emergency declaration under the [address] [place] [address], [address] [place] [address] authority and the [employer] and [employer] Act, this Virtual Visit was conducted with patient's (and/or legal guardian's) consent, to reduce the patient's risk of exposure to COVID-19 and provide necessary medical care. The patient (and/or legal guardian) has also been advised to contact this office for worsening conditions or problems, and seek emergency medical treatment and/or call 911 if deemed necessary. Services were provided through a video synchronous discussion virtually to substitute for in-person clinic visit. I am seeing the patient today from my [place] office and the patient from their home. Time spent on this two way communication review of chart and reviewing meds was 12 minutes    --[address] MD Denae on 8/12/2020 at 3:07 PM    An electronic signature was used to authenticate this note.

## 2020-08-14 ENCOUNTER — HOSPITAL ENCOUNTER (EMERGENCY)
Age: 80
Discharge: HOME | End: 2020-08-14
Attending: EMERGENCY MEDICINE
Payer: MEDICARE

## 2020-08-14 VITALS
OXYGEN SATURATION: 93 % | DIASTOLIC BLOOD PRESSURE: 84 MMHG | WEIGHT: 193 LBS | SYSTOLIC BLOOD PRESSURE: 159 MMHG | HEART RATE: 99 BPM | RESPIRATION RATE: 20 BRPM | BODY MASS INDEX: 26.14 KG/M2 | TEMPERATURE: 98.5 F | HEIGHT: 72 IN

## 2020-08-14 LAB
A/G RATIO: 1.5 (ref 1.1–2.2)
ALBUMIN SERPL-MCNC: 4.5 G/DL (ref 3.4–5)
ALP BLD-CCNC: 72 U/L (ref 40–129)
ALT SERPL-CCNC: 10 U/L (ref 10–40)
ANION GAP SERPL CALCULATED.3IONS-SCNC: 14 MMOL/L (ref 3–16)
AST SERPL-CCNC: 13 U/L (ref 15–37)
BASOPHILS ABSOLUTE: 0 K/UL (ref 0–0.2)
BASOPHILS RELATIVE PERCENT: 0.4 %
BILIRUB SERPL-MCNC: 0.6 MG/DL (ref 0–1)
BUN BLDV-MCNC: 19 MG/DL (ref 7–20)
CALCIUM SERPL-MCNC: 9.8 MG/DL (ref 8.3–10.6)
CHLORIDE BLD-SCNC: 100 MMOL/L (ref 99–110)
CO2: 30 MMOL/L (ref 21–32)
CREAT SERPL-MCNC: 0.8 MG/DL (ref 0.8–1.3)
EOSINOPHILS ABSOLUTE: 0.1 K/UL (ref 0–0.6)
EOSINOPHILS RELATIVE PERCENT: 0.9 %
GFR AFRICAN AMERICAN: >60
GFR NON-AFRICAN AMERICAN: >60
GLOBULIN: 3.1 G/DL
GLUCOSE BLD-MCNC: 137 MG/DL (ref 70–99)
HCT VFR BLD CALC: 36.4 % (ref 40.5–52.5)
HEMOGLOBIN: 12 G/DL (ref 13.5–17.5)
LYMPHOCYTES ABSOLUTE: 2.4 K/UL (ref 1–5.1)
LYMPHOCYTES RELATIVE PERCENT: 34.5 %
MCH RBC QN AUTO: 27.8 PG (ref 26–34)
MCHC RBC AUTO-ENTMCNC: 33.1 G/DL (ref 31–36)
MCV RBC AUTO: 83.9 FL (ref 80–100)
MONOCYTES ABSOLUTE: 0.6 K/UL (ref 0–1.3)
MONOCYTES RELATIVE PERCENT: 8.6 %
NEUTROPHILS ABSOLUTE: 3.8 K/UL (ref 1.7–7.7)
NEUTROPHILS RELATIVE PERCENT: 55.6 %
PDW BLD-RTO: 15.6 % (ref 12.4–15.4)
PLATELET # BLD: 118 K/UL (ref 135–450)
PMV BLD AUTO: 8.7 FL (ref 5–10.5)
POTASSIUM REFLEX MAGNESIUM: 4.2 MMOL/L (ref 3.5–5.1)
RBC # BLD: 4.34 M/UL (ref 4.2–5.9)
SODIUM BLD-SCNC: 144 MMOL/L (ref 136–145)
TOTAL PROTEIN: 7.6 G/DL (ref 6.4–8.2)
WBC # BLD: 6.8 K/UL (ref 4–11)

## 2020-08-14 PROCEDURE — 36415 COLL VENOUS BLD VENIPUNCTURE: CPT

## 2020-08-14 PROCEDURE — 80053 COMPREHEN METABOLIC PANEL: CPT

## 2020-08-14 PROCEDURE — 99283 EMERGENCY DEPT VISIT LOW MDM: CPT

## 2020-08-14 PROCEDURE — 85025 COMPLETE CBC W/AUTO DIFF WBC: CPT

## 2020-08-14 PROCEDURE — 6370000000 HC RX 637 (ALT 250 FOR IP): Performed by: EMERGENCY MEDICINE

## 2020-08-14 RX ORDER — HYDROCODONE BITARTRATE AND ACETAMINOPHEN 5; 325 MG/1; MG/1
1 TABLET ORAL ONCE
Status: COMPLETED | OUTPATIENT
Start: 2020-08-14 | End: 2020-08-14

## 2020-08-14 RX ORDER — HYDROCODONE BITARTRATE AND ACETAMINOPHEN 5; 325 MG/1; MG/1
1 TABLET ORAL EVERY 6 HOURS PRN
Qty: 20 TABLET | Refills: 0 | Status: SHIPPED | OUTPATIENT
Start: 2020-08-14 | End: 2020-08-19

## 2020-08-14 RX ADMIN — HYDROCODONE BITARTRATE AND ACETAMINOPHEN 1 TABLET: 5; 325 TABLET ORAL at 15:55

## 2020-08-14 ASSESSMENT — ENCOUNTER SYMPTOMS
COUGH: 0
NAUSEA: 0
VOMITING: 0
BACK PAIN: 1
SORE THROAT: 0
ABDOMINAL PAIN: 0
DIARRHEA: 0
EYE DISCHARGE: 0

## 2020-08-14 ASSESSMENT — PAIN DESCRIPTION - PROGRESSION: CLINICAL_PROGRESSION: NOT CHANGED

## 2020-08-14 ASSESSMENT — PAIN DESCRIPTION - ORIENTATION: ORIENTATION: LOWER;MID

## 2020-08-14 ASSESSMENT — PAIN SCALES - GENERAL
PAINLEVEL_OUTOF10: 8
PAINLEVEL_OUTOF10: 7

## 2020-08-14 ASSESSMENT — PAIN DESCRIPTION - PAIN TYPE: TYPE: ACUTE PAIN

## 2020-08-14 ASSESSMENT — PAIN DESCRIPTION - DESCRIPTORS: DESCRIPTORS: DISCOMFORT

## 2020-08-14 ASSESSMENT — PAIN DESCRIPTION - LOCATION: LOCATION: BACK

## 2020-08-14 NOTE — ED PROVIDER NOTES
[facility]        Pt Name: Jenise Leach  MRN: [id number]  [place] 1940  Date of evaluation: 8/14/2020  Provider: Kristina Anguiano MD  PCP: Sierra Gutierrez MD  ED Attending: Kristina Anguiano MD    CHIEF COMPLAINT       Chief Complaint   Patient presents with    Fall     Pt sts \"fell 3 days ago, hit the floor, back isn't getting better\"  Low-mid back pain       HISTORY OF PRESENT ILLNESS   (Location/Symptom, Timing/Onset, Context/Setting, Quality, Duration, Modifying Factors, Severity)  Note limiting factors. Jenise Leach is a 78 y.o. male who presents to the emergency department several days after falling down. Patient states he lost his balance and fell to the floor. He injured his mid back. He initially was seen and had imaging as well as urinalysis and blood work. No fracture was identified however the patient did have a urinary tract infection. He has been taking his antibiotics. Patient states that the pain he experiences a sharp, severe for a brief couple of seconds and then moderates. It does not radiate anywhere. There is no bowel or bladder incontinence. No fevers or chills. No weakness. Because of the pain the patient has not been getting up and ambulating as much as normal and daughter states that he has not been eating and drinking as much as a result. History is obtained from patient, review of prior records, daughter. REVIEW OF SYSTEMS    (2-9 systems for level 4, 10 or more for level 5)     Review of Systems   Constitutional: Positive for appetite change. Negative for chills and fever. HENT: Negative for congestion and sore throat. Eyes: Negative for discharge. Respiratory: Negative for cough. Cardiovascular: Negative for chest pain. Gastrointestinal: Negative for abdominal pain, diarrhea, nausea and vomiting. Endocrine: Negative for polydipsia.    Genitourinary: Negative for dysuria and urgency. Musculoskeletal: Positive for back pain. Negative for myalgias. Skin: Negative for rash. Neurological: Negative for weakness and headaches. Hematological: Does not bruise/bleed easily. Psychiatric/Behavioral: Negative for confusion. Positives and Pertinent negatives as per HPI. Except as noted above in the ROS, all other systems were reviewed and negative.        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 Left 6/12/2016    cataract removal    JOINT REPLACEMENT  6/29/2011    right knee    JOINT REPLACEMENT  11/2004    left knee    OTHER SURGICAL HISTORY  08/31/2015    wide excision basal cell carcinoma on the nose and bilateral auricles with frozen sections, plastic closure, full thickness skin graft    OTHER SURGICAL HISTORY  12/1/15    excision of lesion of lip    PROSTATE SURGERY      TURP  10/23/2015    with cystoscopy         CURRENTMEDICATIONS       Discharge Medication List as of 8/14/2020  5:01 PM      CONTINUE these medications which have NOT CHANGED    Details   cephALEXin (KEFLEX) 500 MG capsule Take 1 capsule by mouth 4 times daily for 7 days, Disp-28 capsule,R-0Print      acetaminophen (AMINOFEN) 325 MG tablet Take 2 tablets by mouth every 4 hours as needed for Pain, Disp-120 tablet,R-0Print      propafenone (RYTHMOL) 150 MG tablet TAKE ONE TABLET BY MOUTH EVERY 8 HOURS, Disp-90 tablet,R-0Normal      VENTOLIN  (90 Base) MCG/ACT inhaler Inhale 2 puffs into the lungs every 6 hours as needed for Wheezing orShortness of Breath, Disp-1 Inhaler,R-5Normal      dilTIAZem (CARTIA XT) 180 MG extended release capsule Take 1 capsule by mouth as needed (Ok to take as needed only 1 time a day for increased heart rate), Disp-30 capsule, R-3NO PRINT      apixaban (ELIQUIS) 5 MG TABS tablet Take 1 tablet by mouth 2 times daily, Disp-60 tablet, R-2Normal      alendronate (FOSAMAX) 70 MG tablet Historical Med      fluticasone (FLONASE) 50 MCG/ACT nasal spray Historical Med      atorvastatin (LIPITOR) 10 MG tablet Historical Med      fluticasone-umeclidin-vilant (TRELEGY ELLIPTA) 100-62.5-25 MCG/INH AEPB INHALE 1 PUFF EVERY DAY, Disp-1 each, R-5Normal      pioglitazone (ACTOS) 30 MG tablet Take 30 mg by mouth dailyHistorical Med      albuterol (PROVENTIL) (2.5 MG/3ML) 0.083% nebulizer solution Take 3 mLs by nebulization 4 times daily COPD J44.9 [employer], Disp-300 mL, R-5Normal      gabapentin (NEURONTIN) 300 MG capsule Take 600 mg by mouth 3 times daily . Historical Med      OXYGEN Inhale 2.5 L into the lungs nightly Pt wears 2- 2/12 L at night. 3 L while walking/ moving aroundHistorical Med      metformin (GLUCOPHAGE) 500 MG tablet Take 500 mg by mouth 4 times daily Historical Med      esomeprazole (NEXIUM) 40 MG capsule   Take 40 mg by mouth every morning (before breakfast) Indications: take [address]     Patient has no known allergies.     FAMILYHISTORY       Family History   Problem Relation Age of Onset    Cancer Mother     Diabetes Mother     Heart Disease Mother    Saleem Can Cancer Father     Diabetes Sister     Cancer Brother           SOCIAL HISTORY       Social History     Socioeconomic History    Marital status:      Spouse name: Enzo Lee    Number of children: 4    Years of education: None    Highest education level: None   Occupational History    None   Social Needs    Financial resource strain: None    Food insecurity     Worry: None     Inability: None    Transportation needs     Medical: None     Non-medical: None   Tobacco Use    Smoking status: Former Smoker     Packs/day: 1.50     Years: 45.00     Pack years: 67.50     Last attempt to quit: 5/27/2005     Years since quitting: 15.2    Smokeless tobacco: Never Used   Substance and Sexual Activity    Alcohol use: No    Drug use: No    Sexual activity: Not Currently   Lifestyle    Physical activity     Days per week: None     Minutes per session: None    Stress: None   Relationships    Social connections     Talks on phone: None     Gets together: None     Attends Latter day service: None     Active member of club or organization: None     Attends meetings of clubs or organizations: None     Relationship status: None    Intimate partner violence     Fear of current or ex partner: None     Emotionally abused: None     Physically abused: None     Forced sexual activity: None   Other Topics Concern    None   Social History Narrative    None       SCREENINGS    [place] Coma Scale  Eye Opening: Spontaneous  Best Verbal Response: Oriented  Best Motor Response: Obeys commands  [place] Coma Scale Score: 15        PHYSICAL EXAM    (up to 7 for level 4, 8 or more for level 5)     ED Triage Vitals [08/14/20 1520]   BP Temp Temp Source Pulse Resp SpO2 Height Weight   (!) 175/96 98.5 °F (36.9 °C) Oral 114 17 100 % 6' (1.829 m) 193 lb (87.5 kg)       Physical Exam  Constitutional:       General: He is not in acute distress. Appearance: He is well-developed. HENT:      Head: Normocephalic and atraumatic. Right Ear: External ear normal.      Left Ear: External ear normal.      Nose: Nose normal.      Mouth/Throat:      Pharynx: No oropharyngeal exudate. Eyes:      Conjunctiva/sclera: Conjunctivae normal.      Pupils: Pupils are equal, round, and reactive to light. Neck:      Musculoskeletal: Normal range of motion and neck supple. Cardiovascular:      Rate and Rhythm: Normal rate and regular rhythm. Heart sounds: Normal heart sounds. No murmur. No friction rub. No gallop. Pulmonary:      Effort: Pulmonary effort is normal. No respiratory distress. Breath sounds: Normal breath sounds. No wheezing. Abdominal:      General: Bowel sounds are normal. There is no distension. Palpations: Abdomen is soft. Tenderness: There is no abdominal tenderness. There is no guarding or rebound. Musculoskeletal: Normal range of motion. Thoracic back: He exhibits tenderness and pain. Lumbar back: Normal.        Back:    Lymphadenopathy:      Cervical: No cervical adenopathy. Skin:     General: Skin is warm and dry. Findings: No rash. Neurological:      Mental Status: He is alert and oriented to person, place, and time. Cranial Nerves: No cranial nerve deficit. Sensory: Sensation is intact. Motor: Motor function is intact. Deep Tendon Reflexes:      Reflex Scores:       Patellar reflexes are 2+ on the right side and 2+ on the left side. Achilles reflexes are 2+ on the right side and 2+ on the left side. Comments: Normal sensation in the L5 and S1 dermatomes. Normal greater toe extension strength, as well as dorsiflexion strength bilaterally.          DIAGNOSTIC RESULTS   LABS:    Results for orders placed or performed during the hospital encounter of 08/14/20   CBC Auto Differential   Result Value Ref Range    WBC 6.8 4.0 - 11.0 K/uL    RBC 4.34 4.20 - 5.90 M/uL    Hemoglobin 12.0 (L) 13.5 - 17.5 g/dL    Hematocrit 36.4 (L) 40.5 - 52.5 %    MCV 83.9 80.0 - 100.0 fL    MCH 27.8 26.0 - 34.0 pg    MCHC 33.1 31.0 - 36.0 g/dL    RDW 15.6 (H) 12.4 - 15.4 %    Platelets 788 (L) 719 - 450 K/uL    MPV 8.7 5.0 - 10.5 fL    Neutrophils % 55.6 %    Lymphocytes % 34.5 %    Monocytes % 8.6 %    Eosinophils % 0.9 %    Basophils % 0.4 %    Neutrophils Absolute 3.8 1.7 - 7.7 K/uL    Lymphocytes Absolute 2.4 1.0 - 5.1 K/uL    Monocytes Absolute 0.6 0.0 - 1.3 K/uL    Eosinophils Absolute 0.1 0.0 - 0.6 K/uL    Basophils Absolute 0.0 0.0 - 0.2 K/uL   Comprehensive Metabolic Panel w/ Reflex to MG   Result Value Ref Range    Sodium 144 136 - 145 mmol/L    Potassium reflex Magnesium 4.2 3.5 - 5.1 mmol/L    Chloride 100 99 - 110 mmol/L    CO2 30 21 - 32 mmol/L    Anion Gap 14 3 - 16    Glucose 137 (H) 70 - 99 mg/dL    BUN 19 7 - 20 mg/dL    CREATININE 0.8 0.8 - 1.3 mg/dL    GFR Non-African American >60 >60    GFR African American >60 >60    Calcium 9.8 8.3 - 10.6 mg/dL    Total Protein 7.6 6.4 - 8.2 g/dL    Alb 4.5 3.4 - 5.0 g/dL    Albumin/Globulin Ratio 1.5 1.1 - 2.2    Total Bilirubin 0.6 0.0 - 1.0 mg/dL    Alkaline Phosphatase 72 40 - 129 U/L    ALT 10 10 - 40 U/L    AST 13 (L) 15 - 37 U/L    Globulin 3.1 g/dL       All other labs were within normal range ornot returned as of this dictation. EKG: All EKG's are interpreted by the Emergency Department Physician who either signs or Co-signs this chart in the absence of a cardiologist.  Please see their note for interpretation of EKG.         RADIOLOGY:   Non-plain film images such as CT, Ultrasound and MRI are read by the radiologist.  Plain radiographic images are visualized and preliminarily interpreted by the ED Provider with the belowfindings:    Interpretation per the Radiologist below, if available at the time of this note:    No orders to display         PROCEDURES   Unless otherwise noted below, none     Procedures    CRITICAL CARE TIME   N/A    CONSULTS:  None      EMERGENCY DEPARTMENT COURSE and DIFFERENTIAL DIAGNOSIS/MDM: DISPOSITION/PLAN   DISPOSITION Decision To Discharge 08/14/2020 04:58:09 PM      PATIENT REFERRED TO:  Loco Ramirez MD  [address] [address]  [phone]    Schedule an appointment as soon as possible for a visit in [facility]. [facility]  [address] [place] [address],[address]  [phone]  Go to   If symptoms worsen      DISCHARGE MEDICATIONS:  Discharge Medication List as of 8/14/2020  5:01 PM      START taking these medications    Details   HYDROcodone-acetaminophen (NORCO) 5-325 MG per tablet Take 1 tablet by mouth every 6 hours as needed for Pain for up to 5 days.  TIFFANI: [id number], Disp-20 tablet,R-0Normal             DISCONTINUED MEDICATIONS:  Discharge Medication List as of 8/14/2020  5:01 PM                 (Please note that portions of this note were completed with a voice recognition program.  Efforts were made to edit the dictations but occasionally words are mis-transcribed.)    Yocasta Jones MD(electronically signed)              Yocasta Jones MD  08/15/20 [id number]

## 2020-08-28 RX ORDER — PROPAFENONE HYDROCHLORIDE 150 MG/1
TABLET, FILM COATED ORAL
Qty: 90 TABLET | Refills: 0 | Status: SHIPPED | OUTPATIENT
Start: 2020-08-28 | End: 2020-10-01

## 2020-08-28 NOTE — TELEPHONE ENCOUNTER
Received refill request for propafenone 150 mg  from [facility]   Pharmacy.     Last OV: 5/7/20    Last Labs: 8/14/20    Last Refill: 7/31/20    Next Appt: none

## 2020-09-10 ENCOUNTER — TELEPHONE (OUTPATIENT)
Dept: PULMONOLOGY | Age: 80
End: 2020-09-10

## 2020-09-10 NOTE — TELEPHONE ENCOUNTER
Within this Telehealth Consent, the terms you and yours refer to the person using the Telehealth Service (Service), or in the case of a use of the Service by or on behalf of a minor, you and yours refer to and include (i) the parent or legal guardian who provides consent to the use of the Service by such minor or uses the Service on behalf of such minor, and (ii) the minor for whom consent is being provided or on whose behalf the Service is being utilized. When using Service, you will be consulting with your health care providers via the use of Telehealth.   Telehealth involves the delivery of healthcare services using electronic communications, information technology or other means between a healthcare provider and a patient who are not in the same physical location. Telehealth may be used for diagnosis, treatment, follow-up and/or patient education, and may include, but is not limited to, one or more of the following:    Electronic transmission of medical records, photo images, personal health information or other data between a patient and a healthcare provider    Interactions between a patient and healthcare provider via audio, video and/or data communications    Use of output data from medical devices, sound and video files    Anticipated Benefits   The use of Telehealth by your Provider(s) through the Service may have the following possible benefits:    Making it easier and more efficient for you to access medical care and treatment for the conditions treated by such Provider(s) utilizing the Service    Allowing you to obtain medical care and treatment by Provider(s) at times that are convenient for you    Enabling you to interact with Provider(s) without the necessity of an in-office appointment     Possible Risks   While the use of Telehealth can provide potential benefits for you, there are also potential risks associated with the use of Telehealth.  These risks include, but may not be the terms described in the Terms of Service and this Telehealth Consent. The patient was read the following statement and has consented to the visit as of 9/10/20. The patient has been scheduled for their first telehealth visit on 9/17/20 with Dr Christo Booker.

## 2020-09-17 ENCOUNTER — VIRTUAL VISIT (OUTPATIENT)
Dept: PULMONOLOGY | Age: 80
End: 2020-09-17
Payer: MEDICARE

## 2020-09-17 PROCEDURE — 99442 PR PHYS/QHP TELEPHONE EVALUATION 11-20 MIN: CPT | Performed by: INTERNAL MEDICINE

## 2020-09-28 RX ORDER — ALBUTEROL SULFATE 2.5 MG/3ML
SOLUTION RESPIRATORY (INHALATION)
Qty: 100 VIAL | Refills: 11 | Status: SHIPPED | OUTPATIENT
Start: 2020-09-28 | End: 2021-10-08

## 2020-10-01 RX ORDER — FLUTICASONE FUROATE, UMECLIDINIUM BROMIDE AND VILANTEROL TRIFENATATE 100; 62.5; 25 UG/1; UG/1; UG/1
POWDER RESPIRATORY (INHALATION)
Qty: 1 EACH | Refills: 5 | Status: SHIPPED | OUTPATIENT
Start: 2020-10-01 | End: 2021-03-31

## 2020-10-01 RX ORDER — APIXABAN 5 MG/1
TABLET, FILM COATED ORAL
Qty: 60 TABLET | Refills: 11 | Status: SHIPPED | OUTPATIENT
Start: 2020-10-01 | End: 2021-10-05 | Stop reason: SDUPTHER

## 2020-10-01 RX ORDER — PROPAFENONE HYDROCHLORIDE 150 MG/1
TABLET, FILM COATED ORAL
Qty: 90 TABLET | Refills: 11 | Status: SHIPPED | OUTPATIENT
Start: 2020-10-01 | End: 2021-10-05 | Stop reason: SDUPTHER

## 2020-11-03 PROBLEM — J18.9 PNEUMONIA DUE TO ORGANISM: Status: RESOLVED | Noted: 2020-11-03 | Resolved: 2020-11-03

## 2020-11-03 PROBLEM — J18.9 PNEUMONIA, UNSPECIFIED ORGANISM: Status: RESOLVED | Noted: 2020-11-03 | Resolved: 2020-11-03

## 2020-12-14 ENCOUNTER — APPOINTMENT (OUTPATIENT)
Dept: GENERAL RADIOLOGY | Age: 80
DRG: 445 | End: 2020-12-14
Payer: MEDICARE

## 2020-12-14 ENCOUNTER — HOSPITAL ENCOUNTER (INPATIENT)
Age: 80
LOS: 1 days | Discharge: HOME | DRG: 445 | End: 2020-12-16
Attending: EMERGENCY MEDICINE | Admitting: HOSPITALIST
Payer: MEDICARE

## 2020-12-14 ENCOUNTER — APPOINTMENT (OUTPATIENT)
Dept: CT IMAGING | Age: 80
DRG: 445 | End: 2020-12-14
Payer: MEDICARE

## 2020-12-14 LAB
A/G RATIO: 1.6 (ref 1.1–2.2)
ALBUMIN SERPL-MCNC: 4.1 G/DL (ref 3.4–5)
ALP BLD-CCNC: 67 U/L (ref 40–129)
ALT SERPL-CCNC: 19 U/L (ref 10–40)
ANION GAP SERPL CALCULATED.3IONS-SCNC: 10 MMOL/L (ref 3–16)
AST SERPL-CCNC: 15 U/L (ref 15–37)
BACTERIA: ABNORMAL /HPF
BASOPHILS ABSOLUTE: 0 K/UL (ref 0–0.2)
BASOPHILS RELATIVE PERCENT: 0.2 %
BILIRUB SERPL-MCNC: 0.4 MG/DL (ref 0–1)
BILIRUBIN URINE: NEGATIVE
BLOOD, URINE: ABNORMAL
BUN BLDV-MCNC: 15 MG/DL (ref 7–20)
CALCIUM SERPL-MCNC: 9.1 MG/DL (ref 8.3–10.6)
CHLORIDE BLD-SCNC: 99 MMOL/L (ref 99–110)
CLARITY: CLEAR
CO2: 34 MMOL/L (ref 21–32)
COLOR: YELLOW
CREAT SERPL-MCNC: 1 MG/DL (ref 0.8–1.3)
EOSINOPHILS ABSOLUTE: 0.1 K/UL (ref 0–0.6)
EOSINOPHILS RELATIVE PERCENT: 1 %
GFR AFRICAN AMERICAN: >60
GFR NON-AFRICAN AMERICAN: >60
GLOBULIN: 2.5 G/DL
GLUCOSE BLD-MCNC: 216 MG/DL (ref 70–99)
GLUCOSE URINE: 100 MG/DL
HCT VFR BLD CALC: 35.5 % (ref 40.5–52.5)
HEMOGLOBIN: 11.7 G/DL (ref 13.5–17.5)
KETONES, URINE: NEGATIVE MG/DL
LACTIC ACID: 1.5 MMOL/L (ref 0.4–2)
LEUKOCYTE ESTERASE, URINE: NEGATIVE
LIPASE: 26 U/L (ref 13–60)
LYMPHOCYTES ABSOLUTE: 1.6 K/UL (ref 1–5.1)
LYMPHOCYTES RELATIVE PERCENT: 22.6 %
MAGNESIUM: 1.3 MG/DL (ref 1.8–2.4)
MCH RBC QN AUTO: 27.7 PG (ref 26–34)
MCHC RBC AUTO-ENTMCNC: 32.9 G/DL (ref 31–36)
MCV RBC AUTO: 84.2 FL (ref 80–100)
MICROSCOPIC EXAMINATION: YES
MONOCYTES ABSOLUTE: 0.6 K/UL (ref 0–1.3)
MONOCYTES RELATIVE PERCENT: 7.9 %
NEUTROPHILS ABSOLUTE: 4.9 K/UL (ref 1.7–7.7)
NEUTROPHILS RELATIVE PERCENT: 68.3 %
NITRITE, URINE: NEGATIVE
PDW BLD-RTO: 15 % (ref 12.4–15.4)
PH UA: 8 (ref 5–8)
PLATELET # BLD: 126 K/UL (ref 135–450)
PMV BLD AUTO: 8.8 FL (ref 5–10.5)
POTASSIUM SERPL-SCNC: 3.8 MMOL/L (ref 3.5–5.1)
PROTEIN UA: ABNORMAL MG/DL
RBC # BLD: 4.22 M/UL (ref 4.2–5.9)
RBC UA: ABNORMAL /HPF (ref 0–4)
SODIUM BLD-SCNC: 143 MMOL/L (ref 136–145)
SPECIFIC GRAVITY UA: 1.02 (ref 1–1.03)
TOTAL PROTEIN: 6.6 G/DL (ref 6.4–8.2)
TROPONIN: <0.01 NG/ML
URINE TYPE: ABNORMAL
UROBILINOGEN, URINE: 0.2 E.U./DL
WBC # BLD: 7.1 K/UL (ref 4–11)
WBC UA: ABNORMAL /HPF (ref 0–5)

## 2020-12-14 PROCEDURE — 6370000000 HC RX 637 (ALT 250 FOR IP): Performed by: EMERGENCY MEDICINE

## 2020-12-14 PROCEDURE — 93005 ELECTROCARDIOGRAM TRACING: CPT | Performed by: EMERGENCY MEDICINE

## 2020-12-14 PROCEDURE — 36415 COLL VENOUS BLD VENIPUNCTURE: CPT

## 2020-12-14 PROCEDURE — 2580000003 HC RX 258: Performed by: EMERGENCY MEDICINE

## 2020-12-14 PROCEDURE — 71045 X-RAY EXAM CHEST 1 VIEW: CPT

## 2020-12-14 PROCEDURE — 84484 ASSAY OF TROPONIN QUANT: CPT

## 2020-12-14 PROCEDURE — 85025 COMPLETE CBC W/AUTO DIFF WBC: CPT

## 2020-12-14 PROCEDURE — 2500000003 HC RX 250 WO HCPCS: Performed by: EMERGENCY MEDICINE

## 2020-12-14 PROCEDURE — 81001 URINALYSIS AUTO W/SCOPE: CPT

## 2020-12-14 PROCEDURE — 83605 ASSAY OF LACTIC ACID: CPT

## 2020-12-14 PROCEDURE — 6360000002 HC RX W HCPCS: Performed by: EMERGENCY MEDICINE

## 2020-12-14 PROCEDURE — 96365 THER/PROPH/DIAG IV INF INIT: CPT

## 2020-12-14 PROCEDURE — 87086 URINE CULTURE/COLONY COUNT: CPT

## 2020-12-14 PROCEDURE — 83735 ASSAY OF MAGNESIUM: CPT

## 2020-12-14 PROCEDURE — 96375 TX/PRO/DX INJ NEW DRUG ADDON: CPT

## 2020-12-14 PROCEDURE — 83690 ASSAY OF LIPASE: CPT

## 2020-12-14 PROCEDURE — 99285 EMERGENCY DEPT VISIT HI MDM: CPT

## 2020-12-14 PROCEDURE — 80053 COMPREHEN METABOLIC PANEL: CPT

## 2020-12-14 RX ORDER — MORPHINE SULFATE 4 MG/ML
4 INJECTION, SOLUTION INTRAMUSCULAR; INTRAVENOUS ONCE
Status: COMPLETED | OUTPATIENT
Start: 2020-12-14 | End: 2020-12-14

## 2020-12-14 RX ORDER — 0.9 % SODIUM CHLORIDE 0.9 %
500 INTRAVENOUS SOLUTION INTRAVENOUS ONCE
Status: COMPLETED | OUTPATIENT
Start: 2020-12-14 | End: 2020-12-14

## 2020-12-14 RX ORDER — MAGNESIUM SULFATE 1 G/100ML
1 INJECTION INTRAVENOUS ONCE
Status: COMPLETED | OUTPATIENT
Start: 2020-12-14 | End: 2020-12-14

## 2020-12-14 RX ORDER — ONDANSETRON 2 MG/ML
4 INJECTION INTRAMUSCULAR; INTRAVENOUS ONCE
Status: COMPLETED | OUTPATIENT
Start: 2020-12-14 | End: 2020-12-14

## 2020-12-14 RX ADMIN — MAGNESIUM SULFATE IN DEXTROSE 1 G: 10 INJECTION, SOLUTION INTRAVENOUS at 22:56

## 2020-12-14 RX ADMIN — LIDOCAINE HYDROCHLORIDE: 20 SOLUTION ORAL; TOPICAL at 22:34

## 2020-12-14 RX ADMIN — MORPHINE SULFATE 4 MG: 4 INJECTION INTRAVENOUS at 22:32

## 2020-12-14 RX ADMIN — ONDANSETRON HYDROCHLORIDE 4 MG: 2 INJECTION, SOLUTION INTRAMUSCULAR; INTRAVENOUS at 22:30

## 2020-12-14 RX ADMIN — SODIUM CHLORIDE 500 ML: 9 INJECTION, SOLUTION INTRAVENOUS at 22:29

## 2020-12-14 RX ADMIN — FAMOTIDINE 20 MG: 10 INJECTION, SOLUTION INTRAVENOUS at 22:31

## 2020-12-14 ASSESSMENT — PAIN DESCRIPTION - LOCATION: LOCATION: ABDOMEN

## 2020-12-14 ASSESSMENT — PAIN SCALES - GENERAL
PAINLEVEL_OUTOF10: 7
PAINLEVEL_OUTOF10: 7

## 2020-12-14 ASSESSMENT — PAIN DESCRIPTION - PAIN TYPE: TYPE: ACUTE PAIN

## 2020-12-14 NOTE — Clinical Note
Patient Class: Inpatient [101]   REQUIRED: Diagnosis: Acute cholecystitis [575. 0. ICD-9-CM]   Estimated Length of Stay: Estimated stay of more than 2 midnights   Admitting Provider: Kelsi Welch [[id number]]   Telemetry Bed Required?: No

## 2020-12-15 ENCOUNTER — APPOINTMENT (OUTPATIENT)
Dept: CT IMAGING | Age: 80
DRG: 445 | End: 2020-12-15
Payer: MEDICARE

## 2020-12-15 PROBLEM — K80.00 ACUTE CALCULOUS CHOLECYSTITIS: Status: ACTIVE | Noted: 2020-12-15

## 2020-12-15 PROBLEM — K81.0 ACUTE CHOLECYSTITIS: Status: ACTIVE | Noted: 2020-12-15

## 2020-12-15 LAB
EKG ATRIAL RATE: 95 BPM
EKG DIAGNOSIS: NORMAL
EKG P AXIS: 111 DEGREES
EKG P-R INTERVAL: 208 MS
EKG Q-T INTERVAL: 320 MS
EKG QRS DURATION: 74 MS
EKG QTC CALCULATION (BAZETT): 402 MS
EKG R AXIS: 55 DEGREES
EKG T AXIS: 84 DEGREES
EKG VENTRICULAR RATE: 95 BPM
GLUCOSE BLD-MCNC: 194 MG/DL (ref 70–99)
GLUCOSE BLD-MCNC: 195 MG/DL (ref 70–99)
GLUCOSE BLD-MCNC: 208 MG/DL (ref 70–99)
GLUCOSE BLD-MCNC: 250 MG/DL (ref 70–99)
MAGNESIUM: 2.2 MG/DL (ref 1.8–2.4)
PERFORMED ON: ABNORMAL
SARS-COV-2, NAAT: NOT DETECTED
TROPONIN: <0.01 NG/ML

## 2020-12-15 PROCEDURE — 94761 N-INVAS EAR/PLS OXIMETRY MLT: CPT

## 2020-12-15 PROCEDURE — 99223 1ST HOSP IP/OBS HIGH 75: CPT | Performed by: INTERNAL MEDICINE

## 2020-12-15 PROCEDURE — 6370000000 HC RX 637 (ALT 250 FOR IP): Performed by: HOSPITALIST

## 2020-12-15 PROCEDURE — 2580000003 HC RX 258: Performed by: EMERGENCY MEDICINE

## 2020-12-15 PROCEDURE — U0002 COVID-19 LAB TEST NON-CDC: HCPCS

## 2020-12-15 PROCEDURE — 6360000002 HC RX W HCPCS: Performed by: EMERGENCY MEDICINE

## 2020-12-15 PROCEDURE — 93010 ELECTROCARDIOGRAM REPORT: CPT | Performed by: INTERNAL MEDICINE

## 2020-12-15 PROCEDURE — 36415 COLL VENOUS BLD VENIPUNCTURE: CPT

## 2020-12-15 PROCEDURE — 96376 TX/PRO/DX INJ SAME DRUG ADON: CPT

## 2020-12-15 PROCEDURE — 94640 AIRWAY INHALATION TREATMENT: CPT

## 2020-12-15 PROCEDURE — 6360000004 HC RX CONTRAST MEDICATION: Performed by: EMERGENCY MEDICINE

## 2020-12-15 PROCEDURE — 6370000000 HC RX 637 (ALT 250 FOR IP): Performed by: INTERNAL MEDICINE

## 2020-12-15 PROCEDURE — 2580000003 HC RX 258

## 2020-12-15 PROCEDURE — 2700000000 HC OXYGEN THERAPY PER DAY

## 2020-12-15 PROCEDURE — 74177 CT ABD & PELVIS W/CONTRAST: CPT

## 2020-12-15 PROCEDURE — 83036 HEMOGLOBIN GLYCOSYLATED A1C: CPT

## 2020-12-15 PROCEDURE — 2500000003 HC RX 250 WO HCPCS: Performed by: HOSPITALIST

## 2020-12-15 PROCEDURE — 6370000000 HC RX 637 (ALT 250 FOR IP): Performed by: EMERGENCY MEDICINE

## 2020-12-15 PROCEDURE — 6360000002 HC RX W HCPCS: Performed by: HOSPITALIST

## 2020-12-15 PROCEDURE — 2580000003 HC RX 258: Performed by: HOSPITALIST

## 2020-12-15 PROCEDURE — 83735 ASSAY OF MAGNESIUM: CPT

## 2020-12-15 PROCEDURE — 99222 1ST HOSP IP/OBS MODERATE 55: CPT | Performed by: SURGERY

## 2020-12-15 PROCEDURE — 96367 TX/PROPH/DG ADDL SEQ IV INF: CPT

## 2020-12-15 PROCEDURE — 1200000000 HC SEMI PRIVATE

## 2020-12-15 PROCEDURE — 6360000002 HC RX W HCPCS: Performed by: INTERNAL MEDICINE

## 2020-12-15 PROCEDURE — 6360000002 HC RX W HCPCS: Performed by: PHYSICIAN ASSISTANT

## 2020-12-15 PROCEDURE — 84484 ASSAY OF TROPONIN QUANT: CPT

## 2020-12-15 RX ORDER — ALBUTEROL SULFATE 2.5 MG/3ML
2.5 SOLUTION RESPIRATORY (INHALATION) EVERY 4 HOURS PRN
Status: DISCONTINUED | OUTPATIENT
Start: 2020-12-15 | End: 2020-12-16 | Stop reason: HOSPADM

## 2020-12-15 RX ORDER — ASPIRIN 81 MG/1
324 TABLET, CHEWABLE ORAL ONCE
Status: COMPLETED | OUTPATIENT
Start: 2020-12-15 | End: 2020-12-15

## 2020-12-15 RX ORDER — PROPAFENONE HYDROCHLORIDE 150 MG/1
150 TABLET, FILM COATED ORAL EVERY 8 HOURS SCHEDULED
Status: DISCONTINUED | OUTPATIENT
Start: 2020-12-15 | End: 2020-12-16 | Stop reason: HOSPADM

## 2020-12-15 RX ORDER — SODIUM CHLORIDE 0.9 % (FLUSH) 0.9 %
10 SYRINGE (ML) INJECTION PRN
Status: DISCONTINUED | OUTPATIENT
Start: 2020-12-15 | End: 2020-12-16 | Stop reason: HOSPADM

## 2020-12-15 RX ORDER — ONDANSETRON 2 MG/ML
4 INJECTION INTRAMUSCULAR; INTRAVENOUS EVERY 6 HOURS PRN
Status: DISCONTINUED | OUTPATIENT
Start: 2020-12-15 | End: 2020-12-16 | Stop reason: HOSPADM

## 2020-12-15 RX ORDER — SODIUM CHLORIDE 0.9 % (FLUSH) 0.9 %
10 SYRINGE (ML) INJECTION EVERY 12 HOURS SCHEDULED
Status: DISCONTINUED | OUTPATIENT
Start: 2020-12-15 | End: 2020-12-16 | Stop reason: HOSPADM

## 2020-12-15 RX ORDER — DEXTROSE MONOHYDRATE 50 MG/ML
100 INJECTION, SOLUTION INTRAVENOUS PRN
Status: DISCONTINUED | OUTPATIENT
Start: 2020-12-15 | End: 2020-12-16 | Stop reason: HOSPADM

## 2020-12-15 RX ORDER — SODIUM CHLORIDE 9 MG/ML
INJECTION, SOLUTION INTRAVENOUS
Status: COMPLETED
Start: 2020-12-15 | End: 2020-12-15

## 2020-12-15 RX ORDER — MORPHINE SULFATE 2 MG/ML
2 INJECTION, SOLUTION INTRAMUSCULAR; INTRAVENOUS EVERY 4 HOURS PRN
Status: DISCONTINUED | OUTPATIENT
Start: 2020-12-15 | End: 2020-12-16 | Stop reason: HOSPADM

## 2020-12-15 RX ORDER — FLUTICASONE PROPIONATE 50 MCG
1 SPRAY, SUSPENSION (ML) NASAL DAILY
Status: DISCONTINUED | OUTPATIENT
Start: 2020-12-15 | End: 2020-12-16 | Stop reason: HOSPADM

## 2020-12-15 RX ORDER — MORPHINE SULFATE 4 MG/ML
4 INJECTION, SOLUTION INTRAMUSCULAR; INTRAVENOUS ONCE
Status: COMPLETED | OUTPATIENT
Start: 2020-12-15 | End: 2020-12-15

## 2020-12-15 RX ORDER — ALBUTEROL SULFATE 2.5 MG/3ML
2.5 SOLUTION RESPIRATORY (INHALATION) 2 TIMES DAILY
Status: DISCONTINUED | OUTPATIENT
Start: 2020-12-15 | End: 2020-12-16 | Stop reason: HOSPADM

## 2020-12-15 RX ORDER — PROMETHAZINE HYDROCHLORIDE 25 MG/1
12.5 TABLET ORAL EVERY 6 HOURS PRN
Status: DISCONTINUED | OUTPATIENT
Start: 2020-12-15 | End: 2020-12-16 | Stop reason: HOSPADM

## 2020-12-15 RX ORDER — NICOTINE POLACRILEX 4 MG
15 LOZENGE BUCCAL PRN
Status: DISCONTINUED | OUTPATIENT
Start: 2020-12-15 | End: 2020-12-16 | Stop reason: HOSPADM

## 2020-12-15 RX ORDER — ACETAMINOPHEN 650 MG/1
650 SUPPOSITORY RECTAL EVERY 6 HOURS PRN
Status: DISCONTINUED | OUTPATIENT
Start: 2020-12-15 | End: 2020-12-16 | Stop reason: HOSPADM

## 2020-12-15 RX ORDER — MORPHINE SULFATE 4 MG/ML
4 INJECTION, SOLUTION INTRAMUSCULAR; INTRAVENOUS EVERY 4 HOURS PRN
Status: DISCONTINUED | OUTPATIENT
Start: 2020-12-15 | End: 2020-12-16 | Stop reason: HOSPADM

## 2020-12-15 RX ORDER — DILTIAZEM HYDROCHLORIDE 120 MG/1
120 CAPSULE, COATED, EXTENDED RELEASE ORAL DAILY
Status: DISCONTINUED | OUTPATIENT
Start: 2020-12-15 | End: 2020-12-16 | Stop reason: HOSPADM

## 2020-12-15 RX ORDER — PANTOPRAZOLE SODIUM 40 MG/1
40 TABLET, DELAYED RELEASE ORAL
Status: DISCONTINUED | OUTPATIENT
Start: 2020-12-16 | End: 2020-12-16 | Stop reason: HOSPADM

## 2020-12-15 RX ORDER — BUDESONIDE AND FORMOTEROL FUMARATE DIHYDRATE 160; 4.5 UG/1; UG/1
2 AEROSOL RESPIRATORY (INHALATION) 2 TIMES DAILY
Status: DISCONTINUED | OUTPATIENT
Start: 2020-12-15 | End: 2020-12-16 | Stop reason: HOSPADM

## 2020-12-15 RX ORDER — MAGNESIUM SULFATE IN WATER 40 MG/ML
2 INJECTION, SOLUTION INTRAVENOUS ONCE
Status: COMPLETED | OUTPATIENT
Start: 2020-12-15 | End: 2020-12-15

## 2020-12-15 RX ORDER — DEXTROSE MONOHYDRATE 25 G/50ML
12.5 INJECTION, SOLUTION INTRAVENOUS PRN
Status: DISCONTINUED | OUTPATIENT
Start: 2020-12-15 | End: 2020-12-16 | Stop reason: HOSPADM

## 2020-12-15 RX ORDER — POLYETHYLENE GLYCOL 3350 17 G/17G
17 POWDER, FOR SOLUTION ORAL DAILY PRN
Status: DISCONTINUED | OUTPATIENT
Start: 2020-12-15 | End: 2020-12-16 | Stop reason: HOSPADM

## 2020-12-15 RX ORDER — ACETAMINOPHEN 325 MG/1
650 TABLET ORAL EVERY 6 HOURS PRN
Status: DISCONTINUED | OUTPATIENT
Start: 2020-12-15 | End: 2020-12-16 | Stop reason: HOSPADM

## 2020-12-15 RX ORDER — DILTIAZEM HYDROCHLORIDE 180 MG/1
180 CAPSULE, COATED, EXTENDED RELEASE ORAL DAILY PRN
Status: DISCONTINUED | OUTPATIENT
Start: 2020-12-15 | End: 2020-12-15

## 2020-12-15 RX ADMIN — MORPHINE SULFATE 4 MG: 4 INJECTION INTRAVENOUS at 01:28

## 2020-12-15 RX ADMIN — Medication 2 PUFF: at 19:50

## 2020-12-15 RX ADMIN — TIOTROPIUM BROMIDE INHALATION SPRAY 2 PUFF: 3.12 SPRAY, METERED RESPIRATORY (INHALATION) at 11:26

## 2020-12-15 RX ADMIN — METRONIDAZOLE 500 MG: 500 INJECTION, SOLUTION INTRAVENOUS at 11:53

## 2020-12-15 RX ADMIN — Medication 10 ML: at 11:53

## 2020-12-15 RX ADMIN — WATER 1 G: 1 INJECTION INTRAMUSCULAR; INTRAVENOUS; SUBCUTANEOUS at 01:09

## 2020-12-15 RX ADMIN — Medication 2 PUFF: at 11:25

## 2020-12-15 RX ADMIN — ASPIRIN 324 MG: 81 TABLET, CHEWABLE ORAL at 01:09

## 2020-12-15 RX ADMIN — SODIUM CHLORIDE 250 ML: 9 INJECTION, SOLUTION INTRAVENOUS at 12:47

## 2020-12-15 RX ADMIN — PROPAFENONE HYDROCHLORIDE 150 MG: 150 TABLET, FILM COATED ORAL at 13:43

## 2020-12-15 RX ADMIN — ENOXAPARIN SODIUM 40 MG: 40 INJECTION SUBCUTANEOUS at 11:51

## 2020-12-15 RX ADMIN — SODIUM CHLORIDE 250 ML: 9 INJECTION, SOLUTION INTRAVENOUS at 12:48

## 2020-12-15 RX ADMIN — DILTIAZEM HYDROCHLORIDE 120 MG: 120 CAPSULE, COATED, EXTENDED RELEASE ORAL at 13:45

## 2020-12-15 RX ADMIN — IOPAMIDOL 75 ML: 755 INJECTION, SOLUTION INTRAVENOUS at 00:27

## 2020-12-15 RX ADMIN — METRONIDAZOLE 500 MG: 500 INJECTION, SOLUTION INTRAVENOUS at 18:05

## 2020-12-15 RX ADMIN — MAGNESIUM SULFATE HEPTAHYDRATE 2 G: 40 INJECTION, SOLUTION INTRAVENOUS at 11:52

## 2020-12-15 RX ADMIN — PROPAFENONE HYDROCHLORIDE 150 MG: 150 TABLET, FILM COATED ORAL at 23:09

## 2020-12-15 RX ADMIN — Medication 10 ML: at 23:09

## 2020-12-15 RX ADMIN — NITROGLYCERIN 1 INCH: 20 OINTMENT TOPICAL at 01:09

## 2020-12-15 RX ADMIN — ALBUTEROL SULFATE 2.5 MG: 2.5 SOLUTION RESPIRATORY (INHALATION) at 19:50

## 2020-12-15 ASSESSMENT — PAIN SCALES - GENERAL
PAINLEVEL_OUTOF10: 7
PAINLEVEL_OUTOF10: 4
PAINLEVEL_OUTOF10: 4
PAINLEVEL_OUTOF10: 6
PAINLEVEL_OUTOF10: 8
PAINLEVEL_OUTOF10: 7
PAINLEVEL_OUTOF10: 0
PAINLEVEL_OUTOF10: 7

## 2020-12-15 ASSESSMENT — ENCOUNTER SYMPTOMS
COUGH: 0
SHORTNESS OF BREATH: 0
ABDOMINAL PAIN: 1
NAUSEA: 1
VOMITING: 0
CHEST TIGHTNESS: 0
COLOR CHANGE: 0
BACK PAIN: 1
DIARRHEA: 0

## 2020-12-15 ASSESSMENT — PAIN DESCRIPTION - LOCATION
LOCATION: ABDOMEN
LOCATION: ABDOMEN

## 2020-12-15 ASSESSMENT — PAIN DESCRIPTION - PAIN TYPE: TYPE: ACUTE PAIN

## 2020-12-15 NOTE — ED NOTES
Dr. Liam Cunha at bedside speaking with pt. Regarding possible admission.      Bernardo Shaw RN  12/15/20 0041

## 2020-12-15 NOTE — PROGRESS NOTES
Patient up in chair, pre-op nurse in to assess patient for surgery. Patient aware that  Dr. Renata Garcia will be in to see prior to surgery. Patient admitted at 44, denie needs t this time. Call light in reach.

## 2020-12-15 NOTE — PROGRESS NOTES
.Patient is able to demonstrate the ability to move from a reclining position to an upright position within the recliner.

## 2020-12-15 NOTE — H&P
Hospital Medicine History & Physical      PCP: Micheal Ramírez MD    Date of Admission: 12/14/2020    Date of Service: Pt seen/examined on 12/15/2020    Chief Complaint:    Chief Complaint   Patient presents with    Abdominal Pain     Pt. states he has been having indigestion since 1500. States last time he was having these issues it was kidney stones. History Of Present Illness: The patient is a 78 y.o. male with a PMH of COPD with chronic hypoxia on 3L, Type II DM, HTN, HLD, PAF who presented to [facility] ED with complaint of RUQ/Epigastric abdominal pain. Pt is a somewhat poor historian and gave a limited history. I did speak with his daughter who was able to fill in some information. Symptoms duration - couple days. No nausea, vomiting, diarrhea or constipation. Has had some indigestion and ongoing epigastric pain with no fevers or chills. No chest pain or SOB.  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    ELAINE on CPAP     Osteoporosis     Skin cancer of face        Past Surgical History:        Procedure Laterality Date    BACK SURGERY      repair broken back L4 & L5    CYSTOSCOPY Right 10/9/15    RIght Ureteroscopy, Holmium Laser Lithotripsy with Stone Removal, Right Stent Placement    CYSTOSCOPY  05/01/2019    CYSTOSCOPY, RESECTION OF BLADDER NECK, URETHRAL DILATION    CYSTOSCOPY W BIOPSY OF BLADDER N/A 5/1/2019    CYSTOSCOPY, RESECTION OF BLADDER NECK, URETHRAL DILATION performed by Nida Shankar MD at [facility] Left 6/12/2016    cataract removal  JOINT REPLACEMENT  6/29/2011    right knee    JOINT REPLACEMENT  11/2004    left knee    OTHER SURGICAL HISTORY  08/31/2015    wide excision basal cell carcinoma on the nose and bilateral auricles with frozen sections, plastic closure, full thickness skin graft    OTHER SURGICAL HISTORY  12/1/15    excision of lesion of lip    PROSTATE SURGERY      TURP  10/23/2015    with cystoscopy       Medications Prior to Admission:    Prior to Admission medications    Medication Sig Start Date End Date Taking? Authorizing Provider   propafenone (RYTHMOL) 150 MG tablet TAKE ONE TABLET BY MOUTH EVERY 8 HOURS 10/1/20  Yes Ariel Banks MD   ELIQUIS 5 MG TABS tablet Take 1 tablet by mouth 2 times daily 10/1/20  Yes Ariel Banks MD   Nelta Miners 100-62.5-25 MCG/INH AEPB INHALE 1 PUFF EVERY DAY 10/1/20  Yes Nora De La Rosa MD   albuterol (PROVENTIL) (2.5 MG/3ML) 0.083% nebulizer solution USE 1 VIAL IN NEBULIZER 4 TIMES DAILY 9/28/20  Yes Millie Ramirez MD   acetaminophen (AMINOFEN) 325 MG tablet Take 2 tablets by mouth every 4 hours as needed for Pain 8/11/20  Yes Joshua Reid MD   VENTOLIN  (90 Base) MCG/ACT inhaler Inhale 2 puffs into the lungs every 6 hours as needed for Wheezing orShortness of Breath 6/26/20  Yes Millie Ramirez MD   dilTIAZem (CARTIA XT) 180 MG extended release capsule Take 1 capsule by mouth as needed ([id number] to take as needed only 1 time a day for increased heart rate) 5/7/20  Yes DEMOND Mota - CNP   alendronate (FOSAMAX) 70 MG tablet  3/31/20  Yes Historical Provider, MD   fluticasone (FLONASE) 50 MCG/ACT nasal spray  2/12/20  Yes Historical Provider, MD   atorvastatin (LIPITOR) 10 MG tablet  3/31/20  Yes Historical Provider, MD   pioglitazone (ACTOS) 30 MG tablet Take 30 mg by mouth daily   Yes Historical Provider, MD   gabapentin (NEURONTIN) 300 MG capsule Take 600 mg by mouth 3 times daily .    Yes Historical Provider, MD OXYGEN Inhale 2.5 L into the lungs nightly Pt wears 2- 2/12 L at night. 3 L while walking/ moving around   Yes Historical Provider, MD   metformin (GLUCOPHAGE) 500 MG tablet Take 500 mg by mouth 4 times daily    Yes Historical Provider, MD   esomeprazole (NEXIUM) 40 MG capsule   Take 40 mg by mouth every morning (before breakfast) Indications: take dos    Yes Historical Provider, MD       Allergies:  Patient has no known allergies. Social History:  The patient currently lives at home. TOBACCO:   reports that he quit smoking about 15 years ago. He has a 67.50 pack-year smoking history. He has never used smokeless tobacco.  ETOH:   reports no history of alcohol use. Family History:   Positive as follows:        Problem Relation Age of Onset    Cancer Mother     Diabetes Mother     Heart Disease Mother     Cancer Father     Diabetes Sister     Cancer Brother        REVIEW OF SYSTEMS:     Constitutional: Negative for fever   HENT: Negative for sore throat   Eyes: Negative for redness   Respiratory: Negative  for dyspnea, cough   Cardiovascular: Negative for chest pain   Gastrointestinal: Negative for vomiting, diarrhea, + RUQ/Epigastric abdominal pain   Genitourinary: Negative for hematuria   Musculoskeletal: Negative for arthralgias   Skin: Negative for rash   Neurological: Negative for syncope   Hematological: Negative for adenopathy   Psychiatric/Behavorial: Negative for anxiety    PHYSICAL EXAM:    /74   Pulse 89   Temp 98.3 °F (36.8 °C) (Oral)   Resp 20   Ht 5' 11\" (1.803 m)   Wt 200 lb (90.7 kg)   SpO2 99%   BMI 27.89 kg/m²       Gen: No distress. Alert. Elderly  male, mildly [place]  Eyes: No sclera icterus. No conjunctival injection. Neck: Trachea midline. Resp: No accessory muscle use. No crackles. No wheezes. No rhonchi. On 3L O2 - baseline  CV: Regular rate. Regular rhythm. No murmur. No rub. No edema. GI: soft, RUQ tenderness with voluntary guarding, + quintana's sign. Non-distended. Normal bowel sounds. No hernia. Skin: Warm and dry. No rash on exposed extremities. M/S: Ambulates independently  Neuro: Awake. Grossly nonfocal, no aphasia   Psych: Oriented to self - not to place and year, knows Sarahi is coming up but was unable to recall month until prompted about upcoming holiday. No anxiety or agitation.      CBC:   Recent Labs     12/14/20 2134   WBC 7.1   HGB 11.7*   HCT 35.5*   MCV 84.2   *     BMP:   Recent Labs     12/14/20 2134      K 3.8   CL 99   CO2 34*   BUN 15   CREATININE 1.0     LIVER PROFILE:   Recent Labs     12/14/20 2134   AST 15   ALT 19   LIPASE 26.0   BILITOT 0.4   ALKPHOS 67     UA:  Recent Labs     12/14/20 2258   COLORU Yellow   PHUR 8.0   WBCUA 21-50*   RBCUA 3-4   BACTERIA Rare*   CLARITYU Clear   SPECGRAV 1.020   LEUKOCYTESUR Negative   UROBILINOGEN 0.2   BILIRUBINUR Negative   BLOODU TRACE-INTACT*   GLUCOSEU 100*        CARDIAC ENZYMES  Recent Labs     12/14/20 2134 12/15/20  0101   TROPONINI <0.01 <0.01       U/A:    Lab Results   Component Value Date    COLORU Yellow 12/14/2020    WBCUA 21-50 12/14/2020    RBCUA 3-4 12/14/2020    MUCUS 2+ 04/02/2020    BACTERIA Rare 12/14/2020    CLARITYU Clear 12/14/2020    SPECGRAV 1.020 12/14/2020    LEUKOCYTESUR Negative 12/14/2020    BLOODU TRACE-INTACT 12/14/2020    GLUCOSEU 100 12/14/2020    AMORPHOUS 1+ 04/16/2020       ABG    Lab Results   Component Value Date    FTW3FWS 35.8 04/18/2020    BEART 9.5 04/18/2020    K6ZCTRSW 97.4 04/18/2020    PHART 7.398 04/18/2020    COH6XEF 59.4 04/18/2020    PO2ART 99.4 04/18/2020    SEV8HXP 37.6 04/18/2020       CULTURES    Urine cx: pending    EKG:  I have reviewed the EKG with the following interpretation:   12/14/2020  Normal sinus rhythm rate of 95  Normal ECG  No previous ECGs available     RADIOLOGY    CT CHEST ABDOMEN PELVIS W CONTRAST   Final Result 1. CT findings consistent with interval development of a penetrating   atherosclerotic ulcer involving the anterior wall of the distal descending   thoracic aorta. 2. No evidence of aortic dissection   3. Diffuse emphysematous changes seen throughout the lungs   4. L1 compression fracture, approximately 75% loss of central vertebral body   height, new since the prior study of 2017   5. Cholelithiasis, with wall thickening and surrounding inflammation   involving the neck of the gallbladder, suggesting cholecystitis. 6. No evidence of small-bowel obstruction   7. Mild aneurysmal dilatation of the infrarenal abdominal aorta, measuring   3.1 cm in greatest transverse dimension   8. Critical results were called by Dr. Nickie Concepcion to [facility] on   12/15/2020 at 01:14. RECOMMENDATIONS:   For management of fusiform AAA:      3.0-3.4 cm AAA, recommend follow-up every 3 years. * For management of saccular abdominal aortic aneurysms of any size,   recommend vascular consultation. Note:  For AAA enlargement of >0.5 cm in 6 months or >1 cm in 1 year,   recommend vascular consultation. References: Jolanta Goes Radiol 2013; 63(60):607-788; J Vasc Surg. 2018; 67:2-77         XR CHEST PORTABLE   Final Result   Moderate COPD. Pertinent previous results reviewed     TTE 4/7/2020  Summary   Left ventricular systolic function is normal with ejection fraction   estimated at 55-60 %. No regional wall motion abnormalities are noted. The left ventricular size and wall thickness were not well visualized for   measurement. Normal left ventricular diastolic filling pressure. The right atrium is mildly dilated. Mild mitral regurgitation. Aortic valve sclerosis without aortic stenosis. Mild tricuspid regurgitation. Normal systolic pulmonary artery pressure (SPAP) estimated at 36 mmHg (RA   pressure 8 mmHg). I Ab Valladares have reviewed the chart on Khloe Garcia and personally interviewed and examined patient, reviewed the data (labs and imaging) and after discussion with my PA formulated the plan. Agree with note with the following edits. HPI:     I reviewed the patient's Past Medical History, Past Surgical History, Medications, and Allergies. 78 y.o. male with a PMH of COPD with chronic hypoxia on 3L, Type II DM, HTN, HLD, PAF who presented to [facility] ED with complaint of RUQ/Epigastric abdominal pain  Pt is a somewhat poor historian and gave a limited history  Workup in Er showed possible acute cholecystitis         General: elderly male, very [place]   Awake, alert and oriented. Appears to be not in any distress  Mucous Membranes:  Pink , anicteric  Neck: No JVD, no carotid bruit, no thyromegaly  Chest:  Clear to auscultation bilaterally, no added sounds  Cardiovascular:  RRR S1S2 heard, no murmurs or gallops  Abdomen:  Soft, undistended, minimal right UQ tenderness, no organomegaly, BS present  Extremities: No edema or cyanosis.  Distal pulses well felt  Neurological : grossly normal                ASSESSMENT/PLAN:      Epigastric Abdominal Pain  Acute Cholecystitis  - CT showed cholelithiasis with wall thickening and surrounding inflammation involving the neck of the gallbladder   - Admit to Med Surg  - NPO  - started Rocephin & Flagyl D#1, PRN Morphine  - General Surgery consulted  - covid rapid test neg    Hypomagnesemia  - 1.3  - replete IV and repeat BMP at 1400     L1 compression fracture  - noted on CT with approx 75% loss of central vertebral body height, new since 2017 , appears chronic as pt with - no c/o back pain  - daughter reported a fall several months ago    Type II DM  - uncontrolled - BG in 210s  - hold Metformin and Actos, low dose SSI  - POC Glucose, monitor    HTN  - uncontrolled - SBP in 160s  - on Dilt PRN for HR  - monitor    COPD - no AE Chronic Hypoxic Respiratory Failure - stable  - wears 3L at baseline  - cont Trelegy-Ellipta, PRN Albuterol    PAF  - in NSR on admission  - cont Rythmol and Diltiazem PRN  - AC on Eliquis - hold until evaluated by Gen surgery    Chronic TCP  - plt 126  - on lovenox for now, will monitor PLT    Normocytic Anemia  - Hgb 11.7  - stable compared to prior in 8/2020 - 12    GERD  - cont Protonix    HLD  - hold for now    AAA  - mild at 3.1 cm; infrarenal  - recommend f/u every 3 years  - discussed findings with daughter about f/u    Abnormal CT   - CT findings consistent with interval development of a penetrating atherosclerotic ulcer involving the anterior wall of the distal descending thoracic aorta  - OP f/u with vascular surgery as outpt  - discussed findings with daughter about follow up    DVT Prophylaxis: Lovenox for now until evaluated by Gen surg - Eliquis is held  Diet: Diet NPO Effective Now  Code Status: Full Code    García Hickman PA-C 11:38 AM 12/15/2020     Agree with above  Changes made to note    Chuck Peters MD 12/15/2020 12:47 PM

## 2020-12-15 NOTE — ED NOTES
[facility] here to transport pt. Pt remains alert and without c/o's.       Alma Yarbrough RN  12/15/20 3255

## 2020-12-15 NOTE — ED NOTES
Pt. Accepted at [facility] at this time. Dr. Katelin Izquierdo spoke with Dr. Ricardo Gaston.        Celsa Colon, RN  12/15/20 1050

## 2020-12-15 NOTE — ED NOTES
Assumed care of pt. Pt to transfer to [facility] for [facility] Awaiting EMS at this time. Assisted to void. Made comfortable.       Marialuisa Yu  12/15/20 3502

## 2020-12-15 NOTE — ED NOTES
Dr. Shanice Cortes spoke with general surgeon regarding pt. At this time.      Gil Herzog RN  12/15/20 9601

## 2020-12-15 NOTE — ED NOTES
Received callback from transfer center at this time. Stated Evangelista Ran, and [facility] hospital is full. Speaking with pt. At this time to discuss his preferences.      Magdalena Arteaga RN  12/15/20 [id number]

## 2020-12-15 NOTE — ED NOTES
Called and spoke with pt.'s daughter at this time to give her update on pt. Notified her that plan is to admit pt. To hospital and that we are just waiting on bed. Notified her that I would call her back when I know which bed the patient is going to.      Maya Klein RN  12/15/20 6644

## 2020-12-15 NOTE — CONSULTS
Surgery Consult Note     Lois Ramírez, [facility]  Pt Name: Daniela Miller  MRN: [id number]  YOB: 1940  Date of evaluation: 12/15/2020  Primary Care Physician: Bharti Suggs MD  Patient evaluated at the request of  Dr. Adryan Rogers   Reason for evaluation: Acute cholecystitis   IMPRESSIONS:   1. 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 ABD: soft, + distention, + upper abdominal tenderness > in the RUQ, no N/V, + flatus, he is unsure of last BM  3. Leuks normal  4. LFTs normal  5. VS: temp 100, tachy HR, HTN  6. Pt reports he \"feels better. \" He appears to be a poor historian. 7. does not have any pertinent problems on file. PLANS:   1. Rocephin and flagyl  2. HTN control per primary team  3. Will need vascular f/u with Dr. Eva Vega at  Regarding penetrating ulcer in the Aorta  4. May have clear liquids from a surgical standpoint. 5. Given pt reports abdominal started on Wednesday, would plan on treating acute cholecystitis with IV antibiotics. Case discussed with Dr. Phill Tang. SUBJECTIVE:   History of Chief Complaint:    Daniela Miller is a 78 y.o. male who presented to the ER with upper abdominal and chest pain radiating to the back. The patient states the pain started on Wednesday. He is not sure what he ate before the pain started but, states eating intensified the pain. He reported having this pain in the past where he \"had a surgery that fixed it. \" He denies nausea or vomiting but, state he had a decreased appetite. He denies fevers or chills. He is on 3 L of oxygen at home. He lives alone. He reports a congestive cough with gray colored sputum. In the ER, a CTA was performed and demonstrated the above findings. We were consulted to evaluate this patient's acute cholecystitis.      Past Medical History has a past medical history of Arthritis, CAD (coronary artery disease), COPD (chronic obstructive pulmonary disease) ([facility] [address] Diabetes mellitus ([facility] [address] Hyperlipidemia, Hypertension, Influenza A, Kidney calculi, MDRO (multiple drug resistant organisms) resistance, ELAINE on CPAP, Osteoporosis, and Skin cancer of face. Past Surgical History   has a past surgical history that includes Prostate surgery; eye surgery (Left, 6/12/2016); other surgical history (08/31/2015); Cystoscopy (Right, 10/9/15); joint replacement (6/29/2011); joint replacement (11/2004); TURP (10/23/2015); other surgical history (12/1/15); back surgery; Cystocopy (05/01/2019); and cystoscopy w biopsy of bladder (N/A, 5/1/2019). Medications  Prior to Admission medications    Medication Sig Start Date End Date Taking?  Authorizing Provider   propafenone (RYTHMOL) 150 MG tablet TAKE ONE TABLET BY MOUTH EVERY 8 HOURS 10/1/20  Yes Yolis Lo MD   ELIQUIS 5 MG TABS tablet Take 1 tablet by mouth 2 times daily 10/1/20  Yes Yolis Lo MD   Carola Suad 100-62.5-25 MCG/INH AEPB INHALE 1 PUFF EVERY DAY 10/1/20  Yes Jenny Shanks MD   albuterol (PROVENTIL) (2.5 MG/3ML) 0.083% nebulizer solution USE 1 VIAL IN NEBULIZER 4 TIMES DAILY 9/28/20  Yes Jenny Shanks MD   acetaminophen (AMINOFEN) 325 MG tablet Take 2 tablets by mouth every 4 hours as needed for Pain 8/11/20  Yes Celean Fletcher MD   VENTOLIN  (90 Base) MCG/ACT inhaler Inhale 2 puffs into the lungs every 6 hours as needed for Wheezing orShortness of Breath 6/26/20  Yes Jenny Shanks MD   dilTIAZem (CARTIA XT) 180 MG extended release capsule Take 1 capsule by mouth as needed ([id number] to take as needed only 1 time a day for increased heart rate) 5/7/20  Yes DEMOND Eubanks - CNP   alendronate (FOSAMAX) 70 MG tablet  3/31/20  Yes Historical Provider, MD   fluticasone [facility]) 50 MCG/ACT nasal spray  2/12/20  Yes Historical Provider, MD atorvastatin (LIPITOR) 10 MG tablet  3/31/20  Yes Historical Provider, MD   pioglitazone (ACTOS) 30 MG tablet Take 30 mg by mouth daily   Yes Historical Provider, MD   gabapentin (NEURONTIN) 300 MG capsule Take 600 mg by mouth 3 times daily . Yes Historical Provider, MD   OXYGEN Inhale 2.5 L into the lungs nightly Pt wears 2- 2/12 L at night. 3 L while walking/ moving around   Yes Historical Provider, MD   metformin (GLUCOPHAGE) 500 MG tablet Take 500 mg by mouth 4 times daily    Yes Historical Provider, MD   esomeprazole (NEXIUM) 40 MG capsule   Take 40 mg by mouth every morning (before breakfast) Indications: take dos    Yes Historical Provider, MD    Scheduled Meds:   metroNIDAZOLE  500 mg Intravenous Q8H    [START ON 12/16/2020] cefTRIAXone (ROCEPHIN) IV  1 g Intravenous Q24H    [START ON 12/16/2020] pantoprazole  40 mg Oral QAM AC    fluticasone  1 spray Each Nostril Daily    propafenone  150 mg Oral 3 times per day    insulin lispro  0-6 Units Subcutaneous TID WC    insulin lispro  0-3 Units Subcutaneous Nightly    sodium chloride flush  10 mL Intravenous 2 times per day    enoxaparin  40 mg Subcutaneous Daily    magnesium sulfate  2 g Intravenous Once    budesonide-formoterol  2 puff Inhalation BID    And    tiotropium  2 puff Inhalation Daily     Continuous Infusions:   dextrose      sodium chloride      sodium chloride       PRN Meds:.morphine, morphine, albuterol, dilTIAZem, glucose, dextrose, glucagon (rDNA), dextrose, sodium chloride flush, promethazine **OR** ondansetron, polyethylene glycol, acetaminophen **OR** acetaminophen    Allergies  has No Known Allergies. Family History  family history includes Cancer in his brother, father, and mother; Diabetes in his mother and sister; Heart Disease in his mother.     Social History reports that he quit smoking about 15 years ago. He has a 67.50 pack-year smoking history. He has never used smokeless tobacco. He reports that he does not drink alcohol or use drugs. Review of Systems:  General Denies any fever or chills  HEENT Denies any diplopia, tinnitus or vertigo  Resp negative for  cough with sputum  + SUTTON, on 3 L NC at home  Cardiac positive for edema BLE  GI Denies any melena, hematochezia, hematemesis or pyrosis   Denies any frequency, urgency, hesitancy or incontinence  Heme Denies bruising or bleeding easily  Endocrine DM  Neuro Denies any focal motor or sensory deficits    OBJECTIVE:   VITALS:  height is 5' 11\" (1.803 m) and weight is 200 lb (90.7 kg). His oral temperature is 100.5 °F (38.1 °C). His blood pressure is 154/82 (abnormal) and his pulse is 113. His respiration is 18 and oxygen saturation is 98%. CONSTITUTIONAL: Alert and oriented times 3, no acute distress and cooperative to examination with proper mood and affect. SKIN: Skin color, texture, turgor normal. No rashes or lesions. HEENT: Head is normocephalic, atraumatic. EOMI, PERRLA. NECK: Supple, symmetrical, trachea midline, no adenopathy, thyroid symmetric, not enlarged and no tenderness, skin normal.  CHEST/LUNGS: chest symmetric with normal A/P diameter, normal respiratory rate and rhythm, decreased breath sounds in bilateral lower lobes without wheezes, rales or rhonchi. No accessory muscle use. CARDIOVASCULAR: Heart sounds tachy rate, regular rhythm  ABDOMEN: distended left groin, No and Laparoscopic scar(s) present. Abnormal bowel sounds: hyperactive and tinkling. As above. no evidence of hernia. Percussion: Normal without hepatosplenomegally. Tenderness: absent. RECTAL: deferred, not clinically indicated  NEUROLOGIC: There are no focalizing motor or sensory deficits. CN II-XII are grossly intact. Charly Macias EXTREMITIES: no cyanosis and no clubbing. 1+ BLE pitting edema noted.      LABS:     Recent Labs 12/14/20 2134 12/14/20  2258   WBC 7.1  --    HGB 11.7*  --    HCT 35.5*  --    *  --      --    K 3.8  --    CL 99  --    CO2 34*  --    BUN 15  --    CREATININE 1.0  --    MG 1.30*  --    CALCIUM 9.1  --    AST 15  --    ALT 19  --    BILITOT 0.4  --    NITRU  --  Negative   COLORU  --  Yellow   BACTERIA  --  Rare*     Recent Labs     12/14/20 2134   ALKPHOS 67   ALT 19   AST 15   BILITOT 0.4   LABALBU 4.1   LIPASE 26.0     CBC with Differential:    Lab Results   Component Value Date    WBC 7.1 12/14/2020    RBC 4.22 12/14/2020    HGB 11.7 12/14/2020    HCT 35.5 12/14/2020     12/14/2020    MCV 84.2 12/14/2020    MCH 27.7 12/14/2020    MCHC 32.9 12/14/2020    RDW 15.0 12/14/2020    SEGSPCT 68.5 07/02/2011    LYMPHOPCT 22.6 12/14/2020    MONOPCT 7.9 12/14/2020    EOSPCT 0.3 07/02/2011    BASOPCT 0.2 12/14/2020    MONOSABS 0.6 12/14/2020    LYMPHSABS 1.6 12/14/2020    EOSABS 0.1 12/14/2020    BASOSABS 0.0 12/14/2020    DIFFTYPE Auto 07/02/2011       RADIOLOGY:   I have personally reviewed the following films:    CT scan abd/pelvis: See radiology report    Thank you for the interesting evaluation. Further recommendations to follow. Electronically signed by DEMOND Pantoja CNP on 12/15/2020 at 12:34 PM         Patient seen and examined. I agree with the assessment and plan from [employer], CNP. RUQ pain. Imaging and labs reviewed. Plan for antibiotics and outpatient follow up.      [address] [facility]

## 2020-12-15 NOTE — PROGRESS NOTES
RESPIRATORY THERAPY ASSESSMENT    Name:  [address] Record Number:  [id number]  Age: 78 y.o. Gender: male  : 1940  Today's Date:  12/15/2020  Room:  0227/0227-02    Assessment     Is the patient being admitted for a COPD or Asthma exacerbation? No   (If yes the patient will be seen every 4 hours for the first 24 hours and then reassessed)    Patient Admission Diagnosis      Allergies  No Known Allergies    Minimum Predicted Vital Capacity:                Actual Vital Capacity:                     Pulmonary History:COPD  Home Oxygen Therapy:  2 liters/min via nasal cannula  Home Respiratory Therapy:Albuterol and trelligy   Current Respiratory Therapy: albuterol q 4 prn, symbicort, spiriva  Treatment Type: MDI  Medications: Budesonide/Formoterol    Respiratory Severity Index(RSI)   Patients with orders for inhalation medications, oxygen, or any therapeutic treatment modality will be placed on Respiratory Protocol. They will be assessed with the first treatment and at least every 72 hours thereafter. The following severity scale will be used to determine frequency of treatment intervention.     Smoking History: Pulmonary Disease or Smoking History, Greater than 15 pack year = 2    Social History  Social History     Tobacco Use    Smoking status: Former Smoker     Packs/day: 1.50     Years: 45.00     Pack years: 67.50     Quit date: 2005     Years since quitting: 15.5    Smokeless tobacco: Never Used   Substance Use Topics    Alcohol use: No    Drug use: No       Recent Surgical History: None = 0  Past Surgical History  Past Surgical History:   Procedure Laterality Date    BACK SURGERY      repair broken back L4 & L5    CYSTOSCOPY Right 10/9/15    RIght Ureteroscopy, Holmium Laser Lithotripsy with Stone Removal, Right Stent Placement    CYSTOSCOPY  2019    CYSTOSCOPY, RESECTION OF BLADDER NECK, URETHRAL DILATION    CYSTOSCOPY W BIOPSY OF BLADDER N/A 2019 CYSTOSCOPY, RESECTION OF BLADDER NECK, URETHRAL DILATION performed by Dilia Bell MD at [address] Left 6/12/2016    cataract removal    JOINT REPLACEMENT  6/29/2011    right knee    JOINT REPLACEMENT  11/2004    left knee    OTHER SURGICAL HISTORY  08/31/2015    wide excision basal cell carcinoma on the nose and bilateral auricles with frozen sections, plastic closure, full thickness skin graft    OTHER SURGICAL HISTORY  12/1/15    excision of lesion of lip    PROSTATE SURGERY      TURP  10/23/2015    with cystoscopy       Level of Consciousness: Alert, Oriented, and Cooperative = 0    Level of Activity: Walking with assistance = 1    Respiratory Pattern: Regular Pattern; RR 8-20 = 0    Breath Sounds: Diminshed bilaterally and/or crackles = 2    Sputum  Sputum Color: Unable to assess,  , Sputum How Obtained: Spontaneous cough  Cough: Strong, spontaneous, non-productive = 0    Vital Signs   BP (!) 145/53   Pulse 105   Temp 100.9 °F (38.3 °C) (Oral)   Resp 18   Ht 5' 11\" (1.803 m)   Wt 200 lb (90.7 kg)   SpO2 98%   BMI 27.89 kg/m²   SPO2 (COPD values may differ): 88-89% on room air or greater than 92% on FiO2 28- 35% = 2    Peak Flow (asthma only): not applicable = 0    RSI: 7-8 = BID and Q4HPRN (every four hours as needed) for dyspnea        Plan       Goals: medication delivery, mobilize retained secretions, volume expansion and improve oxygenation    Patient/caregiver was educated on the proper method of use for Respiratory Care Devices:  Yes      Level of patient/caregiver understanding able to:   ? Verbalize understanding   ? Demonstrate understanding       ? Teach back        ? Needs reinforcement       ? No available caregiver               ? Other:     Response to education:  Good     Is patient being placed on Home Treatment Regimen? No     Does the patient have everything they need prior to discharge?   NA     Comments: chart reviewed and patient assessed Plan of Care: bid and prn    Electronically signed by Gege Dooley RCP on 12/15/2020 at 4:56 PM    Respiratory Protocol Guidelines     1. Assessment and treatment by Respiratory Therapy will be initiated for medication and therapeutic interventions upon initiation of aerosolized medication. 2. Physician will be contacted for respiratory rate (RR) greater than 35 breaths per minute. Therapy will be held for heart rate (HR) greater than 140 beats per minute, pending direction from physician. 3. Bronchodilators will be administered via Metered Dose Inhaler (MDI) with spacer when the following criteria are met:  a. Alert and cooperative     b. HR < 140 bpm  c. RR < 30 bpm                d. Can demonstrate a 23 second inspiratory hold  4. Bronchodilators will be administered via Hand Held Nebulizer [place] [facility]) to patients when ANY of the following criteria are met  a. Incognizant or uncooperative          b. Patients treated with HHN at Home        c. Unable to demonstrate proper use of MDI with spacer     d. RR > 30 bpm   5. Bronchodilators will be delivered via Metered Dose Inhaler (MDI), HHN, Aerogen to intubated patients on mechanical ventilation. 6. Inhalation medication orders will be delivered and/or substituted as outlined below. Aerosolized Medications Ordering and Administration Guidelines:    1. All Medications will be ordered by a physician, and their frequency and/or modality will be adjusted as defined by the patients Respiratory Severity Index (RSI) score. 2. If the patient does not have documented COPD, consider discontinuing anticholinergics when RSI is less than 9.  3. If the bronchospasm worsens (increased RSI), then the bronchodilator frequency can be increased to a maximum of every 4 hours. If greater than every 4 hours is required, the physician will be contacted.

## 2020-12-15 NOTE — ED NOTES
Pt. Given repeat dose of morphine at this time for pain rating 8/10. VS recollected at this time.      Hattie Pena RN  12/15/20 1828

## 2020-12-15 NOTE — ED PROVIDER NOTES
[facility]        Pt Name: Magdalene Quesada  MRN: [id number]  [place] 1940  Date of evaluation: 12/14/2020  Provider: Hira Jolly MD  PCP: Willy Villaseñor MD      [address] [place] [address]       Chief Complaint   Patient presents with    Abdominal Pain     Pt. states he has been having indigestion since 1500. States last time he was having these issues it was kidney stones. HISTORY OFPRESENT ILLNESS   (Location/Symptom, Timing/Onset, Context/Setting, Quality, Duration, Modifying Factors,Severity)  Note limiting factors. Magdalene Quesada is a 78 y.o. male presenting today due to concern for persistent epigastric pain with indigestion sensation since 3 PM today associated with nausea. He denies any lower abdominal pain. It does radiate to the middle of the back. He also has some pain in the lower chest.  No shortness of breath. No fever. No headache. He states last time he had pain like this, he had a kidney stone. He denies any urinary discomfort. No falls or trauma. Since nothing was helping for the pain at home, he came to the ED for further evaluation. REVIEW OF SYSTEMS    (2-9 systems for level 4, 10 or more for level 5)     Review of Systems   Constitutional: Negative for chills, diaphoresis, fatigue and fever. HENT: Negative for congestion. Respiratory: Negative for cough, chest tightness and shortness of breath. Cardiovascular: Positive for chest pain. Gastrointestinal: Positive for abdominal pain and nausea. Negative for diarrhea and vomiting. Genitourinary: Negative for difficulty urinating and flank pain. Musculoskeletal: Positive for back pain (radiating from abdomen). Negative for neck pain. Skin: Negative for color change. Neurological: Negative for weakness, numbness and headaches. Psychiatric/Behavioral: Negative for confusion. Positives and Pertinent negatives as per HPI. 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    ELAINE on CPAP     Osteoporosis     Skin cancer of face          SURGICAL HISTORY       Past Surgical History:   Procedure Laterality Date    BACK SURGERY      repair broken back L4 & L5    CYSTOSCOPY Right 10/9/15    RIght Ureteroscopy, Holmium Laser Lithotripsy with Stone Removal, Right Stent Placement    CYSTOSCOPY  05/01/2019    CYSTOSCOPY, RESECTION OF BLADDER NECK, URETHRAL DILATION    CYSTOSCOPY W BIOPSY OF BLADDER N/A 5/1/2019    CYSTOSCOPY, RESECTION OF BLADDER NECK, URETHRAL DILATION performed by Liliana Gonzalez MD at [address] [place] [address] Left 6/12/2016    cataract removal    JOINT REPLACEMENT  6/29/2011    right knee    JOINT REPLACEMENT  11/2004    left knee    OTHER SURGICAL HISTORY  08/31/2015    wide excision basal cell carcinoma on the nose and bilateral auricles with frozen sections, plastic closure, full thickness skin graft    OTHER SURGICAL HISTORY  12/1/15    excision of lesion of lip    PROSTATE SURGERY      TURP  10/23/2015    with cystoscopy         CURRENT MEDICATIONS       Previous Medications    ACETAMINOPHEN (AMINOFEN) 325 MG TABLET    Take 2 tablets by mouth every 4 hours as needed for Pain    ALBUTEROL (PROVENTIL) (2.5 MG/3ML) 0.083% NEBULIZER SOLUTION    USE 1 VIAL IN NEBULIZER 4 TIMES DAILY    ALENDRONATE (FOSAMAX) 70 MG TABLET        ATORVASTATIN (LIPITOR) 10 MG TABLET        DILTIAZEM (CARTIA XT) 180 MG EXTENDED RELEASE CAPSULE    Take 1 capsule by mouth as needed (Ok to take as needed only 1 time a day for increased heart rate) ELIQUIS 5 MG TABS TABLET    Take 1 tablet by mouth 2 times daily    ESOMEPRAZOLE (NEXIUM) 40 MG CAPSULE      Take 40 mg by mouth every morning (before breakfast) Indications: take dos     FLUTICASONE (FLONASE) 50 MCG/ACT NASAL SPRAY        GABAPENTIN (NEURONTIN) 300 MG CAPSULE    Take 600 mg by mouth 3 times daily . METFORMIN (GLUCOPHAGE) 500 MG TABLET    Take 500 mg by mouth 4 times daily     OXYGEN    Inhale 2.5 L into the lungs nightly Pt wears 2- 2/12 L at night. 3 L while walking/ moving around    PIOGLITAZONE (ACTOS) 30 MG TABLET    Take 30 mg by mouth daily    PROPAFENONE (RYTHMOL) 150 MG TABLET    TAKE ONE TABLET BY MOUTH EVERY 8 HOURS    TRELEGY ELLIPTA 100-62.5-25 MCG/INH AEPB    INHALE 1 PUFF EVERY DAY    VENTOLIN  (90 BASE) MCG/ACT INHALER    Inhale 2 puffs into the lungs every 6 hours as needed for Wheezing orShortness of Breath       ALLERGIES     Patient has no known allergies.     FAMILY HISTORY       Family History   Problem Relation Age of Onset    Cancer Mother     Diabetes Mother     Heart Disease Mother     Cancer Father     Diabetes Sister     Cancer Brother           SOCIAL HISTORY       Social History     Socioeconomic History    Marital status:      Spouse name: Doyle Jackson    Number of children: 4    Years of education: None    Highest education level: None   Occupational History    None   Social Needs    Financial resource strain: None    Food insecurity     Worry: None     Inability: None    Transportation needs     Medical: None     Non-medical: None   Tobacco Use    Smoking status: Former Smoker     Packs/day: 1.50     Years: 45.00     Pack years: 67.50     Quit date: 5/27/2005     Years since quitting: 15.5    Smokeless tobacco: Never Used   Substance and Sexual Activity    Alcohol use: No    Drug use: No    Sexual activity: Not Currently   Lifestyle    Physical activity     Days per week: None     Minutes per session: None    Stress: None Radial pulses are 2+ on the right side and 2+ on the left side. Pulmonary:      Effort: Pulmonary effort is normal. No tachypnea, bradypnea, accessory muscle usage, prolonged expiration, respiratory distress or retractions. He is not intubated. Breath sounds: Normal breath sounds and air entry. No stridor, decreased air movement or transmitted upper airway sounds. No decreased breath sounds, wheezing, rhonchi or rales. Chest:      Chest wall: No tenderness. Abdominal:      General: Bowel sounds are normal. There is distension (mild). Palpations: Abdomen is soft. Tenderness: There is abdominal tenderness (moderate) in the right upper quadrant, epigastric area and left upper quadrant. There is no right CVA tenderness, left CVA tenderness, guarding or rebound. Negative signs include McBurney's sign. Musculoskeletal: Normal range of motion. General: No tenderness, deformity or signs of injury. Cervical back: He exhibits normal range of motion, no tenderness and no bony tenderness. Thoracic back: He exhibits normal range of motion, no tenderness and no bony tenderness. Lumbar back: He exhibits normal range of motion, no tenderness and no bony tenderness. Right lower le+ Pitting Edema present. Left lower le+ Pitting Edema present. Skin:     General: Skin is warm and dry. Coloration: Skin is not jaundiced or pale. Findings: No bruising, erythema, lesion or rash. Neurological:      General: No focal deficit present. Mental Status: He is alert and oriented to person, place, and time. Mental status is at baseline. GCS: GCS eye subscore is 4. GCS verbal subscore is 5. GCS motor subscore is 6. Sensory: No sensory deficit. Motor: No weakness, tremor, atrophy, abnormal muscle tone or seizure activity. Psychiatric:         Attention and Perception: Attention normal.         Mood and Affect: Affect normal. Mood is anxious. Speech: Speech normal.         Behavior: Behavior normal. Behavior is cooperative. DIAGNOSTIC RESULTS   :    Labs Reviewed   CBC WITH AUTO DIFFERENTIAL - Abnormal; Notable for the following components:       Result Value    Hemoglobin 11.7 (*)     Hematocrit 35.5 (*)     Platelets 508 (*)     All other components within normal limits    Narrative:     Performed at:  [facility]  [address] [place], [place] [address] [facility]. [place] [address] [employer]   Phone [phone]   COMPREHENSIVE METABOLIC PANEL - Abnormal; Notable for the following components:    CO2 34 (*)     Glucose 216 (*)     All other components within normal limits    Narrative:     Performed at:  [facility]  [address] [place] [address]. [place] [address] [employer]   Phone [phone]   MAGNESIUM - Abnormal; Notable for the following components:    Magnesium 1.30 (*)     All other components within normal limits    Narrative:     Performed at:  [facility]  [address] [place] [address]. [place] [address] [employer]   Phone [phone] - Abnormal; Notable for the following components:    Glucose, Ur 100 (*)     Blood, Urine TRACE-INTACT (*)     Protein, UA TRACE (*)     All other components within normal limits    Narrative:     Performed at:  [facility]  [address] [place] [address]. [place] [address] [employer]   Phone [phone]   MICROSCOPIC URINALYSIS - Abnormal; Notable for the following components:    WBC, UA 21-50 (*)     Bacteria, UA Rare (*)     All other components within normal limits    Narrative:     Performed at:  [facility]  [address] [place] [address]. [place] [address] [employer]   Phone [phone]   CULTURE, URINE   TROPONIN    Narrative:     Performed at:  [facility]  [facility] involving the neck of the gallbladder, suggesting cholecystitis. 6. No evidence of small-bowel obstruction   7. Mild aneurysmal dilatation of the infrarenal abdominal aorta, measuring   3.1 cm in greatest transverse dimension   8. Critical results were called by Dr. Irene Springer to [facility] on   12/15/2020 at 01:14. RECOMMENDATIONS:   For management of fusiform AAA:      3.0-3.4 cm AAA, recommend follow-up every 3 years. * For management of saccular abdominal aortic aneurysms of any size,   recommend vascular consultation. Note:  For AAA enlargement of >0.5 cm in 6 months or >1 cm in 1 year,   recommend vascular consultation. References: Mj Busby Radiol 2013; 62(48):350-111; J Vasc Surg. 2018; 67:2-77         XR CHEST PORTABLE   Final Result   Moderate COPD. PROCEDURES   Unless otherwise noted below, none     Procedures    CRITICAL CARE TIME   Time: 45 minutes   Includes repeat examinations, speaking with consultants, lab interpretation, charting, treating for acute cholecystitis and intractable pain with IV antibiotics and multiple doses of IV pain medication   Excludes separate billable procedures. Patient at risk for serious decompensation if not treated for this life-threatening condition. CONSULTS: Spoke with Dr. Yaritza Adan initially at [address] [place] [address] and he stated that he feels this is less likely related to the penetrating ulcer but based on the new finding, he would be happy to see the patient at [facility]. I was then informed there are no beds at [facility]. I spoke again with vascular surgery at 0340 and at that time he stated that it would be okay to go to [place] and to just monitor for any signs that he would be developing a dissection such as change in the location of the pain or any migration, but otherwise he agrees with me that this is more likely gallbladder origin. Spoke with Dr. Erika Robbins and he agrees to consult at [facility] due to concern for acute cholecystitis. Spoke with Dr. Mike Dodge at 6491 for admission. He accepted the patient to [facility].   IP CONSULT TO HOSPITALIST  IP CONSULT TO VASCULAR SURGERY  IP CONSULT TO GENERAL SURGERY    EMERGENCY DEPARTMENT COURSE and DIFFERENTIAL DIAGNOSIS/MDM:   Vitals:    Vitals:    12/15/20 0115 12/15/20 0128 12/15/20 0245 12/15/20 0330   BP:  (!) 127/99 (!) 162/80 (!) 162/79   Pulse: 91 92 84 84   Resp: 18 18 18 18   Temp:       TempSrc:       SpO2: 100% 100% 100% 100%   Weight:       Height:           Patient was given the following medications:  Medications   famotidine (PEPCID) injection 20 mg (20 mg Intravenous Given 12/14/20 2231)   morphine sulfate (PF) injection 4 mg (4 mg Intravenous Given 12/14/20 2232)   ondansetron (ZOFRAN) injection 4 mg (4 mg Intravenous Given 12/14/20 2230)   0.9 % sodium chloride bolus (0 mLs Intravenous Stopped 12/14/20 2329)   aluminum & magnesium hydroxide-simethicone (MAALOX) 30 mL, lidocaine viscous hcl (XYLOCAINE) 5 mL (GI COCKTAIL) ( Oral Given 12/14/20 2234)   magnesium sulfate 1 g in dextrose 5% 100 mL IVPB (0 g Intravenous Stopped 12/14/20 2356)   iopamidol (ISOVUE-370) 76 % injection 75 mL (75 mLs Intravenous Given 12/15/20 0027)   cefTRIAXone (ROCEPHIN) 1 g in sterile water 10 mL IV syringe (0 g Intravenous Stopped 12/15/20 0129)   nitroglycerin (NITRO-BID) 2 % ointment 1 inch (1 inch Topical Given 12/15/20 0109)   aspirin chewable tablet 324 mg (324 mg Oral Given 12/15/20 0109)   morphine sulfate (PF) injection 4 mg (4 mg Intravenous Given 12/15/20 0128) Patient was evaluated due to concern for worsening upper abdominal pain since 3 PM today associated with chest discomfort that has been constant associated with nausea. Initial EKG did not show any STEMI and initial troponin was negative. He received multiple pain medications in the ED and on repeat evaluation was still complaining of moderate to severe pain and looking uncomfortable in the room. Repeat troponin was negative. 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 Based on abdominal exam, I do believe this is most likely related to acute cholecystitis. Vascular surgery was consulted to determine if the penetrating ulcer required more urgent evaluation or could safely be managed as an outpatient. Initially, vascular surgery stated that going to [facility] would be appropriate in order to be conservative but thought it was less likely the penetrating ulcer. Since there were no beds at [facility], I attempted to talk to the patient about going to other facilities that had vascular surgery but the patient refuses. He has full mental capacity to make his own medical decisions and understands of the did turn out to be the ulcer that led to an aortic dissection, this could lead to severe disability or death. He states he would rather go home and die then be transferred to another facility besides [facility]. Therefore, I did call vascular surgery back to see how truly concerning this finding could be in regards to the penetrating ulcer documentation. (Please note that portions of this note were completed with a voicerecognition program.  Efforts were made to edit the dictations but occasionally words are mis-transcribed.)    Jeremy Velazquez MD (electronically signed)            Jeremy Velazquez MD  12/15/20 0756

## 2020-12-16 VITALS
TEMPERATURE: 98 F | RESPIRATION RATE: 18 BRPM | HEIGHT: 71 IN | WEIGHT: 200 LBS | HEART RATE: 82 BPM | BODY MASS INDEX: 28 KG/M2 | SYSTOLIC BLOOD PRESSURE: 150 MMHG | OXYGEN SATURATION: 99 % | DIASTOLIC BLOOD PRESSURE: 72 MMHG

## 2020-12-16 LAB
ALBUMIN SERPL-MCNC: 3.3 G/DL (ref 3.4–5)
ALP BLD-CCNC: 54 U/L (ref 40–129)
ALT SERPL-CCNC: 13 U/L (ref 10–40)
ANION GAP SERPL CALCULATED.3IONS-SCNC: 9 MMOL/L (ref 3–16)
AST SERPL-CCNC: 10 U/L (ref 15–37)
BASOPHILS ABSOLUTE: 0 K/UL (ref 0–0.2)
BASOPHILS RELATIVE PERCENT: 0.4 %
BILIRUB SERPL-MCNC: 0.5 MG/DL (ref 0–1)
BILIRUBIN DIRECT: <0.2 MG/DL (ref 0–0.3)
BILIRUBIN, INDIRECT: ABNORMAL MG/DL (ref 0–1)
BUN BLDV-MCNC: 16 MG/DL (ref 7–20)
CALCIUM SERPL-MCNC: 8.5 MG/DL (ref 8.3–10.6)
CHLORIDE BLD-SCNC: 99 MMOL/L (ref 99–110)
CO2: 31 MMOL/L (ref 21–32)
CREAT SERPL-MCNC: 1 MG/DL (ref 0.8–1.3)
EOSINOPHILS ABSOLUTE: 0 K/UL (ref 0–0.6)
EOSINOPHILS RELATIVE PERCENT: 0.3 %
ESTIMATED AVERAGE GLUCOSE: 134.1 MG/DL
GFR AFRICAN AMERICAN: >60
GFR NON-AFRICAN AMERICAN: >60
GLUCOSE BLD-MCNC: 185 MG/DL (ref 70–99)
GLUCOSE BLD-MCNC: 192 MG/DL (ref 70–99)
GLUCOSE BLD-MCNC: 217 MG/DL (ref 70–99)
HBA1C MFR BLD: 6.3 %
HCT VFR BLD CALC: 29.8 % (ref 40.5–52.5)
HEMOGLOBIN: 10.1 G/DL (ref 13.5–17.5)
LYMPHOCYTES ABSOLUTE: 1.5 K/UL (ref 1–5.1)
LYMPHOCYTES RELATIVE PERCENT: 16.9 %
MCH RBC QN AUTO: 28.4 PG (ref 26–34)
MCHC RBC AUTO-ENTMCNC: 33.9 G/DL (ref 31–36)
MCV RBC AUTO: 83.8 FL (ref 80–100)
MONOCYTES ABSOLUTE: 0.8 K/UL (ref 0–1.3)
MONOCYTES RELATIVE PERCENT: 9.6 %
NEUTROPHILS ABSOLUTE: 6.3 K/UL (ref 1.7–7.7)
NEUTROPHILS RELATIVE PERCENT: 72.8 %
PDW BLD-RTO: 15 % (ref 12.4–15.4)
PERFORMED ON: ABNORMAL
PERFORMED ON: ABNORMAL
PLATELET # BLD: 99 K/UL (ref 135–450)
PLATELET SLIDE REVIEW: ABNORMAL
PMV BLD AUTO: 9.4 FL (ref 5–10.5)
POTASSIUM REFLEX MAGNESIUM: 3.6 MMOL/L (ref 3.5–5.1)
RBC # BLD: 3.56 M/UL (ref 4.2–5.9)
SLIDE REVIEW: ABNORMAL
SODIUM BLD-SCNC: 139 MMOL/L (ref 136–145)
TOTAL PROTEIN: 5.9 G/DL (ref 6.4–8.2)
URINE CULTURE, ROUTINE: NORMAL
WBC # BLD: 8.6 K/UL (ref 4–11)

## 2020-12-16 PROCEDURE — 6370000000 HC RX 637 (ALT 250 FOR IP): Performed by: HOSPITALIST

## 2020-12-16 PROCEDURE — 36415 COLL VENOUS BLD VENIPUNCTURE: CPT

## 2020-12-16 PROCEDURE — 94761 N-INVAS EAR/PLS OXIMETRY MLT: CPT

## 2020-12-16 PROCEDURE — 99231 SBSQ HOSP IP/OBS SF/LOW 25: CPT | Performed by: SURGERY

## 2020-12-16 PROCEDURE — 2500000003 HC RX 250 WO HCPCS: Performed by: HOSPITALIST

## 2020-12-16 PROCEDURE — 6360000002 HC RX W HCPCS: Performed by: INTERNAL MEDICINE

## 2020-12-16 PROCEDURE — 94640 AIRWAY INHALATION TREATMENT: CPT

## 2020-12-16 PROCEDURE — 6370000000 HC RX 637 (ALT 250 FOR IP): Performed by: INTERNAL MEDICINE

## 2020-12-16 PROCEDURE — 80076 HEPATIC FUNCTION PANEL: CPT

## 2020-12-16 PROCEDURE — 2700000000 HC OXYGEN THERAPY PER DAY

## 2020-12-16 PROCEDURE — 85025 COMPLETE CBC W/AUTO DIFF WBC: CPT

## 2020-12-16 PROCEDURE — 6360000002 HC RX W HCPCS: Performed by: HOSPITALIST

## 2020-12-16 PROCEDURE — 99238 HOSP IP/OBS DSCHRG MGMT 30/<: CPT | Performed by: INTERNAL MEDICINE

## 2020-12-16 PROCEDURE — 2580000003 HC RX 258: Performed by: HOSPITALIST

## 2020-12-16 PROCEDURE — 80048 BASIC METABOLIC PNL TOTAL CA: CPT

## 2020-12-16 RX ORDER — AMOXICILLIN AND CLAVULANATE POTASSIUM 875; 125 MG/1; MG/1
1 TABLET, FILM COATED ORAL 2 TIMES DAILY
Qty: 16 TABLET | Refills: 0 | Status: SHIPPED | OUTPATIENT
Start: 2020-12-16 | End: 2020-12-24

## 2020-12-16 RX ADMIN — ENOXAPARIN SODIUM 40 MG: 40 INJECTION SUBCUTANEOUS at 08:23

## 2020-12-16 RX ADMIN — METRONIDAZOLE 500 MG: 500 INJECTION, SOLUTION INTRAVENOUS at 10:07

## 2020-12-16 RX ADMIN — TIOTROPIUM BROMIDE INHALATION SPRAY 2 PUFF: 3.12 SPRAY, METERED RESPIRATORY (INHALATION) at 07:11

## 2020-12-16 RX ADMIN — ALBUTEROL SULFATE 2.5 MG: 2.5 SOLUTION RESPIRATORY (INHALATION) at 07:11

## 2020-12-16 RX ADMIN — PANTOPRAZOLE SODIUM 40 MG: 40 TABLET, DELAYED RELEASE ORAL at 06:10

## 2020-12-16 RX ADMIN — DILTIAZEM HYDROCHLORIDE 120 MG: 120 CAPSULE, COATED, EXTENDED RELEASE ORAL at 08:24

## 2020-12-16 RX ADMIN — METRONIDAZOLE 500 MG: 500 INJECTION, SOLUTION INTRAVENOUS at 02:08

## 2020-12-16 RX ADMIN — CEFTRIAXONE SODIUM 1 G: 1 INJECTION, POWDER, FOR SOLUTION INTRAMUSCULAR; INTRAVENOUS at 00:58

## 2020-12-16 RX ADMIN — Medication 2 PUFF: at 07:11

## 2020-12-16 RX ADMIN — Medication 10 ML: at 08:24

## 2020-12-16 RX ADMIN — FLUTICASONE PROPIONATE 1 SPRAY: 50 SPRAY, METERED NASAL at 09:08

## 2020-12-16 RX ADMIN — PROPAFENONE HYDROCHLORIDE 150 MG: 150 TABLET, FILM COATED ORAL at 06:10

## 2020-12-16 NOTE — PROGRESS NOTES
Progress Note    Admit Date:  12/14/2020    Subjective:  Mr. Bo Metzger feels well. Denies any abdominal pain. States he has been tolerating his diet. Objective:   Patient Vitals for the past 4 hrs:   BP Temp Temp src Pulse Resp SpO2   12/16/20 0724 131/73 97.2 °F (36.2 °C) Oral 92 18 94 %   12/16/20 0714     16 95 %       Intake/Output Summary (Last 24 hours) at 12/16/2020 0938  Last data filed at 12/16/2020 0909  Gross per 24 hour   Intake 662 ml   Output 150 ml   Net 512 ml        Physical Exam:  Gen: No distress. Alert. Elderly  male, mildly [place]  Eyes: No sclera icterus. No conjunctival injection. Neck: Trachea midline. Resp: No accessory muscle use. No crackles. No wheezes. No rhonchi. On 3L O2 - baseline  CV: Regular rate. Regular rhythm. No murmur. No rub. No edema. GI: soft, improved - RUQ tenderness without voluntary guarding, negative quintana's sign. Non-distended. Normal bowel sounds. No hernia. Skin: Warm and dry. No rash on exposed extremities. M/S: Ambulates independently  Neuro: Awake. Grossly nonfocal, no aphasia   Psych: Oriented to self - No anxiety or agitation. I Rosalba Adenike have reviewed the chart on [facility] and personally interviewed and examined patient, reviewed the data (labs and imaging) and after discussion with my PA formulated the plan. Agree with note with the following edits. HPI:     I reviewed the patient's Past Medical History, Past Surgical History, Medications, and Allergies. Seen sitting up in chair, denies any issues  No pain today   Tolerating clears     General: elderly male, very [place]   Awake, alert and oriented.  Appears to be not in any distress  Mucous Membranes:  Pink , anicteric  Neck: No JVD, no carotid bruit, no thyromegaly  Chest:  Clear to auscultation bilaterally, no added sounds  Cardiovascular:  RRR S1S2 heard, no murmurs or gallops Abdomen:  Soft, undistended, minimal right UQ tenderness, no organomegaly, BS present  Extremities: No edema or cyanosis. Distal pulses well felt  Neurological : grossly normal , appears to have mild dementia               Scheduled Meds:   metroNIDAZOLE  500 mg Intravenous Q8H    cefTRIAXone (ROCEPHIN) IV  1 g Intravenous Q24H    pantoprazole  40 mg Oral QAM AC    fluticasone  1 spray Each Nostril Daily    propafenone  150 mg Oral 3 times per day    insulin lispro  0-6 Units Subcutaneous TID WC    insulin lispro  0-3 Units Subcutaneous Nightly    sodium chloride flush  10 mL Intravenous 2 times per day    enoxaparin  40 mg Subcutaneous Daily    budesonide-formoterol  2 puff Inhalation BID    And    tiotropium  2 puff Inhalation Daily    dilTIAZem  120 mg Oral Daily    albuterol  2.5 mg Nebulization BID       Continuous Infusions:   dextrose         PRN Meds:  morphine, morphine, albuterol, glucose, dextrose, glucagon (rDNA), dextrose, sodium chloride flush, promethazine **OR** ondansetron, polyethylene glycol, acetaminophen **OR** acetaminophen      Data:  CBC:   Recent Labs     12/14/20 2134 12/16/20  0553   WBC 7.1 8.6   HGB 11.7* 10.1*   HCT 35.5* 29.8*   MCV 84.2 83.8   * 99*     BMP:   Recent Labs     12/14/20 2134 12/16/20  0553    139   K 3.8 3.6   CL 99 99   CO2 34* 31   BUN 15 16   CREATININE 1.0 1.0     LIVER PROFILE:   Recent Labs     12/14/20 2134 12/16/20  0553   AST 15 10*   ALT 19 13   LIPASE 26.0  --    BILIDIR  --  <0.2   BILITOT 0.4 0.5   ALKPHOS 67 54     CULTURES    Urine cx: NGTD     RADIOLOGY    CT CHEST ABDOMEN PELVIS W CONTRAST   Final Result   1. CT findings consistent with interval development of a penetrating   atherosclerotic ulcer involving the anterior wall of the distal descending   thoracic aorta. 2. No evidence of aortic dissection   3.  Diffuse emphysematous changes seen throughout the lungs 4. L1 compression fracture, approximately 75% loss of central vertebral body   height, new since the prior study of 2017   5. Cholelithiasis, with wall thickening and surrounding inflammation   involving the neck of the gallbladder, suggesting cholecystitis. 6. No evidence of small-bowel obstruction   7. Mild aneurysmal dilatation of the infrarenal abdominal aorta, measuring   3.1 cm in greatest transverse dimension   8. Critical results were called by Dr. Thi Wyatt to [facility] on   12/15/2020 at 01:14. RECOMMENDATIONS:   For management of fusiform AAA:      3.0-3.4 cm AAA, recommend follow-up every 3 years. * For management of saccular abdominal aortic aneurysms of any size,   recommend vascular consultation. Note:  For AAA enlargement of >0.5 cm in 6 months or >1 cm in 1 year,   recommend vascular consultation. References: Jo Ann Hoot Radiol 2013; 30(69):397-393; J Vasc Surg. 2018; 67:2-77         XR CHEST PORTABLE   Final Result   Moderate COPD.            Assessment/Plan:    Epigastric Abdominal Pain - improved  Acute Cholecystitis  - CT showed cholelithiasis with wall thickening and surrounding inflammation involving the neck of the gallbladder   - Admitted to Med Surg  - NPO > clear liquid diet  - started Rocephin & Flagyl D#2, PRN Morphine  - General Surgery consulted - no surgery at this time, cont abx  - covid rapid test neg  - await gen surg eval, likely discharge home with PO abx     Hypomagnesemia - Resolved  - 1.3  - repleted IV and repeat Mg 2.2     L1 compression fracture  - noted on CT with approx 75% loss of central vertebral body height, new since 2017, appears chronic as pt with - no c/o back pain  - daughter reported a fall several months ago     Type II DM  - BG improved  - Hgb A1c: 6.3  - hold Metformin and Actos, low dose SSI  - POC Glucose, monitor     HTN  - uncontrolled - SBP in 160s > improved  - on Dilt PRN for HR  - monitor     COPD - no AE Chronic Hypoxic Respiratory Failure - stable  - wears 3L at baseline  - cont Trelegy-Ellipta, PRN Albuterol     PAF  - in NSR on admission  - cont Rythmol and Diltiazem PRN  - AC on Eliquis - on hold for now     Chronic TCP  - plt 126 > 99  - on lovenox for now, will monitor PLT       Normocytic Anemia  - Hgb 11.7  - stable compared to prior in 8/2020 - 12  - 10.1 today     GERD  - cont Protonix     HLD  - hold for now     AAA  - mild at 3.1 cm; infrarenal  - recommend f/u every 3 years  - discussed findings with daughter about f/u     Abnormal CT   - CT findings consistent with interval development of a 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

## 2020-12-16 NOTE — PROGRESS NOTES
Pt given written and verbal discharge instructions with prescriptions  instructions. Pt indicated understanding and received prescription and home medication instructions. Pt packed own belongings. Pt awaittng family to pick him up.

## 2020-12-16 NOTE — PLAN OF CARE
Problem: Activity:  Goal: Risk for activity intolerance will decrease  Description: Risk for activity intolerance will decrease  12/16/2020 1019 by Garth Berman RN  Outcome: Ongoing  12/16/2020 0133 by Sam Soares RN  Outcome: Ongoing     Problem:  Bowel/Gastric:  Goal: Bowel function will improve  Description: Bowel function will improve  12/16/2020 1019 by Garth Berman RN  Outcome: Ongoing  12/16/2020 0133 by aSm Soares RN  Outcome: Ongoing  Goal: Diagnostic test results will improve  Description: Diagnostic test results will improve  Outcome: Ongoing     Problem: Falls - Risk of:  Goal: Will remain free from falls  Description: Will remain free from falls  Outcome: Ongoing  Goal: Absence of physical injury  Description: Absence of physical injury  Outcome: Ongoing     Problem: Daily Care:  Goal: Daily care needs are met  Description: Daily care needs are met  12/16/2020 1019 by Garth Berman RN  Outcome: Ongoing  12/16/2020 0133 by Sam Soares RN  Outcome: Ongoing     Problem: Pain:  Goal: Patient's pain/discomfort is manageable  Description: Patient's pain/discomfort is manageable  12/16/2020 1019 by Garth Berman RN  Outcome: Ongoing  12/16/2020 0133 by Sam Soares RN  Outcome: Ongoing     Problem: Skin Integrity:  Goal: Skin integrity will stabilize  Description: Skin integrity will stabilize  Outcome: Ongoing     Problem: Discharge Planning:  Goal: Patients continuum of care needs are met  Description: Patients continuum of care needs are met  12/16/2020 1019 by Garth Berman RN  Outcome: Ongoing  12/16/2020 0133 by Sam Soares RN  Outcome: Ongoing

## 2020-12-16 NOTE — PROGRESS NOTES
AM assessment completed, see flow sheet. Pt is alert and oriented. Vital signs are WNL. Respirations are even & easy on 3 L/NC/O2. No complaints voiced. Pt denies needs at this time. SR up x 2, and bed in low position. Call light is within reach.

## 2020-12-16 NOTE — PROGRESS NOTES
General Surgery [facility], [address]  Daily Progress Note    Pt Name: [address] Record Number: [id number]  Date of Birth 1940   Today's Date: 12/16/2020    ASSESSMENT  1. 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 ABD: soft, non-tender, no N/V, + flatus, no BM, no distention. 3. Labs unremarkable  4. VS: Low grade temp of 100.3 overnight, HTN  5. Pt reports he \"feels better\"     PLAN  1. Pt reports he \"feels better\"   2. Will need vascular follow-up  3. May have low fat full liquids   4. Home on Augmentin  5. F/U with Dr. Max Leroy in office in 2 weeks. Kenny Tsang has improved from yesterday. Pain is well controlled. He has no nausea and no vomiting. He has passed flatus and has not had a bowel movement. He is tolerating clear liquids. Current activity is up with assistance    OBJECTIVE  VITALS:  height is 5' 11\" (1.803 m) and weight is 200 lb (90.7 kg). His oral temperature is 98 °F (36.7 °C). His blood pressure is 150/72 (abnormal) and his pulse is 82. His respiration is 18 and oxygen saturation is 99%. VITALS:  BP (!) 150/72   Pulse 82   Temp 98 °F (36.7 °C) (Oral)   Resp 18   Ht 5' 11\" (1.803 m)   Wt 200 lb (90.7 kg)   SpO2 99%   BMI 27.89 kg/m²   INTAKE/OUTPUT:    Intake/Output Summary (Last 24 hours) at 12/16/2020 1459  Last data filed at 12/16/2020 0909  Gross per 24 hour   Intake 542 ml   Output 150 ml   Net 392 ml     GENERAL: alert, cooperative, no distress  I/O last 3 completed shifts: In: 342 [P.O.:120;  I.V.:222]  Out: 1650 [Urine:1650]  I/O this shift:  In: 320 [P.O.:320]  Out: -     LABS  Recent Labs     12/14/20  2258 12/15/20  1408 12/16/20  0553   WBC  --   --  8.6   HGB  --   --  10.1*   HCT  --   --  29.8*   PLT  --   --  99*   NA  --   --  139   K  --   --  3.6   CL  --   --  99   CO2  --   --  31 BUN  --   --  16   CREATININE  --   --  1.0   MG  --  2.20  --    CALCIUM  --   --  8.5   AST  --   --  10*   ALT  --   --  13   BILITOT  --   --  0.5   BILIDIR  --   --  <0.2   NITRU Negative  --   --    COLORU Yellow  --   --    BACTERIA Rare*  --   --      CBC with Differential:    Lab Results   Component Value Date    WBC 8.6 12/16/2020    RBC 3.56 12/16/2020    HGB 10.1 12/16/2020    HCT 29.8 12/16/2020    PLT 99 12/16/2020    MCV 83.8 12/16/2020    MCH 28.4 12/16/2020    MCHC 33.9 12/16/2020    RDW 15.0 12/16/2020    SEGSPCT 68.5 07/02/2011    LYMPHOPCT 16.9 12/16/2020    MONOPCT 9.6 12/16/2020    EOSPCT 0.3 07/02/2011    BASOPCT 0.4 12/16/2020    MONOSABS 0.8 12/16/2020    LYMPHSABS 1.5 12/16/2020    EOSABS 0.0 12/16/2020    BASOSABS 0.0 12/16/2020    DIFFTYPE Auto 07/02/2011     CMP:    Lab Results   Component Value Date     12/16/2020    K 3.6 12/16/2020    CL 99 12/16/2020    CO2 31 12/16/2020    BUN 16 12/16/2020    CREATININE 1.0 12/16/2020    GFRAA >60 12/16/2020    GFRAA >60 07/01/2011    AGRATIO 1.6 12/14/2020    LABGLOM >60 12/16/2020    GLUCOSE 192 12/16/2020    PROT 5.9 12/16/2020    PROT 7.0 08/10/2010    LABALBU 3.3 12/16/2020    CALCIUM 8.5 12/16/2020    BILITOT 0.5 12/16/2020    ALKPHOS 54 12/16/2020    AST 10 12/16/2020    ALT 13 12/16/2020         Lois Harvey [employer]  Electronically signed 12/16/2020 at 2:53 PM       Patient seen and examined. I agree with the assessment and plan from TWYLA Barba. Patient feels much better. Denies pain. Tolerating PO. OK for discharge and outpatient follow up.      [address] [facility]

## 2020-12-16 NOTE — FLOWSHEET NOTE
12/15/20 1908   Vital Signs   Temp 100.3 °F (37.9 °C)   Temp Source Oral   Pulse 110   Heart Rate Source Monitor   /64   BP Location Left upper arm   Patient Position Sitting   Level of Consciousness Alert (0)   Oxygen Therapy   SpO2 97 %   O2 Device Nasal cannula   O2 Flow Rate (L/min) 2.5 L/min   Pt A/O x 4 sitting up in chair. Assessment completed. Pt denies any pain at this time. PM meds given. Pt denies any needs.  Call light within reach

## 2020-12-16 NOTE — CARE COORDINATION
Case Management Assessment  Initial Evaluation      Patient Name: Hermelinda Keys  YOB: 1940  Diagnosis: Acute cholecystitis [K81.0]  Date / Time: 12/14/2020  8:57 PM    Admission status/Date:12/15/20 inpt  Chart Reviewed: Yes      Patient Interviewed: Yes  Family Interviewed:  No      Hospitalization in the last 30 days:  No      Health Care Decision Maker :     (CM - must 1st enter selection under Navigator - emergency contact- Health Care Decision Maker Relationship and pick relationship)   Who do you trust or have selected to make healthcare decisions for you      Met with: patient at bedside  Interview conducted  (bedside/phone):    Current PCP: Halima Simpson    Financial  Medicare  Precert required for SNF : N          3 night stay required - Y    ADLS  Support Systems/Care Needs: Children  Transportation: family    Meal Preparation: self    Housing  Living Arrangements: Lives 1 story home Steps: 1  Intent for return to present living arrangements: Yes  Identified Issues:     [address] with [address] : No Agency:(Services)  Type of Home Care Services: Aide Services  Passport/Waiver : No  :                      Phone Number:    Passport/Waiver Services: N/A          Durable Medical Equiptment   DME Provider:  [facility]  Equipment:   Walker_x__Cane__x_RTS___ BSC___Shower Chair___Hospital Bed___W/C____Other________  02 at _3___Liter(s)---wears(frequency)__continuous_____ HHN ___ CPAP___ BiPap___   N/A____      Home O2 Use :  Yes    If No for home O2---if presently on O2 during hospitalization:  Yes  if yes CM to follow for potential DC O2 need  Informed of need for care provider to bring portable home O2 tank on day of discharge for nursing to connect prior to leaving:   Yes  Verbalized agreement/Understanding:   Yes    Community Service Affiliation  Dialysis:  No    · Agency:  · Location:  · Dialysis Schedule:  · Phone:   · Fax: Other Community Services: (ex:PT/OT,Mental Health,Wound Clinic, Cardio/Pul [address]) None    DISCHARGE PLAN: Explained Case Management role/services. Reviewed chart and met with pt briefly prior to dc. States lives home alone and francesco provides transportation if needed. [facility] is active with [facility] for home O2. Noted has used [facility] in past if needed. Noted pt to dc home today. HAs home O2 in place and no other dc needs identified. Will not follow.

## 2020-12-16 NOTE — DISCHARGE SUMMARY
COPD - no AE  Chronic Hypoxic Respiratory Failure - stable  - wears 3L at baseline  - continued Trelegy-Ellipta, PRN Albuterol     PAF  - in NSR on admission  - continued Rythmol and Diltiazem PRN  - AC on Eliquis - held during admission  - resume      Chronic TCP  - plt 126 > 99  - on lovenox, monitored PLT     Normocytic Anemia  - Hgb 11.7  - stable compared to prior in 8/2020 - 12  - 10.1 at d/c     GERD  - continued Protonix     HLD  - held > resumed at d/c     AAA  - mild at 3.1 cm; infrarenal  - recommended f/u every 3 years  - discussed findings with daughter about f/u     Abnormal CT   - CT findings consistent with interval development of a penetrating atherosclerotic ulcer involving the anterior wall of the distal descending thoracic aorta  - OP f/u with vascular surgery recommended  - discussed findings with daughter about follow up    Procedures (Please Review Full Report for Details)  None    Consults    General Surgery    Physical Exam at Discharge:    /73   Pulse 92   Temp 97.2 °F (36.2 °C) (Oral)   Resp 18   Ht 5' 11\" (1.803 m)   Wt 200 lb (90.7 kg)   SpO2 94%   BMI 27.89 kg/m²     Gen: No distress. Alert. Elderly  male, mildly [place]  Eyes: No sclera icterus. No conjunctival injection. Neck: Trachea midline. Resp: No accessory muscle use. No crackles. No wheezes. No rhonchi. On 3L O2 - baseline  CV: Regular rate. Regular rhythm. No murmur.  No rub. No edema. GI: soft, improved - RUQ tenderness without voluntary guarding, negative quintana's sign. Non-distended. Normal bowel sounds. No hernia. Skin: Warm and dry. No rash on exposed extremities. M/S: Ambulates independently  Neuro: Awake. Grossly nonfocal, no aphasia   Psych: Oriented to self - No anxiety or agitation.      CBC:   Recent Labs     12/14/20 2134 12/16/20  0553   WBC 7.1 8.6   HGB 11.7* 10.1*   HCT 35.5* 29.8*   MCV 84.2 83.8   * 99*     BMP:   Recent Labs     12/14/20 2134 12/16/20  0553    139 K 3.8 3.6   CL 99 99   CO2 34* 31   BUN 15 16   CREATININE 1.0 1.0     LIVER PROFILE:   Recent Labs     12/14/20  2134 12/16/20  0553   AST 15 10*   ALT 19 13   LIPASE 26.0  --    BILIDIR  --  <0.2   BILITOT 0.4 0.5   ALKPHOS 67 54     UA:  Recent Labs     12/14/20  2258   COLORU Yellow   PHUR 8.0   WBCUA 21-50*   RBCUA 3-4   BACTERIA Rare*   CLARITYU Clear   SPECGRAV 1.020   LEUKOCYTESUR Negative   UROBILINOGEN 0.2   BILIRUBINUR Negative   BLOODU TRACE-INTACT*   GLUCOSEU 100*     CULTURES    Urine cx: NGTD    RADIOLOGY    CT CHEST ABDOMEN PELVIS W CONTRAST 12/15/2020   Final Result   1. CT findings consistent with interval development of a penetrating   atherosclerotic ulcer involving the anterior wall of the distal descending   thoracic aorta. 2. No evidence of aortic dissection   3. Diffuse emphysematous changes seen throughout the lungs   4. L1 compression fracture, approximately 75% loss of central vertebral body   height, new since the prior study of 2017   5. Cholelithiasis, with wall thickening and surrounding inflammation   involving the neck of the gallbladder, suggesting cholecystitis. 6. No evidence of small-bowel obstruction   7. Mild aneurysmal dilatation of the infrarenal abdominal aorta, measuring   3.1 cm in greatest transverse dimension   8. Critical results were called by Dr. Thi Wyatt to [facility] on   12/15/2020 at 01:14. RECOMMENDATIONS:   For management of fusiform AAA:      3.0-3.4 cm AAA, recommend follow-up every 3 years. * For management of saccular abdominal aortic aneurysms of any size,   recommend vascular consultation. Note:  For AAA enlargement of >0.5 cm in 6 months or >1 cm in 1 year,   recommend vascular consultation. References: Jo Ann Gaming Radiol 2013; 78(71):208-057; J Vasc Surg. 2018; 67:2-77         XR CHEST PORTABLE 12/14/2020   Final Result   Moderate COPD.            Discharge Medications     Medication List ASK your doctor about these medications    acetaminophen 325 MG tablet  Commonly known as: Aminofen  Take 2 tablets by mouth every 4 hours as needed for Pain     alendronate 70 MG tablet  Commonly known as: FOSAMAX     atorvastatin 10 MG tablet  Commonly known as: LIPITOR     dilTIAZem 180 MG extended release capsule  Commonly known as: Cartia XT  Take 1 capsule by mouth as needed (Ok to take as needed only 1 time a day for increased heart rate)     Eliquis 5 MG Tabs tablet  Generic drug: apixaban  Take 1 tablet by mouth 2 times daily     esomeprazole 40 MG delayed release capsule  Commonly known as: NEXIUM     fluticasone 50 MCG/ACT nasal spray  Commonly known as: FLONASE     gabapentin 300 MG capsule  Commonly known as: NEURONTIN     metFORMIN 500 MG tablet  Commonly known as: GLUCOPHAGE     OXYGEN     pioglitazone 30 MG tablet  Commonly known as: ACTOS     propafenone 150 MG tablet  Commonly known as: RYTHMOL  TAKE ONE TABLET BY MOUTH EVERY 8 HOURS     Trelegy Ellipta 100-62.5-25 MCG/INH Aepb  Generic drug: fluticasone-umeclidin-vilant  INHALE 1 PUFF EVERY DAY     * Ventolin  (90 Base) MCG/ACT inhaler  Generic drug: albuterol sulfate HFA  Inhale 2 puffs into the lungs every 6 hours as needed for Wheezing orShortness of Breath     * albuterol (2.5 MG/3ML) 0.083% nebulizer solution  Commonly known as: PROVENTIL  USE 1 VIAL IN NEBULIZER 4 TIMES DAILY         * This list has 2 medication(s) that are the same as other medications prescribed for you. Read the directions carefully, and ask your doctor or other care provider to review them with you. Discharged in stable condition to home. Follow Up: Follow up with PCP in 1 week. F/u with general surgery outpatient.     Barbara Spears MD 1/7/2021 7:40 AM

## 2020-12-21 ENCOUNTER — INITIAL CONSULT (OUTPATIENT)
Dept: SURGERY | Age: 80
End: 2020-12-21
Payer: MEDICARE

## 2020-12-21 VITALS — DIASTOLIC BLOOD PRESSURE: 78 MMHG | SYSTOLIC BLOOD PRESSURE: 126 MMHG | TEMPERATURE: 97.5 F

## 2020-12-21 DIAGNOSIS — K80.00 ACUTE CALCULOUS CHOLECYSTITIS: Primary | ICD-10-CM

## 2020-12-21 PROCEDURE — 99213 OFFICE O/P EST LOW 20 MIN: CPT | Performed by: SURGERY

## 2020-12-21 PROCEDURE — 1123F ACP DISCUSS/DSCN MKR DOCD: CPT | Performed by: SURGERY

## 2020-12-21 PROCEDURE — 1036F TOBACCO NON-USER: CPT | Performed by: SURGERY

## 2020-12-21 PROCEDURE — 4040F PNEUMOC VAC/ADMIN/RCVD: CPT | Performed by: SURGERY

## 2020-12-21 PROCEDURE — G8417 CALC BMI ABV UP PARAM F/U: HCPCS | Performed by: SURGERY

## 2020-12-21 PROCEDURE — G8427 DOCREV CUR MEDS BY ELIG CLIN: HCPCS | Performed by: SURGERY

## 2020-12-21 PROCEDURE — G8484 FLU IMMUNIZE NO ADMIN: HCPCS | Performed by: SURGERY

## 2020-12-21 PROCEDURE — 1111F DSCHRG MED/CURRENT MED MERGE: CPT | Performed by: SURGERY

## 2020-12-21 RX ORDER — ASPIRIN 81 MG/1
81 TABLET ORAL DAILY
COMMUNITY
End: 2021-10-05 | Stop reason: ALTCHOICE

## 2021-01-04 ENCOUNTER — HOSPITAL ENCOUNTER (INPATIENT)
Age: 81
LOS: 10 days | Discharge: NURSING, REHAB OR LONG-TERM CARE | DRG: 853 | End: 2021-01-14
Attending: EMERGENCY MEDICINE | Admitting: INTERNAL MEDICINE
Payer: MEDICARE

## 2021-01-04 ENCOUNTER — APPOINTMENT (OUTPATIENT)
Dept: CT IMAGING | Age: 81
DRG: 853 | End: 2021-01-04
Payer: MEDICARE

## 2021-01-04 ENCOUNTER — APPOINTMENT (OUTPATIENT)
Dept: GENERAL RADIOLOGY | Age: 81
DRG: 853 | End: 2021-01-04
Payer: MEDICARE

## 2021-01-04 ENCOUNTER — APPOINTMENT (OUTPATIENT)
Dept: ULTRASOUND IMAGING | Age: 81
DRG: 853 | End: 2021-01-04
Payer: MEDICARE

## 2021-01-04 ENCOUNTER — TELEPHONE (OUTPATIENT)
Dept: SURGERY | Age: 81
End: 2021-01-04

## 2021-01-04 DIAGNOSIS — R65.20 SEVERE SEPSIS (HCC): ICD-10-CM

## 2021-01-04 DIAGNOSIS — I71.40 AAA (ABDOMINAL AORTIC ANEURYSM) WITHOUT RUPTURE: ICD-10-CM

## 2021-01-04 DIAGNOSIS — R79.89 ELEVATED LFTS: ICD-10-CM

## 2021-01-04 DIAGNOSIS — A41.9 SEVERE SEPSIS (HCC): ICD-10-CM

## 2021-01-04 DIAGNOSIS — R10.11 ABDOMINAL PAIN, RIGHT UPPER QUADRANT: ICD-10-CM

## 2021-01-04 DIAGNOSIS — R17 ELEVATED BILIRUBIN: ICD-10-CM

## 2021-01-04 DIAGNOSIS — K80.20 GALLSTONES: Primary | ICD-10-CM

## 2021-01-04 DIAGNOSIS — E87.20 LACTIC ACIDOSIS: ICD-10-CM

## 2021-01-04 DIAGNOSIS — R79.89 ELEVATED PROCALCITONIN: ICD-10-CM

## 2021-01-04 DIAGNOSIS — R53.1 GENERAL WEAKNESS: ICD-10-CM

## 2021-01-04 LAB
A/G RATIO: 1.2 (ref 1.1–2.2)
ALBUMIN SERPL-MCNC: 3.8 G/DL (ref 3.4–5)
ALP BLD-CCNC: 144 U/L (ref 40–129)
ALT SERPL-CCNC: 230 U/L (ref 10–40)
AMORPHOUS: ABNORMAL /HPF
ANION GAP SERPL CALCULATED.3IONS-SCNC: 13 MMOL/L (ref 3–16)
AST SERPL-CCNC: 263 U/L (ref 15–37)
BASOPHILS ABSOLUTE: 0 K/UL (ref 0–0.2)
BASOPHILS RELATIVE PERCENT: 0.3 %
BILIRUB SERPL-MCNC: 4.6 MG/DL (ref 0–1)
BILIRUBIN URINE: ABNORMAL
BLOOD, URINE: ABNORMAL
BUN BLDV-MCNC: 18 MG/DL (ref 7–20)
CALCIUM SERPL-MCNC: 8.8 MG/DL (ref 8.3–10.6)
CHLORIDE BLD-SCNC: 95 MMOL/L (ref 99–110)
CLARITY: ABNORMAL
CO2: 29 MMOL/L (ref 21–32)
COLOR: YELLOW
CREAT SERPL-MCNC: 0.8 MG/DL (ref 0.8–1.3)
EKG ATRIAL RATE: 121 BPM
EKG DIAGNOSIS: NORMAL
EKG P AXIS: 104 DEGREES
EKG P-R INTERVAL: 200 MS
EKG Q-T INTERVAL: 282 MS
EKG QRS DURATION: 80 MS
EKG QTC CALCULATION (BAZETT): 400 MS
EKG R AXIS: 75 DEGREES
EKG T AXIS: 87 DEGREES
EKG VENTRICULAR RATE: 121 BPM
EOSINOPHILS ABSOLUTE: 0 K/UL (ref 0–0.6)
EOSINOPHILS RELATIVE PERCENT: 0.1 %
EPITHELIAL CELLS, UA: ABNORMAL /HPF (ref 0–5)
GFR AFRICAN AMERICAN: >60
GFR NON-AFRICAN AMERICAN: >60
GLOBULIN: 3.3 G/DL
GLUCOSE BLD-MCNC: 201 MG/DL (ref 70–99)
GLUCOSE URINE: NEGATIVE MG/DL
HCT VFR BLD CALC: 32.4 % (ref 40.5–52.5)
HEMOGLOBIN: 10.6 G/DL (ref 13.5–17.5)
KETONES, URINE: NEGATIVE MG/DL
LACTIC ACID, SEPSIS: 2.3 MMOL/L (ref 0.4–1.9)
LACTIC ACID, SEPSIS: 3.1 MMOL/L (ref 0.4–1.9)
LEUKOCYTE ESTERASE, URINE: ABNORMAL
LIPASE: 16 U/L (ref 13–60)
LYMPHOCYTES ABSOLUTE: 0.8 K/UL (ref 1–5.1)
LYMPHOCYTES RELATIVE PERCENT: 7 %
MCH RBC QN AUTO: 27.5 PG (ref 26–34)
MCHC RBC AUTO-ENTMCNC: 32.6 G/DL (ref 31–36)
MCV RBC AUTO: 84.4 FL (ref 80–100)
MICROSCOPIC EXAMINATION: YES
MONOCYTES ABSOLUTE: 1.1 K/UL (ref 0–1.3)
MONOCYTES RELATIVE PERCENT: 9.4 %
MUCUS: ABNORMAL /LPF
NEUTROPHILS ABSOLUTE: 9.3 K/UL (ref 1.7–7.7)
NEUTROPHILS RELATIVE PERCENT: 83.2 %
NITRITE, URINE: NEGATIVE
PDW BLD-RTO: 16.3 % (ref 12.4–15.4)
PH UA: 7.5 (ref 5–8)
PLATELET # BLD: 124 K/UL (ref 135–450)
PMV BLD AUTO: 8.7 FL (ref 5–10.5)
POTASSIUM REFLEX MAGNESIUM: 5.1 MMOL/L (ref 3.5–5.1)
PROCALCITONIN: 22.69 NG/ML (ref 0–0.15)
PROTEIN UA: 30 MG/DL
RBC # BLD: 3.84 M/UL (ref 4.2–5.9)
RBC UA: ABNORMAL /HPF (ref 0–4)
RENAL EPITHELIAL, UA: ABNORMAL /HPF (ref 0–1)
SARS-COV-2, NAAT: NOT DETECTED
SODIUM BLD-SCNC: 137 MMOL/L (ref 136–145)
SPECIFIC GRAVITY UA: 1.01 (ref 1–1.03)
TOTAL PROTEIN: 7.1 G/DL (ref 6.4–8.2)
TROPONIN: <0.01 NG/ML
URINE REFLEX TO CULTURE: ABNORMAL
URINE TYPE: ABNORMAL
UROBILINOGEN, URINE: 2 E.U./DL
WBC # BLD: 11.2 K/UL (ref 4–11)
WBC UA: ABNORMAL /HPF (ref 0–5)

## 2021-01-04 PROCEDURE — 87186 SC STD MICRODIL/AGAR DIL: CPT

## 2021-01-04 PROCEDURE — U0002 COVID-19 LAB TEST NON-CDC: HCPCS

## 2021-01-04 PROCEDURE — 84145 PROCALCITONIN (PCT): CPT

## 2021-01-04 PROCEDURE — 74174 CTA ABD&PLVS W/CONTRAST: CPT

## 2021-01-04 PROCEDURE — 93010 ELECTROCARDIOGRAM REPORT: CPT | Performed by: INTERNAL MEDICINE

## 2021-01-04 PROCEDURE — 6360000004 HC RX CONTRAST MEDICATION: Performed by: PHYSICIAN ASSISTANT

## 2021-01-04 PROCEDURE — 93005 ELECTROCARDIOGRAM TRACING: CPT | Performed by: PHYSICIAN ASSISTANT

## 2021-01-04 PROCEDURE — 81001 URINALYSIS AUTO W/SCOPE: CPT

## 2021-01-04 PROCEDURE — 70450 CT HEAD/BRAIN W/O DYE: CPT

## 2021-01-04 PROCEDURE — 96366 THER/PROPH/DIAG IV INF ADDON: CPT

## 2021-01-04 PROCEDURE — 87086 URINE CULTURE/COLONY COUNT: CPT

## 2021-01-04 PROCEDURE — 84484 ASSAY OF TROPONIN QUANT: CPT

## 2021-01-04 PROCEDURE — 87150 DNA/RNA AMPLIFIED PROBE: CPT

## 2021-01-04 PROCEDURE — 83036 HEMOGLOBIN GLYCOSYLATED A1C: CPT

## 2021-01-04 PROCEDURE — 99284 EMERGENCY DEPT VISIT MOD MDM: CPT

## 2021-01-04 PROCEDURE — 96365 THER/PROPH/DIAG IV INF INIT: CPT

## 2021-01-04 PROCEDURE — 85025 COMPLETE CBC W/AUTO DIFF WBC: CPT

## 2021-01-04 PROCEDURE — 80053 COMPREHEN METABOLIC PANEL: CPT

## 2021-01-04 PROCEDURE — 83605 ASSAY OF LACTIC ACID: CPT

## 2021-01-04 PROCEDURE — 71045 X-RAY EXAM CHEST 1 VIEW: CPT

## 2021-01-04 PROCEDURE — 36415 COLL VENOUS BLD VENIPUNCTURE: CPT

## 2021-01-04 PROCEDURE — 87040 BLOOD CULTURE FOR BACTERIA: CPT

## 2021-01-04 PROCEDURE — 83690 ASSAY OF LIPASE: CPT

## 2021-01-04 PROCEDURE — 1200000000 HC SEMI PRIVATE

## 2021-01-04 PROCEDURE — 2580000003 HC RX 258: Performed by: PHYSICIAN ASSISTANT

## 2021-01-04 PROCEDURE — 76705 ECHO EXAM OF ABDOMEN: CPT

## 2021-01-04 PROCEDURE — 6360000002 HC RX W HCPCS: Performed by: PHYSICIAN ASSISTANT

## 2021-01-04 RX ORDER — 0.9 % SODIUM CHLORIDE 0.9 %
30 INTRAVENOUS SOLUTION INTRAVENOUS ONCE
Status: COMPLETED | OUTPATIENT
Start: 2021-01-04 | End: 2021-01-04

## 2021-01-04 RX ORDER — 0.9 % SODIUM CHLORIDE 0.9 %
1000 INTRAVENOUS SOLUTION INTRAVENOUS ONCE
Status: DISCONTINUED | OUTPATIENT
Start: 2021-01-04 | End: 2021-01-04

## 2021-01-04 RX ORDER — AMOXICILLIN AND CLAVULANATE POTASSIUM 875; 125 MG/1; MG/1
1 TABLET, FILM COATED ORAL 2 TIMES DAILY
Qty: 14 TABLET | Refills: 0 | Status: ON HOLD | OUTPATIENT
Start: 2021-01-04 | End: 2021-01-14 | Stop reason: HOSPADM

## 2021-01-04 RX ADMIN — IOPAMIDOL 85 ML: 755 INJECTION, SOLUTION INTRAVENOUS at 20:17

## 2021-01-04 RX ADMIN — PIPERACILLIN SODIUM AND TAZOBACTAM SODIUM 4.5 G: 4; .5 INJECTION, POWDER, LYOPHILIZED, FOR SOLUTION INTRAVENOUS at 20:36

## 2021-01-04 RX ADMIN — SODIUM CHLORIDE 2448 ML: 9 INJECTION, SOLUTION INTRAVENOUS at 20:23

## 2021-01-04 ASSESSMENT — ENCOUNTER SYMPTOMS
COUGH: 1
ABDOMINAL PAIN: 1
VOMITING: 0
SHORTNESS OF BREATH: 0

## 2021-01-04 NOTE — ED PROVIDER NOTES
with cystoscopy         CURRENTMEDICATIONS       Previous Medications    ACETAMINOPHEN (AMINOFEN) 325 MG TABLET    Take 2 tablets by mouth every 4 hours as needed for Pain    ALBUTEROL (PROVENTIL) (2.5 MG/3ML) 0.083% NEBULIZER SOLUTION    USE 1 VIAL IN NEBULIZER 4 TIMES DAILY    ALENDRONATE (FOSAMAX) 70 MG TABLET        AMOXICILLIN-CLAVULANATE (AUGMENTIN) 875-125 MG PER TABLET    Take 1 tablet by mouth 2 times daily for 7 days    ASPIRIN 81 MG EC TABLET    Take 81 mg by mouth daily    ATORVASTATIN (LIPITOR) 10 MG TABLET        DILTIAZEM (CARTIA XT) 180 MG EXTENDED RELEASE CAPSULE    Take 1 capsule by mouth as needed (Ok to take as needed only 1 time a day for increased heart rate)    ELIQUIS 5 MG TABS TABLET    Take 1 tablet by mouth 2 times daily    ESOMEPRAZOLE (NEXIUM) 40 MG CAPSULE      Take 40 mg by mouth every morning (before breakfast) Indications: take dos     FLUTICASONE (FLONASE) 50 MCG/ACT NASAL SPRAY        GABAPENTIN (NEURONTIN) 300 MG CAPSULE    Take 600 mg by mouth 3 times daily . METFORMIN (GLUCOPHAGE) 500 MG TABLET    Take 500 mg by mouth 4 times daily     OXYGEN    Inhale 2.5 L into the lungs nightly Pt wears 2- 2/12 L at night. 3 L while walking/ moving around    PIOGLITAZONE (ACTOS) 30 MG TABLET    Take 30 mg by mouth daily    PROPAFENONE (RYTHMOL) 150 MG TABLET    TAKE ONE TABLET BY MOUTH EVERY 8 HOURS    TRELEGY ELLIPTA 100-62.5-25 MCG/INH AEPB    INHALE 1 PUFF EVERY DAY    VENTOLIN  (90 BASE) MCG/ACT INHALER    Inhale 2 puffs into the lungs every 6 hours as needed for Wheezing orShortness of Breath         ALLERGIES     Patient has no known allergies.     FAMILYHISTORY       Family History   Problem Relation Age of Onset    Cancer Mother     Diabetes Mother     Heart Disease Mother     Cancer Father     Diabetes Sister     Cancer Brother           SOCIAL HISTORY       Social History     Socioeconomic History    Marital status:      Spouse name: Yash Neely Musculoskeletal: Normal range of motion and neck supple. Cardiovascular:      Rate and Rhythm: Regular rhythm. Tachycardia present. Pulses: Normal pulses. Pulmonary:      Effort: Pulmonary effort is normal.      Breath sounds: No stridor. Decreased breath sounds (bases) and rhonchi present. No wheezing or rales. Abdominal:      General: Bowel sounds are normal.      Palpations: Abdomen is soft. Abdomen is not rigid. Tenderness: There is abdominal tenderness in the right upper quadrant. There is no guarding or rebound. Musculoskeletal: Normal range of motion. Skin:     General: Skin is warm and dry. Neurological:      Mental Status: He is alert and oriented to person, place, and time. GCS: GCS eye subscore is 4. GCS verbal subscore is 5. GCS motor subscore is 6. Cranial Nerves: Cranial nerves are intact. No cranial nerve deficit. Sensory: No sensory deficit. Motor: No abnormal muscle tone. Coordination: Coordination normal.      Comments: Cranial facial musculature and sensation are intact. No cervical spine tenderness. Pt has intact finger to nose bilat arms tremor with task but able to touch finger nose with each arm. Holds arms out extended and supinated for 10 sec without drift. Holds each leg up off the bed for 5 seconds. Equal strength upper and lower extremities symmetric. Equal  strength and equal strength dorsi and plantar flexion feet against resistance. Psychiatric:         Speech: Speech normal.         Behavior: Behavior normal.         Thought Content:  Thought content normal.         DIAGNOSTIC RESULTS   LABS:    Labs Reviewed   LACTATE, SEPSIS - Abnormal; Notable for the following components:       Result Value    Lactic Acid, Sepsis 3.1 (*)     All other components within normal limits    Narrative:     Performed at:  [facility],  [place], [facility]   Phone [phone] CBC WITH AUTO DIFFERENTIAL - Abnormal; Notable for the following components:    WBC 11.2 (*)     RBC 3.84 (*)     Hemoglobin 10.6 (*)     Hematocrit 32.4 (*)     RDW 16.3 (*)     Platelets 700 (*)     Neutrophils Absolute 9.3 (*)     Lymphocytes Absolute 0.8 (*)     All other components within normal limits    Narrative:     Performed at:  [facility],  [place]   Phone [phone]   COMPREHENSIVE METABOLIC PANEL W/ REFLEX TO MG FOR LOW K - Abnormal; Notable for the following components:    Chloride 95 (*)     Glucose 201 (*)     Total Bilirubin 4.6 (*)     Alkaline Phosphatase 144 (*)      (*)      (*)     All other components within normal limits    Narrative:     Performed at:  [facility],  [place]   Phone [phone]   URINE RT REFLEX TO CULTURE - Abnormal; Notable for the following components:    Clarity, UA SL CLOUDY (*)     Bilirubin Urine MODERATE (*)     Blood, Urine TRACE-INTACT (*)     Protein, UA 30 (*)     Urobilinogen, Urine 2.0 (*)     Leukocyte Esterase, Urine TRACE (*)     All other components within normal limits    Narrative:     Performed at:  [facility],  [place]   Phone [phone]   PROCALCITONIN - Abnormal; Notable for the following components:    Procalcitonin 22.69 (*)     All other components within normal limits    Narrative:     Performed at:  [facility],  [place]   Phone [phone]   MICROSCOPIC URINALYSIS - Abnormal; Notable for the following components:    Mucus, UA Rare (*)     WBC, UA 6-9 (*)     Epithelial Cells, UA 6-10 (*)     All other components within normal limits    Narrative:     Performed at:  [facility] [address],  [email or web address]   Phone [phone]   LACTATE, SEPSIS - Abnormal; Notable for the following components:    Lactic Acid, Sepsis 2.3 (*)     All other components within normal limits    Narrative:     Performed at:  [facility] [address],  [place] [facility]   Phone [phone]   CULTURE, BLOOD 1   CULTURE, BLOOD 2   CULTURE, URINE   LIPASE    Narrative:     Performed at:  [place] [address],  [place] [facility]   Phone [phone]   TROPONIN    Narrative:     Performed at:  [place] [address],  [place], [place]   Phone [phone]    Narrative:     Performed at:  [place] [address],  [email or web address], [place] [facility]   Phone [phone]   LACTATE, SEPSIS     Results for orders placed or performed during the hospital encounter of 01/04/21   Lactate, Sepsis   Result Value Ref Range    Lactic Acid, Sepsis 3.1 (H) 0.4 - 1.9 mmol/L   CBC Auto Differential   Result Value Ref Range    WBC 11.2 (H) 4.0 - 11.0 K/uL    RBC 3.84 (L) 4.20 - 5.90 M/uL    Hemoglobin 10.6 (L) 13.5 - 17.5 g/dL    Hematocrit 32.4 (L) 40.5 - 52.5 %    MCV 84.4 80.0 - 100.0 fL    MCH 27.5 26.0 - 34.0 pg    MCHC 32.6 31.0 - 36.0 g/dL    RDW 16.3 (H) 12.4 - 15.4 %    Platelets 888 (L) 124 - 450 K/uL    MPV 8.7 5.0 - 10.5 fL    Neutrophils % 83.2 %    Lymphocytes % 7.0 %    Monocytes % 9.4 %    Eosinophils % 0.1 %    Basophils % 0.3 %    Neutrophils Absolute 9.3 (H) 1.7 - 7.7 K/uL    Lymphocytes Absolute 0.8 (L) 1.0 - 5.1 K/uL    Monocytes Absolute 1.1 0.0 - 1.3 K/uL    Eosinophils Absolute 0.0 0.0 - 0.6 K/uL    Basophils Absolute 0.0 0.0 - 0.2 K/uL   Comprehensive Metabolic Panel w/ Reflex to MG   Result Value Ref Range    Sodium 137 136 - 145 mmol/L    Potassium reflex Magnesium 5.1 3.5 - 5.1 mmol/L    Chloride 95 (L) 99 - 110 mmol/L CO2 29 21 - 32 mmol/L    Anion Gap 13 3 - 16    Glucose 201 (H) 70 - 99 mg/dL    BUN 18 7 - 20 mg/dL    CREATININE 0.8 0.8 - 1.3 mg/dL    GFR Non-African American >60 >60    GFR African American >60 >60    Calcium 8.8 8.3 - 10.6 mg/dL    Total Protein 7.1 6.4 - 8.2 g/dL    Alb 3.8 3.4 - 5.0 g/dL    Albumin/Globulin Ratio 1.2 1.1 - 2.2    Total Bilirubin 4.6 (H) 0.0 - 1.0 mg/dL    Alkaline Phosphatase 144 (H) 40 - 129 U/L     (H) 10 - 40 U/L     (H) 15 - 37 U/L    Globulin 3.3 g/dL   Lipase   Result Value Ref Range    Lipase 16.0 13.0 - 60.0 U/L   Troponin   Result Value Ref Range    Troponin <0.01 <0.01 ng/mL   Urinalysis Reflex to Culture    Specimen: Urine, clean catch   Result Value Ref Range    Color, UA Yellow Straw/Yellow    Clarity, UA SL CLOUDY (A) Clear    Glucose, Ur Negative Negative mg/dL    Bilirubin Urine MODERATE (A) Negative    Ketones, Urine Negative Negative mg/dL    Specific Gravity, UA 1.015 1.005 - 1.030    Blood, Urine TRACE-INTACT (A) Negative    pH, UA 7.5 5.0 - 8.0    Protein, UA 30 (A) Negative mg/dL    Urobilinogen, Urine 2.0 (A) <2.0 E.U./dL    Nitrite, Urine Negative Negative    Leukocyte Esterase, Urine TRACE (A) Negative    Microscopic Examination YES     Urine Type see below     Urine Reflex to Culture Not Indicated    Procalcitonin   Result Value Ref Range    Procalcitonin 22.69 (H) 0.00 - 0.15 ng/mL   COVID-19   Result Value Ref Range    SARS-CoV-2, NAAT Not Detected Not Detected   Microscopic Urinalysis   Result Value Ref Range    Mucus, UA Rare (A) None Seen /LPF    WBC, UA 6-9 (A) 0 - 5 /HPF    RBC, UA 0-2 0 - 4 /HPF    Epithelial Cells, UA 6-10 (A) 0 - 5 /HPF    Renal Epithelial, UA 0-1 0 - 1 /HPF    Amorphous, UA 1+ /HPF   Lactate, Sepsis   Result Value Ref Range    Lactic Acid, Sepsis 2.3 (H) 0.4 - 1.9 mmol/L   EKG 12 Lead   Result Value Ref Range    Ventricular Rate 121 BPM    Atrial Rate 121 BPM    P-R Interval 200 ms    QRS Duration 80 ms Q-T Interval 282 ms    QTc Calculation (Bazett) 400 ms    P Axis 104 degrees    R Axis 75 degrees    T Axis 87 degrees    Diagnosis       Sinus tachycardiaNonspecific T wave abnormalityAbnormal ECGNo previous ECGs availableConfirmed by MELANIE Selby MD ([id number]) on 1/4/2021 8:03:41 PM         All other labs were within normal range or not returned as of this dictation. EKG: All EKG's are interpreted by the Emergency Department Physician in the absence of a cardiologist.  Please see their note for interpretation of EKG. RADIOLOGY:   Non-plain film images such as CT, Ultrasound and MRI are read by the radiologist. Plain radiographic images are visualized andpreliminarily interpreted by the  ED Provider with the below findings:        Interpretation perthe Radiologist below, if available at the time of this note:    [address]   Final Result   1. Cholelithiasis without sonographic evidence for acute cholecystitis. 2. Fatty liver versus diffuse hepatocellular disease. 3. Nonvisualization of the pancreas. CTA CHEST ABDOMEN PELVIS W CONTRAST   Final Result   1. CTA CHEST: No acute vascular abnormality; no aneurysm or dissection. 2.  Pulmonary sequela typical of that seen with smoking, including COPD. 3. Chronic mild T6 compression fracture. 4. CTA ABDOMEN/PELVIS: No acute vascular abnormality; no aneurysm leak or   dissection. 5. 3.0 cm abdominal aortic aneurysm. Recommend follow-up every 3 years. Reference: J Am [facility] Radiol 0500;93:965-695. 6.  No CT evidence of an acute intra-abdominal or intrapelvic process. 7. Cholelithiasis without definite CT findings of acute cholecystitis. Gallbladder appears contracted. 8.  No findings to suggest acute appendicitis; no ureter calculus or   hydronephrosis. 9. Hepatic steatosis. 10. Chronic, nonspecific L1 sclerosis and compression fracture. 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       RECOMMENDATIONS:   3 year imaging follow-up AAA CT HEAD WO CONTRAST   Final Result   1. No acute intracranial abnormality. 2. Small left forehead scalp contusion/hematoma. 3. Possible acute left maxillary sinusitis. 4. Stable appearing senescent changes. XR CHEST PORTABLE   Final Result   No radiographic evidence of acute pulmonary disease. Ct Head Wo Contrast    Result Date: 1/4/2021  EXAMINATION: CT OF THE HEAD WITHOUT CONTRAST  1/4/2021 7:24 pm TECHNIQUE: CT of the head was performed without the administration of intravenous contrast. Dose modulation, iterative reconstruction, and/or weight based adjustment of the mA/kV was utilized to reduce the radiation dose to as low as reasonably achievable. COMPARISON: CT head 08/11/2020 HISTORY: ORDERING SYSTEM PROVIDED HISTORY: fall, on blood thinners TECHNOLOGIST PROVIDED HISTORY: Has a \"code stroke\" or \"stroke alert\" been called?-> no Reason for exam:-> fall, on blood thinners Reason for Exam: pt c/o SOB. Pt fell while on blood thinners, c/o extremity weakness Acuity: Acute Type of Exam: Initial FINDINGS: BRAIN/VENTRICLES: There is no acute intracranial hemorrhage, mass effect or midline shift. No abnormal extra-axial fluid collection. The gray-white differentiation is maintained without evidence of an acute infarct. There is no evidence of hydrocephalus. Stable, mild, generalized cerebral cortical atrophy. Mild bifrontal and biparietal periventricular and subcortical white matter hypoattenuation is noted. ORBITS: The visualized portion of the orbits demonstrate no acute abnormality. SINUSES: Trace air-fluid level left maxillary sinus. The other visualized paranasal sinuses and mastoid air cells demonstrate no acute abnormality. SOFT TISSUES/SKULL:  No acute abnormality of the visualized skull. Small left forehead scalp contusion/hematoma. 1. No acute intracranial abnormality. 2. Small left forehead scalp contusion/hematoma. 3. Possible acute left maxillary sinusitis. 4. Stable appearing senescent changes. Us Gallbladder Ruq    Result Date: 1/4/2021  EXAMINATION: RIGHT UPPER QUADRANT ULTRASOUND 1/4/2021 10:35 pm COMPARISON: 10/09/2015 HISTORY: ORDERING SYSTEM PROVIDED HISTORY: ruq abd pain, gallstones, elevated LFTs TECHNOLOGIST PROVIDED HISTORY: Reason for exam:->ruq abd pain, gallstones, elevated LFTs Reason for Exam: ruq abd pain, gallstones, elevated LFTs Acuity: Unknown Type of Exam: Unknown FINDINGS: LIVER:  The liver demonstrates increased echogenicity without evidence of intrahepatic biliary ductal dilatation. BILIARY SYSTEM:  The gallbladder is contracted. Cholelithiasis is identified. There is no pericholecystic fluid. Negative sonographic Cavazos's sign. Common bile duct is within normal limits measuring 4 mm. RIGHT KIDNEY: The right kidney is grossly unremarkable without evidence of hydronephrosis. PANCREAS:  The pancreas could not be identified. OTHER: No evidence of right upper quadrant ascites. 1. Cholelithiasis without sonographic evidence for acute cholecystitis. 2. Fatty liver versus diffuse hepatocellular disease. 3. Nonvisualization of the pancreas. Xr Chest Portable    Result Date: 1/4/2021  EXAMINATION: ONE XRAY VIEW OF THE CHEST 1/4/2021 6:38 pm COMPARISON: Chest x-ray dated 12/14/2020 HISTORY: ORDERING SYSTEM PROVIDED HISTORY: cough TECHNOLOGIST PROVIDED HISTORY: Reason for exam:->cough Reason for Exam: Shortness of Breath and cough Acuity: Acute Type of Exam: Initial FINDINGS: HEART/MEDIASTINUM: The cardiomediastinal silhouette is within normal limits. PLEURA/LUNGS: There are no focal consolidations or pleural effusions. There is no appreciable pneumothorax. BONES/SOFT TISSUE: No acute abnormality. No radiographic evidence of acute pulmonary disease.      Cta Chest Abdomen Pelvis W Contrast Result Date: 1/4/2021 EXAMINATION: CTA OF THE CHEST, ABDOMEN AND PELVIS WITH CONTRAST, 1/4/2021 8:20 pm TECHNIQUE: CTA of the chest, abdomen and pelvis was performed after the administration of intravenous contrast.  Multiplanar reformatted images are provided for review. MIP images are provided for review. Dose modulation, iterative reconstruction, and/or weight based adjustment of the mA/kV was utilized to reduce the radiation dose to as low as reasonably achievable. COMPARISON: Chest radiograph performed earlier today and CT chest, abdomen and pelvis 12/15/2020 HISTORY: ORDERING SYSTEM PROVIDED HISTORY: abd pain TECHNOLOGIST PROVIDED HISTORY: Reason for exam:->abd pain Reason for Exam: known AAA, abdominal pain Acuity: Acute Type of Exam: Initial CTA CHEST FINDINGS: Vasculature: Main pulmonary artery is 2.6 cm. No definite pulmonary embolism evident. Normal aortic arch branching. Ascending thoracic aorta is 3.4 cm diameter and descending thoracic aorta 2.3 cm diameter. Mild calcific atherosclerosis aorta and its branches including several of the coronary arteries. Mediastinum: Cardiac structures and great vessels appear unremarkable with exception of calcific atherosclerotic disease. No pericardial effusion. Posterior mediastinal structures appear unremarkable. No mediastinal or hilar adenopathy. Lungs/pleura: Pulmonary sequela typical of that seen with smoking including emphysema predominating in the upper lungs. There is some blur related to breathing motion degrading image quality. No definite pulmonary nodule or consolidation evident. No pleural effusion or pneumothorax. No inspissated secretions or endobronchial lesion evident. Soft Tissues/Bones: Stable appearance of chronic T6 inferior endplate wedge compression fracture. The bones appear osteoporotic. CTA ABDOMEN/PELVIS FINDINGS: Vasculature: Mild calcific atherosclerosis aorta and its branches.   Proximal infrarenal abdominal aorta is 3 cm, distal infrarenal abdominal aorta is 2.4 cm diameter. Patent celiac axis, superior mesenteric artery, right and left renal arteries and inferior mesenteric artery. Normal distal abdominal aorta tapering. Mild dilatation proximal left common iliac artery measuring 1.4 cm. Other portions of the common, internal and external iliac arteries and visualized portions of the bilateral femoral arteries appear unremarkable. Organs: The liver attenuation appears abnormally low, and texture appears unremarkable. No discrete hepatic lesion or intrahepatic bile duct dilatation is seen. The gallbladder is contracted has calcifications reflecting cholelithiasis without pericholecystic fluid or inflammatory change evident. The kidneys, spleen, adrenal glands and pancreas appear unremarkable. GI/Bowel: No diffuse or focal bowel wall thickening evident. No inflammatory changes evident. No obstruction is seen. The appendix is visualized right lower quadrant, unremarkable in appearance. Small periampullary duodenal diverticulum. Pelvis: Prostate gland and seminal vesicles appear unremarkable. Urinary bladder is partially filled, unremarkable appearance. No adenopathy or free fluid. Peritoneum/Retroperitoneum: Calcific atherosclerotic disease aorta. No aneurysm. Unremarkable appearance of the IVC. No adenopathy or fluid. Bones/Soft Tissues: Stable diffuse sclerosis throughout L1 with compression fracture and mild retropulsion superior endplate. 1. CTA CHEST: No acute vascular abnormality; no aneurysm or dissection. 2.  Pulmonary sequela typical of that seen with smoking, including COPD. 3. Chronic mild T6 compression fracture. 4. CTA ABDOMEN/PELVIS: No acute vascular abnormality; no aneurysm leak or dissection. 5. 3.0 cm abdominal aortic aneurysm. Recommend follow-up every 3 years. Reference: J Am [facility] Radiol 7659;47:245-483. 6.  No CT evidence of an acute intra-abdominal or intrapelvic process. 7. Cholelithiasis without definite CT findings of acute cholecystitis. Gallbladder appears contracted. 8.  No findings to suggest acute appendicitis; no ureter calculus or hydronephrosis. 9. Hepatic steatosis. 10. Chronic, nonspecific L1 sclerosis and compression fracture. Near vertebra plana. RECOMMENDATIONS: 3 year imaging follow-up AAA         PROCEDURES   Unless otherwise noted below, none     Procedures    CRITICAL CARE TIME   Critical care provided for this patient of which 30 min were spend on critical care and decision making. 0 min spent on procedures. There was imminent failure of an organ system which required critical intervention to prevent clinically significant progression of life threatening deterioration of the patient's condition to the point of disability or death.       CONSULTS:  IP CONSULT TO HOSPITALIST  IP CONSULT TO GENERAL SURGERY      EMERGENCY DEPARTMENT COURSE and DIFFERENTIAL DIAGNOSIS/MDM:   Vitals:    Vitals:    01/04/21 1922 01/04/21 2036 01/04/21 2206 01/04/21 2306   BP:  111/66 133/70 129/72   Pulse:  112 115 122   Resp:  26 23 28   Temp:       SpO2: 98% 100%     Weight:       Height:           Patient was given thefollowing medications:  Medications   piperacillin-tazobactam (ZOSYN) 4.5 g in sodium chloride 0.9 % 100 mL IVPB (mini-bag) (0 g Intravenous Stopped 1/4/21 2227)   0.9 % sodium chloride IV bolus 2,448 mL (0 mLs Intravenous Stopped 1/4/21 2227) iopamidol (ISOVUE-370) 76 % injection 85 mL (85 mLs Intravenous Given 1/4/21 2017)       Patient meets severe sepsis criteria tachycardic , tachypneic RR 28, lactic acid 3.3, procalcitonin 22. Recent cholecystitis, zosyn and IV fluids ordered while awaiting CT. CT showing gallstones no CT evidence of cholecystitis. AAA, not ruptured. Elevated LFTs and bilirubin, new. Plan for admit. Spoke with Dr. Mary Hodge he will admit the patient and place orders. Patient is stable. FINAL IMPRESSION      1. Gallstones    2. Abdominal pain, right upper quadrant    3. Lactic acidosis    4. Elevated LFTs    5. Elevated bilirubin    6. Elevated procalcitonin    7. General weakness    8. AAA (abdominal aortic aneurysm) without rupture ([facility] [address]    9. Severe sepsis ([facility] [address]          DISPOSITION/PLAN   DISPOSITION     PATIENT REFERREDTO:  No follow-up provider specified.     DISCHARGE MEDICATIONS:  New Prescriptions    No medications on file       DISCONTINUED MEDICATIONS:  Discontinued Medications    No medications on file              (Please note that portions ofthis note were completed with a voice recognition program.  Efforts were made to edit the dictations but occasionally words are mis-transcribed.)    Fab Mena PA-C (electronically signed)           Devendra Garcia PA-C  01/04/21 7660

## 2021-01-05 ENCOUNTER — APPOINTMENT (OUTPATIENT)
Dept: MRI IMAGING | Age: 81
DRG: 853 | End: 2021-01-05
Payer: MEDICARE

## 2021-01-05 LAB
A/G RATIO: 1.1 (ref 1.1–2.2)
ACETAMINOPHEN LEVEL: <5 UG/ML (ref 10–30)
ALBUMIN SERPL-MCNC: 3.2 G/DL (ref 3.4–5)
ALP BLD-CCNC: 111 U/L (ref 40–129)
ALT SERPL-CCNC: 161 U/L (ref 10–40)
ANION GAP SERPL CALCULATED.3IONS-SCNC: 9 MMOL/L (ref 3–16)
AST SERPL-CCNC: 125 U/L (ref 15–37)
BASOPHILS ABSOLUTE: 0 K/UL (ref 0–0.2)
BASOPHILS RELATIVE PERCENT: 0.4 %
BILIRUB SERPL-MCNC: 4.4 MG/DL (ref 0–1)
BUN BLDV-MCNC: 17 MG/DL (ref 7–20)
CALCIUM SERPL-MCNC: 8.1 MG/DL (ref 8.3–10.6)
CHLORIDE BLD-SCNC: 99 MMOL/L (ref 99–110)
CO2: 27 MMOL/L (ref 21–32)
CREAT SERPL-MCNC: 0.8 MG/DL (ref 0.8–1.3)
EOSINOPHILS ABSOLUTE: 0 K/UL (ref 0–0.6)
EOSINOPHILS RELATIVE PERCENT: 0.2 %
ESTIMATED AVERAGE GLUCOSE: 142.7 MG/DL
GFR AFRICAN AMERICAN: >60
GFR NON-AFRICAN AMERICAN: >60
GLOBULIN: 3 G/DL
GLUCOSE BLD-MCNC: 115 MG/DL
GLUCOSE BLD-MCNC: 115 MG/DL (ref 70–99)
GLUCOSE BLD-MCNC: 116 MG/DL (ref 70–99)
GLUCOSE BLD-MCNC: 117 MG/DL (ref 70–99)
GLUCOSE BLD-MCNC: 140 MG/DL (ref 70–99)
GLUCOSE BLD-MCNC: 140 MG/DL (ref 70–99)
GLUCOSE BLD-MCNC: 147 MG/DL (ref 70–99)
GLUCOSE BLD-MCNC: 163 MG/DL (ref 70–99)
HBA1C MFR BLD: 6.6 %
HCT VFR BLD CALC: 30.3 % (ref 40.5–52.5)
HEMOGLOBIN: 10 G/DL (ref 13.5–17.5)
INR BLD: 2.02 (ref 0.86–1.14)
LACTIC ACID, SEPSIS: 2.2 MMOL/L (ref 0.4–1.9)
LACTIC ACID: 2.1 MMOL/L (ref 0.4–2)
LACTIC ACID: 2.2 MMOL/L (ref 0.4–2)
LYMPHOCYTES ABSOLUTE: 0.6 K/UL (ref 1–5.1)
LYMPHOCYTES RELATIVE PERCENT: 8.7 %
MCH RBC QN AUTO: 27.9 PG (ref 26–34)
MCHC RBC AUTO-ENTMCNC: 33.1 G/DL (ref 31–36)
MCV RBC AUTO: 84.4 FL (ref 80–100)
MONOCYTES ABSOLUTE: 0.6 K/UL (ref 0–1.3)
MONOCYTES RELATIVE PERCENT: 9.2 %
NEUTROPHILS ABSOLUTE: 5.2 K/UL (ref 1.7–7.7)
NEUTROPHILS RELATIVE PERCENT: 81.5 %
PDW BLD-RTO: 16.4 % (ref 12.4–15.4)
PERFORMED ON: ABNORMAL
PLATELET # BLD: 98 K/UL (ref 135–450)
PMV BLD AUTO: 9.8 FL (ref 5–10.5)
POTASSIUM REFLEX MAGNESIUM: 4.5 MMOL/L (ref 3.5–5.1)
PROTHROMBIN TIME: 23.6 SEC (ref 10–13.2)
RBC # BLD: 3.59 M/UL (ref 4.2–5.9)
SODIUM BLD-SCNC: 135 MMOL/L (ref 136–145)
TOTAL PROTEIN: 6.2 G/DL (ref 6.4–8.2)
URINE CULTURE, ROUTINE: NORMAL
WBC # BLD: 6.4 K/UL (ref 4–11)

## 2021-01-05 PROCEDURE — 2700000000 HC OXYGEN THERAPY PER DAY

## 2021-01-05 PROCEDURE — 83516 IMMUNOASSAY NONANTIBODY: CPT

## 2021-01-05 PROCEDURE — 6370000000 HC RX 637 (ALT 250 FOR IP): Performed by: INTERNAL MEDICINE

## 2021-01-05 PROCEDURE — 85610 PROTHROMBIN TIME: CPT

## 2021-01-05 PROCEDURE — 97530 THERAPEUTIC ACTIVITIES: CPT

## 2021-01-05 PROCEDURE — 36415 COLL VENOUS BLD VENIPUNCTURE: CPT

## 2021-01-05 PROCEDURE — 6360000002 HC RX W HCPCS: Performed by: INTERNAL MEDICINE

## 2021-01-05 PROCEDURE — 83605 ASSAY OF LACTIC ACID: CPT

## 2021-01-05 PROCEDURE — 2580000003 HC RX 258: Performed by: INTERNAL MEDICINE

## 2021-01-05 PROCEDURE — 94761 N-INVAS EAR/PLS OXIMETRY MLT: CPT

## 2021-01-05 PROCEDURE — 80053 COMPREHEN METABOLIC PANEL: CPT

## 2021-01-05 PROCEDURE — 86038 ANTINUCLEAR ANTIBODIES: CPT

## 2021-01-05 PROCEDURE — 85025 COMPLETE CBC W/AUTO DIFF WBC: CPT

## 2021-01-05 PROCEDURE — 83540 ASSAY OF IRON: CPT

## 2021-01-05 PROCEDURE — 86803 HEPATITIS C AB TEST: CPT

## 2021-01-05 PROCEDURE — 82390 ASSAY OF CERULOPLASMIN: CPT

## 2021-01-05 PROCEDURE — 94640 AIRWAY INHALATION TREATMENT: CPT

## 2021-01-05 PROCEDURE — 97161 PT EVAL LOW COMPLEX 20 MIN: CPT

## 2021-01-05 PROCEDURE — 97116 GAIT TRAINING THERAPY: CPT

## 2021-01-05 PROCEDURE — 99222 1ST HOSP IP/OBS MODERATE 55: CPT | Performed by: INTERNAL MEDICINE

## 2021-01-05 PROCEDURE — 86708 HEPATITIS A ANTIBODY: CPT

## 2021-01-05 PROCEDURE — 86706 HEP B SURFACE ANTIBODY: CPT

## 2021-01-05 PROCEDURE — 97165 OT EVAL LOW COMPLEX 30 MIN: CPT

## 2021-01-05 PROCEDURE — 87340 HEPATITIS B SURFACE AG IA: CPT

## 2021-01-05 PROCEDURE — G0480 DRUG TEST DEF 1-7 CLASSES: HCPCS

## 2021-01-05 PROCEDURE — 86705 HEP B CORE ANTIBODY IGM: CPT

## 2021-01-05 PROCEDURE — 74181 MRI ABDOMEN W/O CONTRAST: CPT

## 2021-01-05 PROCEDURE — 1200000000 HC SEMI PRIVATE

## 2021-01-05 PROCEDURE — 86709 HEPATITIS A IGM ANTIBODY: CPT

## 2021-01-05 PROCEDURE — 82103 ALPHA-1-ANTITRYPSIN TOTAL: CPT

## 2021-01-05 PROCEDURE — 86704 HEP B CORE ANTIBODY TOTAL: CPT

## 2021-01-05 PROCEDURE — 99222 1ST HOSP IP/OBS MODERATE 55: CPT | Performed by: SURGERY

## 2021-01-05 PROCEDURE — 83550 IRON BINDING TEST: CPT

## 2021-01-05 RX ORDER — SODIUM CHLORIDE 0.9 % (FLUSH) 0.9 %
10 SYRINGE (ML) INJECTION PRN
Status: DISCONTINUED | OUTPATIENT
Start: 2021-01-05 | End: 2021-01-14 | Stop reason: HOSPADM

## 2021-01-05 RX ORDER — DILTIAZEM HYDROCHLORIDE 180 MG/1
180 CAPSULE, COATED, EXTENDED RELEASE ORAL PRN
Status: DISCONTINUED | OUTPATIENT
Start: 2021-01-05 | End: 2021-01-09

## 2021-01-05 RX ORDER — MORPHINE SULFATE 2 MG/ML
2 INJECTION, SOLUTION INTRAMUSCULAR; INTRAVENOUS
Status: DISCONTINUED | OUTPATIENT
Start: 2021-01-05 | End: 2021-01-07

## 2021-01-05 RX ORDER — PROPAFENONE HYDROCHLORIDE 150 MG/1
150 TABLET, FILM COATED ORAL EVERY 8 HOURS SCHEDULED
Status: DISCONTINUED | OUTPATIENT
Start: 2021-01-05 | End: 2021-01-14 | Stop reason: HOSPADM

## 2021-01-05 RX ORDER — GABAPENTIN 300 MG/1
600 CAPSULE ORAL 3 TIMES DAILY
Status: DISCONTINUED | OUTPATIENT
Start: 2021-01-05 | End: 2021-01-14 | Stop reason: HOSPADM

## 2021-01-05 RX ORDER — ACETAMINOPHEN 650 MG/1
650 SUPPOSITORY RECTAL EVERY 6 HOURS PRN
Status: DISCONTINUED | OUTPATIENT
Start: 2021-01-05 | End: 2021-01-08

## 2021-01-05 RX ORDER — ASPIRIN 81 MG/1
81 TABLET ORAL DAILY
Status: DISCONTINUED | OUTPATIENT
Start: 2021-01-05 | End: 2021-01-14 | Stop reason: HOSPADM

## 2021-01-05 RX ORDER — SODIUM CHLORIDE 9 MG/ML
INJECTION, SOLUTION INTRAVENOUS CONTINUOUS
Status: DISCONTINUED | OUTPATIENT
Start: 2021-01-05 | End: 2021-01-06

## 2021-01-05 RX ORDER — DEXTROSE MONOHYDRATE 25 G/50ML
12.5 INJECTION, SOLUTION INTRAVENOUS PRN
Status: DISCONTINUED | OUTPATIENT
Start: 2021-01-05 | End: 2021-01-14 | Stop reason: HOSPADM

## 2021-01-05 RX ORDER — ACETAMINOPHEN 325 MG/1
650 TABLET ORAL EVERY 6 HOURS PRN
Status: DISCONTINUED | OUTPATIENT
Start: 2021-01-05 | End: 2021-01-08

## 2021-01-05 RX ORDER — ALBUTEROL SULFATE 2.5 MG/3ML
2.5 SOLUTION RESPIRATORY (INHALATION) 2 TIMES DAILY
Status: DISCONTINUED | OUTPATIENT
Start: 2021-01-05 | End: 2021-01-14 | Stop reason: HOSPADM

## 2021-01-05 RX ORDER — DEXTROSE MONOHYDRATE 50 MG/ML
100 INJECTION, SOLUTION INTRAVENOUS PRN
Status: DISCONTINUED | OUTPATIENT
Start: 2021-01-05 | End: 2021-01-14 | Stop reason: HOSPADM

## 2021-01-05 RX ORDER — NICOTINE POLACRILEX 4 MG
15 LOZENGE BUCCAL PRN
Status: DISCONTINUED | OUTPATIENT
Start: 2021-01-05 | End: 2021-01-14 | Stop reason: HOSPADM

## 2021-01-05 RX ORDER — SODIUM CHLORIDE 0.9 % (FLUSH) 0.9 %
10 SYRINGE (ML) INJECTION EVERY 12 HOURS SCHEDULED
Status: DISCONTINUED | OUTPATIENT
Start: 2021-01-05 | End: 2021-01-14 | Stop reason: HOSPADM

## 2021-01-05 RX ORDER — PANTOPRAZOLE SODIUM 40 MG/1
40 TABLET, DELAYED RELEASE ORAL
Status: DISCONTINUED | OUTPATIENT
Start: 2021-01-05 | End: 2021-01-14 | Stop reason: HOSPADM

## 2021-01-05 RX ORDER — BUDESONIDE AND FORMOTEROL FUMARATE DIHYDRATE 160; 4.5 UG/1; UG/1
1 AEROSOL RESPIRATORY (INHALATION) DAILY
Status: DISCONTINUED | OUTPATIENT
Start: 2021-01-05 | End: 2021-01-14 | Stop reason: HOSPADM

## 2021-01-05 RX ORDER — ALBUTEROL SULFATE 2.5 MG/3ML
2.5 SOLUTION RESPIRATORY (INHALATION) EVERY 6 HOURS PRN
Status: DISCONTINUED | OUTPATIENT
Start: 2021-01-05 | End: 2021-01-05

## 2021-01-05 RX ORDER — ALBUTEROL SULFATE 2.5 MG/3ML
2.5 SOLUTION RESPIRATORY (INHALATION) EVERY 4 HOURS PRN
Status: DISCONTINUED | OUTPATIENT
Start: 2021-01-05 | End: 2021-01-14 | Stop reason: HOSPADM

## 2021-01-05 RX ADMIN — ASPIRIN 81 MG: 81 TABLET, COATED ORAL at 10:42

## 2021-01-05 RX ADMIN — INSULIN LISPRO 1 UNITS: 100 INJECTION, SOLUTION INTRAVENOUS; SUBCUTANEOUS at 13:14

## 2021-01-05 RX ADMIN — ALBUTEROL SULFATE 2.5 MG: 2.5 SOLUTION RESPIRATORY (INHALATION) at 08:30

## 2021-01-05 RX ADMIN — GABAPENTIN 600 MG: 300 CAPSULE ORAL at 10:42

## 2021-01-05 RX ADMIN — PROPAFENONE HYDROCHLORIDE 150 MG: 150 TABLET, FILM COATED ORAL at 20:29

## 2021-01-05 RX ADMIN — SODIUM CHLORIDE: 9 INJECTION, SOLUTION INTRAVENOUS at 17:24

## 2021-01-05 RX ADMIN — PIPERACILLIN SODIUM AND TAZOBACTAM SODIUM 3.38 G: 3; .375 INJECTION, POWDER, LYOPHILIZED, FOR SOLUTION INTRAVENOUS at 05:48

## 2021-01-05 RX ADMIN — TIOTROPIUM BROMIDE INHALATION SPRAY 2 PUFF: 3.12 SPRAY, METERED RESPIRATORY (INHALATION) at 08:29

## 2021-01-05 RX ADMIN — INSULIN LISPRO 1 UNITS: 100 INJECTION, SOLUTION INTRAVENOUS; SUBCUTANEOUS at 10:41

## 2021-01-05 RX ADMIN — APIXABAN 5 MG: 5 TABLET, FILM COATED ORAL at 01:14

## 2021-01-05 RX ADMIN — GABAPENTIN 600 MG: 300 CAPSULE ORAL at 20:29

## 2021-01-05 RX ADMIN — SODIUM CHLORIDE: 9 INJECTION, SOLUTION INTRAVENOUS at 20:28

## 2021-01-05 RX ADMIN — PROPAFENONE HYDROCHLORIDE 150 MG: 150 TABLET, FILM COATED ORAL at 06:04

## 2021-01-05 RX ADMIN — PIPERACILLIN SODIUM AND TAZOBACTAM SODIUM 3.38 G: 3; .375 INJECTION, POWDER, LYOPHILIZED, FOR SOLUTION INTRAVENOUS at 15:42

## 2021-01-05 RX ADMIN — PROPAFENONE HYDROCHLORIDE 150 MG: 150 TABLET, FILM COATED ORAL at 15:28

## 2021-01-05 RX ADMIN — PROPAFENONE HYDROCHLORIDE 150 MG: 150 TABLET, FILM COATED ORAL at 01:14

## 2021-01-05 RX ADMIN — PANTOPRAZOLE SODIUM 40 MG: 40 TABLET, DELAYED RELEASE ORAL at 10:42

## 2021-01-05 RX ADMIN — PIPERACILLIN SODIUM AND TAZOBACTAM SODIUM 3.38 G: 3; .375 INJECTION, POWDER, LYOPHILIZED, FOR SOLUTION INTRAVENOUS at 20:29

## 2021-01-05 RX ADMIN — SODIUM CHLORIDE: 9 INJECTION, SOLUTION INTRAVENOUS at 01:13

## 2021-01-05 RX ADMIN — APIXABAN 5 MG: 5 TABLET, FILM COATED ORAL at 10:42

## 2021-01-05 RX ADMIN — GABAPENTIN 600 MG: 300 CAPSULE ORAL at 15:28

## 2021-01-05 RX ADMIN — Medication 1 PUFF: at 08:29

## 2021-01-05 RX ADMIN — ALBUTEROL SULFATE 2.5 MG: 2.5 SOLUTION RESPIRATORY (INHALATION) at 18:45

## 2021-01-05 ASSESSMENT — PAIN SCALES - GENERAL: PAINLEVEL_OUTOF10: 0

## 2021-01-05 NOTE — CONSULTS
[address] ,  [address] [facility]  Phone: [phone] [facility] [address],  [address] [facility], [address]  Phone: [phone]   QLQ:[phone]    Gastroenterology H&P/Consult Note    Chief Complaint   Patient presents with    Shortness of Breath     c/o increased SOB and cough. Patient wears 3L home O2 and is 97%    Extremity Weakness     C/o bilateral leg weakness, unable to stand and walk at home. HPI     Thank you No ref. provider found and Kiley Neri MD for asking me to see Erendira Mas in consultation. He is a 78y.o. year old male with medical history of coronary artery disease, COPD on chronic 3 L of oxygen, diabetes mellitus type 2, hyperlipidemia, hypertension, ELAINE on CPAP, atrial fibrillation on Eliquis and cholecystitis presents for evaluation of fall at home. Patient reports lower extremity weakness with his legs giving out. In the ED labs obtained noted elevated liver chemistries for which GI has been consulted for further evaluation. Patient at present denies abdominal pain and wants to go home. He reports the last abdominal pain was about 4 weeks ago. Denies nausea, vomiting, fever, chills, hematochezia or melenic stools. Patient was previously admitted on 12/14/2020 for epigastric pain. CT abdomen and pelvis noted cholelithiasis with wall thickening and surrounding inflammation suggestive of cholecystitis. Surgery evaluated patient for acute cholecystitis and given clinical improvement, patient was discharged home on Augmentin to follow-up as outpatient. Date of Admission:  1/4/2021  Date of Consultation:  1/5/2021    Last Encounter Reviewed: None  Pertinent PMH, FH, SH is reviewed below.   Last EGD: None  Last Colonoscopy: None    Health Maintenance   Topic Date Due    Lipid screen  12/20/1950    DTaP/Tdap/Td vaccine (1 - Tdap) 12/20/1957    Shingles Vaccine (1 of 2) 12/20/1988  Annual Wellness Visit (AWV)  06/23/2019    Flu vaccine  Completed    Pneumococcal 65+ years Vaccine  Completed    Hepatitis A vaccine  Aged Out    Hib vaccine  Aged Out    Meningococcal (ACWY) vaccine  Aged Out     PAST MEDICAL HISTORY     Past Medical History:   Diagnosis Date    Arthritis     CAD (coronary artery disease)     COPD [facility]ronic obstructive pulmonary disease) [facility] [address]     Diabetes mellitus [facility]     Hyperlipidemia     Hypertension     Influenza A 12/29/2017    Kidney calculi     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    ELAINE on CPAP     Osteoporosis     Skin cancer of face      FAMILY HISTORY     Family History   Problem Relation Age of Onset    Cancer Mother     Diabetes Mother     Heart Disease Mother     Cancer Father     Diabetes Sister     Cancer Brother      SOCIAL HISTORY     Social History     Tobacco Use    Smoking status: Former Smoker     Packs/day: 1.50     Years: 45.00     Pack years: 67.50     Quit date: 5/27/2005     Years since quitting: 15.6    Smokeless tobacco: Never Used   Substance Use Topics    Alcohol use: No    Drug use: No     SURGICAL HISTORY     Past Surgical History:   Procedure Laterality Date    BACK SURGERY      repair broken back L4 & L5    CYSTOSCOPY Right 10/9/15    RIght Ureteroscopy, Holmium Laser Lithotripsy with Stone Removal, Right Stent Placement    CYSTOSCOPY  05/01/2019    CYSTOSCOPY, RESECTION OF BLADDER NECK, URETHRAL DILATION    CYSTOSCOPY W BIOPSY OF BLADDER N/A 5/1/2019    CYSTOSCOPY, RESECTION OF BLADDER NECK, URETHRAL DILATION performed by Nghia Solorio MD at [facility] [address] Left 6/12/2016    cataract removal    JOINT REPLACEMENT  6/29/2011    right knee    JOINT REPLACEMENT  11/2004    left knee    OTHER SURGICAL HISTORY  08/31/2015 wide excision basal cell carcinoma on the nose and bilateral auricles with frozen sections, plastic closure, full thickness skin graft    OTHER SURGICAL HISTORY  12/1/15    excision of lesion of lip    PROSTATE SURGERY      TURP  10/23/2015    with cystoscopy     ALLERGIES   No Known Allergies  CURRENT MEDICATIONS     Current Outpatient Rx   Medication Sig Dispense Refill    amoxicillin-clavulanate (AUGMENTIN) 875-125 MG per tablet Take 1 tablet by mouth 2 times daily for 7 days 14 tablet 0    VENTOLIN  (90 Base) MCG/ACT inhaler Inhale 2 puffs into the lungs every 6 hours as needed for Wheezing orShortness of Breath 18 g 4    aspirin 81 MG EC tablet Take 81 mg by mouth daily      propafenone (RYTHMOL) 150 MG tablet TAKE ONE TABLET BY MOUTH EVERY 8 HOURS 90 tablet 11    ELIQUIS 5 MG TABS tablet Take 1 tablet by mouth 2 times daily 60 tablet 11    TRELEGY ELLIPTA 100-62.5-25 MCG/INH AEPB INHALE 1 PUFF EVERY DAY 1 each 5    albuterol (PROVENTIL) (2.5 MG/3ML) 0.083% nebulizer solution USE 1 VIAL IN NEBULIZER 4 TIMES DAILY 100 vial 11    acetaminophen (AMINOFEN) 325 MG tablet Take 2 tablets by mouth every 4 hours as needed for Pain 120 tablet 0    dilTIAZem (CARTIA XT) 180 MG extended release capsule Take 1 capsule by mouth as needed (Ok to take as needed only 1 time a day for increased heart rate) 30 capsule 3    alendronate (FOSAMAX) 70 MG tablet       fluticasone (FLONASE) 50 MCG/ACT nasal spray       atorvastatin (LIPITOR) 10 MG tablet       pioglitazone (ACTOS) 30 MG tablet Take 30 mg by mouth daily      gabapentin (NEURONTIN) 300 MG capsule Take 600 mg by mouth 3 times daily .  OXYGEN Inhale 2.5 L into the lungs nightly Pt wears 2- 2/12 L at night.  3 L while walking/ moving around      metformin (GLUCOPHAGE) 500 MG tablet Take 500 mg by mouth 4 times daily       esomeprazole (NEXIUM) 40 MG capsule   Take 40 mg by mouth every morning (before breakfast) Indications: take dos Neurological: Negative for weakness and light-headedness. Hematological: Negative for adenopathy. Does not bruise/bleed easily. Psychiatric/Behavioral: Negative for suicidal ideas. PHYSICAL EXAM   VITAL SIGNS: /67   Pulse 116   Temp 98.3 °F (36.8 °C) (Oral)   Resp 16   Ht 5' 11\" (1.803 m)   Wt 180 lb (81.6 kg)   SpO2 99%   BMI 25.10 kg/m²   Review of available records reveals: Wt Readings from Last 50 Encounters:   01/04/21 180 lb (81.6 kg)   12/14/20 200 lb (90.7 kg)   08/14/20 193 lb (87.5 kg)   08/11/20 192 lb (87.1 kg)   05/07/20 192 lb (87.1 kg)   04/20/20 194 lb 12.8 oz (88.4 kg)   04/09/20 198 lb 8 oz (90 kg)   02/28/20 221 lb (100.2 kg)   09/13/19 215 lb (97.5 kg)   05/04/19 290 lb (131.5 kg)   04/26/19 209 lb (94.8 kg)   03/21/19 213 lb (96.6 kg)   11/12/18 204 lb (92.5 kg)   09/18/18 212 lb (96.2 kg)   05/31/18 203 lb 6.4 oz (92.3 kg)   02/14/18 200 lb (90.7 kg)   01/26/18 205 lb 5 oz (93.1 kg)   01/03/18 190 lb 6.4 oz (86.4 kg)   07/28/17 197 lb (89.4 kg)   07/13/17 199 lb (90.3 kg)   06/29/17 196 lb (88.9 kg)   06/15/17 196 lb 3.2 oz (89 kg)   03/07/17 198 lb 8 oz (90 kg)   12/08/15 203 lb (92.1 kg)   12/01/15 200 lb (90.7 kg)   08/31/15 200 lb (90.7 kg)   06/09/15 190 lb (86.2 kg)   05/27/11 200 lb (90.7 kg)     Constitutional: Pleasant elderly male, well developed, Well nourished, No acute distress, Non-toxic appearance. HENT: Normocephalic, Atraumatic, Bilateral external ears normal, Oropharynx moist, No oral exudates, Nose normal.   Eyes: Conjunctiva normal, No discharge. Neck: Normal range of motion, No tenderness, Supple, No stridor. Lymphatic: No cervical, subclavian, or axillary lymphadenopathy. Cardiovascular: Tachycardic to 110s, Normal rhythm, No murmurs, No rubs, No gallops. Thorax & Lungs: Normal breath sounds, No respiratory distress, No wheezing, No chest tenderness.    Abdomen: Pannus abdomen, normal bowel sounds, soft, non tender, non distended,no hernias Rectal:  Deferred. Skin: Warm, Dry, No erythema, No rash. No bruising. No spider hemangiomas. Back: No tenderness, No CVA tenderness. Lower Extremities: Intact distal pulses, No edema, No tenderness, No cyanosis  Neurologic: Alert & oriented x 3    RADIOLOGY/PROCEDURES     Last PALAT CXR:   Results for orders placed during the hospital encounter of 04/16/20   XR CHEST STANDARD (2 VW)    Narrative EXAMINATION:  TWO XRAY VIEWS OF THE CHEST    4/20/2020 9:13 am    COMPARISON:  Prior study(s) most recent 04/17/2020. HISTORY:  ORDERING SYSTEM PROVIDED HISTORY: SOB  TECHNOLOGIST PROVIDED HISTORY:  Reason for exam:->SOB  Reason for Exam: SOB  Acuity: Acute  Type of Exam: Subsequent/Follow-up    FINDINGS:  Heart is upper normal size. There is interstitial change present greater in  the periphery of the lower lungs and suspected emphysema with COPD. No acute  or asymmetric airspace disease. Compared to prior studies, there has been  improving aeration throughout the lungs bilaterally particularly in the lower  lobes. The endotracheal tube is been removed. Impression Clearing of the congestive failure with apparent underlying COPD.           COURSE & MEDICAL DECISION MAKING   (See epic chart for additional details including stool tests, total bilirubin, viral loads, procedures, and pathology)  Lab Results   Component Value Date    WBC 6.4 01/05/2021    HGB 10.0 (L) 01/05/2021    HCT 30.3 (L) 01/05/2021    PLT 98 (L) 01/05/2021     (H) 01/05/2021     (H) 01/05/2021     (L) 01/05/2021    K 4.5 01/05/2021    CL 99 01/05/2021    CREATININE 0.8 01/05/2021    BUN 17 01/05/2021    CO2 27 01/05/2021    TSH 1.74 04/07/2020    INR 2.02 (H) 01/05/2021    LABA1C 6.6 01/04/2021    LABMICR YES 01/04/2021     Lab Results   Component Value Date    BILIDIR <0.2 12/16/2020     Lab Results   Component Value Date    PHOS 4.3 04/19/2020     Lab Results   Component Value Date    LABALBU 3.2 01/05/2021 ALKPHOS 111 01/05/2021     01/05/2021     01/05/2021    BILITOT 4.4 01/05/2021    BILIDIR <0.2 12/16/2020     Lab Results   Component Value Date    LIPASE 16.0 01/04/2021    LIPASE 26.0 12/14/2020    LIPASE 13.0 10/08/2015     No results found for: AMYLASE  Lab Results   Component Value Date    INR 2.02 (H) 01/05/2021    INR 1.16 (H) 01/25/2018    INR 1.15 10/08/2015    PROTIME 23.6 (H) 01/05/2021    PROTIME 13.1 (H) 01/25/2018    PROTIME 12.5 10/08/2015       Lab Results   Component Value Date    [id number] 527 01/25/2018     US gallbladder RUQ 1/4/2021:   1. Cholelithiasis without sonographic evidence for acute cholecystitis. 2. Fatty liver versus diffuse hepatocellular disease. 3. Nonvisualization of the pancreas. FINAL IMPRESSION/ASSESSMENT/PLAN     1. Abnormal liver chemistries (mixed pattern) in setting of recent acute cholecystitis and cholelithiasis  2. Normocytic anemia and thrombocytopenia, coagulopathy    Plan:  Obtain MRI/MRCP of abdomen to further evaluate biliary tree and rule out choledocholithiasis. Continue antibiotics with IV Zosyn  Hepatocellular work-up  No evidence of GI bleed. Continue to monitor hemoglobin and hematocrit for now. GI to follow with you    1. The patient indicates understanding of these issues and agrees with the plan. 2.  I reviewed the patient's medical information and medical history. 3.  I have reviewed the past medical, family, and social history sections including the medications and allergies listed in the above medical record. Thank you for involving [facility]) Gastroenterology in [facility] care. For further questions or concerns, we can best be reached through perfect serv.         Kandy Renner 1/5/21 1:11 PM EST    [facility]) Physicians Gastroenterology   Phone [phone]   Fax [phone]

## 2021-01-05 NOTE — PROGRESS NOTES
.4 Eyes Skin Assessment     The patient is being assess for   Transfer to New Unit    I agree that 2 RN's have performed a thorough Head to Toe Skin Assessment on the patient. ALL assessment sites listed below have been assessed. Areas assessed by both nurses:   [x]   Head, Face, and Ears   [x]   Shoulders, Back, and Chest, Abdomen  [x]   Arms, Elbows, and Hands   [x]   Coccyx, Sacrum, and Ischium  [x]   Legs, Feet, and Heels        Redness on coccyx; blanchable. **SHARE this note so that the co-signing nurse is able to place an eSignature**    Co-signer eSignature: Electronically signed by Triston Mccarty RN on 1/5/21 at 6:27 PM EST    Does the Patient have Skin Breakdown?   No          Santiago Prevention initiated:  Yes   Wound Care Orders initiated:  No      [facility] nurse consulted for Pressure Injury (Stage 3,4, Unstageable, DTI, NWPT, Complex wounds)and New or Established Ostomies:  No      Primary Nurse eSignature: Electronically signed by Juan J Leroy RN on 1/5/21 at 5:44 PM EST

## 2021-01-05 NOTE — ED NOTES
Pt denies shortness of breath states he is only here because his legs gave out.       Wolfgang Kern, JOSE MIGUEL  01/04/21 2100

## 2021-01-05 NOTE — ED NOTES
ED hold for 2W. Telemetry in place with continuous pulse ox. 3 lpm. Pt admission orders released and medications administered. POC reviewed with the pt including hold overnight, NPO - no questions at this time. Call light use reviewed with verbal understanding received and call light in reach. Admission navigator NOT completed.  Marina Roberto Clinical

## 2021-01-05 NOTE — ED NOTES
Pt ambulated to restroom. PT states he can go without oxygen. Attempted to get a weight and the scale would not work. Upon returning to bed, patient was short of breath and pulse ox was 66% on room air. Oxygen provided at 3L/min. PT's oxygen saturation went up to 93% very quickly. Advised patient we will get an oxygen tank next time he has to go. PT agreed.       Rocky Lomeli RN  01/05/21 1002

## 2021-01-05 NOTE — PROGRESS NOTES
Inpatient Occupational Therapy  Evaluation and Treatment    Unit: ED  Date:  1/5/2021  Patient Name:    Nikolas Rosen  Admitting diagnosis:  Acute cholecystitis [K81.0]  Admit Date:  1/4/2021  Precautions/Restrictions/WB Status/ Lines/ Wounds/ Oxygen: fall risk, IV and supplemental O2 (3L)    Treatment Time:  14:32-15:10  Treatment Number: 1     Billable Treatment Time: 28 minutes   Total Treatment Time:   38  minutes    Patient Goals for Therapy:  \" to go home \"      Discharge Recommendations: Home with 24/7 assist and home therapy  DME needs for discharge: Needs Met       Therapy recommendations for staff:   Roney Ochoa with use of rolling walker (RW) for all ambulation to/from bathroom    History of Present Illness: 78y.o. year old male with medical history of coronary artery disease, COPD on chronic 3 L of oxygen, diabetes mellitus type 2, hyperlipidemia, hypertension, ELAINE on CPAP, atrial fibrillation on Eliquis and cholecystitis presents for evaluation of fall at home. Patient reports lower extremity weakness with his legs giving out. Home Health S4 Level Recommendation:  Level 3 Safety  AM-PAC Score:      Preadmission Environment    Pt. Aric Almonte dtr lives near by to assist  Home environment:    one story home  Steps to enter first floor: 2 steps to enter and no Rail  Bathroom: Walk-in Shower, Grab bars and Shower Chair, comfort height toilet with armrests  Equipment owned: SPC, Rolling Walker, manual W/C, home O2 (3 L) continuous, pulse ox, reacher, inhaler, nebulizer and life alert     Preadmission Status:  Pt. Able to drive: No  Pt Fully independent with ADLs: Yes  Pt. Is independent with cooking and laundry.              HHA 3x/wk - assist with cleaning, laundry, grocery shopping  Pt.  Fully independent for transfers and gait and walked with walker intermittently   History of falls: yes \"legs gave out\"   Pt was getting home PT/OT 5x/wk    Pain  No  Rating:NA  Location: Pain Medicine Status: Denies need      Cognition    A&O x4   Able to follow 2 step commands    Subjective  Patient lying supine in bed with no family present - no visitors present due to COVID-19 restrictions  Pt agreeable to this OT eval & tx. Upper Extremity ROM:    WFL,  pt able to perform all bed mobility, transfers, and gait without ROM limitation. Upper Extremity Strength:    BUE strength WFL, but not formally assessed w/ MMT    B UEs 5/5    Upper Extremity Sensation    WFL    Upper Extremity Proprioception:  WFL    Coordination and Tone  Diminished    Balance  Functional Sitting Balance:  WFL  Functional Standing Balance:Diminished    Bed mobility:    Supine to sit:   Not Tested  Sit to supine:   SBA  Rolling:    Modified Independent  Scooting in sitting:  SBA  Scooting to head of bed:   Modified Independent    Bridging:   Not Tested    Transfers:    Sit to stand:  SBA  Stand to sit:  SBA  Bed to chair:   Not Tested  Standard toilet: Not Tested  Bed to [facility]:  Not Tested     Patient completed functional mobility around room with SBA and use of RW. Dressing:      UE:   Not Tested  LE:    Not Tested    Bathing:    UE:  Not Tested  LE:  Not Tested    Eating:   Not Tested    Toileting:  Not Tested    Activity Tolerance   Pt completed therapy session with No adverse symptoms noted w/activity  SpO2: >90%   HR:   BP:     Positioning Needs: In bed, call light and needs in reach. Exercise / Activities Initiated:   N/A    Patient/Family Education:   Role of OT  Recommendations for DC  Safe RW use/hand placement    Assessment of Deficits: Pt seen for Occupational therapy evaluation in acute care setting. Pt demonstrated decreased Activity tolerance, ADLs, IADLs, Balance , Bathing, Bed mobility, Dressing, ROM, Safety Awareness, Strength, Transfers and Coping Skills. Pt functioning below baseline and will likely benefit from skilled occupational therapy services to maximize safety and independence.

## 2021-01-05 NOTE — PROGRESS NOTES
RESPIRATORY THERAPY ASSESSMENT    Name:  [address] Record Number:  [id number]  Age: 78 y.o. Gender: male  : 1940  Today's Date:  2021  Room:      Assessment     Is the patient being admitted for a COPD or Asthma exacerbation? No   (If yes the patient will be seen every 4 hours for the first 24 hours and then reassessed)    Patient Admission Diagnosis      Allergies  No Known Allergies    Minimum Predicted Vital Capacity:               Actual Vital Capacity:                    Pulmonary History: COPD, ELAINE  Home Oxygen Therapy:   3 lpm  Home Respiratory Therapy: Albuterol BID, Albuterol prn, Trelegy   Current Respiratory Therapy:   Albuterol prn, Symbicort/ Spiriva Daily           Respiratory Severity Index(RSI)   Patients with orders for inhalation medications, oxygen, or any therapeutic treatment modality will be placed on Respiratory Protocol. They will be assessed with the first treatment and at least every 72 hours thereafter. The following severity scale will be used to determine frequency of treatment intervention.     Smoking History: Pulmonary Disease or Smoking History, Greater than 15 pack year = 2    Social History  Social History     Tobacco Use    Smoking status: Former Smoker     Packs/day: 1.50     Years: 45.00     Pack years: 67.50     Quit date: 2005     Years since quitting: 15.6    Smokeless tobacco: Never Used   Substance Use Topics    Alcohol use: No    Drug use: No       Recent Surgical History: None = 0  Past Surgical History  Past Surgical History:   Procedure Laterality Date    BACK SURGERY      repair broken back L4 & L5    CYSTOSCOPY Right 10/9/15    RIght Ureteroscopy, Holmium Laser Lithotripsy with Stone Removal, Right Stent Placement    CYSTOSCOPY  2019    CYSTOSCOPY, RESECTION OF BLADDER NECK, URETHRAL DILATION    CYSTOSCOPY W BIOPSY OF BLADDER N/A 2019 CYSTOSCOPY, RESECTION OF BLADDER NECK, URETHRAL DILATION performed by Mina Madrid MD at [address] Left 6/12/2016    cataract removal    JOINT REPLACEMENT  6/29/2011    right knee    JOINT REPLACEMENT  11/2004    left knee    OTHER SURGICAL HISTORY  08/31/2015    wide excision basal cell carcinoma on the nose and bilateral auricles with frozen sections, plastic closure, full thickness skin graft    OTHER SURGICAL HISTORY  12/1/15    excision of lesion of lip    PROSTATE SURGERY      TURP  10/23/2015    with cystoscopy       Level of Consciousness: Alert, Oriented, and Cooperative = 0    Level of Activity: Mostly sedentary, minimal walking = 2    Respiratory Pattern: Regular Pattern; RR 8-20 = 0    Breath Sounds: Diminshed bilaterally and/or crackles = 2    Sputum   ,  ,    Cough: Strong, spontaneous, non-productive = 0    Vital Signs   /68   Pulse 115   Temp 99.9 °F (37.7 °C) (Oral)   Resp 11   Ht 5' 11\" (1.803 m)   Wt 180 lb (81.6 kg)   SpO2 98%   BMI 25.10 kg/m²   SPO2 (COPD values may differ): 88-89% on room air or greater than 92% on FiO2 28- 35% = 2    Peak Flow (asthma only): not applicable = 0    RSI: 7-8 = BID and Q4HPRN (every four hours as needed) for dyspnea        Plan       Goals: medication delivery     Patient/caregiver was educated on the proper method of use for Respiratory Care Devices:  Yes      Level of patient/caregiver understanding able to:   ? Verbalize understanding   ? Demonstrate understanding       ? Teach back        ? Needs reinforcement       ? No available caregiver               ? Other:     Response to education:  Good     Is patient being placed on Home Treatment Regimen? Yes     Does the patient have everything they need prior to discharge? NA     Comments:  Chart reviewed, patient assessed    Plan of Care:   Albuterol BID, Albuterol prn, Spiriva/Symbicort Daily    Electronically signed by Carolee Mcdermott RCP on 1/5/2021 at 4:24 AM Respiratory Protocol Guidelines     1. Assessment and treatment by Respiratory Therapy will be initiated for medication and therapeutic interventions upon initiation of aerosolized medication. 2. Physician will be contacted for respiratory rate (RR) greater than 35 breaths per minute. Therapy will be held for heart rate (HR) greater than 140 beats per minute, pending direction from physician. 3. Bronchodilators will be administered via Metered Dose Inhaler (MDI) with spacer when the following criteria are met:  a. Alert and cooperative     b. HR < 140 bpm  c. RR < 30 bpm                d. Can demonstrate a 23 second inspiratory hold  4. Bronchodilators will be administered via Hand Held Nebulizer [facility]) to patients when ANY of the following criteria are met  a. Incognizant or uncooperative          b. Patients treated with HHN at Home        c. Unable to demonstrate proper use of MDI with spacer     d. RR > 30 bpm   5. Bronchodilators will be delivered via Metered Dose Inhaler (MDI), HHN, Aerogen to intubated patients on mechanical ventilation. 6. Inhalation medication orders will be delivered and/or substituted as outlined below. Aerosolized Medications Ordering and Administration Guidelines:    1. All Medications will be ordered by a physician, and their frequency and/or modality will be adjusted as defined by the patients Respiratory Severity Index (RSI) score. 2. If the patient does not have documented COPD, consider discontinuing anticholinergics when RSI is less than 9.  3. If the bronchospasm worsens (increased RSI), then the bronchodilator frequency can be increased to a maximum of every 4 hours. If greater than every 4 hours is required, the physician will be contacted. 4. If the bronchospasm improves, the frequency of the bronchodilator can be decreased, based on the patient's RSI, but not less than home treatment regimen frequency. 5. Bronchodilator(s) will be discontinued if patient has a RSI less than 9 and has received no scheduled or as needed treatment for 72  Hrs. Patients Ordered on a Mucolytic Agent:    1. Must always be administered with a bronchodilator. 2. Discontinue if patient experiences worsened bronchospasm, or secretions have lessened to the point that the patient is able to clear them with a cough. Anti-inflammatory and Combination Medications:    1. If the patient lacks prior history of lung disease, is not using inhaled anti-inflammatory medication at home, and lacks wheezing by examination or by history for at least 24 hours, contact physician for possible discontinuation.

## 2021-01-05 NOTE — H&P
Hospital Medicine History & Physical      PCP: Artur Harley MD    Date of Service: Pt seen/examined on 1/5/21 and admitted on 1/5/21 to Inpatient. Chief Complaint   Patient presents with    Shortness of Breath     c/o increased SOB and cough. Patient wears 3L home O2 and is 97%    Extremity Weakness     C/o bilateral leg weakness, unable to stand and walk at home. History Of Present Illness: The patient is a 78 y.o. male with PMH below, presents with gen weakness/fatigue, SOB, cough, unable to stand/walk, RUQ abd pain. Pt was admitted last month for acute connie. He is being followed by Dr. Katty Newell. He was recently started on Augmentin. He reports on and off RUQ pain, at times it gets \"pretty bad. \"  None currently. He reports that he has felt increasingly weak and fatigued. He says he was standing by chair when his legs gave out and he fell into the chair. He denies injury, LOC, hitting his head. He reports he has developed a cough and has felt more SOB. He is chronically on 3L O2 2/2 COPD and his sats are stable on that.       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    ELAINE on CPAP     Osteoporosis     Skin cancer of face        Past Surgical History:        Procedure Laterality Date    BACK SURGERY      repair broken back L4 & L5    CYSTOSCOPY Right 10/9/15    RIght Ureteroscopy, Holmium Laser Lithotripsy with Stone Removal, Right Stent Placement    CYSTOSCOPY  05/01/2019    CYSTOSCOPY, RESECTION OF BLADDER NECK, URETHRAL DILATION    CYSTOSCOPY W BIOPSY OF BLADDER N/A 5/1/2019 CYSTOSCOPY, RESECTION OF BLADDER NECK, URETHRAL DILATION performed by Faith Hogue MD at [address] Left 6/12/2016    cataract removal    JOINT REPLACEMENT  6/29/2011    right knee    JOINT REPLACEMENT  11/2004    left knee    OTHER SURGICAL HISTORY  08/31/2015    wide excision basal cell carcinoma on the nose and bilateral auricles with frozen sections, plastic closure, full thickness skin graft    OTHER SURGICAL HISTORY  12/1/15    excision of lesion of lip    PROSTATE SURGERY      TURP  10/23/2015    with cystoscopy       Medications Prior to Admission:    Prior to Admission medications    Medication Sig Start Date End Date Taking?  Authorizing Provider   amoxicillin-clavulanate (AUGMENTIN) 875-125 MG per tablet Take 1 tablet by mouth 2 times daily for 7 days 1/4/21 1/11/21  Tyrell Brothers MD   VENTOLIN  (68 Base) MCG/ACT inhaler Inhale 2 puffs into the lungs every 6 hours as needed for Wheezing orShortness of Breath 12/28/20   Jazlyn Culp MD   aspirin 81 MG EC tablet Take 81 mg by mouth daily    Historical Provider, MD   propafenone (RYTHMOL) 150 MG tablet TAKE ONE TABLET BY MOUTH EVERY 8 HOURS 10/1/20   Jazmín Adler MD   ELIQUIS 5 MG TABS tablet Take 1 tablet by mouth 2 times daily 10/1/20   MD Salvador Velasquez 100-62.5-25 MCG/INH AEPB INHALE 1 PUFF EVERY DAY 10/1/20   Jazlyn Culp MD   albuterol (PROVENTIL) (2.5 MG/3ML) 0.083% nebulizer solution USE 1 VIAL IN NEBULIZER 4 TIMES DAILY 9/28/20   Jazlyn Culp MD   acetaminophen (AMINOFEN) 325 MG tablet Take 2 tablets by mouth every 4 hours as needed for Pain 8/11/20   Itzel Velasquez MD   dilTIAZem (CARTIA XT) 180 MG extended release capsule Take 1 capsule by mouth as needed (Aylin Mathisw to take as needed only 1 time a day for increased heart rate) 5/7/20   DEMOND Quan - CNP   alendronate (FOSAMAX) 70 MG tablet  3/31/20   Historical Provider, MD fluticasone (FLONASE) 50 MCG/ACT nasal spray  2/12/20   Historical Provider, MD   atorvastatin (LIPITOR) 10 MG tablet  3/31/20   Historical Provider, MD   pioglitazone (ACTOS) 30 MG tablet Take 30 mg by mouth daily    Historical Provider, MD   gabapentin (NEURONTIN) 300 MG capsule Take 600 mg by mouth 3 times daily . Historical Provider, MD   OXYGEN Inhale 2.5 L into the lungs nightly Pt wears 2- 2/12 L at night. 3 L while walking/ moving around    Historical Provider, MD   metformin (GLUCOPHAGE) 500 MG tablet Take 500 mg by mouth 4 times daily     Historical Provider, MD   esomeprazole (NEXIUM) 40 MG capsule   Take 40 mg by mouth every morning (before breakfast) Indications: take [address] Provider, MD       Allergies:  Patient has no known allergies. Social History:    TOBACCO:   reports that he quit smoking about 15 years ago. He has a 67.50 pack-year smoking history. He has never used smokeless tobacco.  ETOH:   reports no history of alcohol use. Family History:  Reviewed in detail and negative for DM, Early CAD, Cancer (except as below). Positive as follows:        Problem Relation Age of Onset    Cancer Mother     Diabetes Mother     Heart Disease Mother     Cancer Father     Diabetes Sister     Cancer Brother        REVIEW OF SYSTEMS:   Pertinent positives/negatives as follows: gen weakness/fatigue, SOB, cough, unable to stand/walk, RUQ abd pain, and as discussed in HPI, otherwise a complete ROS performed and all other systems are negative. PHYSICAL EXAM PERFORMED:    /68   Pulse 115   Temp 99.9 °F (37.7 °C) (Oral)   Resp 11   Ht 5' 11\" (1.803 m)   Wt 180 lb (81.6 kg)   SpO2 98%   BMI 25.10 kg/m²     GEN:  A&Ox3, NAD. HEENT:  NC/AT,EOMI, semi dry MM, no erythema/exudates or visible masses. CVS:  Normal S1,S2. Tachy RRR. Without M/G/R.   LUNG:   Rhonchi bilat. Diminished at bases. Tachypnea. no wheezes, rales. ABD:  Soft, ND/NT, BS+ x4.   Without G/R. EXT: 2+ pulses, no c/c/e. Brisk cap refill. PSY:  Thought process intact, affect appropriate. TERRA:  CN III-XII intact, moves all 4 spontaneously, sensory grossly intact. SKIN: No rash or lesions on visible skin. Chart review shows recent radiographs:  Ct Head Wo Contrast    Result Date: 1/4/2021  EXAMINATION: CT OF THE HEAD WITHOUT CONTRAST  1/4/2021 7:24 pm TECHNIQUE: CT of the head was performed without the administration of intravenous contrast. Dose modulation, iterative reconstruction, and/or weight based adjustment of the mA/kV was utilized to reduce the radiation dose to as low as reasonably achievable. COMPARISON: CT head 08/11/2020 HISTORY: ORDERING SYSTEM PROVIDED HISTORY: fall, on blood thinners TECHNOLOGIST PROVIDED HISTORY: Has a \"code stroke\" or \"stroke alert\" been called?-> no Reason for exam:-> fall, on blood thinners Reason for Exam: pt c/o SOB. Pt fell while on blood thinners, c/o extremity weakness Acuity: Acute Type of Exam: Initial FINDINGS: BRAIN/VENTRICLES: There is no acute intracranial hemorrhage, mass effect or midline shift. No abnormal extra-axial fluid collection. The gray-white differentiation is maintained without evidence of an acute infarct. There is no evidence of hydrocephalus. Stable, mild, generalized cerebral cortical atrophy. Mild bifrontal and biparietal periventricular and subcortical white matter hypoattenuation is noted. ORBITS: The visualized portion of the orbits demonstrate no acute abnormality. SINUSES: Trace air-fluid level left maxillary sinus. The other visualized paranasal sinuses and mastoid air cells demonstrate no acute abnormality. SOFT TISSUES/SKULL:  No acute abnormality of the visualized skull. Small left forehead scalp contusion/hematoma. 1. No acute intracranial abnormality. 2. Small left forehead scalp contusion/hematoma. 3. Possible acute left maxillary sinusitis. 4. Stable appearing senescent changes.      Us Gallbladder Ruq Result Date: 1/4/2021  EXAMINATION: RIGHT UPPER QUADRANT ULTRASOUND 1/4/2021 10:35 pm COMPARISON: 10/09/2015 HISTORY: ORDERING SYSTEM PROVIDED HISTORY: ruq abd pain, gallstones, elevated LFTs TECHNOLOGIST PROVIDED HISTORY: Reason for exam:->ruq abd pain, gallstones, elevated LFTs Reason for Exam: ruq abd pain, gallstones, elevated LFTs Acuity: Unknown Type of Exam: Unknown FINDINGS: LIVER:  The liver demonstrates increased echogenicity without evidence of intrahepatic biliary ductal dilatation. BILIARY SYSTEM:  The gallbladder is contracted. Cholelithiasis is identified. There is no pericholecystic fluid. Negative sonographic Cavazos's sign. Common bile duct is within normal limits measuring 4 mm. RIGHT KIDNEY: The right kidney is grossly unremarkable without evidence of hydronephrosis. PANCREAS:  The pancreas could not be identified. OTHER: No evidence of right upper quadrant ascites. 1. Cholelithiasis without sonographic evidence for acute cholecystitis. 2. Fatty liver versus diffuse hepatocellular disease. 3. Nonvisualization of the pancreas. Xr Chest Portable    Result Date: 1/4/2021  EXAMINATION: ONE XRAY VIEW OF THE CHEST 1/4/2021 6:38 pm COMPARISON: Chest x-ray dated 12/14/2020 HISTORY: ORDERING SYSTEM PROVIDED HISTORY: cough TECHNOLOGIST PROVIDED HISTORY: Reason for exam:->cough Reason for Exam: Shortness of Breath and cough Acuity: Acute Type of Exam: Initial FINDINGS: HEART/MEDIASTINUM: The cardiomediastinal silhouette is within normal limits. PLEURA/LUNGS: There are no focal consolidations or pleural effusions. There is no appreciable pneumothorax. BONES/SOFT TISSUE: No acute abnormality. No radiographic evidence of acute pulmonary disease.      Xr Chest Portable    Result Date: 12/14/2020 EXAMINATION: ONE XRAY VIEW OF THE CHEST 12/14/2020 7:19 pm COMPARISON: 08/11/2020. HISTORY: ORDERING SYSTEM PROVIDED HISTORY: epigastric pain TECHNOLOGIST PROVIDED HISTORY: Reason for exam:->epigastric pain Reason for Exam: Abdominal Pain (Pt. states he has been having indigestion since 1500. States last time he was having these issues it was kidney stones.) Acuity: Acute Type of Exam: Initial FINDINGS: Heart size and pulmonary vasculature are normal.  There is a background of moderate emphysema. The lungs are otherwise clear. Surrounding osseous and soft tissue structures are unremarkable for age. Moderate COPD.      Cta Chest Abdomen Pelvis W Contrast    Result Date: 1/4/2021 EXAMINATION: CTA OF THE CHEST, ABDOMEN AND PELVIS WITH CONTRAST, 1/4/2021 8:20 pm TECHNIQUE: CTA of the chest, abdomen and pelvis was performed after the administration of intravenous contrast.  Multiplanar reformatted images are provided for review. MIP images are provided for review. Dose modulation, iterative reconstruction, and/or weight based adjustment of the mA/kV was utilized to reduce the radiation dose to as low as reasonably achievable. COMPARISON: Chest radiograph performed earlier today and CT chest, abdomen and pelvis 12/15/2020 HISTORY: ORDERING SYSTEM PROVIDED HISTORY: abd pain TECHNOLOGIST PROVIDED HISTORY: Reason for exam:->abd pain Reason for Exam: known AAA, abdominal pain Acuity: Acute Type of Exam: Initial CTA CHEST FINDINGS: Vasculature: Main pulmonary artery is 2.6 cm. No definite pulmonary embolism evident. Normal aortic arch branching. Ascending thoracic aorta is 3.4 cm diameter and descending thoracic aorta 2.3 cm diameter. Mild calcific atherosclerosis aorta and its branches including several of the coronary arteries. Mediastinum: Cardiac structures and great vessels appear unremarkable with exception of calcific atherosclerotic disease. No pericardial effusion. Posterior mediastinal structures appear unremarkable. No mediastinal or hilar adenopathy. Lungs/pleura: Pulmonary sequela typical of that seen with smoking including emphysema predominating in the upper lungs. There is some blur related to breathing motion degrading image quality. No definite pulmonary nodule or consolidation evident. No pleural effusion or pneumothorax. No inspissated secretions or endobronchial lesion evident. Soft Tissues/Bones: Stable appearance of chronic T6 inferior endplate wedge compression fracture. The bones appear osteoporotic. CTA ABDOMEN/PELVIS FINDINGS: Vasculature: Mild calcific atherosclerosis aorta and its branches.   Proximal infrarenal abdominal aorta is 3 cm, distal infrarenal abdominal aorta is 2.4 cm diameter. Patent celiac axis, superior mesenteric artery, right and left renal arteries and inferior mesenteric artery. Normal distal abdominal aorta tapering. Mild dilatation proximal left common iliac artery measuring 1.4 cm. Other portions of the common, internal and external iliac arteries and visualized portions of the bilateral femoral arteries appear unremarkable. Organs: The liver attenuation appears abnormally low, and texture appears unremarkable. No discrete hepatic lesion or intrahepatic bile duct dilatation is seen. The gallbladder is contracted has calcifications reflecting cholelithiasis without pericholecystic fluid or inflammatory change evident. The kidneys, spleen, adrenal glands and pancreas appear unremarkable. GI/Bowel: No diffuse or focal bowel wall thickening evident. No inflammatory changes evident. No obstruction is seen. The appendix is visualized right lower quadrant, unremarkable in appearance. Small periampullary duodenal diverticulum. Pelvis: Prostate gland and seminal vesicles appear unremarkable. Urinary bladder is partially filled, unremarkable appearance. No adenopathy or free fluid. Peritoneum/Retroperitoneum: Calcific atherosclerotic disease aorta. No aneurysm. Unremarkable appearance of the IVC. No adenopathy or fluid. Bones/Soft Tissues: Stable diffuse sclerosis throughout L1 with compression fracture and mild retropulsion superior endplate. 1. CTA CHEST: No acute vascular abnormality; no aneurysm or dissection. 2.  Pulmonary sequela typical of that seen with smoking, including COPD. 3. Chronic mild T6 compression fracture. 4. CTA ABDOMEN/PELVIS: No acute vascular abnormality; no aneurysm leak or dissection. 5. 3.0 cm abdominal aortic aneurysm. Recommend follow-up every 3 years. 6.  No CT evidence of an acute intra-abdominal or intrapelvic process. 7. Cholelithiasis without definite CT findings of acute cholecystitis. Gallbladder appears contracted. 8.  No findings to suggest acute appendicitis; no ureter calculus or hydronephrosis. 9. Hepatic steatosis. 10. Chronic, nonspecific L1 sclerosis and compression fracture. Near vertebra plana.  RECOMMENDATIONS: 3 year imaging follow-up AAA     Ct Chest Abdomen Pelvis W Contrast    Result Date: 12/15/2020 EXAMINATION: CT OF THE CHEST, ABDOMEN, AND PELVIS WITH CONTRAST 12/15/2020 12:15 am TECHNIQUE: CT of the chest, abdomen and pelvis was performed with the administration of intravenous contrast. Multiplanar reformatted images are provided for review. Dose modulation, iterative reconstruction, and/or weight based adjustment of the mA/kV was utilized to reduce the radiation dose to as low as reasonably achievable. COMPARISON: CT PA dated December 29, 2017, prior CT scan abdomen pelvis dated October 8, 2015 HISTORY: ORDERING SYSTEM PROVIDED HISTORY: epigastric pain, indigestion, history of kidney stones, assess for any aortic pathology or SBO TECHNOLOGIST PROVIDED HISTORY: Reason for exam:->epigastric pain, indigestion, history of kidney stones, assess for any aortic pathology or SBO Additional Contrast?->None Reason for Exam: Abdominal Pain (Pt. states he has been having indigestion since 1500. States last time he was having these issues it was kidney stones.) Acuity: Acute Type of Exam: Initial FINDINGS: Chest: Mediastinum: Extensive coronary arterial calcifications are present. Atherosclerotic changes are present involving the thoracic aorta. A small contrast filled, mushroom shaped outpouching extending into the thickened wall of the descending thoracic aorta is identified, consistent with a penetrating atherosclerotic ulcer. Changes are new since the prior study. This is best seen on axial images number 78 through 84, and sagittal images 99 through 102. Ulcer measures approximately 14 mm in transverse dimension, and 18 mm in cephalocaudal dimension. Ulcerated plaque formation is seen along the left lateral wall of the distal descending thoracic aorta. No evidence of dissection, or rupture is present. Normal enhancement of the pulmonary arterial system is present. Lungs/pleura: Emphysematous changes are seen throughout the lungs, with scattered areas of fibrosis. No focal area of consolidation is noted. Atelectatic change is present within the right lung base. No pleural effusion, or pneumothorax is present. Soft Tissues/Bones: No acute osseous abnormality is present involving the thorax. A compression fractures again visualized involving the T6 vertebral body. Abdomen/Pelvis: Organs: The liver, spleen, and pancreas appear normal.  Cholelithiasis is present with wall thickening and surrounding inflammation involving the neck of gallbladder, suggesting cholecystitis. .  Adrenal glands, and kidneys appear normal.  Cortical cyst formation is present on the kidneys. No hydronephrosis or intrarenal calculi are present. GI/Bowel: Stomach is nondistended. Small bowel appears normal, without evidence of obstruction. The appendix is normal.  No focal inflammatory change or wall thickening is present involving the large bowel. Pelvis: Urinary bladder appears normal.  Postsurgical changes present involving the prostate gland. Peritoneum/Retroperitoneum: No free fluid or free air is present within the abdomen or pelvis. Atherosclerotic changes are present within the abdominal aorta with mild aneurysmal dilatation of the infrarenal abdominal aorta, measuring 3.1 cm. Bones/Soft Tissues: Compared to the prior CT scan, a new compression deformity is present involving the L1 vertebral body, with approximately 75% loss of central vertebral body height. Posterior angulation of the posterosuperior corner of the compression deformity is noted. No associated soft tissue mass is present. Findings are most consistent with a benign osteoporotic compression fracture. 1. CT findings consistent with interval development of a penetrating atherosclerotic ulcer involving the anterior wall of the distal descending thoracic aorta. 2. No evidence of aortic dissection 3. Diffuse emphysematous changes seen throughout the lungs 4. L1 compression fracture, approximately 75% loss of central vertebral body height, new since the prior study of 2017 5. Cholelithiasis, with wall thickening and surrounding inflammation involving the neck of the gallbladder, suggesting cholecystitis. 6. No evidence of small-bowel obstruction 7. Mild aneurysmal dilatation of the infrarenal abdominal aorta, measuring 3.1 cm in greatest transverse dimension 8. Critical results were called by Dr. Phill Zuñiga to [facility] on 12/15/2020 at 01:14. RECOMMENDATIONS: For management of fusiform AAA: 3.0-3.4 cm AAA, recommend follow-up every 3 years. * For management of saccular abdominal aortic aneurysms of any size, recommend vascular consultation. Note:  For AAA enlargement of >0.5 cm in 6 months or >1 cm in 1 year, recommend vascular consultation. References: Priscilla Guillenins Radiol 2013; 51(49):812-045; J Vasc Surg.  2018; 67:2-77       EKG 12 Lead [[id number]]    Collected: 01/04/21 1827    Updated: 01/04/21 2003     Ventricular Rate 121 BPM    Atrial Rate 121 BPM    P-R Interval 200 ms    QRS Duration 80 ms    Q-T Interval 282 ms    QTc Calculation (Bazett) 400 ms    P Axis 104 degrees    R Axis 75 degrees    T Axis 87 degrees    Diagnosis Sinus tachycardiaNonspecific T wave abnormalityAbnormal ECGNo previous ECGs availableConfirmed by Ivan Mcmillan MD, MELANIE ([id number]) on 1/4/2021 8:03:41 PM         CBC:  Recent Labs     01/04/21  1845   WBC 11.2*   HGB 10.6*   HCT 32.4*   *        RENAL  Recent Labs     01/04/21  1845      K 5.1   CL 95*   CO2 29   BUN 18   CREATININE 0.8   GLUCOSE 201*       Hemoglobin a1c:  Lab Results   Component Value Date    LABA1C 6.3 12/15/2020    LABA1C 6.6 04/16/2020 LABA1C 6.5 04/01/2020       LFT'S:  Recent Labs     01/04/21  1845   *   *   BILITOT 4.6*   ALKPHOS 144*     CARDIAC ENZYMES:   Recent Labs     01/04/21  1845   TROPONINI <0.01     Lab Results   Component Value Date    PROBNP 5,654 (H) 04/16/2020    PROBNP 1,618 (H) 04/01/2020    PROBNP 735 (H) 12/29/2017       LACTIC ACID:    01/04/21  1845   LACTSEPSIS 3.1*       U/A:  Recent Labs     01/04/21 2155   LEUKOCYTESUR TRACE*   WBCUA 6-9*   COLORU Yellow   RBCUA 0-2   MUCUS Rare*   CLARITYU SL CLOUDY*   SPECGRAV 1.015   BLOODU TRACE-INTACT*   GLUCOSEU Negative   AMORPHOUS 1+       Urine Tox Screen:  Recent Labs     01/04/21 2155   PHUR 7.5     PHYSICIAN CERTIFICATION  I certify that Isidra Marcano is expected to be hospitalized for 2 midnights based on the following assessment and plan:    ASSESSMENT/PLAN:  1. Acute cholecystitis, NPO, PRN morphine, IVF. GS c/s.   2. Probable Sepsis, likely related to UTI vs connie, has been on ABX recently. IV Zosyn, fu cx. No need for pressors at this time. 3. Elevated LFTs, new, see above. Monitor. 4. UTI, ABX as above, f/u cx.    5. Generalized weakness/fall, PT/OT, fall precautions. 6. Right upper quadrant abdominal pain, intermittent, likely related to #1. As above. 7. Elevated procalcitonin, 22.7.  8. Lactic acidosis, 3.1, IVF and repeats ordered. 9. Chronic respiratory failure, sats stable on home 3 L.    10. DM2, hold oral Rx, chk A1c, add Low SSI q4h. 11. Rapid COVID negative. DVT Prophylaxis: Eliquis  Diet: NPO  Code Status: Full Code  PT/OT Eval Status: Will order if needed and as patient condition allows  Dispo - Admit to inpatient     Bobby Bolivar MD    Thank you Migue Conley MD for the opportunity to be involved in this patient's care. If you have any questions or concerns please feel free to contact me via the [employer] Service at [phone]. This chart was generated using the [address] [place] [address] dictation system. I created this record but it may contain dictation errors given the limitations of this technology. 64-year-old male recently admitted for acute cholecystitis and upper abdominal pain. Returns with upper abdominal pain and generalized fatigue, bilateral leg weakness/fall. He is chronically on 3 L O2. Sats are stable on that. US GB pending.

## 2021-01-05 NOTE — CARE COORDINATION
Case Management Assessment  Initial Evaluation      Patient Name: Keaton March  YOB: 1940  Diagnosis: Acute cholecystitis [K81.0]  Date / Time: 1/4/2021  5:52 PM    Admission status/Date: 1/04/2020 Inpatient   Chart Reviewed: Yes      Patient Antonella Shah: No pt has no bedside phone in ER bed 5  Family Interviewed:  Yes - Pt's son Freddy Knowles at [phone]      Hospitalization in the last 30 days:  Yes from 12/14/2020-12/16/2020 with Acute cholecystitis. Pt discharged home on 12/16/2020 with no needs from CM.       Health Care Decision Maker :     (CM - must 1st enter selection under Navigator - emergency contact- Health Care Decision Maker Relationship and pick relationship)   Who do you trust or have selected to make healthcare decisions for you      Met with: pt's son Annie Grimes conducted  (bedside/phone): phone    Current PCP: Keyanna  Medicare and Medicaid  Precert required for SNF :  N          3 night stay required - Y-waived due to [employer]!    ADLS  Support Systems/Care Needs:  family  Transportation: family    Meal Preparation: self    Housing  Living Arrangements: pt lives alone in a one story house  Steps: 1   Intent for return to present living arrangements: Yes per son  Identified Issues: none    Home Care Information  Active with [address] : No Agency:(Services)     Passport/Waiver : No  :                      Phone Number:    Passport/Waiver Services: n/a          Durable Medical Equiptment   DME Provider: [address]   Equipment:   Walker__x_Cane_x__RTS___ BSC___Shower Chair___Hospital Bed___W/C____Other________  02 at __3__Liter(s)---wears(frequency)____cont___ HHN ___ CPAP___ BiPap___   N/A____      Home O2 Use :  Yes    If No for home O2---if presently on O2 during hospitalization:  Yes  if yes CM to follow for potential DC O2 need Informed of need for care provider to bring portable home O2 tank on day of discharge for nursing to connect prior to leaving:   Yes  Verbalized agreement/Understanding:   Yes    Community Service Affiliation  Dialysis:  No    · Agency:  · Location:  · Dialysis Schedule:  · Phone:   · Fax: Other Community Services: Cleaning lady through [employer].: Explained Case Management role/services. Chart review completed. Pt is being admitted and has no bedside phone in ER bed 5. Called and spoke with pt's son Shi Infante who completed the assessment. He stated pt is normally independent at home. Shi Infante stated that family is over there frequently and is supportive. He denied needs or questions from CM at this time. Transferred him to the ER to get an update from RN. CM will follow for needs.  Please notify CM if needs or concerns arise

## 2021-01-05 NOTE — ED NOTES
Placed call to rj Walters to let him know the pt is in the er still a hold      [facility]  01/05/21 6975

## 2021-01-05 NOTE — PROGRESS NOTES
Patient to Room 203, oriented to room. Call light given and educated on. Patient given phone and educated on use of phone; called wife while in room. Four eyes conducted by 2 RNs. New IV fluids given; refer to [facility]. No pain noted. Needs met. Patient in bed, call light within reach.

## 2021-01-05 NOTE — CONSULTS
Surgery Consult Note     Lois [place], [address] [facility]  Pt Name: Isidra Marcano  MRN: [id number]  YOB: 1940  Date of evaluation: 1/5/2021  Primary Care Physician: Migue Conley MD  Patient evaluated at the request of  Dr. Hilda Powell  Reason for evaluation: Elevated LFTs  IMPRESSIONS:   1. CTA chest abd and pelvis: gallstones without acute cholecystitis   2. RUQ ultrasound: gallstones without acute cholecystitis, fatty liver   3. ABD: soft, + mild distention, no tenderness, no N/V, pt unsure of last BM  4. Leuks 11.2->6.4  5. ALT/AST: 230/263->161/125  6. T bili 4.6->4.4  7. VS: temp 99.9, tachy   8. COPD on 3 L of O2 at home  9. On Eliquis at home for asymptomatic Afib   10. Pt denies any abdominal pain and is requesting to go home  11. does not have any pertinent problems on file. PLANS:   1. Consult GI: MRCP  2. NPO  3. IVF  4. Zosyn  5. Will d/c Eliquis until MRCP resulted. No surgical plans at this time. Await further workup. Pt seen and examined with Dr. Migue Adams. SUBJECTIVE:   History of Chief Complaint:  From pt and  Chart review. Isidra Marcano is a 78 y.o. male who presented to the ER after \"falling three times at home. \" The patient reports he lives alone. His legs would \"just give out\" and he would fall. After falling three times, he called the squad. The patient denies fever, chills, nausea or vomiting. He states he was eating \"a good amount\" without any problems. He states his urine turned dark about 2 days ago. In the ER, the patient was noted to have elevated LFTs. This patient is familiar to our group. He was evaluated on 10/9/15 for possible cholecystitis with Dr. Migue Adams. He was evaluated again by Dr. Jessica Mcbride on 12/15/20 for possible acute cholecystitis. Imaging suggested the presence of gallstones without findings suggestive of acute cholecystitis. We were consulted to evaluate this patient's abdomen.      Past Medical History has a past medical history of Arthritis, CAD (coronary artery disease), COPD (chronic obstructive pulmonary disease) ([facility] [address] Diabetes mellitus ([facility] [address] Hyperlipidemia, Hypertension, Influenza A, Kidney calculi, MDRO (multiple drug resistant organisms) resistance, ELAINE on CPAP, Osteoporosis, and Skin cancer of face. A fib    Past Surgical History   has a past surgical history that includes Prostate surgery; eye surgery (Left, 6/12/2016); other surgical history (08/31/2015); Cystoscopy (Right, 10/9/15); joint replacement (6/29/2011); joint replacement (11/2004); TURP (10/23/2015); other surgical history (12/1/15); back surgery; Cystocopy (05/01/2019); and cystoscopy w biopsy of bladder (N/A, 5/1/2019). Medications  Prior to Admission medications    Medication Sig Start Date End Date Taking?  Authorizing Provider   amoxicillin-clavulanate (AUGMENTIN) 875-125 MG per tablet Take 1 tablet by mouth 2 times daily for 7 days 1/4/21 1/11/21  Radha Gauthier MD   VENTOLIN  (38 Base) MCG/ACT inhaler Inhale 2 puffs into the lungs every 6 hours as needed for Wheezing orShortness of Breath 12/28/20   Tamar Jj MD   aspirin 81 MG EC tablet Take 81 mg by mouth daily    Historical Provider, MD   propafenone (RYTHMOL) 150 MG tablet TAKE ONE TABLET BY MOUTH EVERY 8 HOURS 10/1/20   Janie Dillard MD   ELIQUIS 5 MG TABS tablet Take 1 tablet by mouth 2 times daily 10/1/20   Janie Dillard MD   [place] 100-62.5-25 MCG/INH AEPB INHALE 1 PUFF EVERY DAY 10/1/20   Tamar Jj MD   albuterol (PROVENTIL) (2.5 MG/3ML) 0.083% nebulizer solution USE 1 VIAL IN NEBULIZER 4 TIMES DAILY 9/28/20   Tamar Jj MD   acetaminophen (AMINOFEN) 325 MG tablet Take 2 tablets by mouth every 4 hours as needed for Pain 8/11/20   Brenton Lerma MD dilTIAZem (CARTIA XT) 180 MG extended release capsule Take 1 capsule by mouth as needed (Ok to take as needed only 1 time a day for increased heart rate) 5/7/20   DEMOND Gonsales CNP   alendronate (FOSAMAX) 70 MG tablet  3/31/20   Historical Provider, MD   fluticasone [facility]) 50 MCG/ACT nasal spray  2/12/20   Historical Provider, MD   atorvastatin (LIPITOR) 10 MG tablet  3/31/20   Historical Provider, MD   pioglitazone (ACTOS) 30 MG tablet Take 30 mg by mouth daily    Historical Provider, MD   gabapentin (NEURONTIN) 300 MG capsule Take 600 mg by mouth 3 times daily . Historical Provider, MD   OXYGEN Inhale 2.5 L into the lungs nightly Pt wears 2- 2/12 L at night. 3 L while walking/ moving around    Historical Provider, MD   metformin (GLUCOPHAGE) 500 MG tablet Take 500 mg by mouth 4 times daily     Historical Provider, MD   esomeprazole (NEXIUM) 40 MG capsule   Take 40 mg by mouth every morning (before breakfast) Indications: take [address] Provider, MD    Scheduled Meds:   piperacillin-tazobactam  3.375 g Intravenous Q8H    aspirin  81 mg Oral Daily    apixaban  5 mg Oral BID    pantoprazole  40 mg Oral QAM AC    gabapentin  600 mg Oral TID    propafenone  150 mg Oral 3 times per day    sodium chloride flush  10 mL Intravenous 2 times per day    insulin lispro  0-6 Units Subcutaneous Q4H    budesonide-formoterol  1 puff Inhalation Daily    And    tiotropium  2 puff Inhalation Daily    albuterol  2.5 mg Nebulization BID     Continuous Infusions:   dextrose      sodium chloride 100 mL/hr at 01/05/21 0113     PRN Meds:.morphine, dilTIAZem, glucose, dextrose, glucagon (rDNA), dextrose, sodium chloride flush, acetaminophen **OR** acetaminophen, albuterol    Allergies  has No Known Allergies. Family History  family history includes Cancer in his brother, father, and mother; Diabetes in his mother and sister; Heart Disease in his mother.     Social History reports that he quit smoking about 15 years ago. He has a 67.50 pack-year smoking history. He has never used smokeless tobacco. He reports that he does not drink alcohol or use drugs. Review of Systems:  General Denies any fever or chills  HEENT Denies any diplopia, tinnitus or vertigo  Resp positive for  Dyspnea. He is on 3 L NC at home at baseline  Cardiac Denies any chest pain, palpitations, claudication or edema  GI Denies any melena, hematochezia, hematemesis or pyrosis   Reports dark urine. Denies any frequency, urgency, hesitancy or incontinence  Heme On Eliquis  Endocrine DM  Neuro Denies any focal motor or sensory deficits    OBJECTIVE:   VITALS:  height is 5' 11\" (1.803 m) and weight is 180 lb (81.6 kg). His oral temperature is 98.3 °F (36.8 °C). His blood pressure is 114/67 and his pulse is 116. His respiration is 16 and oxygen saturation is 99%. CONSTITUTIONAL: cyanotic and Alert and oriented times 3, no acute distress and cooperative to examination with proper mood and affect. SKIN: Skin color, texture, turgor normal. No rashes or lesions. HEENT: Head is normocephalic, atraumatic. EOMI, PERRLA. NECK: supple, symmetrical, trachea midline. CHEST/LUNGS: chest symmetric with normal A/P diameter, normal respiratory rate and rhythm, decreased breath sounds in bilateral lobes with few scattered wheezes noted    CARDIOVASCULAR: Heart sounds were very distant. ABDOMEN: distended large midline scar, No and Laparoscopic scar(s) present. As above. no evidence of hernia. Percussion: Normal without hepatosplenomegally. Tenderness: absent. RECTAL: deferred, not clinically indicated  NEUROLOGIC: There are no focalizing motor or sensory deficits. CN II-XII are grossly intact. Adonna Dayhoff EXTREMITIES: no cyanosis and no clubbing.     LABS:     Recent Labs     01/04/21  1845 01/04/21  2155 01/05/21  0545   WBC 11.2*  --  6.4   HGB 10.6*  --  10.0*   HCT 32.4*  --  30.3*   *  --  98*     --  135* K 5.1  --  4.5   CL 95*  --  99   CO2 29  --  27   BUN 18  --  17   CREATININE 0.8  --  0.8   CALCIUM 8.8  --  8.1*   INR  --   --  2.02*   *  --  125*   *  --  161*   BILITOT 4.6*  --  4.4*   NITRU  --  Negative  --    COLORU  --  Yellow  --      Recent Labs     01/04/21  1845 01/05/21  0545   ALKPHOS 144* 111   * 161*   * 125*   BILITOT 4.6* 4.4*   LABALBU 3.8 3.2*   LIPASE 16.0  --      CBC with Differential:    Lab Results   Component Value Date    WBC 6.4 01/05/2021    RBC 3.59 01/05/2021    HGB 10.0 01/05/2021    HCT 30.3 01/05/2021    PLT 98 01/05/2021    MCV 84.4 01/05/2021    MCH 27.9 01/05/2021    MCHC 33.1 01/05/2021    RDW 16.4 01/05/2021    SEGSPCT 68.5 07/02/2011    LYMPHOPCT 8.7 01/05/2021    MONOPCT 9.2 01/05/2021    EOSPCT 0.3 07/02/2011    BASOPCT 0.4 01/05/2021    MONOSABS 0.6 01/05/2021    LYMPHSABS 0.6 01/05/2021    EOSABS 0.0 01/05/2021    BASOSABS 0.0 01/05/2021    DIFFTYPE Auto 07/02/2011     CMP:    Lab Results   Component Value Date     01/05/2021    K 4.5 01/05/2021    CL 99 01/05/2021    CO2 27 01/05/2021    BUN 17 01/05/2021    CREATININE 0.8 01/05/2021    GFRAA >60 01/05/2021    GFRAA >60 07/01/2011    AGRATIO 1.1 01/05/2021    LABGLOM >60 01/05/2021    GLUCOSE 140 01/05/2021    PROT 6.2 01/05/2021    PROT 7.0 08/10/2010    LABALBU 3.2 01/05/2021    CALCIUM 8.1 01/05/2021    BILITOT 4.4 01/05/2021    ALKPHOS 111 01/05/2021     01/05/2021     01/05/2021       RADIOLOGY:   I have personally reviewed the following films:    CT scan abd/pelvis: See radiology report  US: gallstones      Thank you for the interesting evaluation. Further recommendations to follow.       Electronically signed by DEMOND Ward CNP on 1/5/2021 at 12:20 PM

## 2021-01-05 NOTE — PROGRESS NOTES
Inpatient Physical Therapy Evaluation and Treatment    Unit: ED  Date:  1/5/2021  Patient Name:    Nikolas Rosen  Admitting diagnosis:  Acute cholecystitis [K81.0]  Admit Date:  1/4/2021  Precautions/Restrictions/WB Status/ Lines/ Wounds/ Oxygen: Fall risk, Bed/chair alarm, Lines -IV and Supplemental O2 (3), Telemetry and Continuous pulse oximetry    Treatment Time: 1439 - 1505  Treatment Number:  1   Timed Code Treatment Minutes: 16 minutes  Total Treatment Minutes:  26  minutes    Patient Goals for Therapy: \" To go home \"          Discharge Recommendations: Home 24 hr assist and with home PT   DME needs for discharge: Needs Met       Therapy recommendation for EMS Transport: can transport by wheelchair    Therapy recommendations for staff:   Stand by assist with use of rolling walker (RW) and gait belt for all transfers and ambulation to/from chair  to/from bathroom    History of Present Illness: 78 y. o. year old male with medical history of coronary artery disease, COPD on chronic 3 L of oxygen, diabetes mellitus type 2, hyperlipidemia, hypertension, ELAINE on CPAP, atrial fibrillation on Eliquis and cholecystitis presents for evaluation of fall at home.  Patient reports lower extremity weakness with his legs giving out.      Home Health S4 Level Recommendation:  Level 3 Safety  AM-PAC Mobility Score    AM-PAC Inpatient Mobility Raw Score : 22     Preadmission Environment    Pt. Aric Almonte dtr lives near by to assist  Home environment:    one story home  Steps to enter first floor: 2 steps to enter and no Rail  Bathroom: Walk-in Shower, Grab bars and Shower Chair, comfort height toilet with armrests  Equipment owned: SPC, Rolling Walker, manual W/C, home O2 (3 L) continuous, pulse ox, reacher, inhaler, nebulizer and life alert     Preadmission Status:  Pt. Able to drive: No  Pt Fully independent with ADLs: Yes  Pt. Is independent with cooking and laundry.            HHA 3x/wk - assist with cleaning, laundry, grocery shopping  Pt. Fully independent for transfers and gait and walked with walker intermittently   History of falls: yes \"legs gave out\"   Pt was getting home PT/OT 5x/wk    Pain   No    Cognition    A&O x4   Able to follow 2 step commands    Subjective  Patient sitting EOB with no family present. Pt agreeable to this PT eval & tx. Upper Extremity ROM/Strength  Please see OT evaluation. Lower Extremity ROM / Strength   AROM WFL: Yes  ROM limitations: N/A    Strength Assessment (measured on a 0-5 scale):  R LE   Quad   3-   Ant Tib  4   Hamstring 3   Iliopsoas 4  L LE  Quad   4   Ant Tib  4   Hamstring 4   Iliopsoas 4    Lower Extremity Sensation    WFL    Lower Extremity Proprioception:   WFL    Coordination and Tone  WFL    Balance  Sitting:  Normal; Modified Independent  Comments:     Standing: Fair -; CGA  Comments: 3 min    Bed Mobility   Supine to Sit:    Modified Independent  Sit to Supine:   Modified Independent  Rolling:   Not Tested  Scooting in sitting: Modified Independent  Scooting in supine:  Modified Independent    Transfer Training     Sit to stand:   SBA  Stand to sit:   SBA  Bed to Chair:   SBA with use of gait belt and rolling walker (RW)    Gait gait completed as indicated below  Distance:      20 ft  Deviations (firm surface/linoleum):  decreased cindy, increased YASH, forward flexed posture, decreased step length bilaterally and decreased stance time bilaterally  Assistive Device Used:    gait belt and rolling walker (RW)  Level of Assist:    SBA  Comment:     Stair Training deferred, pt unsafe/ not appropriate to complete stairs at this time    Activity Tolerance   Pt completed therapy session with No adverse symptoms noted w/activity    Positioning Needs   Pt in bed, alarm set, positioned in proper neutral alignment and pressure relief provided.    Call light provided and all needs within reach Exercises Initiated  all completed bilaterally unless indicated  Ankle Pumps x 10 reps  LAQ x 10 reps    Other  None. Patient/Family Education   Pt educated on role of inpatient PT, POC, importance of continued activity, DC recommendations, safety awareness, transfer techniques, pursed lip breathing, energy conservation, pacing activity and calling for assist with mobility. Assessment  Pt seen for Physical Therapy evaluation in acute care setting. Pt demonstrated decreased Activity tolerance, Balance, Safety and Strength as well as decreased independence with Ambulation and Transfers. Recommending Home 24 hr assist and with home PT upon discharge as patient functioning below baseline level and would benefit from continued therapy services    Goals : To be met in 3 visits:  1). Independent with LE Ex x 10 reps    To be met in 6 visits:  1). Supine to/from sit: Independent  2). Sit to/from stand: Supervision  3). Bed to chair: Supervision  4). Gait: Ambulate 150 ft.  with  Supervision and use of LRAD (least restrictive assistive device)  5). Tolerate B LE exercises 3 sets of 10-15 reps  6). Ascend/descend 2 steps with SBA with use of hand rail bilateral and LRAD (least restrictive assistive device)    Rehabilitation Potential: Good  Strengths for achieving goals include:   Pt motivated and Pt cooperative   Barriers to achieving goals include:    No Barriers    Plan    To be seen 3-5 x / week  while in acute care setting for therapeutic exercises, bed mobility, transfers, progressive gait training, balance training, and family/patient education. Signature: Lori Blum MS PT, # [id number]    If patient discharges from this facility prior to next visit, this note will serve as the Discharge Summary.

## 2021-01-05 NOTE — PLAN OF CARE
Seen on 1/5/2021 for H&P by Dr Cynthia Kelly  I ordered MRCP  I spoke to Jameson regarding pt's eliquis- it has been dc'd pending need for any procedures    Winifred Khan PA-C  1/5/2021 3:47 PM

## 2021-01-06 ENCOUNTER — ANESTHESIA EVENT (OUTPATIENT)
Dept: OPERATING ROOM | Age: 81
DRG: 853 | End: 2021-01-06
Payer: MEDICARE

## 2021-01-06 ENCOUNTER — ANESTHESIA (OUTPATIENT)
Dept: OPERATING ROOM | Age: 81
DRG: 853 | End: 2021-01-06
Payer: MEDICARE

## 2021-01-06 VITALS
DIASTOLIC BLOOD PRESSURE: 58 MMHG | RESPIRATION RATE: 12 BRPM | OXYGEN SATURATION: 100 % | SYSTOLIC BLOOD PRESSURE: 126 MMHG

## 2021-01-06 LAB
ALBUMIN SERPL-MCNC: 3 G/DL (ref 3.4–5)
ALP BLD-CCNC: 114 U/L (ref 40–129)
ALT SERPL-CCNC: 101 U/L (ref 10–40)
ANION GAP SERPL CALCULATED.3IONS-SCNC: 8 MMOL/L (ref 3–16)
ANTI-NUCLEAR ANTIBODY (ANA): NEGATIVE
AST SERPL-CCNC: 55 U/L (ref 15–37)
BASE EXCESS ARTERIAL: -1.4 MMOL/L (ref -3–3)
BASE EXCESS ARTERIAL: -2.1 MMOL/L (ref -3–3)
BILIRUB SERPL-MCNC: 3.5 MG/DL (ref 0–1)
BILIRUBIN DIRECT: 3.4 MG/DL (ref 0–0.3)
BILIRUBIN, INDIRECT: 0.1 MG/DL (ref 0–1)
BUN BLDV-MCNC: 15 MG/DL (ref 7–20)
CALCIUM SERPL-MCNC: 8.1 MG/DL (ref 8.3–10.6)
CARBOXYHEMOGLOBIN ARTERIAL: 0.4 % (ref 0–1.5)
CARBOXYHEMOGLOBIN ARTERIAL: 0.6 % (ref 0–1.5)
CHLORIDE BLD-SCNC: 104 MMOL/L (ref 99–110)
CO2: 28 MMOL/L (ref 21–32)
CREAT SERPL-MCNC: 0.9 MG/DL (ref 0.8–1.3)
FERRITIN: 215.2 NG/ML (ref 30–400)
GFR AFRICAN AMERICAN: >60
GFR NON-AFRICAN AMERICAN: >60
GLUCOSE BLD-MCNC: 115 MG/DL (ref 70–99)
GLUCOSE BLD-MCNC: 126 MG/DL (ref 70–99)
GLUCOSE BLD-MCNC: 134 MG/DL (ref 70–99)
GLUCOSE BLD-MCNC: 135 MG/DL (ref 70–99)
GLUCOSE BLD-MCNC: 138 MG/DL (ref 70–99)
GLUCOSE BLD-MCNC: 182 MG/DL (ref 70–99)
GLUCOSE BLD-MCNC: 187 MG/DL (ref 70–99)
GLUCOSE BLD-MCNC: 197 MG/DL (ref 70–99)
HAV IGM SER IA-ACNC: NORMAL
HBV SURFACE AB TITR SER: <3.5 MIU/ML
HCO3 ARTERIAL: 26.7 MMOL/L (ref 21–29)
HCO3 ARTERIAL: 27.2 MMOL/L (ref 21–29)
HCT VFR BLD CALC: 26.9 % (ref 40.5–52.5)
HEMOGLOBIN, ART, EXTENDED: 10.1 G/DL (ref 13.5–17.5)
HEMOGLOBIN, ART, EXTENDED: 9.8 G/DL (ref 13.5–17.5)
HEMOGLOBIN: 8.8 G/DL (ref 13.5–17.5)
HEPATITIS B CORE IGM ANTIBODY: NORMAL
HEPATITIS B SURFACE ANTIGEN INTERPRETATION: NORMAL
HEPATITIS C ANTIBODY INTERPRETATION: NORMAL
INR BLD: 1.5 (ref 0.86–1.14)
IRON SATURATION: 12 % (ref 20–50)
IRON: 31 UG/DL (ref 59–158)
MCH RBC QN AUTO: 27.7 PG (ref 26–34)
MCHC RBC AUTO-ENTMCNC: 32.7 G/DL (ref 31–36)
MCV RBC AUTO: 84.7 FL (ref 80–100)
METHEMOGLOBIN ARTERIAL: 0 %
METHEMOGLOBIN ARTERIAL: 0.3 %
O2 CONTENT ARTERIAL: 13 ML/DL
O2 CONTENT ARTERIAL: 14 ML/DL
O2 SAT, ARTERIAL: 94.9 %
O2 SAT, ARTERIAL: 96 %
O2 THERAPY: ABNORMAL
O2 THERAPY: ABNORMAL
PCO2 ARTERIAL: 64.2 MMHG (ref 35–45)
PCO2 ARTERIAL: 74.8 MMHG (ref 35–45)
PDW BLD-RTO: 16 % (ref 12.4–15.4)
PERFORMED ON: ABNORMAL
PH ARTERIAL: 7.18 (ref 7.35–7.45)
PH ARTERIAL: 7.24 (ref 7.35–7.45)
PLATELET # BLD: 78 K/UL (ref 135–450)
PMV BLD AUTO: 8.9 FL (ref 5–10.5)
PO2 ARTERIAL: 103.4 MMHG (ref 75–108)
PO2 ARTERIAL: 88.5 MMHG (ref 75–108)
POTASSIUM SERPL-SCNC: 4.2 MMOL/L (ref 3.5–5.1)
PROCALCITONIN: 6.88 NG/ML (ref 0–0.15)
PROTHROMBIN TIME: 17.5 SEC (ref 10–13.2)
RBC # BLD: 3.18 M/UL (ref 4.2–5.9)
REPORT: NORMAL
SODIUM BLD-SCNC: 140 MMOL/L (ref 136–145)
TCO2 ARTERIAL: 28.7 MMOL/L
TCO2 ARTERIAL: 29.5 MMOL/L
TOTAL IRON BINDING CAPACITY: 267 UG/DL (ref 260–445)
TOTAL PROTEIN: 6 G/DL (ref 6.4–8.2)
WBC # BLD: 3.8 K/UL (ref 4–11)

## 2021-01-06 PROCEDURE — 2500000003 HC RX 250 WO HCPCS: Performed by: ANESTHESIOLOGY

## 2021-01-06 PROCEDURE — 2000000000 HC ICU R&B

## 2021-01-06 PROCEDURE — 2580000003 HC RX 258: Performed by: NURSE ANESTHETIST, CERTIFIED REGISTERED

## 2021-01-06 PROCEDURE — 2580000003 HC RX 258: Performed by: SURGERY

## 2021-01-06 PROCEDURE — 36415 COLL VENOUS BLD VENIPUNCTURE: CPT

## 2021-01-06 PROCEDURE — 7100000000 HC PACU RECOVERY - FIRST 15 MIN: Performed by: SURGERY

## 2021-01-06 PROCEDURE — 99233 SBSQ HOSP IP/OBS HIGH 50: CPT | Performed by: SURGERY

## 2021-01-06 PROCEDURE — 2500000003 HC RX 250 WO HCPCS: Performed by: SURGERY

## 2021-01-06 PROCEDURE — 6360000002 HC RX W HCPCS: Performed by: ANESTHESIOLOGY

## 2021-01-06 PROCEDURE — 99233 SBSQ HOSP IP/OBS HIGH 50: CPT | Performed by: INTERNAL MEDICINE

## 2021-01-06 PROCEDURE — 85610 PROTHROMBIN TIME: CPT

## 2021-01-06 PROCEDURE — 6360000002 HC RX W HCPCS: Performed by: INTERNAL MEDICINE

## 2021-01-06 PROCEDURE — 3600000014 HC SURGERY LEVEL 4 ADDTL 15MIN: Performed by: SURGERY

## 2021-01-06 PROCEDURE — 6360000002 HC RX W HCPCS: Performed by: SURGERY

## 2021-01-06 PROCEDURE — 7100000001 HC PACU RECOVERY - ADDTL 15 MIN: Performed by: SURGERY

## 2021-01-06 PROCEDURE — 82803 BLOOD GASES ANY COMBINATION: CPT

## 2021-01-06 PROCEDURE — 80048 BASIC METABOLIC PNL TOTAL CA: CPT

## 2021-01-06 PROCEDURE — 6360000002 HC RX W HCPCS: Performed by: NURSE ANESTHETIST, CERTIFIED REGISTERED

## 2021-01-06 PROCEDURE — 94640 AIRWAY INHALATION TREATMENT: CPT

## 2021-01-06 PROCEDURE — 2709999900 HC NON-CHARGEABLE SUPPLY: Performed by: SURGERY

## 2021-01-06 PROCEDURE — 88304 TISSUE EXAM BY PATHOLOGIST: CPT

## 2021-01-06 PROCEDURE — 51702 INSERT TEMP BLADDER CATH: CPT

## 2021-01-06 PROCEDURE — 85027 COMPLETE CBC AUTOMATED: CPT

## 2021-01-06 PROCEDURE — 94660 CPAP INITIATION&MGMT: CPT

## 2021-01-06 PROCEDURE — 82728 ASSAY OF FERRITIN: CPT

## 2021-01-06 PROCEDURE — 47600 CHOLECYSTECTOMY: CPT | Performed by: SURGERY

## 2021-01-06 PROCEDURE — 6370000000 HC RX 637 (ALT 250 FOR IP): Performed by: INTERNAL MEDICINE

## 2021-01-06 PROCEDURE — 88342 IMHCHEM/IMCYTCHM 1ST ANTB: CPT

## 2021-01-06 PROCEDURE — 80076 HEPATIC FUNCTION PANEL: CPT

## 2021-01-06 PROCEDURE — 2580000003 HC RX 258: Performed by: INTERNAL MEDICINE

## 2021-01-06 PROCEDURE — 2720000010 HC SURG SUPPLY STERILE: Performed by: SURGERY

## 2021-01-06 PROCEDURE — 94761 N-INVAS EAR/PLS OXIMETRY MLT: CPT

## 2021-01-06 PROCEDURE — 3700000000 HC ANESTHESIA ATTENDED CARE: Performed by: SURGERY

## 2021-01-06 PROCEDURE — 2500000003 HC RX 250 WO HCPCS: Performed by: NURSE ANESTHETIST, CERTIFIED REGISTERED

## 2021-01-06 PROCEDURE — 0FT40ZZ RESECTION OF GALLBLADDER, OPEN APPROACH: ICD-10-PCS | Performed by: SURGERY

## 2021-01-06 PROCEDURE — 0FJ44ZZ INSPECTION OF GALLBLADDER, PERCUTANEOUS ENDOSCOPIC APPROACH: ICD-10-PCS | Performed by: SURGERY

## 2021-01-06 PROCEDURE — 3600000004 HC SURGERY LEVEL 4 BASE: Performed by: SURGERY

## 2021-01-06 PROCEDURE — 3700000001 HC ADD 15 MINUTES (ANESTHESIA): Performed by: SURGERY

## 2021-01-06 PROCEDURE — 84145 PROCALCITONIN (PCT): CPT

## 2021-01-06 PROCEDURE — 2700000000 HC OXYGEN THERAPY PER DAY

## 2021-01-06 PROCEDURE — 6370000000 HC RX 637 (ALT 250 FOR IP): Performed by: SURGERY

## 2021-01-06 PROCEDURE — 99232 SBSQ HOSP IP/OBS MODERATE 35: CPT | Performed by: PHYSICIAN ASSISTANT

## 2021-01-06 RX ORDER — DEXAMETHASONE SODIUM PHOSPHATE 4 MG/ML
INJECTION, SOLUTION INTRA-ARTICULAR; INTRALESIONAL; INTRAMUSCULAR; INTRAVENOUS; SOFT TISSUE PRN
Status: DISCONTINUED | OUTPATIENT
Start: 2021-01-06 | End: 2021-01-06 | Stop reason: SDUPTHER

## 2021-01-06 RX ORDER — PROMETHAZINE HYDROCHLORIDE 25 MG/ML
6.25 INJECTION, SOLUTION INTRAMUSCULAR; INTRAVENOUS
Status: DISCONTINUED | OUTPATIENT
Start: 2021-01-06 | End: 2021-01-06 | Stop reason: HOSPADM

## 2021-01-06 RX ORDER — BUPIVACAINE HYDROCHLORIDE 5 MG/ML
INJECTION, SOLUTION EPIDURAL; INTRACAUDAL PRN
Status: DISCONTINUED | OUTPATIENT
Start: 2021-01-06 | End: 2021-01-06 | Stop reason: HOSPADM

## 2021-01-06 RX ORDER — LIDOCAINE HYDROCHLORIDE 20 MG/ML
INJECTION, SOLUTION INFILTRATION; PERINEURAL PRN
Status: DISCONTINUED | OUTPATIENT
Start: 2021-01-06 | End: 2021-01-06 | Stop reason: SDUPTHER

## 2021-01-06 RX ORDER — ONDANSETRON 2 MG/ML
4 INJECTION INTRAMUSCULAR; INTRAVENOUS PRN
Status: DISCONTINUED | OUTPATIENT
Start: 2021-01-06 | End: 2021-01-06 | Stop reason: HOSPADM

## 2021-01-06 RX ORDER — MORPHINE SULFATE 10 MG/ML
1 INJECTION, SOLUTION INTRAMUSCULAR; INTRAVENOUS EVERY 5 MIN PRN
Status: DISCONTINUED | OUTPATIENT
Start: 2021-01-06 | End: 2021-01-06 | Stop reason: HOSPADM

## 2021-01-06 RX ORDER — DIPHENHYDRAMINE HYDROCHLORIDE 50 MG/ML
12.5 INJECTION INTRAMUSCULAR; INTRAVENOUS
Status: DISCONTINUED | OUTPATIENT
Start: 2021-01-06 | End: 2021-01-06 | Stop reason: HOSPADM

## 2021-01-06 RX ORDER — FENTANYL CITRATE 50 UG/ML
INJECTION, SOLUTION INTRAMUSCULAR; INTRAVENOUS PRN
Status: DISCONTINUED | OUTPATIENT
Start: 2021-01-06 | End: 2021-01-06 | Stop reason: SDUPTHER

## 2021-01-06 RX ORDER — OXYCODONE HYDROCHLORIDE AND ACETAMINOPHEN 5; 325 MG/1; MG/1
2 TABLET ORAL PRN
Status: DISCONTINUED | OUTPATIENT
Start: 2021-01-06 | End: 2021-01-06 | Stop reason: HOSPADM

## 2021-01-06 RX ORDER — ONDANSETRON 2 MG/ML
INJECTION INTRAMUSCULAR; INTRAVENOUS PRN
Status: DISCONTINUED | OUTPATIENT
Start: 2021-01-06 | End: 2021-01-06 | Stop reason: SDUPTHER

## 2021-01-06 RX ORDER — MEPERIDINE HYDROCHLORIDE 25 MG/ML
12.5 INJECTION INTRAMUSCULAR; INTRAVENOUS; SUBCUTANEOUS EVERY 5 MIN PRN
Status: DISCONTINUED | OUTPATIENT
Start: 2021-01-06 | End: 2021-01-06 | Stop reason: HOSPADM

## 2021-01-06 RX ORDER — ROCURONIUM BROMIDE 10 MG/ML
INJECTION, SOLUTION INTRAVENOUS PRN
Status: DISCONTINUED | OUTPATIENT
Start: 2021-01-06 | End: 2021-01-06 | Stop reason: SDUPTHER

## 2021-01-06 RX ORDER — MAGNESIUM HYDROXIDE 1200 MG/15ML
LIQUID ORAL CONTINUOUS PRN
Status: COMPLETED | OUTPATIENT
Start: 2021-01-06 | End: 2021-01-06

## 2021-01-06 RX ORDER — SODIUM CHLORIDE, SODIUM LACTATE, POTASSIUM CHLORIDE, AND CALCIUM CHLORIDE .6; .31; .03; .02 G/100ML; G/100ML; G/100ML; G/100ML
IRRIGANT IRRIGATION PRN
Status: DISCONTINUED | OUTPATIENT
Start: 2021-01-06 | End: 2021-01-06 | Stop reason: HOSPADM

## 2021-01-06 RX ORDER — LABETALOL HYDROCHLORIDE 5 MG/ML
5 INJECTION, SOLUTION INTRAVENOUS EVERY 10 MIN PRN
Status: DISCONTINUED | OUTPATIENT
Start: 2021-01-06 | End: 2021-01-06 | Stop reason: HOSPADM

## 2021-01-06 RX ORDER — SODIUM CHLORIDE, SODIUM LACTATE, POTASSIUM CHLORIDE, CALCIUM CHLORIDE 600; 310; 30; 20 MG/100ML; MG/100ML; MG/100ML; MG/100ML
INJECTION, SOLUTION INTRAVENOUS CONTINUOUS PRN
Status: DISCONTINUED | OUTPATIENT
Start: 2021-01-06 | End: 2021-01-06 | Stop reason: SDUPTHER

## 2021-01-06 RX ORDER — OXYCODONE HYDROCHLORIDE AND ACETAMINOPHEN 5; 325 MG/1; MG/1
1 TABLET ORAL PRN
Status: DISCONTINUED | OUTPATIENT
Start: 2021-01-06 | End: 2021-01-06 | Stop reason: HOSPADM

## 2021-01-06 RX ORDER — SODIUM CHLORIDE 9 MG/ML
INJECTION, SOLUTION INTRAVENOUS CONTINUOUS
Status: DISCONTINUED | OUTPATIENT
Start: 2021-01-06 | End: 2021-01-14 | Stop reason: HOSPADM

## 2021-01-06 RX ORDER — METOPROLOL TARTRATE 5 MG/5ML
5 INJECTION INTRAVENOUS ONCE
Status: DISCONTINUED | OUTPATIENT
Start: 2021-01-06 | End: 2021-01-06 | Stop reason: HOSPADM

## 2021-01-06 RX ORDER — PROPOFOL 10 MG/ML
INJECTION, EMULSION INTRAVENOUS PRN
Status: DISCONTINUED | OUTPATIENT
Start: 2021-01-06 | End: 2021-01-06 | Stop reason: SDUPTHER

## 2021-01-06 RX ORDER — MORPHINE SULFATE 10 MG/ML
2 INJECTION, SOLUTION INTRAMUSCULAR; INTRAVENOUS EVERY 5 MIN PRN
Status: DISCONTINUED | OUTPATIENT
Start: 2021-01-06 | End: 2021-01-06 | Stop reason: HOSPADM

## 2021-01-06 RX ORDER — HYDRALAZINE HYDROCHLORIDE 20 MG/ML
5 INJECTION INTRAMUSCULAR; INTRAVENOUS EVERY 10 MIN PRN
Status: DISCONTINUED | OUTPATIENT
Start: 2021-01-06 | End: 2021-01-06 | Stop reason: HOSPADM

## 2021-01-06 RX ADMIN — PANTOPRAZOLE SODIUM 40 MG: 40 TABLET, DELAYED RELEASE ORAL at 06:18

## 2021-01-06 RX ADMIN — GABAPENTIN 600 MG: 300 CAPSULE ORAL at 08:44

## 2021-01-06 RX ADMIN — METOPROLOL TARTRATE 2 MG: 5 INJECTION, SOLUTION INTRAVENOUS at 17:53

## 2021-01-06 RX ADMIN — PROPAFENONE HYDROCHLORIDE 150 MG: 150 TABLET, FILM COATED ORAL at 21:07

## 2021-01-06 RX ADMIN — PIPERACILLIN SODIUM AND TAZOBACTAM SODIUM 3.38 G: 3; .375 INJECTION, POWDER, LYOPHILIZED, FOR SOLUTION INTRAVENOUS at 06:18

## 2021-01-06 RX ADMIN — INSULIN LISPRO 1 UNITS: 100 INJECTION, SOLUTION INTRAVENOUS; SUBCUTANEOUS at 19:45

## 2021-01-06 RX ADMIN — DEXAMETHASONE SODIUM PHOSPHATE 6 MG: 4 INJECTION, SOLUTION INTRAMUSCULAR; INTRAVENOUS at 12:55

## 2021-01-06 RX ADMIN — Medication 10 ML: at 21:07

## 2021-01-06 RX ADMIN — ROCURONIUM BROMIDE 50 MG: 10 INJECTION, SOLUTION INTRAVENOUS at 12:46

## 2021-01-06 RX ADMIN — ROCURONIUM BROMIDE 20 MG: 10 INJECTION, SOLUTION INTRAVENOUS at 13:42

## 2021-01-06 RX ADMIN — FENTANYL CITRATE 50 MCG: 50 INJECTION INTRAMUSCULAR; INTRAVENOUS at 13:58

## 2021-01-06 RX ADMIN — SUGAMMADEX 400 MG: 100 INJECTION, SOLUTION INTRAVENOUS at 14:26

## 2021-01-06 RX ADMIN — INSULIN LISPRO 1 UNITS: 100 INJECTION, SOLUTION INTRAVENOUS; SUBCUTANEOUS at 23:15

## 2021-01-06 RX ADMIN — GABAPENTIN 600 MG: 300 CAPSULE ORAL at 21:07

## 2021-01-06 RX ADMIN — Medication 1 PUFF: at 06:50

## 2021-01-06 RX ADMIN — PROPAFENONE HYDROCHLORIDE 150 MG: 150 TABLET, FILM COATED ORAL at 06:18

## 2021-01-06 RX ADMIN — PROPOFOL 200 MG: 10 INJECTION, EMULSION INTRAVENOUS at 12:46

## 2021-01-06 RX ADMIN — ONDANSETRON 4 MG: 2 INJECTION, SOLUTION INTRAMUSCULAR; INTRAVENOUS at 14:19

## 2021-01-06 RX ADMIN — SODIUM CHLORIDE, POTASSIUM CHLORIDE, SODIUM LACTATE AND CALCIUM CHLORIDE: 600; 310; 30; 20 INJECTION, SOLUTION INTRAVENOUS at 13:00

## 2021-01-06 RX ADMIN — TIOTROPIUM BROMIDE INHALATION SPRAY 2 PUFF: 3.12 SPRAY, METERED RESPIRATORY (INHALATION) at 06:49

## 2021-01-06 RX ADMIN — FENTANYL CITRATE 50 MCG: 50 INJECTION INTRAMUSCULAR; INTRAVENOUS at 13:00

## 2021-01-06 RX ADMIN — LIDOCAINE HYDROCHLORIDE 60 MG: 20 INJECTION, SOLUTION INFILTRATION; PERINEURAL at 12:46

## 2021-01-06 RX ADMIN — FENTANYL CITRATE 50 MCG: 50 INJECTION INTRAMUSCULAR; INTRAVENOUS at 12:54

## 2021-01-06 RX ADMIN — ROCURONIUM BROMIDE 15 MG: 10 INJECTION, SOLUTION INTRAVENOUS at 14:15

## 2021-01-06 RX ADMIN — FENTANYL CITRATE 50 MCG: 50 INJECTION INTRAMUSCULAR; INTRAVENOUS at 13:12

## 2021-01-06 RX ADMIN — SODIUM CHLORIDE 1000 ML: 9 INJECTION, SOLUTION INTRAVENOUS at 16:25

## 2021-01-06 RX ADMIN — HYDROMORPHONE HYDROCHLORIDE 0.5 MG: 1 INJECTION, SOLUTION INTRAMUSCULAR; INTRAVENOUS; SUBCUTANEOUS at 16:19

## 2021-01-06 RX ADMIN — ALBUTEROL SULFATE 2.5 MG: 2.5 SOLUTION RESPIRATORY (INHALATION) at 06:49

## 2021-01-06 RX ADMIN — ONDANSETRON 4 MG: 2 INJECTION, SOLUTION INTRAMUSCULAR; INTRAVENOUS at 12:55

## 2021-01-06 RX ADMIN — PIPERACILLIN SODIUM AND TAZOBACTAM SODIUM 3.38 G: 3; .375 INJECTION, POWDER, LYOPHILIZED, FOR SOLUTION INTRAVENOUS at 21:09

## 2021-01-06 RX ADMIN — ROCURONIUM BROMIDE 20 MG: 10 INJECTION, SOLUTION INTRAVENOUS at 13:20

## 2021-01-06 ASSESSMENT — PULMONARY FUNCTION TESTS
PIF_VALUE: 21
PIF_VALUE: 20
PIF_VALUE: 20
PIF_VALUE: 28
PIF_VALUE: 2
PIF_VALUE: 19
PIF_VALUE: 20
PIF_VALUE: 21
PIF_VALUE: 19
PIF_VALUE: 20
PIF_VALUE: 20
PIF_VALUE: 26
PIF_VALUE: 20
PIF_VALUE: 20
PIF_VALUE: 3
PIF_VALUE: 20
PIF_VALUE: 21
PIF_VALUE: 20
PIF_VALUE: 21
PIF_VALUE: 20
PIF_VALUE: 22
PIF_VALUE: 1
PIF_VALUE: 20
PIF_VALUE: 21
PIF_VALUE: 21
PIF_VALUE: 20
PIF_VALUE: 21
PIF_VALUE: 19
PIF_VALUE: 2
PIF_VALUE: 1
PIF_VALUE: 19
PIF_VALUE: 19
PIF_VALUE: 20
PIF_VALUE: 20
PIF_VALUE: 25
PIF_VALUE: 19
PIF_VALUE: 23
PIF_VALUE: 22
PIF_VALUE: 21
PIF_VALUE: 21
PIF_VALUE: 26
PIF_VALUE: 20
PIF_VALUE: 25
PIF_VALUE: 2
PIF_VALUE: 29
PIF_VALUE: 0
PIF_VALUE: 28
PIF_VALUE: 25
PIF_VALUE: 28
PIF_VALUE: 21
PIF_VALUE: 21
PIF_VALUE: 1
PIF_VALUE: 20
PIF_VALUE: 24
PIF_VALUE: 20
PIF_VALUE: 29
PIF_VALUE: 21
PIF_VALUE: 20
PIF_VALUE: 20
PIF_VALUE: 1
PIF_VALUE: 21
PIF_VALUE: 20
PIF_VALUE: 20
PIF_VALUE: 27
PIF_VALUE: 21
PIF_VALUE: 26
PIF_VALUE: 19
PIF_VALUE: 18
PIF_VALUE: 1
PIF_VALUE: 20
PIF_VALUE: 19
PIF_VALUE: 21
PIF_VALUE: 22
PIF_VALUE: 20
PIF_VALUE: 27

## 2021-01-06 ASSESSMENT — PAIN DESCRIPTION - LOCATION: LOCATION: ABDOMEN

## 2021-01-06 ASSESSMENT — PAIN SCALES - GENERAL: PAINLEVEL_OUTOF10: 8

## 2021-01-06 ASSESSMENT — PAIN DESCRIPTION - PAIN TYPE: TYPE: SURGICAL PAIN

## 2021-01-06 NOTE — BRIEF OP NOTE
Brief Postoperative Note      Patient: Lynsey Ji  YOB: 1940  MRN: [id number]    Date of Procedure: 1/6/2021    Pre-Op Diagnosis: ACUTE CHOLECYSTITIS      Post-Op Diagnosis: Same       Procedure:  Lap converted to open cholecystectomy    Surgeon(s):  Maryann Mccormick MD    Assistant:  Surgical Assistant: Yuki Zuluaga    Anesthesia: General    Estimated Blood Loss (mL): 488    Complications: None    Specimens:   * No specimens in log *    Implants:  * No implants in log *      Drains:   Closed/Suction Drain Midline Abdomen (Active)       NG/OG/NJ/NE Tube Orogastric 18 fr Center mouth (Active)       Findings: As above    Electronically signed by Chantal Luna MD on 1/6/2021 at 2:41 PM

## 2021-01-06 NOTE — PROGRESS NOTES
Notified Stefan Salvador PA-  Component Collected Lab   Blood Culture, Routine Abnormal  01/04/2021  6:45 PM [facility]   Gram stain Aerobic bottle:   Gram negative rods   Information to follow    Organism Abnormal  01/04/2021  6:45 PM [facility]   Klebsiella aerogenes DNA Detected

## 2021-01-06 NOTE — PROGRESS NOTES
Bedside report given and pt care transferred to [facility]. Pt denies needs at this time. Call light within reach.

## 2021-01-06 NOTE — FLOWSHEET NOTE
01/05/21 1905   Vital Signs   Temp 99 °F (37.2 °C)   Temp Source Oral   Pulse 119   Heart Rate Source Monitor   Resp 18   /67   BP Location Right upper arm   Patient Position Semi fowlers   Level of Consciousness Alert (0)   MEWS Score 3   Oxygen Therapy   SpO2 97 %   O2 Device Nasal cannula   O2 Flow Rate (L/min) 3 L/min   Pt A/O x 4 assessment completed. Pt denies any pain at this time. PM meds given with sip of water. Pt denies any needs. Educated to call out if he needs anything.  Call light within reach and bed alarm on

## 2021-01-06 NOTE — ANESTHESIA PRE PROCEDURE
Department of Anesthesiology  Preprocedure Note       Name:  Goldie Millan   Age:  78 y.o.  :  1940                                          MRN:  [id number]         Date:  2021      Surgeon: Guillermo Stark):  Ivan Chavez MD    Procedure: Procedure(s):  LAPAROSCOPIC CHOLECYSTECTOMY, WITH CHOLANGIOGRAMS    Medications prior to admission:   Prior to Admission medications    Medication Sig Start Date End Date Taking?  Authorizing Provider   amoxicillin-clavulanate (AUGMENTIN) 875-125 MG per tablet Take 1 tablet by mouth 2 times daily for 7 days 21  Ivan Chavez MD   VENTOLIN  (45 Base) MCG/ACT inhaler Inhale 2 puffs into the lungs every 6 hours as needed for Wheezing orShortness of Breath 20   Karsten Raymond MD   aspirin 81 MG EC tablet Take 81 mg by mouth daily    Historical Provider, MD   propafenone (RYTHMOL) 150 MG tablet TAKE ONE TABLET BY MOUTH EVERY 8 HOURS 10/1/20   Shauna Casper MD   ELIQUIS 5 MG TABS tablet Take 1 tablet by mouth 2 times daily 10/1/20   Shauna Casper MD   Lorella Nanny 100-62.5-25 MCG/INH AEPB INHALE 1 PUFF EVERY DAY 10/1/20   Karsten Raymond MD   albuterol (PROVENTIL) (2.5 MG/3ML) 0.083% nebulizer solution USE 1 VIAL IN NEBULIZER 4 TIMES DAILY 20   Karsten Raymond MD   acetaminophen (AMINOFEN) 325 MG tablet Take 2 tablets by mouth every 4 hours as needed for Pain 20   Abimael Torres MD   dilTIAZem (CARTIA XT) 180 MG extended release capsule Take 1 capsule by mouth as needed ([address] to take as needed only 1 time a day for increased heart rate) 20   DEMOND Cagle - CNP   alendronate (FOSAMAX) 70 MG tablet  3/31/20   Historical Provider, MD   fluticasone [facility]) 50 MCG/ACT nasal spray  20   Historical Provider, MD   atorvastatin (LIPITOR) 10 MG tablet  3/31/20   Historical Provider, MD   pioglitazone (ACTOS) 30 MG tablet Take 30 mg by mouth daily    Historical Provider, MD  acetaminophen (TYLENOL) tablet 650 mg  650 mg Oral Q6H PRN Michell Sinha MD        Or    acetaminophen (TYLENOL) suppository 650 mg  650 mg Rectal Q6H PRN Michell Sinha MD        0.9 % sodium chloride infusion   Intravenous Continuous Michell Sinha  mL/hr at 01/05/21 2028 New Bag at 01/05/21 2028    insulin lispro (HUMALOG) injection vial 0-6 Units  0-6 Units Subcutaneous Q4H Michell Sinha MD   1 Units at 01/05/21 1314    budesonide-formoterol (SYMBICORT) 160-4.5 MCG/ACT inhaler 1 puff  1 puff Inhalation Daily Michell Sinha MD   1 puff at 01/06/21 0650    And    tiotropium (SPIRIVA RESPIMAT) 2.5 MCG/ACT inhaler 2 puff  2 puff Inhalation Daily Michell Sinha MD   2 puff at 01/06/21 0649    albuterol (PROVENTIL) nebulizer solution 2.5 mg  2.5 mg Nebulization Q4H PRN Michell Sinha MD        albuterol (PROVENTIL) nebulizer solution 2.5 mg  2.5 mg Nebulization BID Michell Sinha MD   2.5 mg at 01/06/21 4025       Allergies:  No Known Allergies    Problem List:    Patient Active Problem List   Diagnosis Code    HTN (hypertension) I10    COPD, severe ([facility] J44.9    [facility] mellitus, [facility]) [facility] E11.9    HLD (hyperlipidemia) E78.5    Gallstones K80.20    PAF (paroxysmal atrial fibrillation) ([facility]) I48.0    Chronic respiratory failure with hypoxia and hypercapnia 2.5 L home O2 J96.11, J96.12    Acute respiratory insufficiency R06.89    Acute on chronic respiratory failure with hypercapnia ([facility]) J96.22    COPD exacerbation ([facility]) J44.1    CAD (coronary artery disease) I25.10    Former smoker Z87.891    Acute hyperglycemia R73.9    Chronic normocytic anemia D64.9    Chronic thrombocytopenia D69.6    Falls at home W19. Daniel Rabago, Y92.009    Ambulatory dysfunction R26.2    Generalized weakness R53.1    Acute on chronic respiratory failure with hypoxia and hypercapnia ([facility]) J96.21, J96.22    Pleural effusion J90    Rapid atrial fibrillation (HCC) I48.91  OTHER SURGICAL HISTORY  12/1/15    excision of lesion of lip    PROSTATE SURGERY      TURP  10/23/2015    with cystoscopy       Social History:    Social History     Tobacco Use    Smoking status: Former Smoker     Packs/day: 1.50     Years: 45.00     Pack years: 67.50     Quit date: 5/27/2005     Years since quitting: 15.6    Smokeless tobacco: Never Used   Substance Use Topics    Alcohol use: No                                Counseling given: Not Answered      Vital Signs (Current):   Vitals:    01/05/21 1905 01/06/21 0329 01/06/21 0659 01/06/21 0706   BP: 117/67 114/63  138/77   Pulse: 119 125  74   Resp: 18 18  20   Temp: 99 °F (37.2 °C) 97.5 °F (36.4 °C)  98 °F (36.7 °C)   TempSrc: Oral Oral  Oral   SpO2: 97% 97% 92% 94%   Weight:       Height:                                                  BP Readings from Last 3 Encounters:   01/06/21 138/77   01/06/21 (!) 65/44   12/21/20 126/78       NPO Status: Time of last liquid consumption: 0000                        Time of last solid consumption: 0000                        Date of last liquid consumption: 01/05/21                        Date of last solid food consumption: 01/05/21    BMI:   Wt Readings from Last 3 Encounters:   01/04/21 180 lb (81.6 kg)   12/14/20 200 lb (90.7 kg)   08/14/20 193 lb (87.5 kg)     Body mass index is 25.1 kg/m².     CBC:   Lab Results   Component Value Date    WBC 3.8 01/06/2021    RBC 3.18 01/06/2021    HGB 8.8 01/06/2021    HCT 26.9 01/06/2021    MCV 84.7 01/06/2021    RDW 16.0 01/06/2021    PLT 78 01/06/2021       CMP:   Lab Results   Component Value Date     01/06/2021    K 4.2 01/06/2021    K 4.5 01/05/2021     01/06/2021    CO2 28 01/06/2021    BUN 15 01/06/2021    CREATININE 0.9 01/06/2021    GFRAA >60 01/06/2021    GFRAA >60 07/01/2011    AGRATIO 1.1 01/05/2021    LABGLOM >60 01/06/2021    GLUCOSE 126 01/06/2021    PROT 6.0 01/06/2021    PROT 7.0 08/10/2010    CALCIUM 8.1 01/06/2021 BILITOT 3.5 01/06/2021    ALKPHOS 114 01/06/2021    AST 55 01/06/2021     01/06/2021       POC Tests:   Recent Labs     01/06/21  1105   POCGLU 134*       Coags:   Lab Results   Component Value Date    PROTIME 17.5 01/06/2021    INR 1.50 01/06/2021    APTT 29.8 01/25/2018       HCG (If Applicable): No results found for: PREGTESTUR, PREGSERUM, HCG, HCGQUANT     ABGs:   Lab Results   Component Value Date    PHART 7.398 04/18/2020    PO2ART 99.4 04/18/2020    VRQ6AJZ 59.4 04/18/2020    PTO5HZN 35.8 04/18/2020    BEART 9.5 04/18/2020    H2PFSNFY 97.4 04/18/2020        Type & Screen (If Applicable):  Lab Results   Component Value Date    LABABO B 06/22/2011    LABRH Positive 06/22/2011       Drug/Infectious Status (If Applicable):  No results found for: HIV, HEPCAB    COVID-19 Screening (If Applicable):   Lab Results   Component Value Date    COVID19 Not Detected 01/04/2021    COVID19 Not Detected 04/16/2020         Anesthesia Evaluation  Patient summary reviewed and Nursing notes reviewed  Airway: Mallampati: III  TM distance: >3 FB   Neck ROM: full  Mouth opening: > = 3 FB Dental:    (+) upper dentures and lower dentures      Pulmonary:normal exam  breath sounds clear to auscultation  (+) pneumonia:  COPD:  sleep apnea: on CPAP,                             Cardiovascular:    (+) hypertension:, CAD:, hyperlipidemia        Rhythm: regular  Rate: normal                    Neuro/Psych:   Negative Neuro/Psych ROS              GI/Hepatic/Renal: Neg GI/Hepatic/Renal ROS            Endo/Other:    (+) DiabetesType II DM, , .                 Abdominal:           Vascular: negative vascular ROS. Anesthesia Plan      general     ASA 3       Induction: intravenous. MIPS: Postoperative opioids intended and Prophylactic antiemetics administered. Anesthetic plan and risks discussed with patient. Plan discussed with CRNA.                   Long Stark MD   1/6/2021

## 2021-01-06 NOTE — CARE COORDINATION
INTERDISCIPLINARY PLAN OF CARE CONFERENCE    Date/Time: 1/6/2021 11:43 AM  Completed by: Arlene Rg Case Management      Patient Name:  Aiden Osullivan  YOB: 1940  Admitting Diagnosis: Acute cholecystitis [K81.0]     Admit Date/Time:  1/4/2021  5:52 PM    Chart reviewed. Interdisciplinary team contacted or reviewed plan related to patient progress and discharge plans. Disciplines included Case Management, Nursing, and Dietitian. Current Status:stable; increased LFT's possible lap connie  PT/OT recommendation for discharge plan of care: Home 24 hr assist and with home PT     Expected D/C Disposition:  Home  Confirmed plan with patient and/or family No confirmed with: (name)     Discharge Plan Comments: Chart reviewed. Pt is from home alone. Will need to follow up with pt after lap connie today as needs may change. Currently recommending home with 24/7 assist. Will follow.     Home O2 in place on admit: Yes

## 2021-01-06 NOTE — PROGRESS NOTES
Progress Note    Admit Date:  1/4/2021    Subjective:  Mr. Lyle Lewis states his abdominal pain is improved. Wants to eat. Objective:   Patient Vitals for the past 4 hrs:   BP Temp Temp src Pulse Resp SpO2   01/06/21 0706 138/77 98 °F (36.7 °C) Oral 74 20 94 %   01/06/21 0659      92 %            Intake/Output Summary (Last 24 hours) at 1/6/2021 1034  Last data filed at 1/6/2021 2836  Gross per 24 hour   Intake 2734.82 ml   Output 800 ml   Net 1934.82 ml       Physical Exam:  GEN: A&Ox3, NAD. Elderly  male, pleasant  HEENT: NC/AT,EOMI, semi dry MM, no erythema/exudates or visible masses. CVS: Normal S1,S2. RRR. Without M/G/R  LUNG: CTA throughout,  Diminished at bases. no wheezes, rales. On baseline 3L  ABD: Soft, tender with deep palpation in RUQ, BS+ x4. Without G/R.  EXT: 2+ pulses, no c/c/e. Brisk cap refill. PSY: Thought process intact, affect appropriate. TERRA: CN III-XII intact, moves all 4 spontaneously, sensory grossly intact. SKIN: No rash or lesions on visible skin.     Scheduled Meds:   piperacillin-tazobactam  3.375 g Intravenous Q8H    aspirin  81 mg Oral Daily    pantoprazole  40 mg Oral QAM AC    gabapentin  600 mg Oral TID    propafenone  150 mg Oral 3 times per day    sodium chloride flush  10 mL Intravenous 2 times per day    insulin lispro  0-6 Units Subcutaneous Q4H    budesonide-formoterol  1 puff Inhalation Daily    And    tiotropium  2 puff Inhalation Daily    albuterol  2.5 mg Nebulization BID       Continuous Infusions:   dextrose      sodium chloride 100 mL/hr at 01/05/21 2028       PRN Meds:  morphine, dilTIAZem, glucose, dextrose, glucagon (rDNA), dextrose, sodium chloride flush, acetaminophen **OR** acetaminophen, albuterol      Data:  CBC:   Recent Labs     01/04/21  1845 01/05/21  0545 01/06/21  0521   WBC 11.2* 6.4 3.8*   HGB 10.6* 10.0* 8.8*   HCT 32.4* 30.3* 26.9*   MCV 84.4 84.4 84.7   * 98* 78*     BMP:   Recent Labs     01/04/21 1845 01/05/21  0545 01/06/21  0521    135* 140   K 5.1 4.5 4.2   CL 95* 99 104   CO2 29 27 28   BUN 18 17 15   CREATININE 0.8 0.8 0.9     LIVER PROFILE:   Recent Labs     01/04/21  1845 01/05/21  0545 01/06/21  0521   * 125* 55*   * 161* 101*   LIPASE 16.0  --   --    BILIDIR  --   --  3.4*   BILITOT 4.6* 4.4* 3.5*   ALKPHOS 144* 111 114     PT/INR:   Recent Labs     01/05/21  0545 01/06/21  0521   PROTIME 23.6* 17.5*   INR 2.02* 1.50*       CULTURES    Blood cx x2: NGTD    Urine cx: NGTD     RADIOLOGY    MRI ABDOMEN WO CONTRAST MRCP 1/5/2020   Final Result   No evidence of choledocholithiasis or dilation of the biliary collecting   system on limited imaging      Small bright T2 lesion within the head of the pancreas which could represent   a small cyst.  These lesions are typically not followed beyond age of [de-identified]   years. US GALLBLADDER RUQ 1/4/2020   Final Result   1. Cholelithiasis without sonographic evidence for acute cholecystitis. 2. Fatty liver versus diffuse hepatocellular disease. 3. Nonvisualization of the pancreas. CTA CHEST ABDOMEN PELVIS W CONTRAST 1/4/2020   Final Result   1. CTA CHEST: No acute vascular abnormality; no aneurysm or dissection. 2.  Pulmonary sequela typical of that seen with smoking, including COPD. 3. Chronic mild T6 compression fracture. 4. CTA ABDOMEN/PELVIS: No acute vascular abnormality; no aneurysm leak or   dissection. 5. 3.0 cm abdominal aortic aneurysm. Recommend follow-up every 3 years. 6.  No CT evidence of an acute intra-abdominal or intrapelvic process. 7. Cholelithiasis without definite CT findings of acute cholecystitis. Gallbladder appears contracted. 8.  No findings to suggest acute appendicitis; no ureter calculus or   hydronephrosis. 9. Hepatic steatosis. 10. Chronic, nonspecific L1 sclerosis and compression fracture. 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       RECOMMENDATIONS:   3 year imaging follow-up AAA CT HEAD WO CONTRAST 1/4/2020   Final Result   1. No acute intracranial abnormality. 2. Small left forehead scalp contusion/hematoma. 3. Possible acute left maxillary sinusitis. 4. Stable appearing senescent changes. XR CHEST PORTABLE   Final Result   No radiographic evidence of acute pulmonary disease. Assessment/Plan:    Sepsis  Klebsiella Bactermia - likely 2/2 below  - with Klebsiella bacteremia  - has been on ABX recently, PCT 22.7 - will trend  - urine cx neg, blood cx w/ klebsiella  - IV Zosyn D#2, on IVF, lactic trending down    Acute cholecystitis - recent admission for similar  RUQ Abdominal Pain - likely 2/2 above  Pancreatic Lesion - poss small cyst  - NPO, PRN morphine, IVF, abx as above  - GS c/s.   - GI consulted - checked MRCP - no evidence of choledocholithiasis or dilation of biliary collecting system  - pt remains NPO, likely need cholecystectomy, discuss with gen surg    Elevated LFTs  - new, see above  - mgmt as above  - check hepatitis panel, GI following as above  -  Monitor - trending down    UTI - Ruled Out  - f/u cx - NGTD     Pancytopenia  - in setting of infection, has chronic TCP & anemia  - WBC 3.8, Hgb 8.8, Plt 78  - avoid heparin use, start SCDs  - monitor CBC    Generalized weakness  Fall  - fall precautions  - PT/OT - recommended home with 24/7 assist and home therapy    Lactic acidosis  - 3.1, IVF and repeats ordered.    - trending down    COPD - no AE  Chronic Hypoxic respiratory failure  - sats stable on home 3 L  - cont [address] Albuterol Symbicort/Spiriva     DM Type 2   - controlled  - hold Metformin and Actos, chk A1c: 6.6  - on Low SSI q4h    HTN  - controlled  - cont Diltiazem PRN  - monitor    PAF  - hold Eliquis in preparation for poss sx  - cont Rythmol and Diltiazem PRN    HLD  - hold statin in setting of elevated LFTs    GERD  - cont Protonix    L1 compression fracture  - noted on prior admission - CT with approx 75% loss of central vertebral body height, new since 2017, appears chronic as pt with - no c/o back pain  - daughter reported a fall several months prior    AAA  - noted on prior admission  - mild at 3.1 cm; infrarenal   - recommended f/u every 3 years  - family aware    Abnormal CT  - noted on prior admission  - CT findings consistent with interval development of a 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

## 2021-01-06 NOTE — PROGRESS NOTES
[facility]    [address] [facility] ,  [address] [facility]  Phone: [phone]  [address] [facility],  [address] [place] [address], [address] [place] [address]  Phone: [phone]   Fax:[phone]    78y.o. year old male with medical history of coronary artery disease, COPD on chronic 3 L of oxygen, diabetes mellitus type 2, hyperlipidemia, hypertension, ELAINE on CPAP, atrial fibrillation on Eliquis and recent acute cholecystitis managed with antibiotics presents for evaluation of fall at home. Found to have elevated liver chemistries and sepsis. We are following for abnormal liver chemistries. Patient was seen and examined about 8:15 a.m. Denies abdominal pain, nausea, vomiting, fever, chills, hematochezia or melenic stools. Current Hospital Schedued Meds   piperacillin-tazobactam  3.375 g Intravenous Q8H    aspirin  81 mg Oral Daily    pantoprazole  40 mg Oral QAM AC    gabapentin  600 mg Oral TID    propafenone  150 mg Oral 3 times per day    sodium chloride flush  10 mL Intravenous 2 times per day    insulin lispro  0-6 Units Subcutaneous Q4H    budesonide-formoterol  1 puff Inhalation Daily    And    tiotropium  2 puff Inhalation Daily    albuterol  2.5 mg Nebulization BID     Current Hospital IV Meds   dextrose      sodium chloride 100 mL/hr at 01/05/21 2028     Current Hospital Prn Meds  morphine, dilTIAZem, glucose, dextrose, glucagon (rDNA), dextrose, sodium chloride flush, acetaminophen **OR** acetaminophen, albuterol    I/O last 3 completed shifts: In: 2734.8 [P.O.:300;  I.V.:2434.8]  Out: 800 [Urine:800]  /77   Pulse 74   Temp 98 °F (36.7 °C) (Oral)   Resp 20   Ht 5' 11\" (1.803 m)   Wt 180 lb (81.6 kg)   SpO2 94%   BMI 25.10 kg/m²   BMs: 0  Wt Readings from Last 3 Encounters:   01/04/21 180 lb (81.6 kg)   12/14/20 200 lb (90.7 kg)   08/14/20 193 lb (87.5 kg)       Physical Exam: VITAL SIGNS: /77   Pulse 74   Temp 98 °F (36.7 °C) (Oral)   Resp 20   Ht 5' 11\" (1.803 m)   Wt 180 lb (81.6 kg)   SpO2 94%   BMI 25.10 kg/m²   Wt Readings from Last 3 Encounters:   01/04/21 180 lb (81.6 kg)   12/14/20 200 lb (90.7 kg)   08/14/20 193 lb (87.5 kg)     Constitutional: Obese male, well developed, Well nourished, No acute distress, Non-toxic appearance. HENT: Normocephalic, Atraumatic, Bilateral external ears normal, Oropharynx moist, No oral exudates, Nose normal.   Eyes: Conjunctiva normal, No discharge. Neck: Normal range of motion, No tenderness, Supple, No stridor. Cardiovascular: Normal heart rate, Normal rhythm, No murmurs, No rubs, No gallops. Thorax & Lungs: Normal breath sounds, No respiratory distress, No wheezing, No chest tenderness. Abdomen: Pannus abdomen, normal bowel sounds, soft, mild tenderness in the right upper quadrant, non distended, no hernias  Rectal:  Deferred. Skin: Warm, Dry, No erythema, No rash. No bruising. No spider hemangiomas. Lower Extremities: Intact distal pulses, No edema, No tenderness, No cyanosis, No clubbing. Neurologic: Alert & oriented x 3, Normal motor function, Normal sensory function, No focal deficits noted.        Lab Results   Component Value Date     (H) 01/06/2021    AST 55 (H) 01/06/2021    ALKPHOS 114 01/06/2021    BILIDIR 3.4 (H) 01/06/2021    PROT 6.0 (L) 01/06/2021    LABALBU 3.0 (L) 01/06/2021    INR 1.50 (H) 01/06/2021    LIPASE 16.0 01/04/2021     Lab Results   Component Value Date    WBC 3.8 (L) 01/06/2021    HGB 8.8 (L) 01/06/2021    HCT 26.9 (L) 01/06/2021    MCV 84.7 01/06/2021    PLT 78 (L) 01/06/2021     Lab Results   Component Value Date     01/06/2021    K 4.2 01/06/2021    K 4.5 01/05/2021     01/06/2021    CO2 28 01/06/2021    BUN 15 01/06/2021     Lab Results   Component Value Date    CREATININE 0.9 01/06/2021     US gallbladder RUQ 1/4/2021: 1. Cholelithiasis without sonographic evidence for acute cholecystitis. 2. Fatty liver versus diffuse hepatocellular disease. 3. Nonvisualization of the pancreas.      MRI/MRCP of abdomen 1/5/2021:   Gallbladder is contracted and filled with stones.  No pericholecystic fluid. No evidence of choledocholithiasis or dilation of the biliary collecting   system on limited imaging       Small bright T2 lesion within the head of the pancreas which could represent   a small cyst.  These lesions are typically not followed beyond age of [de-identified]   years. A/P  1. Abnormal liver chemistries (mixed pattern) in setting of recent acute cholecystitis and cholelithiasis -improving. Choledocholithiasis ruled out on MRCP. 2.  Normocytic anemia, leukopenia, thrombocytopenia, coagulopathy, and lactic acidemia due to sepsis from acute cholecystitis     Plan:  Continue antibiotics with IV Zosyn  Pending hepatocellular work-up. Surgery evaluation for cholecystectomy versus cholecystostomy tube placement  No evidence of GI bleed. Continue to monitor hemoglobin and hematocrit for now.     GI to follow with you     Active Problems:    Gallstones    Acute cholecystitis  Resolved Problems:    * No resolved hospital problems. *    Patient Active Problem List   Diagnosis Code    HTN (hypertension) I10    COPD, severe ([facility] J44.9    DM2 (diabetes mellitus, type 2) ([facility] E11.9    HLD (hyperlipidemia) E78.5    Gallstones K80.20    PAF (paroxysmal atrial fibrillation) (HCC) I48.0    Chronic respiratory failure with hypoxia and hypercapnia 2.5 L home O2 J96.11, J96.12    Acute respiratory insufficiency R06.89    Acute on chronic respiratory failure with hypercapnia (HCC) J96.22    COPD exacerbation (HCC) J44.1    CAD (coronary artery disease) I25.10    Former smoker Z87.891    Acute hyperglycemia R73.9    Chronic normocytic anemia D64.9    Chronic thrombocytopenia D69.6    Falls at home W19. 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 For further questions or concerns, we can best be reached through perfect serv.         Kenn Serum 1/6/21 7:38 AM EST

## 2021-01-06 NOTE — PROGRESS NOTES
General Surgery [place], [place]  Daily Progress Note    Pt Name: Jose [address] Record Number: [id number]  Date of Birth 1940   Today's Date: 1/6/2021    ASSESSMENT  1. MRCP: no choledocho or dilation of biliary system   2. ABD: soft, + RUQ tenderness, no N/V, + flatus, no BM, + distention   3. T bili better: 4.6->3.5  4. LA 2.2  5. Leuks 23.6->3.8  6. + BC is Klebsiella  7. Eliquis has been on hold. Last dose before admission. PLAN  1. NPO  2. IVF  3. Zosyn  4. Plan for OR today for laparoscopic cholecystectomy with possible open cholecystectomy with Dr. Jeannie Lowe. All the risks, benefits and alternatives were discussed with the patient and his daughter Drea over the phone and both agreed to proceed with the procedure. Pt seen and examined by Dr. Jeannie Lowe. Reece Alfred has improved in some ways and worsened in others from yesterday. Pain is well controlled. He has no nausea and no vomiting. He has passed flatus and has not had a bowel movement. He is NPO. Current activity is up with assistance    OBJECTIVE  VITALS:  height is 5' 11\" (1.803 m) and weight is 180 lb (81.6 kg). His oral temperature is 98 °F (36.7 °C). His blood pressure is 138/77 and his pulse is 74. His respiration is 20 and oxygen saturation is 94%.  VITALS:  /77   Pulse 74   Temp 98 °F (36.7 °C) (Oral)   Resp 20   Ht 5' 11\" (1.803 m)   Wt 180 lb (81.6 kg)   SpO2 94%   BMI 25.10 kg/m²   INTAKE/OUTPUT:    Intake/Output Summary (Last 24 hours) at 1/6/2021 1159  Last data filed at 1/6/2021 1612  Gross per 24 hour   Intake 2734.82 ml   Output 800 ml   Net 1934.82 ml     GENERAL: alert, cooperative, no distress  LUNGS: clear to ausculation, without wheezes, rales or rhonci  HEART: normal rate and regular rhythm  ABDOMEN: soft, non-tender, non-distended and bowel sounds present in all 4 quadrants  EXTERMITY: no cyanosis, clubbing or edema  I/O last 3 completed shifts: In: 2734.8 [P.O.:300; I.V.:2434.8]  Out: 800 [Urine:800]  No intake/output data recorded. LABS  Recent Labs     01/04/21 2155 01/04/21 2155 01/06/21 0521   WBC  --    < > 3.8*   HGB  --    < > 8.8*   HCT  --    < > 26.9*   PLT  --    < > 78*   NA  --    < > 140   K  --    < > 4.2   CL  --    < > 104   CO2  --    < > 28   BUN  --    < > 15   CREATININE  --    < > 0.9   CALCIUM  --    < > 8.1*   INR  --    < > 1.50*   AST  --    < > 55*   ALT  --    < > 101*   BILITOT  --    < > 3.5*   BILIDIR  --   --  3.4*   NITRU Negative  --   --    COLORU Yellow  --   --     < > = values in this interval not displayed. CBC with Differential:    Lab Results   Component Value Date    WBC 3.8 01/06/2021    RBC 3.18 01/06/2021    HGB 8.8 01/06/2021    HCT 26.9 01/06/2021    PLT 78 01/06/2021    MCV 84.7 01/06/2021    MCH 27.7 01/06/2021    MCHC 32.7 01/06/2021    RDW 16.0 01/06/2021    SEGSPCT 68.5 07/02/2011    LYMPHOPCT 8.7 01/05/2021    MONOPCT 9.2 01/05/2021    EOSPCT 0.3 07/02/2011    BASOPCT 0.4 01/05/2021    MONOSABS 0.6 01/05/2021    LYMPHSABS 0.6 01/05/2021    EOSABS 0.0 01/05/2021    BASOSABS 0.0 01/05/2021    DIFFTYPE Auto 07/02/2011     CMP:    Lab Results   Component Value Date     01/06/2021    K 4.2 01/06/2021    K 4.5 01/05/2021     01/06/2021    CO2 28 01/06/2021    BUN 15 01/06/2021    CREATININE 0.9 01/06/2021    GFRAA >60 01/06/2021    GFRAA >60 07/01/2011    AGRATIO 1.1 01/05/2021    LABGLOM >60 01/06/2021    GLUCOSE 126 01/06/2021    PROT 6.0 01/06/2021    PROT 7.0 08/10/2010    LABALBU 3.0 01/06/2021    CALCIUM 8.1 01/06/2021    BILITOT 3.5 01/06/2021    ALKPHOS 114 01/06/2021    AST 55 01/06/2021     01/06/2021         Lois Harvey Woden Parcel  Electronically signed 1/6/2021 at 11:53 AM     Patient seen and examined. I agree with the assessment and plan from TWYLA Barba. Patient with RUQ tenderness. MRCP shows clear CBD. Recurrent admissions for gallbladder and now with Klebsiella bactremia. The diagnosis and recommended procedure were explained. Questions answered. Prepare for gallbladder surgery. Eliquis has been held. Greater than 50% visit spent counseling about diagnosis, treatment plan and expected post operative course.       [facility]

## 2021-01-06 NOTE — PROGRESS NOTES
Physical Therapy Attempt  PT attempted follow-up treatment. Pt off floor for laparoscopic cholecystectomy with Dr. Huber Henao. Will follow-up as pt status and schedule allow. No charge.   Rita Kate, PT, DPT #[id number]

## 2021-01-06 NOTE — PLAN OF CARE
Problem: Infection:  Goal: Will remain free from infection  Description: Will remain free from infection  1/5/2021 2321 by Donis Fraga RN  Outcome: Ongoing  1/5/2021 1727 by Kristopher Goldmann, RN  Outcome: Ongoing     Problem: Safety:  Goal: Free from accidental physical injury  Description: Free from accidental physical injury  1/5/2021 2321 by Donis Fraga RN  Outcome: Ongoing  1/5/2021 1727 by Kristopher Goldmann, RN  Outcome: Ongoing  Goal: Free from intentional harm  Description: Free from intentional harm  1/5/2021 1727 by Kristopher Goldmann, RN  Outcome: Ongoing     Problem: Daily Care:  Goal: Daily care needs are met  Description: Daily care needs are met  1/5/2021 2321 by Donis Fraga RN  Outcome: Ongoing  1/5/2021 1727 by Kristopher Goldmann, RN  Outcome: Ongoing     Problem: Pain:  Goal: Patient's pain/discomfort is manageable  Description: Patient's pain/discomfort is manageable  1/5/2021 2321 by Donis Fraga RN  Outcome: Ongoing  1/5/2021 1727 by Kristopher Goldmann, RN  Outcome: Ongoing     Problem: Skin Integrity:  Goal: Skin integrity will stabilize  Description: Skin integrity will stabilize  1/5/2021 1727 by Kristopher Goldmann, RN  Outcome: Ongoing     Problem: Discharge Planning:  Goal: Patients continuum of care needs are met  Description: Patients continuum of care needs are met  1/5/2021 1727 by Kristopher Goldmann, RN  Outcome: Ongoing

## 2021-01-06 NOTE — PROGRESS NOTES
Bedside report given to [facility] Pham RAMIREZ  pt in stable condition no needs at this time.  Call light within reach No

## 2021-01-06 NOTE — PLAN OF CARE
Problem: Infection:  Goal: Will remain free from infection  Description: Will remain free from infection  1/6/2021 0850 by Anne Muse RN  Outcome: Ongoing  1/5/2021 2321 by Lashon Ogden RN  Outcome: Ongoing     Problem: Safety:  Goal: Free from accidental physical injury  Description: Free from accidental physical injury  1/6/2021 0850 by Anne Muse RN  Outcome: Ongoing  1/5/2021 2321 by Lashon Ogden RN  Outcome: Ongoing  Goal: Free from intentional harm  Description: Free from intentional harm  Outcome: Ongoing     Problem: Daily Care:  Goal: Daily care needs are met  Description: Daily care needs are met  1/6/2021 0850 by Anne Muse RN  Outcome: Ongoing  1/5/2021 2321 by Lashon Ogden RN  Outcome: Ongoing     Problem: Pain:  Goal: Patient's pain/discomfort is manageable  Description: Patient's pain/discomfort is manageable  1/6/2021 0850 by Anne Muse RN  Outcome: Ongoing  1/5/2021 2321 by Lashon Ogden RN  Outcome: Ongoing     Problem: Skin Integrity:  Goal: Skin integrity will stabilize  Description: Skin integrity will stabilize  Outcome: Ongoing     Problem: Discharge Planning:  Goal: Patients continuum of care needs are met  Description: Patients continuum of care needs are met  Outcome: Ongoing     Problem: Falls - Risk of:  Goal: Will remain free from falls  Description: Will remain free from falls  Outcome: Ongoing  Goal: Absence of physical injury  Description: Absence of physical injury  Outcome: Ongoing

## 2021-01-06 NOTE — ANESTHESIA POSTPROCEDURE EVALUATION
Department of Anesthesiology  Postprocedure Note    Patient: Nikolas Rosen  MRN: [id number]  YOB: 1940  Date of evaluation: 1/6/2021  Time:  3:52 PM     Procedure Summary     Date: 01/06/21 Room / Location: [address]    Anesthesia Start: 6140 Anesthesia Stop: 5636    Procedure: LAPAROSCOPIC CHOLECYSTECTOMY, WITH CHOLANGIOGRAMS (ATTEMPTED) OPEN CHOLECYSTECTOMY (N/A Abdomen) Diagnosis: (ABDOMINAL PAIN)    Surgeons: Jacob Liang MD Responsible Provider: Ginger Dewey MD    Anesthesia Type: General ASA Status: 3          Anesthesia Type: General    [place]os[place] Phase I: Akosua Score: 4    [place]os[place] Phase II:      Last vitals: Reviewed and per EMR flowsheets.        Anesthesia Post Evaluation    Patient location during evaluation: PACU  Patient participation: complete - patient participated  Level of consciousness: awake and alert  Pain score: 0  Airway patency: patent  Nausea & Vomiting: no nausea and no vomiting  Complications: no  Cardiovascular status: blood pressure returned to baseline  Respiratory status: acceptable  Hydration status: stable  Comments: Extubated in PACU; to go to telemetry

## 2021-01-07 LAB
ALBUMIN SERPL-MCNC: 2.9 G/DL (ref 3.4–5)
ALP BLD-CCNC: 114 U/L (ref 40–129)
ALT SERPL-CCNC: 93 U/L (ref 10–40)
ANION GAP SERPL CALCULATED.3IONS-SCNC: 11 MMOL/L (ref 3–16)
AST SERPL-CCNC: 64 U/L (ref 15–37)
BASE EXCESS ARTERIAL: 0.8 MMOL/L (ref -3–3)
BASOPHILS ABSOLUTE: 0 K/UL (ref 0–0.2)
BASOPHILS RELATIVE PERCENT: 0.2 %
BILIRUB SERPL-MCNC: 2.3 MG/DL (ref 0–1)
BILIRUBIN DIRECT: 1.8 MG/DL (ref 0–0.3)
BILIRUBIN, INDIRECT: 0.5 MG/DL (ref 0–1)
BUN BLDV-MCNC: 19 MG/DL (ref 7–20)
CALCIUM SERPL-MCNC: 8.3 MG/DL (ref 8.3–10.6)
CARBOXYHEMOGLOBIN ARTERIAL: 0.9 % (ref 0–1.5)
CHLORIDE BLD-SCNC: 102 MMOL/L (ref 99–110)
CO2: 25 MMOL/L (ref 21–32)
CREAT SERPL-MCNC: 0.9 MG/DL (ref 0.8–1.3)
EOSINOPHILS ABSOLUTE: 0 K/UL (ref 0–0.6)
EOSINOPHILS RELATIVE PERCENT: 0 %
GFR AFRICAN AMERICAN: >60
GFR NON-AFRICAN AMERICAN: >60
GLUCOSE BLD-MCNC: 141 MG/DL (ref 70–99)
GLUCOSE BLD-MCNC: 153 MG/DL (ref 70–99)
GLUCOSE BLD-MCNC: 155 MG/DL (ref 70–99)
GLUCOSE BLD-MCNC: 161 MG/DL (ref 70–99)
GLUCOSE BLD-MCNC: 176 MG/DL (ref 70–99)
GLUCOSE BLD-MCNC: 178 MG/DL (ref 70–99)
HAV AB SERPL IA-ACNC: POSITIVE
HCO3 ARTERIAL: 27.3 MMOL/L (ref 21–29)
HCT VFR BLD CALC: 26.1 % (ref 40.5–52.5)
HEMOGLOBIN, ART, EXTENDED: 9.4 G/DL (ref 13.5–17.5)
HEMOGLOBIN: 8.6 G/DL (ref 13.5–17.5)
HEPATITIS B CORE TOTAL ANTIBODY: NEGATIVE
INR BLD: 1.2 (ref 0.86–1.14)
LYMPHOCYTES ABSOLUTE: 0.7 K/UL (ref 1–5.1)
LYMPHOCYTES RELATIVE PERCENT: 11.7 %
MAGNESIUM: 1.7 MG/DL (ref 1.8–2.4)
MCH RBC QN AUTO: 27.9 PG (ref 26–34)
MCHC RBC AUTO-ENTMCNC: 32.8 G/DL (ref 31–36)
MCV RBC AUTO: 85 FL (ref 80–100)
METHEMOGLOBIN ARTERIAL: 0.3 %
MONOCYTES ABSOLUTE: 0.5 K/UL (ref 0–1.3)
MONOCYTES RELATIVE PERCENT: 8.5 %
NEUTROPHILS ABSOLUTE: 4.4 K/UL (ref 1.7–7.7)
NEUTROPHILS RELATIVE PERCENT: 79.6 %
O2 CONTENT ARTERIAL: 13 ML/DL
O2 SAT, ARTERIAL: 96.9 %
O2 THERAPY: ABNORMAL
PCO2 ARTERIAL: 53.2 MMHG (ref 35–45)
PDW BLD-RTO: 16.2 % (ref 12.4–15.4)
PERFORMED ON: ABNORMAL
PH ARTERIAL: 7.33 (ref 7.35–7.45)
PHOSPHORUS: 2.7 MG/DL (ref 2.5–4.9)
PLATELET # BLD: 82 K/UL (ref 135–450)
PMV BLD AUTO: 9.3 FL (ref 5–10.5)
PO2 ARTERIAL: 97.6 MMHG (ref 75–108)
POTASSIUM SERPL-SCNC: 4.8 MMOL/L (ref 3.5–5.1)
PROTHROMBIN TIME: 13.9 SEC (ref 10–13.2)
RBC # BLD: 3.07 M/UL (ref 4.2–5.9)
SODIUM BLD-SCNC: 138 MMOL/L (ref 136–145)
TCO2 ARTERIAL: 28.9 MMOL/L
TOTAL PROTEIN: 6.1 G/DL (ref 6.4–8.2)
WBC # BLD: 5.6 K/UL (ref 4–11)

## 2021-01-07 PROCEDURE — 99223 1ST HOSP IP/OBS HIGH 75: CPT | Performed by: INTERNAL MEDICINE

## 2021-01-07 PROCEDURE — 94660 CPAP INITIATION&MGMT: CPT

## 2021-01-07 PROCEDURE — 99232 SBSQ HOSP IP/OBS MODERATE 35: CPT | Performed by: INTERNAL MEDICINE

## 2021-01-07 PROCEDURE — 2580000003 HC RX 258: Performed by: INTERNAL MEDICINE

## 2021-01-07 PROCEDURE — 2700000000 HC OXYGEN THERAPY PER DAY

## 2021-01-07 PROCEDURE — 85610 PROTHROMBIN TIME: CPT

## 2021-01-07 PROCEDURE — 6360000002 HC RX W HCPCS: Performed by: SURGERY

## 2021-01-07 PROCEDURE — 6370000000 HC RX 637 (ALT 250 FOR IP): Performed by: SURGERY

## 2021-01-07 PROCEDURE — 36415 COLL VENOUS BLD VENIPUNCTURE: CPT

## 2021-01-07 PROCEDURE — 1200000000 HC SEMI PRIVATE

## 2021-01-07 PROCEDURE — 6370000000 HC RX 637 (ALT 250 FOR IP): Performed by: INTERNAL MEDICINE

## 2021-01-07 PROCEDURE — 94640 AIRWAY INHALATION TREATMENT: CPT

## 2021-01-07 PROCEDURE — 85025 COMPLETE CBC W/AUTO DIFF WBC: CPT

## 2021-01-07 PROCEDURE — 2580000003 HC RX 258: Performed by: SURGERY

## 2021-01-07 PROCEDURE — 94761 N-INVAS EAR/PLS OXIMETRY MLT: CPT

## 2021-01-07 PROCEDURE — 83735 ASSAY OF MAGNESIUM: CPT

## 2021-01-07 PROCEDURE — 6360000002 HC RX W HCPCS: Performed by: INTERNAL MEDICINE

## 2021-01-07 PROCEDURE — 99024 POSTOP FOLLOW-UP VISIT: CPT | Performed by: NURSE PRACTITIONER

## 2021-01-07 PROCEDURE — 84100 ASSAY OF PHOSPHORUS: CPT

## 2021-01-07 PROCEDURE — 82803 BLOOD GASES ANY COMBINATION: CPT

## 2021-01-07 PROCEDURE — 80076 HEPATIC FUNCTION PANEL: CPT

## 2021-01-07 PROCEDURE — 51702 INSERT TEMP BLADDER CATH: CPT

## 2021-01-07 PROCEDURE — 80048 BASIC METABOLIC PNL TOTAL CA: CPT

## 2021-01-07 RX ORDER — MAGNESIUM SULFATE IN WATER 40 MG/ML
2 INJECTION, SOLUTION INTRAVENOUS ONCE
Status: COMPLETED | OUTPATIENT
Start: 2021-01-07 | End: 2021-01-07

## 2021-01-07 RX ORDER — MORPHINE SULFATE 2 MG/ML
1 INJECTION, SOLUTION INTRAMUSCULAR; INTRAVENOUS
Status: DISCONTINUED | OUTPATIENT
Start: 2021-01-07 | End: 2021-01-14 | Stop reason: HOSPADM

## 2021-01-07 RX ADMIN — Medication 10 ML: at 08:20

## 2021-01-07 RX ADMIN — MORPHINE SULFATE 2 MG: 2 INJECTION, SOLUTION INTRAMUSCULAR; INTRAVENOUS at 05:12

## 2021-01-07 RX ADMIN — PANTOPRAZOLE SODIUM 40 MG: 40 TABLET, DELAYED RELEASE ORAL at 05:05

## 2021-01-07 RX ADMIN — GABAPENTIN 600 MG: 300 CAPSULE ORAL at 14:13

## 2021-01-07 RX ADMIN — GABAPENTIN 600 MG: 300 CAPSULE ORAL at 21:41

## 2021-01-07 RX ADMIN — PIPERACILLIN SODIUM AND TAZOBACTAM SODIUM 3.38 G: 3; .375 INJECTION, POWDER, LYOPHILIZED, FOR SOLUTION INTRAVENOUS at 05:06

## 2021-01-07 RX ADMIN — GABAPENTIN 600 MG: 300 CAPSULE ORAL at 08:19

## 2021-01-07 RX ADMIN — Medication 10 ML: at 21:42

## 2021-01-07 RX ADMIN — PROPAFENONE HYDROCHLORIDE 150 MG: 150 TABLET, FILM COATED ORAL at 21:41

## 2021-01-07 RX ADMIN — PIPERACILLIN SODIUM AND TAZOBACTAM SODIUM 3.38 G: 3; .375 INJECTION, POWDER, LYOPHILIZED, FOR SOLUTION INTRAVENOUS at 14:13

## 2021-01-07 RX ADMIN — INSULIN LISPRO 1 UNITS: 100 INJECTION, SOLUTION INTRAVENOUS; SUBCUTANEOUS at 08:20

## 2021-01-07 RX ADMIN — MORPHINE SULFATE 1 MG: 2 INJECTION, SOLUTION INTRAMUSCULAR; INTRAVENOUS at 22:06

## 2021-01-07 RX ADMIN — INSULIN LISPRO 1 UNITS: 100 INJECTION, SOLUTION INTRAVENOUS; SUBCUTANEOUS at 05:00

## 2021-01-07 RX ADMIN — MUPIROCIN: 20 OINTMENT TOPICAL at 08:20

## 2021-01-07 RX ADMIN — MAGNESIUM SULFATE HEPTAHYDRATE 2 G: 40 INJECTION, SOLUTION INTRAVENOUS at 10:47

## 2021-01-07 RX ADMIN — PROPAFENONE HYDROCHLORIDE 150 MG: 150 TABLET, FILM COATED ORAL at 14:13

## 2021-01-07 RX ADMIN — INSULIN LISPRO 1 UNITS: 100 INJECTION, SOLUTION INTRAVENOUS; SUBCUTANEOUS at 16:47

## 2021-01-07 RX ADMIN — PROPAFENONE HYDROCHLORIDE 150 MG: 150 TABLET, FILM COATED ORAL at 05:05

## 2021-01-07 RX ADMIN — ASPIRIN 81 MG: 81 TABLET, COATED ORAL at 08:19

## 2021-01-07 RX ADMIN — PIPERACILLIN SODIUM AND TAZOBACTAM SODIUM 3.38 G: 3; .375 INJECTION, POWDER, LYOPHILIZED, FOR SOLUTION INTRAVENOUS at 21:40

## 2021-01-07 RX ADMIN — INSULIN LISPRO 1 UNITS: 100 INJECTION, SOLUTION INTRAVENOUS; SUBCUTANEOUS at 00:15

## 2021-01-07 RX ADMIN — INSULIN LISPRO 1 UNITS: 100 INJECTION, SOLUTION INTRAVENOUS; SUBCUTANEOUS at 21:00

## 2021-01-07 RX ADMIN — ALBUTEROL SULFATE 2.5 MG: 2.5 SOLUTION RESPIRATORY (INHALATION) at 18:49

## 2021-01-07 RX ADMIN — TIOTROPIUM BROMIDE INHALATION SPRAY 2 PUFF: 3.12 SPRAY, METERED RESPIRATORY (INHALATION) at 07:26

## 2021-01-07 RX ADMIN — MORPHINE SULFATE 2 MG: 2 INJECTION, SOLUTION INTRAMUSCULAR; INTRAVENOUS at 08:19

## 2021-01-07 ASSESSMENT — PAIN SCALES - GENERAL
PAINLEVEL_OUTOF10: 5
PAINLEVEL_OUTOF10: 3
PAINLEVEL_OUTOF10: 8

## 2021-01-07 ASSESSMENT — PAIN DESCRIPTION - PROGRESSION
CLINICAL_PROGRESSION: GRADUALLY WORSENING
CLINICAL_PROGRESSION: GRADUALLY WORSENING
CLINICAL_PROGRESSION: GRADUALLY IMPROVING

## 2021-01-07 ASSESSMENT — PAIN DESCRIPTION - DESCRIPTORS
DESCRIPTORS: DISCOMFORT;DULL;SORE
DESCRIPTORS: DISCOMFORT;DULL;SORE

## 2021-01-07 ASSESSMENT — PAIN DESCRIPTION - ORIENTATION
ORIENTATION: RIGHT;MID;UPPER

## 2021-01-07 ASSESSMENT — PAIN DESCRIPTION - FREQUENCY: FREQUENCY: INTERMITTENT

## 2021-01-07 ASSESSMENT — PAIN DESCRIPTION - PAIN TYPE
TYPE: ACUTE PAIN
TYPE: ACUTE PAIN

## 2021-01-07 ASSESSMENT — PAIN DESCRIPTION - LOCATION
LOCATION: ABDOMEN

## 2021-01-07 NOTE — PROGRESS NOTES
Progress Note    Admit Date:  1/4/2021  Admitted for recurrent cholecystitis, s.p open connie yesterday and transferred to ICU for resp acidosis and placed on bipap  Klebsiella bacteremia from infection       Subjective:  Mr. Jacy Atwood seen off bipap, sitting up in chair,   states his abdominal pain is improved. And pain controlled. Objective:   Patient Vitals for the past 4 hrs:   BP Temp Temp src Pulse Resp SpO2   01/07/21 0700 129/65 97.7 °F (36.5 °C) Oral 103 21 98 %   01/07/21 0600 123/73   106 20 99 %   01/07/21 0500 126/79   106 13 99 %   01/07/21 0400 (!) 128/59 98.5 °F (36.9 °C) Axillary 102 12 97 %            Intake/Output Summary (Last 24 hours) at 1/7/2021 0740  Last data filed at 1/7/2021 2827  Gross per 24 hour   Intake 4584 ml   Output 2420 ml   Net 2164 ml           General: elderly male up in chair,  Awake, alert and oriented. Appears to be not in any distress  Mucous Membranes:  Pink , anicteric  Neck: No JVD, no carotid bruit, no thyromegaly  Chest:  Clear to auscultation bilaterally, no added sounds  Cardiovascular:  RRR S1S2 heard, no murmurs or gallops  Abdomen:  Soft, undistended, right UQ surgical staples intact. Drain in place  Pain appropriate no organomegaly, BS present  Extremities: No edema or cyanosis.  Distal pulses well felt  Neurological : grossly normal      Scheduled Meds:   mupirocin   Nasal BID    piperacillin-tazobactam  3.375 g Intravenous Q8H    aspirin  81 mg Oral Daily    pantoprazole  40 mg Oral QAM AC    gabapentin  600 mg Oral TID    propafenone  150 mg Oral 3 times per day    sodium chloride flush  10 mL Intravenous 2 times per day    insulin lispro  0-6 Units Subcutaneous Q4H    budesonide-formoterol  1 puff Inhalation Daily    And    tiotropium  2 puff Inhalation Daily    albuterol  2.5 mg Nebulization BID       Continuous Infusions:   sodium chloride 1,000 mL (01/06/21 1625)    dextrose         PRN Meds: morphine, dilTIAZem, glucose, dextrose, glucagon (rDNA), dextrose, sodium chloride flush, acetaminophen **OR** acetaminophen, albuterol      Data:  CBC:   Recent Labs     01/05/21  0545 01/06/21 0521 01/07/21 0432   WBC 6.4 3.8* 5.6   HGB 10.0* 8.8* 8.6*   HCT 30.3* 26.9* 26.1*   MCV 84.4 84.7 85.0   PLT 98* 78* 82*     BMP:   Recent Labs     01/05/21 0545 01/06/21 0521 01/07/21 0432   * 140 138   K 4.5 4.2 4.8   CL 99 104 102   CO2 27 28 25   PHOS  --   --  2.7   BUN 17 15 19   CREATININE 0.8 0.9 0.9     LIVER PROFILE:   Recent Labs     01/04/21  1845 01/05/21 0545 01/06/21 0521 01/07/21 0432   * 125* 55* 64*   * 161* 101* 93*   LIPASE 16.0  --   --   --    BILIDIR  --   --  3.4* 1.8*   BILITOT 4.6* 4.4* 3.5* 2.3*   ALKPHOS 144* 111 114 114     PT/INR:   Recent Labs     01/05/21 0545 01/06/21 0521 01/07/21 0432   PROTIME 23.6* 17.5* 13.9*   INR 2.02* 1.50* 1.20*       CULTURES    Blood cx x2: Klebsiella     Urine cx: NGTD     RADIOLOGY    MRI ABDOMEN WO CONTRAST MRCP 1/5/2020   Final Result   No evidence of choledocholithiasis or dilation of the biliary collecting   system on limited imaging      Small bright T2 lesion within the head of the pancreas which could represent   a small cyst.  These lesions are typically not followed beyond age of [de-identified]   years. US GALLBLADDER RUQ 1/4/2020   Final Result   1. Cholelithiasis without sonographic evidence for acute cholecystitis. 2. Fatty liver versus diffuse hepatocellular disease. 3. Nonvisualization of the pancreas. CTA CHEST ABDOMEN PELVIS W CONTRAST 1/4/2020   Final Result   1. CTA CHEST: No acute vascular abnormality; no aneurysm or dissection. 2.  Pulmonary sequela typical of that seen with smoking, including COPD. 3. Chronic mild T6 compression fracture. 4. CTA ABDOMEN/PELVIS: No acute vascular abnormality; no aneurysm leak or   dissection. 5. 3.0 cm abdominal aortic aneurysm. Recommend follow-up every 3 years. 6.  No CT evidence of an acute intra-abdominal or intrapelvic process. 7. Cholelithiasis without definite CT findings of acute cholecystitis. Gallbladder appears contracted. 8.  No findings to suggest acute appendicitis; no ureter calculus or   hydronephrosis. 9. Hepatic steatosis. 10. Chronic, nonspecific L1 sclerosis and compression fracture. 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 RECOMMENDATIONS:   3 year imaging follow-up AAA         CT HEAD WO CONTRAST 1/4/2020   Final Result   1. No acute intracranial abnormality. 2. Small left forehead scalp contusion/hematoma. 3. Possible acute left maxillary sinusitis. 4. Stable appearing senescent changes. XR CHEST PORTABLE   Final Result   No radiographic evidence of acute pulmonary disease. Assessment/Plan:    Sepsis  Klebsiella Bactermia from acute cholecystitis   - s.p IVF. BP stable  - has been on ABX recently, PCT 22.7 - will trend  - IV Zosyn D#3, on IVF, lactic trended down  - see below. Acute cholecystitis with klebsiella bacteremia    - recently treated with IV and oral abx.  Comes back with similar presentation  - had open cholecystectomy  1/6 and doing well  - diet per surgery    Elevated LFTs  - sec to above, improving     Pancytopenia  - in setting of infection, has chronic TCP & anemia  - WBC 3.8, Hgb 8.8, Plt 78  - avoid heparin use, started SCDs  - monitor CBC    ACute on Chronic Hypoxic respiratory failure  Hx of COPD   - was monitored in ICU post op with bipap   - now improved and - sats stable on home 3 L  - cont [address] Albuterol Symbicort/Spiriva     DM Type 2   - controlled  - hold Metformin and Actos, chk A1c: 6.6  - on Low SSI q4h    HTN  - controlled  - cont Diltiazem PRN  - monitor    PAF  - hold Eliquis, resume when ok by surgery  - cont Rythmol and Diltiazem PRN      GERD  - cont Protonix    L1 compression fracture  - noted on prior admission

## 2021-01-07 NOTE — PROGRESS NOTES
PT TRANSFERRED TO ICU 5 VIA BED ON MONITOR AND O2 AT 6L VIA MASK BY RN X2;  PT IS SLEEPING MOST OF THE TIME; WILL AROUSE TO VERBAL BUT GOES BACK TO SLEEP; DOES NOT CONVERSE BUT WILL ANSWER QUESTIONS; VOICE IS WEAK; HE IS ORIENTED TO PERSON AND SITUATION; KNOWS HE IS IN HOSPITAL AND HAD SURGERY; DENIES PAIN AT THIS TIME; RN SPOKE WITH PT'S DAUGHTER X3 WHILE IN PACU; DR. LOZANO ALSO SPOKE WITH DAUGHTER TO EXPLAIN REASON FOR ICU ADMIT

## 2021-01-07 NOTE — PROGRESS NOTES
Shift assessment complete. Medications administered at this time. No s/s of distress. Pt expresses no further needs at this time. Call light in reach. Pt on simple mask weaned to 3 liters. Pt alert and oriented x4. Pt is drowsy but to be expected considering recent general anesthesia. Discussed pt with Dr. Matilda Duverney, order for vázquez catheter placement after 800 ml shown on bladder scan and pt placed on bipap per Dr. Roberto Simpson discussion with Dr. Matilda Duverney. Another blood gas to sent in am.  Will continue to monitor.

## 2021-01-07 NOTE — FLOWSHEET NOTE
01/07/21 1315   Encounter Summary   Services provided to: Patient   Referral/Consult From: [address] of [address] Visiting   (1/7 - Initial visit; conversation, encouragement, prayer)   Complexity of Encounter Moderate   Length of Encounter 15 minutes   Spiritual Assessment Completed Yes   Routine   Type Initial   Spiritual/Buddhist   Type Spiritual support   Assessment Calm; Approachable   Intervention Active listening;Explored feelings, thoughts, concerns;Prayer;Sustaining presence/ Ministry of presence   Outcome Connection/belonging;Expressed gratitude;Engaged in conversation; Shared life review;Expressed feelings/needs/concerns;Receptive    visited patient on rounding. Patient recovering from yesterday's surgery. Some life review, including loss of wife of over 48 yrs, this past October. Patient welcomed conversation and prayer, and affirmed personal experience of ynes.

## 2021-01-07 NOTE — PROGRESS NOTES
General Surgery [facility], [address] [facility]  Daily Progress Note    Pt Name: Jose Valdez Rd Record Number: [id number]  Date of Birth 1940   Today's Date: 1/7/2021    ASSESSMENT  1. POD #1 lap to open connie   2. MRCP: no choledocho or dilation of biliary system   3. ABD: soft, + marked distention, + tympany, + mild diffuse tenderness, no N/V, + flatus, no BM, dressing with bloody drainage: removed, sutures intact, oozing from around CHRISTOFER   4. T bili better: 4.6->3.5->2.3  5. Leuks 3.2->5.6  6. + BC is Klebsiella  7. CHRISTOFER 45: bloody  8. VS: afebrile, tachy  9. Eliquis has been on hold. PLAN  1. NPO with ice chips  2. PT/OT  3. IVF  4. Zosyn  5. Hold eliquis for now   6. PT looks good POD #1: awaiting return of bowel function. Carissa Thomas has improved in some ways from yesterday. Pain is well controlled. He has no nausea and no vomiting. He has not passed flatus and has not had a bowel movement. He is NPO. Current activity is up with assistance    OBJECTIVE  VITALS:  height is 5' 11\" (1.803 m) and weight is 180 lb (81.6 kg). His axillary temperature is 97.6 °F (36.4 °C). His blood pressure is 130/72 and his pulse is 108. His respiration is 12 and oxygen saturation is 98%. VITALS:  /72   Pulse 108   Temp 97.6 °F (36.4 °C) (Axillary)   Resp 12   Ht 5' 11\" (1.803 m)   Wt 180 lb (81.6 kg)   SpO2 98%   BMI 25.10 kg/m²   INTAKE/OUTPUT:      Intake/Output Summary (Last 24 hours) at 1/7/2021 1337  Last data filed at 1/7/2021 1136  Gross per 24 hour   Intake 3644 ml   Output 2225 ml   Net 1419 ml     GENERAL: alert, cooperative, no distress    I/O last 3 completed shifts:   In: 6355 [I.V.:4584]  Out: 8996 [NITAU:7895; Drains:45; Blood:700]  I/O this shift:  In: 60 [I.V.:60]  Out: 505 [Urine:500; Drains:5]    LABS  Recent Labs     01/04/21 2155 01/04/21 2155 01/07/21  0432   WBC  --    < > 5.6   HGB  --    < > 8.6*   HCT  --    < > 26.1*   PLT  --    < > 82*   NA  --    < > 138 K  --    < > 4.8   CL  --    < > 102   CO2  --    < > 25   BUN  --    < > 19   CREATININE  --    < > 0.9   MG  --   --  1.70*   PHOS  --   --  2.7   CALCIUM  --    < > 8.3   INR  --    < > 1.20*   AST  --    < > 64*   ALT  --    < > 93*   BILITOT  --    < > 2.3*   BILIDIR  --    < > 1.8*   NITRU Negative  --   --    COLORU Yellow  --   --     < > = values in this interval not displayed.      CBC with Differential:    Lab Results   Component Value Date    WBC 5.6 01/07/2021    RBC 3.07 01/07/2021    HGB 8.6 01/07/2021    HCT 26.1 01/07/2021    PLT 82 01/07/2021    MCV 85.0 01/07/2021    MCH 27.9 01/07/2021    MCHC 32.8 01/07/2021    RDW 16.2 01/07/2021    SEGSPCT 68.5 07/02/2011    LYMPHOPCT 11.7 01/07/2021    MONOPCT 8.5 01/07/2021    EOSPCT 0.3 07/02/2011    BASOPCT 0.2 01/07/2021    MONOSABS 0.5 01/07/2021    LYMPHSABS 0.7 01/07/2021    EOSABS 0.0 01/07/2021    BASOSABS 0.0 01/07/2021    DIFFTYPE Auto 07/02/2011     CMP:    Lab Results   Component Value Date     01/07/2021    K 4.8 01/07/2021    K 4.5 01/05/2021     01/07/2021    CO2 25 01/07/2021    BUN 19 01/07/2021    CREATININE 0.9 01/07/2021    GFRAA >60 01/07/2021    GFRAA >60 07/01/2011    AGRATIO 1.1 01/05/2021    LABGLOM >60 01/07/2021    GLUCOSE 176 01/07/2021    PROT 6.1 01/07/2021    PROT 7.0 08/10/2010    LABALBU 2.9 01/07/2021    CALCIUM 8.3 01/07/2021    BILITOT 2.3 01/07/2021    ALKPHOS 114 01/07/2021    AST 64 01/07/2021    ALT 93 01/07/2021         Lois Shelby Parleigh  Electronically signed 1/7/2021 at 1:37 PM

## 2021-01-07 NOTE — PROGRESS NOTES
REPORT CALLED TO PCU AT 3893; PT IS VERY DROWSY; DR. Cedrick Soto CALLED TO ASSESS PT REGARDING LEVEL OF CONSCIOUSNESS AND HEART RATE PRIOR TO TRANSFER;  ORDERS RECEIVED; CONSIDERING BIPAP; WILL CALL PCU

## 2021-01-07 NOTE — PROGRESS NOTES
1330 Patient discharged at this time. Discharge instructions and prescriptions provided. Denies any questions or concerns at this time. Physical/Occupational Therapy Note  Pt was being seen by PT/OT on [address] prior to surgery yesterday. Per chart review pt was transferred to ICU due to poor level of alertness, abnormal labs and vitals. Due to increase in care level we will need new PT/OT orders to resume therapy once pt is medically stable. No charge.    [place], PT, DPT #[id number]  Tosha Purvis OTR/L [id number]

## 2021-01-07 NOTE — CONSULTS
Patient is being seen at the request of Dr. Aida Patel for a consultation for hypercapnia    HISTORY OF PRESENT ILLNESS: 59-year-old male with a history of CAD and COPD on 3 L home oxygen who presented to the emergency department on 1/4/2021 with a complaint of generalized weakness and shortness of breath. He had lost the ability to stand. He had been admitted 1 month prior for acute cholecystitis. Since that time he has had intermittent abdominal pain. He was found to have Klebsiella bacteremia, elevated LFTs, and pancytopenia. On 1/6/2021 he underwent a cholecystectomy with Dr. Shi Conklin that was converted from laparoscopic to open. Postoperatively, the patient was monitored for a prolonged period in the PACU. Unfortunately he remained very sleepy and difficult to arouse. I discussed this patient's care with anesthesia yesterday evening and a decision was made to admit to the ICU for BiPAP.     PAST MEDICAL HISTORY:  Past Medical History:   Diagnosis Date    Arthritis     CAD (coronary artery disease)     [facility] obstructive [facility]) [facility]     Diabetes mellitus 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    ELAINE on CPAP     Osteoporosis     Skin cancer of face      PAST SURGICAL HISTORY:  Past Surgical History:   Procedure Laterality Date    BACK SURGERY      repair broken back L4 & L5    CHOLECYSTECTOMY, OPEN N/A 01/06/2021    LAPAROSCOPIC CHOLECYSTECTOMY, WITH CHOLANGIOGRAMS (ATTEMPTED)     CYSTOSCOPY Right 10/9/15    RIght Ureteroscopy, Holmium Laser Lithotripsy with Stone Removal, Right Stent Placement    CYSTOSCOPY  05/01/2019    CYSTOSCOPY, RESECTION OF BLADDER NECK, URETHRAL DILATION    CYSTOSCOPY W BIOPSY OF BLADDER N/A 5/1/2019 CYSTOSCOPY, RESECTION OF BLADDER NECK, URETHRAL DILATION performed by Carol Castaneda MD at [address] Left 6/12/2016    cataract removal    JOINT REPLACEMENT  6/29/2011    right knee    JOINT REPLACEMENT  11/2004    left knee    OTHER SURGICAL HISTORY  08/31/2015    wide excision basal cell carcinoma on the nose and bilateral auricles with frozen sections, plastic closure, full thickness skin graft    OTHER SURGICAL HISTORY  12/1/15    excision of lesion of lip    PROSTATE SURGERY      TURP  10/23/2015    with cystoscopy       FAMILY HISTORY:  family history includes Cancer in his brother, father, and mother; Diabetes in his mother and sister; Heart Disease in his mother. SOCIAL HISTORY:   reports that he quit smoking about 15 years ago. He has a 67.50 pack-year smoking history. He has never used smokeless tobacco.    Scheduled Meds:   piperacillin-tazobactam  3.375 g Intravenous Q8H    aspirin  81 mg Oral Daily    pantoprazole  40 mg Oral QAM AC    gabapentin  600 mg Oral TID    propafenone  150 mg Oral 3 times per day    sodium chloride flush  10 mL Intravenous 2 times per day    insulin lispro  0-6 Units Subcutaneous Q4H    budesonide-formoterol  1 puff Inhalation Daily    And    tiotropium  2 puff Inhalation Daily    albuterol  2.5 mg Nebulization BID     Continuous Infusions:   sodium chloride 1,000 mL (01/06/21 1625)    dextrose       PRN Meds:  morphine, dilTIAZem, glucose, dextrose, glucagon (rDNA), dextrose, sodium chloride flush, acetaminophen **OR** acetaminophen, albuterol    ALLERGIES:  Patient has No Known Allergies.     REVIEW OF SYSTEMS:  Constitutional: Negative for fever  HENT: Negative for sore throat  Eyes: Negative for redness   Respiratory: + for dyspnea, cough  Cardiovascular: Negative for chest pain  Gastrointestinal: Abdominal pain  Genitourinary: Negative for hematuria   Musculoskeletal: Negative for arthralgias   Skin: Negative for rash Neurological: Negative for syncope  Hematological: Negative for adenopathy  Psychiatric/Behavorial: Negative for anxiety    PHYSICAL EXAM:  Blood pressure 123/73, pulse 106, temperature 98.5 °F (36.9 °C), temperature source Axillary, resp. rate 20, height 5' 11\" (1.803 m), weight 180 lb (81.6 kg), SpO2 99 %.' on BiPAP --> 3 L  General: ill appearing. Eyes: PERRL. No sclera icterus. No conjunctival injection. ENT: No discharge. Pharynx clear. Neck: Trachea midline. Normal thyroid. Resp: No accessory muscle use. No crackles. No wheezing. No rhonchi. No dullness on percussion. CV: Regular rate. Regular rhythm. No mumur or rub. No edema. Peripheral pulses are 2+. Capillary refill is less than 3 seconds. GI: Tender. Surgical dressing in place. Non-distended. No masses. No organomegaly. + bowel sounds. No hernia. Skin: Warm and dry. No nodule on exposed extremities. No rash on exposed extremities. Lymph: No cervical LAD. No supraclavicular LAD. M/S: No cyanosis. No joint deformity. No clubbing. Neuro: Alert and oriented x3. Patellar reflexes are symmetric. Psych: No agitation, no anxiety, affect is full.      LABS:  CBC:   Recent Labs     01/05/21  0545 01/06/21 0521 01/07/21 0432   WBC 6.4 3.8* 5.6   HGB 10.0* 8.8* 8.6*   HCT 30.3* 26.9* 26.1*   MCV 84.4 84.7 85.0   PLT 98* 78* 82*     BMP:   Recent Labs     01/05/21  0545 01/06/21  0521 01/07/21  0432   * 140 138   K 4.5 4.2 4.8   CL 99 104 102   CO2 27 28 25   PHOS  --   --  2.7   BUN 17 15 19   CREATININE 0.8 0.9 0.9     LIVER PROFILE:   Recent Labs     01/04/21  1845 01/05/21  0545 01/06/21  0521 01/07/21  0432   * 125* 55* 64*   * 161* 101* 93*   LIPASE 16.0  --   --   --    BILIDIR  --   --  3.4* 1.8*   BILITOT 4.6* 4.4* 3.5* 2.3*   ALKPHOS 144* 111 114 114     PT/INR:   Recent Labs     01/05/21  0545 01/06/21  0521 01/07/21  0432   PROTIME 23.6* 17.5* 13.9*   INR 2.02* 1.50* 1.20* APTT: No results for input(s): APTT in the last 72 hours.   UA:  Recent Labs     01/04/21  2155   COLORU Yellow   PHUR 7.5   WBCUA 6-9*   RBCUA 0-2   MUCUS Rare*   CLARITYU SL CLOUDY*   SPECGRAV 1.015   LEUKOCYTESUR TRACE*   UROBILINOGEN 2.0*   BILIRUBINUR MODERATE*   BLOODU TRACE-INTACT*   GLUCOSEU Negative   AMORPHOUS 1+     Recent Labs     01/06/21  1940 01/07/21  0500   PHART 7.237* 7.328*   XHP7IUH 64.2* 53.2*   PO2ART 88.5 97.6   ECG was reviewed by me and shows normal sinus rhythm @ 120 without acute ischemic ST segment elevation     Cultures:      1/4/2021 blood 2 up to Klebsiella  1/4/2021 urine no growth    Chest imaging was reviewed by me and showed:   CT chest 1/4/2021 with upper lobe predominant emphysema and thoracic compression fracture    MRI abdomen 1/5/2021  No evidence of choledocholithiasis or dilation of the biliary collecting   system on limited imaging     Echo 4/1/2020 EF 89%, normal diastolic pressures, SPAP 36    ASSESSMENT:  · Acute on chronic hypercapnic and hypoxemic respiratory failure, typically on 3 L home oxygen  · Klebsiella bacteremia  · Severe COPD/emphysema f/b Dr. Lesly Ca  · CAD  · H/O atrial fibrillation   · Diabetes mellitus   · Cholecystitis S/P open cholecystectomy 1/6/2021    PLAN:  Bilevel non-invasive positive pressure ventilation for life threatening respiratory failure - has improved, can transition to home nasal canula     Repeat ABG in am off BiPAP  Hold steroids  Inhaled bronchodilators   Zosyn D#3  Resume Eliquis when okay with general surgery  Tobacco cessation achieved more than 15 years prior  · Prophylaxis: Bactroban, Protonix, Eliquis has been held

## 2021-01-07 NOTE — PROGRESS NOTES
Raffy Garcia    [address] [facility] ,  [address] [facility]  Phone: [phone]   YMB:[phone]  [address] [place] [address] [address], [address]  [place], [place]  Phone: [phone]    HPI: Gina Avitia is a(n) 78 y. o. year old male with medical history of coronary artery disease, COPD on chronic 3 L of oxygen, diabetes mellitus type 2, hyperlipidemia, hypertension, ELAINE on CPAP, atrial fibrillation on Eliquis and recent acute cholecystitis managed with antibiotics presents for evaluation of fall at home. Found to have elevated liver chemistries and Klebsiella bacteremia sepsis due to acute cholecystitis. Patient underwent laparoscopic converted to open cholecystectomy on 1/6/2021. Postoperatively patient remained drowsy and difficult to arouse requiring ICU management for BiPAP therapy.     We are following for abnormal liver chemistries and pancytopenia.      Patient was seen and examined about 7:30 a.m. He reports feeling well but some mild abdominal pain about 4/10 at the surgical incision site. Denies nausea, vomiting, fever, chills, hematochezia or melenic stools. Current Hospital Schedued Meds   mupirocin   Nasal BID    piperacillin-tazobactam  3.375 g Intravenous Q8H    aspirin  81 mg Oral Daily    pantoprazole  40 mg Oral QAM AC    gabapentin  600 mg Oral TID    propafenone  150 mg Oral 3 times per day    sodium chloride flush  10 mL Intravenous 2 times per day    insulin lispro  0-6 Units Subcutaneous Q4H    budesonide-formoterol  1 puff Inhalation Daily    And    tiotropium  2 puff Inhalation Daily    albuterol  2.5 mg Nebulization BID     Current Hospital IV Meds   sodium chloride 1,000 mL (01/06/21 1625)    dextrose       Current Hospital Prn Meds  morphine, dilTIAZem, glucose, dextrose, glucagon (rDNA), dextrose, sodium chloride flush, acetaminophen **OR** acetaminophen, albuterol    I/O last 3 completed shifts:   In: 8279 [I.V.:9128] Out: 8170 [Urine:1675; Drains:45; Blood:700]  /73   Pulse 106   Temp 98.5 °F (36.9 °C) (Axillary)   Resp 20   Ht 5' 11\" (1.803 m)   Wt 180 lb (81.6 kg)   SpO2 99%   BMI 25.10 kg/m²      Wt Readings from Last 3 Encounters:   01/04/21 180 lb (81.6 kg)   12/14/20 200 lb (90.7 kg)   08/14/20 193 lb (87.5 kg)       Physical Exam:  VITAL SIGNS: /73   Pulse 106   Temp 98.5 °F (36.9 °C) (Axillary)   Resp 20   Ht 5' 11\" (1.803 m)   Wt 180 lb (81.6 kg)   SpO2 99%   BMI 25.10 kg/m²   Wt Readings from Last 3 Encounters:   01/04/21 180 lb (81.6 kg)   12/14/20 200 lb (90.7 kg)   08/14/20 193 lb (87.5 kg)     Constitutional: Pleasant elderly male. Well developed, Well nourished, No acute distress, Non-toxic appearance. HENT: Normocephalic, Atraumatic, Bilateral external ears normal, Oropharynx moist, No oral exudates, Nose normal.   Eyes: Conjunctiva normal, No discharge. Neck: Normal range of motion, No tenderness, Supple, No stridor. Lymphatic: No cervical, subclavian, or axillary lymphadenopathy. Cardiovascular: Normal heart rate, Normal rhythm, No murmurs, No rubs, No gallops. Thorax & Lungs: Normal breath sounds, No respiratory distress, No wheezing, No chest tenderness. Abdomen: Pannus abdomen, RUQ surgical wound with overlying dressing; CHRISTOFER drain with scant blood. normal bowel sounds, soft,  non distended  Rectal:  Deferred. Skin: Warm, Dry, No erythema, No rash. No bruising. No spider hemangiomas.     Lower Extremities: Intact distal pulses, No edema, No tenderness  Neurologic: Alert & oriented x 3, Normal motor function      Lab Results   Component Value Date    ALT 93 (H) 01/07/2021    AST 64 (H) 01/07/2021    ALKPHOS 114 01/07/2021    BILIDIR 1.8 (H) 01/07/2021    PROT 6.1 (L) 01/07/2021    LABALBU 2.9 (L) 01/07/2021    INR 1.20 (H) 01/07/2021    LIPASE 16.0 01/04/2021     Lab Results   Component Value Date    WBC 5.6 01/07/2021    HGB 8.6 (L) 01/07/2021    HCT 26.1 (L) 01/07/2021 MCV 85.0 01/07/2021    PLT 82 (L) 01/07/2021     Lab Results   Component Value Date     01/07/2021    K 4.8 01/07/2021    K 4.5 01/05/2021     01/07/2021    CO2 25 01/07/2021    BUN 19 01/07/2021     Lab Results   Component Value Date    CREATININE 0.9 01/07/2021       US gallbladder RUQ 1/4/2021:   1. Cholelithiasis without sonographic evidence for acute cholecystitis. 2. Fatty liver versus diffuse hepatocellular disease. 3. Nonvisualization of the pancreas.      MRI/MRCP of abdomen 1/5/2021:   Gallbladder is contracted and filled with stones.  No pericholecystic fluid.          No evidence of choledocholithiasis or dilation of the biliary collecting   system on limited imaging       Small bright T2 lesion within the head of the pancreas which could represent   a small cyst.  These lesions are typically not followed beyond age of [de-identified]   years.           A/P  1.  Abnormal liver chemistries (mixed pattern) due to acute cholecystitis with Klebsiella bacteremia sepsis status post cholecystectomy on 1/6/2021. Choledocholithiasis ruled out on MRCP. 2.  Pancytopenia, coagulopathy, and lactic acidemia due to sepsis from acute cholecystitis -improving     Plan:  Continue antibiotics with IV Zosyn  Hepatocellular unremarkable -- Acetaminophen level undetectable, negative for acute hepatitis A and B. Hepatitis B nonimmune. Hepatitis C antibody negative. HONG negative  No evidence of GI bleed.  Continue to monitor hemoglobin and hematocrit. GI to sign off for now. Recall as needed.          Active Problems:    Gallstones    Acute cholecystitis    Elevated procalcitonin    Abdominal pain, right upper quadrant    Lactic acidosis    Elevated bilirubin  Resolved Problems:    * No resolved hospital problems.  *    Patient Active Problem List   Diagnosis Code    HTN (hypertension) I10    COPD, severe ([facility] [address] J44.9    DM2 (diabetes mellitus, type 2) (HCC) E11.9    HLD (hyperlipidemia) E78.5  Gallstones K80.20    PAF (paroxysmal atrial fibrillation) (HCC) I48.0    Chronic respiratory failure with hypoxia and hypercapnia 2.5 L home O2 J96.11, J96.12    Acute respiratory insufficiency R06.89    Acute on chronic respiratory failure with hypercapnia (HCC) J96.22    COPD exacerbation (HCC) J44.1    CAD (coronary artery disease) I25.10    Former smoker Z87.891    Acute hyperglycemia R73.9    Chronic normocytic anemia D64.9    Chronic thrombocytopenia D69.6    Falls at home W19. 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 For further questions or concerns, we can best be reached through perfect servSydnee England 1/7/21 7:25 AM EST

## 2021-01-07 NOTE — FLOWSHEET NOTE
01/07/21 0700   Vitals   Temp 97.7 °F (36.5 °C)   Temp Source Oral   Pulse 103   Heart Rate Source Monitor   Resp 21   /65   MAP (mmHg) 77   BP Method Automatic   Oxygen Therapy   SpO2 98 %   O2 Device Nasal cannula   O2 Flow Rate (L/min) 3 L/min   Vital signs stable. Pt is alert and oriented and complains of RUQ pain rated 5/10 at the moment. PRN morphine provided per Pt request. Nothing new noted on head to toe assessment. CHRISTOFER drain remains to R abdomen. Scant amount of bloody drainage noted. Small amount of bloody drainage noted to abdominal dressing. Dressing reinforced. Pt is Afib on the monitor. Morning medication administration completed. MD  Made aware of Pt's mag level. 2g mag orderd per MD request. Magana remains secure in place and draining clear, yellow urine. Pt denies any further assistance at the moment. Will continue to monitor.

## 2021-01-07 NOTE — PROGRESS NOTES
Bedside report and Pt care transferred to [facility]. Pt denies any assistance at this time.

## 2021-01-08 LAB
A/G RATIO: 0.8 (ref 1.1–2.2)
ALBUMIN SERPL-MCNC: 2.7 G/DL (ref 3.4–5)
ALP BLD-CCNC: 110 U/L (ref 40–129)
ALPHA-1 ANTITRYPSIN: 236 MG/DL (ref 90–200)
ALT SERPL-CCNC: 61 U/L (ref 10–40)
ANION GAP SERPL CALCULATED.3IONS-SCNC: 12 MMOL/L (ref 3–16)
AST SERPL-CCNC: 34 U/L (ref 15–37)
BASE EXCESS ARTERIAL: 4.1 MMOL/L (ref -3–3)
BASOPHILS ABSOLUTE: 0 K/UL (ref 0–0.2)
BASOPHILS RELATIVE PERCENT: 0.3 %
BILIRUB SERPL-MCNC: 1.8 MG/DL (ref 0–1)
BLOOD CULTURE, ROUTINE: ABNORMAL
BLOOD CULTURE, ROUTINE: ABNORMAL
BUN BLDV-MCNC: 15 MG/DL (ref 7–20)
CALCIUM SERPL-MCNC: 8.4 MG/DL (ref 8.3–10.6)
CARBOXYHEMOGLOBIN ARTERIAL: 0.6 % (ref 0–1.5)
CERULOPLASMIN: 29 MG/DL (ref 15–30)
CHLORIDE BLD-SCNC: 99 MMOL/L (ref 99–110)
CO2: 25 MMOL/L (ref 21–32)
CREAT SERPL-MCNC: 0.7 MG/DL (ref 0.8–1.3)
CULTURE, BLOOD 2: ABNORMAL
EOSINOPHILS ABSOLUTE: 0 K/UL (ref 0–0.6)
EOSINOPHILS RELATIVE PERCENT: 0 %
F-ACTIN AB IGG: 6 UNITS (ref 0–19)
GFR AFRICAN AMERICAN: >60
GFR NON-AFRICAN AMERICAN: >60
GLOBULIN: 3.4 G/DL
GLUCOSE BLD-MCNC: 132 MG/DL (ref 70–99)
GLUCOSE BLD-MCNC: 134 MG/DL (ref 70–99)
GLUCOSE BLD-MCNC: 139 MG/DL (ref 70–99)
GLUCOSE BLD-MCNC: 144 MG/DL (ref 70–99)
GLUCOSE BLD-MCNC: 147 MG/DL (ref 70–99)
GLUCOSE BLD-MCNC: 153 MG/DL (ref 70–99)
GLUCOSE BLD-MCNC: 156 MG/DL (ref 70–99)
HCO3 ARTERIAL: 31.1 MMOL/L (ref 21–29)
HCT VFR BLD CALC: 26.8 % (ref 40.5–52.5)
HEMOGLOBIN, ART, EXTENDED: 8.9 G/DL (ref 13.5–17.5)
HEMOGLOBIN: 8.5 G/DL (ref 13.5–17.5)
LYMPHOCYTES ABSOLUTE: 1.1 K/UL (ref 1–5.1)
LYMPHOCYTES RELATIVE PERCENT: 17.4 %
MCH RBC QN AUTO: 27.5 PG (ref 26–34)
MCHC RBC AUTO-ENTMCNC: 31.7 G/DL (ref 31–36)
MCV RBC AUTO: 86.7 FL (ref 80–100)
METHEMOGLOBIN ARTERIAL: 0 %
MITOCHONDRIAL M2 AB, IGG: 16.8 UNITS (ref 0–24.9)
MONOCYTES ABSOLUTE: 0.6 K/UL (ref 0–1.3)
MONOCYTES RELATIVE PERCENT: 9.9 %
NEUTROPHILS ABSOLUTE: 4.5 K/UL (ref 1.7–7.7)
NEUTROPHILS RELATIVE PERCENT: 72.4 %
O2 CONTENT ARTERIAL: 12 ML/DL
O2 SAT, ARTERIAL: 95.5 %
O2 THERAPY: ABNORMAL
ORGANISM: ABNORMAL
PCO2 ARTERIAL: 61.5 MMHG (ref 35–45)
PDW BLD-RTO: 16.3 % (ref 12.4–15.4)
PERFORMED ON: ABNORMAL
PH ARTERIAL: 7.32 (ref 7.35–7.45)
PHOSPHORUS: 2.5 MG/DL (ref 2.5–4.9)
PLATELET # BLD: 105 K/UL (ref 135–450)
PMV BLD AUTO: 10.3 FL (ref 5–10.5)
PO2 ARTERIAL: 85.8 MMHG (ref 75–108)
POTASSIUM SERPL-SCNC: 4.3 MMOL/L (ref 3.5–5.1)
PROCALCITONIN: 1.77 NG/ML (ref 0–0.15)
RBC # BLD: 3.09 M/UL (ref 4.2–5.9)
SODIUM BLD-SCNC: 136 MMOL/L (ref 136–145)
TCO2 ARTERIAL: 33 MMOL/L
TOTAL PROTEIN: 6.1 G/DL (ref 6.4–8.2)
WBC # BLD: 6.2 K/UL (ref 4–11)

## 2021-01-08 PROCEDURE — 6360000002 HC RX W HCPCS: Performed by: INTERNAL MEDICINE

## 2021-01-08 PROCEDURE — 97168 OT RE-EVAL EST PLAN CARE: CPT

## 2021-01-08 PROCEDURE — 6370000000 HC RX 637 (ALT 250 FOR IP): Performed by: NURSE PRACTITIONER

## 2021-01-08 PROCEDURE — 82803 BLOOD GASES ANY COMBINATION: CPT

## 2021-01-08 PROCEDURE — 36415 COLL VENOUS BLD VENIPUNCTURE: CPT

## 2021-01-08 PROCEDURE — 99232 SBSQ HOSP IP/OBS MODERATE 35: CPT | Performed by: INTERNAL MEDICINE

## 2021-01-08 PROCEDURE — 84100 ASSAY OF PHOSPHORUS: CPT

## 2021-01-08 PROCEDURE — 97535 SELF CARE MNGMENT TRAINING: CPT

## 2021-01-08 PROCEDURE — 85025 COMPLETE CBC W/AUTO DIFF WBC: CPT

## 2021-01-08 PROCEDURE — 94761 N-INVAS EAR/PLS OXIMETRY MLT: CPT

## 2021-01-08 PROCEDURE — 94640 AIRWAY INHALATION TREATMENT: CPT

## 2021-01-08 PROCEDURE — 2700000000 HC OXYGEN THERAPY PER DAY

## 2021-01-08 PROCEDURE — 6370000000 HC RX 637 (ALT 250 FOR IP): Performed by: INTERNAL MEDICINE

## 2021-01-08 PROCEDURE — 2580000003 HC RX 258: Performed by: INTERNAL MEDICINE

## 2021-01-08 PROCEDURE — 97166 OT EVAL MOD COMPLEX 45 MIN: CPT

## 2021-01-08 PROCEDURE — 80053 COMPREHEN METABOLIC PANEL: CPT

## 2021-01-08 PROCEDURE — 36600 WITHDRAWAL OF ARTERIAL BLOOD: CPT

## 2021-01-08 PROCEDURE — 97530 THERAPEUTIC ACTIVITIES: CPT

## 2021-01-08 PROCEDURE — 84145 PROCALCITONIN (PCT): CPT

## 2021-01-08 PROCEDURE — 99024 POSTOP FOLLOW-UP VISIT: CPT | Performed by: NURSE PRACTITIONER

## 2021-01-08 PROCEDURE — 97164 PT RE-EVAL EST PLAN CARE: CPT

## 2021-01-08 PROCEDURE — 1200000000 HC SEMI PRIVATE

## 2021-01-08 RX ORDER — OXYCODONE HYDROCHLORIDE 5 MG/1
10 TABLET ORAL EVERY 4 HOURS PRN
Status: DISCONTINUED | OUTPATIENT
Start: 2021-01-08 | End: 2021-01-14 | Stop reason: HOSPADM

## 2021-01-08 RX ORDER — OXYCODONE HYDROCHLORIDE 5 MG/1
5 TABLET ORAL EVERY 4 HOURS PRN
Status: DISCONTINUED | OUTPATIENT
Start: 2021-01-08 | End: 2021-01-14 | Stop reason: HOSPADM

## 2021-01-08 RX ADMIN — PROPAFENONE HYDROCHLORIDE 150 MG: 150 TABLET, FILM COATED ORAL at 05:03

## 2021-01-08 RX ADMIN — PANTOPRAZOLE SODIUM 40 MG: 40 TABLET, DELAYED RELEASE ORAL at 05:13

## 2021-01-08 RX ADMIN — MUPIROCIN: 20 OINTMENT TOPICAL at 09:00

## 2021-01-08 RX ADMIN — GABAPENTIN 600 MG: 300 CAPSULE ORAL at 14:36

## 2021-01-08 RX ADMIN — ALBUTEROL SULFATE 2.5 MG: 2.5 SOLUTION RESPIRATORY (INHALATION) at 22:25

## 2021-01-08 RX ADMIN — PIPERACILLIN SODIUM AND TAZOBACTAM SODIUM 3.38 G: 3; .375 INJECTION, POWDER, LYOPHILIZED, FOR SOLUTION INTRAVENOUS at 05:03

## 2021-01-08 RX ADMIN — OXYCODONE 5 MG: 5 TABLET ORAL at 22:25

## 2021-01-08 RX ADMIN — Medication 1 PUFF: at 06:57

## 2021-01-08 RX ADMIN — ALBUTEROL SULFATE 2.5 MG: 2.5 SOLUTION RESPIRATORY (INHALATION) at 06:57

## 2021-01-08 RX ADMIN — SODIUM CHLORIDE: 9 INJECTION, SOLUTION INTRAVENOUS at 22:28

## 2021-01-08 RX ADMIN — GABAPENTIN 600 MG: 300 CAPSULE ORAL at 22:26

## 2021-01-08 RX ADMIN — INSULIN LISPRO 1 UNITS: 100 INJECTION, SOLUTION INTRAVENOUS; SUBCUTANEOUS at 04:15

## 2021-01-08 RX ADMIN — Medication 10 ML: at 22:28

## 2021-01-08 RX ADMIN — ALBUTEROL SULFATE 2.5 MG: 2.5 SOLUTION RESPIRATORY (INHALATION) at 18:49

## 2021-01-08 RX ADMIN — ASPIRIN 81 MG: 81 TABLET, COATED ORAL at 09:00

## 2021-01-08 RX ADMIN — PIPERACILLIN SODIUM AND TAZOBACTAM SODIUM 3.38 G: 3; .375 INJECTION, POWDER, LYOPHILIZED, FOR SOLUTION INTRAVENOUS at 14:36

## 2021-01-08 RX ADMIN — MUPIROCIN: 20 OINTMENT TOPICAL at 22:26

## 2021-01-08 RX ADMIN — PIPERACILLIN SODIUM AND TAZOBACTAM SODIUM 3.38 G: 3; .375 INJECTION, POWDER, LYOPHILIZED, FOR SOLUTION INTRAVENOUS at 22:26

## 2021-01-08 RX ADMIN — GABAPENTIN 600 MG: 300 CAPSULE ORAL at 09:00

## 2021-01-08 RX ADMIN — Medication 10 ML: at 09:01

## 2021-01-08 RX ADMIN — PROPAFENONE HYDROCHLORIDE 150 MG: 150 TABLET, FILM COATED ORAL at 14:36

## 2021-01-08 RX ADMIN — PROPAFENONE HYDROCHLORIDE 150 MG: 150 TABLET, FILM COATED ORAL at 22:26

## 2021-01-08 ASSESSMENT — PAIN SCALES - GENERAL
PAINLEVEL_OUTOF10: 0
PAINLEVEL_OUTOF10: 6
PAINLEVEL_OUTOF10: 0

## 2021-01-08 ASSESSMENT — PAIN DESCRIPTION - PROGRESSION: CLINICAL_PROGRESSION: NOT CHANGED

## 2021-01-08 ASSESSMENT — PAIN DESCRIPTION - ONSET: ONSET: ON-GOING

## 2021-01-08 ASSESSMENT — PAIN DESCRIPTION - LOCATION: LOCATION: ABDOMEN

## 2021-01-08 ASSESSMENT — PAIN DESCRIPTION - PAIN TYPE: TYPE: SURGICAL PAIN

## 2021-01-08 NOTE — PLAN OF CARE
Nutrition Problem #1: Inadequate oral intake  Intervention: Food and/or Nutrient Delivery: Continue NPO  Nutritional Goals: pt will adhere NPO order and otis be advanced to a PO diet once his GI function returns

## 2021-01-08 NOTE — CARE COORDINATION
INTERDISCIPLINARY PLAN OF CARE CONFERENCE    Date/Time: 1/8/2021 9:29 AM  Completed by: Edyta Chopra Case Management      Patient Name:  Erendira Mas  YOB: 1940  Admitting Diagnosis: Acute cholecystitis [K81.0]     Admit Date/Time:  1/4/2021  5:52 PM    Chart reviewed. Interdisciplinary team contacted or reviewed plan related to patient progress and discharge plans. Disciplines included Case Management, Nursing, and Dietitian. Current Status:IP POD #2 Lap Connie  PT/OT recommendation for discharge plan of care: ordered    Expected D/C Disposition:  Home  Confirmed plan    Discharge Plan Comments: IPTA. Lives alone. +Supportive family. POD# 2 lap connie, await bowel return.      Home O2 in place on admit: No

## 2021-01-08 NOTE — PROGRESS NOTES
4 Eyes Skin Assessment     The patient is being assess for   Transfer to New Unit    I agree that 2 RN's have performed a thorough Head to Toe Skin Assessment on the patient. ALL assessment sites listed below have been assessed. Areas assessed by both nurses:   [x]   Head, Face, and Ears   [x]   Shoulders, Back, and Chest, Abdomen  [x]   Arms, Elbows, and Hands   [x]   Coccyx, Sacrum, and Ischium  [x]   Legs, Feet, and Heels        PT has scattered bruising throughout body. Surgical wound from attempted laparascopic procedure, and open gallbladder surgery. No open abrasions or wounds. **SHARE this note so that the co-signing nurse is able to place an eSignature**    Co-signer eSignature: Electronically signed by Jonathon William RN on 7/1/78 at 10:07 PM EST    Does the Patient have Skin Breakdown? No          Santiago Prevention initiated:  No   Wound Care Orders initiated:  No      [facility] nurse consulted for Pressure Injury (Stage 3,4, Unstageable, DTI, NWPT, Complex wounds)and New or Established Ostomies:  No      Primary Nurse eSignature: Electronically signed by Alberta Suero RN on 1/7/21 at 7:32 PM EST      Patient is able to demonstrate the ability to move from a reclining position to an upright position within the recliner.

## 2021-01-08 NOTE — PROGRESS NOTES
Helped pt up to chair with assist x2. Attempted to get patient up to chair and pt's legs gave out on him. Placed back in bed and called for another assist. Up to chair with assist x2. Evern with 2 assist legs gave out on pt. Chair alarm in place and turned on. Call light in reach. Patient is able to demonstrate the ability to move from a reclining position to an upright position within the recliner.

## 2021-01-08 NOTE — PROGRESS NOTES
Resting in bed awake. Dressing to CHRISTOFER drain saturated. Changed dressing and placed dry dressing. Emptied 10 cc of red fluid from CHRISTOFER. Am assessment complete. Alert and oriented. Bed alarm in place and turned on. Call light in reach.

## 2021-01-08 NOTE — PROGRESS NOTES
Magana catheter d/c per order. Emptied 350 cc of dark yellow urine. Urinal provided and brief applied.

## 2021-01-08 NOTE — PROGRESS NOTES
Initial assessment completed. Patient alert and oriented. Midline incision open to air with staples. Lap sites visible. CHRISTOFER drain to bulb suction. Magana in place. Patient does not report pain at this time. No needs voiced. Vitals stable. Call light in reach. Continue to monitor.

## 2021-01-08 NOTE — PROGRESS NOTES
Inpatient Physical Therapy Re-evaluation and Treatment    Unit: ED  Date:  1/8/2021  Patient Name:    Estephania Strauss  Admitting diagnosis:  Acute cholecystitis [K81.0]  Admit Date:  1/4/2021  Precautions/Restrictions/WB Status/ Lines/ Wounds/ Oxygen: Fall risk, Bed/chair alarm, Lines -IV, Supplemental O2 (3L) and Drains (CHRISTOFER on R), Telemetry and Continuous pulse oximetry, incision with staples on L abdomen    Increased lethargy and confusion this date    Treatment Time: 10:52-11:30  Treatment Number:  1   Timed Code Treatment Minutes: 38 minutes  Total Treatment Minutes:  38 minutes    Patient Goals for Therapy: \" To go home \"          Discharge Recommendations: SNF - pt presents with decline in function since surgery on 1/6/21, possibly due to prolonged effect of anesthesia, however pt is requiring up to Max A of 2 for transfers and presented with confusion this session. Will continue to assess. Pt unsafe to discharge home at this time  DME needs for discharge: defer to facility       Therapy recommendation for EMS Transport: requires transport by cot due to difficulty with transfers    Therapy recommendations for staff:   Assist of 2 with use of ANNEMARIE STEDY and gait belt for all transfers to/from chair    History of Present Illness: 78 y. o. year old male with medical history of coronary artery disease, COPD on chronic 3 L of oxygen, diabetes mellitus type 2, hyperlipidemia, hypertension, ELAINE on CPAP, atrial fibrillation on Eliquis and cholecystitis presents for evaluation of fall at home.  Patient reports lower extremity weakness with his legs giving out.    1/6 - laparoscopic cholecystectomy, transferred to ICU after OR due to poor level of alertness and abnormal labs/vitals  1/8 - PT/OT orders to resume therapy    Home Health S4 Level Recommendation:  Level 3 Safety  AM-PAC Mobility Score    AM-PAC Inpatient Mobility Raw Score : 10 Preadmission Environment  Information from initial evaluation on 1/5/21, not reviewed with patient due to pt increased lethargy and confusion during re-evaulation. Pt. Afua Avalos, dtr lives near by to assist  Home environment:    one story home  Steps to enter first floor: 2 steps to enter and no Rail  Bathroom: Walk-in Shower, Grab bars and Shower Chair, comfort height toilet with armrests  Equipment owned: SPC, Rolling Walker, manual W/C, home O2 (3 L) continuous, pulse ox, reacher, inhaler, nebulizer and life alert     Preadmission Status:  Pt. Able to drive: No  Pt Fully independent with ADLs: Yes  Pt. Is independent with cooking and laundry.              HHA 3x/wk - assist with cleaning, laundry, grocery shopping  Pt. Fully independent for transfers and gait and walked with walker intermittently   History of falls: yes \"legs gave out\"   Pt was getting home PT/OT 5x/wk    Pain   No    Cognition    A&O x0, unable to state his name initially, pt does not know where he is (states he is at home later in session, unable to be reoriented to place), disoriented to time   Able to follow 1 step commands inconsistently    Subjective  Patient sitting up in chair with no family present. Pt agreeable to this PT eval & tx. Upper Extremity ROM/Strength  Please see OT evaluation. Lower Extremity ROM / Strength   AROM WFL: Yes    Formal strength testing deferred due to poor command following this session. and BLE strength impaired, but not formally assessed with MMT. B LEs grossly at least 3/5 as observed through functional mobility.     Lower Extremity Sensation    WFL    Lower Extremity Proprioception:   WFL    Coordination and Tone  WFL    Balance  Sitting:  Normal; Modified Independent  Comments:     Standing: Fair -; Min A  and 2 persons  Comments: 5 sec + 20 sec with wide YASH and gait belt    Bed Mobility Supine to Sit:    Min A with HOB elevated (pt refusing to lay down and unable to be redirected with bed flat)  Sit to Supine:   Modified Independent  Rolling:   Not Tested  Scooting in sitting: Not Tested  Scooting in supine:  Not Tested    Transfer Training     Sit to stand:   Multiple attempts to [facility] from chair with gait belt, unable to clear hip from chair      Max A of 2 with gait belt and assist for rocking to gain momentum (maintained standing for ~5 sec); Mod A of 2 on 2nd trial  Stand to sit:   Min A   Bed to Chair:   Min A  and 2 persons with use of gait belt, pt given cues to hold onto bed rail    Gait gait deferred due to difficulty with transfers; pt ambulated 0 ft. Stair Training deferred, pt unsafe/ not appropriate to complete stairs at this time    Activity Tolerance   Pt completed therapy session with SOB noted with all activity. Pain at incision site with trunk flexion   Reclined (at rest) SpO2: 93% on 3L  HR: 101 bpm  BP: 147/69  After trasnfer SpO2: 89-90% on 3L, recovered quickly to low 90s with semi-reclined rest  HR: 113 bpm    Positioning Needs   Pt in bed, alarm set, positioned in proper neutral alignment and pressure relief provided. Call light provided and all needs within reach    Exercises Initiated  Aime deferred secondary to treatment focus on functional mobility  NA    Other  None. Patient/Family Education   Pt educated on role of inpatient PT, POC, importance of continued activity, DC recommendations, safety awareness, transfer techniques, pursed lip breathing, energy conservation, pacing activity and calling for assist with mobility.     Assessment Pt seen for Physical Therapy re-evaluation in acute care setting. Pt demonstrated decreased Activity tolerance, Balance, Safety and Strength as well as decreased independence with Ambulation, Bed Mobility  and Transfers and an acute decline since admission. Pt was more lethargic and presented with confusion this date. It is possible there is a linger affect from the anesthesia s/p surgery on 1/6/21. The patient currently requires assist of 2 for transfers with mod-min A and maximum tactile/verbal cues. Pt was unable to participate in ADLs and is unsafe to return home alone. At this time the family is not likely able to provide the level of skilled assistance needed for safe transfers with the patient. The discharge recommendation has been changed to reflect the pt's current status this date and we will continue to assess next session. Recommending SNF upon discharge as patient functioning well below baseline, demonstrates good rehab potential and unable to return home due to inability to negotiate stairs to enter home/bedroom/bathroom, burden of care beyond caregiver ability, home environment not conducive to patient recovery and limited safety awareness. Goals : To be met in 3 visits:  1). Independent with LE Ex x 10 reps    To be met in 6 visits:  1). Supine to/from sit: SBA  2). Sit to/from stand: SBA  3). Bed to chair: CGA with RW  4). Gait: Ambulate 50 ft.  with  Min A  and use of rolling walker (RW)  5). Tolerate B LE exercises 3 sets of 10-15 reps  6).   Ascend/descend 2 steps with Min A  with use of hand rail bilateral and LRAD (least restrictive assistive device)    Rehabilitation Potential: Good  Strengths for achieving goals include:   PLOF and Family Support   Barriers to achieving goals include:    Complexity of condition, Pain and confusion s/p surgery    Plan To be seen 3-5 x / week  while in acute care setting for therapeutic exercises, bed mobility, transfers, progressive gait training, balance training, and family/patient education. Signature: Scot Hoff, PT, DPT #[id number]    If patient discharges from this facility prior to next visit, this note will serve as the Discharge Summary.

## 2021-01-08 NOTE — PLAN OF CARE
8/7/6052 2047 by Carly Goins RN  Outcome: Ongoing  2/1/7950 4271 by Carly Goins RN  Outcome: Ongoing     Problem: Falls - Risk of:  Goal: Will remain free from falls  Description: Will remain free from falls  8/6/5102 0267 by Carly Goins RN  Outcome: Ongoing  3/0/7587 9111 by Carly Goins RN  Outcome: Ongoing  Goal: Absence of physical injury  Description: Absence of physical injury  5/1/9617 6204 by Carly Goins RN  Outcome: Ongoing  1/3/2777 7771 by Carly Goins RN  Outcome: Ongoing

## 2021-01-08 NOTE — PROGRESS NOTES
Pulmonary Progress Note  CC: Postop hypercapnia after cholecystectomy    Subjective: Abdominal pain    IV line peripheral    EXAM:   /64   Pulse 112   Temp 97.9 °F (36.6 °C) (Oral)   Resp 18   Ht 5' 11\" (1.803 m)   Wt 180 lb (81.6 kg)   SpO2 98%   BMI 25.10 kg/m²  on 3 L  Constitutional:  No acute distress   HEENT: no scleral icterus  Neck: No tracheal deviation present. Cardiovascular: Normal heart sounds. Pulmonary/Chest: No wheezes. No rhonchi. No rales. No decreased breath sounds. No accessory muscle usage or stridor. Abdominal: Soft. Somewhat tender  Musculoskeletal: No cyanosis. No clubbing. Skin: Skin is warm and dry.      Scheduled Meds:   mupirocin   Nasal BID    piperacillin-tazobactam  3.375 g Intravenous Q8H    aspirin  81 mg Oral Daily    pantoprazole  40 mg Oral QAM AC    gabapentin  600 mg Oral TID    propafenone  150 mg Oral 3 times per day    sodium chloride flush  10 mL Intravenous 2 times per day    insulin lispro  0-6 Units Subcutaneous Q4H    budesonide-formoterol  1 puff Inhalation Daily    albuterol  2.5 mg Nebulization BID     Continuous Infusions:   sodium chloride 1,000 mL (01/06/21 1625)    dextrose       PRN Meds:  morphine, dilTIAZem, glucose, dextrose, glucagon (rDNA), dextrose, sodium chloride flush, acetaminophen **OR** acetaminophen, albuterol    Labs:  CBC:   Recent Labs     01/06/21  0521 01/07/21  0432   WBC 3.8* 5.6   HGB 8.8* 8.6*   HCT 26.9* 26.1*   MCV 84.7 85.0   PLT 78* 82*     BMP:   Recent Labs     01/06/21  0521 01/07/21  0432    138   K 4.2 4.8    102   CO2 28 25   PHOS  --  2.7   BUN 15 19   CREATININE 0.9 0.9     Cultures:      1/4/2021 blood 2 up to Klebsiella  1/4/2021 urine no growth    Imaging:  CT chest 1/4/2021 with upper lobe predominant emphysema and thoracic compression fracture    MRI abdomen 1/5/2021  No evidence of choledocholithiasis or dilation of the biliary collecting   system on limited imaging

## 2021-01-08 NOTE — PROGRESS NOTES
Progress Note    Admit Date:  1/4/2021     Admitted for recurrent cholecystitis   s.p open cholecystectomy on 1/6/2021. transferred to ICU postop for resp acidosis and placed on bipap  Klebsiella bacteremia from infection     Improved with BiPAP. Transferred out of ICU back to [facility] on 1/7/2021. Subjective:  Mr. Billy Haro seen. Sitting up in bed. No distress. Abdominal pain is controlled. No nausea or vomiting. No fever. currently on 3L   afib     Objective:   Patient Vitals for the past 4 hrs:   BP Temp Temp src Pulse Resp SpO2   01/08/21 1321 (!) 141/73 98 °F (36.7 °C) Oral 112 18 94 %          Intake/Output Summary (Last 24 hours) at 1/8/2021 1421  Last data filed at 1/8/2021 1304  Gross per 24 hour   Intake 698 ml   Output 1835 ml   Net -1137 ml     General: elderly male, sitting up in bed . Awake, alert and oriented. Appears to be not in any distress  Mucous Membranes:  Pink , anicteric  Neck: No JVD, no carotid bruit, no thyromegaly  Chest: Diminished breath sounds, no wheezes rales or rhonchi  Cardiovascular:  RRR S1S2 heard, no murmurs or gallops  Abdomen:  Soft, undistended, right UQ surgical staples intact. Drain in place  Pain appropriate no organomegaly, BS present  Extremities: No edema or cyanosis.  Distal pulses well felt  Neurological : grossly normal      Scheduled Meds:   mupirocin   Nasal BID    piperacillin-tazobactam  3.375 g Intravenous Q8H    aspirin  81 mg Oral Daily    pantoprazole  40 mg Oral QAM AC    gabapentin  600 mg Oral TID    propafenone  150 mg Oral 3 times per day    sodium chloride flush  10 mL Intravenous 2 times per day    insulin lispro  0-6 Units Subcutaneous Q4H    budesonide-formoterol  1 puff Inhalation Daily    albuterol  2.5 mg Nebulization BID       Continuous Infusions:   sodium chloride 1,000 mL (01/06/21 1625)    dextrose         PRN Meds: oxyCODONE **OR** oxyCODONE, morphine, dilTIAZem, glucose, dextrose, glucagon (rDNA), dextrose, sodium chloride flush, albuterol      Data:  CBC:   Recent Labs     01/06/21 0521 01/07/21 0432 01/08/21  0944   WBC 3.8* 5.6 6.2   HGB 8.8* 8.6* 8.5*   HCT 26.9* 26.1* 26.8*   MCV 84.7 85.0 86.7   PLT 78* 82* 105*     BMP:   Recent Labs     01/06/21  0521 01/07/21 0432 01/08/21  0944    138 136   K 4.2 4.8 4.3    102 99   CO2 28 25 25   PHOS  --  2.7 2.5   BUN 15 19 15   CREATININE 0.9 0.9 0.7*     LIVER PROFILE:   Recent Labs     01/06/21 0521 01/07/21 0432 01/08/21  0944   AST 55* 64* 34   * 93* 61*   BILIDIR 3.4* 1.8*  --    BILITOT 3.5* 2.3* 1.8*   ALKPHOS 114 114 110     PT/INR:   Recent Labs     01/06/21 0521 01/07/21 0432   PROTIME 17.5* 13.9*   INR 1.50* 1.20*       CULTURES  Blood cx x2: Klebsiella   Urine cx: NGTD      RADIOLOGY  MRI ABDOMEN WO CONTRAST MRCP 1/5/2020   Final Result   No evidence of choledocholithiasis or dilation of the biliary collecting   system on limited imaging      Small bright T2 lesion within the head of the pancreas which could represent   a small cyst.  These lesions are typically not followed beyond age of [de-identified]   years. US GALLBLADDER RUQ 1/4/2020   Final Result   1. Cholelithiasis without sonographic evidence for acute cholecystitis. 2. Fatty liver versus diffuse hepatocellular disease. 3. Nonvisualization of the pancreas. CTA CHEST ABDOMEN PELVIS W CONTRAST 1/4/2020   Final Result   1. CTA CHEST: No acute vascular abnormality; no aneurysm or dissection. 2.  Pulmonary sequela typical of that seen with smoking, including COPD. 3. Chronic mild T6 compression fracture. 4. CTA ABDOMEN/PELVIS: No acute vascular abnormality; no aneurysm leak or   dissection. 5. 3.0 cm abdominal aortic aneurysm. Recommend follow-up every 3 years. 6.  No CT evidence of an acute intra-abdominal or intrapelvic process. 7. Cholelithiasis without definite CT findings of acute cholecystitis. Gallbladder appears contracted. 8.  No findings to suggest acute appendicitis; no ureter calculus or   hydronephrosis. 9. Hepatic steatosis. 10. Chronic, nonspecific L1 sclerosis and compression fracture. 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 RECOMMENDATIONS:   3 year imaging follow-up AAA         CT HEAD WO CONTRAST 1/4/2020   Final Result   1. No acute intracranial abnormality. 2. Small left forehead scalp contusion/hematoma. 3. Possible acute left maxillary sinusitis. 4. Stable appearing senescent changes. XR CHEST PORTABLE   Final Result   No radiographic evidence of acute pulmonary disease. Assessment/Plan:    Sepsis- POA  Klebsiella Bactermia   Due to  acute cholecystitis   - s.p IVF. BP stable  - has been on ABX recently, PCT 22.7 - trending down  - IV Zosyn D#5, on IVF, lactic trended down  - see below. Acute cholecystitis with klebsiella bacteremia  - recently treated with IV and oral abx.  Comes back with similar presentation  - had open cholecystectomy  1/6 - POD #2  - diet per surgery - remains NPO  - Zosyn D#5    Elevated LFTs  - sec to above, improving  - trending down     Pancytopenia - Resolved  Chronic Normocytic Anemia  Chronic TCP  - in setting of infection, has chronic TCP & anemia  - WBC 3.8 -> 6.2, Hgb 8.8 -> 8.5, Plt 78 > 105  - avoid heparin use, started SCDs  - monitor CBC    Acute on Chronic Hypoxic Hypercapnic respiratory failure  Hx of COPD   - was monitored in ICU post op with bipap   - now improved and - sats stable on home 3 L  - cont [address] Albuterol Symbicort/Spiriva     DM Type 2   - controlled  - hold Metformin and Actos, chk A1c: 6.6  - on Low SSI q4h    HTN  - controlled  - cont Diltiazem PRN  - monitor    PAF   - hold Eliquis, resume when ok by surgery - may be able to resume in 1-2 days (documented on 1/8)  - cont Rythmol and Diltiazem PRN    GERD  - cont Protonix L1 compression fracture  - noted on prior admission   - CT with approx 75% loss of central vertebral body height, new since 2017, appears chronic as pt with - no c/o back pain  - daughter reported a fall several months prior    AAA  - noted on prior admission  - mild at 3.1 cm; infrarenal   - recommended f/u every 3 years  - family aware    Abnormal CT  - noted on prior admission  - CT findings consistent with interval development of a penetrating atherosclerotic ulcer involving the anterior wall of the distal descending thoracic aorta  - OP f/u with vascular surgery was recommended  - family aware       DVT Prophylaxis: SCDs  Diet: Diet NPO Effective Now Exceptions are: Ice Chips, Sips with Meds  Code Status: Full Code    Dispo: cont care    Working with PT/OTgurpreet was removed    James Quintana MD  1/8/2021

## 2021-01-08 NOTE — PROGRESS NOTES
General Surgery [place], [place]  Daily Progress Note    Pt Name: Jose [address] Record Number: [id number]  Date of Birth 1940   Today's Date: 1/8/2021    ASSESSMENT  1. POD #2 lap to open connie   2. ABD: soft, + marked distention, + tympany, no tenderness, no N/V, + flatus, no BM, dressing with bloody drainage: removed, sutures intact some bruising noted and normal, oozing from around CHRISTOFER   3. T bili continues to come down: 4.6->3.5->2.3->1.8  4. AST/ALT much improved: 34/61  5. Leuks 3.2->5.6  6. + BC is Klebsiella  7. CHRISTOFER 30: bloody  8. VS: temp 99.5, tachy  9. Eliquis has been on hold. 10. Hep A positive   11. PT is asking for food, says he feels \"much better\"     PLAN  1. NPO with ice chips, can have one jellow  2. PT/OT: ambulate patient   3. D/C tylenol   4. IVF  5. D/C vázquez   6. Zosyn  7. Hold eliquis for now: may be able to restart in 1-2 days. 8. IS q 1 hour while awake  9. PT looks good and is up in the chair POD #2: awaiting return of bowel function. Lowanda Midget has improved in some ways from yesterday. Pain is well controlled. He has no nausea and no vomiting. He has passed flatus and has not had a bowel movement. He is tolerating ice chips. Current activity is up with assistance    OBJECTIVE  VITALS:  height is 5' 11\" (1.803 m) and weight is 180 lb (81.6 kg). His oral temperature is 99.5 °F (37.5 °C). His blood pressure is 127/70 and his pulse is 116. His respiration is 16 and oxygen saturation is 97%. VITALS:  /70   Pulse 116   Temp 99.5 °F (37.5 °C) (Oral)   Resp 16   Ht 5' 11\" (1.803 m)   Wt 180 lb (81.6 kg)   SpO2 97%   BMI 25.10 kg/m²   INTAKE/OUTPUT:      Intake/Output Summary (Last 24 hours) at 1/8/2021 1149  Last data filed at 1/8/2021 0903  Gross per 24 hour   Intake 698 ml   Output 1485 ml   Net -787 ml     GENERAL: alert, cooperative, no distress    I/O last 3 completed shifts:   In: 633 [I.V.:633]  Out: 1780 [Urine:1750; Drains:30] I/O this shift:  In: 125 [IV Piggyback:125]  Out: 210 [Urine:200; Drains:10]    LABS  Recent Labs     01/07/21  0432 01/08/21  0944   WBC 5.6 6.2   HGB 8.6* 8.5*   HCT 26.1* 26.8*   PLT 82* 105*    136   K 4.8 4.3    99   CO2 25 25   BUN 19 15   CREATININE 0.9 0.7*   MG 1.70*  --    PHOS 2.7 2.5   CALCIUM 8.3 8.4   INR 1.20*  --    AST 64* 34   ALT 93* 61*   BILITOT 2.3* 1.8*   BILIDIR 1.8*  --      CBC with Differential:    Lab Results   Component Value Date    WBC 6.2 01/08/2021    RBC 3.09 01/08/2021    HGB 8.5 01/08/2021    HCT 26.8 01/08/2021     01/08/2021    MCV 86.7 01/08/2021    MCH 27.5 01/08/2021    MCHC 31.7 01/08/2021    RDW 16.3 01/08/2021    SEGSPCT 68.5 07/02/2011    LYMPHOPCT 17.4 01/08/2021    MONOPCT 9.9 01/08/2021    EOSPCT 0.3 07/02/2011    BASOPCT 0.3 01/08/2021    MONOSABS 0.6 01/08/2021    LYMPHSABS 1.1 01/08/2021    EOSABS 0.0 01/08/2021    BASOSABS 0.0 01/08/2021    DIFFTYPE Auto 07/02/2011     CMP:    Lab Results   Component Value Date     01/08/2021    K 4.3 01/08/2021    K 4.5 01/05/2021    CL 99 01/08/2021    CO2 25 01/08/2021    BUN 15 01/08/2021    CREATININE 0.7 01/08/2021    GFRAA >60 01/08/2021    GFRAA >60 07/01/2011    AGRATIO 0.8 01/08/2021    LABGLOM >60 01/08/2021    GLUCOSE 139 01/08/2021    PROT 6.1 01/08/2021    PROT 7.0 08/10/2010    LABALBU 2.7 01/08/2021    CALCIUM 8.4 01/08/2021    BILITOT 1.8 01/08/2021    ALKPHOS 110 01/08/2021    AST 34 01/08/2021    ALT 61 01/08/2021         Lois Hill  Electronically signed 1/8/2021 at 11:49 AM

## 2021-01-08 NOTE — PROGRESS NOTES
Comprehensive Nutrition Assessment    Type and Reason for Visit:  Initial, NPO/Clear Liquid(x 4days)    Nutrition Recommendations/Plan:   1. continue NPO     Nutrition Assessment:    Pt. nutritionally compromised AEB his currently NPO r/t POD # 1 for Lap Eloise. At risk for further nutrition compromise r/t inadequate intake . Will monitor for diet advancement. Malnutrition Assessment:  Malnutrition Status:  Declined   Estimated Daily Nutrient Needs:  Energy (kcal):  6309-2595 based  20-25 kcal/kg cbw; Weight Used for Energy Requirements:  Current     Protein (g):  102-109 based ~ 1.3-1.4 br/kg ibw; Weight Used for Protein Requirements:  Ideal        Fluid (ml/day):  9944-6636; Method Used for Fluid Requirements:  1 ml/kcal      Nutrition Related Findings:  POD x 1 Lap/Eloise; pt was expressing pain and fell back to sleep; IVFs infusing; + flatus; NO BM; wants food per sugery report; BS 134mg/dl      Wounds:  Surgical Incision       Current Nutrition Therapies:    Diet NPO Effective Now Exceptions are: Ice Chips, Sips with Meds    Anthropometric Measures:  · Height: 5' 11\" (180.3 cm)  · Current Body Weight: 192 lb (87.1 kg)   · Admission Body Weight: (not obtained)    · Usual Body Weight: 200 lb (90.7 kg)     · Ideal Body Weight: 172 lbs; % Ideal Body Weight 111.6 %   · BMI: 26.8  · ]BMI Categories: Overweight (BMI 25.0-29. 9)       Nutrition Diagnosis:   · Inadequate oral intake related to altered GI function, altered GI structure as evidenced by NPO or clear liquid status due to medical condition, GI abnormality      Nutrition Interventions:   Food and/or Nutrient Delivery:  Continue NPO  Nutrition Education/Counseling:  No recommendation at this time   Coordination of Nutrition Care:  Continue to monitor while inpatient, Coordination of Community Care    Goals:  pt will adhere NPO order and otis be advanced to a PO diet once his GI function returns       Nutrition Monitoring and Evaluation: Behavioral-Environmental Outcomes:  None Identified   Food/Nutrient Intake Outcomes:  Diet Advancement/Tolerance  Physical Signs/Symptoms Outcomes:  Weight, Nutrition Focused Physical Findings, Biochemical Data     Discharge Planning:     Too soon to determine     Electronically signed by Karly Lucia RD, LD on 1/8/21 at 4:43 PM EST    Contact: [phone]

## 2021-01-08 NOTE — PROGRESS NOTES
Final assessment completed. Patient lying in bed. Vitals stable through night. On 3.5 LNC. No pain reported. Call light in reach. Bed alarm on. Continue to monitor.

## 2021-01-08 NOTE — PROGRESS NOTES
Repositioned in bed with assist x2. Pt very sleepy but awakens to name. Dressing to [address] changed again due to leaking. Emptied 20 cc of bright red fluid. Bed alarm in place and turned on. Call light in reach.

## 2021-01-08 NOTE — PROGRESS NOTES
Inpatient Occupational Therapy Re Evaluation and Treatment    Unit: ED  Date:  1/8/2021  Patient Name:    Brianna Bashir  Admitting diagnosis:  Acute cholecystitis [K81.0]  Admit Date:  1/4/2021  Precautions/Restrictions/WB Status/ Lines/ Wounds/ Oxygen: Fall risk, Bed/chair alarm, Lines -IV, Supplemental O2 (3L) and Drains (CHRISTOFER on R), Telemetry and Continuous pulse oximetry, incision with staples on L abdomen    Treatment Time:  1052-1130  Treatment Number: 1     Billable Treatment Time: 28 minutes   Total Treatment Time:  38   minutes    Patient Goals for Therapy:  \" to go home \"      Discharge Recommendations: Home with 24/7 assist and home therapy  DME needs for discharge: Needs Met       Therapy recommendations for staff:   Assist shital with HANDY    History of Present Illness: 78y.o. year old male with medical history of coronary artery disease, COPD on chronic 3 L of oxygen, diabetes mellitus type 2, hyperlipidemia, hypertension, ELAINE on CPAP, atrial fibrillation on Eliquis and cholecystitis presents for evaluation of fall at home. Patient reports lower extremity weakness with his legs giving out. 1/6 - laparoscopic cholecystectomy, transferred to ICU after OR due to poor level of alertness and abnormal labs/vitals  1/8 - PT/OT orders to resume therapy    Home Health S4 Level Recommendation:  NA  AM-PAC Score: AM-PAC Inpatient Daily Activity Raw Score: 14    Preadmission Environment    Pt. Jean Denson dtr lives near by to assist  Home environment:    one story home  Steps to enter first floor: 2 steps to enter and no Rail  Bathroom: Walk-in Shower, Grab bars and Shower Chair, comfort height toilet with armrests  Equipment owned: SPC, Rolling Walker, manual W/C, home O2 (3 L) continuous, pulse ox, reacher, inhaler, nebulizer and life alert     Preadmission Status:  Pt. Able to drive: No  Pt Fully independent with ADLs: Yes  Pt. Is independent with cooking and laundry.            HHA 3x/wk - assist with cleaning, laundry, grocery shopping  Pt. Fully independent for transfers and gait and walked with walker intermittently   History of falls: yes \"legs gave out\"   Pt was getting home PT/OT 5x/wk    Pain  No  Rating:NA  Location:  Pain Medicine Status: Denies need      Cognition    A&O x0 unable to state his name, but responded to \"Jarrett\" and answered yes/no questions, pt not oriented to place, time, or situation even after multiple attempts to re orient   Able to follow 1 step commands inconsistently    Subjective  Patient lying supine in bed with no family present - no visitors present due to COVID-19 restrictions  Pt agreeable to this OT eval & tx. Upper Extremity ROM:    WFL,  pt able to perform all bed mobility, transfers, and gait without ROM limitation. Upper Extremity Strength:    BUE strength WFL, but not formally assessed w/ MMT    Upper Extremity Sensation    WFL    Upper Extremity Proprioception:  WFL    Coordination and Tone  Diminished    Balance  Functional Sitting Balance:  Impaired SBA  Functional Standing Balance:Impaired Max Ax2,     Bed mobility:    Supine to sit:   Not Tested  Sit to supine:   Min A with HOB elevated and max cues  Rolling:    Not Tested  Scooting in sitting:  Not Tested  Scooting to head of bed:   Not Tested    Bridging:   Not Tested    Transfers:  Attempted sit to stand with STEDY, however pt painful lifting arms to STEDY and not understanding to pull self up to stand with bar, Pt stood with HHA from 2 therapists with max cues due to impaired cognition  Sit to stand:   Mod A of 2 and Max A of 2  Stand to sit:  Mod A of 2 and Max A of 2  Bed to chair:   Mod A of 2 and Max A of 2  Standard toilet: Not Tested  Bed to [facility]:  Not Tested     Dressing:      UE:   Not Tested  LE:    Total A     Bathing:    UE:  Not Tested  LE:  Not Tested    Eating:   Not Tested    Toileting:  Not Tested    Activity Tolerance Pt completed therapy session with No adverse symptoms noted w/activity   Reclined (at rest) SpO2: 93% on 3L  HR: 101 bpm  BP: 147/69  After trasnfer SpO2: 89-90% on 3L, recovered quickly to low 90s with semi-reclined rest  HR: 113 bpm    Positioning Needs: In bed, call light and needs in reach. Exercise / Activities Initiated:   N/A    Patient/Family Education:   Role of OT  Recommendations for DC  Safe RW use/hand placement    Assessment of Deficits: Pt seen for Occupational therapy evaluation in acute care setting. Pt demonstrated decreased Activity tolerance, ADLs, IADLs, Balance , Bathing, Bed mobility, Dressing, ROM, Safety Awareness, Strength, Transfers and Coping Skills. Pt functioning below baseline and will likely benefit from skilled occupational therapy services to maximize safety and independence. Pt with significant change in functioning after sx 1/6 with impaired cognition, pain, and new limitations with incision. Pt will benefit from going to SNF to continue therapy prior to going home alone to increase functional independence. Goal(s) : To be met in 3 Visits:  1). Bed to toilet/BSC: Min A    To be met in 5 Visits:  1). Supine to/from Sit:  SBA  2). Upper Body Bathing:   Supervision  3). Lower Body Bathing: Mod A  4). Upper Body Dressing:  Supervision  5). Lower Body Dressing: Mod A  6). Pt to demonstrate UE exs x 15 reps with minimal cues    Rehabilitation Potential:  Good for goals listed above. Strengths for achieving goals include: Pt motivated, PLOF and Pt cooperative  Barriers to achieving goals include:  Complexity of condition     Plan: To be seen 3-5 x/wk while in acute care setting for therapeutic exercises, bed mobility, transfers, dressing, bathing, family/patient education, ADL/IADL retraining, energy conservation training.        Kimberly Friend, OTR/L [id number]        If patient discharges from this facility prior to next visit, this note will serve as the Discharge Summary

## 2021-01-08 NOTE — PROGRESS NOTES
Lying in bed awake. No complaints of pain or discomfort. Bed alarm in place and turned on. Call light in reach.

## 2021-01-08 NOTE — PROGRESS NOTES
Patient pulling at CHRISTOFER drain line. Dressing was saturated. Redressed with split guaze and abd pad. Not a lot of out put from CHRISTOFER drain to bulb suction itself. Continue to monitor.

## 2021-01-09 LAB
A/G RATIO: 1 (ref 1.1–2.2)
ALBUMIN SERPL-MCNC: 2.9 G/DL (ref 3.4–5)
ALP BLD-CCNC: 111 U/L (ref 40–129)
ALT SERPL-CCNC: 47 U/L (ref 10–40)
ANION GAP SERPL CALCULATED.3IONS-SCNC: 8 MMOL/L (ref 3–16)
ANISOCYTOSIS: ABNORMAL
AST SERPL-CCNC: 29 U/L (ref 15–37)
BASOPHILS ABSOLUTE: 0 K/UL (ref 0–0.2)
BASOPHILS RELATIVE PERCENT: 0 %
BILIRUB SERPL-MCNC: 1.5 MG/DL (ref 0–1)
BUN BLDV-MCNC: 12 MG/DL (ref 7–20)
CALCIUM SERPL-MCNC: 8.6 MG/DL (ref 8.3–10.6)
CHLORIDE BLD-SCNC: 99 MMOL/L (ref 99–110)
CO2: 33 MMOL/L (ref 21–32)
CREAT SERPL-MCNC: 0.9 MG/DL (ref 0.8–1.3)
EOSINOPHILS ABSOLUTE: 0.1 K/UL (ref 0–0.6)
EOSINOPHILS RELATIVE PERCENT: 1 %
GFR AFRICAN AMERICAN: >60
GFR NON-AFRICAN AMERICAN: >60
GLOBULIN: 2.8 G/DL
GLUCOSE BLD-MCNC: 131 MG/DL (ref 70–99)
GLUCOSE BLD-MCNC: 132 MG/DL (ref 70–99)
GLUCOSE BLD-MCNC: 133 MG/DL (ref 70–99)
GLUCOSE BLD-MCNC: 134 MG/DL (ref 70–99)
GLUCOSE BLD-MCNC: 143 MG/DL (ref 70–99)
GLUCOSE BLD-MCNC: 145 MG/DL (ref 70–99)
GLUCOSE BLD-MCNC: 167 MG/DL (ref 70–99)
HCT VFR BLD CALC: 24.2 % (ref 40.5–52.5)
HEMOGLOBIN: 7.9 G/DL (ref 13.5–17.5)
HYPOCHROMIA: ABNORMAL
LYMPHOCYTES ABSOLUTE: 1.3 K/UL (ref 1–5.1)
LYMPHOCYTES RELATIVE PERCENT: 20 %
MCH RBC QN AUTO: 27.4 PG (ref 26–34)
MCHC RBC AUTO-ENTMCNC: 32.6 G/DL (ref 31–36)
MCV RBC AUTO: 83.9 FL (ref 80–100)
MONOCYTES ABSOLUTE: 0.3 K/UL (ref 0–1.3)
MONOCYTES RELATIVE PERCENT: 4 %
NEUTROPHILS ABSOLUTE: 5 K/UL (ref 1.7–7.7)
NEUTROPHILS RELATIVE PERCENT: 75 %
PDW BLD-RTO: 16.5 % (ref 12.4–15.4)
PERFORMED ON: ABNORMAL
PLATELET # BLD: 92 K/UL (ref 135–450)
PLATELET SLIDE REVIEW: ABNORMAL
PMV BLD AUTO: 8.4 FL (ref 5–10.5)
POTASSIUM REFLEX MAGNESIUM: 3.8 MMOL/L (ref 3.5–5.1)
RBC # BLD: 2.88 M/UL (ref 4.2–5.9)
SLIDE REVIEW: ABNORMAL
SODIUM BLD-SCNC: 140 MMOL/L (ref 136–145)
TOTAL PROTEIN: 5.7 G/DL (ref 6.4–8.2)
WBC # BLD: 6.7 K/UL (ref 4–11)

## 2021-01-09 PROCEDURE — 2700000000 HC OXYGEN THERAPY PER DAY

## 2021-01-09 PROCEDURE — 99024 POSTOP FOLLOW-UP VISIT: CPT | Performed by: SURGERY

## 2021-01-09 PROCEDURE — 6370000000 HC RX 637 (ALT 250 FOR IP): Performed by: INTERNAL MEDICINE

## 2021-01-09 PROCEDURE — 80053 COMPREHEN METABOLIC PANEL: CPT

## 2021-01-09 PROCEDURE — 85025 COMPLETE CBC W/AUTO DIFF WBC: CPT

## 2021-01-09 PROCEDURE — 94761 N-INVAS EAR/PLS OXIMETRY MLT: CPT

## 2021-01-09 PROCEDURE — 6370000000 HC RX 637 (ALT 250 FOR IP): Performed by: NURSE PRACTITIONER

## 2021-01-09 PROCEDURE — 6370000000 HC RX 637 (ALT 250 FOR IP): Performed by: PHYSICIAN ASSISTANT

## 2021-01-09 PROCEDURE — 6360000002 HC RX W HCPCS: Performed by: INTERNAL MEDICINE

## 2021-01-09 PROCEDURE — 94640 AIRWAY INHALATION TREATMENT: CPT

## 2021-01-09 PROCEDURE — 2580000003 HC RX 258: Performed by: INTERNAL MEDICINE

## 2021-01-09 PROCEDURE — 87040 BLOOD CULTURE FOR BACTERIA: CPT

## 2021-01-09 PROCEDURE — 36415 COLL VENOUS BLD VENIPUNCTURE: CPT

## 2021-01-09 PROCEDURE — 99232 SBSQ HOSP IP/OBS MODERATE 35: CPT | Performed by: INTERNAL MEDICINE

## 2021-01-09 PROCEDURE — 1200000000 HC SEMI PRIVATE

## 2021-01-09 RX ORDER — DILTIAZEM HYDROCHLORIDE 180 MG/1
180 CAPSULE, COATED, EXTENDED RELEASE ORAL DAILY
Status: DISCONTINUED | OUTPATIENT
Start: 2021-01-09 | End: 2021-01-14 | Stop reason: HOSPADM

## 2021-01-09 RX ADMIN — ALBUTEROL SULFATE 2.5 MG: 2.5 SOLUTION RESPIRATORY (INHALATION) at 18:32

## 2021-01-09 RX ADMIN — PROPAFENONE HYDROCHLORIDE 150 MG: 150 TABLET, FILM COATED ORAL at 04:20

## 2021-01-09 RX ADMIN — PROPAFENONE HYDROCHLORIDE 150 MG: 150 TABLET, FILM COATED ORAL at 20:31

## 2021-01-09 RX ADMIN — ASPIRIN 81 MG: 81 TABLET, COATED ORAL at 08:04

## 2021-01-09 RX ADMIN — PIPERACILLIN SODIUM AND TAZOBACTAM SODIUM 3.38 G: 3; .375 INJECTION, POWDER, LYOPHILIZED, FOR SOLUTION INTRAVENOUS at 20:31

## 2021-01-09 RX ADMIN — GABAPENTIN 600 MG: 300 CAPSULE ORAL at 20:31

## 2021-01-09 RX ADMIN — ALBUTEROL SULFATE 2.5 MG: 2.5 SOLUTION RESPIRATORY (INHALATION) at 06:59

## 2021-01-09 RX ADMIN — DILTIAZEM HYDROCHLORIDE 180 MG: 180 CAPSULE, EXTENDED RELEASE ORAL at 11:25

## 2021-01-09 RX ADMIN — OXYCODONE 5 MG: 5 TABLET ORAL at 20:30

## 2021-01-09 RX ADMIN — GABAPENTIN 600 MG: 300 CAPSULE ORAL at 08:04

## 2021-01-09 RX ADMIN — OXYCODONE 5 MG: 5 TABLET ORAL at 04:30

## 2021-01-09 RX ADMIN — Medication 1 PUFF: at 06:59

## 2021-01-09 RX ADMIN — MUPIROCIN: 20 OINTMENT TOPICAL at 08:05

## 2021-01-09 RX ADMIN — Medication 10 ML: at 08:04

## 2021-01-09 RX ADMIN — SODIUM CHLORIDE: 9 INJECTION, SOLUTION INTRAVENOUS at 20:30

## 2021-01-09 RX ADMIN — SODIUM CHLORIDE: 9 INJECTION, SOLUTION INTRAVENOUS at 12:30

## 2021-01-09 RX ADMIN — PIPERACILLIN SODIUM AND TAZOBACTAM SODIUM 3.38 G: 3; .375 INJECTION, POWDER, LYOPHILIZED, FOR SOLUTION INTRAVENOUS at 14:00

## 2021-01-09 RX ADMIN — PIPERACILLIN SODIUM AND TAZOBACTAM SODIUM 3.38 G: 3; .375 INJECTION, POWDER, LYOPHILIZED, FOR SOLUTION INTRAVENOUS at 04:21

## 2021-01-09 RX ADMIN — GABAPENTIN 600 MG: 300 CAPSULE ORAL at 14:00

## 2021-01-09 RX ADMIN — PROPAFENONE HYDROCHLORIDE 150 MG: 150 TABLET, FILM COATED ORAL at 14:00

## 2021-01-09 RX ADMIN — Medication 10 ML: at 20:37

## 2021-01-09 RX ADMIN — MUPIROCIN: 20 OINTMENT TOPICAL at 20:37

## 2021-01-09 RX ADMIN — PANTOPRAZOLE SODIUM 40 MG: 40 TABLET, DELAYED RELEASE ORAL at 04:20

## 2021-01-09 ASSESSMENT — PAIN DESCRIPTION - DESCRIPTORS: DESCRIPTORS: ACHING;DISCOMFORT;PRESSURE

## 2021-01-09 ASSESSMENT — PAIN SCALES - GENERAL
PAINLEVEL_OUTOF10: 6
PAINLEVEL_OUTOF10: 0

## 2021-01-09 ASSESSMENT — PAIN DESCRIPTION - FREQUENCY
FREQUENCY: CONTINUOUS
FREQUENCY: CONTINUOUS

## 2021-01-09 ASSESSMENT — PAIN DESCRIPTION - PROGRESSION
CLINICAL_PROGRESSION: NOT CHANGED
CLINICAL_PROGRESSION: GRADUALLY IMPROVING

## 2021-01-09 ASSESSMENT — PAIN DESCRIPTION - PAIN TYPE
TYPE: SURGICAL PAIN
TYPE: SURGICAL PAIN

## 2021-01-09 ASSESSMENT — PAIN - FUNCTIONAL ASSESSMENT
PAIN_FUNCTIONAL_ASSESSMENT: PREVENTS OR INTERFERES SOME ACTIVE ACTIVITIES AND ADLS
PAIN_FUNCTIONAL_ASSESSMENT: PREVENTS OR INTERFERES SOME ACTIVE ACTIVITIES AND ADLS

## 2021-01-09 ASSESSMENT — PAIN DESCRIPTION - ONSET: ONSET: ON-GOING

## 2021-01-09 NOTE — PROGRESS NOTES
Resting in bed awake. Alert and oriented. Am assessment complete. Alert and oriented. Call light in reach. Bed alarm in place and turned on. Patient is able to demonstrate the ability to move from a reclining position to an upright position within the recliner.

## 2021-01-09 NOTE — PROGRESS NOTES
Progress Note    Admit Date:  1/4/2021     Admitted for recurrent cholecystitis  S/p open cholecystectomy on 1/6/2021. Transferred to ICU postop for resp acidosis and placed on bipap  Klebsiella bacteremia from infection     Improved with BiPAP. Transferred out of ICU back to [facility] on 1/7/2021. Subjective:  Mr. Dawit Fernandez seen sitting up in bed. Denies complaints  Remains NPO  Eliquis has not been resumed yet   Remains on 3L     Objective:   Patient Vitals for the past 4 hrs:   BP Temp Temp src Pulse Resp SpO2   01/09/21 0729 132/68 97.3 °F (36.3 °C) Oral 113 16 92 %   01/09/21 0720    111     01/09/21 0718 131/61 98.1 °F (36.7 °C) Oral 111 14 93 %   01/09/21 0702     18 95 %          Intake/Output Summary (Last 24 hours) at 1/9/2021 1100  Last data filed at 1/9/2021 0825  Gross per 24 hour   Intake 1738 ml   Output 380 ml   Net 1358 ml       General: elderly male, sitting up in bed . Awake, alert and oriented. Appears to be not in any distress  Mucous Membranes:  Pink , anicteric  Neck: No JVD, no carotid bruit, no thyromegaly  Chest: Diminished breath sounds, no wheezes rales or rhonchi  Cardiovascular: irregularly irregular, no murmurs or gallops  Abdomen:  Soft, undistended, right UQ surgical staples intact. Drain in place  Pain appropriate no organomegaly, BS present  Extremities: No edema or cyanosis.  Distal pulses well felt  Neurological : grossly normal      Scheduled Meds:   dilTIAZem  180 mg Oral Daily    mupirocin   Nasal BID    piperacillin-tazobactam  3.375 g Intravenous Q8H    aspirin  81 mg Oral Daily    pantoprazole  40 mg Oral QAM AC    gabapentin  600 mg Oral TID    propafenone  150 mg Oral 3 times per day    sodium chloride flush  10 mL Intravenous 2 times per day    insulin lispro  0-6 Units Subcutaneous Q4H    budesonide-formoterol  1 puff Inhalation Daily    albuterol  2.5 mg Nebulization BID       Continuous Infusions:   sodium chloride 60 mL/hr at 01/08/21 9738  dextrose         PRN Meds:  oxyCODONE **OR** oxyCODONE, morphine, glucose, dextrose, glucagon (rDNA), dextrose, sodium chloride flush, albuterol      Data:  CBC:   Recent Labs     01/07/21 0432 01/08/21  0944   WBC 5.6 6.2   HGB 8.6* 8.5*   HCT 26.1* 26.8*   MCV 85.0 86.7   PLT 82* 105*     BMP:   Recent Labs     01/07/21 0432 01/08/21  0944    136   K 4.8 4.3    99   CO2 25 25   PHOS 2.7 2.5   BUN 19 15   CREATININE 0.9 0.7*     LIVER PROFILE:   Recent Labs     01/07/21 0432 01/08/21  0944   AST 64* 34   ALT 93* 61*   BILIDIR 1.8*  --    BILITOT 2.3* 1.8*   ALKPHOS 114 110     PT/INR:   Recent Labs     01/07/21 0432   PROTIME 13.9*   INR 1.20*       CULTURES  Blood cx x2: Klebsiella   Urine cx: NGTD      RADIOLOGY  MRI ABDOMEN WO CONTRAST MRCP 1/5/2020   Final Result   No evidence of choledocholithiasis or dilation of the biliary collecting   system on limited imaging      Small bright T2 lesion within the head of the pancreas which could represent   a small cyst.  These lesions are typically not followed beyond age of [de-identified]   years. US GALLBLADDER RUQ 1/4/2020   Final Result   1. Cholelithiasis without sonographic evidence for acute cholecystitis. 2. Fatty liver versus diffuse hepatocellular disease. 3. Nonvisualization of the pancreas. CTA CHEST ABDOMEN PELVIS W CONTRAST 1/4/2020   Final Result   1. CTA CHEST: No acute vascular abnormality; no aneurysm or dissection. 2.  Pulmonary sequela typical of that seen with smoking, including COPD. 3. Chronic mild T6 compression fracture. 4. CTA ABDOMEN/PELVIS: No acute vascular abnormality; no aneurysm leak or   dissection. 5. 3.0 cm abdominal aortic aneurysm. Recommend follow-up every 3 years. 6.  No CT evidence of an acute intra-abdominal or intrapelvic process. 7. Cholelithiasis without definite CT findings of acute cholecystitis. Gallbladder appears contracted. 8.  No findings to suggest acute appendicitis; no ureter calculus or   hydronephrosis. 9. Hepatic steatosis. 10. Chronic, nonspecific L1 sclerosis and compression fracture. 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 RECOMMENDATIONS:   3 year imaging follow-up AAA         CT HEAD WO CONTRAST 1/4/2020   Final Result   1. No acute intracranial abnormality. 2. Small left forehead scalp contusion/hematoma. 3. Possible acute left maxillary sinusitis. 4. Stable appearing senescent changes. XR CHEST PORTABLE   Final Result   No radiographic evidence of acute pulmonary disease. Akira Colindres have reviewed the chart on [facility] and personally interviewed and examined patient, reviewed the data (labs and imaging) and after discussion with my PA formulated the plan. Agree with note with the following edits. HPI:     He is better. Still NPO. Passing flatus. I reviewed the patient's Past Medical History, Past Surgical History, Medications, and Allergies. Physical exam:    /70   Pulse 108   Temp 99.3 °F (37.4 °C) (Oral)   Resp 16   Ht 5' 11\" (1.803 m)   Wt 198 lb 6.6 oz (90 kg)   SpO2 95%   BMI 27.67 kg/m²     Gen: No distress. Alert. Eyes: PERRL. No sclera icterus. No conjunctival injection. ENT: No discharge. Pharynx clear. Neck: Trachea midline. Normal thyroid. Resp: No accessory muscle use. No crackles. No wheezes. No rhonchi. No dullness on percussion. CV: Regular rate. Regular rhythm. No murmur or rub. No edema. GI: appropriate tenderness, CHRISTOFER drain present. Mildly distended. No masses. No organomegaly. Normal bowel sounds. No hernia. Assessment/Plan:  #Sepsis- POA  #Klebsiella Bactermia - Due to  acute cholecystitis   - s.p IVF. BP stable  - has been on ABX recently, PCT 22.7 - trending down  - IV Zosyn D#6, on IVF, lactic trended down  - see below.      #Acute cholecystitis with klebsiella bacteremia - recently treated with IV and oral abx.  Comes back with similar presentation  - had open cholecystectomy  1/6 - POD #3  - diet per surgery - remains NPO  - Zosyn D#6    #Elevated LFTs  - sec to above, improving  - trending down     #Pancytopenia - Resolved  #Chronic Normocytic Anemia  #Chronic TCP  - in setting of infection, has chronic TCP & anemia  - avoid heparin use, started SCDs  - monitor CBC- pending today     #Acute on Chronic Hypoxic Hypercapnic respiratory failure  #Hx of COPD   - was monitored in ICU post op with bipap   - now improved and - sats stable on home 3 L  - cont [address] Albuterol Symbicort/Spiriva     #DM Type 2   - controlled  - hold Metformin and Actos, chk A1c: 6.6  - on Low SSI q4h    #HTN  - controlled  - cont Diltiazem PRN  - monitor    #PAF   - hold Eliquis, resume when ok by surgery - may be able to resume in 1-2 days (documented on 1/8)- still holding today  - cont Rythmol and Diltiazem     #GERD  - cont Protonix    #L1 compression fracture  - noted on prior admission   - CT with approx 75% loss of central vertebral body height, new since 2017, appears chronic as pt with - no c/o back pain  - daughter reported a fall several months prior    #AAA  - noted on prior admission  - mild at 3.1 cm; infrarenal   - recommended f/u every 3 years  - family aware    #Abnormal CT  - noted on prior admission  - CT findings consistent with interval development of a penetrating atherosclerotic ulcer involving the anterior wall of the distal descending thoracic aorta  - OP f/u with vascular surgery was recommended  - family aware     DVT Prophylaxis: SCDs  Diet: Diet NPO Effective Now Exceptions are: Ice Chips, Sips with Meds  Code Status: Full Code    Dispo: cont care    Pino Loja PA-C  1/9/2021 11:19 AM      VITO SALAS 1/9/2021 11:39 AM

## 2021-01-09 NOTE — FLOWSHEET NOTE
01/08/21 1924   Vital Signs   Temp 99.4 °F (37.4 °C)   Temp Source Axillary   Pulse 103   Heart Rate Source Monitor   Resp 18   /69   BP Location Left upper arm   Patient Position Semi fowlers   Level of Consciousness Alert (0)   MEWS Score 2   Oxygen Therapy   SpO2 94 %   O2 Device Nasal cannula   O2 Flow Rate (L/min) 3 L/min   Assessment complete- see flowsheets. Pt resting in bed at this time, call light within reach, telemetry and continuous pulse ox in place. PRN pain medication given per pt request and PRN order parameters. Pt denies further needs at this time. Bed alarm in place. Will continue to monitor.   Girish Cohen RN

## 2021-01-09 NOTE — PROGRESS NOTES
[facility] GENERAL SURGERY    Surgery Progress Note           POD # 3    PATIENT NAME: Wing More     TODAY'S DATE: 1/9/2021    INTERVAL HISTORY:    Pt  Feeling better, stable incisional pain, getting thirsty/hungry. OBJECTIVE:   VITALS:  /70   Pulse 108   Temp 99.3 °F (37.4 °C) (Oral)   Resp 16   Ht 5' 11\" (1.803 m)   Wt 198 lb 6.6 oz (90 kg)   SpO2 95%   BMI 27.67 kg/m²     INTAKE/OUTPUT:    I/O last 3 completed shifts: In: 1863 [P.O.:20; I.V.:1718; IV Piggyback:125]  Out: 0 [Urine:550; Drains:40]  I/O this shift:   In: 0   Out: 200 [Urine:200]              CONSTITUTIONAL:  awake and alert  LUNGS:     ABDOMEN:   normal bowel sounds, soft, non-distended, tenderness noted at incision   INCISION: clean, dry, no drainage, healing, sero-sanguinous drainage in CHRISTOFER    Data:  CBC:   Recent Labs     01/07/21 0432 01/08/21 0944 01/09/21  1113   WBC 5.6 6.2 6.7   HGB 8.6* 8.5* 7.9*   HCT 26.1* 26.8* 24.2*   PLT 82* 105* 92*     BMP:    Recent Labs     01/07/21 0432 01/08/21  0944 01/09/21  1113    136 140   K 4.8 4.3 3.8    99 99   CO2 25 25 33*   BUN 19 15 12   CREATININE 0.9 0.7* 0.9   GLUCOSE 176* 139* 132*     Hepatic:   Recent Labs     01/07/21 0432 01/08/21  0944 01/09/21  1113   AST 64* 34 29   ALT 93* 61* 47*   BILITOT 2.3* 1.8* 1.5*   ALKPHOS 114 110 111     Mag:      Recent Labs     01/07/21 0432   MG 1.70*      Phos:     Recent Labs     01/07/21 0432 01/08/21 0944   PHOS 2.7 2.5      INR:   Recent Labs     01/07/21 0432   INR 1.20*         Radiology Review:       ASSESSMENT AND PLAN:  78 y.o. male status post open cholecystectomy for severe cholecystitis    - start clear liquids   - cont PT/OT   - cont IV abx   - cont to hold Eliquis for now      Electronically signed by Deejay Adair MD

## 2021-01-09 NOTE — PROGRESS NOTES
Repositioned in bed with assist x1. Changed and cleaned of incont urine. Placed on left side. Call light in reach. Bed alarm in place and turned on.

## 2021-01-09 NOTE — PROGRESS NOTES
Pulmonary Progress Note  CC: Postop hypercapnia after cholecystectomy    Subjective: Abdominal pain    IV line peripheral    EXAM:   /68   Pulse 113   Temp 97.3 °F (36.3 °C) (Oral)   Resp 16   Ht 5' 11\" (1.803 m)   Wt 198 lb 6.6 oz (90 kg)   SpO2 92%   BMI 27.67 kg/m²  on 3 L  Constitutional:  No acute distress   HEENT: no scleral icterus  Neck: No tracheal deviation present. Cardiovascular: Normal heart sounds. Pulmonary/Chest: No wheezes. No rhonchi. No rales. No decreased breath sounds. No accessory muscle usage or stridor. Abdominal: Soft. Somewhat tender. Active bowel sounds  Musculoskeletal: No cyanosis. No clubbing. Skin: Skin is warm and dry.      Scheduled Meds:   mupirocin   Nasal BID    piperacillin-tazobactam  3.375 g Intravenous Q8H    aspirin  81 mg Oral Daily    pantoprazole  40 mg Oral QAM AC    gabapentin  600 mg Oral TID    propafenone  150 mg Oral 3 times per day    sodium chloride flush  10 mL Intravenous 2 times per day    insulin lispro  0-6 Units Subcutaneous Q4H    budesonide-formoterol  1 puff Inhalation Daily    albuterol  2.5 mg Nebulization BID     Continuous Infusions:   sodium chloride 60 mL/hr at 01/08/21 2228    dextrose       PRN Meds:  oxyCODONE **OR** oxyCODONE, morphine, dilTIAZem, glucose, dextrose, glucagon (rDNA), dextrose, sodium chloride flush, albuterol    Labs:  CBC:   Recent Labs     01/07/21  0432 01/08/21  0944   WBC 5.6 6.2   HGB 8.6* 8.5*   HCT 26.1* 26.8*   MCV 85.0 86.7   PLT 82* 105*     BMP:   Recent Labs     01/07/21  0432 01/08/21  0944    136   K 4.8 4.3    99   CO2 25 25   PHOS 2.7 2.5   BUN 19 15   CREATININE 0.9 0.7*     Cultures:      1/4/2021 blood 2 up to Klebsiella  1/4/2021 urine no growth    Imaging:  CT chest 1/4/2021 with upper lobe predominant emphysema and thoracic compression fracture    MRI abdomen 1/5/2021  No evidence of choledocholithiasis or dilation of the biliary collecting system on limited imaging     Echo 4/1/2020 EF 30%, normal diastolic pressures, SPAP 36    ASSESSMENT:  · Acute on chronic hypercapnic and hypoxemic respiratory failure, typically on 3 L home oxygen  · Klebsiella bacteremia  · Severe COPD/emphysema f/b Dr. Bogdan Posey  · CAD  · H/O atrial fibrillation   · Diabetes mellitus   · Cholecystitis S/P open cholecystectomy 1/6/2021    PLAN:  Hold steroids  Inhaled bronchodilators   Zosyn D#6  · Eliquis was held, resume when okay with general surgery  · IS  · No acute pulmonary issues at this time, please call with any new or worsening concerns.

## 2021-01-10 LAB
A/G RATIO: 0.9 (ref 1.1–2.2)
ALBUMIN SERPL-MCNC: 2.8 G/DL (ref 3.4–5)
ALP BLD-CCNC: 118 U/L (ref 40–129)
ALT SERPL-CCNC: 42 U/L (ref 10–40)
ANION GAP SERPL CALCULATED.3IONS-SCNC: 9 MMOL/L (ref 3–16)
ANISOCYTOSIS: ABNORMAL
AST SERPL-CCNC: 24 U/L (ref 15–37)
BANDED NEUTROPHILS RELATIVE PERCENT: 1 % (ref 0–7)
BASOPHILS ABSOLUTE: 0 K/UL (ref 0–0.2)
BASOPHILS RELATIVE PERCENT: 0 %
BILIRUB SERPL-MCNC: 1.3 MG/DL (ref 0–1)
BUN BLDV-MCNC: 11 MG/DL (ref 7–20)
CALCIUM SERPL-MCNC: 8.7 MG/DL (ref 8.3–10.6)
CHLORIDE BLD-SCNC: 99 MMOL/L (ref 99–110)
CO2: 34 MMOL/L (ref 21–32)
CREAT SERPL-MCNC: 0.7 MG/DL (ref 0.8–1.3)
EOSINOPHILS ABSOLUTE: 0.1 K/UL (ref 0–0.6)
EOSINOPHILS RELATIVE PERCENT: 1 %
GFR AFRICAN AMERICAN: >60
GFR NON-AFRICAN AMERICAN: >60
GLOBULIN: 3.2 G/DL
GLUCOSE BLD-MCNC: 131 MG/DL (ref 70–99)
GLUCOSE BLD-MCNC: 142 MG/DL (ref 70–99)
GLUCOSE BLD-MCNC: 145 MG/DL (ref 70–99)
GLUCOSE BLD-MCNC: 152 MG/DL (ref 70–99)
GLUCOSE BLD-MCNC: 156 MG/DL (ref 70–99)
GLUCOSE BLD-MCNC: 160 MG/DL (ref 70–99)
GLUCOSE BLD-MCNC: 169 MG/DL (ref 70–99)
GLUCOSE BLD-MCNC: 175 MG/DL (ref 70–99)
HCT VFR BLD CALC: 23.4 % (ref 40.5–52.5)
HEMOGLOBIN: 7.6 G/DL (ref 13.5–17.5)
HYPOCHROMIA: ABNORMAL
LYMPHOCYTES ABSOLUTE: 1.5 K/UL (ref 1–5.1)
LYMPHOCYTES RELATIVE PERCENT: 24 %
MAGNESIUM: 1.5 MG/DL (ref 1.8–2.4)
MCH RBC QN AUTO: 27.5 PG (ref 26–34)
MCHC RBC AUTO-ENTMCNC: 32.5 G/DL (ref 31–36)
MCV RBC AUTO: 84.6 FL (ref 80–100)
MONOCYTES ABSOLUTE: 0.3 K/UL (ref 0–1.3)
MONOCYTES RELATIVE PERCENT: 5 %
NEUTROPHILS ABSOLUTE: 4.3 K/UL (ref 1.7–7.7)
NEUTROPHILS RELATIVE PERCENT: 69 %
PDW BLD-RTO: 16.2 % (ref 12.4–15.4)
PERFORMED ON: ABNORMAL
PLATELET # BLD: 103 K/UL (ref 135–450)
PLATELET SLIDE REVIEW: ABNORMAL
PMV BLD AUTO: 9 FL (ref 5–10.5)
POTASSIUM REFLEX MAGNESIUM: 3.5 MMOL/L (ref 3.5–5.1)
PROCALCITONIN: 0.76 NG/ML (ref 0–0.15)
RBC # BLD: 2.77 M/UL (ref 4.2–5.9)
SLIDE REVIEW: ABNORMAL
SODIUM BLD-SCNC: 142 MMOL/L (ref 136–145)
TOTAL PROTEIN: 6 G/DL (ref 6.4–8.2)
WBC # BLD: 6.1 K/UL (ref 4–11)

## 2021-01-10 PROCEDURE — 85025 COMPLETE CBC W/AUTO DIFF WBC: CPT

## 2021-01-10 PROCEDURE — 36415 COLL VENOUS BLD VENIPUNCTURE: CPT

## 2021-01-10 PROCEDURE — 80053 COMPREHEN METABOLIC PANEL: CPT

## 2021-01-10 PROCEDURE — 6370000000 HC RX 637 (ALT 250 FOR IP): Performed by: NURSE PRACTITIONER

## 2021-01-10 PROCEDURE — 6370000000 HC RX 637 (ALT 250 FOR IP): Performed by: PHYSICIAN ASSISTANT

## 2021-01-10 PROCEDURE — 84145 PROCALCITONIN (PCT): CPT

## 2021-01-10 PROCEDURE — 97116 GAIT TRAINING THERAPY: CPT

## 2021-01-10 PROCEDURE — 94761 N-INVAS EAR/PLS OXIMETRY MLT: CPT

## 2021-01-10 PROCEDURE — 97530 THERAPEUTIC ACTIVITIES: CPT

## 2021-01-10 PROCEDURE — 6370000000 HC RX 637 (ALT 250 FOR IP): Performed by: INTERNAL MEDICINE

## 2021-01-10 PROCEDURE — 99024 POSTOP FOLLOW-UP VISIT: CPT | Performed by: SURGERY

## 2021-01-10 PROCEDURE — 2580000003 HC RX 258: Performed by: INTERNAL MEDICINE

## 2021-01-10 PROCEDURE — 97112 NEUROMUSCULAR REEDUCATION: CPT

## 2021-01-10 PROCEDURE — 2700000000 HC OXYGEN THERAPY PER DAY

## 2021-01-10 PROCEDURE — 6360000002 HC RX W HCPCS: Performed by: INTERNAL MEDICINE

## 2021-01-10 PROCEDURE — 99232 SBSQ HOSP IP/OBS MODERATE 35: CPT | Performed by: INTERNAL MEDICINE

## 2021-01-10 PROCEDURE — 1200000000 HC SEMI PRIVATE

## 2021-01-10 PROCEDURE — 94640 AIRWAY INHALATION TREATMENT: CPT

## 2021-01-10 PROCEDURE — 83735 ASSAY OF MAGNESIUM: CPT

## 2021-01-10 RX ORDER — MAGNESIUM SULFATE 1 G/100ML
1 INJECTION INTRAVENOUS PRN
Status: DISCONTINUED | OUTPATIENT
Start: 2021-01-10 | End: 2021-01-14 | Stop reason: HOSPADM

## 2021-01-10 RX ORDER — POTASSIUM CHLORIDE 7.45 MG/ML
10 INJECTION INTRAVENOUS PRN
Status: DISCONTINUED | OUTPATIENT
Start: 2021-01-10 | End: 2021-01-14 | Stop reason: HOSPADM

## 2021-01-10 RX ORDER — POTASSIUM CHLORIDE 20 MEQ/1
40 TABLET, EXTENDED RELEASE ORAL PRN
Status: DISCONTINUED | OUTPATIENT
Start: 2021-01-10 | End: 2021-01-14 | Stop reason: HOSPADM

## 2021-01-10 RX ADMIN — ALBUTEROL SULFATE 2.5 MG: 2.5 SOLUTION RESPIRATORY (INHALATION) at 20:57

## 2021-01-10 RX ADMIN — OXYCODONE 5 MG: 5 TABLET ORAL at 21:51

## 2021-01-10 RX ADMIN — SODIUM CHLORIDE: 9 INJECTION, SOLUTION INTRAVENOUS at 14:55

## 2021-01-10 RX ADMIN — INSULIN LISPRO 1 UNITS: 100 INJECTION, SOLUTION INTRAVENOUS; SUBCUTANEOUS at 11:26

## 2021-01-10 RX ADMIN — Medication 10 ML: at 08:15

## 2021-01-10 RX ADMIN — GABAPENTIN 600 MG: 300 CAPSULE ORAL at 08:15

## 2021-01-10 RX ADMIN — Medication 1 PUFF: at 07:03

## 2021-01-10 RX ADMIN — PROPAFENONE HYDROCHLORIDE 150 MG: 150 TABLET, FILM COATED ORAL at 05:00

## 2021-01-10 RX ADMIN — PROPAFENONE HYDROCHLORIDE 150 MG: 150 TABLET, FILM COATED ORAL at 21:51

## 2021-01-10 RX ADMIN — PIPERACILLIN SODIUM AND TAZOBACTAM SODIUM 3.38 G: 3; .375 INJECTION, POWDER, LYOPHILIZED, FOR SOLUTION INTRAVENOUS at 05:00

## 2021-01-10 RX ADMIN — GABAPENTIN 600 MG: 300 CAPSULE ORAL at 21:51

## 2021-01-10 RX ADMIN — APIXABAN 5 MG: 5 TABLET, FILM COATED ORAL at 10:31

## 2021-01-10 RX ADMIN — APIXABAN 5 MG: 5 TABLET, FILM COATED ORAL at 21:51

## 2021-01-10 RX ADMIN — ASPIRIN 81 MG: 81 TABLET, COATED ORAL at 08:15

## 2021-01-10 RX ADMIN — INSULIN LISPRO 1 UNITS: 100 INJECTION, SOLUTION INTRAVENOUS; SUBCUTANEOUS at 08:20

## 2021-01-10 RX ADMIN — ALBUTEROL SULFATE 2.5 MG: 2.5 SOLUTION RESPIRATORY (INHALATION) at 07:03

## 2021-01-10 RX ADMIN — MAGNESIUM SULFATE HEPTAHYDRATE 1 G: 1 INJECTION, SOLUTION INTRAVENOUS at 06:29

## 2021-01-10 RX ADMIN — MAGNESIUM SULFATE HEPTAHYDRATE 1 G: 1 INJECTION, SOLUTION INTRAVENOUS at 08:14

## 2021-01-10 RX ADMIN — GABAPENTIN 600 MG: 300 CAPSULE ORAL at 14:55

## 2021-01-10 RX ADMIN — MUPIROCIN: 20 OINTMENT TOPICAL at 08:20

## 2021-01-10 RX ADMIN — PIPERACILLIN SODIUM AND TAZOBACTAM SODIUM 3.38 G: 3; .375 INJECTION, POWDER, LYOPHILIZED, FOR SOLUTION INTRAVENOUS at 21:52

## 2021-01-10 RX ADMIN — PANTOPRAZOLE SODIUM 40 MG: 40 TABLET, DELAYED RELEASE ORAL at 05:00

## 2021-01-10 RX ADMIN — DILTIAZEM HYDROCHLORIDE 180 MG: 180 CAPSULE, EXTENDED RELEASE ORAL at 08:15

## 2021-01-10 RX ADMIN — PIPERACILLIN SODIUM AND TAZOBACTAM SODIUM 3.38 G: 3; .375 INJECTION, POWDER, LYOPHILIZED, FOR SOLUTION INTRAVENOUS at 14:54

## 2021-01-10 RX ADMIN — PROPAFENONE HYDROCHLORIDE 150 MG: 150 TABLET, FILM COATED ORAL at 15:02

## 2021-01-10 RX ADMIN — OXYCODONE 5 MG: 5 TABLET ORAL at 10:31

## 2021-01-10 ASSESSMENT — PAIN SCALES - GENERAL
PAINLEVEL_OUTOF10: 0
PAINLEVEL_OUTOF10: 0
PAINLEVEL_OUTOF10: 5
PAINLEVEL_OUTOF10: 0
PAINLEVEL_OUTOF10: 0

## 2021-01-10 ASSESSMENT — PAIN DESCRIPTION - DESCRIPTORS
DESCRIPTORS: ACHING;DISCOMFORT;PRESSURE
DESCRIPTORS: ACHING;DISCOMFORT

## 2021-01-10 ASSESSMENT — PAIN DESCRIPTION - PAIN TYPE
TYPE: SURGICAL PAIN
TYPE: SURGICAL PAIN

## 2021-01-10 ASSESSMENT — PAIN DESCRIPTION - FREQUENCY
FREQUENCY: CONTINUOUS
FREQUENCY: CONTINUOUS

## 2021-01-10 ASSESSMENT — PAIN DESCRIPTION - LOCATION: LOCATION: ABDOMEN

## 2021-01-10 ASSESSMENT — PAIN DESCRIPTION - PROGRESSION: CLINICAL_PROGRESSION: GRADUALLY IMPROVING

## 2021-01-10 ASSESSMENT — PAIN DESCRIPTION - ONSET: ONSET: ON-GOING

## 2021-01-10 ASSESSMENT — PAIN - FUNCTIONAL ASSESSMENT: PAIN_FUNCTIONAL_ASSESSMENT: PREVENTS OR INTERFERES SOME ACTIVE ACTIVITIES AND ADLS

## 2021-01-10 ASSESSMENT — PAIN DESCRIPTION - ORIENTATION: ORIENTATION: OTHER (COMMENT)

## 2021-01-10 NOTE — FLOWSHEET NOTE
01/10/21 0209   Closed/Suction Drain Midline Abdomen   Placement Date/Time: 01/06/21 0445   Orientation: Midline  Location: Abdomen   Site Description Leaking at site   Dressing Status Clean;Dry; Intact; Changed   Drainage Appearance Serosanguinous   Status To bulb suction   Dressing to CHRISTOFER drain changed again at this time, extra gauze cushion to dressing added since pt having copious amounts of serososanguinous drainage. Pt denies further needs at this time, denies need for pain medication. Call light within reach, bed alarm in place. Telemetry and continuous pulse ox in place. Will continue to monitor.   Nestor Youngblood RN

## 2021-01-10 NOTE — PROGRESS NOTES
Results for Monserrat Miah  as of 1/10/2021 06:15   Ref. Range 1/10/2021 04:10   Magnesium Latest Ref Range: 1.80 - 2.40 mg/dL 1.50 (L)   MD notified via [employer] at this time for electrolyte replacement orders. Will continue to monitor.   Cindy Marks RN

## 2021-01-10 NOTE — PROGRESS NOTES
Resting in bed awake. No complaints or needs at present time. Am assessment complete. Alert and oriented at present time. Bed alarm in place and turned on. Call light in reach. Patient is able to demonstrate the ability to move from a reclining position to an upright position within the recliner.

## 2021-01-10 NOTE — FLOWSHEET NOTE
01/09/21 2015   Vital Signs   Temp 98.5 °F (36.9 °C)   Temp Source Oral   Pulse 105   Heart Rate Source Monitor   Resp 16   /64   BP Location Left upper arm   Level of Consciousness Alert (0)   MEWS Score 2   Oxygen Therapy   SpO2 92 %   O2 Device Nasal cannula   O2 Flow Rate (L/min) 3 L/min   Assessment complete- see flowsheets. Pt resting in bed at this time, given PRN pain medication per pt request and PRN order parameters. Call light within reach, telemetry and continuous pulse ox in place. Dressing around CHRISTOFER reinforced with gauze and paper tape; breakthrough drainage noted. Pt denies further needs at this time, aware to call for any needs. Bed alarm in place. Will continue to monitor.   Yobany Tran RN

## 2021-01-10 NOTE — PROGRESS NOTES
[facility] GENERAL SURGERY    Surgery Progress Note           POD # 4    PATIENT NAME: Murphy Beltrán     TODAY'S DATE: 1/10/2021    INTERVAL HISTORY:    Pt  Still looks very fatigued, reports feeling \"ok\", didn't take clears well yesterday - \"they made me belch a lot\". Passing flatus but denies BM. Adequate pain control. OBJECTIVE:   VITALS:  /70   Pulse 98   Temp 97.2 °F (36.2 °C) (Oral)   Resp 14   Ht 5' 11\" (1.803 m)   Wt 195 lb 8 oz (88.7 kg)   SpO2 92%   BMI 27.27 kg/m²     INTAKE/OUTPUT:    I/O last 3 completed shifts: In: 2536 [P.O.:660; I.V.:1751; IV Piggyback:125]  Out: 920 [Urine:900; Drains:20]  No intake/output data recorded. CONSTITUTIONAL:  awake and alert  LUNGS:  clear to auscultation  ABDOMEN:   normal bowel sounds, soft, distended, tenderness noted at incision   INCISION: clean, dry, no drainage, healing, sero-sanguinous drainage in CHRISTOFER    Data:  CBC:   Recent Labs     01/08/21  0944 01/09/21  1113 01/10/21  0411   WBC 6.2 6.7 6.1   HGB 8.5* 7.9* 7.6*   HCT 26.8* 24.2* 23.4*   * 92* 103*     BMP:    Recent Labs     01/08/21  0944 01/09/21  1113 01/10/21  0410    140 142   K 4.3 3.8 3.5   CL 99 99 99   CO2 25 33* 34*   BUN 15 12 11   CREATININE 0.7* 0.9 0.7*   GLUCOSE 139* 132* 131*     Hepatic:   Recent Labs     01/08/21  0944 01/09/21  1113 01/10/21  0410   AST 34 29 24   ALT 61* 47* 42*   BILITOT 1.8* 1.5* 1.3*   ALKPHOS 110 111 118     Mag:      Recent Labs     01/10/21  0410   MG 1.50*      Phos:     Recent Labs     01/08/21 0944   PHOS 2.5      INR: No results for input(s): INR in the last 72 hours.       Radiology Review:       ASSESSMENT AND PLAN:  78 y.o. male status post open connie for severe cholecystitis   - stick w/ clear liquids until better intake tolerated   - cont PT/OT   - cont IV abx         Electronically signed by Jah Kaufman MD

## 2021-01-10 NOTE — PROGRESS NOTES
Inpatient Physical Therapy Daily Treatment Note    Unit: 2 [address]  Date:  1/10/2021  Patient Name:    Lillie Martinez  Admitting diagnosis:  Acute cholecystitis [K81.0]  Admit Date:  2021  Precautions/Restrictions:  Fall risk, Bed/chair alarm, Lines -IV, Supplemental O2 (3L) and Drains (CHRISTOFER on R), Telemetry and Continuous pulse oximetry, incision with staples on L abdomen      Discharge Recommendations: SNF, pending progress. Patient could possibly dc home if he can arrange  assist  DME needs for discharge: defer to facility       Therapy recommendation for EMS Transport: can transport by wheelchair    Therapy recommendations for staff:   Assist of 2 with use of rolling walker (RW) and gait belt for all transfers and ambulation to/from [facility]  to/from chair    History of Present Illness: 78 y. o. year old male with medical history of coronary artery disease, COPD on chronic 3 L of oxygen, diabetes mellitus type 2, hyperlipidemia, hypertension, ELAINE on CPAP, atrial fibrillation on Eliquis and cholecystitis presents for evaluation of fall at home.  Patient reports lower extremity weakness with his legs giving out.     - laparoscopic cholecystectomy, transferred to ICU after OR due to poor level of alertness and abnormal labs/vitals   - PT/OT orders to resume therapy    Home Health S4 Level Recommendation: Level 3 Safety  AM-PAC Mobility Score   AM-PAC Inpatient Mobility Raw Score : 10       Treatment Time:  9:49-10:30  Treatment number: 2  Timed Code Treatment Minutes: 47 minutes  Total Treatment Minutes:  47  minutes    Cognition    A&O Person, Place, Time and Situation   Able to follow 1 step commands   Decreased safety awareness, moves impulsive    Subjective  Patient lying supine in bed with no family present   Pt agreeable to this PT tx.      Pain   Yes  Location: abdomen  Ratin/10  Pain Medicine Status: RN notified     Bed mobility:    Supine to sit:                           CGA Sit to supine:                           Not tested  Rolling:                                    Not Tested  Scooting in sitting:                   Supervision  Scooting to head of bed:         Not Tested                    Bridging:                                  Not Tested     Transfers:    Sit to stand:                 CGA with RW, VC  Stand to sit:                 CGA, VC for hand placement  Bed to chair:                Alex using RW, Ax1 for line management  Standard toilet:            Not Tested  Bed to [facility]:                Not Tested     Gait Training gait completed as indicated below  Distance:      4 ft  Deviations (firm surface/linoleum):  decreased cindy and forward flexed posture  Assistive Device Used:    gait belt and rolling walker (RW)  Level of Assist:    Min A x1, Ax1 to manage lines  Comment: no LOB,steady, fatigued     Stair Training deferred, pt unsafe/not appropriate to complete stairs at this time    Therapeutic Exercise Aime deferred secondary to pt fatigued    Balance  Sitting:  Good ; SBA  Comments: no LOB    Standing: Good - ; CGA  Comments: steady with RW    Patient Education      Role of PT, POC, Discharge recommendations, DC recommendations, safety awareness, transfer techniques and calling for assist with mobility. Patient states that daughter lives ~12 miles form him. Discussed option of having daughter provide 24/7 assist at dc. He will discuss with her  Positioning Needs       Pt up in chair, alarm set, positioned in proper neutral alignment and pressure relief provided. Call light provided and all needs within reach      Activity Tolerance   Pt completed therapy session with No adverse symptoms noted w/activity.    BP HR SpO2   Supine, at rest 149/72 89bpm 98% on 6L   Seated at /79 100bpm 99%   Post transfer 152/70 94bpm 99%     Other  RN aware of level of asssit    Assessment : Patient made good progress this date. Patient fatigued quickly with activity. If he continues to make good progress and can arrange 24/7 assist he may be able to dc to home at discharge. Recommending SNF at discharge pending progress. Goals : To be met in 3 visits:  1). Independent with LE Ex x 10 reps     To be met in 6 visits:  1). Supine to/from sit: SBA  2). Sit to/from stand: SBA  3). Bed to chair: CGA with RW  4). Gait: Ambulate 50 ft.  with  Min A  and use of rolling walker (RW)  5). Tolerate B LE exercises 3 sets of 10-15 reps  6). Ascend/descend 2 steps with Min A  with use of hand rail bilateral and LRAD (least restrictive assistive device)    Plan   Continue with plan of care. Signature: Maddie Juárez, PT #[id number]    If patient discharges from this facility prior to next visit, this note will serve as the Discharge Summary.

## 2021-01-10 NOTE — PROGRESS NOTES
Progress Note    Admit Date:  1/4/2021     Admitted for recurrent cholecystitis  S/p open cholecystectomy on 1/6/2021. Transferred to ICU postop for resp acidosis and placed on bipap  Klebsiella bacteremia from infection     Improved with BiPAP. Transferred out of ICU back to [facility] on 1/7/2021. Subjective:  Mr. Jacy Atwood started clears yesterday. Mild abd distention today. +flatus   Eliquis has not been resumed yet   Remains on 3L  Which is his baseline     Objective:   Patient Vitals for the past 4 hrs:   BP Temp Temp src Pulse Resp SpO2 Weight   01/10/21 0714 135/70 97.2 °F (36.2 °C) Oral 98 14 92 %    01/10/21 0705     18 95 %    01/10/21 0658       195 lb 8 oz (88.7 kg)          Intake/Output Summary (Last 24 hours) at 1/10/2021 0909  Last data filed at 1/10/2021 0610  Gross per 24 hour   Intake 2536 ml   Output 920 ml   Net 1616 ml       General: elderly male, sitting up in bed . Awake, alert and oriented. Appears to be not in any distress  Mucous Membranes:  Pink , anicteric  Neck: No JVD,  Trachea midline   Chest: Diminished breath sounds, mild crackles in bases, no wheezes   Cardiovascular: irregularly irregular, no murmurs or gallops  Abdomen:  Soft, Mildly distended, right UQ surgical staples intact. Drain in place  Pain appropriate, BS present  Extremities: No edema or cyanosis.  Distal pulses well felt  Neurological : grossly normal      Scheduled Meds:   dilTIAZem  180 mg Oral Daily    mupirocin   Nasal BID    piperacillin-tazobactam  3.375 g Intravenous Q8H    aspirin  81 mg Oral Daily    pantoprazole  40 mg Oral QAM AC    gabapentin  600 mg Oral TID    propafenone  150 mg Oral 3 times per day    sodium chloride flush  10 mL Intravenous 2 times per day    insulin lispro  0-6 Units Subcutaneous Q4H    budesonide-formoterol  1 puff Inhalation Daily    albuterol  2.5 mg Nebulization BID       Continuous Infusions:   sodium chloride 60 mL/hr at 01/09/21 2030  dextrose         PRN Meds:  potassium chloride **OR** potassium alternative oral replacement **OR** potassium chloride, magnesium sulfate, oxyCODONE **OR** oxyCODONE, morphine, glucose, dextrose, glucagon (rDNA), dextrose, sodium chloride flush, albuterol      Data:  CBC:   Recent Labs     01/08/21  0944 01/09/21  1113 01/10/21  0411   WBC 6.2 6.7 6.1   HGB 8.5* 7.9* 7.6*   HCT 26.8* 24.2* 23.4*   MCV 86.7 83.9 84.6   * 92* 103*     BMP:   Recent Labs     01/08/21  0944 01/09/21  1113 01/10/21  0410    140 142   K 4.3 3.8 3.5   CL 99 99 99   CO2 25 33* 34*   PHOS 2.5  --   --    BUN 15 12 11   CREATININE 0.7* 0.9 0.7*     LIVER PROFILE:   Recent Labs     01/08/21 0944 01/09/21  1113 01/10/21  0410   AST 34 29 24   ALT 61* 47* 42*   BILITOT 1.8* 1.5* 1.3*   ALKPHOS 110 111 118     PT/INR:   No results for input(s): PROTIME, INR in the last 72 hours. CULTURES  Blood 1/4:  Klebsiella aerogenes (2)    Antibiotic Interpretation CHRISTELLE Status    amoxicillin-clavulanate Resistant >16/8 mcg/mL     ampicillin Resistant >16 mcg/mL     ceFAZolin Resistant >16 mcg/mL     cefepime Sensitive <=2 mcg/mL     cefTRIAXone Resistant 32 mcg/mL     cefuroxime Resistant >16 mcg/mL     ciprofloxacin Sensitive <=1 mcg/mL     ertapenem Sensitive <=0.5 mcg/mL     gentamicin Sensitive <=4 mcg/mL     meropenem Sensitive <=1 mcg/mL     piperacillin-tazobactam Intermediate 32 mcg/mL     trimethoprim-sulfamethoxazole Sensitive <=2/38 mcg/mL         Urine: NGTD    Blood 1/9: ORDERED    RADIOLOGY  MRI ABDOMEN WO CONTRAST MRCP 1/5/2020   Final Result   No evidence of choledocholithiasis or dilation of the biliary collecting   system on limited imaging      Small bright T2 lesion within the head of the pancreas which could represent   a small cyst.  These lesions are typically not followed beyond age of [de-identified]   years.          US GALLBLADDER RUQ 1/4/2020   Final Result 1. Cholelithiasis without sonographic evidence for acute cholecystitis. 2. Fatty liver versus diffuse hepatocellular disease. 3. Nonvisualization of the pancreas. CTA CHEST ABDOMEN PELVIS W CONTRAST 1/4/2020   Final Result   1. CTA CHEST: No acute vascular abnormality; no aneurysm or dissection. 2.  Pulmonary sequela typical of that seen with smoking, including COPD. 3. Chronic mild T6 compression fracture. 4. CTA ABDOMEN/PELVIS: No acute vascular abnormality; no aneurysm leak or   dissection. 5. 3.0 cm abdominal aortic aneurysm. Recommend follow-up every 3 years. 6.  No CT evidence of an acute intra-abdominal or intrapelvic process. 7. Cholelithiasis without definite CT findings of acute cholecystitis. Gallbladder appears contracted. 8.  No findings to suggest acute appendicitis; no ureter calculus or   hydronephrosis. 9. Hepatic steatosis. 10. Chronic, nonspecific L1 sclerosis and compression fracture. 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 RECOMMENDATIONS:   3 year imaging follow-up AAA         CT HEAD WO CONTRAST 1/4/2020   Final Result   1. No acute intracranial abnormality. 2. Small left forehead scalp contusion/hematoma. 3. Possible acute left maxillary sinusitis. 4. Stable appearing senescent changes. XR CHEST PORTABLE   Final Result   No radiographic evidence of acute pulmonary disease. Yokasta Ozuna have reviewed the chart on Dianne Medina and personally interviewed and examined patient, reviewed the data (labs and imaging) and after discussion with my PA formulated the plan. Agree with note with the following edits. HPI:     Doing better. On clear liquids. Has mild abdominal distension. No nausea. No abdominal pain. No fever. I reviewed the patient's Past Medical History, Past Surgical History, Medications, and Allergies.      Physical exam: /70   Pulse 98   Temp 97.2 °F (36.2 °C) (Oral)   Resp 14   Ht 5' 11\" (1.803 m)   Wt 195 lb 8 oz (88.7 kg)   SpO2 92%   BMI 27.27 kg/m²     Gen: No distress. Alert. Eyes: PERRL. No sclera icterus. No conjunctival injection. ENT: No discharge. Pharynx clear. Neck: Trachea midline. Normal thyroid. Resp: No accessory muscle use. No crackles. No wheezes. No rhonchi. No dullness on percussion. CV: Regular rate. Regular rhythm. No murmur or rub. No edema. GI: appropriate tenderness, CHRISTOFER drain present. Mildly distended. No masses. No organomegaly. Normal bowel sounds. No hernia. Assessment/Plan:    #Sepsis- POA  #Klebsiella Bactermia - Due to  acute cholecystitis   - s.p IVF. BP stable  - has been on ABX recently, PCT 22.7 - trended down  - IV Zosyn D#7,  - BP stable, lactic acid trended down. - repeat blood cx pending  - Sepsis resolved     #Acute cholecystitis with klebsiella bacteremia  - recently treated with IV and oral abx.  Comes back with similar presentation  - had open cholecystectomy  1/6 - POD #4  - diet per surgery - clears started 1/9   - Zosyn D#7    #Elevated LFTs  - sec to above   - trending down     #Pancytopenia - Resolved  #Chronic Normocytic Anemia  #Chronic TCP  - in setting of infection, has chronic TCP & anemia  - avoiding heparin use, started SCDs  - monitor CBC- stable     #Acute on Chronic Hypoxic Hypercapnic respiratory failure  #Hx of COPD   - was monitored in ICU post op with bipap   - now improved and - sats stable on home 3 L  - cont [address] Albuterol Symbicort/Spiriva     #DM Type 2   - controlled  - hold Metformin and Actos, chk A1c: 6.6  - on Low SSI q4h    #HTN  - controlled  - cont Diltiazem   - monitor    #PAF   - held Eliquis- discussed with surgery- resumed 1/10  - cont Rythmol and Diltiazem     #GERD  - cont Protonix    #L1 compression fracture  - noted on prior admission - CT with approx 75% loss of central vertebral body height, new since 2017, appears chronic as pt with - no c/o back pain  - daughter reported a fall several months prior    #AAA  - noted on prior admission  - mild at 3.1 cm; infrarenal   - recommended f/u every 3 years  - family aware    #Abnormal CT of thoracic aorta  - noted on prior admission  - CT findings consistent with interval development of a 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

## 2021-01-10 NOTE — PROGRESS NOTES
Inpatient Occupational Therapy Treatment    Unit: ED  Date:  1/10/2021  Patient Name:    Prema Harkins  Admitting diagnosis:  Acute cholecystitis [K81.0]  Admit Date:  1/4/2021  Precautions/Restrictions/WB Status/ Lines/ Wounds/ Oxygen: Fall risk, Bed/chair alarm, Lines -IV, Supplemental O2 (4L) and Drains (CHRISTOFER on R), Telemetry and Continuous pulse oximetry, incision with staples on L abdomen    Treatment Time: 949-1024  Treatment Number: 2    Billable Treatment Time: 45 minutes   Total Treatment Time:   45  minutes        Discharge Recommendations: SNF; Pending progress possibly Home with 24/7 assist and home therapy  DME needs for discharge: Needs Met       Therapy recommendations for staff:   Assist x1 with RW for transfers to/from BSC/chair    History of Present Illness: 78y.o. year old male with medical history of coronary artery disease, COPD on chronic 3 L of oxygen, diabetes mellitus type 2, hyperlipidemia, hypertension, ELAINE on CPAP, atrial fibrillation on Eliquis and cholecystitis presents for evaluation of fall at home. Patient reports lower extremity weakness with his legs giving out. 1/6 - laparoscopic cholecystectomy, transferred to ICU after OR due to poor level of alertness and abnormal labs/vitals  1/8 - PT/OT orders to resume therapy    Home Health S4 Level Recommendation:  NA  AM-PAC Score: AM-PAC Inpatient Daily Activity Raw Score: 14    Pain  No  Rating:NA  Location:  Pain Medicine Status: Denies need      Cognition  Impulsive  A&Oriented to person and generally to time, otherwise not assessed  Able to follow 2 step commands    Subjective  Patient lying supine in bed with no family present - no visitors present due to COVID-19 restrictions  Pt agreeable to this OT eval & tx.      Balance  Functional Sitting Balance:  Improved-Supervision sitting EOB  Functional Standing Balance: Fair-needs RW and CGA    Bed mobility:    Supine to sit:   CGA  Sit to supine:   Not tested Rolling:    Not Tested  Scooting in sitting:  Supervision  Scooting to head of bed:   Not Tested    Bridging:   Not Tested    Transfers:    Sit to stand:  CGA with RW  Stand to sit:  CGA   Bed to chair:   Alex with RW  Standard toilet: Not Tested  Bed to [facility]:  Not Tested     Dressing:      UE:   Not Tested  LE:    Total A     Bathing:    UE:  Not Tested  LE:  Not Tested    Eating:   Not Tested    Toileting:  Not Tested    Activity Tolerance   Pt completed therapy session with No adverse symptoms noted w/activity    BP HR SpO2   Supine, at rest 149/72 89bpm 98% on 6L   Seated at /79 100bpm 99%   Post transfer 152/70 94bpm 99%       Positioning Needs:   Up in chair, call light and needs in reach. Patient/Family Education:   Role of OT  Recommendations for DC  Safe RW use/hand placement    Assessment : Pt tolerated session well today, with marked improvement with mobility and cognition. Pt will continue to benefit from going to SNF to continue therapy prior to going home alone to increase functional independence. If he declines, 24/7 assist will be needed at home. Goal(s) : To be met in 3 Visits:  1). Bed to toilet/BSC: Min A    To be met in 5 Visits:  1). Supine to/from Sit:  SBA  2). Upper Body Bathing:   Supervision  3). Lower Body Bathing: Mod A  4). Upper Body Dressing:  Supervision  5). Lower Body Dressing: Mod A  6). Pt to demonstrate UE exs x 15 reps with minimal cues    Rehabilitation Potential:  Good for goals listed above. Strengths for achieving goals include: Pt motivated, PLOF and Pt cooperative  Barriers to achieving goals include:  Complexity of condition     Plan: To be seen 3-5 x/wk while in acute care setting for therapeutic exercises, bed mobility, transfers, dressing, bathing, family/patient education, ADL/IADL retraining, energy conservation training.        Verner Agent [address], OTR/L  #[id number] If patient discharges from this facility prior to next visit, this note will serve as the Discharge Summary

## 2021-01-10 NOTE — PROGRESS NOTES
Sitting up in chair with eyes closed. Resp e/e. No distress noted. Call light in reach. Bed alarm in place and turned on.

## 2021-01-10 NOTE — PLAN OF CARE
Problem: Nutrition  Goal: Optimal nutrition therapy  Outcome: Ongoing     Problem: Infection:  Goal: Will remain free from infection  Description: Will remain free from infection  Outcome: Ongoing     Problem: Safety:  Goal: Free from accidental physical injury  Description: Free from accidental physical injury  Outcome: Ongoing  Goal: Free from intentional harm  Description: Free from intentional harm  Outcome: Ongoing     Problem: Daily Care:  Goal: Daily care needs are met  Description: Daily care needs are met  Outcome: Ongoing     Problem: Pain:  Goal: Patient's pain/discomfort is manageable  Description: Patient's pain/discomfort is manageable  Outcome: Ongoing  Goal: Pain level will decrease  Description: Pain level will decrease  Outcome: Ongoing  Goal: Control of acute pain  Description: Control of acute pain  Outcome: Ongoing  Goal: Control of chronic pain  Description: Control of chronic pain  Outcome: Ongoing     Problem: Skin Integrity:  Goal: Skin integrity will stabilize  Description: Skin integrity will stabilize  Outcome: Ongoing     Problem: Falls - Risk of:  Goal: Will remain free from falls  Description: Will remain free from falls  Outcome: Ongoing  Goal: Absence of physical injury  Description: Absence of physical injury  Outcome: Ongoing

## 2021-01-10 NOTE — PROGRESS NOTES
Therapy finished working with pt and pt complains of abdominal pain rate as 6/10. Requesting pain medication. Oxycodone 1 tablet po given. See MAR.

## 2021-01-11 LAB
A/G RATIO: 0.8 (ref 1.1–2.2)
ALBUMIN SERPL-MCNC: 2.6 G/DL (ref 3.4–5)
ALP BLD-CCNC: 112 U/L (ref 40–129)
ALT SERPL-CCNC: 35 U/L (ref 10–40)
ANION GAP SERPL CALCULATED.3IONS-SCNC: 8 MMOL/L (ref 3–16)
AST SERPL-CCNC: 21 U/L (ref 15–37)
BASOPHILS ABSOLUTE: 0 K/UL (ref 0–0.2)
BASOPHILS RELATIVE PERCENT: 0.6 %
BILIRUB SERPL-MCNC: 1.4 MG/DL (ref 0–1)
BUN BLDV-MCNC: 10 MG/DL (ref 7–20)
CALCIUM SERPL-MCNC: 8.4 MG/DL (ref 8.3–10.6)
CHLORIDE BLD-SCNC: 97 MMOL/L (ref 99–110)
CO2: 33 MMOL/L (ref 21–32)
CREAT SERPL-MCNC: 0.7 MG/DL (ref 0.8–1.3)
EOSINOPHILS ABSOLUTE: 0 K/UL (ref 0–0.6)
EOSINOPHILS RELATIVE PERCENT: 0.7 %
GFR AFRICAN AMERICAN: >60
GFR NON-AFRICAN AMERICAN: >60
GLOBULIN: 3.1 G/DL
GLUCOSE BLD-MCNC: 151 MG/DL (ref 70–99)
GLUCOSE BLD-MCNC: 152 MG/DL (ref 70–99)
GLUCOSE BLD-MCNC: 155 MG/DL (ref 70–99)
GLUCOSE BLD-MCNC: 164 MG/DL (ref 70–99)
GLUCOSE BLD-MCNC: 177 MG/DL (ref 70–99)
GLUCOSE BLD-MCNC: 239 MG/DL (ref 70–99)
HCT VFR BLD CALC: 24.4 % (ref 40.5–52.5)
HEMOGLOBIN: 8 G/DL (ref 13.5–17.5)
LYMPHOCYTES ABSOLUTE: 1.3 K/UL (ref 1–5.1)
LYMPHOCYTES RELATIVE PERCENT: 21.9 %
MAGNESIUM: 1.8 MG/DL (ref 1.8–2.4)
MCH RBC QN AUTO: 27.6 PG (ref 26–34)
MCHC RBC AUTO-ENTMCNC: 32.6 G/DL (ref 31–36)
MCV RBC AUTO: 84.7 FL (ref 80–100)
MONOCYTES ABSOLUTE: 0.4 K/UL (ref 0–1.3)
MONOCYTES RELATIVE PERCENT: 7.5 %
NEUTROPHILS ABSOLUTE: 4 K/UL (ref 1.7–7.7)
NEUTROPHILS RELATIVE PERCENT: 69.3 %
PDW BLD-RTO: 16.1 % (ref 12.4–15.4)
PERFORMED ON: ABNORMAL
PLATELET # BLD: 132 K/UL (ref 135–450)
PMV BLD AUTO: 9.8 FL (ref 5–10.5)
POTASSIUM REFLEX MAGNESIUM: 3.5 MMOL/L (ref 3.5–5.1)
RBC # BLD: 2.88 M/UL (ref 4.2–5.9)
SODIUM BLD-SCNC: 138 MMOL/L (ref 136–145)
TOTAL PROTEIN: 5.7 G/DL (ref 6.4–8.2)
WBC # BLD: 5.7 K/UL (ref 4–11)

## 2021-01-11 PROCEDURE — 83735 ASSAY OF MAGNESIUM: CPT

## 2021-01-11 PROCEDURE — 99232 SBSQ HOSP IP/OBS MODERATE 35: CPT | Performed by: INTERNAL MEDICINE

## 2021-01-11 PROCEDURE — 99024 POSTOP FOLLOW-UP VISIT: CPT | Performed by: NURSE PRACTITIONER

## 2021-01-11 PROCEDURE — 87070 CULTURE OTHR SPECIMN AEROBIC: CPT

## 2021-01-11 PROCEDURE — 6360000002 HC RX W HCPCS: Performed by: INTERNAL MEDICINE

## 2021-01-11 PROCEDURE — 6370000000 HC RX 637 (ALT 250 FOR IP): Performed by: INTERNAL MEDICINE

## 2021-01-11 PROCEDURE — 87205 SMEAR GRAM STAIN: CPT

## 2021-01-11 PROCEDURE — 36415 COLL VENOUS BLD VENIPUNCTURE: CPT

## 2021-01-11 PROCEDURE — 1200000000 HC SEMI PRIVATE

## 2021-01-11 PROCEDURE — 80053 COMPREHEN METABOLIC PANEL: CPT

## 2021-01-11 PROCEDURE — 87077 CULTURE AEROBIC IDENTIFY: CPT

## 2021-01-11 PROCEDURE — 97110 THERAPEUTIC EXERCISES: CPT

## 2021-01-11 PROCEDURE — 94761 N-INVAS EAR/PLS OXIMETRY MLT: CPT

## 2021-01-11 PROCEDURE — 87186 SC STD MICRODIL/AGAR DIL: CPT

## 2021-01-11 PROCEDURE — 6370000000 HC RX 637 (ALT 250 FOR IP): Performed by: NURSE PRACTITIONER

## 2021-01-11 PROCEDURE — 2700000000 HC OXYGEN THERAPY PER DAY

## 2021-01-11 PROCEDURE — 85025 COMPLETE CBC W/AUTO DIFF WBC: CPT

## 2021-01-11 PROCEDURE — 94640 AIRWAY INHALATION TREATMENT: CPT

## 2021-01-11 PROCEDURE — 97530 THERAPEUTIC ACTIVITIES: CPT

## 2021-01-11 PROCEDURE — 2580000003 HC RX 258: Performed by: INTERNAL MEDICINE

## 2021-01-11 PROCEDURE — 97116 GAIT TRAINING THERAPY: CPT

## 2021-01-11 PROCEDURE — 6370000000 HC RX 637 (ALT 250 FOR IP): Performed by: PHYSICIAN ASSISTANT

## 2021-01-11 PROCEDURE — 87075 CULTR BACTERIA EXCEPT BLOOD: CPT

## 2021-01-11 RX ORDER — POTASSIUM CHLORIDE 20 MEQ/1
20 TABLET, EXTENDED RELEASE ORAL ONCE
Status: COMPLETED | OUTPATIENT
Start: 2021-01-11 | End: 2021-01-11

## 2021-01-11 RX ADMIN — PIPERACILLIN SODIUM AND TAZOBACTAM SODIUM 3.38 G: 3; .375 INJECTION, POWDER, LYOPHILIZED, FOR SOLUTION INTRAVENOUS at 05:31

## 2021-01-11 RX ADMIN — DILTIAZEM HYDROCHLORIDE 180 MG: 180 CAPSULE, EXTENDED RELEASE ORAL at 08:09

## 2021-01-11 RX ADMIN — APIXABAN 5 MG: 5 TABLET, FILM COATED ORAL at 08:09

## 2021-01-11 RX ADMIN — PIPERACILLIN SODIUM AND TAZOBACTAM SODIUM 3.38 G: 3; .375 INJECTION, POWDER, LYOPHILIZED, FOR SOLUTION INTRAVENOUS at 14:52

## 2021-01-11 RX ADMIN — OXYCODONE 5 MG: 5 TABLET ORAL at 05:31

## 2021-01-11 RX ADMIN — PANTOPRAZOLE SODIUM 40 MG: 40 TABLET, DELAYED RELEASE ORAL at 05:31

## 2021-01-11 RX ADMIN — ASPIRIN 81 MG: 81 TABLET, COATED ORAL at 08:09

## 2021-01-11 RX ADMIN — MUPIROCIN: 20 OINTMENT TOPICAL at 22:44

## 2021-01-11 RX ADMIN — PROPAFENONE HYDROCHLORIDE 150 MG: 150 TABLET, FILM COATED ORAL at 05:32

## 2021-01-11 RX ADMIN — GABAPENTIN 600 MG: 300 CAPSULE ORAL at 14:50

## 2021-01-11 RX ADMIN — MUPIROCIN: 20 OINTMENT TOPICAL at 08:16

## 2021-01-11 RX ADMIN — INSULIN LISPRO 1 UNITS: 100 INJECTION, SOLUTION INTRAVENOUS; SUBCUTANEOUS at 08:16

## 2021-01-11 RX ADMIN — GABAPENTIN 600 MG: 300 CAPSULE ORAL at 22:44

## 2021-01-11 RX ADMIN — PIPERACILLIN SODIUM AND TAZOBACTAM SODIUM 3.38 G: 3; .375 INJECTION, POWDER, LYOPHILIZED, FOR SOLUTION INTRAVENOUS at 22:45

## 2021-01-11 RX ADMIN — OXYCODONE 10 MG: 5 TABLET ORAL at 11:04

## 2021-01-11 RX ADMIN — ALBUTEROL SULFATE 2.5 MG: 2.5 SOLUTION RESPIRATORY (INHALATION) at 19:05

## 2021-01-11 RX ADMIN — PROPAFENONE HYDROCHLORIDE 150 MG: 150 TABLET, FILM COATED ORAL at 22:45

## 2021-01-11 RX ADMIN — PROPAFENONE HYDROCHLORIDE 150 MG: 150 TABLET, FILM COATED ORAL at 14:50

## 2021-01-11 RX ADMIN — SODIUM CHLORIDE: 9 INJECTION, SOLUTION INTRAVENOUS at 05:31

## 2021-01-11 RX ADMIN — INSULIN LISPRO 1 UNITS: 100 INJECTION, SOLUTION INTRAVENOUS; SUBCUTANEOUS at 22:45

## 2021-01-11 RX ADMIN — POTASSIUM CHLORIDE 20 MEQ: 1500 TABLET, EXTENDED RELEASE ORAL at 08:27

## 2021-01-11 RX ADMIN — ALBUTEROL SULFATE 2.5 MG: 2.5 SOLUTION RESPIRATORY (INHALATION) at 07:04

## 2021-01-11 RX ADMIN — Medication 10 ML: at 08:09

## 2021-01-11 RX ADMIN — Medication 1 PUFF: at 07:04

## 2021-01-11 RX ADMIN — APIXABAN 5 MG: 5 TABLET, FILM COATED ORAL at 22:44

## 2021-01-11 RX ADMIN — GABAPENTIN 600 MG: 300 CAPSULE ORAL at 08:09

## 2021-01-11 ASSESSMENT — PAIN - FUNCTIONAL ASSESSMENT: PAIN_FUNCTIONAL_ASSESSMENT: ACTIVITIES ARE NOT PREVENTED

## 2021-01-11 ASSESSMENT — PAIN DESCRIPTION - LOCATION: LOCATION: ABDOMEN

## 2021-01-11 ASSESSMENT — PAIN SCALES - GENERAL: PAINLEVEL_OUTOF10: 0

## 2021-01-11 ASSESSMENT — PAIN DESCRIPTION - DESCRIPTORS: DESCRIPTORS: ACHING;DISCOMFORT;PRESSURE

## 2021-01-11 ASSESSMENT — PAIN DESCRIPTION - FREQUENCY: FREQUENCY: CONTINUOUS

## 2021-01-11 ASSESSMENT — PAIN DESCRIPTION - ONSET: ONSET: ON-GOING

## 2021-01-11 NOTE — OP NOTE
[address]                 [address] [facility] [address]                                OPERATIVE REPORT    PATIENT NAME: Stacy Mora                      :        1940  MED REC NO:   [id number]                          ROOM:       [id number]  ACCOUNT NO:   [de-identified]                           ADMIT DATE: 2021  PROVIDER:     Waldo Younger MD    DATE OF PROCEDURE:  2021    OPERATION PERFORMED:  Complicated laparoscopic, converted to open  cholecystectomy. PREOPERATIVE DIAGNOSIS:  Acute calculous cholecystitis. POSTOPERATIVE DIAGNOSIS:  Acute calculous cholecystitis. SURGEON:  Waldo Younger MD    ANESTHESIA:  General.    COMPLICATIONS:  None. ESTIMATED BLOOD LOSS:  100 mL. INDICATIONS FOR THE OPERATION:  An 29-year-old male who has had  recurrent admissions for acute calculous cholecystitis. Because of the  recurrent nature of his disease, it was recommended he undergo operative  intervention. He also was anticoagulated and he has been off of his  anticoagulation. He has clinically improved and he was thought a  suitable candidate for operative cholecystectomy. The risks and  benefits were explained to him. He understood them, accepted them and  wanted to proceed. DESCRIPTION OF OPERATION:  The patient was brought to the operating  room. General anesthesia was induced. He was prepped and draped in  usual surgical sterile fashion. A vertical supraumbilical incision was  made with a knife. Subcutaneous tissues were spread. Penetrating towel  clip was used to elevate the anterior abdominal wall. The Veress needle  was inserted. Pneumoperitoneum was established. Disposable 5 mm trocar  was passed through the incision. The laparoscope was inserted. Under  direct vision, an 11 mm port was placed in the epigastrium and two 5 mm  ports in the right upper quadrant.   We could not visualize the gallbladder. There was significant omental and pericolonic fatty  adherence to the undersurface of the liver. I began to try to take this  down. I could not find any planes that appeared to identify the  gallbladder. The gallbladder was noted to be contracted on the  patient's imaging. However, I elected to convert to an open procedure  and made a right subcostal incision. I entered the abdominal cavity. Even with direct digital palpation, I was unable to identify the  gallbladder. I went over to Radiology and reviewed all of the patient's  imaging including ultrasound, MRCP and CT. There were clearly stones in  the gallbladder fossa of the liver bed. There was seemed to be a very  contracted near almost tubular structure. With some landmarks and  review of the imaging, I went back to the operating room and was able to  identify a very small, contracted gallbladder that had stones in it. This was an extremely difficult dissection to even try to find the  gallbladder. It then required slow, meticulous dissection given that  this was very unusual and atypical anatomy. I dissected down to the  chapito hepatis as best I could. I then used a TL 30 stapler and stapled  across what I thought was the neck of the gallbladder. This was all  passed off as specimen. Stones had been removed along with the  gallbladder. Hemostasis was confirmed. A drain was left in the  subhepatic space. 0 Vicryl was used to close the peritoneal and  posterior layer. Prolene was used to close the anterior layer starting  medially and laterally and tying in the middle. Skin clips were used to  reapproximate the skin. Dressings were placed. DISPOSITION:  It should be noted this is a complicated case. It took  between hour-and-a-half to two hours to complete. It was difficult to  even identify the gallbladder.   Then the dissection to remove the  gallbladder was slow and meticulous in an effort to make sure that we protected critical structures. The patient went to Recovery in stable  condition.         Vishnu Mckeon MD    D: 01/11/2021 9:37:32       T: 01/11/2021 9:42:37     [email or web address]  Job#: [id number]     Doc#: [id number]    CC:  Venus Redman DO

## 2021-01-11 NOTE — PROGRESS NOTES
Physical Therapy  Inpatient Physical Therapy Daily Treatment Note    Unit: [address]  Date:  1/11/2021  Patient Name:    Erendira Mas  Admitting diagnosis:  Acute cholecystitis [K81.0]  Admit Date:  1/4/2021  Precautions/Restrictions:  Fall risk, Bed/chair alarm and Lines -IV, Supplemental O2 (4L) and Drains (CHRISTOEFR R side abdomen)      Discharge Recommendations: SNF pending progress. Patient could possibly dc home if he can arrange 24/7 assist  DME needs for discharge: defer to facility       Therapy recommendation for EMS Transport: can transport by wheelchair    Therapy recommendations for staff:   Assist of 1 with use of rolling walker (RW) and gait belt for all transfers and ambulation to/from [facility]  to/from chair    History of Present Illness: 78 y. o. year old male with medical history of coronary artery disease, COPD on chronic 3 L of oxygen, diabetes mellitus type 2, hyperlipidemia, hypertension, ELAINE on CPAP, atrial fibrillation on Eliquis and cholecystitis presents for evaluation of fall at home.  Patient reports lower extremity weakness with his legs giving out.    1/6 - laparoscopic cholecystectomy, transferred to ICU after OR due to poor level of alertness and abnormal labs/vitals  1/8 - PT/OT orders to resume therapy    Home Health S4 Level Recommendation: Level 3 Safety  AM-PAC Mobility Score   AM-PAC Inpatient Mobility Raw Score : 18       Treatment Time:  6281-2233  Treatment number: 2  Timed Code Treatment Minutes: 31 minutes  Total Treatment Minutes:  31  minutes    Cognition    A&O x4   Able to follow 2 step commands    Subjective  Patient lying supine in bed with no family present   Pt agreeable to this PT tx.      Pain   No  Location: n/a  Rating:    NA/10  Pain Medicine Status: Denies need     Bed Mobility   Supine to Sit:    Min A   Sit to Supine:   Not Tested  Rolling:   SBA  Scooting:   SBA    Transfer Training     Sit to stand:   CGA  Stand to sit:   CGA Bed to Chair:   CGA with use of gait belt and rolling walker (RW)    Gait Training gait completed as indicated below  Distance:      20 ft  Deviations (firm surface/linoleum):  increased YASH and forward flexed posture holds walker too far from body  Assistive Device Used:    gait belt and rolling walker (RW)  Level of Assist:    Min A   Comment: 2 persons for safety with ambulation and assist to manage oxygen tubing and IV    Stair Training deferred, pt unsafe/not appropriate to complete stairs at this time  # of Steps:   N/A  Level of Assist:  Not Tested  UE Support:  NA  Assistive Device:  N/A  Pattern:   N/A  Comments:     Therapeutic Exercise all completed bilaterally unless indicated  Ankle Pumps x 10 reps  LAQ x 10 reps    Balance  Sitting:  Good ; Supervision  Comments:     Standing: Good - ; CGA  Comments: stood to urinate with urinal with light CGA x 1    Patient Education      Role of PT, POC, Discharge recommendations, DC recommendations, transfer techniques, pursed lip breathing, pacing activity, HEP and calling for assist with mobility. Positioning Needs       Pt up in chair, alarm set, positioned in proper neutral alignment and pressure relief provided. Call light provided and all needs within reach; NP and RN in room at conclusion. ROM Measurements N/A  Knee Flexion:  Knee Extension:     Activity Tolerance   Pt completed therapy session with SOB noted with ambulation in room  Spo2 = 93 at baseline and down to 85% after ambulation; back to baseline after PLB%. Other  Patient with unsafe walker techniques, holding too far from body. Cues needed for safety. Assessment :  Patient improved with increased gait distance today, still not very safe with RW use, and will benefit with continued PT to address safety and fall prevention strategies. Recommending SNF upon discharge as patient functioning well below baseline, demonstrates good rehab potential and unable to return home due to limited or no family support, home environment not conducive to patient recovery and limited safety awareness. Goals (all goals ongoing unless otherwise indicated)  To be met in 3 visits:  1). Independent with LE Ex x 10 reps  MET     To be met in 6 visits:  1).  Supine to/from sit: SBA  2).  Sit to/from stand: SBA  3).  Bed to chair: CGA with RW  4).  Gait: Ambulate 50 ft.  with  Min A  and use of rolling walker (RW)  5).  Tolerate B LE exercises 3 sets of 10-15 reps  6).  Ascend/descend 2 steps with Min A  with use of hand rail bilateral and LRAD (least restrictive assistive device)    Plan   Continue with plan of care. Signature: Jules Cardoso [id number]    If patient discharges from this facility prior to next visit, this note will serve as the Discharge Summary.

## 2021-01-11 NOTE — PROGRESS NOTES
General Surgery [facility], [address] [facility]  Daily Progress Note    Pt Name: [address] Record Number: [id number]  Date of Birth 1940   Today's Date: 1/11/2021    ASSESSMENT  1. POD #5 lap to open connie   2. ABD: soft, + distention improved from Friday, no tenderness, no N/V, + flatus, no BM, dressing with bloody drainage: removed,serosanguinous drainage oozing around CHRISTOFER drain, also some oozing from lateral side of incision with surrounding erythema noted: 2 sutures removed, very small amount of creamy pink drainage, culture obtained, packed open wound with 2x2 gauze and covered, CHRISTOFER in place    3. T bili continues to come down: 1.4  4. AST/ALT normal  5. Leuks 5.7  6. + BC is Klebsiella  7. CHRISTOFER 20: bloody  8. VS: temp 99.5, tachy  9. Eliquis has been on hold. 10. Hep A positive   11. PT is up in chair: states he 'feels better\"    PLAN  1. clears  2. PT/OT: ambulate patient/OOB as tolerated  3. D/C tylenol   4. IVF decrease rate  5. D/C vázquez   6. Zosyn  7. Eliqius started 1/10.   8. IS q 1 hour while awake  9. Await incision culture results. 10. PT looks good and is up in the chair POD #5: awaiting return of bowel function. Maggie Eduardo has improved in some ways from yesterday. Pain is well controlled. He has no nausea and no vomiting. He has passed flatus and has not had a bowel movement. He is tolerating some clear liquids. Current activity is up with assistance    OBJECTIVE  VITALS:  height is 5' 11\" (1.803 m) and weight is 201 lb 15.1 oz (91.6 kg). His axillary temperature is 97.1 °F (36.2 °C). His blood pressure is 136/58 (abnormal) and his pulse is 64. His respiration is 18 and oxygen saturation is 94%.  VITALS:  BP (!) 136/58   Pulse 64   Temp 97.1 °F (36.2 °C) (Axillary)   Resp 18   Ht 5' 11\" (1.803 m)   Wt 201 lb 15.1 oz (91.6 kg)   SpO2 94%   BMI 28.17 kg/m²   INTAKE/OUTPUT:      Intake/Output Summary (Last 24 hours) at 1/11/2021 3889 Last data filed at 1/11/2021 1139  Gross per 24 hour   Intake 2391 ml   Output 405 ml   Net 1986 ml     GENERAL: alert, cooperative, no distress    I/O last 3 completed shifts: In: 3116 [P.O.:1200;  I.V.:1791; IV Piggyback:125]  Out: 520 [Urine:500; Drains:20]  I/O this shift:  In: -   Out: 305 [Urine:280; Drains:25]    LABS  Recent Labs     01/11/21  0516   WBC 5.7   HGB 8.0*   HCT 24.4*   *      K 3.5   CL 97*   CO2 33*   BUN 10   CREATININE 0.7*   MG 1.80   CALCIUM 8.4   AST 21   ALT 35   BILITOT 1.4*     CBC with Differential:    Lab Results   Component Value Date    WBC 5.7 01/11/2021    RBC 2.88 01/11/2021    HGB 8.0 01/11/2021    HCT 24.4 01/11/2021     01/11/2021    MCV 84.7 01/11/2021    MCH 27.6 01/11/2021    MCHC 32.6 01/11/2021    RDW 16.1 01/11/2021    SEGSPCT 68.5 07/02/2011    BANDSPCT 1 01/10/2021    LYMPHOPCT 21.9 01/11/2021    MONOPCT 7.5 01/11/2021    EOSPCT 0.3 07/02/2011    BASOPCT 0.6 01/11/2021    MONOSABS 0.4 01/11/2021    LYMPHSABS 1.3 01/11/2021    EOSABS 0.0 01/11/2021    BASOSABS 0.0 01/11/2021    DIFFTYPE Auto 07/02/2011     CMP:    Lab Results   Component Value Date     01/11/2021    K 3.5 01/11/2021    CL 97 01/11/2021    CO2 33 01/11/2021    BUN 10 01/11/2021    CREATININE 0.7 01/11/2021    GFRAA >60 01/11/2021    GFRAA >60 07/01/2011    AGRATIO 0.8 01/11/2021    LABGLOM >60 01/11/2021    GLUCOSE 152 01/11/2021    PROT 5.7 01/11/2021    PROT 7.0 08/10/2010    LABALBU 2.6 01/11/2021    CALCIUM 8.4 01/11/2021    BILITOT 1.4 01/11/2021    ALKPHOS 112 01/11/2021    AST 21 01/11/2021    ALT 35 01/11/2021         Lois Hill  Electronically signed 1/11/2021 at 1:49 PM

## 2021-01-11 NOTE — PROGRESS NOTES
 dextrose         PRN Meds:  potassium chloride **OR** potassium alternative oral replacement **OR** potassium chloride, magnesium sulfate, oxyCODONE **OR** oxyCODONE, morphine, glucose, dextrose, glucagon (rDNA), dextrose, sodium chloride flush, albuterol      Data:  CBC:   Recent Labs     01/09/21  1113 01/10/21  0411 01/11/21  0516   WBC 6.7 6.1 5.7   HGB 7.9* 7.6* 8.0*   HCT 24.2* 23.4* 24.4*   MCV 83.9 84.6 84.7   PLT 92* 103* 132*     BMP:   Recent Labs     01/08/21  0944 01/09/21  1113 01/10/21  0410 01/11/21  0516    140 142 138   K 4.3 3.8 3.5 3.5   CL 99 99 99 97*   CO2 25 33* 34* 33*   PHOS 2.5  --   --   --    BUN 15 12 11 10   CREATININE 0.7* 0.9 0.7* 0.7*     LIVER PROFILE:   Recent Labs     01/09/21  1113 01/10/21  0410 01/11/21  0516   AST 29 24 21   ALT 47* 42* 35   BILITOT 1.5* 1.3* 1.4*   ALKPHOS 111 118 112     PT/INR:   No results for input(s): PROTIME, INR in the last 72 hours. CULTURES  Blood 1/4:  Klebsiella aerogenes (2)    Antibiotic Interpretation CHRISTELLE Status    amoxicillin-clavulanate Resistant >16/8 mcg/mL     ampicillin Resistant >16 mcg/mL     ceFAZolin Resistant >16 mcg/mL     cefepime Sensitive <=2 mcg/mL     cefTRIAXone Resistant 32 mcg/mL     cefuroxime Resistant >16 mcg/mL     ciprofloxacin Sensitive <=1 mcg/mL     ertapenem Sensitive <=0.5 mcg/mL     gentamicin Sensitive <=4 mcg/mL     meropenem Sensitive <=1 mcg/mL     piperacillin-tazobactam Intermediate 32 mcg/mL     trimethoprim-sulfamethoxazole Sensitive <=2/38 mcg/mL         Urine: NGTD    Blood 1/9: NGTD    RADIOLOGY  MRI ABDOMEN WO CONTRAST MRCP 1/5/2020   Final Result   No evidence of choledocholithiasis or dilation of the biliary collecting   system on limited imaging      Small bright T2 lesion within the head of the pancreas which could represent   a small cyst.  These lesions are typically not followed beyond age of [de-identified]   years.          US GALLBLADDER RUQ 1/4/2020   Final Result 1. Cholelithiasis without sonographic evidence for acute cholecystitis. 2. Fatty liver versus diffuse hepatocellular disease. 3. Nonvisualization of the pancreas. CTA CHEST ABDOMEN PELVIS W CONTRAST 1/4/2020   Final Result   1. CTA CHEST: No acute vascular abnormality; no aneurysm or dissection. 2.  Pulmonary sequela typical of that seen with smoking, including COPD. 3. Chronic mild T6 compression fracture. 4. CTA ABDOMEN/PELVIS: No acute vascular abnormality; no aneurysm leak or   dissection. 5. 3.0 cm abdominal aortic aneurysm. Recommend follow-up every 3 years. 6.  No CT evidence of an acute intra-abdominal or intrapelvic process. 7. Cholelithiasis without definite CT findings of acute cholecystitis. Gallbladder appears contracted. 8.  No findings to suggest acute appendicitis; no ureter calculus or   hydronephrosis. 9. Hepatic steatosis. 10. Chronic, nonspecific L1 sclerosis and compression fracture. 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 RECOMMENDATIONS:   3 year imaging follow-up AAA         CT HEAD WO CONTRAST 1/4/2020   Final Result   1. No acute intracranial abnormality. 2. Small left forehead scalp contusion/hematoma. 3. Possible acute left maxillary sinusitis. 4. Stable appearing senescent changes. XR CHEST PORTABLE   Final Result   No radiographic evidence of acute pulmonary disease. Denise Tabares have reviewed the chart on Verónica Bowers and personally interviewed and examined patient, reviewed the data (labs and imaging) and after discussion with my PA formulated the plan. Agree with note with the following edits. HPI:     Doing better. On clear liquids. Has mild abdominal distension. No nausea. No abdominal pain. No fever. 1/11- no fever. Tolerating clears. Feeling better. I reviewed the patient's Past Medical History, Past Surgical History, Medications, and Allergies.      Physical exam: /62   Pulse 90   Temp 97.3 °F (36.3 °C) (Oral)   Resp 18   Ht 5' 11\" (1.803 m)   Wt 201 lb 15.1 oz (91.6 kg)   SpO2 90%   BMI 28.17 kg/m²     Gen: No distress. Alert. Eyes: PERRL. No sclera icterus. No conjunctival injection. ENT: No discharge. Pharynx clear. Neck: Trachea midline. Normal thyroid. Resp: No accessory muscle use. No crackles. No wheezes. No rhonchi. No dullness on percussion. CV: Regular rate. Regular rhythm. No murmur or rub. No edema. GI: appropriate tenderness, CHRISTOFER drain present. Mildly distended. No masses. No organomegaly. Normal bowel sounds. No hernia. Assessment/Plan:    #Sepsis- POA  #Klebsiella Bactermia - Due to  acute cholecystitis   - s.p IVF. BP stable  - has been on ABX recently, PCT 22.7 - trended down  - IV Zosyn D#8/10  - BP stable, lactic acid trended down. - repeat blood cx NG  - Sepsis resolved     #Acute cholecystitis with klebsiella bacteremia  - recently treated with IV and oral abx.  Comes back with similar presentation  - had open cholecystectomy  1/6 - POD #5  - diet per surgery - clears started 1/9   - Zosyn D#8/10    #Elevated LFTs  - sec to above   - trended down     #Pancytopenia - Resolved  #Chronic Normocytic Anemia  #Chronic TCP  - in setting of infection, has chronic TCP & anemia  - avoided heparin use, started SCDs  - monitor CBC- stable     #Acute on Chronic Hypoxic Hypercapnic respiratory failure  #Hx of COPD   - was monitored in ICU post op with bipap   - now improved and - sats stable on home 3 L  - cont [address] Albuterol Symbicort/Spiriva     #DM Type 2   - controlled  - hold Metformin and Actos, chk A1c: 6.6%  - on Low SSI q4h    #HTN  - controlled  - cont Diltiazem   - monitor    #PAF   - held Eliquis for surgery- discussed with surgery team- resumed 1/10  - cont Rythmol and Diltiazem     #GERD  - cont Protonix    #L1 compression fracture  - noted on prior admission

## 2021-01-11 NOTE — CARE COORDINATION
INTERDISCIPLINARY PLAN OF CARE CONFERENCE    Date/Time: 1/11/2021 2:38 PM  Completed by: Erick Telles MSW, LSW. Case Management      Patient Name:  Prema Harkins  YOB: 1940  Admitting Diagnosis: Acute cholecystitis [K81.0]     Admit Date/Time:  1/4/2021  5:52 PM    Chart reviewed. Interdisciplinary team contacted or reviewed plan related to patient progress and discharge plans. Disciplines included Case Management, Nursing, and Dietitian. Current Status: Ongoing   PT/OT recommendation for discharge plan of care: SNF    Expected D/C Disposition:  Home vs SNF  Confirmed plan with patient and/or family Yes confirmed with: pt's son Constanza Funk at [phone]. Discharge Plan Comments: Chart review completed. Attempted calling pt's bedside phone but there was no answer. Called and spoke with pt's son Harrison Ponce. Discussed therapy recommendations for SNF. He stated that pt will likely not agree to go to a SNF but would agree to having HHC. Explained that pt would get therapy about everyday at a SNF and not with HHC. Harrison Ponce stated understanding and that pt will likely still want to return home with Kristine Booker. He stated that his sister Shakira Shepherd will be staying with pt for a few days when discharge to assist and the other siblings can help out as well. He is aware CM will work on contacting pt to determine what [address] he would like and confirm he does not want a SNF. He stated understanding and denied needs or questions for CM at this time. Home O2 in place on admit: No    CM will follow for discharge needs. Please notify CM if needs or concerns arise.

## 2021-01-11 NOTE — FLOWSHEET NOTE
01/10/21 2119   Vital Signs   Temp 97.1 °F (36.2 °C)   Temp Source Oral   Pulse 77   Heart Rate Source Monitor   Resp 16   /61   BP Location Left upper arm   Patient Position Semi fowlers   Level of Consciousness Alert (0)   MEWS Score 1   Oxygen Therapy   SpO2 97 %   O2 Device Nasal cannula   O2 Flow Rate (L/min) 3.5 L/min   Assessment complete- see flowsheets. Pt resting in bed at this time, call light within reach, telemetry and continuous pulse ox in place. PRN meds given per pt request and PRN order parameters. Pt declined insulin administration since he hasn't eaten much today. Pt denies further needs at this time. CHRISTOFER dressing still intact and remains unsaturated at this time, dressing left in place from this early AM change. Will continue to monitor.   Collin Bashir RN

## 2021-01-11 NOTE — PLAN OF CARE
Problem: Nutrition  Goal: Optimal nutrition therapy  Outcome: Ongoing     Problem: Infection:  Goal: Will remain free from infection  Description: Will remain free from infection  Outcome: Ongoing     Problem: Safety:  Goal: Free from accidental physical injury  Description: Free from accidental physical injury  Outcome: Ongoing  Goal: Free from intentional harm  Description: Free from intentional harm  Outcome: Ongoing     Problem: Daily Care:  Goal: Daily care needs are met  Description: Daily care needs are met  Outcome: Ongoing     Problem: Pain:  Goal: Patient's pain/discomfort is manageable  Description: Patient's pain/discomfort is manageable  Outcome: Ongoing  Goal: Pain level will decrease  Description: Pain level will decrease  Outcome: Ongoing  Goal: Control of acute pain  Description: Control of acute pain  Outcome: Ongoing  Goal: Control of chronic pain  Description: Control of chronic pain  Outcome: Ongoing     Problem: Skin Integrity:  Goal: Skin integrity will stabilize  Description: Skin integrity will stabilize  Outcome: Ongoing     Problem: Falls - Risk of:  Goal: Will remain free from falls  Description: Will remain free from falls  Outcome: Ongoing  Goal: Absence of physical injury  Description: Absence of physical injury  Outcome: Ongoing     Problem: Skin Integrity:  Goal: Will show no infection signs and symptoms  Description: Will show no infection signs and symptoms  Outcome: Ongoing  Goal: Absence of new skin breakdown  Description: Absence of new skin breakdown  Outcome: Ongoing

## 2021-01-11 NOTE — PROGRESS NOTES
Inpatient Occupational Therapy Treatment    Unit: ED  Date:  1/11/2021  Patient Name:    Barbra Minaya  Admitting diagnosis:  Acute cholecystitis [K81.0]  Admit Date:  1/4/2021  Precautions/Restrictions/WB Status/ Lines/ Wounds/ Oxygen: Fall risk, Bed/chair alarm, Lines -IV, Supplemental O2 (4L) and Drains (CHRISTOFER on R), Telemetry and Continuous pulse oximetry, incision with staples on L abdomen    Treatment Time: 9577-1117  Treatment Number: 3    Billable Treatment Time: 25 minutes   Total Treatment Time:   25  minutes        Discharge Recommendations: SNF; Pending progress possibly Home with 24/7 assist and home therapy  DME needs for discharge: Needs Met       Therapy recommendations for staff:   Assist x1 with RW for transfers to/from BSC/chair    History of Present Illness: 78y.o. year old male with medical history of coronary artery disease, COPD on chronic 3 L of oxygen, diabetes mellitus type 2, hyperlipidemia, hypertension, ELAINE on CPAP, atrial fibrillation on Eliquis and cholecystitis presents for evaluation of fall at home. Patient reports lower extremity weakness with his legs giving out. 1/6 - laparoscopic cholecystectomy, transferred to ICU after OR due to poor level of alertness and abnormal labs/vitals  1/8 - PT/OT orders to resume therapy    Home Health S4 Level Recommendation:  NA  AM-PAC Score: AM-PAC Inpatient Daily Activity Raw Score: 14    Pain  No  Rating:NA  Location:  Pain Medicine Status: Denies need      Cognition   A and O x4  Able to follow 2 step commands    Subjective  Patient lying supine in bed with no family present - no visitors present due to COVID-19 restrictions  Pt agreeable to this OT eval & tx.      Balance  Functional Sitting Balance:  Improved-Supervision sitting EOB  Functional Standing Balance: Fair-Min A with RW    Bed mobility:    Supine to sit:   Min A  Sit to supine:   Not tested  Rolling:    Not Tested  Scooting in sitting:  Min A Scooting to head of bed:   Not Tested    Bridging:   Not Tested    Transfers:    Sit to stand:  CGA  Stand to sit:  CGA   Bed to chair:   CGA with RW  Standard toilet: Not Tested  Bed to [facility]:  Not Tested   Functional mobility bed to door with assist x2 for line mangement CGA with RW for balance    Dressing:      UE:   Not Tested  LE:    Total A     Bathing:    UE:  Not Tested  LE:  Not Tested    Eating:   Modified Independent    Toileting:  Not Tested    Activity Tolerance   Pt completed therapy session with No adverse symptoms noted w/activity  Spo2 = 93 at baseline and down to 85% after ambulation; back to baseline after PLB    Positioning Needs:   Up in chair, call light and needs in reach. Patient/Family Education:   Role of OT  Recommendations for DC  Safe RW use/hand placement    Assessment : Pt participated well in session and motivated to improve functional independence. Pt needing increased assistance with bed mobility today Min A. And CGA/MIn A with functional mobility however able to walk towards door and back to bed today. Pt performed bilateral UB exercise x10-15 reps for shld flex/ext, elbow flex/ext, and hand open/close. Pt will continue to benefit from going to SNF to continue therapy prior to going home alone to increase functional independence. If he declines, 24/7 assist will be needed at home. Goal(s) : To be met in 3 Visits:  1). Bed to toilet/BSC: Min A, goal met 1/11, increase to SBA     To be met in 5 Visits:  1). Supine to/from Sit:  SBA  2). Upper Body Bathing:   Supervision  3). Lower Body Bathing: Mod A  4). Upper Body Dressing:  Supervision  5). Lower Body Dressing: Mod A  6). Pt to demonstrate UE exs x 15 reps with minimal cues    Rehabilitation Potential:  Good for goals listed above.     Strengths for achieving goals include: Pt motivated, PLOF and Pt cooperative  Barriers to achieving goals include:  Complexity of condition Plan:  To be seen 3-5 x/wk while in acute care setting for therapeutic exercises, bed mobility, transfers, dressing, bathing, family/patient education, ADL/IADL retraining, energy conservation training.        Kimberly Friend, OTR/L [id number]        If patient discharges from this facility prior to next visit, this note will serve as the Discharge Summary

## 2021-01-12 ENCOUNTER — APPOINTMENT (OUTPATIENT)
Dept: GENERAL RADIOLOGY | Age: 81
DRG: 853 | End: 2021-01-12
Payer: MEDICARE

## 2021-01-12 PROBLEM — E44.0 MODERATE PROTEIN-CALORIE MALNUTRITION (HCC): Status: ACTIVE | Noted: 2021-01-12

## 2021-01-12 LAB
A/G RATIO: 0.7 (ref 1.1–2.2)
ALBUMIN SERPL-MCNC: 2.6 G/DL (ref 3.4–5)
ALP BLD-CCNC: 107 U/L (ref 40–129)
ALT SERPL-CCNC: 31 U/L (ref 10–40)
ANION GAP SERPL CALCULATED.3IONS-SCNC: 7 MMOL/L (ref 3–16)
AST SERPL-CCNC: 20 U/L (ref 15–37)
BASOPHILS ABSOLUTE: 0 K/UL (ref 0–0.2)
BASOPHILS RELATIVE PERCENT: 0.5 %
BILIRUB SERPL-MCNC: 1.3 MG/DL (ref 0–1)
BUN BLDV-MCNC: 9 MG/DL (ref 7–20)
CALCIUM SERPL-MCNC: 8.6 MG/DL (ref 8.3–10.6)
CHLORIDE BLD-SCNC: 99 MMOL/L (ref 99–110)
CO2: 35 MMOL/L (ref 21–32)
CREAT SERPL-MCNC: 0.7 MG/DL (ref 0.8–1.3)
EOSINOPHILS ABSOLUTE: 0 K/UL (ref 0–0.6)
EOSINOPHILS RELATIVE PERCENT: 0.8 %
GFR AFRICAN AMERICAN: >60
GFR NON-AFRICAN AMERICAN: >60
GLOBULIN: 3.6 G/DL
GLUCOSE BLD-MCNC: 160 MG/DL (ref 70–99)
GLUCOSE BLD-MCNC: 180 MG/DL (ref 70–99)
GLUCOSE BLD-MCNC: 182 MG/DL (ref 70–99)
GLUCOSE BLD-MCNC: 220 MG/DL (ref 70–99)
GLUCOSE BLD-MCNC: 283 MG/DL (ref 70–99)
HCT VFR BLD CALC: 25.4 % (ref 40.5–52.5)
HEMOGLOBIN: 8.2 G/DL (ref 13.5–17.5)
LYMPHOCYTES ABSOLUTE: 1.2 K/UL (ref 1–5.1)
LYMPHOCYTES RELATIVE PERCENT: 20.6 %
MAGNESIUM: 1.7 MG/DL (ref 1.8–2.4)
MCH RBC QN AUTO: 27.4 PG (ref 26–34)
MCHC RBC AUTO-ENTMCNC: 32.1 G/DL (ref 31–36)
MCV RBC AUTO: 85.3 FL (ref 80–100)
MONOCYTES ABSOLUTE: 0.5 K/UL (ref 0–1.3)
MONOCYTES RELATIVE PERCENT: 8.7 %
NEUTROPHILS ABSOLUTE: 4 K/UL (ref 1.7–7.7)
NEUTROPHILS RELATIVE PERCENT: 69.4 %
PDW BLD-RTO: 16.3 % (ref 12.4–15.4)
PERFORMED ON: ABNORMAL
PLATELET # BLD: 141 K/UL (ref 135–450)
PMV BLD AUTO: 9.5 FL (ref 5–10.5)
POTASSIUM REFLEX MAGNESIUM: 3.4 MMOL/L (ref 3.5–5.1)
RBC # BLD: 2.98 M/UL (ref 4.2–5.9)
SODIUM BLD-SCNC: 141 MMOL/L (ref 136–145)
TOTAL PROTEIN: 6.2 G/DL (ref 6.4–8.2)
WBC # BLD: 5.8 K/UL (ref 4–11)

## 2021-01-12 PROCEDURE — 6370000000 HC RX 637 (ALT 250 FOR IP): Performed by: INTERNAL MEDICINE

## 2021-01-12 PROCEDURE — 99024 POSTOP FOLLOW-UP VISIT: CPT | Performed by: NURSE PRACTITIONER

## 2021-01-12 PROCEDURE — 6370000000 HC RX 637 (ALT 250 FOR IP): Performed by: NURSE PRACTITIONER

## 2021-01-12 PROCEDURE — 97112 NEUROMUSCULAR REEDUCATION: CPT

## 2021-01-12 PROCEDURE — 6370000000 HC RX 637 (ALT 250 FOR IP): Performed by: PHYSICIAN ASSISTANT

## 2021-01-12 PROCEDURE — 2500000003 HC RX 250 WO HCPCS: Performed by: NURSE PRACTITIONER

## 2021-01-12 PROCEDURE — 6360000002 HC RX W HCPCS: Performed by: NURSE PRACTITIONER

## 2021-01-12 PROCEDURE — 80053 COMPREHEN METABOLIC PANEL: CPT

## 2021-01-12 PROCEDURE — 6360000002 HC RX W HCPCS: Performed by: INTERNAL MEDICINE

## 2021-01-12 PROCEDURE — 36415 COLL VENOUS BLD VENIPUNCTURE: CPT

## 2021-01-12 PROCEDURE — 97530 THERAPEUTIC ACTIVITIES: CPT

## 2021-01-12 PROCEDURE — 99232 SBSQ HOSP IP/OBS MODERATE 35: CPT | Performed by: INTERNAL MEDICINE

## 2021-01-12 PROCEDURE — 85025 COMPLETE CBC W/AUTO DIFF WBC: CPT

## 2021-01-12 PROCEDURE — 1200000000 HC SEMI PRIVATE

## 2021-01-12 PROCEDURE — 83735 ASSAY OF MAGNESIUM: CPT

## 2021-01-12 PROCEDURE — 71045 X-RAY EXAM CHEST 1 VIEW: CPT

## 2021-01-12 PROCEDURE — 2580000003 HC RX 258: Performed by: INTERNAL MEDICINE

## 2021-01-12 PROCEDURE — 2700000000 HC OXYGEN THERAPY PER DAY

## 2021-01-12 PROCEDURE — 6360000002 HC RX W HCPCS: Performed by: PHYSICIAN ASSISTANT

## 2021-01-12 PROCEDURE — 94761 N-INVAS EAR/PLS OXIMETRY MLT: CPT

## 2021-01-12 PROCEDURE — 94640 AIRWAY INHALATION TREATMENT: CPT

## 2021-01-12 RX ORDER — CIPROFLOXACIN 2 MG/ML
400 INJECTION, SOLUTION INTRAVENOUS EVERY 12 HOURS
Status: DISCONTINUED | OUTPATIENT
Start: 2021-01-12 | End: 2021-01-14 | Stop reason: HOSPADM

## 2021-01-12 RX ORDER — MAGNESIUM SULFATE 1 G/100ML
1 INJECTION INTRAVENOUS ONCE
Status: COMPLETED | OUTPATIENT
Start: 2021-01-12 | End: 2021-01-12

## 2021-01-12 RX ADMIN — PIPERACILLIN SODIUM AND TAZOBACTAM SODIUM 3.38 G: 3; .375 INJECTION, POWDER, LYOPHILIZED, FOR SOLUTION INTRAVENOUS at 06:05

## 2021-01-12 RX ADMIN — CIPROFLOXACIN 400 MG: 2 INJECTION, SOLUTION INTRAVENOUS at 12:10

## 2021-01-12 RX ADMIN — OXYCODONE 10 MG: 5 TABLET ORAL at 12:10

## 2021-01-12 RX ADMIN — OXYCODONE 5 MG: 5 TABLET ORAL at 08:12

## 2021-01-12 RX ADMIN — GABAPENTIN 600 MG: 300 CAPSULE ORAL at 20:21

## 2021-01-12 RX ADMIN — INSULIN LISPRO 1 UNITS: 100 INJECTION, SOLUTION INTRAVENOUS; SUBCUTANEOUS at 20:25

## 2021-01-12 RX ADMIN — GABAPENTIN 600 MG: 300 CAPSULE ORAL at 08:12

## 2021-01-12 RX ADMIN — ASPIRIN 81 MG: 81 TABLET, COATED ORAL at 08:12

## 2021-01-12 RX ADMIN — PANTOPRAZOLE SODIUM 40 MG: 40 TABLET, DELAYED RELEASE ORAL at 06:05

## 2021-01-12 RX ADMIN — GABAPENTIN 600 MG: 300 CAPSULE ORAL at 15:38

## 2021-01-12 RX ADMIN — POTASSIUM CHLORIDE 40 MEQ: 1500 TABLET, EXTENDED RELEASE ORAL at 08:12

## 2021-01-12 RX ADMIN — MAGNESIUM SULFATE 1 G: 1 INJECTION INTRAVENOUS at 08:13

## 2021-01-12 RX ADMIN — METRONIDAZOLE 500 MG: 500 INJECTION, SOLUTION INTRAVENOUS at 20:20

## 2021-01-12 RX ADMIN — PROPAFENONE HYDROCHLORIDE 150 MG: 150 TABLET, FILM COATED ORAL at 06:05

## 2021-01-12 RX ADMIN — APIXABAN 5 MG: 5 TABLET, FILM COATED ORAL at 20:21

## 2021-01-12 RX ADMIN — PROPAFENONE HYDROCHLORIDE 150 MG: 150 TABLET, FILM COATED ORAL at 15:38

## 2021-01-12 RX ADMIN — APIXABAN 5 MG: 5 TABLET, FILM COATED ORAL at 08:12

## 2021-01-12 RX ADMIN — ALBUTEROL SULFATE 2.5 MG: 2.5 SOLUTION RESPIRATORY (INHALATION) at 18:36

## 2021-01-12 RX ADMIN — ALBUTEROL SULFATE 2.5 MG: 2.5 SOLUTION RESPIRATORY (INHALATION) at 07:07

## 2021-01-12 RX ADMIN — METRONIDAZOLE 500 MG: 500 INJECTION, SOLUTION INTRAVENOUS at 12:10

## 2021-01-12 RX ADMIN — Medication 1 PUFF: at 07:07

## 2021-01-12 RX ADMIN — DILTIAZEM HYDROCHLORIDE 180 MG: 180 CAPSULE, EXTENDED RELEASE ORAL at 08:12

## 2021-01-12 RX ADMIN — PROPAFENONE HYDROCHLORIDE 150 MG: 150 TABLET, FILM COATED ORAL at 20:20

## 2021-01-12 ASSESSMENT — PAIN DESCRIPTION - PROGRESSION: CLINICAL_PROGRESSION: NOT CHANGED

## 2021-01-12 ASSESSMENT — PAIN DESCRIPTION - PAIN TYPE: TYPE: CHRONIC PAIN;SURGICAL PAIN

## 2021-01-12 ASSESSMENT — PAIN DESCRIPTION - ONSET: ONSET: ON-GOING

## 2021-01-12 ASSESSMENT — PAIN SCALES - GENERAL: PAINLEVEL_OUTOF10: 7

## 2021-01-12 ASSESSMENT — PAIN DESCRIPTION - DESCRIPTORS: DESCRIPTORS: ACHING;DISCOMFORT

## 2021-01-12 ASSESSMENT — PAIN - FUNCTIONAL ASSESSMENT: PAIN_FUNCTIONAL_ASSESSMENT: ACTIVITIES ARE NOT PREVENTED

## 2021-01-12 NOTE — PLAN OF CARE
Problem: Infection:  Goal: Will remain free from infection  Description: Will remain free from infection  1/12/2021 1001 by Geeta Benson RN  Outcome: Ongoing  1/12/2021 0027 by Soraya Jarrell RN  Outcome: Ongoing     Problem: Safety:  Goal: Free from accidental physical injury  Description: Free from accidental physical injury  1/12/2021 1001 by Geeta Benson RN  Outcome: Ongoing  1/12/2021 0027 by Soraya Jarrell RN  Outcome: Ongoing  Goal: Free from intentional harm  Description: Free from intentional harm  1/12/2021 1001 by Geeta Benson RN  Outcome: Ongoing  1/12/2021 0027 by Soraya Jarrell RN  Outcome: Ongoing     Problem: Daily Care:  Goal: Daily care needs are met  Description: Daily care needs are met  1/12/2021 1001 by Geeta Benson RN  Outcome: Ongoing  1/12/2021 0027 by Soraya Jarrell RN  Outcome: Ongoing     Problem: Pain:  Goal: Patient's pain/discomfort is manageable  Description: Patient's pain/discomfort is manageable  1/12/2021 1001 by Geeta Benson RN  Outcome: Ongoing  1/12/2021 0027 by Soraya Jarrell RN  Outcome: Ongoing  Goal: Pain level will decrease  Description: Pain level will decrease  1/12/2021 1001 by Geeta Benson RN  Outcome: Ongoing  1/12/2021 0027 by Soraya Jarrell RN  Outcome: Ongoing  Goal: Control of acute pain  Description: Control of acute pain  1/12/2021 1001 by Geeta Benson RN  Outcome: Ongoing  1/12/2021 0027 by Soraya Jarrell RN  Outcome: Ongoing  Goal: Control of chronic pain  Description: Control of chronic pain  1/12/2021 1001 by Geeta Benson RN  Outcome: Ongoing  1/12/2021 0027 by Soraya Jarrell RN  Outcome: Ongoing     Problem: Skin Integrity:  Goal: Skin integrity will stabilize  Description: Skin integrity will stabilize  1/12/2021 1001 by Geeta Benson RN  Outcome: Ongoing  1/12/2021 0027 by Soraya Jarrell RN  Outcome: Ongoing     Problem: Discharge Planning:  Goal: Patients continuum of care needs are met Description: Patients continuum of care needs are met  1/12/2021 1001 by Patricia Rizvi RN  Outcome: Ongoing  1/12/2021 0027 by Charis Campbell RN  Outcome: Ongoing     Problem: Falls - Risk of:  Goal: Will remain free from falls  Description: Will remain free from falls  1/12/2021 1001 by Patricia Rizvi RN  Outcome: Ongoing  1/12/2021 0027 by Charis Campbell RN  Outcome: Ongoing  Goal: Absence of physical injury  Description: Absence of physical injury  1/12/2021 1001 by Patricia Rizvi RN  Outcome: Ongoing  1/12/2021 0027 by Charis Campbell RN  Outcome: Ongoing     Problem: Nutrition  Goal: Optimal nutrition therapy  1/12/2021 1001 by Patricia Rizvi RN  Outcome: Ongoing  1/12/2021 0027 by Charis Campbell RN  Outcome: Ongoing     Problem: Skin Integrity:  Goal: Will show no infection signs and symptoms  Description: Will show no infection signs and symptoms  1/12/2021 1001 by Patricia Rizvi RN  Outcome: Ongoing  1/12/2021 0027 by Charis Campbell RN  Outcome: Ongoing  Goal: Absence of new skin breakdown  Description: Absence of new skin breakdown  1/12/2021 1001 by Patricia Rizvi RN  Outcome: Ongoing  1/12/2021 0027 by Charis Campbell RN  Outcome: Ongoing

## 2021-01-12 NOTE — PROGRESS NOTES
Report given @ bedside to [facility], for transferral of care. Pt resting in bed. Call light in reach.

## 2021-01-12 NOTE — PROGRESS NOTES
Inpatient Occupational Therapy Treatment    Unit: ED  Date:  1/12/2021  Patient Name:    Gina Avitia  Admitting diagnosis:  Acute cholecystitis [K81.0]  Admit Date:  1/4/2021  Precautions/Restrictions/WB Status/ Lines/ Wounds/ Oxygen: Fall risk, Bed/chair alarm, Lines -IV, Supplemental O2 (3.5 L) and Drains (CHRISTOFER on R), Telemetry and Continuous pulse oximetry, incision with staples on L abdomen    Treatment Time:1530-1610   Treatment Number: 3    Billable Treatment Time: 40 minutes   Total Treatment Time: 40   minutes        Discharge Recommendations: SNF    ME needs for discharge: Needs Met       Therapy recommendations for staff:   Assist x2 with RW for transfers to/from BSC/chair    History of Present Illness: 78y.o. year old male with medical history of coronary artery disease, COPD on chronic 3 L of oxygen, diabetes mellitus type 2, hyperlipidemia, hypertension, ELAINE on CPAP, atrial fibrillation on Eliquis and cholecystitis presents for evaluation of fall at home. Patient reports lower extremity weakness with his legs giving out. 1/6 - laparoscopic cholecystectomy, transferred to ICU after OR due to poor level of alertness and abnormal labs/vitals  1/8 - PT/OT orders to resume therapy    Home Health S4 Level Recommendation:  NA  AM-PAC Score: AM-PAC Inpatient Daily Activity Raw Score: 14    Pain  Yes   Rating:mild   Location:rt side   Pain Medicine Status: Denies need      Cognition Pt seems slow to process information and instruction   Alert not aware of month or yr   Able to follow 2 step commands    Subjective  Patient lying supine in bed with no family present   Pt agreeable to this OT eval & tx.      Balance  Functional Sitting Balance:  WFL  Functional Standing Balance: Fair-Min A with RW    Bed mobility:    Supine to sit:   NT   Sit to supine:              SBA   Rolling:    Not Tested  Scooting in sitting:  Not Tested   Scooting to head of bed:   Not Tested    Bridging:   Not Tested Transfers:  Pt stood from the chair and after approx 45 seconds the pts LEs buckled and had to sit back down quickly into the chair. The pt then rested and was able to transfer to the bed with the bed close to chair and the pts LEs buckled again. Sit to stand:  min  Stand to sit:  Min   Bed to chair:   Min  with RW  Standard toilet: Not Tested  Bed to [facility]:  Not Tested      Dressing:      UE:   Not Tested  LE:    Total A  to don depends     Bathing:    UE:  Not Tested  LE:  Not Tested    Eating:   NT    Toileting:  Not Tested    Activity Tolerance   Pt completed therapy session with No adverse symptoms noted w/activity. SpO2: 85-90% on 3.5L, fluctuating rapidly (does not appear to be an accurate reading)  HR: 69 bpm  Supine at rest SpO2: 87-90% on 3.5L  HR: 85 bpm  BP: 127/62    Positioning Needs:   Up in chair, call light and needs in reach. Patient/Family Education:   Role of OT  Recommendations for DC  Safe RW use/hand placement    Assessment :   Pt was stand by assist for sit to supine and min assist for sit to stand. The pt was min assist for sit to stand and LEs buckled a couple of times with transfer to bed. The pt was moved closer to the chair and pt was using a walker. Pt is slow to respond and has decreased safety awareness. The pt is recommended to go to SNF. Goal(s) : To be met in 3 Visits:  1). Bed to toilet/BSC: Min A, goal met 1/11, increase to SBA     To be met in 5 Visits:  1). Supine to/from Sit:  SBA  2). Upper Body Bathing:   Supervision  3). Lower Body Bathing: Mod A  4). Upper Body Dressing:  Supervision  5). Lower Body Dressing: Mod A  6). Pt to demonstrate UE exs x 15 reps with minimal cues    Rehabilitation Potential:  Good for goals listed above.     Strengths for achieving goals include: Pt motivated, PLOF and Pt cooperative  Barriers to achieving goals include:  Complexity of condition Plan:  To be seen 3-5 x/wk while in acute care setting for therapeutic exercises, bed mobility, transfers, dressing, bathing, family/patient education, ADL/IADL retraining, energy conservation training.        Will Botkins OTR/L [id number]        If patient discharges from this facility prior to next visit, this note will serve as the Discharge Summary

## 2021-01-12 NOTE — PLAN OF CARE
Problem: Infection:  Goal: Will remain free from infection  Description: Will remain free from infection  Outcome: Ongoing     Problem: Safety:  Goal: Free from accidental physical injury  Description: Free from accidental physical injury  Outcome: Ongoing  Goal: Free from intentional harm  Description: Free from intentional harm  Outcome: Ongoing     Problem: Daily Care:  Goal: Daily care needs are met  Description: Daily care needs are met  Outcome: Ongoing     Problem: Pain:  Goal: Patient's pain/discomfort is manageable  Description: Patient's pain/discomfort is manageable  Outcome: Ongoing  Goal: Pain level will decrease  Description: Pain level will decrease  Outcome: Ongoing  Goal: Control of acute pain  Description: Control of acute pain  Outcome: Ongoing  Goal: Control of chronic pain  Description: Control of chronic pain  Outcome: Ongoing     Problem: Skin Integrity:  Goal: Skin integrity will stabilize  Description: Skin integrity will stabilize  Outcome: Ongoing     Problem: Discharge Planning:  Goal: Patients continuum of care needs are met  Description: Patients continuum of care needs are met  Outcome: Ongoing     Problem: Falls - Risk of:  Goal: Will remain free from falls  Description: Will remain free from falls  Outcome: Ongoing  Goal: Absence of physical injury  Description: Absence of physical injury  Outcome: Ongoing     Problem: Nutrition  Goal: Optimal nutrition therapy  Outcome: Ongoing     Problem: Skin Integrity:  Goal: Will show no infection signs and symptoms  Description: Will show no infection signs and symptoms  Outcome: Ongoing  Goal: Absence of new skin breakdown  Description: Absence of new skin breakdown  Outcome: Ongoing

## 2021-01-12 NOTE — PROGRESS NOTES
Shift Assessment completed. See Flow sheets. IV infusing without difficulty. O2 3.5 LPM NC in place. Patient c/o denies pain. Patient denies any needs at this time. Patient educated on use of call light and to call out with needs, verbalizing understanding. Bed in low locked position for patient safety.

## 2021-01-12 NOTE — PROGRESS NOTES
General Surgery [place], [place]  Daily Progress Note    Pt Name: Jose [address] Record Number: [id number]  Date of Birth 1940   Today's Date: 1/12/2021    ASSESSMENT  1. POD #6 lap to open connie   2. ABD: soft, + distention unchanged from yesterday, no tenderness, no N/V, + flatus, no BM, dressing with serosanguinous drainage: dressing removed,serosanguinous drainage oozing around CHRISTOFER drain, staples line with continued erythema noted, + serous drainage, CHRISTOFER in place    3. Keyla Concepcionce continues to come down: 1.3  4. AST/ALT normal  5. Leuks 5.8  6. + BC is Klebsiella  7. Wound culture + Klebsiella   8. CHRISTOFER 25: serosanguinous   9. VSS  10. Eliquis resumed    11. PT is up in chair: states he '\" feels ok but, id not going to a nursing home, states he has always had a cough but, feels he needs a chest xray today\"  12. Lungs with rhonchi t/o L > R on 3.5L o2, o2 sat 93%    PLAN  1. Fulls  2. PT/OT: ambulate patient/OOB as tolerated  3. IVF decrease rate  4. D/C vázquez   5. Zosyn d/c (indeterminate on sensitivity report) start Cipro/flagyl   6. Eliqius started 1/10.   7. IS q 1 hour while awake  8. Await incision culture results. 9. Currently pt refusing SNF, lives alone at home. 10. PT looks OK up in the chair. He was coughing a lot during assessment today. POD #6: awaiting return of bowel function. Jessica Malave has improved in some ways from yesterday. Pain is well controlled. He has no nausea and no vomiting. He has passed flatus and has not had a bowel movement. He is tolerating some clear liquids.  Current activity is up with assistance    OBJECTIVE VITALS:  height is 5' 11\" (1.803 m) and weight is 201 lb 15.1 oz (91.6 kg). His oral temperature is 97.4 °F (36.3 °C). His blood pressure is 140/64 (abnormal) and his pulse is 92. His respiration is 18 and oxygen saturation is 95%. VITALS:  BP (!) 140/64   Pulse 92   Temp 97.4 °F (36.3 °C) (Oral)   Resp 18   Ht 5' 11\" (1.803 m)   Wt 201 lb 15.1 oz (91.6 kg)   SpO2 95%   BMI 28.17 kg/m²   INTAKE/OUTPUT:      Intake/Output Summary (Last 24 hours) at 1/12/2021 1141  Last data filed at 1/12/2021 0931  Gross per 24 hour   Intake 480 ml   Output 200 ml   Net 280 ml     GENERAL: alert, cooperative, no acute distress    I/O last 3 completed shifts:   In: 480 [P.O.:480]  Out: 405 [Urine:380; Drains:25]  I/O this shift:  In: 240 [P.O.:240]  Out: 100 [Urine:100]    LABS  Recent Labs     01/12/21  0548   WBC 5.8   HGB 8.2*   HCT 25.4*         K 3.4*   CL 99   CO2 35*   BUN 9   CREATININE 0.7*   MG 1.70*   CALCIUM 8.6   AST 20   ALT 31   BILITOT 1.3*     CBC with Differential:    Lab Results   Component Value Date    WBC 5.8 01/12/2021    RBC 2.98 01/12/2021    HGB 8.2 01/12/2021    HCT 25.4 01/12/2021     01/12/2021    MCV 85.3 01/12/2021    MCH 27.4 01/12/2021    MCHC 32.1 01/12/2021    RDW 16.3 01/12/2021    SEGSPCT 68.5 07/02/2011    BANDSPCT 1 01/10/2021    LYMPHOPCT 20.6 01/12/2021    MONOPCT 8.7 01/12/2021    EOSPCT 0.3 07/02/2011    BASOPCT 0.5 01/12/2021    MONOSABS 0.5 01/12/2021    LYMPHSABS 1.2 01/12/2021    EOSABS 0.0 01/12/2021    BASOSABS 0.0 01/12/2021    DIFFTYPE Auto 07/02/2011     CMP:    Lab Results   Component Value Date     01/12/2021    K 3.4 01/12/2021    CL 99 01/12/2021    CO2 35 01/12/2021    BUN 9 01/12/2021    CREATININE 0.7 01/12/2021    GFRAA >60 01/12/2021    GFRAA >60 07/01/2011    AGRATIO 0.7 01/12/2021    LABGLOM >60 01/12/2021    GLUCOSE 160 01/12/2021    PROT 6.2 01/12/2021    PROT 7.0 08/10/2010    LABALBU 2.6 01/12/2021    CALCIUM 8.6 01/12/2021 BILITOT 1.3 01/12/2021    ALKPHOS 107 01/12/2021    AST 20 01/12/2021    ALT 31 01/12/2021         Lois Shelby Parcel  Electronically signed 1/12/2021 at 11:41 AM

## 2021-01-12 NOTE — PROGRESS NOTES
Inpatient Physical Therapy Daily Treatment Note    Unit: [address]  Date:  1/12/2021  Patient Name:    Prema Harkins  Admitting diagnosis:  Acute cholecystitis [K81.0]  Admit Date:  1/4/2021  Precautions/Restrictions:  Fall risk, Bed/chair alarm, Lines -IV, Supplemental O2 (3.5L) and Drains (CHRISTOFER drain), Confusion, Telemetry and Continuous pulse oximetry, R abdominal incision      Discharge Recommendations: SNF - pt fatigues throughout day and presents with increased confusion, decreased safety awareness, and his legs buckle after ~45 sec of upright activity (requiring assist of 2 to safely land in chair)  DME needs for discharge: defer to facility       Therapy recommendation for EMS Transport: requires transport by cot due to inconsistency with transfers    Therapy recommendations for staff:   Assist of 2 with use of rolling walker (RW) and gait belt for all transfers to/from [facility]  to/from chair   Use Stedy if pt is fatigued as his knees buckle    History of Present Illness: (Per consult by Dr. Ayse Joseph on 1/5/21) 78 y. o. year old male with medical history of coronary artery disease, COPD on chronic 3 L of oxygen, diabetes mellitus type 2, hyperlipidemia, hypertension, ELAINE on CPAP, atrial fibrillation on Eliquis and cholecystitis presents for evaluation of fall at home.  Patient reports lower extremity weakness with his legs giving out.    1/6 - laparoscopic cholecystectomy, transferred to ICU after OR due to poor level of alertness and abnormal labs/vitals  1/8 - PT/OT orders to resume therapy    Home Health S4 Level Recommendation: Level 3 Safety  AM-PAC Mobility Score   AM-PAC Inpatient Mobility Raw Score : 16       Treatment Time:  15:28-16:10  Treatment number: 3  Timed Code Treatment Minutes: 42 minutes  Total Treatment Minutes:  42  minutes    Cognition    A&O Person and Place (with increased time), disoriented to time (November, unable to state year; reoriented)  Able to follow 1 step commands Pt demonstrates impulsive behavior and is difficult to redirect at times. Subjective  Patient sitting up in chair with no family present   Pt agreeable to this PT tx. Pt sitting up in chair, states he is tired  RN reports pt had instance of LE buckling earlier today and was very fatigued yesterday after being up in chair. Pt required assist of 2 and use of Stedy to safely return to bed yesterday by staff. Pain   Yes  Location: R abdomen  Ratin/10  Pain Medicine Status: Received pain med prior to tx     Bed Mobility   Supine to Sit:    Not Tested  Sit to Supine:   SBA  Rolling:   Not Tested  Scooting:   SBA in chair (poor safety awareness)    Transfer Training     Sit to stand:   Min A   Stand to sit:   Min A   Bed to Chair:   Min A  and 2 persons with use of gait belt and rolling walker (RW) with cues for sequencing with taking steps and walker management (noted delayed motor planning)    Gait Training gait deferred due to difficulty with transfers; pt ambulated 0 ft.    Distance:      N/A ft  Deviations (firm surface/linoleum):  N/A  Assistive Device Used:    N/A  Level of Assist:    Not Tested  Comment:     Stair Training deferred, pt unsafe/not appropriate to complete stairs at this time    Therapeutic Exercise all completed bilaterally unless indicated, pt required frequent tactile and verbal cues for redirection to task  Ankle Pumps x 10 reps  LAQ x 10 reps  marching x 10 reps    Balance  Sitting:  Good - ; Supervision  Comments: poor safety awareness, limited tolerance to unsupported sitting as pt is avoiding trunk activation due to pain at incision site    Standing: Fair +; CGA, Max A of 2 with LOB  Comments: pt can stand for ~45-60 sec before his knees buckle and he requires assist of 2 to return to chair safely, pt appeared to lack insight into safety concern with his LOB    Patient Education Role of PT, POC, Discharge recommendations, DC recommendations, safety awareness, transfer techniques, HEP and calling for assist with mobility. Positioning Needs       Pt in bed, alarm set, positioned in proper neutral alignment and pressure relief provided. Call light provided and all needs within reach    Activity Tolerance   Pt completed therapy session with No adverse symptoms noted w/activity. SpO2: 85-90% on 3.5L, fluctuating rapidly (does not appear to be an accurate reading)  HR: 69 bpm  Supine at rest SpO2: 87-90% on 3.5L  HR: 85 bpm  BP: 127/62    Other  None. Assessment :  Patient demonstrated decreased independence and tolerance to activity from last session. Pt was seen in afternoon this session and was significantly more fatigued this date. Pt was able to tolerate ~45 sec of standing before his knees buckled suddenly and he required Max A of 2 to safely land in the chair. Pt demonstrated delayed motor planning with pivot transfer with use of RW, requiring tactile and verbal cues for sequencing. At this time the patient is not able to care for himself and would require skilled help to assist with transfers. Pt appears to lack insight into his current deficits and is unconcerned with potential falls at home. Strongly recommending SNF to improve pt's independence with functional mobility prior to discharge home with assistance from family members (per CM note pt has family able to stay a few days but pt's progress here has been slow and he is likely to need a few weeks to recover at this time). Recommending SNF upon discharge as patient functioning well below baseline, demonstrates good rehab potential and unable to return home due to inability to negotiate stairs to enter home/bedroom/bathroom, burden of care beyond caregiver ability, home environment not conducive to patient recovery and limited safety awareness.     Goals (all goals ongoing unless otherwise indicated) To be met in 3 visits:  1). Independent with LE Ex x 10 reps  MET     To be met in 6 visits:  1).  Supine to/from sit: SBA  2).  Sit to/from stand: SBA  3).  Bed to chair: CGA with RW  4).  Gait: Ambulate 50 ft.  with  Min A  and use of rolling walker (RW)  5).  Tolerate B LE exercises 3 sets of 10-15 reps  6).  Ascend/descend 2 steps with Min A  with use of hand rail bilateral and LRAD (least restrictive assistive device)    Plan   Continue with plan of care. Signature: Stephanie Rizvi, PT, DPT #[id number]    If patient discharges from this facility prior to next visit, this note will serve as the Discharge Summary.

## 2021-01-12 NOTE — PROGRESS NOTES
Accessed pt chart. Pt was calling out saying \"help\" writer walked in room. Pt IV was disconnected and running onto the floor. Writer accessed pt chart to see the rate. will let primary RN know.  Jarrod Jason RN

## 2021-01-12 NOTE — PROGRESS NOTES
Comprehensive Nutrition Assessment    Type and Reason for Visit:  Reassess    Nutrition Recommendations/Plan:   1. Continue full-liquid diet order - diet advancement, per surgery. 2. Added Ensure Clear with meals. 3. Monitor appetite, meal intake, and acceptance/intake of ONS. 4. Monitor nutrition-related labs, bowel function (no documented BM since 1/4/21), and weight trends. Nutrition Assessment:  patient has improved from a nutritional standpoint AEB his diet has been advanced to full-liquid and he is tolerating this diet well, however, he remains at risk for further compromise d/t abdominal distention, pain at CHRISTOFER drain site, constipation, and s/p cholecystectomy on 1/6/21; will continue full-liquid diet order and add Ensure Clear with meals    Malnutrition Assessment:  Malnutrition Status: Moderate malnutrition    Context:  Acute Illness     Findings of the 6 clinical characteristics of malnutrition:  Energy Intake:  1 - 75% or less of estimated energy requirements for 7 or more days  Weight Loss:  No significant weight loss     Body Fat Loss:  7 - Moderate body fat loss Orbital, Triceps   Muscle Mass Loss:  1 - Mild muscle mass loss Clavicles (pectoralis & deltoids), Hand (interosseous)  Fluid Accumulation:  No significant fluid accumulation     Strength:  Not Performed    Estimated Daily Nutrient Needs:  Energy (kcal):  7980 - 2093 kcals based on 20-23 kcal/skg/CBW; Weight Used for Energy Requirements:  Current     Protein (g):  94 - 117 g protein based on 1.2-1.5 g/kg/IBW;  Weight Used for Protein Requirements:  Ideal        Fluid (ml/day):  1820 - 2093 ml; Method Used for Fluid Requirements:  1 ml/kcal Nutrition Related Findings:  patient is s/p cholecystectomy on 1/6/21; CHRISTOFER drain remains in place at this time - patient c/o pain at the CHRISTOFER drain site; patient was awake and sitting up in chair at bedside this afternoon upon entering his room for f/u visit; patient's can of Pepsi had slipped out of his hand and on to the floor and pop was all over the floor and bench seat nearby; this RD cleaned up the pop and dietary got patient a new Pepsi + dietetic intern got patient a cup so he could hold onto it easier (not so slippery); abdomen is distended, taut, round, tender, and bowel sounds are active; last documented BM was on 1/4/21; patient is A & O x 4; skin color is pale + dry and warm; patient had blood stains on his gown and hands from an IV that had been removed and bled a lot; patient has upper and lower dentures but they are at home and patient prefers to eat without dentures anyways; patient is from home and his daughter has been staying with him recently; patient has a hx of bilateral knee replacements - he reported that sometimes his knees \"just give out\" on him; he uses a walker to ambulate at home; patient's bilateral legs were very tight during NFPE - patient stated that this is nothin new for him; patient has low-dose SSI, protonix, gabapentin, and NS at 35 ml/hr ordered at this time      Wounds:  Surgical Incision(on abdomen; s/p cholecystectomy on 1/6/21)       Current Nutrition Therapies:    DIET FULL LIQUID;  Dietary Nutrition Supplements: Clear Liquid Oral Supplement    Anthropometric Measures:  · Height: 5' 11\" (180.3 cm)  · Current Body Weight: 201 lb 15.1 oz (91.6 kg)(actual weight; obtained on 1/11/21)   · Admission Body Weight: 198 lb 6.6 oz (90 kg)(actual weight; obtained on 1/8/21)    · Usual Body Weight: 198 lb 8 oz (90 kg)(actual weight; obtained on 4/1/20)     · Ideal Body Weight: 172 lbs; % Ideal Body Weight 117.4 %   · BMI: 28.2    · BMI Categories: Overweight (BMI 25.0-29. 9) Nutrition Diagnosis:   · Inadequate oral intake related to altered GI function, altered GI structure as evidenced by intake 26-50%, intake 51-75%, GI abnormality, constipation      Nutrition Interventions:   Food and/or Nutrient Delivery:  Continue Current Diet, Start Oral Nutrition Supplement  Nutrition Education/Counseling:  No recommendation at this time   Coordination of Nutrition Care:  Continue to monitor while inpatient    Goals:  patient will consume > 75% of his meals on full-liquid diet order x 3 meals per day + > 50% of Ensure Clear with meals; patient will tolerate diet advancement, as appropriate, per surgery guidance       Nutrition Monitoring and Evaluation:   Behavioral-Environmental Outcomes:  None Identified   Food/Nutrient Intake Outcomes:  Diet Advancement/Tolerance, Food and Nutrient Intake, Supplement Intake, IVF Intake  Physical Signs/Symptoms Outcomes:  Biochemical Data, Constipation, GI Status, Fluid Status or Edema, Meal Time Behavior, Nutrition Focused Physical Findings, Skin, Weight     Discharge Planning:     Too soon to determine     Electronically signed by Blas Parker RD, LD on 1/12/21 at 2:14 PM EST    Contact: [phone]

## 2021-01-12 NOTE — PLAN OF CARE
Nutrition Problem #1: Inadequate oral intake  Intervention: Food and/or Nutrient Delivery: Continue Current Diet, Start Oral Nutrition Supplement  Nutritional Goals: patient will consume > 75% of his meals on full-liquid diet order x 3 meals per day + > 50% of Ensure Clear with meals; patient will tolerate diet advancement, as appropriate, per surgery guidance

## 2021-01-12 NOTE — PROGRESS NOTES
Scheduled medications administered. See MAR. CHRISTOFER drain dressing changed and Staple dressing change. Patient denies any needs at this time. Call light within reach. Bed in low locked position for patient safety.

## 2021-01-12 NOTE — PROGRESS NOTES
Progress Note    Admit Date:  1/4/2021     Admitted for recurrent cholecystitis  S/p open cholecystectomy on 1/6/2021. Transferred to ICU postop for resp acidosis and placed on bipap  Klebsiella bacteremia from infection     Improved with BiPAP. Transferred out of ICU back to [facility] on 1/7/2021. Subjective:  Mr. Lokesh Patel is tolerating clears, no nausea or vomiting. + flatus, no BM. Abd is still distended  - Surgery sent wound culture yesterday   - Today he is on 3.5L NC, 3L is his baseline     Objective:   Patient Vitals for the past 4 hrs:   BP Temp Temp src Pulse Resp SpO2   01/12/21 0708      95 %   01/12/21 0704 (!) 140/64 97.4 °F (36.3 °C) Oral 92 18 96 %          Intake/Output Summary (Last 24 hours) at 1/12/2021 0911  Last data filed at 1/12/2021 0849  Gross per 24 hour   Intake 240 ml   Output 505 ml   Net -265 ml       General: elderly male, sitting up in bed . Awake, alert and oriented. Appears to be not in any distress  Mucous Membranes:  Pink , anicteric  Neck: No JVD,  Trachea midline   Chest: Diminished breath sounds, no crackles, no wheezes   Cardiovascular: irregularly irregular, no murmurs. Trace BLE edema   Abdomen:  Soft, Mildly distended, right UQ surgical staples intact. Drain in place  Pain appropriate, BS present  Extremities: No cyanosis.  Distal pulses well felt  Neurological : grossly normal      Scheduled Meds:   magnesium sulfate  1 g Intravenous Once    insulin lispro  0-6 Units Subcutaneous TID     insulin lispro  0-3 Units Subcutaneous Nightly    apixaban  5 mg Oral BID    dilTIAZem  180 mg Oral Daily    piperacillin-tazobactam  3.375 g Intravenous Q8H    aspirin  81 mg Oral Daily    pantoprazole  40 mg Oral QAM AC    gabapentin  600 mg Oral TID    propafenone  150 mg Oral 3 times per day    sodium chloride flush  10 mL Intravenous 2 times per day    budesonide-formoterol  1 puff Inhalation Daily    albuterol  2.5 mg Nebulization BID Final Result   1. Cholelithiasis without sonographic evidence for acute cholecystitis. 2. Fatty liver versus diffuse hepatocellular disease. 3. Nonvisualization of the pancreas. CTA CHEST ABDOMEN PELVIS W CONTRAST 1/4/2020   Final Result   1. CTA CHEST: No acute vascular abnormality; no aneurysm or dissection. 2.  Pulmonary sequela typical of that seen with smoking, including COPD. 3. Chronic mild T6 compression fracture. 4. CTA ABDOMEN/PELVIS: No acute vascular abnormality; no aneurysm leak or   dissection. 5. 3.0 cm abdominal aortic aneurysm. Recommend follow-up every 3 years. 6.  No CT evidence of an acute intra-abdominal or intrapelvic process. 7. Cholelithiasis without definite CT findings of acute cholecystitis. Gallbladder appears contracted. 8.  No findings to suggest acute appendicitis; no ureter calculus or   hydronephrosis. 9. Hepatic steatosis. 10. Chronic, nonspecific L1 sclerosis and compression fracture. 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 RECOMMENDATIONS:   3 year imaging follow-up AAA         CT HEAD WO CONTRAST 1/4/2020   Final Result   1. No acute intracranial abnormality. 2. Small left forehead scalp contusion/hematoma. 3. Possible acute left maxillary sinusitis. 4. Stable appearing senescent changes. XR CHEST PORTABLE   Final Result   No radiographic evidence of acute pulmonary disease. Sylvain Risk have reviewed the chart on Liliya Caraballo and personally interviewed and examined patient, reviewed the data (labs and imaging) and after discussion with my PA formulated the plan. Agree with note with the following edits. HPI:     Doing better. On clear liquids. Has mild abdominal distension. No nausea. No abdominal pain. No fever. 1/11- no fever. Tolerating clears. Feeling better. 1/12- doing better. No dyspnea. I reviewed the patient's Past Medical History, Past Surgical History, Medications, and Allergies. Physical exam:    BP (!) 140/64   Pulse 92   Temp 97.4 °F (36.3 °C) (Oral)   Resp 18   Ht 5' 11\" (1.803 m)   Wt 201 lb 15.1 oz (91.6 kg)   SpO2 95%   BMI 28.17 kg/m²     Gen: No distress. Alert. Eyes: PERRL. No sclera icterus. No conjunctival injection. ENT: No discharge. Pharynx clear. Neck: Trachea midline. Normal thyroid. Resp: No accessory muscle use. No crackles. No wheezes. No rhonchi. No dullness on percussion. CV: Regular rate. Regular rhythm. No murmur or rub. No edema. GI: appropriate tenderness, CHRISTOFER drain present. Mildly distended. No masses. No organomegaly. Normal bowel sounds. No hernia. Assessment/Plan:    #Sepsis- POA  #Klebsiella Bactermia - Due to  acute cholecystitis   - s.p IVF. BP stable  - has been on ABX recently, PCT 22.7 - trended down  - IV Zosyn D#9/10  - BP stable, lactic acid trended down. - repeat blood cx NG  - Sepsis resolved     #Acute cholecystitis with klebsiella bacteremia  - recently treated with IV and oral abx.  Comes back with similar presentation  - had open cholecystectomy  1/6 - POD #6  - diet per surgery - clears started 1/9   - Zosyn D#9/10    #Elevated LFTs  - sec to above   - trended down     #Pancytopenia - Resolved  #Chronic Normocytic Anemia  #Chronic TCP  - in setting of infection, has chronic TCP & anemia  - avoided heparin use, started SCDs  - monitor CBC- stable     #Acute on Chronic Hypoxic Hypercapnic respiratory failure  #Hx of COPD   - was monitored in ICU post op with bipap   - now improved and - sats stable 3.5L NC (wears 3L at baseline)  - cont [address] Albuterol Symbicort/Spiriva     #DM Type 2   - controlled  - hold Metformin and Actos, chk A1c: 6.6%  - on Low SSI q4h    #HTN  - controlled  - cont Diltiazem   - monitor    #PAF   - held Eliquis for surgery- discussed with surgery team- resumed 1/10  - cont Rythmol and Diltiazem     #GERD  - cont Protonix    #L1 compression fracture  - noted on prior admission - CT with approx 75% loss of central vertebral body height, new since 2017, appears chronic as pt with - no c/o back pain  - daughter reported a fall several months prior    #AAA  - noted on prior admission  - mild at 3.1 cm; infrarenal   - recommended f/u every 3 years  - family aware    #Abnormal CT of thoracic aorta  - noted on prior admission  - CT findings consistent with interval development of 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 PT OT is currently recommending SNF. CM consult   He wants to go home.      Amanda Romeo PA-C  1/12/2021 9:11 AM      VITO SALAS 1/12/2021 11:10 AM

## 2021-01-13 LAB
A/G RATIO: 0.7 (ref 1.1–2.2)
ALBUMIN SERPL-MCNC: 2.5 G/DL (ref 3.4–5)
ALP BLD-CCNC: 103 U/L (ref 40–129)
ALT SERPL-CCNC: 28 U/L (ref 10–40)
ANION GAP SERPL CALCULATED.3IONS-SCNC: 9 MMOL/L (ref 3–16)
AST SERPL-CCNC: 18 U/L (ref 15–37)
BASOPHILS ABSOLUTE: 0 K/UL (ref 0–0.2)
BASOPHILS RELATIVE PERCENT: 0.7 %
BILIRUB SERPL-MCNC: 1.3 MG/DL (ref 0–1)
BLOOD CULTURE, ROUTINE: NORMAL
BUN BLDV-MCNC: 7 MG/DL (ref 7–20)
CALCIUM SERPL-MCNC: 8.3 MG/DL (ref 8.3–10.6)
CHLORIDE BLD-SCNC: 96 MMOL/L (ref 99–110)
CO2: 33 MMOL/L (ref 21–32)
CREAT SERPL-MCNC: 0.7 MG/DL (ref 0.8–1.3)
EOSINOPHILS ABSOLUTE: 0 K/UL (ref 0–0.6)
EOSINOPHILS RELATIVE PERCENT: 0.5 %
GFR AFRICAN AMERICAN: >60
GFR NON-AFRICAN AMERICAN: >60
GLOBULIN: 3.7 G/DL
GLUCOSE BLD-MCNC: 185 MG/DL (ref 70–99)
GLUCOSE BLD-MCNC: 203 MG/DL (ref 70–99)
GLUCOSE BLD-MCNC: 207 MG/DL (ref 70–99)
GLUCOSE BLD-MCNC: 262 MG/DL (ref 70–99)
GLUCOSE BLD-MCNC: 282 MG/DL (ref 70–99)
HCT VFR BLD CALC: 25.3 % (ref 40.5–52.5)
HEMOGLOBIN: 8.1 G/DL (ref 13.5–17.5)
LYMPHOCYTES ABSOLUTE: 1.2 K/UL (ref 1–5.1)
LYMPHOCYTES RELATIVE PERCENT: 19.4 %
MAGNESIUM: 1.7 MG/DL (ref 1.8–2.4)
MCH RBC QN AUTO: 27.7 PG (ref 26–34)
MCHC RBC AUTO-ENTMCNC: 32.2 G/DL (ref 31–36)
MCV RBC AUTO: 86 FL (ref 80–100)
MONOCYTES ABSOLUTE: 0.5 K/UL (ref 0–1.3)
MONOCYTES RELATIVE PERCENT: 7.7 %
NEUTROPHILS ABSOLUTE: 4.4 K/UL (ref 1.7–7.7)
NEUTROPHILS RELATIVE PERCENT: 71.7 %
PDW BLD-RTO: 17.1 % (ref 12.4–15.4)
PERFORMED ON: ABNORMAL
PLATELET # BLD: 132 K/UL (ref 135–450)
PMV BLD AUTO: 9.7 FL (ref 5–10.5)
POTASSIUM REFLEX MAGNESIUM: 3.5 MMOL/L (ref 3.5–5.1)
RBC # BLD: 2.94 M/UL (ref 4.2–5.9)
SODIUM BLD-SCNC: 138 MMOL/L (ref 136–145)
TOTAL PROTEIN: 6.2 G/DL (ref 6.4–8.2)
WBC # BLD: 6.2 K/UL (ref 4–11)

## 2021-01-13 PROCEDURE — 97110 THERAPEUTIC EXERCISES: CPT

## 2021-01-13 PROCEDURE — 97530 THERAPEUTIC ACTIVITIES: CPT

## 2021-01-13 PROCEDURE — 97535 SELF CARE MNGMENT TRAINING: CPT

## 2021-01-13 PROCEDURE — 6370000000 HC RX 637 (ALT 250 FOR IP): Performed by: PHYSICIAN ASSISTANT

## 2021-01-13 PROCEDURE — 94640 AIRWAY INHALATION TREATMENT: CPT

## 2021-01-13 PROCEDURE — 2500000003 HC RX 250 WO HCPCS: Performed by: NURSE PRACTITIONER

## 2021-01-13 PROCEDURE — 36415 COLL VENOUS BLD VENIPUNCTURE: CPT

## 2021-01-13 PROCEDURE — U0003 INFECTIOUS AGENT DETECTION BY NUCLEIC ACID (DNA OR RNA); SEVERE ACUTE RESPIRATORY SYNDROME CORONAVIRUS 2 (SARS-COV-2) (CORONAVIRUS DISEASE [COVID-19]), AMPLIFIED PROBE TECHNIQUE, MAKING USE OF HIGH THROUGHPUT TECHNOLOGIES AS DESCRIBED BY CMS-2020-01-R: HCPCS

## 2021-01-13 PROCEDURE — 99232 SBSQ HOSP IP/OBS MODERATE 35: CPT | Performed by: INTERNAL MEDICINE

## 2021-01-13 PROCEDURE — 6370000000 HC RX 637 (ALT 250 FOR IP): Performed by: INTERNAL MEDICINE

## 2021-01-13 PROCEDURE — 94761 N-INVAS EAR/PLS OXIMETRY MLT: CPT

## 2021-01-13 PROCEDURE — 6360000002 HC RX W HCPCS: Performed by: INTERNAL MEDICINE

## 2021-01-13 PROCEDURE — 2580000003 HC RX 258: Performed by: NURSE PRACTITIONER

## 2021-01-13 PROCEDURE — 83735 ASSAY OF MAGNESIUM: CPT

## 2021-01-13 PROCEDURE — 1200000000 HC SEMI PRIVATE

## 2021-01-13 PROCEDURE — 80053 COMPREHEN METABOLIC PANEL: CPT

## 2021-01-13 PROCEDURE — 6360000002 HC RX W HCPCS: Performed by: PHYSICIAN ASSISTANT

## 2021-01-13 PROCEDURE — 2700000000 HC OXYGEN THERAPY PER DAY

## 2021-01-13 PROCEDURE — 6360000002 HC RX W HCPCS: Performed by: NURSE PRACTITIONER

## 2021-01-13 PROCEDURE — 85025 COMPLETE CBC W/AUTO DIFF WBC: CPT

## 2021-01-13 PROCEDURE — U0002 COVID-19 LAB TEST NON-CDC: HCPCS

## 2021-01-13 PROCEDURE — 2580000003 HC RX 258: Performed by: INTERNAL MEDICINE

## 2021-01-13 PROCEDURE — 97116 GAIT TRAINING THERAPY: CPT

## 2021-01-13 PROCEDURE — 99024 POSTOP FOLLOW-UP VISIT: CPT | Performed by: NURSE PRACTITIONER

## 2021-01-13 RX ORDER — MAGNESIUM SULFATE 1 G/100ML
1 INJECTION INTRAVENOUS ONCE
Status: COMPLETED | OUTPATIENT
Start: 2021-01-13 | End: 2021-01-13

## 2021-01-13 RX ORDER — INSULIN GLARGINE 100 [IU]/ML
20 INJECTION, SOLUTION SUBCUTANEOUS NIGHTLY
Status: DISCONTINUED | OUTPATIENT
Start: 2021-01-13 | End: 2021-01-14 | Stop reason: HOSPADM

## 2021-01-13 RX ORDER — POTASSIUM CHLORIDE 20 MEQ/1
40 TABLET, EXTENDED RELEASE ORAL ONCE
Status: COMPLETED | OUTPATIENT
Start: 2021-01-13 | End: 2021-01-13

## 2021-01-13 RX ADMIN — PROPAFENONE HYDROCHLORIDE 150 MG: 150 TABLET, FILM COATED ORAL at 21:15

## 2021-01-13 RX ADMIN — METRONIDAZOLE 500 MG: 500 INJECTION, SOLUTION INTRAVENOUS at 21:15

## 2021-01-13 RX ADMIN — ASPIRIN 81 MG: 81 TABLET, COATED ORAL at 08:10

## 2021-01-13 RX ADMIN — INSULIN GLARGINE 20 UNITS: 100 INJECTION, SOLUTION SUBCUTANEOUS at 21:15

## 2021-01-13 RX ADMIN — PROPAFENONE HYDROCHLORIDE 150 MG: 150 TABLET, FILM COATED ORAL at 05:44

## 2021-01-13 RX ADMIN — DILTIAZEM HYDROCHLORIDE 180 MG: 180 CAPSULE, EXTENDED RELEASE ORAL at 08:09

## 2021-01-13 RX ADMIN — Medication 1 PUFF: at 06:43

## 2021-01-13 RX ADMIN — POTASSIUM CHLORIDE 40 MEQ: 1500 TABLET, EXTENDED RELEASE ORAL at 10:03

## 2021-01-13 RX ADMIN — MAGNESIUM SULFATE HEPTAHYDRATE 1 G: 1 INJECTION, SOLUTION INTRAVENOUS at 10:03

## 2021-01-13 RX ADMIN — GABAPENTIN 600 MG: 300 CAPSULE ORAL at 21:15

## 2021-01-13 RX ADMIN — ALBUTEROL SULFATE 2.5 MG: 2.5 SOLUTION RESPIRATORY (INHALATION) at 06:43

## 2021-01-13 RX ADMIN — PROPAFENONE HYDROCHLORIDE 150 MG: 150 TABLET, FILM COATED ORAL at 14:51

## 2021-01-13 RX ADMIN — CIPROFLOXACIN 400 MG: 2 INJECTION, SOLUTION INTRAVENOUS at 00:20

## 2021-01-13 RX ADMIN — SODIUM CHLORIDE: 9 INJECTION, SOLUTION INTRAVENOUS at 05:44

## 2021-01-13 RX ADMIN — GABAPENTIN 600 MG: 300 CAPSULE ORAL at 08:09

## 2021-01-13 RX ADMIN — APIXABAN 5 MG: 5 TABLET, FILM COATED ORAL at 21:15

## 2021-01-13 RX ADMIN — ALBUTEROL SULFATE 2.5 MG: 2.5 SOLUTION RESPIRATORY (INHALATION) at 18:45

## 2021-01-13 RX ADMIN — METRONIDAZOLE 500 MG: 500 INJECTION, SOLUTION INTRAVENOUS at 04:43

## 2021-01-13 RX ADMIN — PANTOPRAZOLE SODIUM 40 MG: 40 TABLET, DELAYED RELEASE ORAL at 05:44

## 2021-01-13 RX ADMIN — CIPROFLOXACIN 400 MG: 2 INJECTION, SOLUTION INTRAVENOUS at 12:11

## 2021-01-13 RX ADMIN — Medication 10 ML: at 08:10

## 2021-01-13 RX ADMIN — APIXABAN 5 MG: 5 TABLET, FILM COATED ORAL at 08:09

## 2021-01-13 RX ADMIN — METRONIDAZOLE 500 MG: 500 INJECTION, SOLUTION INTRAVENOUS at 12:11

## 2021-01-13 RX ADMIN — GABAPENTIN 600 MG: 300 CAPSULE ORAL at 14:51

## 2021-01-13 RX ADMIN — INSULIN LISPRO 1 UNITS: 100 INJECTION, SOLUTION INTRAVENOUS; SUBCUTANEOUS at 21:15

## 2021-01-13 NOTE — PROGRESS NOTES
Inpatient Physical Therapy Daily Treatment Note    Unit: [address]  Date:  1/13/2021  Patient Name:    Bebeto Escalona  Admitting diagnosis:  Acute cholecystitis [K81.0]  Admit Date:  1/4/2021  Precautions/Restrictions:  Fall risk, Bed/chair alarm, Lines -Supplemental O2 (3.5L) and Drains (CHRISTOFER), Confusion, Telemetry, Continuous pulse oximetry and R abdominal incision      Discharge Recommendations: ARU/IRF (inpatient rehab facility)  - pt fatigues throughout day and presents with increased confusion, decreased safety awareness, and his legs buckle after ~45 sec of upright activity (requiring assist of 2 to safely land in chair)  The inconsistency with his performance with staff and with therapy presents at concern for family's ability to assist pt safely at home. DME needs for discharge: defer to facility       Therapy recommendation for EMS Transport: requires transport by cot due to inconsistency with transfers    Therapy recommendations for staff:   Assist of 2 with use of rolling walker (RW) and gait belt for all transfers to/from [facility]  to/from [facility]    History of Present Illness: (Per consult by Dr. Diann James on 1/5/21) 78 y. o. year old male with medical history of coronary artery disease, COPD on chronic 3 L of oxygen, diabetes mellitus type 2, hyperlipidemia, hypertension, ELAINE on CPAP, atrial fibrillation on Eliquis and cholecystitis presents for evaluation of fall at home.  Patient reports lower extremity weakness with his legs giving out.    1/6 - laparoscopic cholecystectomy, transferred to ICU after OR due to poor level of alertness and abnormal labs/vitals  1/8 - PT/OT orders to resume therapy    Home Health S4 Level Recommendation: Level 3 Safety  AM-PAC Mobility Score   AM-PAC Inpatient Mobility Raw Score : 16       Treatment Time:  08:33-09:20 and 15:30-15:45  Treatment number: 4  Timed Code Treatment Minutes: 47 minutes + 15 minutes  Total Treatment Minutes:  47  Minutes + 15 minutes    Cognition A&O Person and Place, Katelynn Sam" - reoriented to time and able to recall month later during session when asked again    Able to follow 1 step commands    Subjective  Patient lying supine in bed with no family present   Pt agreeable to this PT tx. Pain   No  Location: N/A  Rating:    NA/10  Pain Medicine Status: No request made     Bed Mobility   Supine to Sit:    Min A with HOB elevated  Sit to Supine:   Not Tested  Rolling:   SBA  Scooting:   Supervision    Transfer Training     Sit to stand:   Min A from bed, CGA from chair  Stand to sit:   CGA with cues for hand placement (without cues on 2nd trial)  Bed to Chair:   Not Tested with use of N/A - pt ambulated    Gait Training gait completed as indicated below  Distance:      20 + 30 ft  Deviations (firm surface/linoleum):  decreased cindy, forward flexed posture and decreased step length bilaterally  Assistive Device Used:    gait belt and rolling walker (RW)  Level of Assist:    CGA and 2 persons  Comment: no knee buckling noted on first trial, cues to maintain walker closer to YASH, intermittent delayed motor planning with toe off/weight shift    Stair Training deferred, pt unsafe/not appropriate to complete stairs at this time    Therapeutic Exercise all completed bilaterally unless indicated  Ankle Pumps x 10 reps  LAQ x 10 reps  marching x 10 reps  Hip adduction/pillow squeeze: x 10 reps   See OT note for UE exercises. Balance  Sitting:  Good ; Supervision  Comments: for static and dynamic sitting at EOB    Standing: Fair +; CGA  Comments: 3 minute with unilateral UE support on RW (to use urinal and for lower body dressing), pt leaning back against chair for support throughout    Patient Education      Role of PT, POC, Discharge recommendations, DC recommendations, safety awareness, transfer techniques, HEP and calling for assist with mobility.      Positioning Needs Pt up in chair, alarm set, positioned in proper neutral alignment and pressure relief provided. Call light provided and all needs within reach    Activity Tolerance   Pt completed therapy session with SOB noted with standing, seated exercises, and ambulation. All mild SOB and controled with seated rest breaks. Other  See OT note for assist with lower body dressing. Assisted pt with using urinal.     Addendum (15:47)  Therapist responded to pt's chair alarm. Pt was scooting forward in the chair and requests to use urinal. PCA in to assist as well. Sit>stand: CGA, and gait belt. Stood x1 minute with CGA and B UE support on RW, pt not leaning against chair. PCA assisted with urinal. Pt then requested return to bed. Chair>Bed: CGA of 2 with gait belt and RW, pt's knees buckled requiring Max A of 2 to pull pt onto bed. Sit>supine: Min A to flat bed  Rolling: Min A   Depends and gown changed. Noted drainage from incision site - RN aware. Pt left supine in bed with alarm on, in neutral alignment. PCA in room. O2 NC in place. All needs met and call light in reach. Noted once again pt's performance in PM after fatigue sets in is significantly below pt's performance in AM. Pt appeared to have slightly better insight into LOB and the safety concerns this date though this was not directly discussed with him.      Assessment : Patient tolerated treatment session well. Pt's performance with transfers is significantly improved in AM vs PM session. RN states she also held pain medications this morning as pt was not having any pain and in attempt to see if medication is a possible factor to pt's fatigue and increased confusion in PM. Pt was able to tolerate ambulating 20 + 30 ft with RW and CGA, with assist from 2nd therapist to safely manage lines. Pt's flucuating assist levels creates the primary concern for returning home with assist from therapy. Will continue to work with staff to monitor pt's performance throughout the day. Recommending ARU/IRF/Inpatient Rehab Facility upon discharge as patient functioning well below baseline, demonstrates good rehab potential and unable to return home due to inability to negotiate stairs to enter home/bedroom/bathroom, burden of care beyond caregiver ability, home environment not conducive to patient recovery and limited safety awareness. Goals (all goals ongoing unless otherwise indicated)  To be met in 3 visits:  1). Independent with LE Ex x 10 reps  MET     To be met in 6 visits:  1).  Supine to/from sit: SBA  2).  Sit to/from stand: SBA  3).  Bed to chair: CGA with RW  4).  Gait: Ambulate 50 ft.  with  Min A  and use of rolling walker (RW)  5).  Tolerate B LE exercises 3 sets of 10-15 reps  6).  Ascend/descend 2 steps with Min A  with use of hand rail bilateral and LRAD (least restrictive assistive device)    Plan   Continue with plan of care. Signature: Flaca Piedra PT, DPT #[id number]    If patient discharges from this facility prior to next visit, this note will serve as the Discharge Summary.

## 2021-01-13 NOTE — PROGRESS NOTES
Shift Assessment completed. See Flow sheets. Scheduled medications administered. See MAR. IV infusing without difficulty. O2 3.5 LPM NC in place. Patient denies pain. CHRISTOFER drain dressing changed. Patient denies any needs at this time. Patient educated on use of call light and to call out with needs, verbalizing understanding. Bed in low locked position for patient safety.

## 2021-01-13 NOTE — PROGRESS NOTES
AM assessment completed, see flow sheet. Pt is alert and oriented. Vital signs are WNL. Respirations are even & easy on 3.5L/NC with NP moist cough. Encouraged incentive spirometer use. Pt stated he is using it through out day. No complaints voiced. Pt denies needs at this time. SR up x 2, and bed in low position. Call light is within reach.

## 2021-01-13 NOTE — PLAN OF CARE
Description: Patients continuum of care needs are met  1/12/2021 2329 by Prisca Cox RN  Outcome: Ongoing  1/12/2021 1001 by Kuldip Bradley RN  Outcome: Ongoing     Problem: Falls - Risk of:  Goal: Will remain free from falls  Description: Will remain free from falls  1/12/2021 2329 by Prisca Cox RN  Outcome: Ongoing  1/12/2021 1001 by Kuldip Bradley RN  Outcome: Ongoing  Goal: Absence of physical injury  Description: Absence of physical injury  1/12/2021 2329 by Prisca Cox RN  Outcome: Ongoing  1/12/2021 1001 by Kuldip Bradley RN  Outcome: Ongoing     Problem: Nutrition  Goal: Optimal nutrition therapy  1/12/2021 2329 by Prisca Cox RN  Outcome: Ongoing  1/12/2021 1413 by Ben Alfredo RD, LD  Outcome: Ongoing  1/12/2021 1001 by Kuldip Bradley RN  Outcome: Ongoing     Problem: Skin Integrity:  Goal: Will show no infection signs and symptoms  Description: Will show no infection signs and symptoms  1/12/2021 2329 by Prisca Cox RN  Outcome: Ongoing  1/12/2021 1001 by Kuldip Bradley RN  Outcome: Ongoing  Goal: Absence of new skin breakdown  Description: Absence of new skin breakdown  1/12/2021 2329 by Prisca Cox RN  Outcome: Ongoing  1/12/2021 1001 by Kuldip Bradley RN  Outcome: Ongoing

## 2021-01-13 NOTE — PROGRESS NOTES
Out: 7285 [Urine:1050; Drains:10]  I/O this shift:   In: 480 [P.O.:480]  Out: 325 [Urine:325]    LABS  Recent Labs     01/13/21  0421   WBC 6.2   HGB 8.1*   HCT 25.3*   *      K 3.5   CL 96*   CO2 33*   BUN 7   CREATININE 0.7*   MG 1.70*   CALCIUM 8.3   AST 18   ALT 28   BILITOT 1.3*     CBC with Differential:    Lab Results   Component Value Date    WBC 6.2 01/13/2021    RBC 2.94 01/13/2021    HGB 8.1 01/13/2021    HCT 25.3 01/13/2021     01/13/2021    MCV 86.0 01/13/2021    MCH 27.7 01/13/2021    MCHC 32.2 01/13/2021    RDW 17.1 01/13/2021    SEGSPCT 68.5 07/02/2011    BANDSPCT 1 01/10/2021    LYMPHOPCT 19.4 01/13/2021    MONOPCT 7.7 01/13/2021    EOSPCT 0.3 07/02/2011    BASOPCT 0.7 01/13/2021    MONOSABS 0.5 01/13/2021    LYMPHSABS 1.2 01/13/2021    EOSABS 0.0 01/13/2021    BASOSABS 0.0 01/13/2021    DIFFTYPE Auto 07/02/2011     CMP:    Lab Results   Component Value Date     01/13/2021    K 3.5 01/13/2021    CL 96 01/13/2021    CO2 33 01/13/2021    BUN 7 01/13/2021    CREATININE 0.7 01/13/2021    GFRAA >60 01/13/2021    GFRAA >60 07/01/2011    AGRATIO 0.7 01/13/2021    LABGLOM >60 01/13/2021    GLUCOSE 203 01/13/2021    PROT 6.2 01/13/2021    PROT 7.0 08/10/2010    LABALBU 2.5 01/13/2021    CALCIUM 8.3 01/13/2021    BILITOT 1.3 01/13/2021    ALKPHOS 103 01/13/2021    AST 18 01/13/2021    ALT 28 01/13/2021         Lois Shelby Parcel  Electronically signed 1/13/2021 at 1:42 PM

## 2021-01-13 NOTE — PROGRESS NOTES
Inpatient Occupational Therapy Treatment    Unit: ED  Date:  1/13/2021  Patient Name:    Brianna Bashir  Admitting diagnosis:  Acute cholecystitis [K81.0]  Admit Date:  1/4/2021  Precautions/Restrictions/WB Status/ Lines/ Wounds/ Oxygen: Fall risk, Bed/chair alarm, Lines -IV, Supplemental O2 (3  L) and Drains (CHRISTOFER on R), Telemetry and Continuous pulse oximetry, incision with staples on L abdomen    Treatment Time:835-920  Treatment Number: 4    Billable Treatment Time: 45 minutes   Total Treatment Time: 45   minutes        Discharge Recommendations: ARU    ME needs for discharge: Needs Met       Therapy recommendations for staff:   Assist x2 with RW for transfers to/from BSC/chair    History of Present Illness: 78y.o. year old male with medical history of coronary artery disease, COPD on chronic 3 L of oxygen, diabetes mellitus type 2, hyperlipidemia, hypertension, ELAINE on CPAP, atrial fibrillation on Eliquis and cholecystitis presents for evaluation of fall at home. Patient reports lower extremity weakness with his legs giving out. 1/6 - laparoscopic cholecystectomy, transferred to ICU after OR due to poor level of alertness and abnormal labs/vitals  1/8 - PT/OT orders to resume therapy    Home Health S4 Level Recommendation:  NA  AM-PAC Score: AM-PAC Inpatient Daily Activity Raw Score: 14    Pain    Rating:none      Cognition    Able to follow 2 step commands    Subjective  Patient lying supine in bed with no family present   Pt agreeable to this OT eval & tx.      Balance  Functional Sitting Balance:  WFL  Functional Standing Balance: diminished     Bed mobility:    Supine to sit:   SBA   Sit to supine:              NT   Rolling:    Not Tested  Scooting in sitting:  SBA   Scooting to head of bed:   Not Tested    Bridging:   Not Tested    Transfers:     Sit to stand:  CGA from the bed with VC on hand placement   Stand to sit:  Min   Bed to chair:   Min  with RW  Standard toilet: Not Tested Bed to [facility]:  Not Tested   Bed to curtain              CGA with RW and no LE buckle   Bed to door/chair         CGA witih RW   Dressing:      UE:   Not Tested  LE:    Total A  to don depends , CGA to don pants     Bathing:    UE:  Not Tested  LE:  Not Tested    Eating:   NT    Toileting:  Not Tested   Exercises :   UE shoulder - reaching with trunk movement - 10x  Shoulder shrugs 15x  Shoulder retraction 12x- had to stop due to SOB     Activity Tolerance   Pt completed therapy session with No adverse symptoms noted w/activity. after ambulation SpO2: 95% on 3 L, /72 HR 81   After 2nd ambulation Sp02 96%       Positioning Needs:   Up in chair, call light and needs in reach. Alarm on     Patient/Family Education:   Role of OT  Recommendations for DC  Safe RW use/hand placement    Assessment :   Pt was stand by assist for supine to sit and CGA to stand to the RW. Pt required VC for hand placement The pt was CGA with RW to ambulate two times with RW. The pt was CGA to don pants and max assist to change depends. The pt was CGA to stand and use the urinal. The pt is recommended to go to SNF. Goal(s) : To be met in 3 Visits:  1). Bed to toilet/BSC: Min A, goal met 1/11, increase to SBA     To be met in 5 Visits:  1). Supine to/from Sit:  SBA  2). Upper Body Bathing:   Supervision  3). Lower Body Bathing: Mod A  4). Upper Body Dressing:  Supervision  5). Lower Body Dressing: Mod A   6). Pt to demonstrate UE exs x 15 reps with minimal cues    Rehabilitation Potential:  Good for goals listed above. Strengths for achieving goals include: Pt motivated, PLOF and Pt cooperative  Barriers to achieving goals include:  Complexity of condition     Plan: To be seen 3-5 x/wk while in acute care setting for therapeutic exercises, bed mobility, transfers, dressing, bathing, family/patient education, ADL/IADL retraining, energy conservation training.        Julio Tellez OTR/L [id number]

## 2021-01-13 NOTE — PROGRESS NOTES
Progress Note    Admit Date:  1/4/2021     Admitted for recurrent cholecystitis  S/p open cholecystectomy on 1/6/2021. Transferred to ICU postop for resp acidosis and placed on bipap  Klebsiella bacteremia from infection     Improved with BiPAP. Transferred out of ICU back to [facility] on 1/7/2021. Subjective:  Mr. Sandra Hammond is tolerating full liquids, no nausea or vomiting. + flatus, no BM  - He appears stronger today  - stable on home O2- 3L  - PT OT still recommending SNF. Re eval this AM.  Pt wants to go home     Objective:   Patient Vitals for the past 4 hrs:   BP Temp Temp src Pulse Resp SpO2   01/13/21 0722 130/70 97.9 °F (36.6 °C) Axillary 89 16 93 %   01/13/21 0643     16 98 %          Intake/Output Summary (Last 24 hours) at 1/13/2021 0846  Last data filed at 1/13/2021 2184  Gross per 24 hour   Intake 4199 ml   Output 1385 ml   Net 2814 ml       General: elderly male, sitting up in bed . Awake, alert and oriented. Appears to be not in any distress  Mucous Membranes:  Pink , anicteric  Neck: No JVD,  Trachea midline   Chest: Diminished breath sounds, no crackles, no wheezes   Cardiovascular: irregularly irregular, no murmurs. 1+ BLE edema   Abdomen:  Soft, Mildly distended, right UQ surgical staples intact. Drain in place  Pain appropriate, BS present  Extremities: No cyanosis.  Distal pulses well felt  Neurological : grossly normal      Scheduled Meds:   magnesium sulfate  1 g Intravenous Once    potassium chloride  40 mEq Oral Once    ciprofloxacin  400 mg Intravenous Q12H    metroNIDAZOLE  500 mg Intravenous Q8H    insulin lispro  0-6 Units Subcutaneous TID WC    insulin lispro  0-3 Units Subcutaneous Nightly    apixaban  5 mg Oral BID    dilTIAZem  180 mg Oral Daily    aspirin  81 mg Oral Daily    pantoprazole  40 mg Oral QAM AC    gabapentin  600 mg Oral TID    propafenone  150 mg Oral 3 times per day    sodium chloride flush  10 mL Intravenous 2 times per day  budesonide-formoterol  1 puff Inhalation Daily    albuterol  2.5 mg Nebulization BID       Continuous Infusions:   sodium chloride 35 mL/hr at 01/13/21 0544    dextrose         PRN Meds:  potassium chloride **OR** potassium alternative oral replacement **OR** potassium chloride, magnesium sulfate, oxyCODONE **OR** oxyCODONE, morphine, glucose, dextrose, glucagon (rDNA), dextrose, sodium chloride flush, albuterol      Data:  CBC:   Recent Labs     01/11/21  0516 01/12/21  0548 01/13/21  0421   WBC 5.7 5.8 6.2   HGB 8.0* 8.2* 8.1*   HCT 24.4* 25.4* 25.3*   MCV 84.7 85.3 86.0   * 141 132*     BMP:   Recent Labs     01/11/21  0516 01/12/21  0548 01/13/21  0421    141 138   K 3.5 3.4* 3.5   CL 97* 99 96*   CO2 33* 35* 33*   BUN 10 9 7   CREATININE 0.7* 0.7* 0.7*     LIVER PROFILE:   Recent Labs     01/11/21  0516 01/12/21  0548 01/13/21  0421   AST 21 20 18   ALT 35 31 28   BILITOT 1.4* 1.3* 1.3*   ALKPHOS 112 107 103     PT/INR:   No results for input(s): PROTIME, INR in the last 72 hours.     CULTURES  Blood 1/4:  Klebsiella aerogenes (2)    Antibiotic Interpretation CHRISTELLE Status    amoxicillin-clavulanate Resistant >16/8 mcg/mL     ampicillin Resistant >16 mcg/mL     ceFAZolin Resistant >16 mcg/mL     cefepime Sensitive <=2 mcg/mL     cefTRIAXone Resistant 32 mcg/mL     cefuroxime Resistant >16 mcg/mL     ciprofloxacin Sensitive <=1 mcg/mL     ertapenem Sensitive <=0.5 mcg/mL     gentamicin Sensitive <=4 mcg/mL     meropenem Sensitive <=1 mcg/mL     piperacillin-tazobactam Intermediate 32 mcg/mL     trimethoprim-sulfamethoxazole Sensitive <=2/38 mcg/mL         Urine: NGTD    Blood 1/9: NGTD    Surgical incision 1/11:   Klebsiella aerogenes (1)    Antibiotic Interpretation CHRISTELLE Status    ceFAZolin Resistant >=64 mcg/mL     cefepime Sensitive 0.25 mcg/mL     cefTRIAXone Intermediate 32 mcg/mL     ciprofloxacin Sensitive <=0.25 mcg/mL     ertapenem Sensitive <=0.12 mcg/mL gentamicin Sensitive <=1 mcg/mL     levofloxacin Sensitive <=0.12 mcg/mL     piperacillin-tazobactam Resistant >=128 mcg/mL     trimethoprim-sulfamethoxazole Sensitive <=20 mcg/mL           RADIOLOGY  MRI ABDOMEN WO CONTRAST MRCP 1/5/2020   Final Result   No evidence of choledocholithiasis or dilation of the biliary collecting   system on limited imaging      Small bright T2 lesion within the head of the pancreas which could represent   a small cyst.  These lesions are typically not followed beyond age of [de-identified]   years. US GALLBLADDER RUQ 1/4/2020   Final Result   1. Cholelithiasis without sonographic evidence for acute cholecystitis. 2. Fatty liver versus diffuse hepatocellular disease. 3. Nonvisualization of the pancreas. CTA CHEST ABDOMEN PELVIS W CONTRAST 1/4/2020   Final Result   1. CTA CHEST: No acute vascular abnormality; no aneurysm or dissection. 2.  Pulmonary sequela typical of that seen with smoking, including COPD. 3. Chronic mild T6 compression fracture. 4. CTA ABDOMEN/PELVIS: No acute vascular abnormality; no aneurysm leak or   dissection. 5. 3.0 cm abdominal aortic aneurysm. Recommend follow-up every 3 years. 6.  No CT evidence of an acute intra-abdominal or intrapelvic process. 7. Cholelithiasis without definite CT findings of acute cholecystitis. Gallbladder appears contracted. 8.  No findings to suggest acute appendicitis; no ureter calculus or   hydronephrosis. 9. Hepatic steatosis. 10. Chronic, nonspecific L1 sclerosis and compression fracture. 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 RECOMMENDATIONS:   3 year imaging follow-up AAA         CT HEAD WO CONTRAST 1/4/2020   Final Result   1. No acute intracranial abnormality. 2. Small left forehead scalp contusion/hematoma. 3. Possible acute left maxillary sinusitis. 4. Stable appearing senescent changes. XR CHEST PORTABLE   Final Result   No radiographic evidence of acute pulmonary disease.

## 2021-01-13 NOTE — PROGRESS NOTES
Writer spoke with son Brendasuzan Morel to provide update about patient with patient permission. Answered all questions and concerns. No further questions and concerns at this time.

## 2021-01-13 NOTE — PLAN OF CARE
Problem: Infection:  Goal: Will remain free from infection  Description: Will remain free from infection  1/13/2021 0941 by Roddy Medina RN  Outcome: Ongoing  1/12/2021 2329 by Consuelo Ahmadi RN  Outcome: Ongoing     Problem: Safety:  Goal: Free from accidental physical injury  Description: Free from accidental physical injury  1/13/2021 0941 by Roddy Medina RN  Outcome: Ongoing  1/12/2021 2329 by Consuelo Ahmadi RN  Outcome: Ongoing  Goal: Free from intentional harm  Description: Free from intentional harm  1/13/2021 0941 by Roddy Medina RN  Outcome: Ongoing  1/12/2021 2329 by Consuelo Ahmadi RN  Outcome: Ongoing     Problem: Daily Care:  Goal: Daily care needs are met  Description: Daily care needs are met  1/13/2021 0941 by Roddy Medina RN  Outcome: Ongoing  1/12/2021 2329 by Consuelo Ahmadi RN  Outcome: Ongoing     Problem: Pain:  Goal: Patient's pain/discomfort is manageable  Description: Patient's pain/discomfort is manageable  1/13/2021 0941 by Roddy Medina RN  Outcome: Ongoing  1/12/2021 2329 by Consuelo Ahmadi RN  Outcome: Ongoing  Goal: Pain level will decrease  Description: Pain level will decrease  1/13/2021 0941 by Roddy Medina RN  Outcome: Ongoing  1/12/2021 2329 by Consuelo Ahmadi RN  Outcome: Ongoing  Goal: Control of acute pain  Description: Control of acute pain  1/13/2021 0941 by Roddy Medina RN  Outcome: Ongoing  1/12/2021 2329 by Consuelo Ahmadi RN  Outcome: Ongoing  Goal: Control of chronic pain  Description: Control of chronic pain  1/13/2021 0941 by Roddy Medina RN  Outcome: Ongoing  1/12/2021 2329 by Consuelo Ahmadi RN  Outcome: Ongoing     Problem: Skin Integrity:  Goal: Skin integrity will stabilize  Description: Skin integrity will stabilize  1/13/2021 0941 by Roddy Medina RN  Outcome: Ongoing  1/12/2021 2329 by Consuelo Ahmadi RN  Outcome: Ongoing     Problem: Discharge Planning:  Goal: Patients continuum of care needs are met Description: Patients continuum of care needs are met  1/13/2021 0941 by Essie Pizano RN  Outcome: Ongoing  1/12/2021 2329 by Blanka Rodas RN  Outcome: Ongoing     Problem: Falls - Risk of:  Goal: Will remain free from falls  Description: Will remain free from falls  1/13/2021 0941 by Essie Pizano RN  Outcome: Ongoing  1/12/2021 2329 by Blanka Rodas RN  Outcome: Ongoing  Goal: Absence of physical injury  Description: Absence of physical injury  1/13/2021 0941 by Essie Pizano RN  Outcome: Ongoing  1/12/2021 2329 by Blanka Rodas RN  Outcome: Ongoing     Problem: Nutrition  Goal: Optimal nutrition therapy  1/13/2021 0941 by Essie Pizano RN  Outcome: Ongoing  1/12/2021 2329 by Blanka Rodas RN  Outcome: Ongoing     Problem: Skin Integrity:  Goal: Will show no infection signs and symptoms  Description: Will show no infection signs and symptoms  1/13/2021 0941 by Essie Pizano RN  Outcome: Ongoing  1/12/2021 2329 by Blanka Rodas RN  Outcome: Ongoing  Goal: Absence of new skin breakdown  Description: Absence of new skin breakdown  1/13/2021 0941 by Essie Pizano RN  Outcome: Ongoing  1/12/2021 2329 by Blanka Rodas RN  Outcome: Ongoing

## 2021-01-14 ENCOUNTER — HOSPITAL ENCOUNTER (INPATIENT)
Age: 81
LOS: 9 days | Discharge: HOME HEALTH | DRG: 948 | End: 2021-01-23
Attending: PHYSICAL MEDICINE & REHABILITATION | Admitting: PHYSICAL MEDICINE & REHABILITATION
Payer: MEDICARE

## 2021-01-14 VITALS
WEIGHT: 204 LBS | HEIGHT: 71 IN | DIASTOLIC BLOOD PRESSURE: 62 MMHG | SYSTOLIC BLOOD PRESSURE: 121 MMHG | TEMPERATURE: 96.6 F | RESPIRATION RATE: 16 BRPM | BODY MASS INDEX: 28.56 KG/M2 | OXYGEN SATURATION: 96 % | HEART RATE: 90 BPM

## 2021-01-14 DIAGNOSIS — K81.0 ACUTE CHOLECYSTITIS: Primary | ICD-10-CM

## 2021-01-14 PROBLEM — R53.81 DEBILITY: Status: ACTIVE | Noted: 2021-01-14

## 2021-01-14 LAB
A/G RATIO: 0.8 (ref 1.1–2.2)
ALBUMIN SERPL-MCNC: 2.6 G/DL (ref 3.4–5)
ALP BLD-CCNC: 92 U/L (ref 40–129)
ALT SERPL-CCNC: 20 U/L (ref 10–40)
ANION GAP SERPL CALCULATED.3IONS-SCNC: 2 MMOL/L (ref 3–16)
AST SERPL-CCNC: 13 U/L (ref 15–37)
BASOPHILS ABSOLUTE: 0 K/UL (ref 0–0.2)
BASOPHILS RELATIVE PERCENT: 0.3 %
BILIRUB SERPL-MCNC: 1.1 MG/DL (ref 0–1)
BUN BLDV-MCNC: 4 MG/DL (ref 7–20)
CALCIUM SERPL-MCNC: 8.5 MG/DL (ref 8.3–10.6)
CHLORIDE BLD-SCNC: 100 MMOL/L (ref 99–110)
CO2: 42 MMOL/L (ref 21–32)
CREAT SERPL-MCNC: 0.8 MG/DL (ref 0.8–1.3)
EOSINOPHILS ABSOLUTE: 0 K/UL (ref 0–0.6)
EOSINOPHILS RELATIVE PERCENT: 0.5 %
GFR AFRICAN AMERICAN: >60
GFR NON-AFRICAN AMERICAN: >60
GLOBULIN: 3.1 G/DL
GLUCOSE BLD-MCNC: 187 MG/DL (ref 70–99)
GLUCOSE BLD-MCNC: 188 MG/DL (ref 70–99)
GLUCOSE BLD-MCNC: 207 MG/DL (ref 70–99)
GLUCOSE BLD-MCNC: 228 MG/DL (ref 70–99)
GLUCOSE BLD-MCNC: 304 MG/DL (ref 70–99)
HCT VFR BLD CALC: 24.2 % (ref 40.5–52.5)
HEMOGLOBIN: 7.8 G/DL (ref 13.5–17.5)
LYMPHOCYTES ABSOLUTE: 0.9 K/UL (ref 1–5.1)
LYMPHOCYTES RELATIVE PERCENT: 20.2 %
MCH RBC QN AUTO: 27.8 PG (ref 26–34)
MCHC RBC AUTO-ENTMCNC: 32.3 G/DL (ref 31–36)
MCV RBC AUTO: 86 FL (ref 80–100)
MONOCYTES ABSOLUTE: 0.4 K/UL (ref 0–1.3)
MONOCYTES RELATIVE PERCENT: 9.9 %
NEUTROPHILS ABSOLUTE: 3 K/UL (ref 1.7–7.7)
NEUTROPHILS RELATIVE PERCENT: 69.1 %
PDW BLD-RTO: 17.4 % (ref 12.4–15.4)
PERFORMED ON: ABNORMAL
PLATELET # BLD: 122 K/UL (ref 135–450)
PMV BLD AUTO: 9.6 FL (ref 5–10.5)
POTASSIUM REFLEX MAGNESIUM: 3.6 MMOL/L (ref 3.5–5.1)
RBC # BLD: 2.81 M/UL (ref 4.2–5.9)
SARS-COV-2, PCR: NOT DETECTED
SODIUM BLD-SCNC: 144 MMOL/L (ref 136–145)
TOTAL PROTEIN: 5.7 G/DL (ref 6.4–8.2)
WBC # BLD: 4.4 K/UL (ref 4–11)

## 2021-01-14 PROCEDURE — 99024 POSTOP FOLLOW-UP VISIT: CPT | Performed by: NURSE PRACTITIONER

## 2021-01-14 PROCEDURE — 2500000003 HC RX 250 WO HCPCS: Performed by: NURSE PRACTITIONER

## 2021-01-14 PROCEDURE — 6360000002 HC RX W HCPCS: Performed by: PHYSICAL MEDICINE & REHABILITATION

## 2021-01-14 PROCEDURE — 6370000000 HC RX 637 (ALT 250 FOR IP): Performed by: PHYSICAL MEDICINE & REHABILITATION

## 2021-01-14 PROCEDURE — 6370000000 HC RX 637 (ALT 250 FOR IP): Performed by: PHYSICIAN ASSISTANT

## 2021-01-14 PROCEDURE — 80053 COMPREHEN METABOLIC PANEL: CPT

## 2021-01-14 PROCEDURE — 94640 AIRWAY INHALATION TREATMENT: CPT

## 2021-01-14 PROCEDURE — 6370000000 HC RX 637 (ALT 250 FOR IP): Performed by: INTERNAL MEDICINE

## 2021-01-14 PROCEDURE — 6360000002 HC RX W HCPCS: Performed by: NURSE PRACTITIONER

## 2021-01-14 PROCEDURE — 6360000002 HC RX W HCPCS: Performed by: INTERNAL MEDICINE

## 2021-01-14 PROCEDURE — 36415 COLL VENOUS BLD VENIPUNCTURE: CPT

## 2021-01-14 PROCEDURE — 94761 N-INVAS EAR/PLS OXIMETRY MLT: CPT

## 2021-01-14 PROCEDURE — 85025 COMPLETE CBC W/AUTO DIFF WBC: CPT

## 2021-01-14 PROCEDURE — 2700000000 HC OXYGEN THERAPY PER DAY

## 2021-01-14 PROCEDURE — 99239 HOSP IP/OBS DSCHRG MGMT >30: CPT | Performed by: INTERNAL MEDICINE

## 2021-01-14 PROCEDURE — 1280000000 HC REHAB R&B

## 2021-01-14 RX ORDER — DEXTROSE MONOHYDRATE 25 G/50ML
12.5 INJECTION, SOLUTION INTRAVENOUS PRN
Status: DISCONTINUED | OUTPATIENT
Start: 2021-01-14 | End: 2021-01-23 | Stop reason: HOSPADM

## 2021-01-14 RX ORDER — DEXTROSE MONOHYDRATE 25 G/50ML
12.5 INJECTION, SOLUTION INTRAVENOUS PRN
Status: CANCELLED | OUTPATIENT
Start: 2021-01-14

## 2021-01-14 RX ORDER — DILTIAZEM HYDROCHLORIDE 180 MG/1
180 CAPSULE, COATED, EXTENDED RELEASE ORAL DAILY
Status: CANCELLED | OUTPATIENT
Start: 2021-01-15

## 2021-01-14 RX ORDER — ACETAMINOPHEN 325 MG/1
650 TABLET ORAL EVERY 4 HOURS PRN
Status: DISCONTINUED | OUTPATIENT
Start: 2021-01-14 | End: 2021-01-23 | Stop reason: HOSPADM

## 2021-01-14 RX ORDER — CIPROFLOXACIN 2 MG/ML
400 INJECTION, SOLUTION INTRAVENOUS EVERY 12 HOURS
Status: DISCONTINUED | OUTPATIENT
Start: 2021-01-15 | End: 2021-01-14

## 2021-01-14 RX ORDER — INSULIN GLARGINE 100 [IU]/ML
20 INJECTION, SOLUTION SUBCUTANEOUS NIGHTLY
Status: CANCELLED | OUTPATIENT
Start: 2021-01-14

## 2021-01-14 RX ORDER — BUDESONIDE AND FORMOTEROL FUMARATE DIHYDRATE 160; 4.5 UG/1; UG/1
1 AEROSOL RESPIRATORY (INHALATION) 2 TIMES DAILY
Status: DISCONTINUED | OUTPATIENT
Start: 2021-01-14 | End: 2021-01-23 | Stop reason: HOSPADM

## 2021-01-14 RX ORDER — ALBUTEROL SULFATE 2.5 MG/3ML
2.5 SOLUTION RESPIRATORY (INHALATION) 2 TIMES DAILY
Status: CANCELLED | OUTPATIENT
Start: 2021-01-14

## 2021-01-14 RX ORDER — OXYCODONE HYDROCHLORIDE 5 MG/1
5-10 TABLET ORAL EVERY 4 HOURS PRN
Qty: 24 TABLET | Refills: 0 | Status: ON HOLD
Start: 2021-01-14 | End: 2021-01-22 | Stop reason: HOSPADM

## 2021-01-14 RX ORDER — ASPIRIN 81 MG/1
81 TABLET ORAL DAILY
Status: DISCONTINUED | OUTPATIENT
Start: 2021-01-15 | End: 2021-01-23 | Stop reason: HOSPADM

## 2021-01-14 RX ORDER — BUDESONIDE AND FORMOTEROL FUMARATE DIHYDRATE 160; 4.5 UG/1; UG/1
1 AEROSOL RESPIRATORY (INHALATION) DAILY
Status: CANCELLED | OUTPATIENT
Start: 2021-01-15

## 2021-01-14 RX ORDER — CIPROFLOXACIN 500 MG/1
500 TABLET, FILM COATED ORAL 2 TIMES DAILY
Qty: 14 TABLET | Refills: 0 | Status: ON HOLD
Start: 2021-01-14 | End: 2021-01-22 | Stop reason: HOSPADM

## 2021-01-14 RX ORDER — TRAZODONE HYDROCHLORIDE 50 MG/1
50 TABLET ORAL NIGHTLY PRN
Status: CANCELLED | OUTPATIENT
Start: 2021-01-14

## 2021-01-14 RX ORDER — POTASSIUM CHLORIDE 7.45 MG/ML
10 INJECTION INTRAVENOUS PRN
Status: DISCONTINUED | OUTPATIENT
Start: 2021-01-14 | End: 2021-01-14 | Stop reason: ALTCHOICE

## 2021-01-14 RX ORDER — SODIUM CHLORIDE 9 MG/ML
INJECTION, SOLUTION INTRAVENOUS CONTINUOUS
Status: DISCONTINUED | OUTPATIENT
Start: 2021-01-14 | End: 2021-01-14 | Stop reason: ALTCHOICE

## 2021-01-14 RX ORDER — PROPAFENONE HYDROCHLORIDE 150 MG/1
150 TABLET, FILM COATED ORAL EVERY 8 HOURS SCHEDULED
Status: CANCELLED | OUTPATIENT
Start: 2021-01-14

## 2021-01-14 RX ORDER — OXYCODONE HYDROCHLORIDE 5 MG/1
5 TABLET ORAL EVERY 4 HOURS PRN
Status: DISCONTINUED | OUTPATIENT
Start: 2021-01-14 | End: 2021-01-23 | Stop reason: HOSPADM

## 2021-01-14 RX ORDER — ONDANSETRON 4 MG/1
8 TABLET, ORALLY DISINTEGRATING ORAL EVERY 8 HOURS PRN
Status: CANCELLED | OUTPATIENT
Start: 2021-01-14

## 2021-01-14 RX ORDER — ALBUTEROL SULFATE 2.5 MG/3ML
2.5 SOLUTION RESPIRATORY (INHALATION) EVERY 4 HOURS PRN
Status: CANCELLED | OUTPATIENT
Start: 2021-01-14

## 2021-01-14 RX ORDER — POLYETHYLENE GLYCOL 3350 17 G/17G
17 POWDER, FOR SOLUTION ORAL DAILY PRN
Status: DISCONTINUED | OUTPATIENT
Start: 2021-01-14 | End: 2021-01-23 | Stop reason: HOSPADM

## 2021-01-14 RX ORDER — OXYCODONE HYDROCHLORIDE 5 MG/1
10 TABLET ORAL EVERY 4 HOURS PRN
Status: DISCONTINUED | OUTPATIENT
Start: 2021-01-14 | End: 2021-01-23 | Stop reason: HOSPADM

## 2021-01-14 RX ORDER — OXYCODONE HYDROCHLORIDE 5 MG/1
5 TABLET ORAL EVERY 4 HOURS PRN
Status: CANCELLED | OUTPATIENT
Start: 2021-01-14

## 2021-01-14 RX ORDER — OXYCODONE HYDROCHLORIDE 5 MG/1
10 TABLET ORAL EVERY 4 HOURS PRN
Status: CANCELLED | OUTPATIENT
Start: 2021-01-14

## 2021-01-14 RX ORDER — GABAPENTIN 300 MG/1
600 CAPSULE ORAL 3 TIMES DAILY
Status: DISCONTINUED | OUTPATIENT
Start: 2021-01-14 | End: 2021-01-23 | Stop reason: HOSPADM

## 2021-01-14 RX ORDER — PANTOPRAZOLE SODIUM 40 MG/1
40 TABLET, DELAYED RELEASE ORAL
Status: CANCELLED | OUTPATIENT
Start: 2021-01-15

## 2021-01-14 RX ORDER — POTASSIUM CHLORIDE 20 MEQ/1
40 TABLET, EXTENDED RELEASE ORAL PRN
Status: DISCONTINUED | OUTPATIENT
Start: 2021-01-14 | End: 2021-01-14 | Stop reason: ALTCHOICE

## 2021-01-14 RX ORDER — NICOTINE POLACRILEX 4 MG
15 LOZENGE BUCCAL PRN
Status: DISCONTINUED | OUTPATIENT
Start: 2021-01-14 | End: 2021-01-23 | Stop reason: HOSPADM

## 2021-01-14 RX ORDER — METRONIDAZOLE 250 MG/1
500 TABLET ORAL EVERY 8 HOURS SCHEDULED
Status: COMPLETED | OUTPATIENT
Start: 2021-01-14 | End: 2021-01-21

## 2021-01-14 RX ORDER — HYDRALAZINE HYDROCHLORIDE 25 MG/1
50 TABLET, FILM COATED ORAL EVERY 6 HOURS PRN
Status: DISCONTINUED | OUTPATIENT
Start: 2021-01-14 | End: 2021-01-23 | Stop reason: HOSPADM

## 2021-01-14 RX ORDER — SODIUM CHLORIDE 0.9 % (FLUSH) 0.9 %
10 SYRINGE (ML) INJECTION EVERY 12 HOURS SCHEDULED
Status: CANCELLED | OUTPATIENT
Start: 2021-01-14

## 2021-01-14 RX ORDER — SODIUM CHLORIDE 0.9 % (FLUSH) 0.9 %
10 SYRINGE (ML) INJECTION PRN
Status: DISCONTINUED | OUTPATIENT
Start: 2021-01-14 | End: 2021-01-23 | Stop reason: HOSPADM

## 2021-01-14 RX ORDER — ONDANSETRON 4 MG/1
8 TABLET, ORALLY DISINTEGRATING ORAL EVERY 8 HOURS PRN
Status: DISCONTINUED | OUTPATIENT
Start: 2021-01-14 | End: 2021-01-23 | Stop reason: HOSPADM

## 2021-01-14 RX ORDER — HYDRALAZINE HYDROCHLORIDE 50 MG/1
50 TABLET, FILM COATED ORAL EVERY 6 HOURS PRN
Status: CANCELLED | OUTPATIENT
Start: 2021-01-14

## 2021-01-14 RX ORDER — GABAPENTIN 300 MG/1
600 CAPSULE ORAL 3 TIMES DAILY
Status: CANCELLED | OUTPATIENT
Start: 2021-01-14

## 2021-01-14 RX ORDER — INSULIN GLARGINE 100 [IU]/ML
20 INJECTION, SOLUTION SUBCUTANEOUS NIGHTLY
Status: DISCONTINUED | OUTPATIENT
Start: 2021-01-14 | End: 2021-01-23 | Stop reason: HOSPADM

## 2021-01-14 RX ORDER — PANTOPRAZOLE SODIUM 40 MG/1
40 TABLET, DELAYED RELEASE ORAL
Status: DISCONTINUED | OUTPATIENT
Start: 2021-01-15 | End: 2021-01-23 | Stop reason: HOSPADM

## 2021-01-14 RX ORDER — ACETAMINOPHEN 325 MG/1
650 TABLET ORAL EVERY 4 HOURS PRN
Status: CANCELLED | OUTPATIENT
Start: 2021-01-14

## 2021-01-14 RX ORDER — POTASSIUM CHLORIDE 7.45 MG/ML
10 INJECTION INTRAVENOUS PRN
Status: CANCELLED | OUTPATIENT
Start: 2021-01-14

## 2021-01-14 RX ORDER — SODIUM CHLORIDE 0.9 % (FLUSH) 0.9 %
10 SYRINGE (ML) INJECTION PRN
Status: CANCELLED | OUTPATIENT
Start: 2021-01-14

## 2021-01-14 RX ORDER — TRAZODONE HYDROCHLORIDE 50 MG/1
50 TABLET ORAL NIGHTLY PRN
Status: DISCONTINUED | OUTPATIENT
Start: 2021-01-14 | End: 2021-01-23 | Stop reason: HOSPADM

## 2021-01-14 RX ORDER — DEXTROSE MONOHYDRATE 50 MG/ML
100 INJECTION, SOLUTION INTRAVENOUS PRN
Status: CANCELLED | OUTPATIENT
Start: 2021-01-14

## 2021-01-14 RX ORDER — MAGNESIUM SULFATE 1 G/100ML
1 INJECTION INTRAVENOUS PRN
Status: CANCELLED | OUTPATIENT
Start: 2021-01-14

## 2021-01-14 RX ORDER — DILTIAZEM HYDROCHLORIDE 180 MG/1
180 CAPSULE, COATED, EXTENDED RELEASE ORAL DAILY
Status: DISCONTINUED | OUTPATIENT
Start: 2021-01-15 | End: 2021-01-23 | Stop reason: HOSPADM

## 2021-01-14 RX ORDER — SODIUM CHLORIDE 0.9 % (FLUSH) 0.9 %
10 SYRINGE (ML) INJECTION EVERY 12 HOURS SCHEDULED
Status: DISCONTINUED | OUTPATIENT
Start: 2021-01-14 | End: 2021-01-14 | Stop reason: ALTCHOICE

## 2021-01-14 RX ORDER — POTASSIUM CHLORIDE 20 MEQ/1
40 TABLET, EXTENDED RELEASE ORAL PRN
Status: CANCELLED | OUTPATIENT
Start: 2021-01-14

## 2021-01-14 RX ORDER — ASPIRIN 81 MG/1
81 TABLET ORAL DAILY
Status: CANCELLED | OUTPATIENT
Start: 2021-01-15

## 2021-01-14 RX ORDER — POLYETHYLENE GLYCOL 3350 17 G/17G
17 POWDER, FOR SOLUTION ORAL DAILY PRN
Status: CANCELLED | OUTPATIENT
Start: 2021-01-14

## 2021-01-14 RX ORDER — CIPROFLOXACIN 2 MG/ML
400 INJECTION, SOLUTION INTRAVENOUS EVERY 12 HOURS
Status: CANCELLED | OUTPATIENT
Start: 2021-01-14

## 2021-01-14 RX ORDER — MAGNESIUM SULFATE 1 G/100ML
1 INJECTION INTRAVENOUS PRN
Status: DISCONTINUED | OUTPATIENT
Start: 2021-01-14 | End: 2021-01-23 | Stop reason: HOSPADM

## 2021-01-14 RX ORDER — CIPROFLOXACIN 500 MG/1
500 TABLET, FILM COATED ORAL EVERY 12 HOURS SCHEDULED
Status: COMPLETED | OUTPATIENT
Start: 2021-01-14 | End: 2021-01-21

## 2021-01-14 RX ORDER — BUDESONIDE AND FORMOTEROL FUMARATE DIHYDRATE 160; 4.5 UG/1; UG/1
1 AEROSOL RESPIRATORY (INHALATION) DAILY
Status: DISCONTINUED | OUTPATIENT
Start: 2021-01-15 | End: 2021-01-14

## 2021-01-14 RX ORDER — ALBUTEROL SULFATE 2.5 MG/3ML
2.5 SOLUTION RESPIRATORY (INHALATION) 2 TIMES DAILY
Status: DISCONTINUED | OUTPATIENT
Start: 2021-01-14 | End: 2021-01-15

## 2021-01-14 RX ORDER — ALBUTEROL SULFATE 2.5 MG/3ML
2.5 SOLUTION RESPIRATORY (INHALATION) EVERY 4 HOURS PRN
Status: DISCONTINUED | OUTPATIENT
Start: 2021-01-14 | End: 2021-01-23 | Stop reason: HOSPADM

## 2021-01-14 RX ORDER — DEXTROSE MONOHYDRATE 50 MG/ML
100 INJECTION, SOLUTION INTRAVENOUS PRN
Status: DISCONTINUED | OUTPATIENT
Start: 2021-01-14 | End: 2021-01-23 | Stop reason: HOSPADM

## 2021-01-14 RX ORDER — PROPAFENONE HYDROCHLORIDE 150 MG/1
150 TABLET, FILM COATED ORAL EVERY 8 HOURS SCHEDULED
Status: DISCONTINUED | OUTPATIENT
Start: 2021-01-14 | End: 2021-01-23 | Stop reason: HOSPADM

## 2021-01-14 RX ORDER — NICOTINE POLACRILEX 4 MG
15 LOZENGE BUCCAL PRN
Status: CANCELLED | OUTPATIENT
Start: 2021-01-14

## 2021-01-14 RX ORDER — METRONIDAZOLE 500 MG/1
500 TABLET ORAL 3 TIMES DAILY
Qty: 21 TABLET | Refills: 0 | Status: ON HOLD
Start: 2021-01-14 | End: 2021-01-22 | Stop reason: HOSPADM

## 2021-01-14 RX ORDER — SODIUM CHLORIDE 9 MG/ML
INJECTION, SOLUTION INTRAVENOUS CONTINUOUS
Status: CANCELLED | OUTPATIENT
Start: 2021-01-14

## 2021-01-14 RX ADMIN — CIPROFLOXACIN 500 MG: 500 TABLET, FILM COATED ORAL at 21:05

## 2021-01-14 RX ADMIN — ALBUTEROL SULFATE 2.5 MG: 2.5 SOLUTION RESPIRATORY (INHALATION) at 07:56

## 2021-01-14 RX ADMIN — METRONIDAZOLE 500 MG: 250 TABLET ORAL at 21:05

## 2021-01-14 RX ADMIN — METRONIDAZOLE 500 MG: 500 INJECTION, SOLUTION INTRAVENOUS at 11:57

## 2021-01-14 RX ADMIN — METRONIDAZOLE 500 MG: 500 INJECTION, SOLUTION INTRAVENOUS at 04:03

## 2021-01-14 RX ADMIN — INSULIN GLARGINE 20 UNITS: 100 INJECTION, SOLUTION SUBCUTANEOUS at 21:07

## 2021-01-14 RX ADMIN — ALBUTEROL SULFATE 2.5 MG: 2.5 SOLUTION RESPIRATORY (INHALATION) at 20:11

## 2021-01-14 RX ADMIN — CIPROFLOXACIN 400 MG: 2 INJECTION, SOLUTION INTRAVENOUS at 11:58

## 2021-01-14 RX ADMIN — PROPAFENONE HYDROCHLORIDE 150 MG: 150 TABLET, FILM COATED ORAL at 21:05

## 2021-01-14 RX ADMIN — PANTOPRAZOLE SODIUM 40 MG: 40 TABLET, DELAYED RELEASE ORAL at 06:00

## 2021-01-14 RX ADMIN — APIXABAN 5 MG: 5 TABLET, FILM COATED ORAL at 21:05

## 2021-01-14 RX ADMIN — Medication 1 PUFF: at 07:55

## 2021-01-14 RX ADMIN — APIXABAN 5 MG: 5 TABLET, FILM COATED ORAL at 09:28

## 2021-01-14 RX ADMIN — CIPROFLOXACIN 400 MG: 2 INJECTION, SOLUTION INTRAVENOUS at 00:31

## 2021-01-14 RX ADMIN — GABAPENTIN 600 MG: 300 CAPSULE ORAL at 21:05

## 2021-01-14 RX ADMIN — PROPAFENONE HYDROCHLORIDE 150 MG: 150 TABLET, FILM COATED ORAL at 06:00

## 2021-01-14 RX ADMIN — INSULIN LISPRO 2 UNITS: 100 INJECTION, SOLUTION INTRAVENOUS; SUBCUTANEOUS at 21:07

## 2021-01-14 RX ADMIN — DILTIAZEM HYDROCHLORIDE 180 MG: 180 CAPSULE, EXTENDED RELEASE ORAL at 09:28

## 2021-01-14 RX ADMIN — Medication 1 PUFF: at 20:18

## 2021-01-14 RX ADMIN — GABAPENTIN 600 MG: 300 CAPSULE ORAL at 09:28

## 2021-01-14 RX ADMIN — ASPIRIN 81 MG: 81 TABLET, COATED ORAL at 09:28

## 2021-01-14 ASSESSMENT — PAIN SCALES - GENERAL
PAINLEVEL_OUTOF10: 0
PAINLEVEL_OUTOF10: 0

## 2021-01-14 ASSESSMENT — PAIN SCALES - WONG BAKER: WONGBAKER_NUMERICALRESPONSE: 0

## 2021-01-14 NOTE — CARE COORDINATION
DISCHARGE ORDER  Date/Time 2021 11:32 AM  Completed by: Marilyn Lorenzana Case Management    Patient Name: Tyree Spence    : 1940      Admit order Date and Status:21  Noted discharge order. Confirmed discharge plan with: Pt, nursing and ARU              Patient:  Yes              When pt confirms DC plan does any support person need to be contacted by CM Yes if yes who: Rachel Styles notified and aware and nursing aware. Discharge to Facility: [address] phone number for staff giving report: [phone]   Pre-certification completed: [facility] Exemption Notification (HENS) completed: n/a   Discharge orders and Continuity of Care faxed to facility:  yes [phone]      Transportation:               Medical Transport explained with choice list offered to pt/family. Choice:Yes(no preference)  Agency used: [facility] up time:   1400      Pt/family/Nursing/Facility aware of  time:   Yes   Ambulance form completed:  yes      Comments: [facility] to [facility] ARU ETA 1400    Pt is being d/c'd to [facility] ARU today. Pt's O2 sats are 96% on 3lpm.    Discharge timeout done with all disciplines. All discharge needs and concerns addressed. Discharging nurse to complete GALILEO, reconcile AVS, and place final copy with patient's discharge packet. Discharging RN to ensure that written prescriptions for  Level II medications are sent with patient to the facility as per protocol.

## 2021-01-14 NOTE — PROGRESS NOTES
PM assessment completed, see flow sheet. Pt is alert and oriented. Respirations are even & easy at rest.  Staples DOMINGUEZ clean dry and intact, also with some of incision covered, dressing clean dry and intact. No complaints voiced. Pt denies needs at this time. SR up x 2, and bed in low position. Call light is within reach.

## 2021-01-14 NOTE — PROGRESS NOTES
Report called to Monge Jayuya RN at [facility] at this time. All belongings gathered and sent with pt. Left without incident.

## 2021-01-14 NOTE — PROGRESS NOTES
General Surgery [place], [place]  Daily Progress Note    Pt Name: [address] Record Number: [id number]  Date of Birth 1940   Today's Date: 1/14/2021    ASSESSMENT  1. POD #8 lap to open connie   2. ABD: soft, + distention unchanged from yesterday, no tenderness, no N/V, + flatus, + BM, dressing with serosanguinous drainage: dressing removed,serosanguinous drainage oozing around CHRISTOFER drain, staples line with continued erythema noted unchanged from yesterday, CHRISTOFER drain removed. 3. T bili : 1.3->1.1  4. AST/ALT normal  5. Leuks 4.4  6. + BC is Klebsiella  7. Wound culture + Klebsiella   8. CHRISTOFER 9.5: serosanguinous   9. VSS  10. Eliquis resumed    11. PT is up in chair: states he '\" feels tired\"    PLAN  1. Advance to soft diet  2. PT/OT: ambulate patient/OOB as tolerated  3. IVF at 28  4. Cipro/flagyl   5. Eliqius started 1/10.   6. IS q 1 hour while awake  7. OK to D/C to ARU from a surgical standpoint with BID wet-to-dry dressings and PO antibiotics       SUBJECTIVE  Wanda Davis has improved from yesterday. Pain is well controlled. He has no nausea and no vomiting. He has passed flatus and has had a bowel movement. He is tolerating some full liquids. Current activity is up with assistance    OBJECTIVE  VITALS:  height is 5' 11\" (1.803 m) and weight is 204 lb (92.5 kg). His oral temperature is 96.6 °F (35.9 °C). His blood pressure is 121/62 and his pulse is 90. His respiration is 16 and oxygen saturation is 96%. VITALS:  /62   Pulse 90   Temp 96.6 °F (35.9 °C) (Oral)   Resp 16   Ht 5' 11\" (1.803 m)   Wt 204 lb (92.5 kg)   SpO2 96%   BMI 28.45 kg/m²   INTAKE/OUTPUT:      Intake/Output Summary (Last 24 hours) at 1/14/2021 1126  Last data filed at 1/14/2021 0928  Gross per 24 hour   Intake 980 ml   Output 509.5 ml   Net 470.5 ml     GENERAL: alert but, falling asleep during assessment this am    I/O last 3 completed shifts:   In: 940 [P.O.:840; IV Piggyback:100] Out: 534.5 [Urine:525; Drains:9.5]  I/O this shift:  In: 220 [P.O.:220]  Out: 300 [Urine:300]    LABS  Recent Labs     01/13/21  0421 01/14/21  0545 01/14/21  0546   WBC 6.2  --  4.4   HGB 8.1*  --  7.8*   HCT 25.3*  --  24.2*   *  --  122*    144  --    K 3.5 3.6  --    CL 96* 100  --    CO2 33* 42*  --    BUN 7 4*  --    CREATININE 0.7* 0.8  --    MG 1.70*  --   --    CALCIUM 8.3 8.5  --    AST 18 13*  --    ALT 28 20  --    BILITOT 1.3* 1.1*  --      CBC with Differential:    Lab Results   Component Value Date    WBC 4.4 01/14/2021    RBC 2.81 01/14/2021    HGB 7.8 01/14/2021    HCT 24.2 01/14/2021     01/14/2021    MCV 86.0 01/14/2021    MCH 27.8 01/14/2021    MCHC 32.3 01/14/2021    RDW 17.4 01/14/2021    SEGSPCT 68.5 07/02/2011    BANDSPCT 1 01/10/2021    LYMPHOPCT 20.2 01/14/2021    MONOPCT 9.9 01/14/2021    EOSPCT 0.3 07/02/2011    BASOPCT 0.3 01/14/2021    MONOSABS 0.4 01/14/2021    LYMPHSABS 0.9 01/14/2021    EOSABS 0.0 01/14/2021    BASOSABS 0.0 01/14/2021    DIFFTYPE Auto 07/02/2011     CMP:    Lab Results   Component Value Date     01/14/2021    K 3.6 01/14/2021     01/14/2021    CO2 42 01/14/2021    BUN 4 01/14/2021    CREATININE 0.8 01/14/2021    GFRAA >60 01/14/2021    GFRAA >60 07/01/2011    AGRATIO 0.8 01/14/2021    LABGLOM >60 01/14/2021    GLUCOSE 188 01/14/2021    PROT 5.7 01/14/2021    PROT 7.0 08/10/2010    LABALBU 2.6 01/14/2021    CALCIUM 8.5 01/14/2021    BILITOT 1.1 01/14/2021    ALKPHOS 92 01/14/2021    AST 13 01/14/2021    ALT 20 01/14/2021         Lois Shelby Parcel  Electronically signed 1/14/2021 at 11:26 AM

## 2021-01-14 NOTE — PROGRESS NOTES
Report given @ bedside to [facility], for transferral of care. Pt resting in bed. Call light in reach.

## 2021-01-14 NOTE — PROGRESS NOTES
Assisted pt up to chair. Pt tolerated very well. Chair alarm on. Report given to [facility] for transfer of care.

## 2021-01-14 NOTE — DISCHARGE SUMMARY
- avoided heparin use, started SCDs  - monitored CBC- stable      #Acute on Chronic Hypoxic Hypercapnic respiratory failure  #Hx of COPD   - was monitored in ICU post op with bipap   - now improved and - sats stable 3 LNC (wears 3L at baseline)  - cont'd [facility] Albuterol Symbicort/Spiriva      #DM Type 2   - controlled  - held Metformin and Actos, chk A1c: 6.6%  - on Low SSI     #HTN  - controlled  - cont'd Diltiazem   - monitored     #PAF   - held Eliquis for surgery- discussed with surgery team- resumed 1/10  - cont'd Rythmol and Diltiazem      #GERD  - cont'd Protonix     #L1 compression fracture  - noted on prior admission   - CT with approx 75% loss of central vertebral body height, new since 2017, appears chronic as pt with - no c/o back pain  - daughter reported a fall several months prior     #AAA  - noted on prior admission  - mild at 3.1 cm; infrarenal   - recommended f/u every 3 years  - family aware     #Abnormal CT of thoracic aorta  - noted on prior admission  - CT findings consistent with interval development of 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 Consults    Pulmonology  GI  General surgery      Physical Exam at Discharge:    /62   Pulse 90   Temp 96.6 °F (35.9 °C) (Oral)   Resp 16   Ht 5' 11\" (1.803 m)   Wt 204 lb (92.5 kg)   SpO2 96%   BMI 28.45 kg/m²     General: elderly male, sitting up in bed . Awake, alert and oriented.    Appears to be not in any distress  Mucous Membranes:  Pink , anicteric  Neck: No JVD,  Trachea midline   Chest: Diminished breath sounds, no crackles, no wheezes Cardiovascular: irregularly irregular, no murmurs. 1+ BLE edema   Abdomen:  Soft, Mildly distended, right UQ surgical staples intact. Drain in place  Pain appropriate, BS present  Extremities: No cyanosis.  Distal pulses well felt  Neurological : grossly normal      CBC:   Recent Labs     01/12/21  0548 01/13/21  0421 01/14/21  0546   WBC 5.8 6.2 4.4   HGB 8.2* 8.1* 7.8*   HCT 25.4* 25.3* 24.2*   MCV 85.3 86.0 86.0    132* 122*     BMP:   Recent Labs     01/12/21  0548 01/13/21  0421 01/14/21  0545    138 144   K 3.4* 3.5 3.6   CL 99 96* 100   CO2 35* 33* 42*   BUN 9 7 4*   CREATININE 0.7* 0.7* 0.8     LIVER PROFILE:   Recent Labs     01/12/21  0548 01/13/21  0421 01/14/21  0545   AST 20 18 13*   ALT 31 28 20   BILITOT 1.3* 1.3* 1.1*   ALKPHOS 107 103 92       U/A:    Lab Results   Component Value Date    COLORU Yellow 01/04/2021    WBCUA 6-9 01/04/2021    RBCUA 0-2 01/04/2021    MUCUS Rare 01/04/2021    BACTERIA Rare 12/14/2020    CLARITYU SL CLOUDY 01/04/2021    SPECGRAV 1.015 01/04/2021    LEUKOCYTESUR TRACE 01/04/2021    BLOODU TRACE-INTACT 01/04/2021    GLUCOSEU Negative 01/04/2021    AMORPHOUS 1+ 01/04/2021       ABG    Lab Results   Component Value Date    ZWD0LMZ 31.1 01/08/2021    BEART 4.1 01/08/2021    W6IWAGGI 95.5 01/08/2021    PHART 7.322 01/08/2021    LWI3OEJ 61.5 01/08/2021    PO2ART 85.8 01/08/2021    DXC7SOQ 33.0 01/08/2021         CULTURES  Blood 1/4:  Klebsiella aerogenes (2)             Antibiotic Interpretation CHRISTELLE Status     amoxicillin-clavulanate Resistant >16/8 mcg/mL       ampicillin Resistant >16 mcg/mL       ceFAZolin Resistant >16 mcg/mL       cefepime Sensitive <=2 mcg/mL       cefTRIAXone Resistant 32 mcg/mL       cefuroxime Resistant >16 mcg/mL       ciprofloxacin Sensitive <=1 mcg/mL       ertapenem Sensitive <=0.5 mcg/mL       gentamicin Sensitive <=4 mcg/mL       meropenem Sensitive <=1 mcg/mL       piperacillin-tazobactam Intermediate 32 mcg/mL     esomeprazole 40 MG delayed release capsule  Commonly known as: NEXIUM     fluticasone 50 MCG/ACT nasal spray  Commonly known as: FLONASE     gabapentin 300 MG capsule  Commonly known as: NEURONTIN     metFORMIN 500 MG tablet  Commonly known as: GLUCOPHAGE     OXYGEN     pioglitazone 30 MG tablet  Commonly known as: ACTOS     propafenone 150 MG tablet  Commonly known as: RYTHMOL  TAKE ONE TABLET BY MOUTH EVERY 8 HOURS     Trelegy Ellipta 100-62.5-25 MCG/INH Aepb  Generic drug: fluticasone-umeclidin-vilant  INHALE 1 PUFF EVERY DAY         * This list has 2 medication(s) that are the same as other medications prescribed for you. Read the directions carefully, and ask your doctor or other care provider to review them with you. STOP taking these medications    amoxicillin-clavulanate 875-125 MG per tablet  Commonly known as: AUGMENTIN           Where to Get Your Medications      Information about where to get these medications is not yet available    Ask your nurse or doctor about these medications  · ciprofloxacin 500 MG tablet  · metroNIDAZOLE 500 MG tablet  · oxyCODONE 5 MG immediate release tablet           Discharged in stable condition to ARU. Follow Up: Follow up with PCP.        More than 30 mts spent      Jackie Grewal 1/18/2021 1:00 PM

## 2021-01-14 NOTE — DISCHARGE INSTR - COC
Continuity of Care Form    Patient Name: Gina Avitia   :  1940  MRN:  [id number]    Admit date:  2021  Discharge date:      Code Status Order: Full Code   Advance Directives:   Advance Care Flowsheet Documentation       Date/Time Healthcare Directive Type of Healthcare Directive Copy in [address] Agent's Name Healthcare Agent's Phone Number    21 1034  No, patient does not have an advance directive for healthcare treatment -- -- -- -- --    21 1230  No, patient does not have an advance directive for healthcare treatment -- -- -- -- --    21 0453  No, patient does not have an advance directive for healthcare treatment -- -- -- -- --            Admitting Physician:  Roger Westfall MD  PCP: Vin Curiel MD    Discharging Nurse: [facility] Unit/Room#: 0204/0204-01  Discharging Unit Phone Number: [phone]    Emergency Contact:   Extended Emergency Contact Information  Primary Emergency Contact: [address] [place] [address] Phone: [phone]  Relation: Child  Secondary Emergency Contact: [address] Phone: [phone]  Relation: Child    Past Surgical History:  Past Surgical History:   Procedure Laterality Date    BACK SURGERY      repair broken back L4 & L5    CHOLECYSTECTOMY, LAPAROSCOPIC N/A 2021    LAPAROSCOPIC CHOLECYSTECTOMY, WITH CHOLANGIOGRAMS (ATTEMPTED) OPEN CHOLECYSTECTOMY performed by Ever Garcia MD at [address] [place] [address], OPEN N/A 2021    LAPAROSCOPIC CHOLECYSTECTOMY, WITH CHOLANGIOGRAMS (ATTEMPTED)     CYSTOSCOPY Right 10/9/15    RIght Ureteroscopy, Holmium Laser Lithotripsy with Stone Removal, Right Stent Placement    CYSTOSCOPY  2019    CYSTOSCOPY, RESECTION OF BLADDER NECK, URETHRAL DILATION    CYSTOSCOPY W BIOPSY OF BLADDER N/A 2019 CYSTOSCOPY, RESECTION OF BLADDER NECK, URETHRAL DILATION performed by Fabi Arcos MD at [address] Left 6/12/2016    cataract removal    JOINT REPLACEMENT  6/29/2011    right knee    JOINT REPLACEMENT  11/2004    left knee    OTHER SURGICAL HISTORY  08/31/2015    wide excision basal cell carcinoma on the nose and bilateral auricles with frozen sections, plastic closure, full thickness skin graft    OTHER SURGICAL HISTORY  12/1/15    excision of lesion of lip    PROSTATE SURGERY      TURP  10/23/2015    with cystoscopy       Immunization History:   Immunization History   Administered Date(s) Administered    Influenza Vaccine, unspecified formulation 11/18/2009, 10/14/2010, 11/08/2011, 09/21/2012, 10/18/2013, 10/07/2016    Influenza Virus Vaccine 10/12/2015, 10/07/2016, 10/18/2017    Influenza Whole 10/01/2010    Influenza, High Dose (Fluzone 65 yrs and older) 09/18/2018, 09/13/2019    Influenza, Sigmund Prier, IM, PF (6 mo and older Fluzone, Flulaval, Fluarix, and 3 yrs and older Afluria) 11/01/2017, 08/28/2018    Pneumococcal Conjugate 13-valent ([id number]) 08/02/2017, 02/14/2018    Pneumococcal Conjugate 7-valent (Prevnar7) 10/01/2006    Pneumococcal Polysaccharide (Gfdwfsedr07) 10/24/2015, 10/07/2016    Td vaccine (adult) 09/21/2012       Active Problems:  Patient Active Problem List   Diagnosis Code    HTN (hypertension) I10    COPD, severe ([facility] J44.9    DM2 (diabetes mellitus, type 2) ([facility] E11.9    HLD (hyperlipidemia) E78.5    Gallstones K80.20    PAF (paroxysmal atrial fibrillation) ([facility]) I48.0    Chronic respiratory failure with hypoxia and hypercapnia 2.5 L home O2 J96.11, J96.12    Acute respiratory insufficiency R06.89    Acute on chronic respiratory failure with hypercapnia ([facility]) J96.22    Sepsis ([facility]) A41.9    COPD exacerbation ([facility]) J44.1    CAD (coronary artery disease) I25.10    Former smoker Z87.891    Acute hyperglycemia R73.9 MDRO (multi-drug resistant organism)  03/13/17 03/13/17 Evgeny Bass RN   01/01/18 Evgeny Bass RN            Nurse Assessment:  Last Vital Signs: /62   Pulse 90   Temp 96.6 °F (35.9 °C) (Oral)   Resp 16   Ht 5' 11\" (1.803 m)   Wt 204 lb (92.5 kg)   SpO2 96%   BMI 28.45 kg/m²     Last documented pain score (0-10 scale): Pain Level: 4  Last Weight:   Wt Readings from Last 1 Encounters:   01/14/21 204 lb (92.5 kg)     Mental Status:  oriented and alert    IV Access:  - None    Nursing Mobility/ADLs:  Walking   Assisted  Transfer  Assisted  Bathing  Assisted  Dressing  Assisted  Toileting  Assisted  Feeding  Independent  Med Admin  Assisted  Med Delivery   whole    Wound Care Documentation and Therapy:        Elimination:  Continence:   · Bowel: continent/incontinent  · Bladder: continent/incontinent  Urinary Catheter: None   Colostomy/Ileostomy/Ileal Conduit: No       Date of Last BM: 1/14/21    Intake/Output Summary (Last 24 hours) at 1/14/2021 1050  Last data filed at 1/14/2021 0928  Gross per 24 hour   Intake 980 ml   Output 509.5 ml   Net 470.5 ml     I/O last 3 completed shifts: In: 56 [P.O.:840; IV Piggyback:100]  Out: 534.5 [Urine:525; Drains:9.5]    Safety Concerns: At Risk for Falls    Impairments/Disabilities:      None    Nutrition Therapy:  Current Nutrition Therapy:   - Oral Diet:  soft diet and advance as tolerated     Routes of Feeding: Oral  Liquids: Thin Liquids  Daily Fluid Restriction: no  Last Modified Barium Swallow with Video (Video Swallowing Test): not done    Treatments at the Time of Hospital Discharge:   Respiratory Treatments: PRN   Oxygen Therapy:  is on oxygen at 3 L/min per nasal cannula.   Ventilator:    - No ventilator support    Rehab Therapies: Physical Therapy and Occupational Therapy  Weight Bearing Status/Restrictions: No weight bearing restirctions  Other Medical Equipment (for information only, NOT a DME order):  walker Other Treatments: See below for dressing orders     Patient's personal belongings (please select all that are sent with patient):  None    RN SIGNATURE:  Electronically signed by Jet Ahmadi RN on 1/14/21 at 1:47 PM EST    CASE MANAGEMENT/SOCIAL WORK SECTION    Inpatient Status Date: 1/4/21    Readmission Risk Assessment Score:  Readmission Risk              Risk of Unplanned Readmission:        28           Discharging to Facility/ Agency   · Name: [facility]  · Phone: [phone] · [phone]    / signature: Electronically signed by Felipa Meza RN on 1/14/21 at 11:07 AM EST    PHYSICIAN SECTION    Prognosis: Good    Condition at Discharge: Stable    Rehab Potential (if transferring to Rehab): Good    Recommended Labs or Other Treatments After Discharge: PT and OT. Follow up with Dr Mary South in 1-2 weeks. Call [phone] to make your follow-up appointment. Please change pt RUQ incision dressing BID: wet-to-dry packing in 2 open areas along lateral side of incision and cover with gauze. Pt may shower over the incision. Soft diet advance as tolerated. Please call Dr. Valentino Craig office with persistent fevers greater than 100, persistent vomiting and or severe abdominal pain. Physician Certification: I certify the above information and transfer of Dianne Medina  is necessary for the continuing treatment of the diagnosis listed and that he requires Acute Rehab for less 30 days.      Update Admission H&P: No change in H&P    PHYSICIAN SIGNATURE:  Electronically signed by Yokasta Ozuna MD on 1/14/21 at 10:51 AM EST

## 2021-01-14 NOTE — PROGRESS NOTES
[facility]    Patient[place] [facility]     Rehab Dx/Hx: Viktor Haynes [R53.81]   ASHA:38/48/5415  [facility]:[id number]  Date of Admit: 1/14/2021  Room #: 0152/0152-01    Subjective:   Patient admitted to room 152 from [facility] via ambulance. Alert and oriented x4. Oriented to room and call light system. Oriented to rehab routine and therapy schedules. Informed about care conferences and ordering of meals with PCA. Drug / Medication Review:   Medications were reviewed by RN at time of admission  []  No potential or actual clinically significant medication issues were noted. [x]  Potential or actual clinically significant medication issues were found and MD was notified. 4 Eyes Skin Assessment   The patient is being assessed for: Admission     I agree that 2 RN's have performed a thorough Head to Toe Skin Assessment on the patient. ALL assessment sites listed below have been assessed. Areas assessed by both nurses:   [x]   Head, Face, and Ears   [x]   Shoulders, Back, and Chest, Abdomen  [x]   Arms, Elbows, and Hands   [x]   Coccyx, Sacrum, and Ischium  [x]   Legs, Feet, and Heel     Does the Patient have Skin Breakdown? No         Santiago Prevention initiated:  Yes  Wound Care Orders initiated:  Yes     [id number]  nurse consulted for Pressure Injury (Stage 3,4, Unstageable, DTI, NWPT, Complex wounds)and New or Established Ostomies:  Not Applicable    Please see assessment for skin details.     Primary Nurse eSignature: Chiquita Harris    Co-signer eSignature:  Winsome Haynes

## 2021-01-15 LAB
A/G RATIO: 1 (ref 1.1–2.2)
ALBUMIN SERPL-MCNC: 2.8 G/DL (ref 3.4–5)
ALP BLD-CCNC: 85 U/L (ref 40–129)
ALT SERPL-CCNC: 17 U/L (ref 10–40)
ANION GAP SERPL CALCULATED.3IONS-SCNC: 2 MMOL/L (ref 3–16)
AST SERPL-CCNC: 13 U/L (ref 15–37)
BILIRUB SERPL-MCNC: 0.9 MG/DL (ref 0–1)
BUN BLDV-MCNC: 4 MG/DL (ref 7–20)
CALCIUM SERPL-MCNC: 8.6 MG/DL (ref 8.3–10.6)
CHLORIDE BLD-SCNC: 99 MMOL/L (ref 99–110)
CO2: 44 MMOL/L (ref 21–32)
CREAT SERPL-MCNC: 0.7 MG/DL (ref 0.8–1.3)
GFR AFRICAN AMERICAN: >60
GFR NON-AFRICAN AMERICAN: >60
GLOBULIN: 2.8 G/DL
GLUCOSE BLD-MCNC: 163 MG/DL (ref 70–99)
GLUCOSE BLD-MCNC: 171 MG/DL (ref 70–99)
GLUCOSE BLD-MCNC: 171 MG/DL (ref 70–99)
GLUCOSE BLD-MCNC: 196 MG/DL (ref 70–99)
GLUCOSE BLD-MCNC: 230 MG/DL (ref 70–99)
GLUCOSE BLD-MCNC: 260 MG/DL (ref 70–99)
HCT VFR BLD CALC: 24.2 % (ref 40.5–52.5)
HEMOGLOBIN: 7.8 G/DL (ref 13.5–17.5)
MAGNESIUM: 1.8 MG/DL (ref 1.8–2.4)
MCH RBC QN AUTO: 27.2 PG (ref 26–34)
MCHC RBC AUTO-ENTMCNC: 32.3 G/DL (ref 31–36)
MCV RBC AUTO: 84.2 FL (ref 80–100)
PDW BLD-RTO: 16.8 % (ref 12.4–15.4)
PERFORMED ON: ABNORMAL
PLATELET # BLD: 132 K/UL (ref 135–450)
PMV BLD AUTO: 9.5 FL (ref 5–10.5)
POTASSIUM REFLEX MAGNESIUM: 3.4 MMOL/L (ref 3.5–5.1)
RBC # BLD: 2.87 M/UL (ref 4.2–5.9)
SODIUM BLD-SCNC: 145 MMOL/L (ref 136–145)
TOTAL PROTEIN: 5.6 G/DL (ref 6.4–8.2)
WBC # BLD: 5.5 K/UL (ref 4–11)

## 2021-01-15 PROCEDURE — 85027 COMPLETE CBC AUTOMATED: CPT

## 2021-01-15 PROCEDURE — 83735 ASSAY OF MAGNESIUM: CPT

## 2021-01-15 PROCEDURE — 6370000000 HC RX 637 (ALT 250 FOR IP): Performed by: PHYSICAL MEDICINE & REHABILITATION

## 2021-01-15 PROCEDURE — 97530 THERAPEUTIC ACTIVITIES: CPT

## 2021-01-15 PROCEDURE — 36415 COLL VENOUS BLD VENIPUNCTURE: CPT

## 2021-01-15 PROCEDURE — 2700000000 HC OXYGEN THERAPY PER DAY

## 2021-01-15 PROCEDURE — 92610 EVALUATE SWALLOWING FUNCTION: CPT

## 2021-01-15 PROCEDURE — 94640 AIRWAY INHALATION TREATMENT: CPT

## 2021-01-15 PROCEDURE — 80053 COMPREHEN METABOLIC PANEL: CPT

## 2021-01-15 PROCEDURE — 97535 SELF CARE MNGMENT TRAINING: CPT

## 2021-01-15 PROCEDURE — 97162 PT EVAL MOD COMPLEX 30 MIN: CPT

## 2021-01-15 PROCEDURE — 94761 N-INVAS EAR/PLS OXIMETRY MLT: CPT

## 2021-01-15 PROCEDURE — 1280000000 HC REHAB R&B

## 2021-01-15 PROCEDURE — 97116 GAIT TRAINING THERAPY: CPT

## 2021-01-15 PROCEDURE — 6360000002 HC RX W HCPCS: Performed by: PHYSICAL MEDICINE & REHABILITATION

## 2021-01-15 PROCEDURE — 92523 SPEECH SOUND LANG COMPREHEN: CPT

## 2021-01-15 PROCEDURE — 97166 OT EVAL MOD COMPLEX 45 MIN: CPT

## 2021-01-15 RX ORDER — POTASSIUM CHLORIDE 20 MEQ/1
20 TABLET, EXTENDED RELEASE ORAL 2 TIMES DAILY
Status: COMPLETED | OUTPATIENT
Start: 2021-01-15 | End: 2021-01-16

## 2021-01-15 RX ORDER — ALBUTEROL SULFATE 2.5 MG/3ML
2.5 SOLUTION RESPIRATORY (INHALATION) 2 TIMES DAILY
Status: DISCONTINUED | OUTPATIENT
Start: 2021-01-15 | End: 2021-01-23 | Stop reason: HOSPADM

## 2021-01-15 RX ORDER — PIOGLITAZONEHYDROCHLORIDE 15 MG/1
15 TABLET ORAL DAILY
Status: DISCONTINUED | OUTPATIENT
Start: 2021-01-15 | End: 2021-01-23 | Stop reason: HOSPADM

## 2021-01-15 RX ADMIN — ASPIRIN 81 MG: 81 TABLET, COATED ORAL at 07:45

## 2021-01-15 RX ADMIN — GABAPENTIN 600 MG: 300 CAPSULE ORAL at 07:45

## 2021-01-15 RX ADMIN — INSULIN GLARGINE 20 UNITS: 100 INJECTION, SOLUTION SUBCUTANEOUS at 20:31

## 2021-01-15 RX ADMIN — ALBUTEROL SULFATE 2.5 MG: 2.5 SOLUTION RESPIRATORY (INHALATION) at 07:40

## 2021-01-15 RX ADMIN — PROPAFENONE HYDROCHLORIDE 150 MG: 150 TABLET, FILM COATED ORAL at 05:08

## 2021-01-15 RX ADMIN — APIXABAN 5 MG: 5 TABLET, FILM COATED ORAL at 07:45

## 2021-01-15 RX ADMIN — Medication 1 PUFF: at 19:59

## 2021-01-15 RX ADMIN — ONDANSETRON 8 MG: 4 TABLET, ORALLY DISINTEGRATING ORAL at 07:46

## 2021-01-15 RX ADMIN — POTASSIUM CHLORIDE 20 MEQ: 1500 TABLET, EXTENDED RELEASE ORAL at 11:57

## 2021-01-15 RX ADMIN — PROPAFENONE HYDROCHLORIDE 150 MG: 150 TABLET, FILM COATED ORAL at 20:23

## 2021-01-15 RX ADMIN — ALBUTEROL SULFATE 2.5 MG: 2.5 SOLUTION RESPIRATORY (INHALATION) at 19:59

## 2021-01-15 RX ADMIN — INSULIN LISPRO 2 UNITS: 100 INJECTION, SOLUTION INTRAVENOUS; SUBCUTANEOUS at 16:39

## 2021-01-15 RX ADMIN — GABAPENTIN 600 MG: 300 CAPSULE ORAL at 20:23

## 2021-01-15 RX ADMIN — CIPROFLOXACIN 500 MG: 500 TABLET, FILM COATED ORAL at 20:23

## 2021-01-15 RX ADMIN — APIXABAN 5 MG: 5 TABLET, FILM COATED ORAL at 20:23

## 2021-01-15 RX ADMIN — POTASSIUM CHLORIDE 20 MEQ: 1500 TABLET, EXTENDED RELEASE ORAL at 20:23

## 2021-01-15 RX ADMIN — PIOGLITAZONE 15 MG: 15 TABLET ORAL at 12:35

## 2021-01-15 RX ADMIN — INSULIN LISPRO 2 UNITS: 100 INJECTION, SOLUTION INTRAVENOUS; SUBCUTANEOUS at 20:31

## 2021-01-15 RX ADMIN — PROPAFENONE HYDROCHLORIDE 150 MG: 150 TABLET, FILM COATED ORAL at 14:14

## 2021-01-15 RX ADMIN — INSULIN LISPRO 1 UNITS: 100 INJECTION, SOLUTION INTRAVENOUS; SUBCUTANEOUS at 06:07

## 2021-01-15 RX ADMIN — CIPROFLOXACIN 500 MG: 500 TABLET, FILM COATED ORAL at 07:45

## 2021-01-15 RX ADMIN — METRONIDAZOLE 500 MG: 250 TABLET ORAL at 05:08

## 2021-01-15 RX ADMIN — INSULIN LISPRO 1 UNITS: 100 INJECTION, SOLUTION INTRAVENOUS; SUBCUTANEOUS at 11:58

## 2021-01-15 RX ADMIN — DILTIAZEM HYDROCHLORIDE 180 MG: 180 CAPSULE, COATED, EXTENDED RELEASE ORAL at 07:45

## 2021-01-15 RX ADMIN — GABAPENTIN 600 MG: 300 CAPSULE ORAL at 14:14

## 2021-01-15 RX ADMIN — Medication 1 PUFF: at 07:40

## 2021-01-15 RX ADMIN — PANTOPRAZOLE SODIUM 40 MG: 40 TABLET, DELAYED RELEASE ORAL at 05:08

## 2021-01-15 RX ADMIN — METRONIDAZOLE 500 MG: 250 TABLET ORAL at 14:14

## 2021-01-15 RX ADMIN — METRONIDAZOLE 500 MG: 250 TABLET ORAL at 20:23

## 2021-01-15 RX ADMIN — OXYCODONE 5 MG: 5 TABLET ORAL at 12:34

## 2021-01-15 ASSESSMENT — PAIN SCALES - GENERAL
PAINLEVEL_OUTOF10: 0
PAINLEVEL_OUTOF10: 3
PAINLEVEL_OUTOF10: 7
PAINLEVEL_OUTOF10: 7
PAINLEVEL_OUTOF10: 0
PAINLEVEL_OUTOF10: 7

## 2021-01-15 ASSESSMENT — PAIN DESCRIPTION - LOCATION
LOCATION: ABDOMEN
LOCATION: ABDOMEN

## 2021-01-15 ASSESSMENT — PAIN DESCRIPTION - DESCRIPTORS: DESCRIPTORS: ACHING

## 2021-01-15 NOTE — CONSULTS
Comprehensive Nutrition Assessment    Type and Reason for Visit:  Initial, Consult    Nutrition Recommendations/Plan:   1. Modify diet to carb control   2. Modify ONS to Glucerna   3. Encourage PO intakes   4. Monitor nutrition adequacy, pertinent labs, bowel habits, wt changes, and clinical progress    Nutrition Assessment:  New ARU pt. Pt admitted with debility. S/p [facility]olecystectomy at [facility]. Consulted for ONS. Pt nutritionally compromised AEB fluctuating PO intakes and previous NPO/liquid diet. On general, dental soft diet and Ensure Clears ordered. BG elevated. Will continue to monitor for further nutrition intervention. Malnutrition Assessment:  Malnutrition Status: At risk for malnutrition (Comment)      Estimated Daily Nutrient Needs:  Energy (kcal):  9196-6373 kcal; Weight Used for Energy Requirements:  Ideal(78 kg)     Protein (g):  78-94 g; Weight Used for Protein Requirements:  Ideal(1.0-1.2 g/kg)        Fluid (ml/day):   ; Method Used for Fluid Requirements:  1 ml/kcal      Nutrition Related Findings:  BG elevated. Active BS. BM 1/14. +1 BLE edema. Wounds:  Surgical Incision       Current Nutrition Therapies:    Dietary Nutrition Supplements: Clear Liquid Oral Supplement  DIET GENERAL; Dental Soft    Anthropometric Measures:  · Height: 5' 11\" (180.3 cm)  · Current Body Weight: 204 lb (92.5 kg)(bed scale 1/4/21)   · Usual Body Weight: 194 lb (88 kg)(standing scale April 2020)     · Ideal Body Weight: 172 lbs; % Ideal Body Weight 118.6 %   · BMI: 28.5  · BMI Categories: Overweight (BMI 25.0-29. 9)       Nutrition Diagnosis:   · Inadequate oral intake related to altered GI function, altered GI structure, inadequate protein-energy intake as evidenced by intake 0-25%, poor intake prior to admission      Nutrition Interventions:   Food and/or Nutrient Delivery:  Modify Current Diet, Modify Oral Nutrition Supplement  Nutrition Education/Counseling:  No recommendation at this time Coordination of Nutrition Care:  Continue to monitor while inpatient    Goals:  Pt will consume greater than 50% of meals and ONS this admission w/o GI disturbances       Nutrition Monitoring and Evaluation:   Food/Nutrient Intake Outcomes:  Food and Nutrient Intake, Supplement Intake  Physical Signs/Symptoms Outcomes:  Biochemical Data, GI Status, Weight     Discharge Planning:    Continue current diet, Continue Oral Nutrition Supplement     Electronically signed by Stella Gonzales MS, RD, LD on 1/15/21 at 12:38 PM EST    Contact: [phone]

## 2021-01-15 NOTE — PROGRESS NOTES
Speech Language Pathology  Facility/Department: [facility]   CLINICAL BEDSIDE SWALLOW EVALUATION    NAME: Nikolas Rosen  : 1940  MRN: [id number]    ADMISSION DATE: 2021  ADMITTING DIAGNOSIS: has HTN (hypertension); COPD, severe ([address] DM2 (diabetes mellitus, type 2) ([address] HLD (hyperlipidemia); Gallstones; PAF (paroxysmal atrial fibrillation) ([address] Chronic respiratory failure with hypoxia and hypercapnia 2.5 L home O2; Acute respiratory insufficiency; Acute on chronic respiratory failure with hypercapnia ([address] Sepsis ([address] COPD exacerbation ([address] CAD (coronary artery disease); Former smoker; Acute hyperglycemia; Chronic normocytic anemia; Chronic thrombocytopenia; Falls at home; Ambulatory dysfunction; General weakness; Acute on chronic respiratory failure with hypoxia and hypercapnia ([address] Pleural effusion; Rapid atrial fibrillation ([address] Atrial fibrillation with RVR ([address] Acute respiratory failure with hypoxia and hypercapnia (HCC); COPD with acute exacerbation ([address] Acute encephalopathy; Hyperglycemia; HCAP (healthcare-associated pneumonia); Acute calculous cholecystitis; Acute cholecystitis; Atherosclerotic ulcer of aorta ([address] Closed compression fracture of body of L1 vertebra ([address] Hypomagnesemia; AAA (abdominal aortic aneurysm) without rupture ([address] Elevated procalcitonin; Abdominal pain, right upper quadrant; Lactic acidosis; Elevated bilirubin; Bacteremia due to Klebsiella pneumoniae; Paroxysmal atrial fibrillation ([address]  Moderate protein-calorie malnutrition ([address] and Debility on their problem list.  ONSET DATE: 2021    Recent Chest Xray/CT of Chest: (2021)  Impression   Bilateral pleural effusions with bibasilar hypoaeration.  Otherwise, stable   chest       Reason for Referral Dianne Medina was referred for a bedside swallow evaluation to assess the efficiency of his swallow function, identify signs and symptoms of aspiration and make recommendations regarding safe dietary consistencies, effective compensatory strategies, and safe eating environment. Date of Eval: 1/15/2021  Evaluating Therapist: Eva Bentley    Current Diet level:  Current Diet : (Dental soft)  Current Liquid Diet : Thin      Primary Complaint  Patient Complaint: No complaints from patient; pt stated he consumes a regular/TL PO diet at home. Stated he eats \"whatever he wants\" with the exception of peanuts and some meats. Pt stated he cooks for himself when he can and takes meds whole with water without difficulty. Pt does endorse hx of GERD and stated he takes a daily PPI to manage. Pt also reports hx of pneumonia, but not recurring (reported 1-2x in his life). Pt is edentulous; stated he has dentures at home but does not wear them. Per pt, he has not worn his dentures for eating in \"years\" (>10 years). Pt was seen by ST during previous hospitalization at [place] [address] in April 2020. MBSS was completed at that time; see below for impressions.     MBSS 4/8/2020: · Pharyngeal phase judged to be grossly [facility]. Swallow initiation was timely and laryngeal elevation judged to be [facility] upon palpation. No overt s/s of aspiration observed with thin liquids (in isolation), thin liquids with whole pill (per administration from RN), and regular solids (cold grilled cheese sandwich). Discussed dental soft vs regular solids with pt. Pt demonstrated the ability to state foods he knows he can tolerate and other foods that cause him difficulty. Reviewed optios of remaining on altered diet vs upgrading. Pt in agreement to upgrade diet and choose foods similar to those he eats at home (avoiding tougher consistencies). · ST to f/u for dysphagia tx and diet tolerance d/t recently upgraded diet. MD made aware of diet change recs; ok to upgrade. Treatment Plan  Requires SLP Intervention: Yes  Duration/Frequency of Treatment: Speech therapy for dysphagia and cog-linguistic tx 5x/week. D/C Recommendations: Home with intermittent assistance       Recommended Diet and Intervention  Diet Solids Recommendation: Regular(Pt to choose slightly softer solids; choose foods he knows he can tolerate and typically eats at home.)  Liquid Consistency Recommendation: Thin  Recommended Form of Meds: Whole with water  Recommendations: Dysphagia treatment  Therapeutic Interventions: Patient/Family education;Diet tolerance monitoring    Compensatory Swallowing Strategies  Compensatory Swallowing Strategies: Alternate solids and liquids;Eat/Feed slowly;Upright as possible for all oral intake;Small bites/sips    Treatment/Goals  Short-term Goals  Timeframe for Short-term Goals: 7 Days (1/21/2021)  Goal 1: Pt will tolerate recommended diet without overt s/s of aspiration. Goal 2: Pt will demonstrate understanding of compensatory strategies for improved swallowing safety.   Long-term Goals  Timeframe for Long-term Goals: 10 Days (1/24/2021) Goal 1: Pt will safely and consistently tolerate a least restrictive PO diet without difficulty or overt s/s of aspiration. General  Chart Reviewed: Yes  Subjective  Subjective: Pt was alerted easily, upright in chair, agreeable to participate. Behavior/Cognition: Alert; Cooperative;Pleasant mood  Respiratory Status: O2 via nasual cannula  Follows Directions: Simple  Dentition: Edentulous(Pt has upper and lower dentures at home but does not wear them; states it has been \"years\" (>10) since he's worn them while eating.)  Patient Positioning: Upright in chair  Baseline Vocal Quality: Weak;Hoarse  Volitional Cough: Weak  Prior Dysphagia History: Pt has been seen by speech therapy in the past during previous hospitalizations. Most recent was in April; pt had MBSS on 4/8/20. Consistencies Administered: Reg solid; Thin - cup    Vision/Hearing  Vision  Vision: Impaired  Vision Exceptions: Wears glasses for reading  Hearing  Hearing: Within functional limits    Oral Motor Deficits  Oral/Motor  Oral Motor: Within functional limits    Oral Phase Dysfunction  Oral Phase  Oral Phase: Exceptions  Oral Phase Dysfunction  Impaired Mastication: Reg Solid(Prolonged, but adequate.)     Indicators of Pharyngeal Phase Dysfunction   Pharyngeal Phase  Pharyngeal Phase: WFL      Prognosis  Prognosis  Prognosis for safe diet advancement: good  Individuals consulted  Consulted and agree with results and recommendations: Patient;MD(MD notified of diet change recs (upgrade to reg) and verbalized ok.)    Education  Patient Education: Education provided to pt re: difference between dental soft vs regular diet, safe swallowing precautions, diet upgrade, and continued POC for ST>  Patient Education Response: Verbalizes understanding  Safety Devices in place: Yes  Type of devices: Call light within reach; Chair alarm in place       Therapy Time  SLP Individual Minutes  Time In: 1230  Time Out: 6532  Minutes: 15  Dysphagia Eval x1 Deepa Canales  #[id number]  Speech-Language Pathologist

## 2021-01-15 NOTE — PROGRESS NOTES
Occupational Therapy   Occupational Therapy Initial Assessment and Treatment Note  Date: 1/15/2021   Patient Name: Dianne Medina  MRN: [id number]     : 1940    Date of Service: 1/15/2021    Discharge Recommendations:  Home with assist PRN, Home with Home health OT  OT Equipment Recommendations  Equipment Needed: No    Assessment   Performance deficits / Impairments: Decreased functional mobility ; Decreased high-level IADLs;Decreased ADL status; Decreased balance;Decreased posture;Decreased endurance  Assessment: Pt with varied report of social hx initially, anticipate d/t fatigue vs. confusion, continue to assess. Pt reported typically IND with BADL, light IADL, functional mobility/transfers, but has support for cleaning from family and outside services. Pt presented grossly at up to [address] in these areas at time of eval, and anticipate will progress well toward established goals. Continue OT tx. Prognosis: Good  Decision Making: Medium Complexity  OT Education: OT Role;Plan of Care;Precautions; ADL Adaptive Strategies;Transfer Training;Energy Conservation;Orientation;Equipment  REQUIRES OT FOLLOW UP: Yes  Activity Tolerance  Activity Tolerance: Patient limited by fatigue  Safety Devices  Safety Devices in place: Yes  Type of devices: Nurse notified; Chair alarm in place; Left in chair;Call light within reach;Gait belt           Patient Diagnosis(es): There were no encounter diagnoses. has a past medical history of Arthritis, CAD (coronary artery disease), COPD (chronic obstructive pulmonary disease) ([facility] Diabetes mellitus ([facility] [address] Hepatitis A, Hyperlipidemia, Hypertension, Influenza A, Kidney calculi, MDRO (multiple drug resistant organisms) resistance, ELAINE on CPAP, Osteoporosis, and Skin cancer of face. has a past surgical history that includes Prostate surgery; eye surgery (Left, 6/12/2016); other surgical history (08/31/2015); Cystoscopy (Right, 10/9/15); joint replacement (6/29/2011); joint replacement (11/2004); TURP (10/23/2015); other surgical history (12/1/15); back surgery; Cystocopy (05/01/2019); cystoscopy w biopsy of bladder (N/A, 5/1/2019); Cholecystectomy, open (N/A, 01/06/2021); and Cholecystectomy, laparoscopic (N/A, 1/6/2021). Restrictions  Restrictions/Precautions  Restrictions/Precautions: General Precautions, Fall Risk  Position Activity Restriction  Other position/activity restrictions: up with assistance, JARAD hose, 3L O2    Subjective   General  Chart Reviewed: Yes  Patient assessed for rehabilitation services?: Yes  Additional Pertinent Hx: baseline 3L oxygen requirement, HTN  Family / Caregiver Present: No  Referring Practitioner: Danelle El MD  Diagnosis: Acute cholecystitis, Hypokalemia, L1 compression fracture  Subjective  Subjective: Pt up in chair on arrival, sleeping, but aroused with prompts and pleasant and agreeable to treatment  General Comment  Comments: Per RN okay to treat  Patient Currently in Pain: Yes  Pain Assessment  Pain Assessment: 0-10  Pain Level: 7  Pain Type: Acute pain;Surgical pain  Pain Location: Abdomen  Pain Descriptors: Aching  Non-Pharmaceutical Pain Intervention(s): Ambulation/Increased Activity;Repositioned; Rest  Response to Pain Intervention: Patient Satisfied  Vital Signs  Patient Currently in Pain: Yes  Social/Functional History  Social/Functional History  Lives With: Alone  Type of Home: House  Home Layout: One level  Home Access: Stairs to enter without rails  Entrance Stairs - Number of Steps: 1  Bathroom Shower/Tub: Walk-in shower, Shower chair without back  Bathroom Toilet: Handicap height  Bathroom Equipment: Grab bars in shower, Grab bars around toilet  Home Equipment: Rolling walker, Jud Barry, Susie, [address] Receives Help From: Family, Home health  ADL Assistance: Independent  Homemaking Assistance: Needs assistance(\"I've got a lady that does all that\"- states aide comes in 3 days/week for \"a few hours\" at a time)  Homemaking Responsibilities: Yes  Meal Prep Responsibility: Primary  Laundry Responsibility: No  Cleaning Responsibility: Secondary  Bill Paying/Finance Responsibility: No(Pt initially reporting manages own finances and meds, then reported his daughter assists)  Shopping Responsibility: Secondary(daughter completes, goes with her to shop sometimes)  Health Care Management: Secondary  Ambulation Assistance: Independent(using RW, household distances only. Pt states \"I can't even walk 100ft to the mailbox normally\"; states 50ft is typically the furthest distance he can walk at baseline)  Transfer Assistance: Independent  Active : No  Patient's  Info: daughter  Occupation: Retired  Leisure & Hobbies: fishing, hunting, mowing the lawn  IADL Comments: Pt has one cat  Additional Comments: Pt reports daughter comes by Jeana Mahesh day\" and assists as needed; can continue to assist as needed upon d/c. Pt states daughter is unable to provide 24 hr assist howver Angela Santoyo can be there most of the time\". Objective   Vision: Impaired  Vision Exceptions: Wears glasses for reading  Hearing: Within functional limits    Orientation  Overall Orientation Status: Impaired  Orientation Level: Disoriented to place;Oriented to time;Oriented to person;Oriented to situation  Observation/Palpation  Posture: Fair  Balance  Sitting Balance: Supervision  Standing Balance: Contact guard assistance  Standing Balance  Time: 2-3 minutes, 1-2 minutes, <1 minute over multiple trials  Activity: mobility, standing ADL, transfers  Functional Mobility  Functional - Mobility Device: Rolling Walker  Activity: To/from bathroom; Other  Assist Level: Contact guard assistance  Toilet Transfers  Toilet - Technique: Ambulating(RW) Equipment Used: Standard toilet(improved level of assist with use of grab bar)  Toilet Transfer: Minimal assistance  Shower Transfers  Shower - Transfer From: Netta Jimenez - Transfer Type: To and From  Shower - Transfer To: Shower seat with back  Shower - Technique: Ambulating  Shower Transfers: Minimal assistance(assist for walker management)  ADL  Feeding: Setup(Pt initially reported no dentures, but reports at home with another prompts d/t edentulous)  Grooming: Contact guard assistance(in stance at sink)  UE Bathing: Supervision;Setup;Verbal cueing  LE Bathing: Minimal assistance;Setup;Verbal cueing  UE Dressing: Minimal assistance;Setup  LE Dressing: Increased time to complete;Setup;Verbal cueing;Minimal assistance(Pt able to reach to feet figure four technique, but with increased effort/pain, assist to thread pants, don socks)  Toileting: Minimal assistance(assist for standing balance with clothing management only)  Tone RUE  RUE Tone: Normotonic  Tone LUE  LUE Tone: Normotonic  Coordination  Movements Are Fluid And Coordinated: Yes     Bed mobility  Supine to Sit: Unable to assess  Sit to Supine: Unable to assess  Comment: Pt up in chair on arrival and exit  Transfers  Sit to stand: Minimal assistance;Contact guard assistance  Stand to sit: Contact guard assistance;Minimal assistance     Cognition  Overall Cognitive Status: Exceptions  Arousal/Alertness: (initially delayed d/t recently waking up, then [facility])  Following Commands: Follows one step commands with increased time; Follows one step commands with repetition  Attention Span: Attends with cues to redirect  Safety Judgement: Decreased awareness of need for assistance  Problem Solving: Assistance required to generate solutions  Initiation: Requires cues for some  Sequencing: Does not require cues        Sensation  Overall Sensation Status: WFL        LUE AROM (degrees)  LUE AROM : [facility] LUE General AROM: WFL proximal to distal, but reports hx of pain with some movement L shoulder, no recollection of prior injury  Left Hand AROM (degrees)  Left Hand AROM: WFL  RUE AROM (degrees)  RUE AROM : WFL  Right Hand AROM (degrees)  Right Hand AROM: WFL  LUE Strength  Gross LUE Strength: WFL  RUE Strength  Gross RUE Strength: WFL           Plan   Plan  Times per week: 5-7x/week  Current Treatment Recommendations: Strengthening, Balance Training, Functional Mobility Training, Endurance Training, Gait Training, Positioning, Pain Management, Safety Education & Training, Patient/Caregiver Education & Training, Self-Care / ADL, Equipment Evaluation, Education, & procurement, Home Management Training       Goals  Short term goals  Time Frame for Short term goals: 5 days (by 1/18/21)  Short term goal 1: Pt will complete functional transfers with Supervision  Short term goal 2: Pt will complete LE dressing with Supervision/setup, use of AE as needed  Short term goal 3: Pt will perform 3 minutes dynamic standing activity with SBA  Long term goals  Time Frame for Long term goals : Pt will complete functional transfers with Mod I  Long term goal 1: Pt will complete LE dressing with Mod I, use of AE as needed  Long term goal 2: Pt will perform light housekeeping/meal prep/item retrieval task with Mod I  Patient Goals   Patient goals : \"to work on my legs\"       Therapy Time   Individual Concurrent Group Co-treatment   Time In 0935(15 minutes for eval)         Time Out 1105         Minutes 90         Timed Code Treatment Minutes: 75 Minutes       Kirstie Bonilla, OTR/L

## 2021-01-15 NOTE — PLAN OF CARE
ARU PATIENT TREATMENT PLAN  [facility]   [phone]    Bebeto Escalona    : 1940  [facility]  MRN: [id number]   PHYSICIAN:  Alma Hagen MD  Primary Problem    Patient Active Problem List   Diagnosis    HTN (hypertension)    COPD, severe ([address]    DM2 (diabetes mellitus, type 2) ([address]    HLD (hyperlipidemia)    Gallstones    PAF (paroxysmal atrial fibrillation) (HCC)    Chronic respiratory failure with hypoxia and hypercapnia 2.5 L home O2    Acute respiratory insufficiency    Acute on chronic respiratory failure with hypercapnia (HCC)    Sepsis ([address]    COPD exacerbation ([address]    CAD (coronary artery disease)    Former smoker    Acute hyperglycemia    Chronic normocytic anemia    Chronic thrombocytopenia    Falls at home    Ambulatory dysfunction    General weakness    Acute on chronic respiratory failure with hypoxia and hypercapnia (HCC)    Pleural effusion    Rapid atrial fibrillation (HCC)    Atrial fibrillation with RVR (HCC)    Acute respiratory failure with hypoxia and hypercapnia (HCC)    COPD with acute exacerbation (HCC)    Acute encephalopathy    Hyperglycemia    HCAP (healthcare-associated pneumonia)    Acute calculous cholecystitis    Acute cholecystitis    Atherosclerotic ulcer of aorta (HCC)    Closed compression fracture of body of L1 vertebra (HCC)    Hypomagnesemia    AAA (abdominal aortic aneurysm) without rupture (HCC)    Elevated procalcitonin    Abdominal pain, right upper quadrant    Lactic acidosis    Elevated bilirubin    Bacteremia due to Klebsiella pneumoniae    Paroxysmal atrial fibrillation (HCC)    Moderate protein-calorie malnutrition ([address]    Debility       Rehabilitation Diagnosis:     Debility [R53.81]       ADMIT DATE:2021    Patient Goals: To get back home. Short term goal 3: Pt will ambulate 50ft with SBA using RW  Short term goal 4: Pt will complete 1 curb step with SBA using RW            Long term goals  Time Frame for Long term goals : 10 days- 1/23/20  Long term goal 1: Pt will complete bed mobility with mod I  Long term goal 2: Pt will complete functional transfers with mod I using RW  Long term goal 3: Pt will ambulate 50ft with mod I using RW  Long term goal 4: Pt will complete 1 curb step with supervision using RW  These goals were reviewed with this patient at the time of assessment and Bebeto Escalona is in agreement.      Plan of Care: Pt to be seen 5 out of 7 days per week, 90   mins (exact) per day for 10 days (exact)                   Current Treatment Recommendations: Strengthening, Balance Training, Functional Mobility Training, Transfer Training, Endurance Training, Gait Training, Stair training, Wheelchair Mobility Training, Home Exercise Program, Safety Education & Training, Patient/Caregiver Education & Training, Equipment Evaluation, Education, & procurement      OCCUPATIONAL THERAPY:  Goals:             Short term goals  Time Frame for Short term goals: 5 days (by 1/18/21)  Short term goal 1: Pt will complete functional transfers with Supervision  Short term goal 2: Pt will complete LE dressing with Supervision/setup, use of AE as needed  Short term goal 3: Pt will perform 3 minutes dynamic standing activity with SBA :  Long term goals  Time Frame for Long term goals : Pt will complete functional transfers with Mod I  Long term goal 1: Pt will complete LE dressing with Mod I, use of AE as needed  Long term goal 2: Pt will perform light housekeeping/meal prep/item retrieval task with Mod I :    These goals were reviewed with this patient at the time of assessment and Bebeto Escalona is in agreement    Plan of Care:  Pt to be seen 5 out of 7 days per week, 90 mins (exact) per day for 10 days (exact) Assist patient/family with discharge planning, patient/family counseling,   and coordination with insurance during ARU stay. QIM / IRF GAEL SCORES:  ITEM CURRENT SCORE GOAL   Eating CARE Score: 5 Discharge Goal: Independent   Oral Hygiene CARE Score: 4 Discharge Goal: Independent   Toileting Hygiene CARE Score: 3 Discharge Goal: Independent   Shower/Bathe Self CARE Score: 3 Discharge Goal: Independent   Upper Body Dressing CARE Score: 3 Discharge Goal: Independent   Lower Body Dressing CARE Score: 3 Discharge Goal: Independent   On/Off Footwear CARE Score: 3 Discharge Goal: Independent   Roll Left & Right CARE Score: 4 Discharge Goal: Independent   Sit to Lying  CARE Score: 3 Discharge Goal: Independent   Lying to Sitting EOB CARE Score: 3 Discharge Goal: Independent   Sit to Stand CARE Score: 3 Discharge Goal: Independent   Chair/Bed to Chair Transfer CARE Score: 3 Discharge Goal: Independent   Toilet Transfer CARE Score: 3 Discharge Goal: Independent   Car Transfer CARE Score: 3 Discharge Goal: Independent   Walk 10 Feet CARE Score: 4 Discharge Goal: Independent   Walk 50 Feet, 2 Turns CARE Score: 4 Discharge Goal: Independent   Walk 150 Feet CARE Score: 9 Discharge Goal: Not Applicable   Walk 10 Feet, Uneven Surface CARE Score: 4 Discharge Goal: Independent   1 Step (Curb) CARE Score: 3 Discharge Goal: Independent   4 Steps CARE Score: 9 Discharge Goal: Not Applicable   12 Steps CARE Score: 9 Discharge Goal: Not Applicable    Object CARE Score: 3 Discharge Goal: Independent   Wheel 50 feet, 2 turns CARE Score: 4 Discharge Goal: Independent   Wheel 150 Feet CARE Score: 4 Discharge Goal: Independent          Dianne Medina will be seen a minimum of 3 hours of therapy per day, a minimum of 5 out of 7 days per week. [] In this rare instance due to the nature of this patient's medical involvement, this patient will be seen 15 hours per week (900 minutes within a 7 day period). Treatments may include therapeutic exercises, gait training, neuromuscular re-ed, transfer training, community reintegration, bed mobility, w/c mobility and training, self care, home mgmt, cognitive training, energy conservation,dysphagia tx, speech/language/communication therapy, group therapy, and patient/family education. In addition, dietician/nutritionist may monitor calorie count as well as intake and collaboratively work with SLP on dietary upgrades. Neuropsychology/Psychology may evaluate and provide necessary support. Medical issues being managed closely and that require 24 hour availability of a physician:   [] Swallowing Precautions  [x] Bowel/Bladder Fx  [] Weight bearing precautions   [] Wound Care    [x] Pain Mgmt   [] Infection Protection   [x] DVT Prophylaxis   [x] Fall Precautions  [x] Fluid/Electrolyte/Nutrition Balance   [] Voice Protection   [x] Respiratory  [] Other:    Medical Prognosis: [x] Good  [] Fair    [] Guarded   Total expected IRF days 10  Anticipated discharge destination:    [] Home Independently   [] Home Modified Independent  [x] Home with supervision    []SNF     [] Other                                           Physician anticipated functional outcomes:  Home w/ supervision and home health services. IPOC brief synthesis: 78year old female admitted to ARU to address strengthening, endurance, balance, gait, ADLs, assistive/adaptive device needs, patient/family training, pain control. This plan has been reviewed with Melchor Baum on 1/15 in a language the patient understands. Melchor Baum has had the opportunity to include input with the therapy team.      I have reviewed this initial plan of care and agree with its contents:    Title   Name    Date    Time    Physician: Walt Ahuja.  Divya Matthews MD 1/15/2021, 4:36 PM     Case Mgmt: Anurag CABRERAW    OT: Karyle Fell, OTR/L    PT: Jazmin Morales, PT, DPT    RN: Ginger Ferreira RN    ST: Kana Gates  CCC-SLP : Joanna Orellana OTR/L    Other:

## 2021-01-15 NOTE — PLAN OF CARE
Problem: Neurological  Intervention: Speech Evaluation/treatment  Note: SLP evaluation completed. All further notes may be found in EMR. Deepa Gates [id number] #[id number]  Speech-Language Pathologist       Problem: Nutrition  Intervention: Swallowing evaluation  Note: SLP evaluation completed. All further notes may be found in EMR. Deepa Gates [id number] #[id number]  Speech-Language Pathologist       Problem: Nutrition  Intervention: Aspiration precautions  Note: SLP evaluation completed. All further notes may be found in EMR.     Deepa Gates 92 #[id number]  Speech-Language Pathologist

## 2021-01-15 NOTE — H&P
Patient: Stacey Morrow  [id number]  Date: 1/15/2021      Chief Complaint: weakness    History of Present Illness/Hospital Course:  Mr. Asmita Gutiérrez is an 78year old male with h/o COPD and baseline 3L oxygen requirement, DM, PAF admitted to [facility] after presenting to [facility] ED with complaints of RUQ pain. He was found to have acute cholecystitis and was treated with rocephin and flagyl, subsequently transitioned to oral antibiotics. Gen surgery consulted with no recommendations for surgical intervention. He was also noted to have an L1 compression fracture managed conservatively, as well as a possible penetrating atherosclerotic ulcer involving the anterior wall of the distal descending aorta with recs for outpatient vascular surgery follow up. Prior Level of Function:  Independent    Current Level of Function:  Desiree Gamez     has a past medical history of Arthritis, CAD (coronary artery disease), COPD (chronic obstructive pulmonary disease) ([facility] [address] Diabetes mellitus ([facility] [address] Hepatitis A, Hyperlipidemia, Hypertension, Influenza A, Kidney calculi, MDRO (multiple drug resistant organisms) resistance, ELAINE on CPAP, Osteoporosis, and Skin cancer of face. has a past surgical history that includes Prostate surgery; eye surgery (Left, 6/12/2016); other surgical history (08/31/2015); Cystoscopy (Right, 10/9/15); joint replacement (6/29/2011); joint replacement (11/2004); TURP (10/23/2015); other surgical history (12/1/15); back surgery; Cystocopy (05/01/2019); cystoscopy w biopsy of bladder (N/A, 5/1/2019); Cholecystectomy, open (N/A, 01/06/2021); and Cholecystectomy, laparoscopic (N/A, 1/6/2021). reports that he quit smoking about 15 years ago. He has a 67.50 pack-year smoking history. He has never used smokeless tobacco. He reports that he does not drink alcohol or use drugs. family history includes Cancer in his brother, father, and mother; Diabetes in his mother and sister; Heart Disease in his mother. Current Facility-Administered Medications   Medication Dose Route Frequency Provider Last Rate Last Admin    albuterol (PROVENTIL) nebulizer solution 2.5 mg  2.5 mg Nebulization BID Leo Mejia MD   2.5 mg at 01/15/21 0740    potassium chloride (KLOR-CON M) extended release tablet 20 mEq  20 mEq Oral BID Leo Mejia MD        dextrose 5 % solution  100 mL/hr Intravenous PRN Leo Mejia MD        dextrose 50 % IV solution  12.5 g Intravenous PRN Leo Mejia MD        glucagon (rDNA) injection 1 mg  1 mg Intramuscular PRN Leo Mejia MD        glucose (GLUTOSE) 40 % oral gel 15 g  15 g Oral PRN Leo Mejia MD        acetaminophen (TYLENOL) tablet 650 mg  650 mg Oral Q4H PRN Leo Mejia MD        polyethylene glycol (GLYCOLAX) packet 17 g  17 g Oral Daily PRN Leo Mejia MD        albuterol (PROVENTIL) nebulizer solution 2.5 mg  2.5 mg Nebulization Q4H PRN Leo Mejia MD        apixaban (ELIQUIS) tablet 5 mg  5 mg Oral BID Leo Mejia MD   5 mg at 01/15/21 0745    aspirin EC tablet 81 mg  81 mg Oral Daily Leo Mejia MD   81 mg at 01/15/21 0745    dilTIAZem (CARDIZEM CD) extended release capsule 180 mg  180 mg Oral Daily Leo Mejia MD   180 mg at 01/15/21 0745    gabapentin (NEURONTIN) capsule 600 mg  600 mg Oral TID Leo Mejia MD   600 mg at 01/15/21 0745    hydrALAZINE (APRESOLINE) tablet 50 mg  50 mg Oral Q6H PRN Leo Mejia MD        insulin glargine (LANTUS) injection vial 20 Units  20 Units Subcutaneous Nightly Leo Mejia MD   20 Units at 01/14/21 2107    insulin lispro (HUMALOG) injection vial 0-6 Units  0-6 Units Subcutaneous TID  Leo Mejia MD   1 Units at 01/15/21 0607    insulin lispro (HUMALOG) injection vial 0-3 Units  0-3 Units Subcutaneous Nightly Leo Mejia MD   2 Units at 01/14/21 2107  acetaminophen (TYLENOL) tablet 650 mg  650 mg Oral Q4H PRN Leelee Castanon MD        polyethylene glycol [facility]) packet 17 g  17 g Oral Daily PRN Leelee Castanon MD        albuterol (PROVENTIL) nebulizer solution 2.5 mg  2.5 mg Nebulization Q4H PRN Leelee Castanon MD        apixaban (ELIQUIS) tablet 5 mg  5 mg Oral BID Leelee Castanon MD   5 mg at 01/15/21 0745    aspirin EC tablet 81 mg  81 mg Oral Daily Leelee Castanon MD   81 mg at 01/15/21 0745    dilTIAZem (CARDIZEM CD) extended release capsule 180 mg  180 mg Oral Daily Leelee Castanon MD   180 mg at 01/15/21 0745    gabapentin (NEURONTIN) capsule 600 mg  600 mg Oral TID Leelee Castanon MD   600 mg at 01/15/21 0745    hydrALAZINE (APRESOLINE) tablet 50 mg  50 mg Oral Q6H PRN Leelee Castanon MD        insulin glargine (LANTUS) injection vial 20 Units  20 Units Subcutaneous Nightly Leelee Castanon MD   20 Units at 01/14/21 2107    insulin lispro (HUMALOG) injection vial 0-6 Units  0-6 Units Subcutaneous TID WC Leelee Castanon MD   1 Units at 01/15/21 1922    insulin lispro (HUMALOG) injection vial 0-3 Units  0-3 Units Subcutaneous Nightly Leelee Castanon MD   2 Units at 01/14/21 2107    magnesium sulfate 1 g in dextrose 5% 100 mL IVPB  1 g Intravenous PRN Leelee Castanon MD        ondansetron (ZOFRAN-ODT) disintegrating tablet 8 mg  8 mg Oral Q8H PRN Leelee Castanon MD   8 mg at 01/15/21 0746    oxyCODONE (ROXICODONE) immediate release tablet 5 mg  5 mg Oral Q4H PRN Barbette Setters, MD Or Georgianna Olszewski oxyCODONE (ROXICODONE) immediate release tablet 10 mg  10 mg Oral Q4H PRN Leelee Castanon MD        pantoprazole (PROTONIX) tablet 40 mg  40 mg Oral QAM AC Leelee Castanon MD   40 mg at 01/15/21 0508    propafenone (RYTHMOL) tablet 150 mg  150 mg Oral 3 times per day Leelee Castanon MD   150 mg at 01/15/21 0508    traZODone (DESYREL) tablet 50 mg  50 mg Oral Nightly PRN Leelee Castanon MD 1. CT findings consistent with interval development of a penetrating   atherosclerotic ulcer involving the anterior wall of the distal descending   thoracic aorta. 2. No evidence of aortic dissection   3. Diffuse emphysematous changes seen throughout the lungs   4. L1 compression fracture, approximately 75% loss of central vertebral body   height, new since the prior study of 2017   5. Cholelithiasis, with wall thickening and surrounding inflammation   involving the neck of the gallbladder, suggesting cholecystitis. 6. No evidence of small-bowel obstruction   7. Mild aneurysmal dilatation of the infrarenal abdominal aorta, measuring   3.1 cm in greatest transverse dimension   8. Critical results were called by Dr. Jerson Joel to [facility] on   12/15/2020 at 01:14.       RECOMMENDATIONS:   For management of fusiform AAA:       3.0-3.4 cm AAA, recommend follow-up every 3 years.       * For management of saccular abdominal aortic aneurysms of any size,   recommend vascular consultation.       Note:  For AAA enlargement of >0.5 cm in 6 months or >1 cm in 1 year,   recommend vascular consultation.       References: Fely Janus Radiol 2013; 10(10):789-794; J Vasc Surg. 2018; 67:2-77           XR CHEST PORTABLE 12/14/2020   Final Result   Moderate COPD. The above laboratory data have been reviewed. The above imaging data have been reviewed. The above medical testing have been reviewed. Body mass index is 28.45 kg/m².     Barriers to Discharge: weakness, balance, endurance      Rehabilitation Diagnosis:  16.0, Debility (non-cardiac, non-pulmonary    Assessment and Plan:  Acute cholecystitis  - IV abx converted to PO  - Tolerating diet, bowels moving    Hypokalemia  - Replace    L1 compression fracture  - symptomatic treatment      HTN  - Follow bp    COPD  - baseline 3L oxygen requirement  - continue inhalers    PAF  - resume eliquis  - rate controlled    Chronic thrombocytopenia - Follow, particularly given resumption of eliquis    Possible aortic ulcer, AAA  - OP vascular follow up    DM  - SSI; follow blood sugars  - Resume actos tomorrow, metformin next week. Bowels: Schedule stool softener. Follow bowel movements. Enema or suppository if needed. Bladder: Check PVR x 3. [address] if PVR > 200ml or if any volume is > 500 ml. Sleep: Trazodone provided prn. Follow up appointments: vascular, gen surg, pcp    Chang Nunn MD 1/15/2021, 9:09 AM

## 2021-01-15 NOTE — PROGRESS NOTES
Speech Language Pathology  Facility/Department: [facility]U  Initial Speech/Language/Cognitive Assessment    NAME: Dianne Medina  : 1940   MRN: [id number]  ADMISSION DATE: 2021  ADMITTING DIAGNOSIS: has HTN (hypertension); COPD, severe ([address] DM2 (diabetes mellitus, type 2) ([address] HLD (hyperlipidemia); Gallstones; PAF (paroxysmal atrial fibrillation) ([address] Chronic respiratory failure with hypoxia and hypercapnia 2.5 L home O2; Acute respiratory insufficiency; Acute on chronic respiratory failure with hypercapnia ([address] Sepsis ([address] COPD exacerbation ([address] CAD (coronary artery disease); Former smoker; Acute hyperglycemia; Chronic normocytic anemia; Chronic thrombocytopenia; Falls at home; Ambulatory dysfunction; General weakness; Acute on chronic respiratory failure with hypoxia and hypercapnia ([address] Pleural effusion; Rapid atrial fibrillation ([address] Atrial fibrillation with RVR ([address] Acute respiratory failure with hypoxia and hypercapnia (HCC); COPD with acute exacerbation ([address] Acute encephalopathy; Hyperglycemia; HCAP (healthcare-associated pneumonia); Acute calculous cholecystitis; Acute cholecystitis; Atherosclerotic ulcer of aorta ([address] Closed compression fracture of body of L1 vertebra ([address] Hypomagnesemia; AAA (abdominal aortic aneurysm) without rupture ([address] Elevated procalcitonin; Abdominal pain, right upper quadrant; Lactic acidosis; Elevated bilirubin; Bacteremia due to Klebsiella pneumoniae; Paroxysmal atrial fibrillation ([address]  Moderate protein-calorie malnutrition ([address] and Debility on their problem list.  DATE ONSET:  2021    Date of Eval: 1/15/2021   Evaluating Therapist: MAUREEN Benson    RECENT RESULTS  CT OF HEAD/MRI: N/A    Primary Complaint: Administered the MoCA for cog-linguistic assessment this date. Pt scored 13/30 on the assessment (see breakdown of scores below). Typically this would be considered a more moderate to mod-severe cog deficit, however, when considering patients daily responsibilities, and support he has at home, impairments are judged to be more mild for his functional baseline. Pt demonstrated relative strengths in the areas of naming, language, and attention. Pt demonstrated difficulty with exec function, visuo-spatial concepts, problem solving, and reasoning. Pt also presented with deficits during the recall portion of the assessment; recalling 1/5 items independently, improving minimally with moderate cueing. Pt had some difficulty with orientation with SLP today when reviewing temporal concepts; difficulty recalling and reasoning with what month, day, and date it is. Cueing provided to reference calendar. Per other therapy disciplines, he was not disoriented earlier today; question whether orientation waxes and weans throughout the day. Will monitor. Recommend continued speech therapy to promote improvement in above mentioned areas of deficit to ensure a safe and independent transition back home at [facility]. Perfecto Cognitive Assessment (MoCA)    Section Subtest Score Comments   Visuospatial/ [address] making 0/1 Incorrect    Copy Cube 0/1 Very close; but inaccurate.      Clock 3/3 Accurate   Naming Picture naming 2/3 Cueing needed for rhino    Attention Number repetition 1/2 Accurate with forward repetition; difficulty with reverse    Selective attention 0/1 2 errors; test indicates >2 errors = 0 points    Serial 7s 1/3 First calculation was correct   Language Repetition 2/2 No difficulty    Fluency 0/1 Pt named 7 items in 1 min; per test protocol, need 11 items for full points   Abstraction Comparisons 0/2 Difficulty with this task despite example provided Delayed Recall Recall 5 novel words 1/5 1/5 independently, min improvement with cues   Orientation Spatial and Temporal 3/6 Difficulty with month, date, day    Total:  13/30. Recommendations:  Requires SLP Intervention: Yes  Duration/Frequency of Treatment: Speech therapy for dysphagia and cog-linguistic tx 5x/week. D/C Recommendations: Home with intermittent assistance       Goals:  Short-term Goals  Timeframe for Short-term Goals: 7 Days (1/21/2021)  Goal 1: The patient will verbalize awareness to temporal orientation concepts with 100% accy and use of external aid when needed. Goal 2: The patient will complete functional problem solving tasks with 80% accy and min cues. Goal 3: The patient will complete verbal and visual reasoning tasks with 80% accy, min cueing. Goal 4: The patient will complete functional STM and delayed recall tasks with 75% acc, min cueing, and use of compensatory strategies. Long-term Goals  Timeframe for Long-term Goals: 10 days (1/24/2021)  Goal 1: The patient will improve overall cog-linguistic skills to promote safe and independent return home at [facility]. Patient/family involved in developing goals and treatment plan: Yes    Subjective:  General  Chart Reviewed: Yes  Subjective  Subjective: Pt upright in chair, alert and agreeable to participate. No complaints throughout session. Vision  Vision: Within Functional Limits  Vision Exceptions: Wears glasses for reading  Hearing  Hearing: Within functional limits        Objective:     Oral/Motor  Oral Motor:  Within functional limits    Auditory Comprehension  Comprehension: Within Functional Limits    Expression  Primary Mode of Expression: Verbal    Verbal Expression  Verbal Expression: Within functional limits    Written Expression  Dominant Hand: Right  Written Expression: Ex[facility]  Legibility Impairment Severity: Mild    Motor Speech

## 2021-01-15 NOTE — PROGRESS NOTES
REQUIRES PT FOLLOW UP: Yes  Activity Tolerance  Activity Tolerance: Patient Tolerated treatment well;Patient limited by endurance  Activity Tolerance: Pt on 3L O2 throughout session; O2 sats % at rest and 90-94% with activity       Patient Diagnosis(es): There were no encounter diagnoses. has a past medical history of Arthritis, CAD (coronary artery disease), COPD (chronic obstructive pulmonary disease) ([facility] [address] Diabetes mellitus ([facility] [address] Hepatitis A, Hyperlipidemia, Hypertension, Influenza A, Kidney calculi, MDRO (multiple drug resistant organisms) resistance, ELAINE on CPAP, Osteoporosis, and Skin cancer of face. has a past surgical history that includes Prostate surgery; eye surgery (Left, 6/12/2016); other surgical history (08/31/2015); Cystoscopy (Right, 10/9/15); joint replacement (6/29/2011); joint replacement (11/2004); TURP (10/23/2015); other surgical history (12/1/15); back surgery; Cystocopy (05/01/2019); cystoscopy w biopsy of bladder (N/A, 5/1/2019); Cholecystectomy, open (N/A, 01/06/2021); and Cholecystectomy, laparoscopic (N/A, 1/6/2021). Restrictions  Restrictions/Precautions  Restrictions/Precautions: General Precautions, Fall Risk  Position Activity Restriction  Other position/activity restrictions: up with assistance, JARAD hose, 3L O2     Vision/Hearing  Vision: Impaired  Vision Exceptions: Wears glasses for reading  Hearing: Within functional limits       Subjective  General  Chart Reviewed: Yes  Patient assessed for rehabilitation services?: Yes  Additional Pertinent Hx: HTN, COPD on chronic 3L O2, DMII, osteoporosis, ELAINE on CPAP.  S/p open cholecystectomy on 1/6/2021  Family / Caregiver Present: No  Referring Practitioner: Willam Ray MD  Referral Date : 01/14/21  Diagnosis: debility  Pain Screening  Patient Currently in Pain: Yes  Pain Assessment  Pain Assessment: 0-10  Pain Level: 7  Pain Type: Acute pain;Surgical pain  Pain Location: Abdomen  Pain Orientation: Right Pain Descriptors: Aching  Vital Signs  Patient Currently in Pain: Yes  Pre Treatment Pain Screening  Intervention List: Patient able to continue with treatment    Orientation  Orientation  Orientation Level: Oriented to person;Oriented to place;Oriented to time (requires cues for month);Oriented to situation     Social/Functional History  Social/Functional History  Lives With: Alone  Type of Home: House  Home Layout: One level  Home Access: Stairs to enter without rails  Entrance Stairs - Number of Steps: 1 (pt reports he does not complete more than 1 step at baseline)  Bathroom Shower/Tub: Walk-in shower, Shower chair without back  Bathroom Toilet: Handicap height  Bathroom Equipment: Grab bars in shower, Grab bars around toilet  Home Equipment: Rolling walker, Cane, [employer], [address] Help From: Family, Home health  ADL Assistance: Independent  Homemaking Assistance: Needs assistance(\"I've got a lady that does all that\"- states aide comes in 3 days/week for \"a few hours\" at a time)  Homemaking Responsibilities: Yes  Meal Prep Responsibility: Primary  Laundry Responsibility: No  Cleaning Responsibility: Secondary  Bill Paying/Finance Responsibility: No(Pt initially reporting manages own finances and meds, then reported his daughter assists)  Shopping Responsibility: Secondary(daughter completes, goes with her to shop sometimes)  Health Care Management: Secondary  Ambulation Assistance: Independent(using RW, household distances only.  Pt states \"I can't even walk 100ft to the mailbox normally\"; states 50ft is typically the furthest distance he can walk at baseline)  Transfer Assistance: Independent  Active : No  Patient's  Info: daughter  Occupation: Retired  Leisure & Hobbies: fishing, hunting, mowing the lawn  IADL Comments: Pt has one cat Additional Comments: Pt reports daughter comes by [facility] day\" and assists as needed; can continue to assist as needed upon d/c. Pt states daughter is unable to provide 24 hr assist howver [place] Cruz can be there most of the time\". Reports history of 1 fall in past year; denies injury with fall. Objective  Observation/Palpation  Posture: Fair       Strength RLE  Strength RLE: Exception  R Hip Flexion: 4-/5  R Hip ABduction: 4-/5  R Hip ADduction: 4-/5  R Knee Flexion: 4/5  R Knee Extension: 4/5  R Ankle Dorsiflexion: 4/5  R Ankle Plantar flexion: 4/5  Strength LLE  Strength LLE: Exception  L Hip Flexion: 4-/5  L Hip ABduction: 4-/5  L Hip ADduction: 4-/5  L Knee Flexion: 4/5  L Knee Extension: 4/5  L Ankle Dorsiflexion: 4/5  L Ankle Plantar Flexion: 4/5        Sensation  Overall Sensation Status: WFL     Bed mobility  Rolling to Left: Stand by assistance  Rolling to Right: Stand by assistance  Supine to Sit: Minimal assistance(with HOB elevated and increased time/effort; assist for trunk elevation)  Sit to Supine: Minimal assistance     Transfers  Sit to Stand: Contact guard assistance;Minimal Assistance(up to RW with cues for hand placement)  Stand to sit: Contact guard assistance;Minimal Assistance(with cues for hand placement)  Bed to Chair: Minimal assistance  Stand Pivot Transfers: Contact guard assistance;Minimal Assistance(using RW)  Car Transfer: Minimal Assistance(CGA to enter car; min A to stand from seat of car when exiting)     Ambulation  Ambulation?: Yes  Ambulation 1  Surface: level tile  Device: Rolling Walker  Other Apparatus: O2(3L)  Assistance: Contact guard assistance  Quality of Gait: decreased cindy and B step length, decreased B foot clearance, decreased B TKE, min forward flexion of trunk (increases slightly as pt fatigues; cues for maintain RW within YASH with fatigue).  No LOB or knee buckling noted although at end of each bout, pt does report BLE feel \"shaky\"  Distance: 65ft + 15ft + 25ft Comments: O2 sats 92-94% after each bout    Stairs/Curb  Stairs?: Yes  Stairs  Curbs: 6\"(x 4 reps)  Device: Rolling walker  Assistance: Minimal assistance  Comment: pt reports BLE feel \"a little shaky\" with curb step completion, however no knee buckling noted. Pt does not complete more than 1 step at baseline. Wheelchair Activities  Wheelchair Type: Standard  Wheelchair Cushion: Standard  Propulsion: Yes  Propulsion 1  Propulsion: Manual  Level: Level Tile  Method: RUE;LUE  Level of Assistance: Stand by assistance  Description/Details: decreased velocity, decreased BUE stroke length, distance limited by fatigue, able to complete 2 turns in hallway  Distance: 150ft (with 1 brief rest break)        Balance  Sitting - Static: (supervision seated EOB)  Sitting - Dynamic: (SBA seated EOB)  Standing - Static: (CGA using RW)  Standing - Dynamic: (CGA-min A using RW)     Exercises  Quad Sets: x 10 BLE  Ankle Pumps: x 20 BLE       Plan   Plan  Times per week: 5 out of 7 days/week  Plan weeks: 10 days  Current Treatment Recommendations: Strengthening, Balance Training, Functional Mobility Training, Transfer Training, Endurance Training, Gait Training, Stair training, Wheelchair Mobility Training, Home Exercise Program, Safety Education & Training, Patient/Caregiver Education & Training, Equipment Evaluation, Education, & procurement  Safety Devices  Type of devices:  All fall risk precautions in place, Call light within reach, Chair alarm in place, Left in chair, Nurse notified      Goals  Short term goals  Time Frame for Short term goals: 5 days- 1/18/20  Short term goal 1: Pt will complete bed mobility with SBA  Short term goal 2: Pt will complete functional transfers with SBA using RW  Short term goal 3: Pt will ambulate 50ft with SBA using RW  Short term goal 4: Pt will complete 1 curb step with SBA using RW  Long term goals  Time Frame for Long term goals : 10 days- 1/23/20 Long term goal 1: Pt will complete bed mobility with mod I  Long term goal 2: Pt will complete functional transfers with mod I using RW  Long term goal 3: Pt will ambulate 50ft with mod I using RW  Long term goal 4: Pt will complete 1 curb step with supervision using RW  Patient Goals   Patient goals : \"get my legs taken care of so I can get back home\"       Therapy Time   Individual Concurrent Group Co-treatment   Time In 0800         Time Out 0930         Minutes 90         Timed Code Treatment Minutes: 75 Minutes (15 min eval)       Frank Camacho, PT, DPT

## 2021-01-16 LAB
ANAEROBIC CULTURE: ABNORMAL
GLUCOSE BLD-MCNC: 175 MG/DL (ref 70–99)
GLUCOSE BLD-MCNC: 214 MG/DL (ref 70–99)
GLUCOSE BLD-MCNC: 216 MG/DL (ref 70–99)
GLUCOSE BLD-MCNC: 221 MG/DL (ref 70–99)
GRAM STAIN RESULT: ABNORMAL
ORGANISM: ABNORMAL
ORGANISM: ABNORMAL
PERFORMED ON: ABNORMAL
WOUND/ABSCESS: ABNORMAL
WOUND/ABSCESS: ABNORMAL

## 2021-01-16 PROCEDURE — 97129 THER IVNTJ 1ST 15 MIN: CPT

## 2021-01-16 PROCEDURE — 1280000000 HC REHAB R&B

## 2021-01-16 PROCEDURE — 2700000000 HC OXYGEN THERAPY PER DAY

## 2021-01-16 PROCEDURE — 97530 THERAPEUTIC ACTIVITIES: CPT

## 2021-01-16 PROCEDURE — 97110 THERAPEUTIC EXERCISES: CPT

## 2021-01-16 PROCEDURE — 92526 ORAL FUNCTION THERAPY: CPT

## 2021-01-16 PROCEDURE — 97535 SELF CARE MNGMENT TRAINING: CPT

## 2021-01-16 PROCEDURE — 97116 GAIT TRAINING THERAPY: CPT

## 2021-01-16 PROCEDURE — 94640 AIRWAY INHALATION TREATMENT: CPT

## 2021-01-16 PROCEDURE — 6360000002 HC RX W HCPCS: Performed by: PHYSICAL MEDICINE & REHABILITATION

## 2021-01-16 PROCEDURE — 6370000000 HC RX 637 (ALT 250 FOR IP): Performed by: PHYSICAL MEDICINE & REHABILITATION

## 2021-01-16 PROCEDURE — 94761 N-INVAS EAR/PLS OXIMETRY MLT: CPT

## 2021-01-16 PROCEDURE — 97130 THER IVNTJ EA ADDL 15 MIN: CPT

## 2021-01-16 RX ADMIN — GABAPENTIN 600 MG: 300 CAPSULE ORAL at 11:15

## 2021-01-16 RX ADMIN — PROPAFENONE HYDROCHLORIDE 150 MG: 150 TABLET, FILM COATED ORAL at 20:20

## 2021-01-16 RX ADMIN — PROPAFENONE HYDROCHLORIDE 150 MG: 150 TABLET, FILM COATED ORAL at 05:36

## 2021-01-16 RX ADMIN — METRONIDAZOLE 500 MG: 250 TABLET ORAL at 05:36

## 2021-01-16 RX ADMIN — PANTOPRAZOLE SODIUM 40 MG: 40 TABLET, DELAYED RELEASE ORAL at 05:36

## 2021-01-16 RX ADMIN — INSULIN LISPRO 1 UNITS: 100 INJECTION, SOLUTION INTRAVENOUS; SUBCUTANEOUS at 06:39

## 2021-01-16 RX ADMIN — INSULIN LISPRO 2 UNITS: 100 INJECTION, SOLUTION INTRAVENOUS; SUBCUTANEOUS at 11:40

## 2021-01-16 RX ADMIN — ASPIRIN 81 MG: 81 TABLET, COATED ORAL at 11:14

## 2021-01-16 RX ADMIN — GABAPENTIN 600 MG: 300 CAPSULE ORAL at 20:20

## 2021-01-16 RX ADMIN — INSULIN LISPRO 2 UNITS: 100 INJECTION, SOLUTION INTRAVENOUS; SUBCUTANEOUS at 16:50

## 2021-01-16 RX ADMIN — Medication 1 PUFF: at 09:38

## 2021-01-16 RX ADMIN — PIOGLITAZONE 15 MG: 15 TABLET ORAL at 11:14

## 2021-01-16 RX ADMIN — POTASSIUM CHLORIDE 20 MEQ: 1500 TABLET, EXTENDED RELEASE ORAL at 11:15

## 2021-01-16 RX ADMIN — PROPAFENONE HYDROCHLORIDE 150 MG: 150 TABLET, FILM COATED ORAL at 15:00

## 2021-01-16 RX ADMIN — ALBUTEROL SULFATE 2.5 MG: 2.5 SOLUTION RESPIRATORY (INHALATION) at 09:38

## 2021-01-16 RX ADMIN — Medication 1 PUFF: at 19:12

## 2021-01-16 RX ADMIN — CIPROFLOXACIN 500 MG: 500 TABLET, FILM COATED ORAL at 20:19

## 2021-01-16 RX ADMIN — APIXABAN 5 MG: 5 TABLET, FILM COATED ORAL at 11:15

## 2021-01-16 RX ADMIN — DILTIAZEM HYDROCHLORIDE 180 MG: 180 CAPSULE, COATED, EXTENDED RELEASE ORAL at 11:15

## 2021-01-16 RX ADMIN — METRONIDAZOLE 500 MG: 250 TABLET ORAL at 20:19

## 2021-01-16 RX ADMIN — APIXABAN 5 MG: 5 TABLET, FILM COATED ORAL at 20:20

## 2021-01-16 RX ADMIN — ALBUTEROL SULFATE 2.5 MG: 2.5 SOLUTION RESPIRATORY (INHALATION) at 19:12

## 2021-01-16 RX ADMIN — GABAPENTIN 600 MG: 300 CAPSULE ORAL at 15:00

## 2021-01-16 RX ADMIN — INSULIN GLARGINE 20 UNITS: 100 INJECTION, SOLUTION SUBCUTANEOUS at 20:22

## 2021-01-16 RX ADMIN — METRONIDAZOLE 500 MG: 250 TABLET ORAL at 15:00

## 2021-01-16 RX ADMIN — INSULIN LISPRO 1 UNITS: 100 INJECTION, SOLUTION INTRAVENOUS; SUBCUTANEOUS at 20:21

## 2021-01-16 RX ADMIN — CIPROFLOXACIN 500 MG: 500 TABLET, FILM COATED ORAL at 11:14

## 2021-01-16 ASSESSMENT — PAIN SCALES - GENERAL: PAINLEVEL_OUTOF10: 0

## 2021-01-16 NOTE — PROGRESS NOTES
Occupational Therapy  Facility/Department: [facility]  Daily Treatment Note  NAME: Britt Reed  : 1940  MRN: [id number]    Date of Service: 2021    Discharge Recommendations:  Home with assist PRN, Home with Home health OT  OT Equipment Recommendations  Equipment Needed: No    Assessment   Performance deficits / Impairments: Decreased functional mobility ; Decreased high-level IADLs;Decreased ADL status; Decreased balance;Decreased posture;Decreased endurance  Assessment: Patient reports feeling \"a step below his normal\"-typically Ind with ADLs, functional mobility, transfers, endurance. Patient able to complete 2x15 sets of free weight ex with 2# weights, and neon green theraband; declined need for ADLs this am-completed with PCA. Patient Ind with feeding, taking meds, pouring drinks. Patient reports feeling very fatigued and diffculty staying awake during day. Stopping multiple times during session to cough up phlegm. Patient educated on use of insentive spriometer. Cont OT POC. Prognosis: Good  OT Education: OT Role;Plan of Care;Precautions; ADL Adaptive Strategies;Transfer Training;Energy Conservation;Orientation;Equipment  REQUIRES OT FOLLOW UP: Yes  Activity Tolerance  Activity Tolerance: Patient limited by fatigue  Activity Tolerance: patient required multiple extended rest breaks in between each set of exercises  Safety Devices  Safety Devices in place: Yes  Type of devices: Nurse notified; Chair alarm in place; Left in chair;Call light within reach;Gait belt  Restraints  Initially in place: No         Patient Diagnosis(es): There were no encounter diagnoses. has a past medical history of Arthritis, CAD (coronary artery disease), COPD (chronic obstructive pulmonary disease) ([facility] [address] Diabetes mellitus ([facility] [address] Hepatitis A, Hyperlipidemia, Hypertension, Influenza A, Kidney calculi, MDRO (multiple drug resistant organisms) resistance, ELAINE on CPAP, Osteoporosis, and Skin cancer of face. has a past surgical history that includes Prostate surgery; eye surgery (Left, 6/12/2016); other surgical history (08/31/2015); Cystoscopy (Right, 10/9/15); joint replacement (6/29/2011); joint replacement (11/2004); TURP (10/23/2015); other surgical history (12/1/15); back surgery; Cystocopy (05/01/2019); cystoscopy w biopsy of bladder (N/A, 5/1/2019); Cholecystectomy, open (N/A, 01/06/2021); and Cholecystectomy, laparoscopic (N/A, 1/6/2021). Restrictions  Restrictions/Precautions  Restrictions/Precautions: General Precautions, Fall Risk  Required Braces or Orthoses?: No  Position Activity Restriction  Other position/activity restrictions: up with assistance, JARAD hose, 3L O2  Subjective   General  Chart Reviewed: Yes  Patient assessed for rehabilitation services?: Yes  Additional Pertinent Hx: baseline 3L oxygen requirement, HTN  Family / Caregiver Present: No  Referring Practitioner: Jerry Harris MD  Diagnosis: Acute cholecystitis, Hypokalemia, L1 compression fracture  Subjective  Subjective: Patient asleep in recliner upon arrival, difficult to arouse; awakens to phone call. General Comment  Comments: RN cleared for treatment  Vital Signs  Patient Currently in Pain: Denies   Orientation  Orientation  Overall Orientation Status: Impaired  Orientation Level: Oriented to person;Disoriented to place;Oriented to time;Oriented to situation(partially to place \"hospital\")  Objective    ADL  Feeding: Setup  LE Dressing: Minimal assistance                       Coordination  Gross Motor: good coordination for UE Ex              Cognition  Overall Cognitive Status: Exceptions  Arousal/Alertness: Appropriate responses to stimuli(initially delayed when waking up, then appropriate)  Following Commands: Follows one step commands with increased time; Follows one step commands with repetition  Attention Span: Attends with cues to redirect  Safety Judgement: Decreased awareness of need for assistance Problem Solving: Assistance required to generate solutions  Insights: Decreased awareness of deficits  Initiation: Requires cues for some  Sequencing: Does not require cues     Perception  Overall Perceptual Status: WFL              Type of ROM/Therapeutic Exercise  Type of ROM/Therapeutic Exercise: AROM; Free weights; Resistive Bands  Comment: 2# free weights, neon green theraband  Exercises  Shoulder Elevation: 2x15  Shoulder Flexion: 2x15  Shoulder Extension: 2x15  Shoulder ABduction: 2x15  Shoulder ADduction: 2x15  Horizontal ABduction: 2x15  Horizontal ADduction: 2x15  Elbow Flexion: 2x15  Elbow Extension: 2x15  Supination: 2x15  Pronation: 2x15  Wrist Flexion: 2x15  Wrist Extension: 2x15                    Plan   Plan  Times per week: 5-7x/week  Current Treatment Recommendations: Strengthening, Balance Training, Functional Mobility Training, Endurance Training, Gait Training, Positioning, Pain Management, Safety Education & Training, Patient/Caregiver Education & Training, Self-Care / ADL, Equipment Evaluation, Education, & procurement, Home Management Training    Goals  Short term goals  Time Frame for Short term goals: 5 days (by 1/18/21)  Short term goal 1: Pt will complete functional transfers with Supervision  Short term goal 2: Pt will complete LE dressing with Supervision/setup, use of AE as needed  Short term goal 3: Pt will perform 3 minutes dynamic standing activity with SBA  Long term goals  Time Frame for Long term goals : Pt will complete functional transfers with Mod I  Long term goal 1: Pt will complete LE dressing with Mod I, use of AE as needed  Long term goal 2: Pt will perform light housekeeping/meal prep/item retrieval task with Mod I  Patient Goals   Patient goals : \"to work on my legs\"       Therapy Time   Individual Concurrent Group Co-treatment   Time In 1030         Time Out 1130         Minutes 60         Timed Code Treatment Minutes: 60 Minutes       [employer], OTR/L

## 2021-01-16 NOTE — PROGRESS NOTES
Activity Tolerance: Pt on 3L O2 NC, SpO2 77% on arrival (O2 not completely in nose, after adjustment SpO2 increased to 93% within 2 minutes); SpO2 maintained 97-98% at rest and with mobility for remainder of session, -106     Patient Diagnosis(es): There were no encounter diagnoses. has a past medical history of Arthritis, CAD (coronary artery disease), COPD (chronic obstructive pulmonary disease) ([facility] [address] Diabetes mellitus ([facility] [address] Hepatitis A, Hyperlipidemia, Hypertension, Influenza A, Kidney calculi, MDRO (multiple drug resistant organisms) resistance, ELAINE on CPAP, Osteoporosis, and Skin cancer of face. has a past surgical history that includes Prostate surgery; eye surgery (Left, 6/12/2016); other surgical history (08/31/2015); Cystoscopy (Right, 10/9/15); joint replacement (6/29/2011); joint replacement (11/2004); TURP (10/23/2015); other surgical history (12/1/15); back surgery; Cystocopy (05/01/2019); cystoscopy w biopsy of bladder (N/A, 5/1/2019); Cholecystectomy, open (N/A, 01/06/2021); and Cholecystectomy, laparoscopic (N/A, 1/6/2021). Restrictions  Restrictions/Precautions  Restrictions/Precautions: General Precautions, Fall Risk  Required Braces or Orthoses?: No  Position Activity Restriction  Other position/activity restrictions: up with assistance, JARAD hose, 3L O2  Subjective   General  Chart Reviewed: Yes  Additional Pertinent Hx: HTN, COPD on chronic 3L O2, DMII, osteoporosis, ELAINE on CPAP. S/p open cholecystectomy on 1/6/2021  Response To Previous Treatment: Patient with no complaints from previous session.   Family / Caregiver Present: No  Referring Practitioner: Luzmaria Rios MD  Subjective  Subjective: Pt resting in bed on approach, pleasant and agreeable to PT tx  General Comment  Comments: Pt using urinal on arrival, O2 noted to be partially out of nose on arrival, re-donned, SpO2 77%, improved to 93% within 2 minutes, pt does endorse mild SOB  Pain Screening Patient Currently in Pain: Denies  Vital Signs  Patient Currently in Pain: Denies       Orientation  Orientation  Overall Orientation Status: Within Functional Limits    Objective   Bed mobility  Supine to Sit: Stand by assistance(HOB elevated, use of BR, increased time to complete)  Sit to Supine: Unable to assess(Pt seated in chair at end of session)     Transfers  Sit to Stand: Contact guard assistance  Stand to sit: Contact guard assistance  Bed to Chair: Contact guard assistance  Stand Pivot Transfers: Contact guard assistance  Comment: Sit to/from stand EOB to RW CGA, SPT EOB to w/c with RW CGA, sit to/from stand w/c and standard chair to RW CGA completed x 7 reps, sit to/from stand commode to RW CGA, cues for hand placement, decreased eccentric control noted     Ambulation  Ambulation?: Yes  Ambulation 1  Surface: level tile  Device: Rolling Walker  Other Apparatus: O2(3L O2 NC)  Assistance: Contact guard assistance  Quality of Gait: Slow cindy, B decreased toe clearance, B decreased TKE, forward flexed trunk and increased UE support. Pt mildly unsteady no overt LOB. Gait Deviations: Slow Cindy;Decreased step length;Decreased step height;Shuffles  Distance: 21' + 55' + 72' + 10' + 15'  Comments: SpO2 maintained >95% following each bout of gait, seated rest break between bouts to recover from fatigue. Cues provided to increase upright posture, forward gaze and maintain YASH closer to RW  Stairs/Curb  Stairs?: Yes  Stairs  # Steps : 1  Curbs: 6\"  Device: Rolling walker  Assistance: Minimal assistance;Contact guard assistance  Comment: Completed with RW step up to 6\" step x 2 reps, cues for sequence.   Wheelchair Activities  Propulsion: No        Balance  Posture: Fair  Sitting - Static: Good  Sitting - Dynamic: Good  Standing - Static: Fair;+  Standing - Dynamic: Fair;- Current Treatment Recommendations: Strengthening, Balance Training, Functional Mobility Training, Transfer Training, Endurance Training, Gait Training, Stair training, Wheelchair Mobility Training, Home Exercise Program, Safety Education & Training, Patient/Caregiver Education & Training, Equipment Evaluation, Education, & procurement, Neuromuscular Re-education  Safety Devices  Type of devices:  All fall risk precautions in place, Call light within reach, Chair alarm in place, Left in chair, Nurse notified, Gait belt, Patient at risk for falls     Therapy Time   Individual Concurrent Group Co-treatment   Time In 0830         Time Out 0930         Minutes 60         Timed Code Treatment Minutes: [facility], PT, DPT

## 2021-01-16 NOTE — PROGRESS NOTES
[facility]: ACUTE REHAB UNIT  SPEECH-LANGUAGE PATHOLOGY      [x] Daily  [] Weekly Care Conference Note  [] Discharge    Patient:Jarrett Villanueva      :1940  ZFD:[id number]  Rehab Dx/Hx: Debility [R53.81]    Precautions: falls  Home situation: Lives at home independently, simple cooking, daughter manages medications, both he and daughter complete finances and laundry  ST Dx: [] Aphasia  [] Dysarthria  [] Apraxia   [x] Oropharyngeal dysphagia [x] Cognitive Impairment  [] Other:   Date of Admit: 2021  Room #: 0152/0152-01    Current functional status (updated daily):         Pt being seen for : [] Speech/Language Treatment  [x] Dysphagia Treatment [x] Cognitive Treatment  [] Other:  Communication: [x]WFL  [] Aphasia  [] Dysarthria  [] Apraxia  [] Pragmatic Impairment [] Non-verbal  [] Hearing Loss  [] Other:   Cognition: [] WFL  [x] Mild  [] Moderate  [] Severe [] Unable to Assess  [] Other:  Memory: [] WFL  [] Mild  [x] Moderate  [] Severe [] Unable to Assess  [] Other:  Behavior: [x] Alert  [x] Cooperative  [x]  Pleasant  [] Confused  [] Agitated  [] Uncooperative  [] Distractible [] Motivated  [] Self-Limiting [] Anxious  [] Other:  Endurance:  [x] Adequate for participation in SLP sessions  [] Reduced overall  [] Lethargic  [] Other:  Safety: [] No concerns at this time  [x] Reduced insight into deficits  [x]  Reduced safety awareness [] Not following call light procedures  [] Unable to Assess  [] Other:    Current Diet Order:Dietary Nutrition Supplements: Diabetic Oral Supplement  DIET CARB CONTROL;  Swallowing Precautions: Compensatory Swallowing Strategies:  Alternate solids and liquids;Eat/Feed slowly;Upright as possible for all oral intake;Small bites/sips        Date: 2021      Tx session 1  730 Tx session 2  All needs met in session 1   Total Timed Code Min 30 0   Total Treatment Minutes 60 0   Individual Treatment Minutes 60 0   Group Treatment Minutes 0 0   Co-Treat Minutes 0 0 Variance/Reason:  0 0   Pain None reported. Pain Intervention [] RN notified  [] Repositioned  [] Intervention offered and patient declined  [x] N/A  [] Other: [] RN notified  [] Repositioned  [] Intervention offered and patient declined  [] N/A  [] Other:   Subjective:     Pt sleeping upon arrival, but easily aroused. Pt alert and cooperative, agreeable to tx. Objective:  Timeframe for Short-term Goals: 7 Days (1/21/2021)     Dysphagia Goals:    Goal 1: Pt will tolerate recommended diet without overt s/s of aspiration. Pt seen for breakfast meal. Regular solids, mech soft solids, and thin liquid trials presented. Pt safely swallowed 10/12 sips of thin liquid via side of cup. 2x immediate post swallow cough. No other overt s/s of aspiration observed. Pt tolerating mech soft solids (eggs. Hash browns); prolonged mastication, but able to clear oral cavity with increased time. Pt tolerating regular solids (schwarz) during meal. Pt with prolonged mastication and prolonged A-P bolus transit, but pt able to clear oral cavity with increased time. Goal 2: Pt will demonstrate understanding of compensatory strategies for improved swallowing safety. SLP edu pt re: safe swallow strategies - small bites, small sips, slow rate, alternate liquids and solids, sit fully upright. Pt indep raised HOB in order to sit at 90 degrees for meal.     Pt took small bites with min cueing, small sips indep, ate at slow rate with min cues, and alternated liquids and solids with min cues. Cognitive-Linguistic Goals:    Goal 1: The patient will verbalize awareness to temporal orientation concepts with 100% accy and use of external aid when needed.    Month - indep    Year - indep    CHRISTINE - indep    Date - used calendar for visual aid    Place - hospital; SLP oriented pt to name of hospital     Purpose - \"legs\" - SLP edu pt re: rehab    [address] Goal 2: The patient will complete functional problem solving tasks with 80% accy and min cues. Picture cards with problem scenarios  -pt ID the problem with 67% acc given mod-max cues   -pt ID why it was a problem with 86% acc given mod cues  -pt ID a solution with 60% acc given mod cues       Goal 3: The patient will complete verbal and visual reasoning tasks with 80% accy, min cueing. SLP showed pt 2 items, and asked pt to name a similarity and a difference  -pt named a similarity with 80% acc given mod-max cues  -pt named a difference with 70% acc given mod cues      Goal 4: The patient will complete functional STM and delayed recall tasks with 75% acc, min cueing, and use of compensatory strategies. Edu to pt re: memory strategies - repetition, association, writing information down.      Word list retention task   -SLP provided pt with 4 words and then asked a question re: the words  -e.g. which is the softest?  -pt completed task with 67% acc given mod cues       Other areas targeted: N/A    Education:   Edu to pt re: Role of SLP, rationale of tx tasks, safe swallow strategies, choosing softer food options if having difficulty masticating, memory strategies       Safety Devices: [x] Call light within reach  [] Chair alarm activated  [x] Bed alarm activated  [] Other: [] Call light within reach  [] Chair alarm activated  [] Bed alarm activated  [] Other: Assessment: Pt alert and cooperative, agreeable to tx. Pt seen for breakfast meal. SLP edu pt re: safe swallow strategies - small bites, small sips, slow rate, alternate liquids and solids, sit fully upright. Regular solids, mech soft solids, and thin liquid trials presented. Pt required min cues in order to utilize safe swallow strategies during meal. Pt tolerating thin liquids, mech soft solids, and regular solids. Pt with increased orientation this date - oriented to month, year, CHRISTINE indep. Pt used calendar to recall date. Pt oriented to hospital, just not the name. SLP edu pt re: memory strategies (repetition, association, writing information down). Pt required mod cues to complete a recall task. Pt required mod-max cues to identify problems, why it was a problem, and a solution to the problem. Pt required mod-max cues to use reasoning abilities in order to identify similarities and differences. Continue goals above. Plan: Continue as per plan of care. Additional Information:     Barriers toward progress: Cognitive deficit and Limited insight into deficits  Discharge recommendations:  [] Home independently  [] Home with assistance []  24 hour supervision  [] ECF [x] Other: See PT/OT  Continued Tx Upon Discharge: ? [] Yes [] No [x] TBD based on progress while on ARU [] Vital Stim indicated [] Other:   Estimated discharge date: TBD  Interventions used this date:  [] Speech/Language Treatment  [] Instruction in HEP [] Group [x] Dysphagia Treatment [x] Cognitive Treatment   [] Other:       Total Time Breakdown / Charges    Time in Time out Total Time / units   Cognitive Tx 0800 0830 30 min / 2 units   Speech Tx -- -- --   Dysphagia Tx 0730 0800 30 min / 1 unit       Electronically Signed by     Florina Miller MA CCC-SLP #[id number]  Speech Language Pathologist

## 2021-01-17 LAB
GLUCOSE BLD-MCNC: 192 MG/DL (ref 70–99)
GLUCOSE BLD-MCNC: 226 MG/DL (ref 70–99)
GLUCOSE BLD-MCNC: 249 MG/DL (ref 70–99)
GLUCOSE BLD-MCNC: 300 MG/DL (ref 70–99)
PERFORMED ON: ABNORMAL

## 2021-01-17 PROCEDURE — 1280000000 HC REHAB R&B

## 2021-01-17 PROCEDURE — 6370000000 HC RX 637 (ALT 250 FOR IP): Performed by: PHYSICAL MEDICINE & REHABILITATION

## 2021-01-17 PROCEDURE — 6360000002 HC RX W HCPCS: Performed by: PHYSICAL MEDICINE & REHABILITATION

## 2021-01-17 PROCEDURE — 94761 N-INVAS EAR/PLS OXIMETRY MLT: CPT

## 2021-01-17 PROCEDURE — 94640 AIRWAY INHALATION TREATMENT: CPT

## 2021-01-17 PROCEDURE — 2700000000 HC OXYGEN THERAPY PER DAY

## 2021-01-17 RX ADMIN — INSULIN GLARGINE 20 UNITS: 100 INJECTION, SOLUTION SUBCUTANEOUS at 21:30

## 2021-01-17 RX ADMIN — METRONIDAZOLE 500 MG: 250 TABLET ORAL at 06:28

## 2021-01-17 RX ADMIN — INSULIN LISPRO 1 UNITS: 100 INJECTION, SOLUTION INTRAVENOUS; SUBCUTANEOUS at 21:30

## 2021-01-17 RX ADMIN — Medication 1 PUFF: at 20:07

## 2021-01-17 RX ADMIN — APIXABAN 5 MG: 5 TABLET, FILM COATED ORAL at 10:00

## 2021-01-17 RX ADMIN — PROPAFENONE HYDROCHLORIDE 150 MG: 150 TABLET, FILM COATED ORAL at 21:29

## 2021-01-17 RX ADMIN — ALBUTEROL SULFATE 2.5 MG: 2.5 SOLUTION RESPIRATORY (INHALATION) at 09:14

## 2021-01-17 RX ADMIN — PROPAFENONE HYDROCHLORIDE 150 MG: 150 TABLET, FILM COATED ORAL at 14:52

## 2021-01-17 RX ADMIN — GABAPENTIN 600 MG: 300 CAPSULE ORAL at 10:00

## 2021-01-17 RX ADMIN — Medication 1 PUFF: at 09:15

## 2021-01-17 RX ADMIN — ALBUTEROL SULFATE 2.5 MG: 2.5 SOLUTION RESPIRATORY (INHALATION) at 20:00

## 2021-01-17 RX ADMIN — GABAPENTIN 600 MG: 300 CAPSULE ORAL at 21:29

## 2021-01-17 RX ADMIN — PANTOPRAZOLE SODIUM 40 MG: 40 TABLET, DELAYED RELEASE ORAL at 06:28

## 2021-01-17 RX ADMIN — INSULIN LISPRO 2 UNITS: 100 INJECTION, SOLUTION INTRAVENOUS; SUBCUTANEOUS at 17:13

## 2021-01-17 RX ADMIN — METRONIDAZOLE 500 MG: 250 TABLET ORAL at 14:52

## 2021-01-17 RX ADMIN — DILTIAZEM HYDROCHLORIDE 180 MG: 180 CAPSULE, COATED, EXTENDED RELEASE ORAL at 10:00

## 2021-01-17 RX ADMIN — CIPROFLOXACIN 500 MG: 500 TABLET, FILM COATED ORAL at 10:00

## 2021-01-17 RX ADMIN — APIXABAN 5 MG: 5 TABLET, FILM COATED ORAL at 21:29

## 2021-01-17 RX ADMIN — PROPAFENONE HYDROCHLORIDE 150 MG: 150 TABLET, FILM COATED ORAL at 06:28

## 2021-01-17 RX ADMIN — INSULIN LISPRO 4 UNITS: 100 INJECTION, SOLUTION INTRAVENOUS; SUBCUTANEOUS at 12:52

## 2021-01-17 RX ADMIN — ASPIRIN 81 MG: 81 TABLET, COATED ORAL at 10:01

## 2021-01-17 RX ADMIN — METRONIDAZOLE 500 MG: 250 TABLET ORAL at 21:29

## 2021-01-17 RX ADMIN — PIOGLITAZONE 15 MG: 15 TABLET ORAL at 10:02

## 2021-01-17 RX ADMIN — GABAPENTIN 600 MG: 300 CAPSULE ORAL at 14:52

## 2021-01-17 RX ADMIN — INSULIN LISPRO 1 UNITS: 100 INJECTION, SOLUTION INTRAVENOUS; SUBCUTANEOUS at 06:55

## 2021-01-17 RX ADMIN — CIPROFLOXACIN 500 MG: 500 TABLET, FILM COATED ORAL at 21:29

## 2021-01-17 ASSESSMENT — PAIN SCALES - GENERAL
PAINLEVEL_OUTOF10: 0
PAINLEVEL_OUTOF10: 0

## 2021-01-18 LAB
ANION GAP SERPL CALCULATED.3IONS-SCNC: 1 MMOL/L (ref 3–16)
BASOPHILS ABSOLUTE: 0 K/UL (ref 0–0.2)
BASOPHILS RELATIVE PERCENT: 0.4 %
BUN BLDV-MCNC: 10 MG/DL (ref 7–20)
CALCIUM SERPL-MCNC: 8.4 MG/DL (ref 8.3–10.6)
CHLORIDE BLD-SCNC: 97 MMOL/L (ref 99–110)
CO2: 43 MMOL/L (ref 21–32)
CREAT SERPL-MCNC: 0.9 MG/DL (ref 0.8–1.3)
EOSINOPHILS ABSOLUTE: 0.1 K/UL (ref 0–0.6)
EOSINOPHILS RELATIVE PERCENT: 1.4 %
GFR AFRICAN AMERICAN: >60
GFR NON-AFRICAN AMERICAN: >60
GLUCOSE BLD-MCNC: 155 MG/DL (ref 70–99)
GLUCOSE BLD-MCNC: 161 MG/DL (ref 70–99)
GLUCOSE BLD-MCNC: 178 MG/DL (ref 70–99)
GLUCOSE BLD-MCNC: 186 MG/DL (ref 70–99)
GLUCOSE BLD-MCNC: 216 MG/DL (ref 70–99)
HCT VFR BLD CALC: 25 % (ref 40.5–52.5)
HEMOGLOBIN: 7.9 G/DL (ref 13.5–17.5)
LYMPHOCYTES ABSOLUTE: 1.4 K/UL (ref 1–5.1)
LYMPHOCYTES RELATIVE PERCENT: 25.8 %
MCH RBC QN AUTO: 26.9 PG (ref 26–34)
MCHC RBC AUTO-ENTMCNC: 31.6 G/DL (ref 31–36)
MCV RBC AUTO: 85.3 FL (ref 80–100)
MONOCYTES ABSOLUTE: 0.6 K/UL (ref 0–1.3)
MONOCYTES RELATIVE PERCENT: 11.3 %
NEUTROPHILS ABSOLUTE: 3.2 K/UL (ref 1.7–7.7)
NEUTROPHILS RELATIVE PERCENT: 61.1 %
PDW BLD-RTO: 18.8 % (ref 12.4–15.4)
PERFORMED ON: ABNORMAL
PLATELET # BLD: 146 K/UL (ref 135–450)
PMV BLD AUTO: 9.1 FL (ref 5–10.5)
POTASSIUM REFLEX MAGNESIUM: 4.1 MMOL/L (ref 3.5–5.1)
RBC # BLD: 2.94 M/UL (ref 4.2–5.9)
SODIUM BLD-SCNC: 141 MMOL/L (ref 136–145)
WBC # BLD: 5.3 K/UL (ref 4–11)

## 2021-01-18 PROCEDURE — 94761 N-INVAS EAR/PLS OXIMETRY MLT: CPT

## 2021-01-18 PROCEDURE — 6370000000 HC RX 637 (ALT 250 FOR IP): Performed by: PHYSICAL MEDICINE & REHABILITATION

## 2021-01-18 PROCEDURE — 1280000000 HC REHAB R&B

## 2021-01-18 PROCEDURE — 97129 THER IVNTJ 1ST 15 MIN: CPT

## 2021-01-18 PROCEDURE — 97130 THER IVNTJ EA ADDL 15 MIN: CPT

## 2021-01-18 PROCEDURE — 97530 THERAPEUTIC ACTIVITIES: CPT

## 2021-01-18 PROCEDURE — 80048 BASIC METABOLIC PNL TOTAL CA: CPT

## 2021-01-18 PROCEDURE — 36415 COLL VENOUS BLD VENIPUNCTURE: CPT

## 2021-01-18 PROCEDURE — 6360000002 HC RX W HCPCS: Performed by: PHYSICAL MEDICINE & REHABILITATION

## 2021-01-18 PROCEDURE — 97110 THERAPEUTIC EXERCISES: CPT

## 2021-01-18 PROCEDURE — 97116 GAIT TRAINING THERAPY: CPT

## 2021-01-18 PROCEDURE — 85025 COMPLETE CBC W/AUTO DIFF WBC: CPT

## 2021-01-18 PROCEDURE — 94640 AIRWAY INHALATION TREATMENT: CPT

## 2021-01-18 PROCEDURE — 2700000000 HC OXYGEN THERAPY PER DAY

## 2021-01-18 RX ADMIN — INSULIN GLARGINE 20 UNITS: 100 INJECTION, SOLUTION SUBCUTANEOUS at 20:54

## 2021-01-18 RX ADMIN — DILTIAZEM HYDROCHLORIDE 180 MG: 180 CAPSULE, COATED, EXTENDED RELEASE ORAL at 07:49

## 2021-01-18 RX ADMIN — Medication 1 PUFF: at 20:05

## 2021-01-18 RX ADMIN — INSULIN LISPRO 1 UNITS: 100 INJECTION, SOLUTION INTRAVENOUS; SUBCUTANEOUS at 16:45

## 2021-01-18 RX ADMIN — INSULIN LISPRO 1 UNITS: 100 INJECTION, SOLUTION INTRAVENOUS; SUBCUTANEOUS at 20:54

## 2021-01-18 RX ADMIN — APIXABAN 5 MG: 5 TABLET, FILM COATED ORAL at 20:45

## 2021-01-18 RX ADMIN — METRONIDAZOLE 500 MG: 250 TABLET ORAL at 05:52

## 2021-01-18 RX ADMIN — GABAPENTIN 600 MG: 300 CAPSULE ORAL at 07:49

## 2021-01-18 RX ADMIN — GABAPENTIN 600 MG: 300 CAPSULE ORAL at 20:45

## 2021-01-18 RX ADMIN — CIPROFLOXACIN 500 MG: 500 TABLET, FILM COATED ORAL at 07:49

## 2021-01-18 RX ADMIN — PANTOPRAZOLE SODIUM 40 MG: 40 TABLET, DELAYED RELEASE ORAL at 05:52

## 2021-01-18 RX ADMIN — METRONIDAZOLE 500 MG: 250 TABLET ORAL at 14:47

## 2021-01-18 RX ADMIN — INSULIN LISPRO 2 UNITS: 100 INJECTION, SOLUTION INTRAVENOUS; SUBCUTANEOUS at 12:20

## 2021-01-18 RX ADMIN — ALBUTEROL SULFATE 2.5 MG: 2.5 SOLUTION RESPIRATORY (INHALATION) at 20:05

## 2021-01-18 RX ADMIN — METRONIDAZOLE 500 MG: 250 TABLET ORAL at 20:45

## 2021-01-18 RX ADMIN — CIPROFLOXACIN 500 MG: 500 TABLET, FILM COATED ORAL at 20:45

## 2021-01-18 RX ADMIN — PROPAFENONE HYDROCHLORIDE 150 MG: 150 TABLET, FILM COATED ORAL at 05:52

## 2021-01-18 RX ADMIN — PROPAFENONE HYDROCHLORIDE 150 MG: 150 TABLET, FILM COATED ORAL at 14:46

## 2021-01-18 RX ADMIN — ALBUTEROL SULFATE 2.5 MG: 2.5 SOLUTION RESPIRATORY (INHALATION) at 07:20

## 2021-01-18 RX ADMIN — PROPAFENONE HYDROCHLORIDE 150 MG: 150 TABLET, FILM COATED ORAL at 20:46

## 2021-01-18 RX ADMIN — APIXABAN 5 MG: 5 TABLET, FILM COATED ORAL at 07:49

## 2021-01-18 RX ADMIN — GABAPENTIN 600 MG: 300 CAPSULE ORAL at 14:47

## 2021-01-18 RX ADMIN — TRAZODONE HYDROCHLORIDE 50 MG: 50 TABLET ORAL at 20:44

## 2021-01-18 RX ADMIN — PIOGLITAZONE 15 MG: 15 TABLET ORAL at 07:49

## 2021-01-18 RX ADMIN — INSULIN LISPRO 1 UNITS: 100 INJECTION, SOLUTION INTRAVENOUS; SUBCUTANEOUS at 05:54

## 2021-01-18 RX ADMIN — Medication 1 PUFF: at 07:20

## 2021-01-18 RX ADMIN — ASPIRIN 81 MG: 81 TABLET, COATED ORAL at 07:49

## 2021-01-18 ASSESSMENT — PAIN SCALES - GENERAL
PAINLEVEL_OUTOF10: 0
PAINLEVEL_OUTOF10: 0

## 2021-01-18 NOTE — PROGRESS NOTES
Keaton March 1/18/2021  [id number]    Chief Complaint: Debility    Subjective:   Resting in bed. No issues overnight. No new c/o. ROS: no n/v, cp, sob, f/c  Objective:  Patient Vitals for the past 24 hrs:   BP Temp Temp src Pulse Resp SpO2 Weight   01/18/21 0747 (!) 117/56 97.2 °F (36.2 °C) Oral 87 18 99 %    01/18/21 0720     20 92 %    01/18/21 0645       199 lb (90.3 kg)   01/17/21 2115 117/64 98 °F (36.7 °C) Oral 97 18 97 %    01/17/21 2000     18 98 %    01/17/21 0918      99 %      Gen: No distress, pleasant. HEENT: Normocephalic, atraumatic. NC in place. CV: Regular rate and rhythm. Resp: No respiratory distress. Abd: Soft, nontender   Ext: No edema. Neuro: Alert, oriented, appropriately interactive.    Wt Readings from Last 3 Encounters:   01/18/21 199 lb (90.3 kg)   01/14/21 204 lb (92.5 kg)   12/14/20 200 lb (90.7 kg)       Laboratory data:   Lab Results   Component Value Date    WBC 5.3 01/18/2021    HGB 7.9 (L) 01/18/2021    HCT 25.0 (L) 01/18/2021    MCV 85.3 01/18/2021     01/18/2021       Lab Results   Component Value Date     01/18/2021    K 4.1 01/18/2021    CL 97 01/18/2021    CO2 43 01/18/2021    BUN 10 01/18/2021    CREATININE 0.9 01/18/2021    GLUCOSE 178 01/18/2021    CALCIUM 8.4 01/18/2021        Therapy progress:  PT  Position Activity Restriction  Other position/activity restrictions: up with assistance, JARAD hose, 3L O2  Objective     Sit to Stand: Contact guard assistance  Stand to sit: Contact guard assistance  Bed to Chair: Contact guard assistance  Device: Rolling Walker  Other Apparatus: O2(3L O2 NC)  Assistance: Contact guard assistance  Distance: 21' + 55' + 72' + 10' + 15'  OT  PT Equipment Recommendations  Equipment Needed: No  Other: anticipate no DME needs, pt has RW at home  Toilet - Technique: Ambulating(RW)  Equipment Used: Standard toilet(improved level of assist with use of grab bar)  Assessment        SLP Current Diet : (Dental soft)  Current Liquid Diet : Thin  Diet Solids Recommendation: Regular(Pt to choose slightly softer solids; choose foods he knows he can tolerate and typically eats at home.)  Liquid Consistency Recommendation: Thin    Body mass index is 27.75 kg/m². Rehabilitation Diagnosis:  16.0, Debility (non-cardiac, non-pulmonary     Assessment and Plan:  Acute cholecystitis  - IV abx converted to PO  - Tolerating diet, bowels moving     Hypokalemia  - Replace     L1 compression fracture  - symptomatic treatment       HTN  - Follow bp     COPD  - baseline 3L oxygen requirement  - continue inhalers     PAF  - resume eliquis  - rate controlled     Chronic thrombocytopenia  - Follow, particularly given resumption of eliquis     Possible aortic ulcer, AAA  - OP vascular follow up     DM  - SSI; follow blood sugars  - Resume actos tomorrow, metformin next week.     Bowels: Schedule stool softener. Follow bowel movements. Enema or suppository if needed.      Bladder: Check PVR x 3. [facility] if PVR > 200ml or if any volume is > 500 ml.      Sleep: Trazodone provided prn.      Follow up appointments: vascular, gen surg, pcp    Nicholas A. Signe Favre, MD 1/18/2021, 8:08 AM

## 2021-01-18 NOTE — PROGRESS NOTES
Pulse Oximeter Device Location: Right;Finger  O2 Device: Nasal cannula  O2 Flow Rate (L/min): 3 L/min   Orientation  Orientation  Overall Orientation Status: Within Functional Limits  Orientation Level: Oriented to person;Oriented to time;Oriented to situation;Oriented to place  Objective    ADL  Additional Comments: Pt declined all ADLs this date. Balance  Sitting Balance: Supervision  Standing Balance: Moderate assistance(SBA at times, B knees buckling twice, requiring min/mod A to correct LOB, with RW)  Standing Balance  Time: 2:10, 3:15, 6:20, 3:00  Activity: collection of cones around room, tossing of bean bags while stepping on corresponding colored dots  Comment: with RW, B knee buckling requiring min A first time and mod A second time to safely reach seat  Functional Mobility  Functional - Mobility Device: Rolling Walker  Activity: Retrieve items  Assist Level: Moderate assistance     Transfers  Stand Step Transfers: Moderate assistance  Sit to stand: Supervision  Stand to sit: Moderate assistance  Transfer Comments: mod A to safely sit after episode of B knee buckling        Coordination  Gross Motor: good coordination to grasp cones and toss bean bags              Cognition  Overall Cognitive Status: Exceptions  Arousal/Alertness: Appropriate responses to stimuli  Following Commands: Follows one step commands with increased time; Follows one step commands with repetition  Attention Span: Attends with cues to redirect  Memory: Decreased short term memory  Safety Judgement: Decreased awareness of need for assistance;Decreased awareness of need for safety  Problem Solving: Assistance required to generate solutions;Assistance required to identify errors made  Insights: Decreased awareness of deficits  Initiation: Does not require cues  Sequencing: Requires cues for some  Cognition Comment: mod VCs to name items of certain colors                    Type of ROM/Therapeutic Exercise Type of ROM/Therapeutic Exercise: Free weights  Comment: 3#  Exercises  Shoulder Flexion: x15  Shoulder Extension: x15  Horizontal ABduction: x15  Horizontal ADduction: x15  Chair Push-ups: x15  Elbow Flexion: x15  Elbow Extension: x15  Wrist Flexion: x15  Wrist Extension: x15  Other: chest press x15                    Plan   Plan  Times per week: 5-7x/week  Current Treatment Recommendations: Strengthening, Balance Training, Functional Mobility Training, Endurance Training, Gait Training, Positioning, Pain Management, Safety Education & Training, Patient/Caregiver Education & Training, Self-Care / ADL, Equipment Evaluation, Education, & procurement, Home Management Training    Goals  Short term goals  Time Frame for Short term goals: 5 days (by 1/18/21)  Short term goal 1: Pt will complete functional transfers with Supervision, progressing. Pt requires min A at times due to knee buckling. Short term goal 2: Pt will complete LE dressing with Supervision/setup, use of AE as needed. progressing. Pt continues to require min A. Short term goal 3: Pt will perform 3 minutes dynamic standing activity with SBA. GOAL MET 1/18/21 Pt performed 3 minutes of standing task with SBA.   Long term goals  Time Frame for Long term goals : Pt will complete functional transfers with Mod I  Long term goal 1: Pt will complete LE dressing with Mod I, use of AE as needed  Long term goal 2: Pt will perform light housekeeping/meal prep/item retrieval task with Mod I  Patient Goals   Patient goals : \"to work on my legs\"       Therapy Time   Individual Concurrent Group Co-treatment   Time In 0800         Time Out 0900         Minutes 60         Timed Code Treatment Minutes: 60 Minutes    Second Session Therapy Time:   Individual Concurrent Group Co-treatment   Time In 1330         Time Out 1400         Minutes 30           Timed Code Treatment Minutes:  30 Minutes    Total Treatment Minutes:  90 minutes      Alexsandra Bartlett, OT

## 2021-01-18 NOTE — PROGRESS NOTES
Physical Therapy  Facility/Department: [facility]  Daily Treatment Note  NAME: Bebeto Escalona  : 1940  MRN: [id number]    Date of Service: 2021    Discharge Recommendations:  Home with assist PRN, Home with Home health PT   PT Equipment Recommendations  Equipment Needed: No  Other: anticipate no DME needs, pt has RW at home    Assessment   Body structures, Functions, Activity limitations: Decreased functional mobility ; Decreased strength;Decreased endurance;Decreased balance;Decreased posture  Assessment: Pt pleasant and agreeable to PT tx with no c/o pain, pt on 3L O2 NC with SpO2 maintained >90%. Pt requires intermittent rest breaks throughout session d/t fatigue. Pt demonstrates improved performance with functional mobility and gait this session, able to complete t/f with consistent CGA to SBA with cues to push up and reach back from/to surface with all t/f. Pt ambulates up to 20' with RW with CGA and participates in dynamic gait activites with CGA to Jaime. Pt will benefit from continued skilled PT in ARU to address above deficits, will continue to progress mobility as tolerated. Treatment Diagnosis: impaired activity tolerance and strength  Specific instructions for Next Treatment: Progress mobility ans gait as tolerated  Prognosis: Good  Decision Making: Medium Complexity  PT Education: Goals;PT Role;Plan of Care;Precautions;Transfer Training;General Safety;Gait Training;Functional Mobility Training  Patient Education: Educated pt on role of PT, importance of mobility, safety, ARU POC, goals, d/c recs; pt verbalizes understanding  Barriers to Learning: None  REQUIRES PT FOLLOW UP: Yes  Activity Tolerance  Activity Tolerance: Patient Tolerated treatment well;Patient limited by endurance  Activity Tolerance: Pt on 3L O2 NC, SpO2  maintained >90%, SOB noted with activity with seated rest breaks to recover. Patient Diagnosis(es): There were no encounter diagnoses. has a past medical history of Arthritis, CAD (coronary artery disease), COPD (chronic obstructive pulmonary disease) ([facility] [address] Diabetes mellitus ([facility] [address] Hepatitis A, Hyperlipidemia, Hypertension, Influenza A, Kidney calculi, MDRO (multiple drug resistant organisms) resistance, ELAINE on CPAP, Osteoporosis, and Skin cancer of face. has a past surgical history that includes Prostate surgery; eye surgery (Left, 6/12/2016); other surgical history (08/31/2015); Cystoscopy (Right, 10/9/15); joint replacement (6/29/2011); joint replacement (11/2004); TURP (10/23/2015); other surgical history (12/1/15); back surgery; Cystocopy (05/01/2019); cystoscopy w biopsy of bladder (N/A, 5/1/2019); Cholecystectomy, open (N/A, 01/06/2021); and Cholecystectomy, laparoscopic (N/A, 1/6/2021). Restrictions  Restrictions/Precautions  Restrictions/Precautions: General Precautions, Fall Risk  Required Braces or Orthoses?: No  Position Activity Restriction  Other position/activity restrictions: up with assistance, JARAD hose, 3L O2  Subjective   General  Chart Reviewed: Yes  Additional Pertinent Hx: HTN, COPD on chronic 3L O2, DMII, osteoporosis, LEAINE on CPAP. S/p open cholecystectomy on 1/6/2021  Response To Previous Treatment: Patient with no complaints from previous session.   Family / Caregiver Present: No  Referring Practitioner: Dawit Gaming MD  Subjective  Subjective: Pt seated in recliner on approach, pleasant and agreeable to PT tx  Pain Screening  Patient Currently in Pain: Denies  Vital Signs  Patient Currently in Pain: Denies       Orientation  Orientation  Overall Orientation Status: Within Functional Limits    Objective   Bed mobility  Supine to Sit: Unable to assess  Sit to Supine: Unable to assess  Comment: Pt seated in recliner at start and end of session     Transfers  Sit to Stand: Contact guard assistance  Stand to sit: Contact guard assistance Comment: sit to/from stand recliner to RW CGA completed x 3 reps, cues for hand placement; sit to/from stand without AD with PT in front of pt for balance activity Jaime, pt demonstrates decreased eccentric control     Ambulation  Ambulation?: Yes  More Ambulation?: Yes  Ambulation 1  Surface: level tile  Device: Rolling Walker  Other Apparatus: O2(3L O2 NC)  Assistance: Contact guard assistance;Minimal assistance(CGA for straight progression (20'), CGA to Jaime for forwards and retrowalking)  Quality of Gait: Slow cindy, B decreased toe clearance, B decreased TKE, forward flexed trunk and increased UE support. Pt mildly unsteady no overt LOB. Gait Deviations: Slow Cindy;Decreased step length;Decreased step height;Shuffles  Distance: 20' x 2 + 10' forwards/backwards  x 3 reps  Comments: SpO2 maintained >90% following each bout of gait, seated rest break between bouts to recover from fatigue. Cues provided to increase upright posture, forward gaze and maintain YASH closer to RW  Ambulation 2  Surface - 2: level tile  Device 2: No device(BUE supported on PT shoulders)  Assistance 2: Minimal assistance  Quality of Gait 2: Pt completes lateral stepping with BUe support on PT shoulders, pt demonstrates decreased step length and toe clearance, mildly unsteady with no overt LOB but requires grossly Jaime  Gait Deviations: Slow Cindy;Decreased step length;Decreased step height  Distance: x 10' R/L lateral stepping x 2  Comments: Seated rest break to recover from fatigue following task. Completed to improve hip strength and promote dynamic stability with gait activities.   Stairs/Curb  Stairs?: No        Balance  Posture: Fair  Sitting - Static: Good  Sitting - Dynamic: Good  Standing - Static: Fair;+  Standing - Dynamic: Fair;-     Exercises  Hip Flexion: Seated marches BLE x 15 1.5#  Hip Abduction: Seated clamshells BLE x 15 with green TB  Knee Long Arc Quad: Seated BLE x 15 with 1.5#  Ankle Pumps: Seated BLE x 15 Comments: Cues for sequence/technique, intermittent rest breaks d/t fatigue. Other exercises  Other exercises?: Yes  Other exercises 1: Pt completes x 5 reps consecutive sit to/from stand with RW and CGA to SBA                      Addendum: 2nd session  Pt pleasant and agreeable to second session with no c/o pain, pt does report fatigue at end of session and requires frequent therapeutic rest breaks to recover. Transfers:   SPT recliner to w/c CGA/SBA   Sit to/from stand w/c to RW CGA/SBA completed x 8 reps   Pt requires cues for hand placement  Gait:  Surface: level tile  Device: Rolling Walker  Other Apparatus: O2(3L O2 NC)  Assistance: Contact guard assistance  Quality of Gait: Slow cindy, B decreased toe clearance, B decreased TKE, forward flexed trunk and increased UE support. Pt mildly unsteady no overt LOB. Gait Deviations: Slow Cindy;Decreased step length;Decreased step height;Shuffles  Distance: 72' + 10'  Comments: SpO2 maintained >90% following each bout of gait, seated rest break between bouts to recover from fatigue. Cues provided to increase upright posture, forward gaze and maintain YASH closer to RW. Pt does demonstrate one B knee buckling while turning to sit in chair at end of gait, pt is able to maintain balance and correct without additional assist    Pt completes x 3 reps dynamic gait in // bars x 10' forwards/backwards while pushing/pulling red therapy ball with CGA for balance. Seated rest breaks between bouts to recover from fatigue. Pt ambulates with slow cindy, B decreased step length/height, B decreased toe clearance, improved upright posture. Pt steady with no overt LOB. Completed to promote improved reciprocal gait pattern, improved upright posture and improve dynamic balance with gait. Pt ascends/descends 6\" curb step with RW x 2 reps with CGA. Balance:   Pt completes x 3 bout x 12 reps dynamic bean bag reach/toss with RW and CGA to Jaime for balance. Bout 1: completed level ground, CGA no LOB    Bout 2: Completed on blue air-ex pad, CGA to Jaime, increased postural sway no overt LOB    Bout 3: Reciprocal RUE throw with LLE step, CGA, no overt LOB. Pt completes static balance with RW on blue air ex pad:    Narrow YASH EO x 30 seconds, CGA    Narrow YASH EC x 30 seconds, CGA    Normal stance with UUE support x 2 set x 30 seconds, CGA to Jaime    Semi-tandem stance with RLE leading x 30 seconds, CGA    Semi-tandem stance with LLE leading x 30 seconds CGA   Rest breaks between bouts to recover from fatigue. Completed to improve proprioception, improved balance strategies and promote improved safety and performance with function mobility and gait. Pt seated in recliner at end of session with chair alarm in place and all needs within reach.           Goals  Short term goals  Time Frame for Short term goals: 5 days- 1/18/20  Short term goal 1: Pt will complete bed mobility with SBA -- GOAL MET SBA  Short term goal 2: Pt will complete functional transfers with SBA using RW -- GOAL NOT MET but progression CGA to SBA  Short term goal 3: Pt will ambulate 50ft with SBA using RW -- GOAL NOT MET but progressing up to 61' with RW CGA to SBA, limited by intermittent B knee buckling  Short term goal 4: Pt will complete 1 curb step with SBA using RW -- GOAL NOT MET but progressing, curb step with RW and CGA  Long term goals  Time Frame for Long term goals : 10 days- 1/23/20  Long term goal 1: Pt will complete bed mobility with mod I  Long term goal 2: Pt will complete functional transfers with mod I using RW  Long term goal 3: Pt will ambulate 50ft with mod I using RW  Long term goal 4: Pt will complete 1 curb step with supervision using RW  Patient Goals   Patient goals : \"get my legs taken care of so I can get back home\"    Plan    Plan  Times per week: 5 out of 7 days/week  Times per day: Daily  Plan weeks: 10 days Specific instructions for Next Treatment: Progress mobility ans gait as tolerated  Current Treatment Recommendations: Strengthening, Balance Training, Functional Mobility Training, Transfer Training, Endurance Training, Gait Training, Stair training, Wheelchair Mobility Training, Home Exercise Program, Safety Education & Training, Patient/Caregiver Education & Training, Equipment Evaluation, Education, & procurement, Neuromuscular Re-education  Safety Devices  Type of devices:  All fall risk precautions in place, Call light within reach, Chair alarm in place, Left in chair, Nurse notified, Gait belt, Patient at risk for falls     Therapy Time   Individual Concurrent Group Co-treatment   Time In 1100         Time Out 1130         Minutes 30         Timed Code Treatment Minutes: 30 Minutes     Second Session Therapy Time:   Individual Concurrent Group Co-treatment   Time In 1230         Time Out 1330         Minutes 60           Timed Code Treatment Minutes: 60 minutes     Total Treatment Minutes:  90 minutes    Mundo Corbin, PT, DPT

## 2021-01-18 NOTE — PROGRESS NOTES
[facility]: ACUTE REHAB UNIT  SPEECH-LANGUAGE PATHOLOGY      [x] Daily  [] Weekly Care Conference Note  [] Discharge    Patient:Jarrett Guzman      :1940  EWA:[id number]  Rehab Dx/Hx: Debility [R53.81]    Precautions: falls  Home situation: Lives at home independently, simple cooking, daughter manages medications, both he and daughter complete finances and laundry  ST Dx: [] Aphasia  [] Dysarthria  [] Apraxia   [x] Oropharyngeal dysphagia [x] Cognitive Impairment  [] Other:   Date of Admit: 2021  Room #: 0152/0152-01    Current functional status (updated daily):         Pt being seen for : [] Speech/Language Treatment  [x] Dysphagia Treatment [x] Cognitive Treatment  [] Other:  Communication: [x]WFL  [] Aphasia  [] Dysarthria  [] Apraxia  [] Pragmatic Impairment [] Non-verbal  [] Hearing Loss  [] Other:   Cognition: [] WFL  [x] Mild  [] Moderate  [] Severe [] Unable to Assess  [] Other:  Memory: [] WFL  [] Mild  [x] Moderate  [] Severe [] Unable to Assess  [] Other:  Behavior: [x] Alert  [x] Cooperative  [x]  Pleasant  [] Confused  [] Agitated  [] Uncooperative  [] Distractible [] Motivated  [] Self-Limiting [] Anxious  [] Other:  Endurance:  [x] Adequate for participation in SLP sessions  [] Reduced overall  [] Lethargic  [] Other:  Safety: [] No concerns at this time  [x] Reduced insight into deficits  [x]  Reduced safety awareness [] Not following call light procedures  [] Unable to Assess  [] Other:    Current Diet Order:Dietary Nutrition Supplements: Diabetic Oral Supplement  DIET CARB CONTROL; Carb Control: 4 carb choices (60 gms)/meal  Swallowing Precautions: Compensatory Swallowing Strategies:  Alternate solids and liquids;Eat/Feed slowly;Upright as possible for all oral intake;Small bites/sips        Date: 2021      Tx session 1  0900 - 1000 Tx session 2  All needs met in session 1    Total Timed Code Min 60 0   Total Treatment Minutes 60 0   Individual Treatment Minutes 60 0 -pt shown 4 items, and instructed pt which item did not belong with the others  -pt completed with just 50% acc despite max cues. Goal 4: The patient will complete functional STM and delayed recall tasks with 75% acc, min cueing, and use of compensatory strategies. Mental Manipulation  -SLP provided pt with 3 words  -pt immediately recalled the 3 words with 80% acc given mod cues    Functional Paragraph Retention Task  -pt completed with 65% acc given mod cues      Other areas targeted: N/A    Education:   Edu to pt re: Role of SLP, rationale of tx tasks, memory strategies       Safety Devices: [x] Call light within reach  [x] Chair alarm activated  [] Bed alarm activated  [] Other: [] Call light within reach  [] Chair alarm activated  [] Bed alarm activated  [] Other:    Assessment: Pt alert and cooperative, agreeable to tx. Pt with increased orientation this date - pt oriented to month, year, date, CHRISTINE. Pt able to recall he is in the hospital, but required SLP to tell him the name of the hospital and the city. Pt required mod cues to complete time related problem solving questions. Pt required min cues to identify a problem and a solution, but with increased ability to explain why something is a problem. Pt required mod cues to complete a mental manipulation task, and max cues to use reasoning skills in an \"odd one out\" task. Pt with increased ability to immediately recall information, more difficulty when there is a delay. Continue goals above. Plan: Continue as per plan of care.       Additional Information:     Barriers toward progress: Cognitive deficit and Limited insight into deficits  Discharge recommendations:  [] Home independently  [] Home with assistance []  24 hour supervision  [] ECF [x] Other: See PT/OT  Continued Tx Upon Discharge: ? [] Yes [] No [x] TBD based on progress while on ARU [] Vital Stim indicated [] Other:   Estimated discharge date: TBD  Interventions used this date:

## 2021-01-19 LAB
GLUCOSE BLD-MCNC: 157 MG/DL (ref 70–99)
GLUCOSE BLD-MCNC: 186 MG/DL (ref 70–99)
GLUCOSE BLD-MCNC: 214 MG/DL (ref 70–99)
GLUCOSE BLD-MCNC: 233 MG/DL (ref 70–99)
PERFORMED ON: ABNORMAL

## 2021-01-19 PROCEDURE — 6370000000 HC RX 637 (ALT 250 FOR IP): Performed by: PHYSICAL MEDICINE & REHABILITATION

## 2021-01-19 PROCEDURE — 97535 SELF CARE MNGMENT TRAINING: CPT

## 2021-01-19 PROCEDURE — 97116 GAIT TRAINING THERAPY: CPT

## 2021-01-19 PROCEDURE — 97530 THERAPEUTIC ACTIVITIES: CPT

## 2021-01-19 PROCEDURE — 97110 THERAPEUTIC EXERCISES: CPT

## 2021-01-19 PROCEDURE — 6360000002 HC RX W HCPCS: Performed by: PHYSICAL MEDICINE & REHABILITATION

## 2021-01-19 PROCEDURE — 2700000000 HC OXYGEN THERAPY PER DAY

## 2021-01-19 PROCEDURE — 97129 THER IVNTJ 1ST 15 MIN: CPT

## 2021-01-19 PROCEDURE — 97130 THER IVNTJ EA ADDL 15 MIN: CPT

## 2021-01-19 PROCEDURE — 94761 N-INVAS EAR/PLS OXIMETRY MLT: CPT

## 2021-01-19 PROCEDURE — 1280000000 HC REHAB R&B

## 2021-01-19 PROCEDURE — 94640 AIRWAY INHALATION TREATMENT: CPT

## 2021-01-19 RX ADMIN — GABAPENTIN 600 MG: 300 CAPSULE ORAL at 13:57

## 2021-01-19 RX ADMIN — CIPROFLOXACIN 500 MG: 500 TABLET, FILM COATED ORAL at 21:32

## 2021-01-19 RX ADMIN — INSULIN LISPRO 1 UNITS: 100 INJECTION, SOLUTION INTRAVENOUS; SUBCUTANEOUS at 16:49

## 2021-01-19 RX ADMIN — PROPAFENONE HYDROCHLORIDE 150 MG: 150 TABLET, FILM COATED ORAL at 13:57

## 2021-01-19 RX ADMIN — INSULIN LISPRO 1 UNITS: 100 INJECTION, SOLUTION INTRAVENOUS; SUBCUTANEOUS at 06:13

## 2021-01-19 RX ADMIN — INSULIN LISPRO 1 UNITS: 100 INJECTION, SOLUTION INTRAVENOUS; SUBCUTANEOUS at 21:32

## 2021-01-19 RX ADMIN — METRONIDAZOLE 500 MG: 250 TABLET ORAL at 13:57

## 2021-01-19 RX ADMIN — APIXABAN 5 MG: 5 TABLET, FILM COATED ORAL at 21:32

## 2021-01-19 RX ADMIN — PANTOPRAZOLE SODIUM 40 MG: 40 TABLET, DELAYED RELEASE ORAL at 06:11

## 2021-01-19 RX ADMIN — ALBUTEROL SULFATE 2.5 MG: 2.5 SOLUTION RESPIRATORY (INHALATION) at 07:55

## 2021-01-19 RX ADMIN — APIXABAN 5 MG: 5 TABLET, FILM COATED ORAL at 07:58

## 2021-01-19 RX ADMIN — METRONIDAZOLE 500 MG: 250 TABLET ORAL at 21:32

## 2021-01-19 RX ADMIN — Medication 1 PUFF: at 07:55

## 2021-01-19 RX ADMIN — ALBUTEROL SULFATE 2.5 MG: 2.5 SOLUTION RESPIRATORY (INHALATION) at 19:41

## 2021-01-19 RX ADMIN — GABAPENTIN 600 MG: 300 CAPSULE ORAL at 07:58

## 2021-01-19 RX ADMIN — GABAPENTIN 600 MG: 300 CAPSULE ORAL at 21:32

## 2021-01-19 RX ADMIN — Medication 1 PUFF: at 19:48

## 2021-01-19 RX ADMIN — DILTIAZEM HYDROCHLORIDE 180 MG: 180 CAPSULE, COATED, EXTENDED RELEASE ORAL at 07:58

## 2021-01-19 RX ADMIN — PROPAFENONE HYDROCHLORIDE 150 MG: 150 TABLET, FILM COATED ORAL at 06:11

## 2021-01-19 RX ADMIN — CIPROFLOXACIN 500 MG: 500 TABLET, FILM COATED ORAL at 07:58

## 2021-01-19 RX ADMIN — INSULIN GLARGINE 20 UNITS: 100 INJECTION, SOLUTION SUBCUTANEOUS at 21:33

## 2021-01-19 RX ADMIN — PROPAFENONE HYDROCHLORIDE 150 MG: 150 TABLET, FILM COATED ORAL at 21:32

## 2021-01-19 RX ADMIN — INSULIN LISPRO 2 UNITS: 100 INJECTION, SOLUTION INTRAVENOUS; SUBCUTANEOUS at 12:06

## 2021-01-19 RX ADMIN — METRONIDAZOLE 500 MG: 250 TABLET ORAL at 06:11

## 2021-01-19 RX ADMIN — ASPIRIN 81 MG: 81 TABLET, COATED ORAL at 07:58

## 2021-01-19 RX ADMIN — PIOGLITAZONE 15 MG: 15 TABLET ORAL at 07:58

## 2021-01-19 ASSESSMENT — PAIN SCALES - GENERAL: PAINLEVEL_OUTOF10: 0

## 2021-01-19 NOTE — PROGRESS NOTES
Physical Therapy  Facility/Department: [facility]  Daily Treatment Note  NAME: Alisha Howard  : 1940  MRN: [id number]    Date of Service: 2021    Discharge Recommendations:  Home with assist PRN, Home with Home health PT   PT Equipment Recommendations  Equipment Needed: No  Other: anticipate no DME needs, pt has RW at home    Assessment   Body structures, Functions, Activity limitations: Decreased functional mobility ; Decreased strength;Decreased endurance;Decreased balance;Decreased posture  Assessment: pt found sleeping in recliner, increased time to wake pt from deep sleep. once awake pt pleasant and agreeable, motivated. grossly cgA-SBA for transfers with cues for safety. CGA for gait x 30ft with RW. tolerated SCIFIT with rest breaks. Spo2 remains > 97-99% on 3 Lo2 with activity. continue to progress endurance and mobility as able. Treatment Diagnosis: impaired activity tolerance and strength  Specific instructions for Next Treatment: Progress mobility ans gait as tolerated  Prognosis: Good  Decision Making: Medium Complexity  PT Education: Goals;PT Role;Plan of Care;Precautions;Transfer Training;General Safety;Gait Training;Functional Mobility Training  Patient Education: Educated pt on role of PT, importance of mobility, safety, ARU POC, goals, d/c recs; pt verbalizes understanding  Barriers to Learning: None  REQUIRES PT FOLLOW UP: Yes  Activity Tolerance  Activity Tolerance: Patient limited by fatigue     Patient Diagnosis(es): There were no encounter diagnoses. has a past medical history of Arthritis, CAD (coronary artery disease), COPD (chronic obstructive pulmonary disease) ([facility] [address] Diabetes mellitus ([facility] [address] Hepatitis A, Hyperlipidemia, Hypertension, Influenza A, Kidney calculi, MDRO (multiple drug resistant organisms) resistance, ELAINE on CPAP, Osteoporosis, and Skin cancer of face. has a past surgical history that includes Prostate surgery; eye surgery (Left, 6/12/2016); other surgical history (08/31/2015); Cystoscopy (Right, 10/9/15); joint replacement (6/29/2011); joint replacement (11/2004); TURP (10/23/2015); other surgical history (12/1/15); back surgery; Cystocopy (05/01/2019); cystoscopy w biopsy of bladder (N/A, 5/1/2019); Cholecystectomy, open (N/A, 01/06/2021); and Cholecystectomy, laparoscopic (N/A, 1/6/2021). Restrictions  Restrictions/Precautions  Restrictions/Precautions: General Precautions, Fall Risk  Required Braces or Orthoses?: No  Position Activity Restriction  Other position/activity restrictions: up with assistance, JARAD hose, 3L O2  Subjective   General  Chart Reviewed: Yes  Additional Pertinent Hx: HTN, COPD on chronic 3L O2, DMII, osteoporosis, ELAINE on CPAP. S/p open cholecystectomy on 1/6/2021  Response To Previous Treatment: Patient with no complaints from previous session. Family / Caregiver Present: No  Referring Practitioner: Gee Liang MD  Subjective  Subjective: found sleeping in recliner  General Comment  Comments: denies pain. 3 Lo2 throughout. pleasant and agreeable  Pain Screening  Patient Currently in Pain: No  Vital Signs  Patient Currently in Pain: No       Orientation  Orientation  Overall Orientation Status: Within Functional Limits  Cognition      Objective      Transfers  Sit to Stand: Contact guard assistance;Stand by assistance  Stand to sit: Contact guard assistance;Stand by assistance  Comment: sit to stand from recliner to Rw, chiar to RW with CGA-SBA, cues for safe hand placemnet. stand pivot from w/c to SCIFIT with RW and CGA.  stand pivot from recliner <> w/c wtih RW and CGA  Ambulation  Ambulation?: Yes  Ambulation 1  Surface: level tile  Device: Rolling Walker  Other Apparatus: O2(3 L02)  Assistance: Contact guard assistance Quality of Gait: Slow cindy, B decreased toe clearance, B decreased TKE, forward flexed trunk and increased UE support. Pt mildly unsteady no overt LOB. Gait Deviations: Slow Cindy;Decreased step length;Decreased step height;Shuffles  Distance: 30 ft  Comments: Spo2 remained WNL on 3 Lo2        Exercises  Bridging: With kicks  Other exercises  Other exercises 1: pt completed 3 min, 2 min on SCIFIT lvel 1 wtih BUE and BLE for increased cardiorespiratory endurance and LE strenghtening. maintains rate of 75 steps/min. Spo2 remains 99% on 3 Lo2 with activity       Addendum: 2nd session Maria D [place])  Pt seated in recliner on approach, pleasant and agreeable to PT tx, with no c/o pain. Pt on 3L O2 NC with SpO2 maintained >95%. Pt reports his daughter is considering getting electric scoot for pt use within the home d/t knee buckling. Bed mobility:   Supine to/from sit, HOB flat use of BR MI  Transfers:   SPT recliner to/from w/c with RW SBA   Sit to/from stand w/c to RW SBA completed x 9 reps   Sit to/from stand commode to RW SBA   Car t/f no AD CGA/SBA   Intermittent cues for hand placement  Gait:  Surface: level tile  Device: Rolling Walker  Other Apparatus: O2(3 L02)  Assistance: Contact guard assistance  Quality of Gait: Slow cindy, B decreased toe clearance, B decreased TKE, forward flexed trunk and increased UE support. Pt mildly unsteady no overt LOB. Gait Deviations: Slow Cindy;Decreased step length;Decreased step height;Shuffles  Distance: 79' + 30' x 2    Pt completes x 3 reps x 10' cone stepping gait task with RW and CGA for balance. Pt ambulates over cones with cues for increased step height and heel to toe pattern to promote improved foot clearance and decreased gait deviations. Seated rest break between bouts to recover from fatigue.     Balance: Pt completes x 2 bouts x 8 reps dynamic cone reaching task on blue air-ex pad with RW and CGA to Jaime for balance. Pt reaching to L to grab cone from lower surface, reaching across body to stack on on CL side. Pt mildly unsteady with no overt LOB with use of primarily ankle strategy to maintain balance. Completed to improve proprioception, improve coordination and promote improved balance strategies. Pt seated in recliner at end of session with chair alarm in place and all needs within reach, RN present in room with pt at end of session.     Goals  Short term goals  Time Frame for Short term goals: 5 days- 1/18/20  Short term goal 1: Pt will complete bed mobility with SBA -- GOAL MET SBA  Short term goal 2: Pt will complete functional transfers with SBA using RW -- GOAL NOT MET but progression CGA to SBA  Short term goal 3: Pt will ambulate 50ft with SBA using RW -- GOAL NOT MET but progressing up to 61' with RW CGA to SBA, limited by intermittent B knee buckling  Short term goal 4: Pt will complete 1 curb step with SBA using RW -- GOAL NOT MET but progressing, curb step with RW and CGA  Long term goals  Time Frame for Long term goals : 10 days- 1/23/20  Long term goal 1: Pt will complete bed mobility with mod I  Long term goal 2: Pt will complete functional transfers with mod I using RW  Long term goal 3: Pt will ambulate 50ft with mod I using RW  Long term goal 4: Pt will complete 1 curb step with supervision using RW  Patient Goals   Patient goals : \"get my legs taken care of so I can get back home\"    Plan    Plan  Times per week: 5 out of 7 days/week  Times per day: Daily  Plan weeks: 10 days  Specific instructions for Next Treatment: Progress mobility ans gait as tolerated

## 2021-01-19 NOTE — PROGRESS NOTES
Occupational Therapy  Facility/Department: [facility]  Daily Treatment Note  NAME: Dianne Medina  : 1940  MRN: [id number]    Date of Service: 2021    Discharge Recommendations:  Home with assist PRN, Home with Home health OT, S Level 1       Assessment   Performance deficits / Impairments: Decreased functional mobility ; Decreased high-level IADLs;Decreased ADL status; Decreased balance;Decreased posture;Decreased endurance  Assessment: First Session: Pt agreeable to OT session. Pt performed standing tasks with good tolerance to stand for up to 8 minutes and SBA/supervision for all standing tasks and transfers during session. Pt did not exhibit any knee buckling or LOB this date. Pt performed toileting with SBA. Pt completed standing coordination tasks with good coordination. Pt completed parquetry with good problem solving and 4 color clothespin sequence with min VCs. Pt tossed unweighted ball while seated for 1 minute x3 with min SOB and good coordination. Second Session: Pt completed BUE ther ex with good strength for 2# weights. Pt completed 10 sit<>stands consecutively with supervision. Continue POC. Prognosis: Good  OT Education: OT Role;Plan of Care;Precautions; ADL Adaptive Strategies;Transfer Training;Energy Conservation;Orientation;Equipment  Patient Education: safe hand placement, PLB, transfer safety, precautions for spinal fracture  Barriers to Learning: Pt verbalized understanding but may benefit from reinforcement. REQUIRES OT FOLLOW UP: Yes  Activity Tolerance  Activity Tolerance: Patient Tolerated treatment well;Patient limited by pain  Activity Tolerance: Pt c/o back pain after prolonged standing, improved with seated rest break. Did not rate  Safety Devices  Safety Devices in place: Yes  Type of devices: Nurse notified; Chair alarm in place; Left in chair;Call light within reach;Gait belt         Patient Diagnosis(es): There were no encounter diagnoses. has a past medical history of Arthritis, CAD (coronary artery disease), COPD (chronic obstructive pulmonary disease) ([facility] [address] Diabetes mellitus ([facility] [address] Hepatitis A, Hyperlipidemia, Hypertension, Influenza A, Kidney calculi, MDRO (multiple drug resistant organisms) resistance, ELAINE on CPAP, Osteoporosis, and Skin cancer of face. has a past surgical history that includes Prostate surgery; eye surgery (Left, 6/12/2016); other surgical history (08/31/2015); Cystoscopy (Right, 10/9/15); joint replacement (6/29/2011); joint replacement (11/2004); TURP (10/23/2015); other surgical history (12/1/15); back surgery; Cystocopy (05/01/2019); cystoscopy w biopsy of bladder (N/A, 5/1/2019); Cholecystectomy, open (N/A, 01/06/2021); and Cholecystectomy, laparoscopic (N/A, 1/6/2021). Restrictions  Restrictions/Precautions  Restrictions/Precautions: General Precautions, Fall Risk  Required Braces or Orthoses?: No  Position Activity Restriction  Other position/activity restrictions: up with assistance, JARAD hose, 3L O2  Subjective   General  Chart Reviewed: Yes, Orders, Progress Notes, Labs, History and Physical  Patient assessed for rehabilitation services?: Yes  Additional Pertinent Hx: baseline 3L oxygen requirement, HTN  Family / Caregiver Present: No  Referring Practitioner: Queen Zion MD  Diagnosis: Acute cholecystitis, Hypokalemia, L1 compression fracture  Subjective  Subjective: Pt in recliner, pleasant, declined shower, stating he got one yesterday, agreeable to OT session.   General Comment  Comments: RN cleared for treatment  Vital Signs  Patient Currently in Pain: Denies   Orientation  Orientation  Overall Orientation Status: Within Functional Limits  Objective    ADL  Toileting: Stand by assistance(during clothing management in stance)        Balance  Sitting Balance: Supervision  Standing Balance: Contact guard assistance(with RW)  Standing Balance  Time: 8:10, 3:45, 1:30, 30 seconds x2 Activity: standing for parquetry task, transfer to/from bathroom, standing for clothespin task  Comment: with RW, no episodes of knee buckling this date  Functional Mobility  Functional - Mobility Device: Rolling Walker  Activity: To/from bathroom  Assist Level: Contact guard assistance  Toilet Transfers  Toilet - Technique: Ambulating  Equipment Used: Standard toilet  Toilet Transfer: Contact guard assistance     Transfers  Stand Step Transfers: Contact guard assistance  Sit to stand: Supervision  Stand to sit: Supervision        Coordination  Gross Motor: good coordination for parquetry puzzle x2 with no VCs, good coordination for clothespin task, good coordination for ADLs              Cognition  Overall Cognitive Status: Exceptions  Arousal/Alertness: Appropriate responses to stimuli  Following Commands: Follows one step commands with increased time; Follows one step commands with repetition  Attention Span: Attends with cues to redirect  Memory: Decreased short term memory  Safety Judgement: Decreased awareness of need for assistance  Problem Solving: Assistance required to generate solutions  Insights: Decreased awareness of deficits  Initiation: Does not require cues  Sequencing: Does not require cues  Cognition Comment: 1 VC to start 4 color sequence, no further cues to repeat sequence                    Type of ROM/Therapeutic Exercise  Type of ROM/Therapeutic Exercise: Free weights  Comment: 2#  Exercises  Shoulder Flexion: x20  Shoulder Extension: x20  Horizontal ABduction: x20  Horizontal ADduction: x20  Elbow Flexion: x20  Elbow Extension: x20  Supination: x20  Pronation: x20  Wrist Flexion: x20  Wrist Extension: x20  Other: chest press x20                    Plan   Plan  Times per week: 5-7x/week Current Treatment Recommendations: Strengthening, Balance Training, Functional Mobility Training, Endurance Training, Gait Training, Positioning, Pain Management, Safety Education & Training, Patient/Caregiver Education & Training, Self-Care / ADL, Equipment Evaluation, Education, & procurement, Home Management Training    Goals  Short term goals  Time Frame for Short term goals: 5 days (by 1/18/21)  Short term goal 1: Pt will complete functional transfers with Supervision, progressing. Pt requires CGA for functional transfers. Short term goal 2: Pt will complete LE dressing with Supervision/setup, use of AE as needed. progressing. Pt continues to require min A. Short term goal 3: Pt will perform 3 minutes dynamic standing activity with SBA. GOAL MET 1/18/21 Pt performed 3 minutes of standing task with SBA.   Long term goals  Time Frame for Long term goals : Pt will complete functional transfers with Mod I  Long term goal 1: Pt will complete LE dressing with Mod I, use of AE as needed  Long term goal 2: Pt will perform light housekeeping/meal prep/item retrieval task with Mod I  Patient Goals   Patient goals : \"to work on my legs\"       Therapy Time   Individual Concurrent Group Co-treatment   Time In 0930         Time Out 1030         Minutes 60         Timed Code Treatment Minutes: 60 Minutes    Second Session Therapy Time:   Individual Concurrent Group Co-treatment   Time In 1400         Time Out 1430         Minutes 30           Timed Code Treatment Minutes:  30 Minutes    Total Treatment Minutes:  90 minutes      Ray Farris, OT

## 2021-01-19 NOTE — PROGRESS NOTES
[facility]      S:   Patient presents for follow up of recent admission for cholecystitis. He reports improvement. He denies pain. He is eating. O:   Comfortable. No distress. Abdomen soft. Non distended. Non tender. A:     Encounter Diagnosis   Name Primary?  Acute calculous cholecystitis Yes            P:   Recommend continuing antibiotic Rx. Will follow a course of observation at present as he is improving. Greater than 50% visit spent counseling about diagnosis, treatment plan and expected course.

## 2021-01-19 NOTE — PROGRESS NOTES
[facility]: ACUTE REHAB UNIT  SPEECH-LANGUAGE PATHOLOGY      [x] Daily  [] Weekly Care Conference Note  [] Discharge    Patient:Fred Deidra Mortimer      :1940  XYT:[id number]  Rehab Dx/Hx: Debility [R53.81]    Precautions: falls  Home situation: Lives at home independently, simple cooking, daughter manages medications, both he and daughter complete finances and laundry  ST Dx: [] Aphasia  [] Dysarthria  [] Apraxia   [x] Oropharyngeal dysphagia [x] Cognitive Impairment  [] Other:   Date of Admit: 2021  Room #: 0152/0152-01    Current functional status (updated daily):         Pt being seen for : [] Speech/Language Treatment  [x] Dysphagia Treatment [x] Cognitive Treatment  [] Other:  Communication: [x]WFL  [] Aphasia  [] Dysarthria  [] Apraxia  [] Pragmatic Impairment [] Non-verbal  [] Hearing Loss  [] Other:   Cognition: [] WFL  [x] Mild  [] Moderate  [] Severe [] Unable to Assess  [] Other:  Memory: [] WFL  [] Mild  [x] Moderate  [] Severe [] Unable to Assess  [] Other:  Behavior: [x] Alert  [x] Cooperative  [x]  Pleasant  [] Confused  [] Agitated  [] Uncooperative  [] Distractible [] Motivated  [] Self-Limiting [] Anxious  [] Other:  Endurance:  [x] Adequate for participation in SLP sessions  [] Reduced overall  [] Lethargic  [] Other:  Safety: [] No concerns at this time  [x] Reduced insight into deficits  [x]  Reduced safety awareness [] Not following call light procedures  [] Unable to Assess  [] Other:    Current Diet Order:Dietary Nutrition Supplements: Diabetic Oral Supplement  DIET CARB CONTROL; Carb Control: 4 carb choices (60 gms)/meal  Swallowing Precautions: Compensatory Swallowing Strategies:  Alternate solids and liquids;Eat/Feed slowly;Upright as possible for all oral intake;Small bites/sips        Date: 2021      Tx session 1  [id number]  iKel Tam MA CCC-SLP Tx session 2  [id number]  Magdalena Anderson MA CCC-SLP   Total Timed Code Min 30 30   Total Treatment Minutes 30 30 Individual Treatment Minutes 30 30   Group Treatment Minutes 0 0   Co-Treat Minutes 0 0   Variance/Reason:  0 0   Pain None reported. Pain Intervention [] RN notified  [] Repositioned  [] Intervention offered and patient declined  [x] N/A  [] Other: [] RN notified  [] Repositioned  [] Intervention offered and patient declined  [x] N/A  [] Other:   Subjective:     Pt alert and cooperative, sitting upright in bedside chair. Pt agreeable to tx. Pt alert and oriented, cooperative and agreeable. Pt sitting upright on bedside chair. Objective:  Timeframe for Short-term Goals: 7 Days (1/21/2021)     Dysphagia Goals:    Goal 1: Pt will tolerate recommended diet without overt s/s of aspiration. Goal not directly targeted. Did not target. Goal 2: Pt will demonstrate understanding of compensatory strategies for improved swallowing safety. Goal not directly targeted. Did not target. Cognitive-Linguistic Goals:    Goal 1: The patient will verbalize awareness to temporal orientation concepts with 100% accy and use of external aid when needed. Month - indep    Year - indep    CHRISTINE - indep    Date - indep     Place - \"hospital\"   -able to recall with [facility]    Did not target. Goal 2: The patient will complete functional problem solving tasks with 80% accy and min cues. Goal not directly targeted. Did not target. Goal 3: The patient will complete verbal and visual reasoning tasks with 80% accy, min cueing.    Naming a category  -SLP gave pt 3 words, and asked pt to name the category the words belong to  -pt completed with 80% acc given mod-max cues    Adding to a category  -SLP gave pt 3 words, and asked pt to add an item to the category  -pt able to add an item to the group with 90% acc given mod cues   Word deduction task:  -pt given three clue words and asked to name the target item being described  -ex: smell, nostrils, face= nose -76% accuracy independently improving to 100% accuracy given mod cues    Goal 4: The patient will complete functional STM and delayed recall tasks with 75% acc, min cueing, and use of compensatory strategies. Paragraph Retention Task  -SLP read a paragraph aloud, and then asked pt recall questions  -pt completed with 70% acc with min cues. Functional recall/word progression task:  -pt given three words and asked to repeat and rearrange the words in order of their degree  -ex: Sept, May, Nov= May, Sept, Nov  -65% accuracy independently improving to 80% accuracy given mod-,ax cues      Other areas targeted: N/A N/A   Education:   Edu to pt re: repetition as a memory strategy. edu provided re: compensatory memory and reasoning strategies     Safety Devices: [x] Call light within reach  [x] Chair alarm activated  [] Bed alarm activated  [] Other: [x] Call light within reach  [x] Chair alarm activated  [] Bed alarm activated  [] Other:    Assessment: Session 1: Pt alert and cooperative, sitting upright in bedside chair. Pt agreeable to session. Pt with increased orientation - oriented to month, year, date, CHRISTINE indep. Pt required [facility] cue to recall name of hospital. Pt required mod-max cues to name a category items belonged to, but only min cues to add an item to the category. Edu to pt re: use of repetition to improve recall. Pt required min cues in order to complete a paragraph retention task. Continue goals above. Session 2: Pt pleasant and cooperative, agreeable to participate. Pt required mod-max cues for functional recall/word progression task, demonstrating difficulty with recalling the words initially. When provided repetition of word sets, pt able to rearrange and organize the words in accurate order. Pt did better with a verbal reasoning task, still requiring mod cues though. Pt appeared to be tangential at times easily getting off topic, but able to be redirected. Continue goals as listed above. Plan: Continue as per plan of care. Additional Information:     Barriers toward progress: Cognitive deficit and Limited insight into deficits  Discharge recommendations:  [] Home independently  [] Home with assistance []  24 hour supervision  [] ECF [x] Other: See PT/OT  Continued Tx Upon Discharge: ? [] Yes [] No [x] TBD based on progress while on ARU [] Vital Stim indicated [] Other:   Estimated discharge date: TBD  Interventions used this date:  [] Speech/Language Treatment  [] Instruction in HEP [] Group [x] Dysphagia Treatment [x] Cognitive Treatment   [] Other: Total Time Breakdown / Charges    Time in Time out Total Time / units   Cognitive Tx 1030  1100 1100  1130 30 min / 2 units  30 min/ 2 units    Speech Tx -- -- --   Dysphagia Tx          Electronically Signed by      Session 1:   Johny Cul[place] [address] [facility] #[id number]  Speech Language Pathologist    Session 2:  Isabelle Fernandez. A CCC-SLP  Speech-Language Pathologist  [id number]

## 2021-01-19 NOTE — PROGRESS NOTES
Milderd Gobble  1/19/2021  [id number]    Chief Complaint: Debility    Subjective:   Resting in bed. No issues overnight. No new c/o. Repeat labs yesterday look good. Blood sugars being monitored. Patient will be discussed in [facility]. tomorrow. ROS: no n/v, cp, sob, f/c    Objective:  Patient Vitals for the past 24 hrs:   BP Temp Temp src Pulse Resp SpO2   01/19/21 0755      98 %   01/19/21 0745 105/62 97.8 °F (36.6 °C) Oral 65 16 98 %   01/18/21 2007      95 %   01/18/21 2006 130/70 98.2 °F (36.8 °C) Oral 86 16 98 %   01/18/21 0852    90  93 %     Gen: No distress, pleasant. HEENT: Normocephalic, atraumatic. NC in place. CV: Regular rate and rhythm. Resp: No respiratory distress. Abd: Soft, nontender   Ext: No edema. Neuro: Alert, oriented, appropriately interactive.      Wt Readings from Last 3 Encounters:   01/18/21 199 lb (90.3 kg)   01/14/21 204 lb (92.5 kg)   12/14/20 200 lb (90.7 kg)       Laboratory data:   Lab Results   Component Value Date    WBC 5.3 01/18/2021    HGB 7.9 (L) 01/18/2021    HCT 25.0 (L) 01/18/2021    MCV 85.3 01/18/2021     01/18/2021       Lab Results   Component Value Date     01/18/2021    K 4.1 01/18/2021    CL 97 01/18/2021    CO2 43 01/18/2021    BUN 10 01/18/2021    CREATININE 0.9 01/18/2021    GLUCOSE 178 01/18/2021    CALCIUM 8.4 01/18/2021        Therapy progress:  PT  Position Activity Restriction  Other position/activity restrictions: up with assistance, JARAD hose, 3L O2  Objective     Sit to Stand: Contact guard assistance  Stand to sit: Contact guard assistance  Bed to Chair: Contact guard assistance  Device: Rolling Walker  Other Apparatus: O2(3L O2 NC)  Assistance: Contact guard assistance, Minimal assistance(CGA for straight progression (20'), CGA to Jaime for forwards and retrowalking)  Distance: 20' x 2 + 10' forwards/backwards  x 3 reps  OT  PT Equipment Recommendations  Equipment Needed: No Other: anticipate no DME needs, pt has RW at home  Toilet - Technique: Ambulating(RW)  Equipment Used: Standard toilet(improved level of assist with use of grab bar)  Assessment        SLP  Current Diet : (Dental soft)  Current Liquid Diet : Thin  Diet Solids Recommendation: Regular(Pt to choose slightly softer solids; choose foods he knows he can tolerate and typically eats at home.)  Liquid Consistency Recommendation: Thin    Body mass index is 27.75 kg/m². Rehabilitation Diagnosis:  16.0, Debility (non-cardiac, non-pulmonary     Assessment and Plan:  Acute cholecystitis  - IV abx converted to PO  - Tolerating diet, bowels moving     Hypokalemia  - Replace     L1 compression fracture  - symptomatic treatment       HTN  - Follow bp     COPD  - baseline 3L oxygen requirement  - continue inhalers     PAF  - resume eliquis  - rate controlled     Chronic thrombocytopenia  - Follow, particularly given resumption of eliquis     Possible aortic ulcer, AAA  - OP vascular follow up     DM  - SSI; follow blood sugars  - Resume actos tomorrow, metformin next week.     Bowels: Schedule stool softener. Follow bowel movements. Enema or suppository if needed.      Bladder: Check PVR x 3. [facility] if PVR > 200ml or if any volume is > 500 ml.      Sleep: Trazodone provided prn.      Follow up appointments: vascular, gen surg, pcp      Lesly Crenshaw.  MD Shanell 1/19/2021, 8:06 AM

## 2021-01-19 NOTE — PATIENT CARE CONFERENCE
[facility]  Inpatient Rehabilitation  Weekly Team Conference Note    Date: 2021  Patient Name: Alisha Howard        MRN: [id number]    : 1940  (78 y.o.)  Gender: male   Referring Practitioner: Tan Rogel MD  Diagnosis: debility      Interventions to be utilized toward barriers to discharge, per discipline:  [address] observed barriers to dc: Upper extremity weakness, Lower extremity weakness, Wound Care and Medication managment  Nursing interventions: Assist with Meds administration, wound care, transfers, ADLs  Family Education: Yes  Fall Risk:  Yes      Physical therapy observed barriers to dc:    Baseline: Lives alone, 1 NANCY, I/MI with functional mobility and gait with RW   Current level: MI bed mobility, CGA to SBA t/f with RW, CGA gait up to 79' with RW, CGA for stairs with RW   Barriers to DC: Decreased strength, decreased activity tolerance, lives alone, knee buckling   Needs in order to achieve dc home/next level of care: Needs to be MI with functional mobility, gait household distances and stairs with LRAD      Physical therapy interventions:   Current Treatment Recommendations: Strengthening, Balance Training, Functional Mobility Training, Transfer Training, Endurance Training, Gait Training, Stair training, Wheelchair Mobility Training, Home Exercise Program, Safety Education & Training, Patient/Caregiver Education & Training, Equipment Evaluation, Education, & procurement, Neuromuscular Re-education      PHYSICAL THERAPY  PT Equipment Recommendations  Equipment Needed: No  Other: anticipate no DME needs, pt has RW at home Assessment: pt found sleeping in recliner, increased time to wake pt from deep sleep. once awake pt pleasant and agreeable, motivated. grossly cgA-SBA for transfers with cues for safety. CGA for gait x 30ft with RW. tolerated SCIFIT with rest breaks. Spo2 remains > 97-99% on 3 Lo2 with activity. continue to progress endurance and mobility as able. Occupational therapy observed barriers to dc:    Baseline: mod I all ADLs and transfers, assist for IADLs Current level: SBA/min A for transfers depending on knee buckling, min A LB ADLs   Barriers to DC: LE weakness, intermittent knee buckling, limited assist at home   Needs in order to achieve dc home/next level of care: mod I all ADLs and functional transfers    Occupational Therapy interventions:  Current Treatment Recommendations: Strengthening, Balance Training, Functional Mobility Training, Endurance Training, Gait Training, Positioning, Pain Management, Safety Education & Training, Patient/Caregiver Education & Training, Self-Care / ADL, Equipment Evaluation, Education, & procurement, Home Management Training      OCCUPATIONAL THERAPY  Assessment: First Session: Pt agreeable to OT session. Pt performed standing tasks with good tolerance to stand for up to 8 minutes and SBA/supervision for all standing tasks and transfers during session. Pt did not exhibit any knee buckling or LOB this date. Pt performed toileting with SBA. Pt completed standing coordination tasks with good coordination. Pt completed parquetry with good problem solving and 4 color clothespin sequence with min VCs. Pt tossed unweighted ball while seated for 1 minute x3 with min SOB and good coordination. Second Session: Pt completed BUE ther ex with good strength for 2# weights. Pt completed 10 sit<>stands consecutively with supervision. Continue POC.         Speech therapy observed barriers to dc: Baseline: Pt lives at home independently. Pt has strong support from a daughter who lives nearby. Pt does simple cooking. Daughter manages finances and both he and his daughter complete the finances    Current level: Mild cognitive-linguistic deficits    Barriers to DC: Decreased recall, decreased problem solving and safety awareness   Needs in order to achieve dc home/next level of care: Improved and consistent orientation, improved recall, improved problem solving and ability to manage finances     Speech Therapy interventions:  Dysphagia: Therapeutic Interventions: Patient/Family education, Diet tolerance monitoring  Speech/Language/Cognition: Compensatory strategy training and carryover, recall/STM, problem solving, reasoning, exec function, thought organization, attention. SPEECH THERAPY   Session 1: Pt alert and cooperative, sitting upright in bedside chair. Pt agreeable to session. Pt with increased orientation - oriented to month, year, date, CHRISTINE indep. Pt required [facility] cue to recall name of hospital. Pt required mod-max cues to name a category items belonged to, but only min cues to add an item to the category. Edu to pt re: use of repetition to improve recall. Pt required min cues in order to complete a paragraph retention task. Continue goals above. Session 2: Pt pleasant and cooperative, agreeable to participate. Pt required mod-max cues for functional recall/word progression task, demonstrating difficulty with recalling the words initially. When provided repetition of word sets, pt able to rearrange and organize the words in accurate order. Pt did better with a verbal reasoning task, still requiring mod cues though. Pt appeared to be tangential at times easily getting off topic, but able to be redirected. Continue goals as listed above. NUTRITION  Weight: 199 lb (90.3 kg) / Body mass index is 27.75 kg/m².   Diet Order: Dietary Nutrition Supplements: Diabetic Oral Supplement DIET CARB CONTROL; Carb Control: 4 carb choices (60 gms)/meal  PO Meals Eaten (%): 76 - 100%  Education: No recommendation at this time      CASE MANAGEMENT  Assessment: Pt will return to prior level of care. Pt is open to services recommended. Interdisciplinary Goals:   1.) Pt will carryover use of compensatory strategies for improved recall given min cues    2.) Pt will complete toileting with mod I.  3.) Pt will ambulate 48' with RW and MI      Discharge Plan   Estimated discharge date: 1/23/2021  Destination: home health  Pass:No  Services at Discharge: [employer] [employer], Occupational Therapy, Speech Therapy and [address] at Discharge: Pt owns DME. Team Members Present at Conference:  : Get CABRERAW    Occupational Therapist: Jenny Vidales, OTR/L  Physical Therapist: Sujey Loza, GENO  Speech Therapist: Mikki Allen [place] [address] [facility] [place]  Nurse: Trish Pacheco RN BSN  Dietician: Juwan Altman RDN, LD  : Nomi Callejas, OTR/L  Psychiatry: N/A    Family members present at conference: No      I led this team conference and I approve the established interdisciplinary plan of care as documented within the medical record of Jodee Gomez. MD: Jeremiah Madden.  Maria D Foley MD 1/20/2021, 11:04 AM

## 2021-01-20 LAB
GLUCOSE BLD-MCNC: 104 MG/DL (ref 70–99)
GLUCOSE BLD-MCNC: 165 MG/DL (ref 70–99)
GLUCOSE BLD-MCNC: 170 MG/DL (ref 70–99)
GLUCOSE BLD-MCNC: 234 MG/DL (ref 70–99)
PERFORMED ON: ABNORMAL

## 2021-01-20 PROCEDURE — 1280000000 HC REHAB R&B

## 2021-01-20 PROCEDURE — 94761 N-INVAS EAR/PLS OXIMETRY MLT: CPT

## 2021-01-20 PROCEDURE — 97530 THERAPEUTIC ACTIVITIES: CPT

## 2021-01-20 PROCEDURE — 97110 THERAPEUTIC EXERCISES: CPT

## 2021-01-20 PROCEDURE — 97535 SELF CARE MNGMENT TRAINING: CPT

## 2021-01-20 PROCEDURE — 97116 GAIT TRAINING THERAPY: CPT

## 2021-01-20 PROCEDURE — 94640 AIRWAY INHALATION TREATMENT: CPT

## 2021-01-20 PROCEDURE — 97130 THER IVNTJ EA ADDL 15 MIN: CPT

## 2021-01-20 PROCEDURE — 6360000002 HC RX W HCPCS: Performed by: PHYSICAL MEDICINE & REHABILITATION

## 2021-01-20 PROCEDURE — 6370000000 HC RX 637 (ALT 250 FOR IP): Performed by: PHYSICAL MEDICINE & REHABILITATION

## 2021-01-20 PROCEDURE — 97129 THER IVNTJ 1ST 15 MIN: CPT

## 2021-01-20 PROCEDURE — 2700000000 HC OXYGEN THERAPY PER DAY

## 2021-01-20 RX ADMIN — INSULIN LISPRO 1 UNITS: 100 INJECTION, SOLUTION INTRAVENOUS; SUBCUTANEOUS at 06:15

## 2021-01-20 RX ADMIN — METRONIDAZOLE 500 MG: 250 TABLET ORAL at 16:10

## 2021-01-20 RX ADMIN — METRONIDAZOLE 500 MG: 250 TABLET ORAL at 22:13

## 2021-01-20 RX ADMIN — DILTIAZEM HYDROCHLORIDE 180 MG: 180 CAPSULE, COATED, EXTENDED RELEASE ORAL at 09:57

## 2021-01-20 RX ADMIN — PANTOPRAZOLE SODIUM 40 MG: 40 TABLET, DELAYED RELEASE ORAL at 06:14

## 2021-01-20 RX ADMIN — Medication 1 PUFF: at 20:19

## 2021-01-20 RX ADMIN — PROPAFENONE HYDROCHLORIDE 150 MG: 150 TABLET, FILM COATED ORAL at 06:14

## 2021-01-20 RX ADMIN — GABAPENTIN 600 MG: 300 CAPSULE ORAL at 16:09

## 2021-01-20 RX ADMIN — CIPROFLOXACIN 500 MG: 500 TABLET, FILM COATED ORAL at 20:32

## 2021-01-20 RX ADMIN — PIOGLITAZONE 15 MG: 15 TABLET ORAL at 11:36

## 2021-01-20 RX ADMIN — ALBUTEROL SULFATE 2.5 MG: 2.5 SOLUTION RESPIRATORY (INHALATION) at 20:19

## 2021-01-20 RX ADMIN — ASPIRIN 81 MG: 81 TABLET, COATED ORAL at 09:57

## 2021-01-20 RX ADMIN — GABAPENTIN 600 MG: 300 CAPSULE ORAL at 09:57

## 2021-01-20 RX ADMIN — APIXABAN 5 MG: 5 TABLET, FILM COATED ORAL at 20:32

## 2021-01-20 RX ADMIN — OXYCODONE 5 MG: 5 TABLET ORAL at 09:57

## 2021-01-20 RX ADMIN — INSULIN LISPRO 1 UNITS: 100 INJECTION, SOLUTION INTRAVENOUS; SUBCUTANEOUS at 20:33

## 2021-01-20 RX ADMIN — INSULIN LISPRO 2 UNITS: 100 INJECTION, SOLUTION INTRAVENOUS; SUBCUTANEOUS at 11:36

## 2021-01-20 RX ADMIN — APIXABAN 5 MG: 5 TABLET, FILM COATED ORAL at 09:58

## 2021-01-20 RX ADMIN — OXYCODONE 5 MG: 5 TABLET ORAL at 16:10

## 2021-01-20 RX ADMIN — CIPROFLOXACIN 500 MG: 500 TABLET, FILM COATED ORAL at 09:57

## 2021-01-20 RX ADMIN — INSULIN GLARGINE 20 UNITS: 100 INJECTION, SOLUTION SUBCUTANEOUS at 20:32

## 2021-01-20 RX ADMIN — PROPAFENONE HYDROCHLORIDE 150 MG: 150 TABLET, FILM COATED ORAL at 16:09

## 2021-01-20 RX ADMIN — METRONIDAZOLE 500 MG: 250 TABLET ORAL at 06:14

## 2021-01-20 RX ADMIN — PROPAFENONE HYDROCHLORIDE 150 MG: 150 TABLET, FILM COATED ORAL at 20:34

## 2021-01-20 RX ADMIN — GABAPENTIN 600 MG: 300 CAPSULE ORAL at 20:32

## 2021-01-20 ASSESSMENT — PAIN DESCRIPTION - PAIN TYPE: TYPE: ACUTE PAIN

## 2021-01-20 ASSESSMENT — PAIN DESCRIPTION - ONSET: ONSET: ON-GOING

## 2021-01-20 ASSESSMENT — PAIN DESCRIPTION - LOCATION: LOCATION: ABDOMEN

## 2021-01-20 ASSESSMENT — PAIN - FUNCTIONAL ASSESSMENT: PAIN_FUNCTIONAL_ASSESSMENT: ACTIVITIES ARE NOT PREVENTED

## 2021-01-20 ASSESSMENT — PAIN SCALES - GENERAL: PAINLEVEL_OUTOF10: 6

## 2021-01-20 ASSESSMENT — PAIN DESCRIPTION - FREQUENCY: FREQUENCY: CONTINUOUS

## 2021-01-20 NOTE — PROGRESS NOTES
Individual Treatment Minutes 30 30   Group Treatment Minutes 0 0   Co-Treat Minutes 0 0   Variance/Reason:  0 0   Pain None reported. Pain Intervention [] RN notified  [] Repositioned  [] Intervention offered and patient declined  [x] N/A  [] Other: [] RN notified  [] Repositioned  [] Intervention offered and patient declined  [x] N/A  [] Other:   Subjective:     Pt alert and cooperative, sitting upright in bedside chair. Pt agreeable to tx. Pt alert and oriented, cooperative and agreeable. Pt sitting upright on bedside chair. Objective:  Timeframe for Short-term Goals: 7 Days (1/21/2021)     Dysphagia Goals:    Goal 1: Pt will tolerate recommended diet without overt s/s of aspiration. Goal not directly targeted. Did not target. Pt indirectly observed drinking thin liquids via cup tolerating without any overt s/s of aspiration. Goal 2: Pt will demonstrate understanding of compensatory strategies for improved swallowing safety. Goal not directly targeted. Did not target. Cognitive-Linguistic Goals:    Goal 1: The patient will verbalize awareness to temporal orientation concepts with 100% accy and use of external aid when needed. Month - indep    Year - indep    CHRISTINE - indep    Date - 19th (cue to look at calendar - pt then able to state it is the 20th)    Place - \"hospital\"   -SLP asked pt to state the name - pt stated \"[facility]\"   Orientation concepts with use of visual calendar on wall: 100% accuracy    Goal met 01/20/21. Goal 2: The patient will complete functional problem solving tasks with 80% accy and min cues. Money Word Problems   -e.g. do 5 pennies equal 1 nickel?  -pt completed with 92% acc given mod cues. Did not target. Goal 3: The patient will complete verbal and visual reasoning tasks with 80% accy, min cueing.    Sorting Items into Categories  -SLP provided pt with 16 items (for recall task), but asked pt to sort into 4 groups -pt required max cues to sort groups at beginning of task, SLP able to fade cues to mod as task continued    Odd one out picture card task:  -pt given a picture card with four items  -pt asked to determine which item didn't belong and why  -63% accuracy independently improving to 90% accuracy given mod-max cues  -pt demonstrated with significantly reduced thought organization and attention to detail during this task     Goal 4: The patient will complete functional STM and delayed recall tasks with 75% acc, min cueing, and use of compensatory strategies. Grouping/Chunking Task   -SLP edu pt re: use of grouping/chunking strategy to improve recall   -pt recalled 0/4 groups   -SLP cued pt with group (sports, drinks, rooms, space), and then asked pt to name the items that belonged to the group   -with group cue, pt recalled 12/16 items; +3 with mod cues    Did not target. Other areas targeted: N/A N/A   Education:   Edu to pt re: memory strategies - grouping/chunking, repetition edu provided re: thought organization, attention, and reasoning strategies      Safety Devices: [x] Call light within reach  [x] Chair alarm activated  [] Bed alarm activated  [] Other: [x] Call light within reach  [x] Chair alarm activated  [] Bed alarm activated  [] Other:    Assessment: Session 1: Pt alert and cooperative, sitting upright in bedside chair and agreeable to tx. Pt with increased orientation this date, able to recall he is in [facility]. \" Pt oriented to month, year, CHRISTINE, and hospital for place. Pt required max cues to initially sort items into groups; SLP able to fade to mod cues to complete the task. SLP educated pt on the following memory strategies - repetition, grouping/chunking. Pt then completed a grouping/chunking task. Pt unable to recall the groups indep, but when SLP provided group name, pt able to name 12/16 items (improved to 15/16 with additional cues). Continue goals above. Session 2: Pt pleasant and cooperative, agreeable to participate. Pt able to view visual calendar on wall to determine appropriate orientation concepts accurately and consistently. Pt demonstrated significant difficulty with verbal/visual reasoning task today (odd one out). Pt frequently would look at the picture card and blurt out an item without taking time to view all four items. SLP encouraged pt to slow down, pay close attention to details of each picture, find a commonality between the three items and then determine which item didn't belong. With cueing provided, accuracy improved. Pt is anxious to go home soon. Continue goals as listed above. Plan: Continue as per plan of care. Additional Information:     Barriers toward progress: Cognitive deficit and Limited insight into deficits  Discharge recommendations:  [] Home independently  [] Home with assistance []  24 hour supervision  [] ECF [x] Other: See PT/OT  Continued Tx Upon Discharge: ? [] Yes [] No [x] TBD based on progress while on ARU [] Vital Stim indicated [] Other:   Estimated discharge date: 01/23/21  Interventions used this date:  [] Speech/Language Treatment  [] Instruction in HEP [] Group [x] Dysphagia Treatment [x] Cognitive Treatment   [] Other: Total Time Breakdown / Charges    Time in Time out Total Time / units   Cognitive Tx 0900  1230 0930  1330 30 min / 2 units  30 min/ 2 units    Speech Tx -- -- --   Dysphagia Tx          Electronically Signed by      Session 1:   Errol Hendrix #[id number]  Speech Language Pathologist    Session 2:  J Luis Pham. A [facility])-SLP  Speech-Language Pathologist  [id number]

## 2021-01-20 NOTE — PROGRESS NOTES
Physical Therapy  Facility/Department: [facility]  Daily Treatment Note  NAME: Bebeto Escalona  : 1940  MRN: [id number]    Date of Service: 2021    Discharge Recommendations:  Home with assist PRN, Home with Home health PT   PT Equipment Recommendations  Equipment Needed: No  Other: anticipate no DME needs, pt has RW at home    Assessment   Body structures, Functions, Activity limitations: Decreased functional mobility ; Decreased strength;Decreased endurance;Decreased balance;Decreased posture  Assessment: Pt maxwell's improved activity tolerance with ambulation, tolerating up to 136ft prior to requiring seated rest break this session. Pt able to complete functional transfers with grossly supervision and ambulate household distances with supervision-SBA using RW. No knee buckling noted this date with transfers and ambulation. Pt does fatigue quickly with activity and requires multiple therapeutic seated rest breaks during session, although current activity tolerance is at or near baseline per pt. Pt maxwell's good safety awareness and good awareness of need for rest breaks/energy conservation with activity. Reports he is looking forward to d/c home Saturday. Will continue to progress functional mobility and activity tolerance as appropriate. Treatment Diagnosis: impaired activity tolerance and strength  Prognosis: Good  Decision Making: Medium Complexity  PT Education: Goals;PT Role;Plan of Care;Precautions;Transfer Training;General Safety;Gait Training;Functional Mobility Training  Patient Education: Educated pt on transfers, gait, LE exercises, activity pacing/energy conservation; pt verbalizes understanding  REQUIRES PT FOLLOW UP: Yes  Activity Tolerance  Activity Tolerance: Patient Tolerated treatment well;Patient limited by endurance  Activity Tolerance: Pt on 3L O2 NC, SpO2  maintained >90%, SOB noted with activity with seated rest breaks to recover. Patient Diagnosis(es): There were no encounter diagnoses. has a past medical history of Arthritis, CAD (coronary artery disease), COPD (chronic obstructive pulmonary disease) ([facility] [address] Diabetes mellitus ([facility] [address] Hepatitis A, Hyperlipidemia, Hypertension, Influenza A, Kidney calculi, MDRO (multiple drug resistant organisms) resistance, ELAINE on CPAP, Osteoporosis, and Skin cancer of face. has a past surgical history that includes Prostate surgery; eye surgery (Left, 6/12/2016); other surgical history (08/31/2015); Cystoscopy (Right, 10/9/15); joint replacement (6/29/2011); joint replacement (11/2004); TURP (10/23/2015); other surgical history (12/1/15); back surgery; Cystocopy (05/01/2019); cystoscopy w biopsy of bladder (N/A, 5/1/2019); Cholecystectomy, open (N/A, 01/06/2021); and Cholecystectomy, laparoscopic (N/A, 1/6/2021). Restrictions  Restrictions/Precautions  Restrictions/Precautions: General Precautions, Fall Risk  Required Braces or Orthoses?: No  Position Activity Restriction  Other position/activity restrictions: up with assistance, JARAD hose, 3L O2     Subjective   General  Chart Reviewed: Yes  Additional Pertinent Hx: HTN, COPD on chronic 3L O2, DMII, osteoporosis, ELAINE on CPAP. S/p open cholecystectomy on 1/6/2021  Response To Previous Treatment: Patient with no complaints from previous session.   Family / Caregiver Present: No  Referring Practitioner: Demetrius Blue MD  Subjective  Subjective: Pt seated in recliner chair in room upon arrival and agreeable to PT session  Pain Screening  Patient Currently in Pain: Denies  Vital Signs  Patient Currently in Pain: Denies          Orientation  Orientation  Overall Orientation Status: Within Functional Limits     Objective   Transfers  Sit to Stand: Supervision  Stand to sit: Supervision  Stand Pivot Transfers: Supervision(using RW)     Ambulation  Ambulation?: Yes  Ambulation 1  Surface: level tile  Device: Rolling Walker  Other Apparatus: O2(3L) Assistance: Supervision;Stand by assistance  Quality of Gait: decreased cindy, B decreased toe clearance, B decreased TKE, forward flexed trunk and increased UE support. No LOB or knee buckling noted  Distance: 20ft + 20ft (in/out of bathroom) + 136ft + 130ft  Comments: O2 sats 95-98% on 3L with ambulation           Exercises  Hamstring Sets: resisted knee flexion x 15 BLE (lime green TB)  Gluteal Sets: x 15  Hip Flexion: Seated marches BLE x 15 1.5#  Hip Abduction: Seated clamshells BLE x 15 (lime green TB)  Knee Long Arc Quad: Seated BLE x 15 with 1.5#  Ankle Pumps: Seated BLE x 15  Comments: Intermittent rest breaks required during seated exercise completion d/t fatigue. O2 sats 93% or greater on 3L throughout. Other exercises  Other exercises?: Yes  Other exercises 1: Sci-fit stepper: 3 mins + 3 mins (level 1), average 75 steps/min, using BUE/BLE-- in order to increase strength and activity tolerance. ~5 min rest break required after each bout d/t deconditioning, fatigue, and mild SOB.  O2 sats 95% on 3L after each bout                        Goals  Short term goals  Time Frame for Short term goals: 5 days- 1/18/20  Short term goal 1: Pt will complete bed mobility with SBA -- GOAL MET SBA  Short term goal 2: Pt will complete functional transfers with SBA using RW -- GOAL MET 1/20, pt completes functional transfers with supervision  Short term goal 3: Pt will ambulate 50ft with SBA using RW -- GOAL MET 1/20, pt ambulates 50ft with supervision-SBA using RW  Short term goal 4: Pt will complete 1 curb step with SBA using RW -- GOAL MET 1/20, pt completes curb step with SBA using RW  Long term goals  Time Frame for Long term goals : 10 days- 1/23/20  Long term goal 1: Pt will complete bed mobility with mod I  Long term goal 2: Pt will complete functional transfers with mod I using RW  Long term goal 3: Pt will ambulate 50ft with mod I using RW Long term goal 4: Pt will complete 1 curb step with supervision using RW  Patient Goals   Patient goals : \"get my legs taken care of so I can get back home\"    Plan    Plan  Times per week: 5 out of 7 days/week  Times per day: Daily  Plan weeks: 10 days  Specific instructions for Next Treatment: Progress mobility ans gait as tolerated  Current Treatment Recommendations: Strengthening, Balance Training, Functional Mobility Training, Transfer Training, Endurance Training, Gait Training, Stair training, Wheelchair Mobility Training, Home Exercise Program, Safety Education & Training, Patient/Caregiver Education & Training, Equipment Evaluation, Education, & procurement, Neuromuscular Re-education  Safety Devices  Type of devices:  All fall risk precautions in place, Call light within reach, Chair alarm in place, Left in chair, Nurse notified     Therapy Time   Individual Concurrent Group Co-treatment   Time In 1320         Time Out 1450         Minutes 90         Timed Code Treatment Minutes: Elizabeth 166, PT, DPT

## 2021-01-20 NOTE — PROGRESS NOTES
Dianne Medina  1/20/2021  [id number]    Chief Complaint: Debility    Subjective:   Resting in bed this morning. No issues overnight. No new c/o. ROS: no n/v, cp, sob, f/c    Objective:  Patient Vitals for the past 24 hrs:   BP Temp Temp src Pulse SpO2   01/19/21 2139 136/68 98 °F (36.7 °C) Oral 88 98 %   01/19/21 1941     99 %   01/19/21 1448 (!) 125/98   66 96 %   01/19/21 0755     98 %     Gen: No distress, pleasant. HEENT: Normocephalic, atraumatic. NC in place. CV: Regular rate and rhythm. Resp: No respiratory distress. Abd: Soft, nontender   Ext: No edema. Neuro: Alert, oriented, appropriately interactive.      Wt Readings from Last 3 Encounters:   01/18/21 199 lb (90.3 kg)   01/14/21 204 lb (92.5 kg)   12/14/20 200 lb (90.7 kg)       Laboratory data:   Lab Results   Component Value Date    WBC 5.3 01/18/2021    HGB 7.9 (L) 01/18/2021    HCT 25.0 (L) 01/18/2021    MCV 85.3 01/18/2021     01/18/2021       Lab Results   Component Value Date     01/18/2021    K 4.1 01/18/2021    CL 97 01/18/2021    CO2 43 01/18/2021    BUN 10 01/18/2021    CREATININE 0.9 01/18/2021    GLUCOSE 178 01/18/2021    CALCIUM 8.4 01/18/2021        Therapy progress:  PT  Position Activity Restriction  Other position/activity restrictions: up with assistance, JARAD hose, 3L O2  Objective     Sit to Stand: Contact guard assistance, Stand by assistance  Stand to sit: Contact guard assistance, Stand by assistance  Bed to Chair: Contact guard assistance  Device: Rolling Walker  Other Apparatus: O2(3 L02)  Assistance: Contact guard assistance  Distance: 30 ft  OT  PT Equipment Recommendations  Equipment Needed: No  Other: anticipate no DME needs, pt has RW at home  Toilet - Technique: Ambulating  Equipment Used: Standard toilet  Assessment        SLP  Current Diet : (Dental soft)  Current Liquid Diet : Thin

## 2021-01-21 LAB
ANION GAP SERPL CALCULATED.3IONS-SCNC: 4 MMOL/L (ref 3–16)
BASOPHILS ABSOLUTE: 0 K/UL (ref 0–0.2)
BASOPHILS RELATIVE PERCENT: 0.5 %
BUN BLDV-MCNC: 11 MG/DL (ref 7–20)
CALCIUM SERPL-MCNC: 8.8 MG/DL (ref 8.3–10.6)
CHLORIDE BLD-SCNC: 94 MMOL/L (ref 99–110)
CO2: 39 MMOL/L (ref 21–32)
CREAT SERPL-MCNC: 1.1 MG/DL (ref 0.8–1.3)
EOSINOPHILS ABSOLUTE: 0.1 K/UL (ref 0–0.6)
EOSINOPHILS RELATIVE PERCENT: 1.3 %
GFR AFRICAN AMERICAN: >60
GFR NON-AFRICAN AMERICAN: >60
GLUCOSE BLD-MCNC: 172 MG/DL (ref 70–99)
GLUCOSE BLD-MCNC: 182 MG/DL (ref 70–99)
GLUCOSE BLD-MCNC: 184 MG/DL (ref 70–99)
GLUCOSE BLD-MCNC: 233 MG/DL (ref 70–99)
GLUCOSE BLD-MCNC: 235 MG/DL (ref 70–99)
HCT VFR BLD CALC: 28.5 % (ref 40.5–52.5)
HEMOGLOBIN: 8.8 G/DL (ref 13.5–17.5)
LYMPHOCYTES ABSOLUTE: 1.2 K/UL (ref 1–5.1)
LYMPHOCYTES RELATIVE PERCENT: 19.8 %
MCH RBC QN AUTO: 26.4 PG (ref 26–34)
MCHC RBC AUTO-ENTMCNC: 30.8 G/DL (ref 31–36)
MCV RBC AUTO: 85.7 FL (ref 80–100)
MONOCYTES ABSOLUTE: 0.5 K/UL (ref 0–1.3)
MONOCYTES RELATIVE PERCENT: 8.7 %
NEUTROPHILS ABSOLUTE: 4.4 K/UL (ref 1.7–7.7)
NEUTROPHILS RELATIVE PERCENT: 69.7 %
PDW BLD-RTO: 19.2 % (ref 12.4–15.4)
PERFORMED ON: ABNORMAL
PLATELET # BLD: 176 K/UL (ref 135–450)
PMV BLD AUTO: 9.6 FL (ref 5–10.5)
POTASSIUM REFLEX MAGNESIUM: 4.4 MMOL/L (ref 3.5–5.1)
RBC # BLD: 3.33 M/UL (ref 4.2–5.9)
SODIUM BLD-SCNC: 137 MMOL/L (ref 136–145)
WBC # BLD: 6.3 K/UL (ref 4–11)

## 2021-01-21 PROCEDURE — 36415 COLL VENOUS BLD VENIPUNCTURE: CPT

## 2021-01-21 PROCEDURE — 94640 AIRWAY INHALATION TREATMENT: CPT

## 2021-01-21 PROCEDURE — 80048 BASIC METABOLIC PNL TOTAL CA: CPT

## 2021-01-21 PROCEDURE — 97116 GAIT TRAINING THERAPY: CPT

## 2021-01-21 PROCEDURE — 85025 COMPLETE CBC W/AUTO DIFF WBC: CPT

## 2021-01-21 PROCEDURE — 6360000002 HC RX W HCPCS: Performed by: PHYSICAL MEDICINE & REHABILITATION

## 2021-01-21 PROCEDURE — 6370000000 HC RX 637 (ALT 250 FOR IP): Performed by: PHYSICAL MEDICINE & REHABILITATION

## 2021-01-21 PROCEDURE — 1280000000 HC REHAB R&B

## 2021-01-21 PROCEDURE — 97110 THERAPEUTIC EXERCISES: CPT

## 2021-01-21 PROCEDURE — 97530 THERAPEUTIC ACTIVITIES: CPT

## 2021-01-21 PROCEDURE — 94761 N-INVAS EAR/PLS OXIMETRY MLT: CPT

## 2021-01-21 PROCEDURE — 2700000000 HC OXYGEN THERAPY PER DAY

## 2021-01-21 PROCEDURE — 97130 THER IVNTJ EA ADDL 15 MIN: CPT

## 2021-01-21 PROCEDURE — 97129 THER IVNTJ 1ST 15 MIN: CPT

## 2021-01-21 RX ADMIN — INSULIN LISPRO 1 UNITS: 100 INJECTION, SOLUTION INTRAVENOUS; SUBCUTANEOUS at 16:53

## 2021-01-21 RX ADMIN — ALBUTEROL SULFATE 2.5 MG: 2.5 SOLUTION RESPIRATORY (INHALATION) at 07:14

## 2021-01-21 RX ADMIN — GABAPENTIN 600 MG: 300 CAPSULE ORAL at 13:30

## 2021-01-21 RX ADMIN — INSULIN LISPRO 1 UNITS: 100 INJECTION, SOLUTION INTRAVENOUS; SUBCUTANEOUS at 20:27

## 2021-01-21 RX ADMIN — PIOGLITAZONE 15 MG: 15 TABLET ORAL at 09:31

## 2021-01-21 RX ADMIN — Medication 1 PUFF: at 20:34

## 2021-01-21 RX ADMIN — METRONIDAZOLE 500 MG: 250 TABLET ORAL at 06:43

## 2021-01-21 RX ADMIN — CIPROFLOXACIN 500 MG: 500 TABLET, FILM COATED ORAL at 09:30

## 2021-01-21 RX ADMIN — INSULIN LISPRO 1 UNITS: 100 INJECTION, SOLUTION INTRAVENOUS; SUBCUTANEOUS at 12:10

## 2021-01-21 RX ADMIN — APIXABAN 5 MG: 5 TABLET, FILM COATED ORAL at 09:30

## 2021-01-21 RX ADMIN — INSULIN LISPRO 1 UNITS: 100 INJECTION, SOLUTION INTRAVENOUS; SUBCUTANEOUS at 06:53

## 2021-01-21 RX ADMIN — GABAPENTIN 600 MG: 300 CAPSULE ORAL at 20:25

## 2021-01-21 RX ADMIN — PROPAFENONE HYDROCHLORIDE 150 MG: 150 TABLET, FILM COATED ORAL at 06:43

## 2021-01-21 RX ADMIN — DILTIAZEM HYDROCHLORIDE 180 MG: 180 CAPSULE, COATED, EXTENDED RELEASE ORAL at 09:30

## 2021-01-21 RX ADMIN — PROPAFENONE HYDROCHLORIDE 150 MG: 150 TABLET, FILM COATED ORAL at 13:30

## 2021-01-21 RX ADMIN — GABAPENTIN 600 MG: 300 CAPSULE ORAL at 09:30

## 2021-01-21 RX ADMIN — ASPIRIN 81 MG: 81 TABLET, COATED ORAL at 09:30

## 2021-01-21 RX ADMIN — INSULIN GLARGINE 20 UNITS: 100 INJECTION, SOLUTION SUBCUTANEOUS at 20:27

## 2021-01-21 RX ADMIN — METRONIDAZOLE 500 MG: 250 TABLET ORAL at 13:30

## 2021-01-21 RX ADMIN — PANTOPRAZOLE SODIUM 40 MG: 40 TABLET, DELAYED RELEASE ORAL at 06:43

## 2021-01-21 RX ADMIN — ALBUTEROL SULFATE 2.5 MG: 2.5 SOLUTION RESPIRATORY (INHALATION) at 20:32

## 2021-01-21 RX ADMIN — Medication 1 PUFF: at 07:14

## 2021-01-21 RX ADMIN — APIXABAN 5 MG: 5 TABLET, FILM COATED ORAL at 20:25

## 2021-01-21 RX ADMIN — PROPAFENONE HYDROCHLORIDE 150 MG: 150 TABLET, FILM COATED ORAL at 20:25

## 2021-01-21 NOTE — PROGRESS NOTES
[facility]: ACUTE REHAB UNIT  SPEECH-LANGUAGE PATHOLOGY      [x] Daily  [] Weekly Care Conference Note  [] Discharge    Patient:Jarrett Villanueva      :1940  LQD:[id number]  Rehab Dx/Hx: Debility [R53.81]    Precautions: falls  Home situation: Lives at home independently, simple cooking, daughter manages medications, both he and daughter complete finances and laundry  ST Dx: [] Aphasia  [] Dysarthria  [] Apraxia   [x] Oropharyngeal dysphagia [x] Cognitive Impairment  [] Other:   Date of Admit: 2021  Room #: 0152/0152-01    Current functional status (updated daily):         Pt being seen for : [] Speech/Language Treatment  [x] Dysphagia Treatment [x] Cognitive Treatment  [] Other:  Communication: [x]WFL  [] Aphasia  [] Dysarthria  [] Apraxia  [] Pragmatic Impairment [] Non-verbal  [] Hearing Loss  [] Other:   Cognition: [] WFL  [x] Mild  [] Moderate  [] Severe [] Unable to Assess  [] Other:  Memory: [] WFL  [] Mild  [x] Moderate  [] Severe [] Unable to Assess  [] Other:  Behavior: [x] Alert  [x] Cooperative  [x]  Pleasant  [] Confused  [] Agitated  [] Uncooperative  [] Distractible [] Motivated  [] Self-Limiting [] Anxious  [] Other:  Endurance:  [x] Adequate for participation in SLP sessions  [] Reduced overall  [] Lethargic  [] Other:  Safety: [] No concerns at this time  [x] Reduced insight into deficits  [x]  Reduced safety awareness [] Not following call light procedures  [] Unable to Assess  [] Other:    Current Diet Order:Dietary Nutrition Supplements: Diabetic Oral Supplement  DIET CARB CONTROL; Carb Control: 4 carb choices (60 gms)/meal  Swallowing Precautions: Compensatory Swallowing Strategies:  Alternate solids and liquids;Eat/Feed slowly;Upright as possible for all oral intake;Small bites/sips        Date: 2021      Tx session 1  0427-0872 Tx session 2  0825-8160   Total Timed Code Min 30 30   Total Treatment Minutes 30 30   Individual Treatment Minutes 30 30   Group Treatment Minutes 0 0 Co-Treat Minutes 0 0   Variance/Reason:  0 0   Pain None reported. None reported   Pain Intervention [] RN notified  [] Repositioned  [] Intervention offered and patient declined  [x] N/A  [] Other: [] RN notified  [] Repositioned  [] Intervention offered and patient declined  [x] N/A  [] Other:   Subjective:     Pt alert and cooperative, sitting upright in bedside chair. Pt agreeable to tx. Pt alert and oriented, cooperative and agreeable. Pt sitting upright on bedside chair. Objective:  Timeframe for Short-term Goals: 7 Days (1/21/2021)     Dysphagia Goals:    Goal 1: Pt will tolerate recommended diet without overt s/s of aspiration. Did not target. Did not target. Goal 2: Pt will demonstrate understanding of compensatory strategies for improved swallowing safety. Did not target. Did not target. Cognitive-Linguistic Goals:    Goal 1: The patient will verbalize awareness to temporal orientation concepts with 100% accy and use of external aid when needed. Goal met 01/20/21. Goal met 01/20/21. Goal 2: The patient will complete functional problem solving tasks with 80% accy and min cues. 4 step picture sequencing task:  -70% accuracy independently improving to 100% accuracy given mod cues. Math word problems:  -70% accuracy independently improving to 100% accuracy given min cues    Goal 3: The patient will complete verbal and visual reasoning tasks with 80% accy, min cueing. Verbal reasoning:  -naming a person's name associated with each letter in the alphabet  -pt required mod cues to recall what letter came name and to name appropriate names  -76% accuracy independently improving to 100% accuracy given mod cues    Organizing 16 items into four categories:  -100% accuracy independently    Goal 4: The patient will complete functional STM and delayed recall tasks with 75% acc, min cueing, and use of compensatory strategies. Recall indirectly targeted during verbal reasoning/naming task. see goal 4 above. Pt required mod cues to recall what letter came next in the alphabet. Functional recall of 16 items sorted into four categories:  -81% accuracy independently improving to 100% accuracy given mod cues   Other areas targeted: N/A N/A   Education:   Edu provided re: compensatory memory, thought organization strategies   edu provided re: rationale for tx tasks provided    Safety Devices: [x] Call light within reach  [x] Chair alarm activated  [] Bed alarm activated  [] Other: [x] Call light within reach  [x] Chair alarm activated  [] Bed alarm activated  [] Other:    Assessment: Session 1: Pt pleasant and cooperative, agreeable to participate. Pt demonstrated difficulty with verbal reasoning/naming alphabet task today, secondary to forgetting what letter came next in the alphabet, requiring mod cues. Pt also required cues to slow down, pay close attention to details, and thought organization strategies for improved sequencing with four step picture cards. Session 2: Pt pleasant and cooperative, agreeable to participate. Pt completed simple math word problems with 70% accuracy improving to 100% accuracy given min cues. Pt organized sixteen items into four categories independently demonstrating improved thought organization and attention to detail this afternoon. Pt continues to benefit from consistent carryover of compensatory memory strategies for improved recall skills. Pt is making good progress towards goals. Continue goals as listed above. Plan: Continue as per plan of care.       Additional Information:     Barriers toward progress: Cognitive deficit and Limited insight into deficits  Discharge recommendations:  [] Home independently  [] Home with assistance []  24 hour supervision  [] ECF [x] Other: See PT/OT Continued Tx Upon Discharge: ? [] Yes [] No [x] TBD based on progress while on ARU [] Vital Stim indicated [] Other:   Estimated discharge date: 01/23/21  Interventions used this date:  [] Speech/Language Treatment  [] Instruction in HEP [] Group [x] Dysphagia Treatment [x] Cognitive Treatment   [] Other: Total Time Breakdown / Charges    Time in Time out Total Time / units   Cognitive Tx 0800  1230 0830  1300 30 min/ 2 units   30 min/ 2 units    Speech Tx -- -- --   Dysphagia Tx          Electronically Signed by      Rocio Robertson. A CCC-SLP  Speech-Language Pathologist  [id number]

## 2021-01-21 NOTE — PROGRESS NOTES
Physical Therapy  Facility/Department: [facility]  Daily Treatment Note  NAME: Tyree Spence  : 1940  MRN: [id number]    Date of Service: 2021    Discharge Recommendations:  Home with assist PRN, Home with Home health PT   PT Equipment Recommendations  Equipment Needed: No  Other: pt has RW at home    Assessment   Body structures, Functions, Activity limitations: Decreased functional mobility ; Decreased strength;Decreased endurance;Decreased balance;Decreased posture  Assessment: Pt reporting increased fatigue this morning however demo's improved activity tolerance with ambulation this session, ambulating up to 140ft. Pt requires grossly supervision for functional transfers and ambulation this date. Continues to require intermittent seated rest breaks throughout exercises and mobility d/t fatigue and mild SOB however pt reports current activity tolerance is at baseline. Pt with good awareness of energy conservation and need for seated rest breaks with activity. Will continue to progress functional mobility and activity tolerance at appropriate. Treatment Diagnosis: impaired activity tolerance and strength  Prognosis: Good  Decision Making: Medium Complexity  PT Education: Goals;PT Role;Plan of Care;Precautions;Transfer Training;General Safety;Gait Training;Functional Mobility Training  Patient Education: Educated pt on transfers, gait, LE exercises; pt verbalizes understanding  REQUIRES PT FOLLOW UP: Yes  Activity Tolerance  Activity Tolerance: Patient Tolerated treatment well;Patient limited by endurance  Activity Tolerance: Pt on 3L O2 NC througout session with SpO2  maintained >90%. Mild SOB noted with activity with seated rest breaks to recover; pt does report current activity tolerance is at baseline     Patient Diagnosis(es): There were no encounter diagnoses. has a past medical history of Arthritis, CAD (coronary artery disease), COPD (chronic obstructive pulmonary disease) ([facility] [address] Diabetes mellitus ([facility] [address] Hepatitis A, Hyperlipidemia, Hypertension, Influenza A, Kidney calculi, MDRO (multiple drug resistant organisms) resistance, ELAINE on CPAP, Osteoporosis, and Skin cancer of face. has a past surgical history that includes Prostate surgery; eye surgery (Left, 6/12/2016); other surgical history (08/31/2015); Cystoscopy (Right, 10/9/15); joint replacement (6/29/2011); joint replacement (11/2004); TURP (10/23/2015); other surgical history (12/1/15); back surgery; Cystocopy (05/01/2019); cystoscopy w biopsy of bladder (N/A, 5/1/2019); Cholecystectomy, open (N/A, 01/06/2021); and Cholecystectomy, laparoscopic (N/A, 1/6/2021). Restrictions  Restrictions/Precautions  Restrictions/Precautions: General Precautions, Fall Risk  Required Braces or Orthoses?: No  Position Activity Restriction  Other position/activity restrictions: up with assistance, JARAD hose, 3L O2     Subjective   General  Chart Reviewed: Yes  Additional Pertinent Hx: HTN, COPD on chronic 3L O2, DMII, osteoporosis, ELAINE on CPAP. S/p open cholecystectomy on 1/6/2021  Response To Previous Treatment: Patient with no complaints from previous session.   Family / Caregiver Present: No  Referring Practitioner: Luzmaria Rios MD  Subjective  Subjective: Pt seated in recliner chair in room upon arrival and agreeable to PT session  General Comment  Comments: Pt reporting increased fatigue this morning  Pain Screening  Patient Currently in Pain: Denies  Vital Signs  Patient Currently in Pain: Denies          Orientation  Orientation  Overall Orientation Status: Within Functional Limits     Objective   Transfers  Sit to Stand: Supervision  Stand to sit: Supervision  Stand Pivot Transfers: Supervision(using RW)     Ambulation  Ambulation?: Yes  Ambulation 1  Surface: level tile  Device: [address] [place] [employer] Short term goal 1: Pt will complete bed mobility with SBA -- GOAL MET, SBA  Short term goal 2: Pt will complete functional transfers with SBA using RW -- GOAL MET 1/20, pt completes functional transfers with supervision  Short term goal 3: Pt will ambulate 50ft with SBA using RW -- GOAL MET 1/20, pt ambulates 50ft with supervision-SBA using RW  Short term goal 4: Pt will complete 1 curb step with SBA using RW -- GOAL MET 1/20, pt completes curb step with SBA using RW  Long term goals  Time Frame for Long term goals : 10 days- 1/23/20  Long term goal 1: Pt will complete bed mobility with mod I  Long term goal 2: Pt will complete functional transfers with mod I using RW-- GOAL MET 1/21, pt completes functional transfers with mod I  Long term goal 3: Pt will ambulate 50ft with mod I using RW  Long term goal 4: Pt will complete 1 curb step with supervision using RW-- GOAL MET 1/21, pt completes 1 curb step with supervision using RW  Patient Goals   Patient goals : \"get my legs taken care of so I can get back home\"    Plan    Plan  Times per week: 5 out of 7 days/week  Times per day: Daily  Plan weeks: 10 days  Specific instructions for Next Treatment: Progress mobility ans gait as tolerated  Current Treatment Recommendations: Strengthening, Balance Training, Functional Mobility Training, Transfer Training, Endurance Training, Gait Training, Stair training, Wheelchair Mobility Training, Home Exercise Program, Safety Education & Training, Patient/Caregiver Education & Training, Equipment Evaluation, Education, & procurement, Neuromuscular Re-education  Safety Devices  Type of devices:  All fall risk precautions in place, Call light within reach, Chair alarm in place, Left in chair, Nurse notified     Therapy Time   Individual Concurrent Group Co-treatment   Time In 1000         Time Out 1045         Minutes 45         Timed Code Treatment Minutes: 45 Minutes      Second Session Therapy Time: Individual Concurrent Group Co-treatment   Time In 1400         Time Out 1445         Minutes 45         Timed Code Treatment Minutes:  45    Total Treatment Minutes:  [address] [facility]  [address], PT, DPT

## 2021-01-21 NOTE — PROGRESS NOTES
Occupational Therapy  Facility/Department: [facility]  Daily Treatment Note  NAME: Danielle Marquez  : 1940  MRN: [id number]    Date of Service: 2021    Discharge Recommendations:  Home with assist PRN, Home with Home health OT, S Level 1       Assessment   Performance deficits / Impairments: Decreased functional mobility ; Decreased high-level IADLs;Decreased ADL status; Decreased balance;Decreased posture;Decreased endurance  Assessment: First Session: Pt agreeable to OT session. Pt not connected to O2 on arrival. 3L O2 replaced immediately and O2 sats checked, reading at 88% and improving to 95% in ~30 seconds. Pt completed functional mobility room>gym, around unit, and in room to collect cones for multiple bouts with RW. Pt exhibited 3 minor LOB requiring CGA to correct. Pt continues to have episodes of knee buckling that pt is unaware of until beginning to fall/buckle. Pt performed collection of cones with min VCs for 6 color sequence. Pt demonstrated ability to step on/off red balance disc for 10 reps x2 with no knee buckling. Second Session: Pt performed BUE ther ex with 3# weight for 15 reps with good strength and tolerance for all exercises. Pt collected items in room and transported to closet with SBA and cues to not tangle self in O2 tubing. Pt knees buckling, requiring min A when approaching chair to sit down. Continue POC. Prognosis: Good;Fair  OT Education: OT Role;Plan of Care;Precautions; ADL Adaptive Strategies;Transfer Training;Energy Conservation;Orientation;Equipment  Patient Education: safe hand placement, PLB, transfer safety, ADL a/e, recommendations for home  Barriers to Learning: Pt verbalized understanding but may benefit from reinforcement. REQUIRES OT FOLLOW UP: Yes  Activity Tolerance  Activity Tolerance: Patient Tolerated treatment well  Safety Devices  Safety Devices in place:  Yes Type of devices: Nurse notified; Chair alarm in place; Left in chair;Call light within reach;Gait belt         Patient Diagnosis(es): There were no encounter diagnoses. has a past medical history of Arthritis, CAD (coronary artery disease), COPD (chronic obstructive pulmonary disease) ([facility] [address] Diabetes mellitus ([facility] [address] Hepatitis A, Hyperlipidemia, Hypertension, Influenza A, Kidney calculi, MDRO (multiple drug resistant organisms) resistance, ELAINE on CPAP, Osteoporosis, and Skin cancer of face. has a past surgical history that includes Prostate surgery; eye surgery (Left, 6/12/2016); other surgical history (08/31/2015); Cystoscopy (Right, 10/9/15); joint replacement (6/29/2011); joint replacement (11/2004); TURP (10/23/2015); other surgical history (12/1/15); back surgery; Cystocopy (05/01/2019); cystoscopy w biopsy of bladder (N/A, 5/1/2019); Cholecystectomy, open (N/A, 01/06/2021); and Cholecystectomy, laparoscopic (N/A, 1/6/2021). Restrictions  Restrictions/Precautions  Restrictions/Precautions: General Precautions, Fall Risk  Required Braces or Orthoses?: No  Position Activity Restriction  Other position/activity restrictions: up with assistance, JARAD hose, 3L O2  Subjective   General  Chart Reviewed: Yes, Orders, Progress Notes, Labs, History and Physical  Patient assessed for rehabilitation services?: Yes  Additional Pertinent Hx: baseline 3L oxygen requirement, HTN  Family / Caregiver Present: No  Referring Practitioner: Sam Ag MD  Diagnosis: Acute cholecystitis, Hypokalemia, L1 compression fracture  Subjective  Subjective: Pt in recliner, pleasant, agreeable to OT session.   General Comment  Comments: RN cleared for treatment  Vital Signs  Patient Currently in Pain: Denies   Orientation  Orientation  Overall Orientation Status: Within Functional Limits  Orientation Level: Oriented to person;Oriented to time;Oriented to situation;Oriented to place  Objective    ADL Additional Comments: Pt declined all ADLs this date. Instrumental ADL's  Instrumental ADLs: Yes  Light Housekeeping  Light Housekeeping Level: Rosea Commander Housekeeping Level of Assistance: Minimal assistance  Light Housekeeping: Pt collected items around room and transported to closet. Pt required min VCs to not tangle self in O2 tubing and min A to correct LOB due to knee buckling when approaching chair. Balance  Sitting Balance: Supervision  Standing Balance: Contact guard assistance(supervision for majority of session, CGA to correct 3 LOB during session, with RW)  Standing Balance  Time: 2:30, 1:45, 1:30, 1:40, 3:15, 4:00  Activity: functional mobility room>gym, collection of cones in hallway  Comment: with RW  Functional Mobility  Functional - Mobility Device: Rolling Walker  Activity: To/From therapy gym;Retrieve items  Assist Level: Contact guard assistance  Functional Mobility Comments: 3 minor LOB with CGA to correct     Transfers  Stand Step Transfers: Contact guard assistance  Sit to stand: Supervision  Stand to sit: Contact guard assistance                       Cognition  Overall Cognitive Status: Exceptions  Arousal/Alertness: Appropriate responses to stimuli  Following Commands:  Follows multistep commands with repitition  Attention Span: Attends with cues to redirect  Memory: Appears intact  Safety Judgement: Decreased awareness of need for assistance  Problem Solving: Assistance required to generate solutions  Insights: Fully aware of deficits  Initiation: Does not require cues  Sequencing: Does not require cues                    Type of ROM/Therapeutic Exercise  Type of ROM/Therapeutic Exercise: Free weights  Comment: 3#  Exercises  Shoulder Flexion: x15  Shoulder Extension: x15  Horizontal ABduction: x15  Horizontal ADduction: x15  Chair Push-ups: x15  Elbow Flexion: x15  Elbow Extension: x15  Supination: x15  Pronation: x15  Wrist Flexion: x15  Wrist Extension: x15  Other: chest press x15 Plan   Plan  Times per week: 5-7x/week  Current Treatment Recommendations: Strengthening, Balance Training, Functional Mobility Training, Endurance Training, Gait Training, Positioning, Pain Management, Safety Education & Training, Patient/Caregiver Education & Training, Self-Care / ADL, Equipment Evaluation, Education, & procurement, Home Management Training    Goals  Short term goals  Time Frame for Short term goals: 5 days (by 1/18/21)  Short term goal 1: Pt will complete functional transfers with Supervision, GOAL MET 1/20/21 Pt performed functional transfers with supervision. Short term goal 2: Pt will complete LE dressing with Supervision/setup, use of AE as needed. GOAL MET 1/20/21 Pt performed LB dressing with supervision. Short term goal 3: Pt will perform 3 minutes dynamic standing activity with SBA. GOAL MET 1/18/21 Pt performed 3 minutes of standing task with SBA.   Long term goals  Time Frame for Long term goals : 10 days- 1/23/21  Long term goal 1: Pt will complete LE dressing with Mod I, use of AE as needed  Long term goal 2: Pt will perform light housekeeping/meal prep/item retrieval task with Mod I  Long term goal 3: Pt will complete functional transfers with Mod I  Patient Goals   Patient goals : \"to work on my legs\"       Therapy Time   Individual Concurrent Group Co-treatment   Time In 0830         Time Out 0930         Minutes 60         Timed Code Treatment Minutes: 60 Minutes    Second Session Therapy Time:   Individual Concurrent Group Co-treatment   Time In 1330         Time Out 1400         Minutes 30           Timed Code Treatment Minutes:  30 Minutes    Total Treatment Minutes:  90 minutes      J Luis Solorzano, OT

## 2021-01-21 NOTE — PROGRESS NOTES
José Miguel Arroyo  1/21/2021  [id number]    Chief Complaint: Debility    Subjective:   No issues overnight; resting in bed without complaint. ROS: no n/v, cp, sob, f/c    Objective:  Patient Vitals for the past 24 hrs:   BP Temp Temp src Pulse Resp SpO2   01/21/21 0715     20 97 %   01/20/21 2028 124/71 97.8 °F (36.6 °C) Oral 76 16 98 %   01/20/21 2020      97 %     Gen: No distress, pleasant. HEENT: Normocephalic, atraumatic. NC in place. CV: Regular rate and rhythm. Resp: No respiratory distress. Abd: Soft, nontender   Ext: No edema. Neuro: Alert, oriented, appropriately interactive.      Wt Readings from Last 3 Encounters:   01/18/21 199 lb (90.3 kg)   01/14/21 204 lb (92.5 kg)   12/14/20 200 lb (90.7 kg)       Laboratory data:   Lab Results   Component Value Date    WBC 5.3 01/18/2021    HGB 7.9 (L) 01/18/2021    HCT 25.0 (L) 01/18/2021    MCV 85.3 01/18/2021     01/18/2021       Lab Results   Component Value Date     01/18/2021    K 4.1 01/18/2021    CL 97 01/18/2021    CO2 43 01/18/2021    BUN 10 01/18/2021    CREATININE 0.9 01/18/2021    GLUCOSE 178 01/18/2021    CALCIUM 8.4 01/18/2021        Therapy progress:  PT  Position Activity Restriction  Other position/activity restrictions: up with assistance, JARAD hose, 3L O2  Objective     Sit to Stand: Supervision  Stand to sit: Supervision  Bed to Chair: Contact guard assistance  Device: Rolling Walker  Other Apparatus: O2(3L)  Assistance: Supervision, Stand by assistance  Distance: 20ft + 20ft (in/out of bathroom) + 136ft + 130ft  OT  PT Equipment Recommendations  Equipment Needed: No  Other: anticipate no DME needs, pt has RW at home  Toilet - Technique: Ambulating  Equipment Used: Standard toilet  Assessment        SLP  Current Diet : (Dental soft)  Current Liquid Diet : Thin  Diet Solids Recommendation: Regular(Pt to choose slightly softer solids; choose foods he knows he can tolerate and typically eats at home.) Liquid Consistency Recommendation: Thin    Body mass index is 27.75 kg/m². Rehabilitation Diagnosis:  16.0, Debility (non-cardiac, non-pulmonary     Assessment and Plan:  Acute cholecystitis  - IV abx converted to PO  - Tolerating diet, bowels moving     Hypokalemia  - Replace     L1 compression fracture  - symptomatic treatment       HTN  - Follow bp     COPD  - baseline 3L oxygen requirement  - continue inhalers     PAF  - resume eliquis  - rate controlled     Chronic thrombocytopenia  - Follow, particularly given resumption of eliquis     Possible aortic ulcer, AAA  - OP vascular follow up     DM  - SSI; follow blood sugars  - Resumed actos, consider restart of metformin soon     Bowels: Schedule stool softener. Follow bowel movements. Enema or suppository if needed.      Bladder: Check PVR x 3.   [facility] if PVR > 200ml or if any volume is > 500 ml.      Sleep: Trazodone provided prn.      Follow up appointments: vascular, gen surg, pcp  DON: 1/23 home w/ home health  DME: owns

## 2021-01-21 NOTE — PROGRESS NOTES
Comprehensive Nutrition Assessment    Type and Reason for Visit:  Reassess    Nutrition Recommendations/Plan:   1. Continue carb control diet   2. Continue Glucerna TID   3. Monitor nutrition adequacy, pertinent labs, bowel habits, wt changes, and clinical progress    Nutrition Assessment:  Follow up: Pt remains on carb control diet with Glucerna TID. Pt consuming % of meals this admission per EMR. Per MD tolerating current diet, no diarrhea noted following cholecystectomy. BG fluctuating. Will continue to monitor. Malnutrition Assessment:  Malnutrition Status: At risk for malnutrition (Comment)      Estimated Daily Nutrient Needs:  Energy (kcal):  3055-2646 kcal; Weight Used for Energy Requirements:  Ideal(78 kg)     Protein (g):  78-94 g; Weight Used for Protein Requirements:  Ideal(1.0-1.2 g/kg)        Fluid (ml/day):   ; Method Used for Fluid Requirements:  1 ml/kcal      Nutrition Related Findings:  BG fluctuating. A1c of 6.6 on 1/4/21. +2 pitting BLE edema. BM on 1/21. Wounds:  Surgical Incision       Current Nutrition Therapies:    Dietary Nutrition Supplements: Diabetic Oral Supplement  DIET CARB CONTROL; Carb Control: 4 carb choices (60 gms)/meal    Anthropometric Measures:  · Height: 5' 11\" (180.3 cm)  · Current Body Weight: 199 lb (90.3 kg)   · Usual Body Weight: 194 lb (88 kg)(standing scale April 2020)     · Ideal Body Weight: 172 lbs; % Ideal Body Weight 118.6 %   · BMI: 27.8  · BMI Categories: Overweight (BMI 25.0-29. 9)       Nutrition Diagnosis:   · Inadequate oral intake related to altered GI function, altered GI structure, inadequate protein-energy intake as evidenced by intake 0-25%, poor intake prior to admission      Nutrition Interventions:   Food and/or Nutrient Delivery:  Continue Current Diet, Continue Oral Nutrition Supplement  Nutrition Education/Counseling:  No recommendation at this time   Coordination of Nutrition Care:  Continue to monitor while inpatient    Goals: Pt will consume greater than 50% of meals and ONS this admission w/o GI disturbances       Nutrition Monitoring and Evaluation:   Food/Nutrient Intake Outcomes:  Food and Nutrient Intake, Supplement Intake  Physical Signs/Symptoms Outcomes:  Biochemical Data, GI Status, Weight     Discharge Planning:    Continue current diet, Continue Oral Nutrition Supplement     Electronically signed by Bjorn Villagomez MS, RD, LD on 1/21/21 at 1:58 PM EST    Contact: [phone]

## 2021-01-22 LAB
GLUCOSE BLD-MCNC: 168 MG/DL (ref 70–99)
GLUCOSE BLD-MCNC: 173 MG/DL (ref 70–99)
GLUCOSE BLD-MCNC: 214 MG/DL (ref 70–99)
GLUCOSE BLD-MCNC: 216 MG/DL (ref 70–99)
PERFORMED ON: ABNORMAL

## 2021-01-22 PROCEDURE — 6360000002 HC RX W HCPCS: Performed by: PHYSICAL MEDICINE & REHABILITATION

## 2021-01-22 PROCEDURE — 97130 THER IVNTJ EA ADDL 15 MIN: CPT

## 2021-01-22 PROCEDURE — 92526 ORAL FUNCTION THERAPY: CPT

## 2021-01-22 PROCEDURE — 97110 THERAPEUTIC EXERCISES: CPT

## 2021-01-22 PROCEDURE — 97535 SELF CARE MNGMENT TRAINING: CPT

## 2021-01-22 PROCEDURE — 97530 THERAPEUTIC ACTIVITIES: CPT

## 2021-01-22 PROCEDURE — 2700000000 HC OXYGEN THERAPY PER DAY

## 2021-01-22 PROCEDURE — 94640 AIRWAY INHALATION TREATMENT: CPT

## 2021-01-22 PROCEDURE — 6370000000 HC RX 637 (ALT 250 FOR IP): Performed by: PHYSICAL MEDICINE & REHABILITATION

## 2021-01-22 PROCEDURE — 1280000000 HC REHAB R&B

## 2021-01-22 PROCEDURE — 97116 GAIT TRAINING THERAPY: CPT

## 2021-01-22 PROCEDURE — 97129 THER IVNTJ 1ST 15 MIN: CPT

## 2021-01-22 PROCEDURE — 94761 N-INVAS EAR/PLS OXIMETRY MLT: CPT

## 2021-01-22 RX ORDER — OXYCODONE HYDROCHLORIDE 5 MG/1
5 TABLET ORAL EVERY 4 HOURS PRN
Qty: 40 TABLET | Refills: 0 | Status: SHIPPED | OUTPATIENT
Start: 2021-01-22 | End: 2021-02-05

## 2021-01-22 RX ADMIN — APIXABAN 5 MG: 5 TABLET, FILM COATED ORAL at 20:49

## 2021-01-22 RX ADMIN — Medication 1 PUFF: at 08:17

## 2021-01-22 RX ADMIN — ALBUTEROL SULFATE 2.5 MG: 2.5 SOLUTION RESPIRATORY (INHALATION) at 19:57

## 2021-01-22 RX ADMIN — GABAPENTIN 600 MG: 300 CAPSULE ORAL at 15:16

## 2021-01-22 RX ADMIN — PROPAFENONE HYDROCHLORIDE 150 MG: 150 TABLET, FILM COATED ORAL at 15:16

## 2021-01-22 RX ADMIN — PIOGLITAZONE 15 MG: 15 TABLET ORAL at 08:38

## 2021-01-22 RX ADMIN — INSULIN LISPRO 2 UNITS: 100 INJECTION, SOLUTION INTRAVENOUS; SUBCUTANEOUS at 16:51

## 2021-01-22 RX ADMIN — INSULIN LISPRO 1 UNITS: 100 INJECTION, SOLUTION INTRAVENOUS; SUBCUTANEOUS at 12:14

## 2021-01-22 RX ADMIN — GABAPENTIN 600 MG: 300 CAPSULE ORAL at 20:49

## 2021-01-22 RX ADMIN — INSULIN GLARGINE 20 UNITS: 100 INJECTION, SOLUTION SUBCUTANEOUS at 20:49

## 2021-01-22 RX ADMIN — ASPIRIN 81 MG: 81 TABLET, COATED ORAL at 08:37

## 2021-01-22 RX ADMIN — PROPAFENONE HYDROCHLORIDE 150 MG: 150 TABLET, FILM COATED ORAL at 05:01

## 2021-01-22 RX ADMIN — INSULIN LISPRO 1 UNITS: 100 INJECTION, SOLUTION INTRAVENOUS; SUBCUTANEOUS at 06:29

## 2021-01-22 RX ADMIN — GABAPENTIN 600 MG: 300 CAPSULE ORAL at 08:37

## 2021-01-22 RX ADMIN — Medication 1 PUFF: at 19:57

## 2021-01-22 RX ADMIN — INSULIN LISPRO 1 UNITS: 100 INJECTION, SOLUTION INTRAVENOUS; SUBCUTANEOUS at 20:50

## 2021-01-22 RX ADMIN — ALBUTEROL SULFATE 2.5 MG: 2.5 SOLUTION RESPIRATORY (INHALATION) at 08:17

## 2021-01-22 RX ADMIN — DILTIAZEM HYDROCHLORIDE 180 MG: 180 CAPSULE, COATED, EXTENDED RELEASE ORAL at 08:37

## 2021-01-22 RX ADMIN — PANTOPRAZOLE SODIUM 40 MG: 40 TABLET, DELAYED RELEASE ORAL at 05:01

## 2021-01-22 RX ADMIN — PROPAFENONE HYDROCHLORIDE 150 MG: 150 TABLET, FILM COATED ORAL at 20:49

## 2021-01-22 RX ADMIN — APIXABAN 5 MG: 5 TABLET, FILM COATED ORAL at 08:37

## 2021-01-22 NOTE — PROGRESS NOTES
Comment: Pt seated in recliner at start and end of session    Functional Transfers: Toilet Transfers  Toilet - Technique: Ambulating  Equipment Used: Standard toilet  Toilet Transfer: Modified independent         Shower Transfers  Shower - Transfer From: Shayla Pompa - Transfer Type: To and From  Shower - Transfer To: Transfer tub bench  Shower - Technique: Ambulating  Shower Transfers: Modified independence           Functional Mobility  Functional - Mobility Device: Rolling Walker  Activity: To/from bathroom, Retrieve items, Transport items  Assist Level: Modified independent   Functional Mobility Comments: no LOB or knee buckling throughout session    Instrumental ADL's  Instrumental ADLs: Yes  Meal Prep  Meal Prep Level: Walker  Meal Prep Level of Assistance: Modified independent  Meal Preparation: simulated meal prep to gather items from cabinet and fridge and transport to microwave to simulate simple meal prep  Light Housekeeping  Light Housekeeping Level: Maudnithya Hilliard Housekeeping Level of Assistance: Minimal assistance  Light Housekeeping: Pt collected items around room and transported to closet. Pt required min VCs to not tangle self in O2 tubing and min A to correct LOB due to knee buckling when approaching chair. Perception  Overall Perceptual Status: WFL       Assessment:   Assessment: Pt agreeable to OT session. Pt performed functional mobility in room and bathroom with mod I and good standing tolerance for safe completion of all ADLs and transfers. Pt performed BADLs with mod I and needing setup to cover R abdominal incision only. Pt completed simulated kitchen task with mod I and good safety for use of AD in kitchen. Pt completed HEP with good strength and tolerance for 2# weights. Pt completed standing tasks on red balance disc with supervision for balance and good tolerance to stand for 3-4 minutes x2. Pt completed [facility] with fair coordination but functional for task. Prognosis: Good, Fair  Barriers to Learning: Pt verbalized understanding but may benefit from reinforcement. REQUIRES OT FOLLOW UP: Yes  Discharge Recommendations: Home with assist PRN, Home with Home health OT, S Level 1    Equipment Recommendations:  none         Home Exercise Program  Provided Pt with handout for HEP with weights. Pt verbalized understanding of all exercises.     Electronically signed by Manuel Horne OT on 1/22/2021 at 2:45 PM

## 2021-01-22 NOTE — PROGRESS NOTES
[facility]: ACUTE REHAB UNIT  SPEECH-LANGUAGE PATHOLOGY      [x] Daily  [] Weekly Care Conference Note  [x] Discharge    Patient:Jarrett Martinez      :1940  FDA:[id number]  Rehab Dx/Hx: Debility [R53.81]    Precautions: falls  Home situation: Lives at home independently, simple cooking, daughter manages medications, both he and daughter complete finances and laundry  ST Dx: [] Aphasia  [] Dysarthria  [] Apraxia   [x] Oropharyngeal dysphagia [x] Cognitive Impairment  [] Other:   Date of Admit: 2021  Room #: 0152/0152-01    Current functional status (updated daily):         Pt being seen for : [] Speech/Language Treatment  [x] Dysphagia Treatment [x] Cognitive Treatment  [] Other:  Communication: [x]WFL  [] Aphasia  [] Dysarthria  [] Apraxia  [] Pragmatic Impairment [] Non-verbal  [] Hearing Loss  [] Other:   Cognition: [] WFL  [x] Mild  [] Moderate  [] Severe [] Unable to Assess  [] Other:  Memory: [] WFL  [] Mild  [x] Moderate  [] Severe [] Unable to Assess  [] Other:  Behavior: [x] Alert  [x] Cooperative  [x]  Pleasant  [] Confused  [] Agitated  [] Uncooperative  [] Distractible [] Motivated  [] Self-Limiting [] Anxious  [] Other:  Endurance:  [x] Adequate for participation in SLP sessions  [] Reduced overall  [] Lethargic  [] Other:  Safety: [] No concerns at this time  [x] Reduced insight into deficits  [x]  Reduced safety awareness [] Not following call light procedures  [] Unable to Assess  [] Other:    Current Diet Order:Dietary Nutrition Supplements: Diabetic Oral Supplement  DIET CARB CONTROL; Carb Control: 4 carb choices (60 gms)/meal  Swallowing Precautions: Compensatory Swallowing Strategies:  Alternate solids and liquids;Eat/Feed slowly;Upright as possible for all oral intake;Small bites/sips        Date: 2021      Tx session 1  1030 - 1130 Discharge Summary    Total Timed Code Min 45 0   Total Treatment Minutes 60 0   Individual Treatment Minutes 60 0   Group Treatment Minutes 0 0 Co-Treat Minutes 0 0   Variance/Reason:  0 0   Pain None reported. None reported   Pain Intervention [] RN notified  [] Repositioned  [] Intervention offered and patient declined  [x] N/A  [] Other: [] RN notified  [] Repositioned  [] Intervention offered and patient declined  [x] N/A  [] Other:   Subjective:     Pt alert and cooperative, agreeable to tx. Pt upright in chair for session. Objective:  Timeframe for Short-term Goals: 7 Days (1/21/2021)     Dysphagia Goals:    Goal 1: Pt will tolerate recommended diet without overt s/s of aspiration. Trials of regular solids, puree, and thin liquids this date. Pt tolerated regular solids and puree - adequate oral clearance, mastication, and A-P propulsion. Pt safely swallowed thin liquids with no overt s/s of aspiration - no coughing, throat clearing, wet vocal quality observed. Goal Met: Pt tolerating regular solids and thin liquids with no overt s/s of aspiration. Continue to recommend regular solids and thin liquids. Goal 2: Pt will demonstrate understanding of compensatory strategies for improved swallowing safety. Pt demonstrated understanding and use of safe swallow strategies. Pt utilized during PO trials of thin liquids, regular solids, and puree. Pt able to recall safe swallow strategies (small bites/sips, slow rate, alternate liquids and solids). Goal met: Pt demonstrated  understanding and use of safe swallow strategies - small bites, small sips, slow rate, alternating liquids and solids     Cognitive-Linguistic Goals:    Goal 1: The patient will verbalize awareness to temporal orientation concepts with 100% accy and use of external aid when needed. Goal met 01/20/21. Goal met 01/20/21. Goal 2: The patient will complete functional problem solving tasks with 80% accy and min cues. MoCA - pt had difficulty completing the trail making task and design copy. Pt adequately carol clock and put numbers on it. Pt completed serial 7s 1x.

## 2021-01-22 NOTE — PROGRESS NOTES
Breonna Leo  1/22/2021  [id number]    Chief Complaint: Debility    Subjective:   Patient feeling better today. Repeat labs yesterday look good and are stable. Patient is set up for discharge to home tomorrow. We will have patient continue with therapies at home at discharge. We will fill out his GALILEO and meds today for his discharge tomorrow. Patient will follow up with general surgery and his PCP. ROS: no n/v, cp, sob, f/c    Objective:  Patient Vitals for the past 24 hrs:   BP Temp Temp src Pulse Resp SpO2 Weight   01/22/21 0515       194 lb 14.4 oz (88.4 kg)   01/21/21 2034      100 %    01/21/21 2023 (!) 101/48 98.1 °F (36.7 °C) Oral 76 18 99 %    01/21/21 1315 126/66   92      01/21/21 0925 126/67 97.5 °F (36.4 °C) Oral 88 20 100 %      Gen: No distress, pleasant. HEENT: Normocephalic, atraumatic. NC in place. CV: Regular rate and rhythm. Resp: No respiratory distress. Abd: Soft, nontender   Ext: No edema. Neuro: Alert, oriented, appropriately interactive.      Wt Readings from Last 3 Encounters:   01/22/21 194 lb 14.4 oz (88.4 kg)   01/14/21 204 lb (92.5 kg)   12/14/20 200 lb (90.7 kg)       Laboratory data:   Lab Results   Component Value Date    WBC 6.3 01/21/2021    HGB 8.8 (L) 01/21/2021    HCT 28.5 (L) 01/21/2021    MCV 85.7 01/21/2021     01/21/2021       Lab Results   Component Value Date     01/21/2021    K 4.4 01/21/2021    CL 94 01/21/2021    CO2 39 01/21/2021    BUN 11 01/21/2021    CREATININE 1.1 01/21/2021    GLUCOSE 233 01/21/2021    CALCIUM 8.8 01/21/2021        Therapy progress:  PT  Position Activity Restriction  Other position/activity restrictions: up with assistance, JARAD hose, 3L O2  Objective     Sit to Stand: Supervision  Stand to sit: Supervision  Bed to Chair: Contact guard assistance  Device: Rolling Walker  Other Apparatus: O2(3L)  Assistance: Supervision  Distance: 140ft + 130ft  OT  PT Equipment Recommendations  Equipment Needed: No Other: pt has RW at home  Toilet - Technique: Ambulating  Equipment Used: Standard toilet  Assessment        SLP  Current Diet : (Dental soft)  Current Liquid Diet : Thin  Diet Solids Recommendation: Regular(Pt to choose slightly softer solids; choose foods he knows he can tolerate and typically eats at home.)  Liquid Consistency Recommendation: Thin    Body mass index is 27.18 kg/m². Rehabilitation Diagnosis:  16.0, Debility (non-cardiac, non-pulmonary     Assessment and Plan:  Acute cholecystitis  - IV abx converted to PO  - Tolerating diet, bowels moving     Hypokalemia  - Replace     L1 compression fracture  - symptomatic treatment       HTN  - Follow bp     COPD  - baseline 3L oxygen requirement  - continue inhalers     PAF  - resume eliquis  - rate controlled     Chronic thrombocytopenia  - Follow, particularly given resumption of eliquis     Possible aortic ulcer, AAA  - OP vascular follow up     DM  - SSI; follow blood sugars  - Resumed actos, consider restart of metformin soon     Bowels: Schedule stool softener. Follow bowel movements. Enema or suppository if needed.      Bladder: Check PVR x 3. [address] if PVR > 200ml or if any volume is > 500 ml.      Sleep: Trazodone provided prn.      Follow up appointments: vascular, gen surg, pcp  DON: 1/23 home w/ home health  DME: owns    Joelle Gallardo.  MD Shanell  1/22/21

## 2021-01-22 NOTE — PROGRESS NOTES
Physical Therapy  Discharge Summary    Name:Jarrett Monte  DVA:[id number]  :1940  Treatment Diagnosis: impaired activity tolerance and strength  Diagnosis: debility    Restrictions/Precautions  Restrictions/Precautions: General Precautions, Fall Risk  Required Braces or Orthoses?: No           Position Activity Restriction  Other position/activity restrictions: up with assistance, JARAD hose, 3L O2     Goals:                  Short term goals  Time Frame for Short term goals: 5 days- 20  Short term goal 1: Pt will complete bed mobility with SBA -- GOAL MET SBA  Short term goal 2: Pt will complete functional transfers with SBA using RW -- GOAL MET , pt completes functional transfers with supervision  Short term goal 3: Pt will ambulate 50ft with SBA using RW -- GOAL MET , pt ambulates 50ft with supervision-SBA using RW  Short term goal 4: Pt will complete 1 curb step with SBA using RW -- GOAL MET , pt completes curb step with SBA using RW            Long term goals  Time Frame for Long term goals : 10 days- 20  Long term goal 1: Pt will complete bed mobility with mod I -- GOAL MET , indep with bed mobility  Long term goal 2: Pt will complete functional transfers with mod I using RW-- GOAL MET , pt completes functional transfers with mod I  Long term goal 3: Pt will ambulate 50ft with mod I using RW -- GOAL MET , mod I with ambulation household distances  Long term goal 4: Pt will complete 1 curb step with supervision using RW-- GOAL MET , pt completes 1 curb step with supervision using RW    Pt. Met 4/4 short term goals and 4/4 long term goals. Discharge PT IRF:    CARE Score: 6                                   Pt. Currently ambulates 180 feet with RW and Supervision to mod I.   Up/down 4 steps with supervision and bilateral UE support  Up/down curb step with supervision and RW  Sit to/from stand with mod I  Bed mobility indep Recommend home PT in order to continue improving balance, strength, activity tolerance. Pt. Safe to return home with assistance from family.      Electronically signed by Lucrecia Rudd PT on 1/22/2021 at 9:06 AM

## 2021-01-22 NOTE — PROGRESS NOTES
Patient's dressing to abdomen removed and incision assessed. Slight redness in middle of incision but otherwise incision is intact and looks okay. Incision rinsed with saline and new wet to dry dressing applied.  Delanna Face

## 2021-01-22 NOTE — DISCHARGE INSTR - COC
Continuity of Care Form    Patient Name: Keaton March   :  1940  MRN:  [id number]    Admit date:  2021  Discharge date:  2021    Code Status Order: Full Code   Advance Directives:   Advance Care Flowsheet Documentation       Date/Time Healthcare Directive Type of Healthcare Directive Copy in [address] Agent's Name Healthcare Agent's Phone Number    21 1556  No, patient does not have an advance directive for healthcare treatment -- -- -- -- --            Admitting Physician:  Willam Ray MD  PCP: Leodan Montalvo MD    Discharging Nurse: [facility] Unit/Room#: 8374/5056-40  Discharging Unit Phone Number: [phone]    Emergency Contact:   Extended Emergency Contact Information  Primary Emergency Contact: [address] [place] [address] Phone: [phone]  Relation: Child  Secondary Emergency Contact: [address] Phone: [phone]  Relation: Child    Past Surgical History:  Past Surgical History:   Procedure Laterality Date    BACK SURGERY      repair broken back L4 & L5    CHOLECYSTECTOMY, LAPAROSCOPIC N/A 2021    LAPAROSCOPIC CHOLECYSTECTOMY, WITH CHOLANGIOGRAMS (ATTEMPTED) OPEN CHOLECYSTECTOMY performed by Anny Betancur MD at [address] [place] [address], OPEN N/A 2021    LAPAROSCOPIC CHOLECYSTECTOMY, WITH CHOLANGIOGRAMS (ATTEMPTED)     CYSTOSCOPY Right 10/9/15    RIght Ureteroscopy, Holmium Laser Lithotripsy with Stone Removal, Right Stent Placement    CYSTOSCOPY  2019    CYSTOSCOPY, RESECTION OF BLADDER NECK, URETHRAL DILATION    CYSTOSCOPY W BIOPSY OF BLADDER N/A 2019    CYSTOSCOPY, RESECTION OF BLADDER NECK, URETHRAL DILATION performed by Nghia Solorio MD at [address] [place] [address] Left 2016    cataract removal    JOINT REPLACEMENT  2011    right knee    JOINT REPLACEMENT  2004    left knee    OTHER SURGICAL HISTORY  2015 wide excision basal cell carcinoma on the nose and bilateral auricles with frozen sections, plastic closure, full thickness skin graft    OTHER SURGICAL HISTORY  12/1/15    excision of lesion of lip    PROSTATE SURGERY      TURP  10/23/2015    with cystoscopy       Immunization History:   Immunization History   Administered Date(s) Administered    Influenza Vaccine, unspecified formulation 11/18/2009, 10/14/2010, 11/08/2011, 09/21/2012, 10/18/2013, 10/07/2016    Influenza Virus Vaccine 10/12/2015, 10/07/2016, 10/18/2017    Influenza Whole 10/01/2010    Influenza, High Dose (Fluzone 65 yrs and older) 09/18/2018, 09/13/2019    Influenza, Terrall Rahman, IM, PF (6 mo and older Fluzone, Flulaval, Fluarix, and 3 yrs and older Afluria) 11/01/2017, 08/28/2018    Pneumococcal Conjugate 13-valent (Cwroowu69) 08/02/2017, 02/14/2018    Pneumococcal Conjugate 7-valent (Prevnar7) 10/01/2006    Pneumococcal Polysaccharide (Ffozbufli68) 10/24/2015, 10/07/2016    Td vaccine (adult) 09/21/2012       Active Problems:  Patient Active Problem List   Diagnosis Code    HTN (hypertension) I10    COPD, severe ([facility] [address] [address] J44.9    DM2 (diabetes mellitus, type 2) ([facility] [address] E11.9    HLD (hyperlipidemia) E78.5    Gallstones K80.20    PAF (paroxysmal atrial fibrillation) ([facility]) I48.0    Chronic respiratory failure with hypoxia and hypercapnia 2.5 L home O2 J96.11, J96.12    Acute respiratory insufficiency R06.89    Acute on chronic respiratory failure with hypercapnia ([facility]) J96.22    Sepsis ([facility]) A41.9    COPD exacerbation ([facility]) J44.1    CAD (coronary artery disease) I25.10    Former smoker Z87.891    Acute hyperglycemia R73.9    Chronic normocytic anemia D64.9    Chronic thrombocytopenia D69.6    Falls at home W19. Mike iFgueredo, Y92.009    Ambulatory dysfunction R26.2    General weakness R53.1    Acute on chronic respiratory failure with hypoxia and hypercapnia ([facility]) J96.21, J96.22    Pleural effusion J90  Rapid atrial fibrillation (HCC) I48.91    Atrial fibrillation with RVR (HCC) I48.91    Acute respiratory failure with hypoxia and hypercapnia (HCC) J96.01, J96.02    COPD with acute exacerbation (HCC) J44.1    Acute encephalopathy G93.40    Hyperglycemia R73.9    HCAP (healthcare-associated pneumonia) J18.9    Acute calculous cholecystitis K80.00    Acute cholecystitis K81.0    Atherosclerotic ulcer of aorta ([facility]) I70.0, I71.9    Closed compression fracture of body of L1 vertebra ([facility]) S32.010A    Hypomagnesemia E83.42    AAA (abdominal aortic aneurysm) without rupture ([facility]) I71.4    Elevated procalcitonin R79.89    Abdominal pain, right upper quadrant R10.11    Lactic acidosis E87.2    Elevated bilirubin R17    Bacteremia due to Klebsiella pneumoniae R78.81, B96.1    Paroxysmal atrial fibrillation ([facility]) I48.0    Moderate protein-calorie malnutrition ([facility]) E44.0    Debility R53.81       Isolation/Infection:   Isolation            No Isolation          Patient Infection Status       Infection Onset Added Last Indicated Last Indicated By Review Planned Expiration Resolved Resolved By    None active    Resolved    COVID-19 Rule Out 01/13/21 01/13/21 01/13/21 COVID-19 (Ordered)   01/14/21 Rule-Out Test Resulted    COVID-19 Rule Out 01/04/21 01/04/21 01/04/21 COVID-19 (Ordered)   01/04/21 Rule-Out Test Resulted    COVID-19 Rule Out 12/15/20 12/15/20 12/15/20 COVID-19 (Ordered)   12/15/20 Rule-Out Test Resulted    COVID-19 Rule Out 04/16/20 04/16/20 04/16/20 COVID-19 (Ordered)   04/17/20 Rule-Out Test Resulted    COVID-19 Rule Out 04/01/20 04/01/20 04/01/20 Emergent Disease Panel (Ordered)   04/03/20 Manisha Limon RN    MDRO (multi-drug resistant organism)  03/13/17 03/13/17 Giselle Larkin RN   01/01/18 Giselle Larkin RN            Nurse Assessment: Last Vital Signs: BP (!) 101/48   Pulse 76   Temp 98.1 °F (36.7 °C) (Oral)   Resp 18   Ht 5' 11\" (1.803 m)   Wt 194 lb 14.4 oz (88.4 kg)   SpO2 100%   BMI 27.18 kg/m²     Last documented pain score (0-10 scale): Pain Level: 0  Last Weight:   Wt Readings from Last 1 Encounters:   21 194 lb 14.4 oz (88.4 kg)     Mental Status:  oriented, alert, thought processes intact and able to concentrate and follow conversation    IV Access:  - None    Nursing Mobility/ADLs:  Walking   Assisted  Transfer  Assisted  Bathing  Assisted  Dressing  Assisted  Toileting  Independent  Feeding  [address]  Assisted  Med Delivery   whole    Wound Care Documentation and Therapy:        Elimination:  Continence:   · Bowel: Yes  · Bladder: Yes  Urinary Catheter: None   Colostomy/Ileostomy/Ileal Conduit: No       Date of Last BM: 2021    Intake/Output Summary (Last 24 hours) at 2021 0743  Last data filed at 2021 0730  Gross per 24 hour   Intake 600 ml   Output 0 ml   Net 600 ml     I/O last 3 completed shifts: In: 600 [P.O.:600]  Out: -     Safety Concerns: At Risk for Falls    Impairments/Disabilities:      Vision    Nutrition Therapy:  Current Nutrition Therapy:   - Oral Diet:  Carb Control 4 carbs/meal (1800kcals/day)    Routes of Feeding: Oral  Liquids: Thin Liquids  Daily Fluid Restriction: no  Last Modified Barium Swallow with Video (Video Swallowing Test): not done    Treatments at the Time of Hospital Discharge:     Oxygen Therapy:  is on oxygen at 3 L/min per nasal cannula.   Ventilator:        Rehab Therapies: {THERAPEUTIC INTERVENTION:7808309694}  Weight Bearing Status/Restrictions: { CC Weight Bearin:::0}  Other Medical Equipment (for information only, NOT a DME order):  {EQUIPMENT:[id number]}  Other Treatments: dressing change daily to right abdominal incision,wet to dry dressing    Patient's personal belongings (please select all that are sent with patient):  Glasses RN SIGNATURE:  Electronically signed by Nelson Rabago RN on 1/23/21 at 10:37 AM EST    CASE MANAGEMENT/SOCIAL WORK SECTION    Inpatient Status Date: ***    Readmission Risk Assessment Score:  Readmission Risk              Risk of Unplanned Readmission:        29           Discharging to Facility/ Agency   · Name: [facility]   · Address:  · Phone:[phone]  · Fax:[phone]        / signature: Electronically signed by EDILSON Hamm on 1/22/21 at 11:13 AM EST    PHYSICIAN SECTION    Prognosis: Good    Condition at Discharge: Stable    Rehab Potential (if transferring to Rehab): Good    Recommended Labs or Other Treatments After Discharge: PT,OT,VN    Physician Certification: I certify the above information and transfer of Alisha Howard  is necessary for the continuing treatment of the diagnosis listed and that he requires Home Care for less 30 days.      Update Admission H&P: No change in H&P    PHYSICIAN SIGNATURE:  Electronically signed by Victoria Opitz, MD on 1/22/21 at 7:44 AM EST

## 2021-01-22 NOTE — CARE COORDINATION
CASE MANAGEMENT DISCHARGE SUMMARY      Discharge to: home with [facility], all follow up appointments set and IMM completed. Vascular, PCP, general surgery, and pulm    Precertification completed: none   Hospital Exemption Notification (HENS) completed: none     New Durable Medical Equipment ordered/agency: none needed per therapist    Transportation:    Family/car:  Tp daughter noon        Confirmed discharge plan with:     Patient: yes     Family, name and contact number: Oliver Brian Pt daughter   [facility]
Late entry. 1/18/21 SW met with the Pt at Bedside. Pt reported that he does not have any concerns or questions. SW reminded pt about Care conference on wendsday.    EDILSON Greco
Writer met with patient to discuss disposition of care conference with interdisciplinary team on        1/20/21       Discussed:  Pt progress with therapy. Went over [facility]. Pt daughter will  patient at noon on 1/23    Recommendations: home with Kristine Booker    Anticipated discharge date: 1/23/20    Patient verbalized understanding of recommendations and anticipated discharge date. SW to call to [address] spoke to [place] and she reported that she will follow the pt and gather information and send to office for admission.   EDILSON Hamm
Caregiver physical ability: TBD    Who will be the one to contact for family training: pt daughter and or Son     24 hour care on discharge: limited    What level does the Patient need to be at to return home: TBD    Discharge plan: home with services     PCP:  [address]: Meds to [facility]     Summary: Pt reported that he would like to return home with continued care by his family and aide services. SW is unsure if [facility] would interfere with his aide services. [facility]    Electronic continuity of care form is on the chart. Case Management (CM) will continue to follow for recommendations from the treatment team. Please notify Case Management if needs or concerns arise.

## 2021-01-22 NOTE — PROGRESS NOTES
Occupational Therapy  Facility/Department: [facility]  Daily Treatment Note  NAME: Aiden Osullivan  : 1940  MRN: [id number]    Date of Service: 2021    Discharge Recommendations:  Home with assist PRN, Home with Home health OT, S Level 1       Assessment   Performance deficits / Impairments: Decreased functional mobility ; Decreased high-level IADLs;Decreased ADL status; Decreased balance;Decreased posture;Decreased endurance  Assessment: Pt agreeable to OT session. Pt performed functional mobility in room and bathroom with mod I and good standing tolerance for safe completion of all ADLs and transfers. Pt performed BADLs with mod I and needing setup to cover R abdominal incision only. Pt completed simulated kitchen task with mod I and good safety for use of AD in kitchen. Pt completed HEP with good strength and tolerance for 2# weights. Pt completed standing tasks on red balance disc with supervision for balance and good tolerance to stand for 3-4 minutes x2. Pt completed [facility] with fair coordination but functional for task. Prognosis: Good;Fair  OT Education: OT Role;Plan of Care;Precautions; ADL Adaptive Strategies;Transfer Training;Energy Conservation;Orientation;Equipment  Patient Education: safe hand placement, PLB, transfer safety, ADL a/e, recommendations for home, kitchen safety  Barriers to Learning: Pt verbalized understanding but may benefit from reinforcement. REQUIRES OT FOLLOW UP: Yes  Activity Tolerance  Activity Tolerance: Patient Tolerated treatment well  Safety Devices  Safety Devices in place: Yes  Type of devices: Nurse notified; Chair alarm in place; Left in chair;Call light within reach;Gait belt         Patient Diagnosis(es): There were no encounter diagnoses. Feeding: Independent  Grooming: Modified independent   UE Bathing: Setup(to cover incision)  LE Bathing: Modified independent (seated on shower chair)  UE Dressing: Independent  LE Dressing: Modified independent   Toileting: Modified independent   Instrumental ADL's  Instrumental ADLs: Yes  Meal Prep  Meal Prep Level: Walker  Meal Prep Level of Assistance: Modified independent  Meal Preparation: simulated meal prep to gather items from cabinet and fridge and transport to microwave to simulate simple meal prep     Balance  Sitting Balance: Independent  Standing Balance: Modified independent (with RW, no LOB)  Standing Balance  Time: 30 seconds x5, 3 minutes, 2 minutes, 3.5 minutes x2, 1.5 minutes  Activity: transfer to/from bathroom, grooming at sink, LB dressing, kitchen mobility, [address] task at table top while standing on red balance disc  Comment: with RW  Functional Mobility  Functional - Mobility Device: Rolling Walker  Activity: To/from bathroom; Retrieve items;Transport items  Assist Level: Modified independent   Functional Mobility Comments: no LOB or knee buckling throughout session  Toilet Transfers  Toilet - Technique: Ambulating  Equipment Used: Standard toilet  Toilet Transfer: Modified independent  Shower Transfers  Shower - Transfer From: Sally Favre - Transfer Type: To and From  Shower - Transfer To: Transfer tub bench  Shower - Technique: Ambulating  Shower Transfers: Modified independence     Transfers  Stand Step Transfers: Modified independent  Sit to stand: Modified independent  Stand to sit: Modified independent        Coordination  Gross Motor: good coordination for all ADLs and simulated kitchen task  Fine Motor: fair FMC to remove coins from stereognosis bin with 50% ability to locate coins, fair FMC to manipulate coins with BUEs.               Cognition  Overall Cognitive Status: Exceptions  Arousal/Alertness: Appropriate responses to stimuli Following Commands: Follows multistep commands with repitition  Attention Span: Appears intact  Memory: Appears intact  Safety Judgement: Good awareness of safety precautions  Problem Solving: Assistance required to generate solutions  Insights: Fully aware of deficits  Initiation: Does not require cues  Sequencing: Does not require cues                    Type of ROM/Therapeutic Exercise  Type of ROM/Therapeutic Exercise: Free weights  Comment: 2#  Exercises  Shoulder Flexion: x15  Shoulder Extension: x15  Elbow Flexion: x15  Elbow Extension: x15  Supination: x15  Pronation: x15  Wrist Flexion: x15  Wrist Extension: x15  Other: chest press x15                    Plan   Plan  Times per week: 5-7x/week  Current Treatment Recommendations: Strengthening, Balance Training, Functional Mobility Training, Endurance Training, Gait Training, Positioning, Pain Management, Safety Education & Training, Patient/Caregiver Education & Training, Self-Care / ADL, Equipment Evaluation, Education, & procurement, Home Management Training    Goals  Short term goals  Time Frame for Short term goals: 5 days (by 1/18/21)  Short term goal 1: Pt will complete functional transfers with Supervision, GOAL MET 1/20/21 Pt performed functional transfers with supervision. Short term goal 2: Pt will complete LE dressing with Supervision/setup, use of AE as needed. GOAL MET 1/20/21 Pt performed LB dressing with supervision. Short term goal 3: Pt will perform 3 minutes dynamic standing activity with SBA. GOAL MET 1/18/21 Pt performed 3 minutes of standing task with SBA. Long term goals  Time Frame for Long term goals : 10 days- 1/23/21  Long term goal 1: Pt will complete LE dressing with Mod I, use of AE as needed. GOAL MET 1/22/21 Pt performed LB dressing with mod I.  Long term goal 2: Pt will perform light housekeeping/meal prep/item retrieval task with Mod I.  GOAL MET 1/22/21 Pt performed homemaking task with mod I. Long term goal 3: Pt will complete functional transfers with Mod I. GOAL MET 1/22/21 Pt completed functional transfers with mod I.   Patient Goals   Patient goals : \"to work on my legs\"       Therapy Time   Individual Concurrent Group Co-treatment   Time In 2437         Time Out 1647         Minutes 85         Timed Code Treatment Minutes: [facility] [address] [facility]

## 2021-01-22 NOTE — PROGRESS NOTES
Physical Therapy    Facility/Department: [facility]  Daily Treatment Note  NAME: Britt Reed  : 1940  MRN: [id number]    Date of Service: 2021    Discharge Recommendations:  Home with assist PRN, Home with Home health PT   PT Equipment Recommendations  Equipment Needed: No  Other: pt has RW at home    Assessment   Body structures, Functions, Activity limitations: Decreased functional mobility ; Decreased strength;Decreased endurance;Decreased balance;Decreased posture  Assessment: Pt has progressed well with therapy and all goals met. Pt indep with bed mobility, mod I with transfers, and household distance ambulation with RW. Supervision for car transfer for safety. Pt states his daughter plans to stay with him at d/c. Declines concerns about mobility at home. Plan for safe d/c home tomorrow. Treatment Diagnosis: impaired activity tolerance and strength  Prognosis: Good  Decision Making: Medium Complexity  PT Education: Goals;PT Role;Plan of Care;Precautions;Transfer Training;General Safety;Gait Training;Functional Mobility Training  Patient Education: Educated pt on transfers, gait, LE exercises; pt verbalizes understanding  Barriers to Learning: None  REQUIRES PT FOLLOW UP: Yes  Activity Tolerance  Activity Tolerance: Patient Tolerated treatment well;Patient limited by endurance     Patient Diagnosis(es): There were no encounter diagnoses. has a past medical history of Arthritis, CAD (coronary artery disease), COPD (chronic obstructive pulmonary disease) ([address] Diabetes mellitus ([facility] [address] Hepatitis A, Hyperlipidemia, Hypertension, Influenza A, Kidney calculi, MDRO (multiple drug resistant organisms) resistance, ELAINE on CPAP, Osteoporosis, and Skin cancer of face. has a past surgical history that includes Prostate surgery; eye surgery (Left, 6/12/2016); other surgical history (08/31/2015); Cystoscopy (Right, 10/9/15); joint replacement (6/29/2011); joint replacement (11/2004); TURP (10/23/2015); other surgical history (12/1/15); back surgery; Cystocopy (05/01/2019); cystoscopy w biopsy of bladder (N/A, 5/1/2019); Cholecystectomy, open (N/A, 01/06/2021); and Cholecystectomy, laparoscopic (N/A, 1/6/2021). Restrictions  Restrictions/Precautions  Restrictions/Precautions: General Precautions, Fall Risk  Required Braces or Orthoses?: No  Position Activity Restriction  Other position/activity restrictions: up with assistance, JARAD hose, 3L O2  Subjective   General  Chart Reviewed: Yes  Additional Pertinent Hx: HTN, COPD on chronic 3L O2, DMII, osteoporosis, ELAINE on CPAP. S/p open cholecystectomy on 1/6/2021  Response To Previous Treatment: Patient with no complaints from previous session.   Family / Caregiver Present: No  Referring Practitioner: Latoya Mancera MD  Subjective  Subjective: Pt seated in recliner chair in room upon arrival and agreeable to PT session  Pain Screening  Patient Currently in Pain: Denies       Objective   Bed mobility  Rolling to Left: Independent  Rolling to Right: Independent  Supine to Sit: Independent  Sit to Supine: Independent     Transfers  Sit to Stand: Modified independent  Stand to sit: Modified independent  Bed to Chair: Modified independent(with RW)  Car Transfer: Supervision(Supervision for safety and cues for safe technique and hand placement)     Ambulation  Ambulation?: Yes  More Ambulation?: Yes  Ambulation 1  Surface: level tile  Device: Rolling Walker  Other Apparatus: O2(3L)  Assistance: Supervision;Modified Independent  Quality of Gait: Mod I for household distances 50 ft, Supervision for community distances for cues for upright posture and to maintain YASH within RW as fatigues Short term goal 2: Pt will complete functional transfers with SBA using RW -- GOAL MET 1/20, pt completes functional transfers with supervision  Short term goal 3: Pt will ambulate 50ft with SBA using RW -- GOAL MET 1/20, pt ambulates 50ft with supervision-SBA using RW  Short term goal 4: Pt will complete 1 curb step with SBA using RW -- GOAL MET 1/20, pt completes curb step with SBA using RW  Long term goals  Time Frame for Long term goals : 10 days- 1/23/20  Long term goal 1: Pt will complete bed mobility with mod I -- GOAL MET 1/22, indep with bed mobility  Long term goal 2: Pt will complete functional transfers with mod I using RW-- GOAL MET 1/21, pt completes functional transfers with mod I  Long term goal 3: Pt will ambulate 50ft with mod I using RW -- GOAL MET 1/22, mod I with ambulation household distances  Long term goal 4: Pt will complete 1 curb step with supervision using RW-- GOAL MET 1/21, pt completes 1 curb step with supervision using RW  Patient Goals   Patient goals : \"get my legs taken care of so I can get back home\"    Plan    Plan  Times per week: 5 out of 7 days/week  Times per day: Daily  Plan weeks: 10 days  Specific instructions for Next Treatment: Progress mobility ans gait as tolerated  Current Treatment Recommendations: Strengthening, Balance Training, Functional Mobility Training, Transfer Training, Endurance Training, Gait Training, Stair training, Wheelchair Mobility Training, Home Exercise Program, Safety Education & Training, Patient/Caregiver Education & Training, Equipment Evaluation, Education, & procurement, Neuromuscular Re-education  Safety Devices  Type of devices:  All fall risk precautions in place, Call light within reach, Chair alarm in place, Left in chair, Nurse notified     Therapy Time   Individual Concurrent Group Co-treatment   Time In 0800         Time Out 0930         Minutes 90         Timed Code Treatment Minutes: Sylvester Gutierrez, PT, DPT

## 2021-01-23 VITALS
HEIGHT: 71 IN | OXYGEN SATURATION: 98 % | DIASTOLIC BLOOD PRESSURE: 61 MMHG | HEART RATE: 61 BPM | WEIGHT: 194.9 LBS | SYSTOLIC BLOOD PRESSURE: 149 MMHG | BODY MASS INDEX: 27.29 KG/M2 | TEMPERATURE: 97.4 F | RESPIRATION RATE: 18 BRPM

## 2021-01-23 LAB
GLUCOSE BLD-MCNC: 153 MG/DL (ref 70–99)
GLUCOSE BLD-MCNC: 239 MG/DL (ref 70–99)
GLUCOSE BLD-MCNC: 290 MG/DL (ref 70–99)
PERFORMED ON: ABNORMAL

## 2021-01-23 PROCEDURE — 94640 AIRWAY INHALATION TREATMENT: CPT

## 2021-01-23 PROCEDURE — 94761 N-INVAS EAR/PLS OXIMETRY MLT: CPT

## 2021-01-23 PROCEDURE — 6370000000 HC RX 637 (ALT 250 FOR IP): Performed by: PHYSICAL MEDICINE & REHABILITATION

## 2021-01-23 PROCEDURE — 6360000002 HC RX W HCPCS: Performed by: PHYSICAL MEDICINE & REHABILITATION

## 2021-01-23 PROCEDURE — 2700000000 HC OXYGEN THERAPY PER DAY

## 2021-01-23 RX ADMIN — Medication 1 PUFF: at 07:38

## 2021-01-23 RX ADMIN — ALBUTEROL SULFATE 2.5 MG: 2.5 SOLUTION RESPIRATORY (INHALATION) at 07:38

## 2021-01-23 RX ADMIN — INSULIN LISPRO 1 UNITS: 100 INJECTION, SOLUTION INTRAVENOUS; SUBCUTANEOUS at 06:03

## 2021-01-23 RX ADMIN — GABAPENTIN 600 MG: 300 CAPSULE ORAL at 08:14

## 2021-01-23 RX ADMIN — APIXABAN 5 MG: 5 TABLET, FILM COATED ORAL at 08:14

## 2021-01-23 RX ADMIN — PROPAFENONE HYDROCHLORIDE 150 MG: 150 TABLET, FILM COATED ORAL at 06:03

## 2021-01-23 RX ADMIN — PANTOPRAZOLE SODIUM 40 MG: 40 TABLET, DELAYED RELEASE ORAL at 06:03

## 2021-01-23 RX ADMIN — ASPIRIN 81 MG: 81 TABLET, COATED ORAL at 08:14

## 2021-01-23 RX ADMIN — DILTIAZEM HYDROCHLORIDE 180 MG: 180 CAPSULE, COATED, EXTENDED RELEASE ORAL at 08:14

## 2021-01-23 RX ADMIN — PIOGLITAZONE 15 MG: 15 TABLET ORAL at 08:15

## 2021-01-23 NOTE — PROGRESS NOTES
Assisted patient to private car to discharge home per daughter's car in stable condition. Daughter brought his home oxygen and he discharged on 3 liters nasal cannula with all belongings.  Rhae Dials

## 2021-01-23 NOTE — DISCHARGE SUMMARY
[facility]     Patient Identification:  Mary Grimes  : 1940  Admit date: 2021  Discharge date: 21   Attending provider: Claudia Elise MD        Primary care provider: Alfredo Hickman MD     Discharge Diagnoses:   Patient Active Problem List   Diagnosis    HTN (hypertension)    COPD, severe ([address]    DM2 (diabetes mellitus, type 2) ([address]    HLD (hyperlipidemia)    Gallstones    PAF (paroxysmal atrial fibrillation) ([address]    Chronic respiratory failure with hypoxia and hypercapnia 2.5 L home O2    Acute respiratory insufficiency    Acute on chronic respiratory failure with hypercapnia (HCC)    Sepsis ([address]    COPD exacerbation ([address]    CAD (coronary artery disease)    Former smoker    Acute hyperglycemia    Chronic normocytic anemia    Chronic thrombocytopenia    Falls at home    Ambulatory dysfunction    General weakness    Acute on chronic respiratory failure with hypoxia and hypercapnia (HCC)    Pleural effusion    Rapid atrial fibrillation (HCC)    Atrial fibrillation with RVR (HCC)    Acute respiratory failure with hypoxia and hypercapnia (HCC)    COPD with acute exacerbation (HCC)    Acute encephalopathy    Hyperglycemia    HCAP (healthcare-associated pneumonia)    Acute calculous cholecystitis    Acute cholecystitis    Atherosclerotic ulcer of aorta (HCC)    Closed compression fracture of body of L1 vertebra (HCC)    Hypomagnesemia    AAA (abdominal aortic aneurysm) without rupture (HCC)    Elevated procalcitonin    Abdominal pain, right upper quadrant    Lactic acidosis    Elevated bilirubin    Bacteremia due to Klebsiella pneumoniae    Paroxysmal atrial fibrillation ([address]    Moderate protein-calorie malnutrition ([address]    Debility         Discharge Functional Status:    Physical therapy:  Bed Mobility:    Transfers: Sit to Stand: Modified independent Stand to sit: Modified independent  Bed to Chair: Modified independent(with RW), Ambulation 1  Surface: level tile  Device: Rolling Walker  Other Apparatus: O2(3L)  Assistance: Supervision, Modified Independent  Quality of Gait: Mod I for household distances 50 ft, Supervision for community distances for cues for upright posture and to maintain YASH within RW as fatigues  Gait Deviations: Slow Talia, Decreased step length, Decreased step height, Shuffles  Distance: 180 ft  Comments: SpO2 >95% on 3L with all ambulation. Pt able to complete right and left hand turns with RW, Stairs  # Steps : 1  Curbs: 6\"  Device: Rolling walker  Assistance: Supervision  Comment: Supervision to negotiate curb step x 4 succesive reps with RW for support. Supervision for safety/balance  Mobility:  , PT Equipment Recommendations  Equipment Needed: No  Other: pt has RW at home, Assessment: Pt has progressed well with therapy and all goals met. Pt indep with bed mobility, mod I with transfers, and household distance ambulation with RW. Supervision for car transfer for safety. Pt states his daughter plans to stay with him at d/c. Declines concerns about mobility at home. Plan for safe d/c home tomorrow. Occupational therapy:  ,  , Assessment: Pt agreeable to OT session. Pt performed functional mobility in room and bathroom with mod I and good standing tolerance for safe completion of all ADLs and transfers. Pt performed BADLs with mod I and needing setup to cover R abdominal incision only. Pt completed simulated kitchen task with mod I and good safety for use of AD in kitchen. Pt completed HEP with good strength and tolerance for 2# weights. Pt completed standing tasks on red balance disc with supervision for balance and good tolerance to stand for 3-4 minutes x2. Pt completed [facility] with fair coordination but functional for task. Murphy Beltrán is a 78 y.o. male admitted to inpatient rehabilitation on 1/14/2021 for s/p Debility. The patient participated in an aggressive multidisciplinary inpatient rehabilitation program involving 3 hours per day, 5 days per week of rehabilitation. 78year old male with h/o COPD and baseline 3L oxygen requirement, DM, PAF admitted to [facility] after presenting to [facility] ED with complaints of RUQ pain. He was found to have acute cholecystitis and was treated with rocephin and flagyl, subsequently transitioned to oral antibiotics. Gen surgery consulted with no recommendations for surgical intervention. He was also noted to have an L1 compression fracture managed conservatively, as well as a possible penetrating atherosclerotic ulcer involving the anterior wall of the distal descending aorta with recs for outpatient vascular surgery follow up. After admission to the Rehab Unit, he made steady progress in his therapies as listed above under each therapy section. His upper and lower extremity coordination and strength did improve in therapy, and he was starting to improve with his endurance and functional status as he  participated in his rehab therapies. His abdominal pain, labs, blood sugars and blood pressure were monitored while on the rehabilitation unit. Patient feeling better today. Repeat labs look good and are stable. Patient is set up for discharge to home today. We will have patient continue with therapies at home at discharge. We have filled out his GALILEO and meds for his discharge today. Patient will follow up with general surgery, vascular , pulm, and his PCP.        Condition at Discharge: Good    Significant Diagnostics:   Lab Results   Component Value Date     01/21/2021    K 4.4 01/21/2021    CL 94 (L) 01/21/2021    BUN 11 01/21/2021    CREATININE 1.1 01/21/2021    GLUCOSE 233 (H) 01/21/2021    CALCIUM 8.8 01/21/2021     Lab Results   Component Value Date WBC 6.3 01/21/2021    RBC 3.33 (L) 01/21/2021    HGB 8.8 (L) 01/21/2021    HCT 28.5 (L) 01/21/2021    MCV 85.7 01/21/2021    MCH 26.4 01/21/2021    MCHC 30.8 (L) 01/21/2021    RDW 19.2 (H) 01/21/2021     01/21/2021    MPV 9.6 01/21/2021     Lab Results   Component Value Date    PROTIME 13.9 (H) 01/07/2021    INR 1.20 (H) 01/07/2021     Lab Results   Component Value Date    APTT 29.8 01/25/2018     Lab Results   Component Value Date    COLORU Yellow 01/04/2021    CLARITYU SL CLOUDY (A) 01/04/2021    GLUCOSEU Negative 01/04/2021    BILIRUBINUR MODERATE (A) 01/04/2021    KETUA Negative 01/04/2021    SPECGRAV 1.015 01/04/2021    BLOODU TRACE-INTACT (A) 01/04/2021    PHUR 7.5 01/04/2021    PROTEINU 30 (A) 01/04/2021    UROBILINOGEN 2.0 (A) 01/04/2021    NITRU Negative 01/04/2021    LEUKOCYTESUR TRACE (A) 01/04/2021    LABMICR YES 01/04/2021    URRFLXCULT Not Indicated 01/04/2021    URINETYPE see below 01/04/2021     Lab Results   Component Value Date    MUCUS Rare (A) 01/04/2021    WBCUA 6-9 (A) 01/04/2021    EPIU 6-10 (A) 01/04/2021    BACTERIA Rare (A) 12/14/2020     Lab Results   Component Value Date    LABURIN No growth at 18 to 36 hours 01/04/2021    ORG Klebsiella aerogenes (A) 01/11/2021    ORG Enterococcus faecium (A) 01/11/2021       Ct Head Wo Contrast    Result Date: 1/4/2021 EXAMINATION: CT OF THE HEAD WITHOUT CONTRAST  1/4/2021 7:24 pm TECHNIQUE: CT of the head was performed without the administration of intravenous contrast. Dose modulation, iterative reconstruction, and/or weight based adjustment of the mA/kV was utilized to reduce the radiation dose to as low as reasonably achievable. COMPARISON: CT head 08/11/2020 HISTORY: ORDERING SYSTEM PROVIDED HISTORY: fall, on blood thinners TECHNOLOGIST PROVIDED HISTORY: Has a \"code stroke\" or \"stroke alert\" been called?-> no Reason for exam:-> fall, on blood thinners Reason for Exam: pt c/o SOB. Pt fell while on blood thinners, c/o extremity weakness Acuity: Acute Type of Exam: Initial FINDINGS: BRAIN/VENTRICLES: There is no acute intracranial hemorrhage, mass effect or midline shift. No abnormal extra-axial fluid collection. The gray-white differentiation is maintained without evidence of an acute infarct. There is no evidence of hydrocephalus. Stable, mild, generalized cerebral cortical atrophy. Mild bifrontal and biparietal periventricular and subcortical white matter hypoattenuation is noted. ORBITS: The visualized portion of the orbits demonstrate no acute abnormality. SINUSES: Trace air-fluid level left maxillary sinus. The other visualized paranasal sinuses and mastoid air cells demonstrate no acute abnormality. SOFT TISSUES/SKULL:  No acute abnormality of the visualized skull. Small left forehead scalp contusion/hematoma. 1. No acute intracranial abnormality. 2. Small left forehead scalp contusion/hematoma. 3. Possible acute left maxillary sinusitis. 4. Stable appearing senescent changes.      Us Gallbladder Ruq    Result Date: 1/4/2021 EXAMINATION: ONE XRAY VIEW OF THE CHEST 1/4/2021 6:38 pm COMPARISON: Chest x-ray dated 12/14/2020 HISTORY: ORDERING SYSTEM PROVIDED HISTORY: cough TECHNOLOGIST PROVIDED HISTORY: Reason for exam:->cough Reason for Exam: Shortness of Breath and cough Acuity: Acute Type of Exam: Initial FINDINGS: HEART/MEDIASTINUM: The cardiomediastinal silhouette is within normal limits. PLEURA/LUNGS: There are no focal consolidations or pleural effusions. There is no appreciable pneumothorax. BONES/SOFT TISSUE: No acute abnormality. No radiographic evidence of acute pulmonary disease.      Mri Abdomen Wo Contrast Mrcp    Result Date: 1/5/2021 EXAMINATION: MRI OF THE ABDOMEN WITHOUT CONTRAST AND MRCP 1/5/2021 1:34 pm TECHNIQUE: Multiplanar multisequence MRI of the abdomen was performed without the administration of intravenous contrast.  After initial T2 axial and coronal images, thick slab, thin slab and 3D coronal MRCP sequences were obtained without the administration of intravenous contrast.  MIP images are provided for review. COMPARISON: CT and ultrasounds from 01/04/2021, and prior HISTORY: ORDERING SYSTEM PROVIDED HISTORY: cholelithiasis, elevated lfts TECHNOLOGIST PROVIDED HISTORY: Reason for exam:->cholelithiasis, elevated lfts Reason for Exam: Weakness and shaking in legs. Some abdominal pain. Abnormal findings of US AND CT Acuity: Acute Type of Exam: Subsequent/Follow-up FINDINGS: Study is limited by motion artifact. Limited imaging of the lung bases demonstrates trace amount of pleural fluid. Gallbladder: Gallbladder is contracted and filled with stones. No pericholecystic fluid. Bile Ducts: Motion limited imaging demonstrates no dilation of the biliary ducts or definite filling defects. Pancreatic Duct: Pancreatic duct is normal in caliber. Small 5 mm bright T2 lesion noted at the head of the pancreas. Other:  Small amount of perinephric fluid is noted. There is a duodenal diverticulum appreciated. Evaluation of the liver and solid organs is limited by motion. No evidence of choledocholithiasis or dilation of the biliary collecting system on limited imaging Small bright T2 lesion within the head of the pancreas which could represent a small cyst.  These lesions are typically not followed beyond age of [de-identified] years.      Cta Chest Abdomen Pelvis W Contrast    Result Date: 1/4/2021 EXAMINATION: CTA OF THE CHEST, ABDOMEN AND PELVIS WITH CONTRAST, 1/4/2021 8:20 pm TECHNIQUE: CTA of the chest, abdomen and pelvis was performed after the administration of intravenous contrast.  Multiplanar reformatted images are provided for review. MIP images are provided for review. Dose modulation, iterative reconstruction, and/or weight based adjustment of the mA/kV was utilized to reduce the radiation dose to as low as reasonably achievable. COMPARISON: Chest radiograph performed earlier today and CT chest, abdomen and pelvis 12/15/2020 HISTORY: ORDERING SYSTEM PROVIDED HISTORY: abd pain TECHNOLOGIST PROVIDED HISTORY: Reason for exam:->abd pain Reason for Exam: known AAA, abdominal pain Acuity: Acute Type of Exam: Initial CTA CHEST FINDINGS: Vasculature: Main pulmonary artery is 2.6 cm. No definite pulmonary embolism evident. Normal aortic arch branching. Ascending thoracic aorta is 3.4 cm diameter and descending thoracic aorta 2.3 cm diameter. Mild calcific atherosclerosis aorta and its branches including several of the coronary arteries. Mediastinum: Cardiac structures and great vessels appear unremarkable with exception of calcific atherosclerotic disease. No pericardial effusion. Posterior mediastinal structures appear unremarkable. No mediastinal or hilar adenopathy. Lungs/pleura: Pulmonary sequela typical of that seen with smoking including emphysema predominating in the upper lungs. There is some blur related to breathing motion degrading image quality. No definite pulmonary nodule or consolidation evident. No pleural effusion or pneumothorax. No inspissated secretions or endobronchial lesion evident. Soft Tissues/Bones: Stable appearance of chronic T6 inferior endplate wedge compression fracture. The bones appear osteoporotic. CTA ABDOMEN/PELVIS FINDINGS: Vasculature: Mild calcific atherosclerosis aorta and its branches.   Proximal infrarenal abdominal aorta is 3 cm, distal infrarenal abdominal aorta is 2.4 cm diameter. Patent celiac axis, superior mesenteric artery, right and left renal arteries and inferior mesenteric artery. Normal distal abdominal aorta tapering. Mild dilatation proximal left common iliac artery measuring 1.4 cm. Other portions of the common, internal and external iliac arteries and visualized portions of the bilateral femoral arteries appear unremarkable. Organs: The liver attenuation appears abnormally low, and texture appears unremarkable. No discrete hepatic lesion or intrahepatic bile duct dilatation is seen. The gallbladder is contracted has calcifications reflecting cholelithiasis without pericholecystic fluid or inflammatory change evident. The kidneys, spleen, adrenal glands and pancreas appear unremarkable. GI/Bowel: No diffuse or focal bowel wall thickening evident. No inflammatory changes evident. No obstruction is seen. The appendix is visualized right lower quadrant, unremarkable in appearance. Small periampullary duodenal diverticulum. Pelvis: Prostate gland and seminal vesicles appear unremarkable. Urinary bladder is partially filled, unremarkable appearance. No adenopathy or free fluid. Peritoneum/Retroperitoneum: Calcific atherosclerotic disease aorta. No aneurysm. Unremarkable appearance of the IVC. No adenopathy or fluid. Bones/Soft Tissues: Stable diffuse sclerosis throughout L1 with compression fracture and mild retropulsion superior endplate. 1. CTA CHEST: No acute vascular abnormality; no aneurysm or dissection. 2.  Pulmonary sequela typical of that seen with smoking, including COPD. 3. Chronic mild T6 compression fracture. 4. CTA ABDOMEN/PELVIS: No acute vascular abnormality; no aneurysm leak or dissection. 5. 3.0 cm abdominal aortic aneurysm. Recommend follow-up every 3 years. 6.  No CT evidence of an acute intra-abdominal or intrapelvic process. 7. Cholelithiasis without definite CT findings of acute cholecystitis. Gallbladder appears contracted. 8.  No findings to suggest acute appendicitis; no ureter calculus or hydronephrosis. 9. Hepatic steatosis. 10. Chronic, nonspecific L1 sclerosis and compression fracture. Near vertebra plana. RECOMMENDATIONS: 3 year imaging follow-up AAA       Patient Instructions: Follow-up visits: Patient will follow up with general surgery,  vascular , pulm and his PCP. Discharge Medications:     Medication List      CHANGE how you take these medications    oxyCODONE 5 MG immediate release tablet  Commonly known as: ROXICODONE  Take 1 tablet by mouth every 4 hours as needed for Pain for up to 14 days.   What changed: how much to take        CONTINUE taking these medications    acetaminophen 325 MG tablet  Commonly known as: Aminofen  Take 2 tablets by mouth every 4 hours as needed for Pain     * albuterol (2.5 MG/3ML) 0.083% nebulizer solution  Commonly known as: PROVENTIL  USE 1 VIAL IN NEBULIZER 4 TIMES DAILY     * Ventolin  (90 Base) MCG/ACT inhaler  Generic drug: albuterol sulfate HFA  Inhale 2 puffs into the lungs every 6 hours as needed for Wheezing orShortness of Breath     alendronate 70 MG tablet  Commonly known as: FOSAMAX     aspirin 81 MG EC tablet     atorvastatin 10 MG tablet  Commonly known as: LIPITOR     dilTIAZem 180 MG extended release capsule  Commonly known as: Cartia XT  Take 1 capsule by mouth as needed (Ok to take as needed only 1 time a day for increased heart rate) Eliquis 5 MG Tabs tablet  Generic drug: apixaban  Take 1 tablet by mouth 2 times daily     esomeprazole 40 MG delayed release capsule  Commonly known as: NEXIUM     fluticasone 50 MCG/ACT nasal spray  Commonly known as: FLONASE     gabapentin 300 MG capsule  Commonly known as: NEURONTIN     metFORMIN 500 MG tablet  Commonly known as: GLUCOPHAGE     OXYGEN     pioglitazone 30 MG tablet  Commonly known as: ACTOS     propafenone 150 MG tablet  Commonly known as: RYTHMOL  TAKE ONE TABLET BY MOUTH EVERY 8 HOURS     Trelegy Ellipta 100-62.5-25 MCG/INH Aepb  Generic drug: fluticasone-umeclidin-vilant  INHALE 1 PUFF EVERY DAY         * This list has 2 medication(s) that are the same as other medications prescribed for you. Read the directions carefully, and ask your doctor or other care provider to review them with you.             STOP taking these medications    ciprofloxacin 500 MG tablet  Commonly known as: CIPRO     metroNIDAZOLE 500 MG tablet  Commonly known as: FLAGYL           Where to Get Your Medications      You can get these medications from any pharmacy    Bring a paper prescription for each of these medications  · oxyCODONE 5 MG immediate release tablet            I spent over 35 minutes on this discharge encounter between counseling, coordination of care, and medication reconciliation.         To comply with [address] [facility] bylaw R.II.4.1:   Discharge order placed in advance to facilitate patients discharge needs      Noam Aguilera MD, 1/23/2021, 9:04 AM

## 2021-01-23 NOTE — PROGRESS NOTES
Discharge instructions given to patient and reviewed all medications with his verbal understanding. One prescription given to patient and reviewed signs and symptoms of infection to watch for with surgical incision with his understanding.  Najma Azul

## 2021-01-26 ENCOUNTER — TELEPHONE (OUTPATIENT)
Dept: SURGERY | Age: 81
End: 2021-01-26

## 2021-01-26 NOTE — TELEPHONE ENCOUNTER
[facility] calling for wound care instructions, pt had lap connie on 01/06/2021.  Tiffany's call back number is [phone]

## 2021-01-26 NOTE — TELEPHONE ENCOUNTER
I spoke to JUAN MIGUEL Barba and she states to have home care do wet to dry dressing changes. I called and spoke to Chaya at [facility] and informed her. She stated that's what it looked like on his papers, she just wanted to verify.

## 2021-02-03 ENCOUNTER — OFFICE VISIT (OUTPATIENT)
Dept: SURGERY | Age: 81
End: 2021-02-03

## 2021-02-03 VITALS — DIASTOLIC BLOOD PRESSURE: 74 MMHG | SYSTOLIC BLOOD PRESSURE: 136 MMHG | TEMPERATURE: 97.8 F

## 2021-02-03 DIAGNOSIS — Z09 SURGICAL FOLLOW-UP CARE: Primary | ICD-10-CM

## 2021-02-03 PROCEDURE — 99024 POSTOP FOLLOW-UP VISIT: CPT | Performed by: SURGERY

## 2021-02-24 ENCOUNTER — TELEPHONE (OUTPATIENT)
Dept: SURGERY | Age: 81
End: 2021-02-24

## 2021-02-26 ENCOUNTER — OFFICE VISIT (OUTPATIENT)
Dept: VASCULAR SURGERY | Age: 81
End: 2021-02-26
Payer: MEDICARE

## 2021-02-26 VITALS
BODY MASS INDEX: 28 KG/M2 | HEIGHT: 71 IN | SYSTOLIC BLOOD PRESSURE: 140 MMHG | WEIGHT: 200 LBS | DIASTOLIC BLOOD PRESSURE: 80 MMHG

## 2021-02-26 DIAGNOSIS — I71.40 ABDOMINAL AORTIC ANEURYSM (AAA) 30 TO 34 MM IN DIAMETER: Primary | ICD-10-CM

## 2021-02-26 PROCEDURE — 99203 OFFICE O/P NEW LOW 30 MIN: CPT | Performed by: SURGERY

## 2021-02-26 PROCEDURE — G8417 CALC BMI ABV UP PARAM F/U: HCPCS | Performed by: SURGERY

## 2021-02-26 PROCEDURE — G8484 FLU IMMUNIZE NO ADMIN: HCPCS | Performed by: SURGERY

## 2021-02-26 PROCEDURE — G8427 DOCREV CUR MEDS BY ELIG CLIN: HCPCS | Performed by: SURGERY

## 2021-02-26 NOTE — PROGRESS NOTES
[facility] GENERAL SURGERY      S:   Patient presents s/p open connie 3 weeks  ago. He reports improvement. O:   Comfortable         Incision site healing well. FINAL DIAGNOSIS:     Gallbladder, cholecystectomy:   - Acute and chronic cholecystitis with cholelithiasis.    BUCCA/BUCCA             A:   S/P open connie    P:   Follow up as needed.

## 2021-02-26 NOTE — PROGRESS NOTES
Outpatient Consultation / H&P    Date of Consultation:  2/26/2021    PCP:  Chris Storm MD     Referring Provider:  Dr. Dc Rodriguez     Chief Complaint:   Chief Complaint   Patient presents with    Other     patient ref by Dr Tess Silverman for AAA. pamlr        History of Present Illness: We are asked to see this patient in consultation by Dr. Dc Rodriguez regarding an AAA. Bebeto Escalona is a 78 y.o. male who has a known history of AAA imaged 2020 on CT, with a diameter of 3.1cm. He recently developed cholecystitis and underwent open Cholecystectomy. He was then on Rehab. CT imaging performed showed stable AAA. Abdominal pain has resolved. He does have severe COPD and is on chronic oxygen.        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    ELAINE on CPAP     Osteoporosis     Skin cancer of face        Past Surgical History:  Past Surgical History:   Procedure Laterality Date    BACK SURGERY      repair broken back L4 & L5    CHOLECYSTECTOMY, LAPAROSCOPIC N/A 1/6/2021    LAPAROSCOPIC CHOLECYSTECTOMY, WITH CHOLANGIOGRAMS (ATTEMPTED) OPEN CHOLECYSTECTOMY performed by Catherine Singletary MD at [address] [place] [address], OPEN N/A 01/06/2021    LAPAROSCOPIC CHOLECYSTECTOMY, WITH CHOLANGIOGRAMS (ATTEMPTED)     CYSTOSCOPY Right 10/9/15    RIght Ureteroscopy, Holmium Laser Lithotripsy with Stone Removal, Right Stent Placement    CYSTOSCOPY  05/01/2019    CYSTOSCOPY, RESECTION OF BLADDER NECK, URETHRAL DILATION    CYSTOSCOPY W BIOPSY OF BLADDER N/A 5/1/2019 CYSTOSCOPY, RESECTION OF BLADDER NECK, URETHRAL DILATION performed by Racheal Castanon MD at [address] Left 6/12/2016    cataract removal    JOINT REPLACEMENT  6/29/2011    right knee    JOINT REPLACEMENT  11/2004    left knee    OTHER SURGICAL HISTORY  08/31/2015    wide excision basal cell carcinoma on the nose and bilateral auricles with frozen sections, plastic closure, full thickness skin graft    OTHER SURGICAL HISTORY  12/1/15    excision of lesion of lip    PROSTATE SURGERY      TURP  10/23/2015    with cystoscopy       Home Medications:   Prior to Admission medications    Medication Sig Start Date End Date Taking?  Authorizing Provider   VENTOLIN  (90 Base) MCG/ACT inhaler Inhale 2 puffs into the lungs every 6 hours as needed for Wheezing orShortness of Breath 12/28/20  Yes Victoriano Harris MD   aspirin 81 MG EC tablet Take 81 mg by mouth daily   Yes Historical Provider, MD   propafenone (RYTHMOL) 150 MG tablet TAKE ONE TABLET BY MOUTH EVERY 8 HOURS 10/1/20  Yes Catalino June MD   ELIQUIS 5 MG TABS tablet Take 1 tablet by mouth 2 times daily 10/1/20  Yes Catalino June MD   Murriel Leopoldo 100-62.5-25 MCG/INH AEPB INHALE 1 PUFF EVERY DAY 10/1/20  Yes Victoriano Harris MD   albuterol (PROVENTIL) (2.5 MG/3ML) 0.083% nebulizer solution USE 1 VIAL IN NEBULIZER 4 TIMES DAILY 9/28/20  Yes Victoriano Harris MD   acetaminophen (AMINOFEN) 325 MG tablet Take 2 tablets by mouth every 4 hours as needed for Pain 8/11/20  Yes Solo Currie MD   dilTIAZem (CARTIA XT) 180 MG extended release capsule Take 1 capsule by mouth as needed (Clinton Ta to take as needed only 1 time a day for increased heart rate) 5/7/20  Yes Sterling Mas, APRN - CNP   alendronate (FOSAMAX) 70 MG tablet  3/31/20  Yes Historical Provider, MD   fluticasone [place]) 50 MCG/ACT nasal spray  2/12/20  Yes Historical Provider, MD   atorvastatin (LIPITOR) 10 MG tablet  3/31/20  Yes Historical Provider, MD Physically abused: Not on file     Forced sexual activity: Not on file   Other Topics Concern    Not on file   Social History Narrative    Not on file       Family History:        Problem Relation Age of Onset    Cancer Mother     Diabetes Mother     Heart Disease Mother     Cancer Father     Diabetes Sister     Cancer Brother        Review of Systems:  A 14 point review of systems was completed. Pertinent positives identified in the HPI, all other review of systems negative. Physical Examination:    BP (!) 140/80 (Site: Right Upper Arm)   Ht 5' 11\" (1.803 m)   Wt 200 lb (90.7 kg)   BMI 27.89 kg/m²     Weight: 200 lb (90.7 kg)       General appearance: NAD  Eyes: PERRLA  Neck: no JVD, no lymphadenopathy. Respiratory: effort is unlabored, no crackles, wheezes or rubs. Cardiovascular: regular, no murmur. No carotid bruits. No edema or varicosities. Abdominal aorta: non palpable. Pulses:    radial femoral PT   RIGHT 2 2 2   LEFT 2 2 2   GI: abdomen soft, nondistended, no organomegaly. Musculoskeletal: strength and tone normal.  Extremities: warm and pink. Skin: no dermatitis or ulceration. Neuro/psychiatric: grossly intact. MEDICAL DECISION MAKING/TESTING        CT: images personally reviewed and interpreted. Agree with reported results. Impression   1.  CTA CHEST: No acute vascular abnormality; no aneurysm or dissection. 2.  Pulmonary sequela typical of that seen with smoking, including COPD. 3. Chronic mild T6 compression fracture. 4. CTA ABDOMEN/PELVIS: No acute vascular abnormality; no aneurysm leak or   dissection. 5. 3.0 cm abdominal aortic aneurysm. Recommend follow-up every 3 years. Assessment:      Diagnosis Orders   1. Abdominal aortic aneurysm (AAA) 30 to 34 mm in diameter (HCC)       Asymptomatic small AAA. Recommendations/Plan:    Will place order for Aortic Duplex Ultrasound in two years. Any abdominal or back pain should be evaluated promptly. Dg De Guzman MD, FACS

## 2021-03-30 ENCOUNTER — HOSPITAL ENCOUNTER (OUTPATIENT)
Dept: VASCULAR LAB | Age: 81
Discharge: HOME | End: 2021-03-30
Payer: MEDICARE

## 2021-03-30 PROCEDURE — 93925 LOWER EXTREMITY STUDY: CPT

## 2021-03-31 RX ORDER — FLUTICASONE FUROATE, UMECLIDINIUM BROMIDE AND VILANTEROL TRIFENATATE 100; 62.5; 25 UG/1; UG/1; UG/1
POWDER RESPIRATORY (INHALATION)
Qty: 60 EACH | Refills: 5 | Status: SHIPPED | OUTPATIENT
Start: 2021-03-31 | End: 2021-10-01

## 2021-03-31 NOTE — PROGRESS NOTES
Cataracts are visually significant and patient is ready to consider surgery; anticipate improved visual acuity but no guarantee of outcome; refer for consult. Sonia Kate is a 78 y.o. male evaluated via telephone on 9/17/2020. Consent:  He and/or health care decision maker is aware that that he may receive a bill for this telephone service, depending on his insurance coverage, and has provided verbal consent to proceed: Yes      Documentation:  I communicated with the patient and/or health care decision maker about COPD. Details of this discussion including any medical advice provided:     Not needing rescue   No wheeze or cough  SUTTON is improved some    ASSESSMENT:  · Very Severe COPD   · Chronic hypoxic respiratory failure  · SUTTON due to the above  · Chronic Thrombcytopenia  · Not addressed: Afib, DM2     PLAN:   · Continue Trelegy  · Albuterol nebs q4hr PRN  · Prn albuterol nebs   · Supplemental O2 to 3lpm with exertion  · Pulmonary rehab declined. · F/u in 6 months    I affirm this is a Patient Initiated Episode with a Patient who has not had a related appointment within my department in the past 7 days or scheduled within the next 24 hours.     Patient identification was verified at the start of the visit: Yes    Total Time: minutes: 11-20 minutes    Note: not billable if this call serves to triage the patient into an appointment for the relevant concern      ANTONIO BRADSHAW

## 2021-05-03 ENCOUNTER — TELEPHONE (OUTPATIENT)
Dept: PULMONOLOGY | Age: 81
End: 2021-05-03

## 2021-05-03 NOTE — TELEPHONE ENCOUNTER
Patient cancelled appointment on 5/19/21 with Dr Valarie Akins for 6 mo fu COPD. Reason: na    Patient did reschedule appointment. Appointment rescheduled for 7/8/21. ASSESSMENT: 9/17/20  · Very Severe COPD   · Chronic hypoxic respiratory failure  · SUTTON due to the above  · Chronic Thrombcytopenia  · Not addressed: Afib, DM2     PLAN:   · Continue Trelegy  · Albuterol nebs q4hr PRN  · Prn albuterol nebs   · Supplemental O2 to 3lpm with exertion  · Pulmonary rehab declined.    · F/u in 6 months

## 2021-07-03 LAB
CHOLESTEROL, TOTAL: 113 MG/DL
CHOLESTEROL/HDL RATIO: NORMAL
HDLC SERPL-MCNC: 37 MG/DL (ref 35–70)
LDL CHOLESTEROL CALCULATED: 57 MG/DL (ref 0–160)
NONHDLC SERPL-MCNC: NORMAL MG/DL
TRIGL SERPL-MCNC: 99 MG/DL
VLDLC SERPL CALC-MCNC: 19 MG/DL

## 2021-07-07 ENCOUNTER — TELEPHONE (OUTPATIENT)
Dept: PULMONOLOGY | Age: 81
End: 2021-07-07

## 2021-07-07 NOTE — TELEPHONE ENCOUNTER
Patient cancelled appointment on 7/8/21 with Dr. Sarah Tyler for 6 month r/s. Reason: pt can't make it, unable to do VV    Patient did reschedule appointment. Appointment rescheduled for 9/2/21. Last OV 9/17/20      ASSESSMENT:  · Very Severe COPD   · Chronic hypoxic respiratory failure  · SUTTON due to the above  · Chronic Thrombcytopenia  · Not addressed: Afib, DM2     PLAN:   · Continue Trelegy  · Albuterol nebs q4hr PRN  · Prn albuterol nebs   · Supplemental O2 to 3lpm with exertion  · Pulmonary rehab declined.    · F/u in 6 months

## 2021-07-30 DIAGNOSIS — J44.9 CHRONIC OBSTRUCTIVE PULMONARY DISEASE, UNSPECIFIED COPD TYPE (HCC): ICD-10-CM

## 2021-07-30 RX ORDER — ALBUTEROL SULFATE 2.5 MG/3ML
SOLUTION RESPIRATORY (INHALATION)
Qty: 100 VIAL | Refills: 11 | OUTPATIENT
Start: 2021-07-30

## 2021-09-02 ENCOUNTER — OFFICE VISIT (OUTPATIENT)
Dept: PULMONOLOGY | Age: 81
End: 2021-09-02
Payer: MEDICARE

## 2021-09-02 ENCOUNTER — CASE MANAGEMENT (OUTPATIENT)
Dept: SPIRITUAL SERVICES | Age: 81
End: 2021-09-02

## 2021-09-02 VITALS
HEART RATE: 107 BPM | RESPIRATION RATE: 20 BRPM | BODY MASS INDEX: 29.35 KG/M2 | WEIGHT: 205 LBS | DIASTOLIC BLOOD PRESSURE: 78 MMHG | SYSTOLIC BLOOD PRESSURE: 130 MMHG | HEIGHT: 70 IN | OXYGEN SATURATION: 98 %

## 2021-09-02 DIAGNOSIS — J96.12 CHRONIC RESPIRATORY FAILURE WITH HYPOXIA AND HYPERCAPNIA (HCC): ICD-10-CM

## 2021-09-02 DIAGNOSIS — J44.9 CHRONIC OBSTRUCTIVE PULMONARY DISEASE, UNSPECIFIED COPD TYPE (HCC): Primary | ICD-10-CM

## 2021-09-02 DIAGNOSIS — J96.11 CHRONIC RESPIRATORY FAILURE WITH HYPOXIA AND HYPERCAPNIA (HCC): ICD-10-CM

## 2021-09-02 PROCEDURE — 1123F ACP DISCUSS/DSCN MKR DOCD: CPT | Performed by: INTERNAL MEDICINE

## 2021-09-02 PROCEDURE — G8427 DOCREV CUR MEDS BY ELIG CLIN: HCPCS | Performed by: INTERNAL MEDICINE

## 2021-09-02 PROCEDURE — G8926 SPIRO NO PERF OR DOC: HCPCS | Performed by: INTERNAL MEDICINE

## 2021-09-02 PROCEDURE — 1036F TOBACCO NON-USER: CPT | Performed by: INTERNAL MEDICINE

## 2021-09-02 PROCEDURE — 3023F SPIROM DOC REV: CPT | Performed by: INTERNAL MEDICINE

## 2021-09-02 PROCEDURE — 99214 OFFICE O/P EST MOD 30 MIN: CPT | Performed by: INTERNAL MEDICINE

## 2021-09-02 PROCEDURE — 4040F PNEUMOC VAC/ADMIN/RCVD: CPT | Performed by: INTERNAL MEDICINE

## 2021-09-02 PROCEDURE — G8417 CALC BMI ABV UP PARAM F/U: HCPCS | Performed by: INTERNAL MEDICINE

## 2021-09-02 RX ORDER — GUAIFENESIN 600 MG/1
600 TABLET, EXTENDED RELEASE ORAL 2 TIMES DAILY
Qty: 60 TABLET | Refills: 1 | Status: SHIPPED | OUTPATIENT
Start: 2021-09-02 | End: 2021-11-01

## 2021-09-02 RX ORDER — BENZONATATE 200 MG/1
200 CAPSULE ORAL 3 TIMES DAILY PRN
Qty: 90 CAPSULE | Refills: 0 | Status: SHIPPED | OUTPATIENT
Start: 2021-09-02 | End: 2021-10-02

## 2021-09-02 NOTE — PROGRESS NOTES
, Disp: , Rfl:     atorvastatin (LIPITOR) 10 MG tablet, , Disp: , Rfl:     pioglitazone (ACTOS) 30 MG tablet, Take 30 mg by mouth daily, Disp: , Rfl:     gabapentin (NEURONTIN) 300 MG capsule, Take 600 mg by mouth 3 times daily . , Disp: , Rfl:     OXYGEN, Inhale 2.5 L into the lungs nightly Pt wears 2- 2/12 L at night. 3 L while walking/ moving around, Disp: , Rfl:     metformin (GLUCOPHAGE) 500 MG tablet, Take 500 mg by mouth 4 times daily , Disp: , Rfl:     esomeprazole (NEXIUM) 40 MG capsule,  Take 40 mg by mouth every morning (before breakfast) Indications: take dos , Disp: , Rfl:     Objective:   PHYSICAL EXAM:    VITALS:  There were no vitals taken for this visit. NOT VACCINATED  Constitutional:  No acute distress. Appears well developed and nourished. Eyes: EOM intact. Conjunctivae anicteric. No visible discharge. HENT: Head is normocephalic and atraumatic. Mucus membranes are moist and the tongue appears normal. Normal appearing nose. External Ears normal.   Neck: No obvious mass and the trachea is midline. Respiratory: No accessory muscle usage. Respiratory effort normal. No visualized signs of difficulty breathing or respiratory distress  Cardiovascular: No LE edema. Skin: No significant exanthematous lesions or discoloration noted on facial skin. Musculoskeletal:  No digit clubbing or cyanosis. Normal sitting station. Psychiatric: No anxiety or Agitation. Alert and Oriented to person, place and time. Normal judgement and insight. LABS:  Reviewed any pertinent new labs that are available.     PFTs 7/13/17  FVC  (35%) FEV1 0.57 (18%)  FEV1/FVC ratio 37  TLC  (146%)  RV  (300%)   DLCO (25%) Bronchodilator response:+++    6MWT: 360ft, 3lpm     IMAGING:  I personally reviewed and interpreted the following today in the office:   7/21/17 Chest CT:   Mediastinum: Limited evaluation without IV contrast.  Heavy coronary artery   calcifications are noted. Yo Sykes prominent precarinal lymph node measures 8   mm short axis diameter.       Lungs/pleura: Retained secretions are noted in the lower trachea and right   main stem bronchus.  Small calcified granuloma right upper lobe measures 8   mm, accounting for the plain film finding reported recent.  Mild bronchial   wall thickening in the lower lobes.  Minimal subpleural reticular opacities   right middle lobe, likely postinflammatory.  Moderate to severe centrilobular   type emphysematous changes are noted bilaterally.  Mild fibrotic changes   posterior right lower lobe.  No pleural effusion appreciated.       Upper Abdomen: Limited evaluation without acute finding detected.  Shrunken   gallbladder containing stones.       Soft Tissues/Bones: Incidental hemangioma in the T12 vertebral body.  No   acute bony abnormality detected.  Moderate diffuse degenerative disc disease. Moderate compressive deformity T5 vertebral body.  This appears similar to   appearance from chest x-ray 03/07/2017.           Impression   The questioned right suprahilar nodular density corresponds to a benign   calcified granuloma by chest CT scan.  No additional follow-up is necessary   for this.       Moderate to severe centrilobular type emphysema is noted.       Retained secretions within the lower trachea and right mainstem bronchus. Mild bronchial wall thickening in the lower lobes.       Scattered postinflammatory changes are noted within the lungs.        ASSESSMENT:  · Very Severe COPD   · Chronic hypoxic respiratory failure  · SUTTON due to the above  · Chronic Thrombcytopenia  · Not addressed: Afib, DM2     PLAN:   · Continue Trelegy  · Albuterol nebs q4hr PRN  · Prn albuterol nebs   · Supplemental O2 to 3lpm with exertion  Pulmonary rehab declined.    · Pneumovax 23 and prevnar 13 UTD;  flu vaccine last year,  · But refused COVID vaccine  F/u in 6 months

## 2021-09-02 NOTE — FLOWSHEET NOTE
Advance Care Planning   Advance Care Planning Note  Ambulatory Spiritual Care Services    Date:  9/2/2021    Received request from patient. Consultation conversation participants:   Patient who understands ACP conversation  Healthcare Decision Maker     Goals of ACP Conversation:  Discuss advance care planning documents  Facilitate a discussion related to patient's goals of care as they align with the patient's values and beliefs. Health Care Decision Makers:    No healthcare decision makers have been documented. Click here to complete [id number] including selection of the Healthcare Decision Maker Relationship (ie \"Primary\")   Summary:  Completed New Documents  Placed POA & LW in chart    Advance Care Planning Documents (Patient Wishes)  Currently on file:   Healthcare Power of /Advance Directive Appointment of [address]  Living Will/Advance Directive    Assessment:   Jarrett visited the [facility] office with daughter Drea, who is his primary assistance and help in the family. We completed AD documents and I had them scanned into his chart. Interventions:  Assisted in the completion of documents according to patient's wishes at this time    Care Preferences Communicated:   No  Patient trusts designated POA's to make decision for him. Outcomes:  New advance directive completed.     Patient / Healthcare Decision Maker Instructions:  Review completed ACP document(s) and update, if needed, with changes health or future preferences    Electronically signed by Milagros Lynn on 9/2/2021 at 3:57 PM.

## 2021-10-01 LAB
ALBUMIN SERPL-MCNC: 4.4 G/DL
ALP BLD-CCNC: 121 U/L
ALT SERPL-CCNC: 33 U/L
ANION GAP SERPL CALCULATED.3IONS-SCNC: NORMAL MMOL/L
AST SERPL-CCNC: 39 U/L
AVERAGE GLUCOSE: 146
BASOPHILS ABSOLUTE: 0 /ΜL
BASOPHILS RELATIVE PERCENT: 0 %
BILIRUB SERPL-MCNC: 0.6 MG/DL (ref 0.1–1.4)
BUN BLDV-MCNC: 13 MG/DL
CALCIUM SERPL-MCNC: 8.9 MG/DL
CHLORIDE BLD-SCNC: 101 MMOL/L
CO2: 27 MMOL/L
CREAT SERPL-MCNC: 0.96 MG/DL
EOSINOPHILS ABSOLUTE: 0.1 /ΜL
EOSINOPHILS RELATIVE PERCENT: 1 %
GFR CALCULATED: NORMAL
GLUCOSE BLD-MCNC: 150 MG/DL
HBA1C MFR BLD: 6.7 %
HCT VFR BLD CALC: 35.3 % (ref 41–53)
HEMOGLOBIN: 11.7 G/DL (ref 13.5–17.5)
LYMPHOCYTES ABSOLUTE: 2.6 /ΜL
LYMPHOCYTES RELATIVE PERCENT: 37 %
MCH RBC QN AUTO: 29 PG
MCHC RBC AUTO-ENTMCNC: 33.1 G/DL
MCV RBC AUTO: 88 FL
MONOCYTES ABSOLUTE: 0.4 /ΜL
MONOCYTES RELATIVE PERCENT: 6 %
NEUTROPHILS ABSOLUTE: 3.9 /ΜL
NEUTROPHILS RELATIVE PERCENT: 56 %
PLATELET # BLD: 113 K/ΜL
PMV BLD AUTO: ABNORMAL FL
POTASSIUM SERPL-SCNC: 4.4 MMOL/L
RBC # BLD: 4.03 10^6/ΜL
SODIUM BLD-SCNC: 143 MMOL/L
TOTAL PROTEIN: 6.8
WBC # BLD: 7 10^3/ML

## 2021-10-01 RX ORDER — FLUTICASONE FUROATE, UMECLIDINIUM BROMIDE AND VILANTEROL TRIFENATATE 100; 62.5; 25 UG/1; UG/1; UG/1
POWDER RESPIRATORY (INHALATION)
Qty: 60 EACH | Refills: 5 | Status: SHIPPED | OUTPATIENT
Start: 2021-10-01 | End: 2022-04-01

## 2021-10-04 NOTE — PROGRESS NOTES
[address] Office Note  10/5/2021     Subjective:  Mr. Sharol Lombard is here for follow up for Paroxysmal atrial fibrillation. HTN HLD   He has COPD chronic resp failure home O2 3L/min     [place]:  He comes in with his daughter Yanique Preston. Since last visit he underwent open cholecystectomy on 1/11/2021. He has followed with DR. Tello for AAA measuring 3.1 cm noted on CT in 1/4/2021. Today, he presents in wheelchair on 3L NC . He reports that he chronically uses oxygen at home. He is complaint with his medications and tolerates well. He notes that he bruises easily. EKG today noted NSR. He is unvaccinated for COVID. HPI:   Admitted to the hospital 4/7/20  presented to the hospital with complaints of  Shortness of breath. treated for acute exacerbation of COPD. Last night he went in to afib RVR. known to have PAF documented in 2015  Readmission to [facility] 4/16/20 with c/o SOB. He has PMH of PAF on eliquis dx 10/15, HTN, DM, HLD, and severe COPD on 3.5L NC home oxygen. Note admitted to [facility] 10/8/15 with kidney stone treated with cysto/laser removal by Dr. Joe Champagne. He developed asymptomatic afib. He has a [id number] score of 4. Placed on eliquis for [facility].      Review of Systems:  Constitutional: negative for anorexia, chills, fatigue, fevers, malaise, sweats and weight loss  Eyes: negative for irritation, redness and visual disturbance  Respiratory: negative for cough, dyspnea on exertion, hemoptysis, pleurisy/chest pain, shortness of breath, sputum and wheezing  Cardiovascular: negative for chest pain, chest pressure/discomfort, claudication, dyspnea, exertional chest pressure/discomfort, fatigue, irregular heart beat, lower extremity edema, near-syncope, orthopnea, palpitations, paroxysmal nocturnal dyspnea, syncope and tachypnea  Gastrointestinal: negative for abdominal pain, change in bowel habits, constipation, diarrhea, dyspepsia, dysphagia, jaundice, melena, nausea, odynophagia, reflux symptoms and vomiting  Genitourinary:negative for dysuria, frequency, hematuria, hesitancy, nocturia and urinary incontinence  Musculoskeletal:negative for muscle weakness and myalgias  Neurological: negative for coordination problems, dizziness, gait problems, headaches, paresthesia, seizures, speech problems, tremors, vertigo and weakness  Behavioral/Psych: negative for bad mood and fatigue  Endocrine: negative for diabetic symptoms including blurry vision, increased fatigue, polydipsia, polyphagia and polyuria    Reviewed past medical history, social, and family history. Smoking none  Alcohol none  Family history patient does not know details due to time elapsed.   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    ELAINE on CPAP     Osteoporosis     Skin cancer of face      Past Surgical History:   Procedure Laterality Date    BACK SURGERY      repair broken back L4 & L5    CHOLECYSTECTOMY, LAPAROSCOPIC N/A 1/6/2021    LAPAROSCOPIC CHOLECYSTECTOMY, WITH CHOLANGIOGRAMS (ATTEMPTED) OPEN CHOLECYSTECTOMY performed by Robb Driscoll MD at [address] [place] [address], OPEN N/A 01/06/2021    LAPAROSCOPIC CHOLECYSTECTOMY, WITH CHOLANGIOGRAMS (ATTEMPTED)     CYSTOSCOPY Right 10/9/15    RIght Ureteroscopy, Holmium Laser Lithotripsy with Stone Removal, Right Stent Placement    CYSTOSCOPY  05/01/2019    CYSTOSCOPY, RESECTION OF BLADDER NECK, URETHRAL DILATION    CYSTOSCOPY W BIOPSY OF BLADDER N/A 5/1/2019    CYSTOSCOPY, RESECTION OF BLADDER NECK, URETHRAL DILATION performed by Yair Puentes MD at [address] [place] [address] Left 6/12/2016    cataract removal    GALLBLADDER SURGERY 01/2021    JOINT REPLACEMENT  6/29/2011    right knee    JOINT REPLACEMENT  11/2004    left knee    OTHER SURGICAL HISTORY  08/31/2015    wide excision basal cell carcinoma on the nose and bilateral auricles with frozen sections, plastic closure, full thickness skin graft    OTHER SURGICAL HISTORY  12/1/15    excision of lesion of lip    PROSTATE SURGERY      TURP  10/23/2015    with cystoscopy       Objective:   /72   Pulse 104   Ht 6' (1.829 m)   Wt 203 lb (92.1 kg)   SpO2 97%   BMI 27.53 kg/m²     Wt Readings from Last 3 Encounters:   10/05/21 203 lb (92.1 kg)   09/02/21 205 lb (93 kg)   02/26/21 200 lb (90.7 kg)       Physical Exam:  General: No Respiratory distress, appears well developed and well nourished. Eyes:  Sclera nonicteric  Nose/Sinuses:  negative findings: nose shows no deformity, asymmetry, or inflammation, nasal mucosa normal, septum midline with no perforation or bleeding  Back:  no pain to palpation  Joint:  no active joint inflammation  Musculoskeletal:  negative  Skin:  Warm and dry bruises ecchymoses on exposed areas  Neck:  Negative for JVD and Carotid Bruits. Chest:  Clear to auscultation, respiration easy  Cardiovascular:  RRR, S1S2  diminished , no murmur, no rub or thrill.   Abdomen:  Soft normal liver and spleen  Extremities:   No edema, clubbing, cyanosis,  Pulses: Femoral and pedal pulses are normal.  Neuro: intact    Medications:   Outpatient Encounter Medications as of 10/5/2021   Medication Sig Dispense Refill    dilTIAZem (CARTIA XT) 180 MG extended release capsule Take 1 capsule by mouth as needed (Ok to take as needed only 1 time a day for increased heart rate) 90 capsule 3    apixaban (ELIQUIS) 5 MG TABS tablet Take 1 tablet by mouth 2 times daily 180 tablet 3    propafenone (RYTHMOL) 150 MG tablet TAKE ONE TABLET BY MOUTH EVERY 8 HOURS 270 tablet 3    atorvastatin (LIPITOR) 10 MG tablet Take 1 tablet by mouth daily 90 tablet 3    TRELEGY ELLIPTA 100-62.5-25 MCG/INH AEPB INHALE 1 PUFF EVERY DAY 60 each 5    guaiFENesin (MUCINEX) 600 MG extended release tablet Take 1 tablet by mouth 2 times daily 60 tablet 1    VENTOLIN  (90 Base) MCG/ACT inhaler Inhale 2 puffs into the lungs every 6 hours as needed for Wheezing orShortness of Breath 18 g 5    albuterol (PROVENTIL) (2.5 MG/3ML) 0.083% nebulizer solution USE 1 VIAL IN NEBULIZER 4 TIMES DAILY 100 vial 11    acetaminophen (AMINOFEN) 325 MG tablet Take 2 tablets by mouth every 4 hours as needed for Pain 120 tablet 0    alendronate (FOSAMAX) 70 MG tablet       fluticasone (FLONASE) 50 MCG/ACT nasal spray       pioglitazone (ACTOS) 30 MG tablet Take 30 mg by mouth daily      gabapentin (NEURONTIN) 300 MG capsule Take 600 mg by mouth 3 times daily .  OXYGEN Inhale 2.5 L into the lungs nightly Pt wears 2- 2/12 L at night. 3 L while walking/ moving around      metformin (GLUCOPHAGE) 500 MG tablet Take 500 mg by mouth 4 times daily       esomeprazole (NEXIUM) 40 MG capsule   Take 40 mg by mouth every morning (before breakfast) Indications: take dos       [DISCONTINUED] aspirin 81 MG EC tablet Take 81 mg by mouth daily      [DISCONTINUED] propafenone (RYTHMOL) 150 MG tablet TAKE ONE TABLET BY MOUTH EVERY 8 HOURS 90 tablet 11    [DISCONTINUED] ELIQUIS 5 MG TABS tablet Take 1 tablet by mouth 2 times daily (Patient not taking: Reported on 10/5/2021) 60 tablet 11    [DISCONTINUED] dilTIAZem (CARTIA XT) 180 MG extended release capsule Take 1 capsule by mouth as needed (Ok to take as needed only 1 time a day for increased heart rate) 30 capsule 3    [DISCONTINUED] atorvastatin (LIPITOR) 10 MG tablet        No facility-administered encounter medications on file as of 10/5/2021. Lab Data:  CBC: No results for input(s): WBC, HGB, HCT, MCV, PLT in the last 72 hours. BMP: No results for input(s): NA, K, CL, CO2, PHOS, BUN, CREATININE in the last 72 hours.     Invalid input(s): CA  LIVER PROFILE: No results for input(s): AST, ALT, LIPASE, BILIDIR, BILITOT, ALKPHOS in the last 72 hours. Invalid input(s): AMYLASE,  ALB  LIPID: No results found for: CHOL  No results found for: TRIG  No results found for: HDL  No results found for: LDLCHOLESTEROL, LDLCALC  No results found for: LABVLDL, VLDL  No results found for: CHOLHDLRATIO  PT/INR: No results for input(s): PROTIME, INR in the last 72 hours. A1C:   Lab Results   Component Value Date    LABA1C 6.6 01/04/2021     BNP:  No results for input(s): BNP in the last 72 hours. IMAGING:   10.5.21 EKG NSR first degree AV block   VL DUPLEX BLE 3/30/2021  Normal bilateral lower extremity ankle brachial indices. Normal bilateral lower extremity arterial Doppler ultrasound. No significant limitation in arterial blood flow in the right and left lower extremities. CTA: Chest/Abd 1/4/2021    Impression   1. CTA CHEST: No acute vascular abnormality; no aneurysm or dissection. 2.  Pulmonary sequela typical of that seen with smoking, including COPD. 3. Chronic mild T6 compression fracture. 4. CTA ABDOMEN/PELVIS: No acute vascular abnormality; no aneurysm leak or   dissection. 5. 3.0 cm abdominal aortic aneurysm. Recommend follow-up every 3 years. EKG 1/4/2021  Sinus tachycardiaNonspecific T wave abnormalityAbnormal  bpm    ECHO 4/1/2020  Summary  Left ventricular systolic function is normal with ejection fraction  estimated at 55-60 %. No regional wall motion abnormalities are noted. The left ventricular size and wall thickness were not well visualized for measurement. Normal left ventricular diastolic filling pressure. The right atrium is mildly dilated. Mild mitral regurgitation. Aortic valve sclerosis without aortic stenosis. Mild tricuspid regurgitation. Normal systolic pulmonary artery pressure (SPAP) estimated at 36 mmHg (RA pressure 8 mmHg).       Assessment:  Madalyn Jason was seen today for 1 month follow-up and medication refill. Diagnoses and all orders for this visit:    PAF (paroxysmal atrial fibrillation) ([facility] [address] [facility]  -     EKG 12 Lead    Primary hypertension    Mixed hyperlipidemia    Abdominal aortic aneurysm (AAA) 30 to 34 mm in diameter (HCC)    Other orders  -     dilTIAZem (CARTIA XT) 180 MG extended release capsule; Take 1 capsule by mouth as needed (Ok to take as needed only 1 time a day for increased heart rate)  -     apixaban (ELIQUIS) 5 MG TABS tablet; Take 1 tablet by mouth 2 times daily  -     propafenone (RYTHMOL) 150 MG tablet; TAKE ONE TABLET BY MOUTH EVERY 8 HOURS  -     atorvastatin (LIPITOR) 10 MG tablet; Take 1 tablet by mouth daily        BP is controlled  Rhythm of heart is sinus  He is on appropriate Rx  He is fully anticoagulated  No need for aspirin since he has not had  Any recent vascular intervention. Continue statin  Plan:  1. Medication reviewed. Refills warranted. 2. Stop Aspirin  3. Recommend that you receive COVID vaccine  4. Follow up in 6 months  5. He had labs with Dr Beronica Packer yesterday will call for a copy of results    This note was scribed in the presence of Felicia Pan MD by Sydnee Pacheco RN. I, Dr. Radha Gomez, personally performed the services described in this documentation, as scribed by the above signed scribe in my presence. It is both accurate and complete to my knowledge. I agree with the details independently gathered by the clinical support staff, while the remaining scribed note accurately describes my personal service to the patient.         Felicia Pan MD  [facility]  [phone] [facility]  [phone] [facility]

## 2021-10-05 ENCOUNTER — OFFICE VISIT (OUTPATIENT)
Dept: CARDIOLOGY CLINIC | Age: 81
End: 2021-10-05
Payer: MEDICARE

## 2021-10-05 VITALS
WEIGHT: 203 LBS | SYSTOLIC BLOOD PRESSURE: 136 MMHG | BODY MASS INDEX: 27.5 KG/M2 | OXYGEN SATURATION: 97 % | HEIGHT: 72 IN | DIASTOLIC BLOOD PRESSURE: 72 MMHG | HEART RATE: 104 BPM

## 2021-10-05 DIAGNOSIS — I71.40 ABDOMINAL AORTIC ANEURYSM (AAA) 30 TO 34 MM IN DIAMETER: ICD-10-CM

## 2021-10-05 DIAGNOSIS — I10 PRIMARY HYPERTENSION: Chronic | ICD-10-CM

## 2021-10-05 DIAGNOSIS — E78.2 MIXED HYPERLIPIDEMIA: Chronic | ICD-10-CM

## 2021-10-05 DIAGNOSIS — I48.0 PAF (PAROXYSMAL ATRIAL FIBRILLATION) (HCC): Primary | ICD-10-CM

## 2021-10-05 PROCEDURE — 1036F TOBACCO NON-USER: CPT | Performed by: INTERNAL MEDICINE

## 2021-10-05 PROCEDURE — 99214 OFFICE O/P EST MOD 30 MIN: CPT | Performed by: INTERNAL MEDICINE

## 2021-10-05 PROCEDURE — G8417 CALC BMI ABV UP PARAM F/U: HCPCS | Performed by: INTERNAL MEDICINE

## 2021-10-05 PROCEDURE — 1123F ACP DISCUSS/DSCN MKR DOCD: CPT | Performed by: INTERNAL MEDICINE

## 2021-10-05 PROCEDURE — G8427 DOCREV CUR MEDS BY ELIG CLIN: HCPCS | Performed by: INTERNAL MEDICINE

## 2021-10-05 PROCEDURE — 4040F PNEUMOC VAC/ADMIN/RCVD: CPT | Performed by: INTERNAL MEDICINE

## 2021-10-05 PROCEDURE — 93000 ELECTROCARDIOGRAM COMPLETE: CPT | Performed by: INTERNAL MEDICINE

## 2021-10-05 PROCEDURE — G8484 FLU IMMUNIZE NO ADMIN: HCPCS | Performed by: INTERNAL MEDICINE

## 2021-10-05 RX ORDER — DILTIAZEM HYDROCHLORIDE 180 MG/1
180 CAPSULE, COATED, EXTENDED RELEASE ORAL PRN
Qty: 90 CAPSULE | Refills: 3 | Status: SHIPPED | OUTPATIENT
Start: 2021-10-05 | End: 2022-09-29

## 2021-10-05 RX ORDER — ATORVASTATIN CALCIUM 10 MG/1
10 TABLET, FILM COATED ORAL DAILY
Qty: 90 TABLET | Refills: 3 | Status: SHIPPED | OUTPATIENT
Start: 2021-10-05 | End: 2022-11-02

## 2021-10-05 RX ORDER — PROPAFENONE HYDROCHLORIDE 150 MG/1
TABLET, FILM COATED ORAL
Qty: 270 TABLET | Refills: 3 | Status: SHIPPED | OUTPATIENT
Start: 2021-10-05 | End: 2022-09-29

## 2021-10-05 NOTE — LETTER
[facility]  Phone: [phone]  Fax: [phone]    Lazara Chandler MD    October 5, 2021     Maci Moreira MD  [address] [place] [address]    Patient: Stuart Sommer   MR Number: [id number]   YOB: 1940   Date of Visit: 10/5/2021       Dear Maci Moreira:    Thank you for referring Maryam Moreira to me for evaluation/treatment. Below are the relevant portions of my assessment and plan of care. If you have questions, please do not hesitate to call me. I look forward to following [facility] along with you.     Sincerely,      Lazara Chandler MD

## 2021-10-05 NOTE — PATIENT INSTRUCTIONS
Plan:  1. Medication reviewed. Refills warranted. 2. Stop Aspirin  3. Recommend that you receive COVID vaccine  4.  Follow up in 6 months

## 2021-10-07 DIAGNOSIS — J44.9 CHRONIC OBSTRUCTIVE PULMONARY DISEASE, UNSPECIFIED COPD TYPE (HCC): ICD-10-CM

## 2021-10-08 RX ORDER — ALBUTEROL SULFATE 2.5 MG/3ML
SOLUTION RESPIRATORY (INHALATION)
Qty: 120 EACH | Refills: 11 | Status: SHIPPED | OUTPATIENT
Start: 2021-10-08 | End: 2022-11-03

## 2021-10-14 DIAGNOSIS — E78.2 MIXED HYPERLIPIDEMIA: Primary | ICD-10-CM

## 2021-10-15 ENCOUNTER — TELEPHONE (OUTPATIENT)
Dept: CARDIOLOGY CLINIC | Age: 81
End: 2021-10-15

## 2021-10-15 NOTE — TELEPHONE ENCOUNTER
----- Message from Helen Hearn MD sent at 10/13/2021  8:35 PM EDT -----  He will need cholesterol test next visit. please put the order in epic future test.

## 2022-01-27 NOTE — ED NOTES
Attempted to place pt on bipap 0615-pt at that time refused intubation Pt was unable to tolerate. 0630 Pt verbalized to MD that he is okay with intubation at this time. Pt moved from room 3 to room 1, 0644 etomidate and succinylcholine administered for RSI, intubation attempted x 1 and pt tolerated well. Propofol initiated for continued sedation titrated to 30 mcg/kg/min at this time. Pt shows no s/s of distress. Daughter updated per MD, awaiting bed assignment at [facility].       Lo Palumbo RN  04/16/20 JOSE MIGUEL Alexandre  04/16/20 [id number]
Report to [facility] for transport to [facility] ICU.      Lisa Helton RN  04/16/20 0467
no

## 2022-01-30 ENCOUNTER — HOSPITAL ENCOUNTER (EMERGENCY)
Age: 82
Discharge: HOME | End: 2022-01-30
Attending: EMERGENCY MEDICINE
Payer: MEDICARE

## 2022-01-30 ENCOUNTER — APPOINTMENT (OUTPATIENT)
Dept: GENERAL RADIOLOGY | Age: 82
End: 2022-01-30
Payer: MEDICARE

## 2022-01-30 VITALS
HEART RATE: 101 BPM | SYSTOLIC BLOOD PRESSURE: 111 MMHG | OXYGEN SATURATION: 95 % | BODY MASS INDEX: 26.72 KG/M2 | TEMPERATURE: 98.9 F | DIASTOLIC BLOOD PRESSURE: 54 MMHG | WEIGHT: 197 LBS | RESPIRATION RATE: 20 BRPM

## 2022-01-30 DIAGNOSIS — U07.1 COVID-19: Primary | ICD-10-CM

## 2022-01-30 DIAGNOSIS — Z87.09 HISTORY OF COPD: ICD-10-CM

## 2022-01-30 DIAGNOSIS — R11.2 NON-INTRACTABLE VOMITING WITH NAUSEA, UNSPECIFIED VOMITING TYPE: ICD-10-CM

## 2022-01-30 DIAGNOSIS — R53.83 TIREDNESS: ICD-10-CM

## 2022-01-30 DIAGNOSIS — E86.0 MILD DEHYDRATION: ICD-10-CM

## 2022-01-30 DIAGNOSIS — E08.65 DIABETES MELLITUS DUE TO UNDERLYING CONDITION WITH HYPERGLYCEMIA, WITHOUT LONG-TERM CURRENT USE OF INSULIN (HCC): ICD-10-CM

## 2022-01-30 LAB
A/G RATIO: 1.3 (ref 1.1–2.2)
ALBUMIN SERPL-MCNC: 4.1 G/DL (ref 3.4–5)
ALP BLD-CCNC: 69 U/L (ref 40–129)
ALT SERPL-CCNC: 29 U/L (ref 10–40)
ANION GAP SERPL CALCULATED.3IONS-SCNC: 13 MMOL/L (ref 3–16)
AST SERPL-CCNC: 38 U/L (ref 15–37)
BASE EXCESS VENOUS: 6.8 MMOL/L (ref -3–3)
BASOPHILS ABSOLUTE: 0 K/UL (ref 0–0.2)
BASOPHILS RELATIVE PERCENT: 0.2 %
BILIRUB SERPL-MCNC: 0.5 MG/DL (ref 0–1)
BUN BLDV-MCNC: 12 MG/DL (ref 7–20)
CALCIUM SERPL-MCNC: 9.1 MG/DL (ref 8.3–10.6)
CARBOXYHEMOGLOBIN: 1.5 % (ref 0–1.5)
CHLORIDE BLD-SCNC: 96 MMOL/L (ref 99–110)
CO2: 30 MMOL/L (ref 21–32)
CREAT SERPL-MCNC: 0.9 MG/DL (ref 0.8–1.3)
D DIMER: <200 NG/ML DDU (ref 0–229)
EOSINOPHILS ABSOLUTE: 0 K/UL (ref 0–0.6)
EOSINOPHILS RELATIVE PERCENT: 0.2 %
GFR AFRICAN AMERICAN: >60
GFR NON-AFRICAN AMERICAN: >60
GLUCOSE BLD-MCNC: 265 MG/DL (ref 70–99)
HCO3 VENOUS: 32.9 MMOL/L (ref 23–29)
HCT VFR BLD CALC: 33.6 % (ref 40.5–52.5)
HEMOGLOBIN: 11.4 G/DL (ref 13.5–17.5)
LACTIC ACID: 1.3 MMOL/L (ref 0.4–2)
LACTIC ACID: 2.3 MMOL/L (ref 0.4–2)
LYMPHOCYTES ABSOLUTE: 1.3 K/UL (ref 1–5.1)
LYMPHOCYTES RELATIVE PERCENT: 35.2 %
MCH RBC QN AUTO: 28.4 PG (ref 26–34)
MCHC RBC AUTO-ENTMCNC: 34.1 G/DL (ref 31–36)
MCV RBC AUTO: 83.4 FL (ref 80–100)
METHEMOGLOBIN VENOUS: 0.3 %
MONOCYTES ABSOLUTE: 0.3 K/UL (ref 0–1.3)
MONOCYTES RELATIVE PERCENT: 9.2 %
NEUTROPHILS ABSOLUTE: 2 K/UL (ref 1.7–7.7)
NEUTROPHILS RELATIVE PERCENT: 55.2 %
O2 SAT, VEN: 74 %
O2 THERAPY: ABNORMAL
PCO2, VEN: 53.7 MMHG (ref 40–50)
PDW BLD-RTO: 15.8 % (ref 12.4–15.4)
PH VENOUS: 7.41 (ref 7.35–7.45)
PLATELET # BLD: 96 K/UL (ref 135–450)
PMV BLD AUTO: 7.8 FL (ref 5–10.5)
PO2, VEN: 39.9 MMHG (ref 25–40)
POTASSIUM REFLEX MAGNESIUM: 4 MMOL/L (ref 3.5–5.1)
PRO-BNP: 170 PG/ML (ref 0–449)
RBC # BLD: 4.03 M/UL (ref 4.2–5.9)
SODIUM BLD-SCNC: 139 MMOL/L (ref 136–145)
TCO2 CALC VENOUS: 35 MMOL/L
TOTAL PROTEIN: 7.3 G/DL (ref 6.4–8.2)
TROPONIN: <0.01 NG/ML
WBC # BLD: 3.6 K/UL (ref 4–11)

## 2022-01-30 PROCEDURE — 96361 HYDRATE IV INFUSION ADD-ON: CPT

## 2022-01-30 PROCEDURE — 96360 HYDRATION IV INFUSION INIT: CPT

## 2022-01-30 PROCEDURE — 83880 ASSAY OF NATRIURETIC PEPTIDE: CPT

## 2022-01-30 PROCEDURE — 85025 COMPLETE CBC W/AUTO DIFF WBC: CPT

## 2022-01-30 PROCEDURE — 2580000003 HC RX 258: Performed by: EMERGENCY MEDICINE

## 2022-01-30 PROCEDURE — 80053 COMPREHEN METABOLIC PANEL: CPT

## 2022-01-30 PROCEDURE — 82803 BLOOD GASES ANY COMBINATION: CPT

## 2022-01-30 PROCEDURE — 36415 COLL VENOUS BLD VENIPUNCTURE: CPT

## 2022-01-30 PROCEDURE — 84484 ASSAY OF TROPONIN QUANT: CPT

## 2022-01-30 PROCEDURE — 99285 EMERGENCY DEPT VISIT HI MDM: CPT

## 2022-01-30 PROCEDURE — 85379 FIBRIN DEGRADATION QUANT: CPT

## 2022-01-30 PROCEDURE — 83605 ASSAY OF LACTIC ACID: CPT

## 2022-01-30 PROCEDURE — 93005 ELECTROCARDIOGRAM TRACING: CPT | Performed by: EMERGENCY MEDICINE

## 2022-01-30 PROCEDURE — 71045 X-RAY EXAM CHEST 1 VIEW: CPT

## 2022-01-30 RX ORDER — ONDANSETRON 4 MG/1
4 TABLET, ORALLY DISINTEGRATING ORAL EVERY 8 HOURS PRN
Qty: 16 TABLET | Refills: 0 | Status: SHIPPED | OUTPATIENT
Start: 2022-01-30

## 2022-01-30 RX ORDER — 0.9 % SODIUM CHLORIDE 0.9 %
30 INTRAVENOUS SOLUTION INTRAVENOUS ONCE
Status: COMPLETED | OUTPATIENT
Start: 2022-01-30 | End: 2022-01-30

## 2022-01-30 RX ADMIN — SODIUM CHLORIDE 2682 ML: 9 INJECTION, SOLUTION INTRAVENOUS at 20:30

## 2022-01-30 ASSESSMENT — PAIN SCALES - GENERAL: PAINLEVEL_OUTOF10: 4

## 2022-01-31 LAB
EKG ATRIAL RATE: 123 BPM
EKG DIAGNOSIS: NORMAL
EKG P AXIS: 90 DEGREES
EKG P-R INTERVAL: 192 MS
EKG Q-T INTERVAL: 286 MS
EKG QRS DURATION: 80 MS
EKG QTC CALCULATION (BAZETT): 409 MS
EKG R AXIS: 57 DEGREES
EKG T AXIS: 82 DEGREES
EKG VENTRICULAR RATE: 123 BPM

## 2022-01-31 PROCEDURE — 93010 ELECTROCARDIOGRAM REPORT: CPT | Performed by: INTERNAL MEDICINE

## 2022-01-31 NOTE — ED PROVIDER NOTES
CHIEF COMPLAINT  Positive For Covid-19 (per EMS> pt from home. home o2 3L nc. covid + x 1 wk. s/s x 1 wk. pt A Ox4. very moist cough noted. home HHN. last one yesterday. no LE edema. resp tachy. exertion/ambulatory pox on 3L nc 94%. baseline walker use)      HISTORY OF PRESENT ILLNESS  Zeinab Mora is a 78 y.o. male presents to the ED with shortness of breath, brought in by EMS, daughter called, though he reports he did not really want to come, he is on chronic 3 L O2 nasal cannula, history of COPD, uses nebulizer at home but has not had 1 since yesterday, diagnosed with Covid about a week ago symptoms worsened with exertion, he has been fatigued, nauseous and poor appetite, some diarrhea,, denies chest pain, no edema, uses a walker. No headache or neck stiffness, daughter states he has just been laying around all day which is not typical for him, concerned he may be dehydrated, no urinary changes, still coughing, no fevers today, no other complaints, modifying factors or associated symptoms. I have reviewed the following from the nursing documentation.     Past Medical History:   Diagnosis Date    Arthritis     CAD (coronary artery disease)     [facility] [address] [facility]     Diabetes mellitus 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    ELAINE on CPAP     Osteoporosis     Skin cancer of face      Past Surgical History:   Procedure Laterality Date    BACK SURGERY      repair broken back L4 & L5    CHOLECYSTECTOMY, LAPAROSCOPIC N/A 1/6/2021    LAPAROSCOPIC CHOLECYSTECTOMY, WITH CHOLANGIOGRAMS (ATTEMPTED) OPEN CHOLECYSTECTOMY performed by Miguel Larry MD at [facility], OPEN N/A 01/06/2021    LAPAROSCOPIC CHOLECYSTECTOMY, WITH CHOLANGIOGRAMS (ATTEMPTED)     CYSTOSCOPY Right 10/9/15 RIght Ureteroscopy, Holmium Laser Lithotripsy with Stone Removal, Right Stent Placement    CYSTOSCOPY  2019    CYSTOSCOPY, RESECTION OF BLADDER NECK, URETHRAL DILATION    CYSTOSCOPY W BIOPSY OF BLADDER N/A 2019    CYSTOSCOPY, RESECTION OF BLADDER NECK, URETHRAL DILATION performed by Lary Calzada MD at [address] Left 2016    cataract removal    GALLBLADDER SURGERY  2021    JOINT REPLACEMENT  2011    right knee    JOINT REPLACEMENT  2004    left knee    OTHER SURGICAL HISTORY  2015    wide excision basal cell carcinoma on the nose and bilateral auricles with frozen sections, plastic closure, full thickness skin graft    OTHER SURGICAL HISTORY  12/1/15    excision of lesion of lip    PROSTATE SURGERY      TURP  10/23/2015    with cystoscopy     Family History   Problem Relation Age of Onset    Cancer Mother     Diabetes Mother     Heart Disease Mother     Cancer Father     Diabetes Sister     Cancer Brother      Social History     Socioeconomic History    Marital status:      Spouse name: Soy Juárez    Number of children: 3    Years of education: Not on file    Highest education level: Not on file   Occupational History    Not on file   Tobacco Use    Smoking status: Former Smoker     Packs/day: 1.50     Years: 45.00     Pack years: 67.50     Quit date: 2005     Years since quittin.6    Smokeless tobacco: Never Used   Vaping Use    Vaping Use: Never used   Substance and Sexual Activity    Alcohol use: No    Drug use: No    Sexual activity: Not Currently   Other Topics Concern    Not on file   Social History Narrative    Not on file     Social Determinants of Health     Financial Resource Strain:     Difficulty of Paying Living Expenses: Not on file   Food Insecurity:     Worried About Running Out of Food in the Last Year: Not on file    Sara of Food in the Last Year: Not on file   Transportation Needs:     Lack of Transportation (Medical): Not on file    Lack of Transportation (Non-Medical): Not on file   Physical Activity:     Days of Exercise per Week: Not on file    Minutes of Exercise per Session: Not on file   Stress:     Feeling of Stress : Not on file   Social Connections:     Frequency of Communication with Friends and Family: Not on file    Frequency of Social Gatherings with Friends and Family: Not on file    Attends Anabaptist Services: Not on file    Active Member of [address] [place] [address] or Organizations: Not on file    Attends Club or Organization Meetings: Not on file    Marital Status: Not on file   Intimate Partner Violence:     Fear of Current or Ex-Partner: Not on file    Emotionally Abused: Not on file    Physically Abused: Not on file    Sexually Abused: Not on file   Housing Stability:     Unable to Pay for Housing in the Last Year: Not on file    Number of [place] in the Last Year: Not on file    Unstable Housing in the Last Year: Not on file     No current facility-administered medications for this encounter.      Current Outpatient Medications   Medication Sig Dispense Refill    ondansetron (ZOFRAN ODT) 4 MG disintegrating tablet Take 1 tablet by mouth every 8 hours as needed for Nausea or Vomiting 16 tablet 0    VENTOLIN  (90 Base) MCG/ACT inhaler Inhale 2 puffs into the lungs every 6 hours as needed for Wheezing orShortness of Breath 1 each 5    albuterol (PROVENTIL) (2.5 MG/3ML) 0.083% nebulizer solution USE 1 VIAL IN NEBULIZER 4 TIMES DAILY 120 each 11    dilTIAZem (CARTIA XT) 180 MG extended release capsule Take 1 capsule by mouth as needed (Ok to take as needed only 1 time a day for increased heart rate) 90 capsule 3    apixaban (ELIQUIS) 5 MG TABS tablet Take 1 tablet by mouth 2 times daily 180 tablet 3    propafenone (RYTHMOL) 150 MG tablet TAKE ONE TABLET BY MOUTH EVERY 8 HOURS 270 tablet 3    atorvastatin (LIPITOR) 10 MG tablet Take 1 tablet by mouth daily 90 tablet 3    TRELEGY ELLIPTA 100-62.5-25 MCG/INH AEPB INHALE 1 PUFF EVERY DAY 60 each 5    acetaminophen (AMINOFEN) 325 MG tablet Take 2 tablets by mouth every 4 hours as needed for Pain 120 tablet 0    alendronate (FOSAMAX) 70 MG tablet       fluticasone (FLONASE) 50 MCG/ACT nasal spray       pioglitazone (ACTOS) 30 MG tablet Take 30 mg by mouth daily      gabapentin (NEURONTIN) 300 MG capsule Take 600 mg by mouth 3 times daily .  OXYGEN Inhale 2.5 L into the lungs nightly Pt wears 2- 2/12 L at night. 3 L while walking/ moving around      metformin (GLUCOPHAGE) 500 MG tablet Take 500 mg by mouth 4 times daily       esomeprazole (NEXIUM) 40 MG capsule   Take 40 mg by mouth every morning (before breakfast) Indications: take dos        No Known Allergies    REVIEW OF SYSTEMS  10 systems reviewed, pertinent positives per HPI otherwise noted to be negative. PHYSICAL EXAM  BP (!) 111/54   Pulse 101   Temp 98.9 °F (37.2 °C)   Resp 20   Wt 197 lb (89.4 kg)   SpO2 95%   BMI 26.72 kg/m²   GENERAL APPEARANCE: Awake and aler mom t. Cooperative. No acute distress, elderly chronically ill-appearing  HEAD: Normocephalic. Atraumatic. EYES: PERRL. EOM's grossly intact. ENT: Mucous membranes are tacky, wearing O2 nasal cannula, airway patent, no stridor  NECK: Supple. No rigidity  HEART: RRR. No murmurs  LUNGS: Respirations nonlabored. Lungs are slightly diminished throughout, trace end expiratory wheezes, no crackles or rhonchi. ABDOMEN: Soft. Non-distended. Non-tender. No guarding or rebound. Normal bowel sounds. EXTREMITIES: No peripheral edema. Moves all extremities equally. All extremities neurovascularly intact. SKIN: Warm and dry. No acute rashes. NEUROLOGICAL: Alert and oriented. No gross facial drooping. Slow but steady gait with walker, normal speech  PSYCHIATRIC: Normal mood and affect. LABS  I have reviewed all labs for this visit.    Results for orders placed or performed during the hospital encounter of 01/30/22   CBC Auto Differential   Result Value Ref Range    WBC 3.6 (L) 4.0 - 11.0 K/uL    RBC 4.03 (L) 4.20 - 5.90 M/uL    Hemoglobin 11.4 (L) 13.5 - 17.5 g/dL    Hematocrit 33.6 (L) 40.5 - 52.5 %    MCV 83.4 80.0 - 100.0 fL    MCH 28.4 26.0 - 34.0 pg    MCHC 34.1 31.0 - 36.0 g/dL    RDW 15.8 (H) 12.4 - 15.4 %    Platelets 96 (L) 452 - 450 K/uL    MPV 7.8 5.0 - 10.5 fL    Neutrophils % 55.2 %    Lymphocytes % 35.2 %    Monocytes % 9.2 %    Eosinophils % 0.2 %    Basophils % 0.2 %    Neutrophils Absolute 2.0 1.7 - 7.7 K/uL    Lymphocytes Absolute 1.3 1.0 - 5.1 K/uL    Monocytes Absolute 0.3 0.0 - 1.3 K/uL    Eosinophils Absolute 0.0 0.0 - 0.6 K/uL    Basophils Absolute 0.0 0.0 - 0.2 K/uL   Comprehensive Metabolic Panel w/ Reflex to MG   Result Value Ref Range    Sodium 139 136 - 145 mmol/L    Potassium reflex Magnesium 4.0 3.5 - 5.1 mmol/L    Chloride 96 (L) 99 - 110 mmol/L    CO2 30 21 - 32 mmol/L    Anion Gap 13 3 - 16    Glucose 265 (H) 70 - 99 mg/dL    BUN 12 7 - 20 mg/dL    CREATININE 0.9 0.8 - 1.3 mg/dL    GFR Non-African American >60 >60    GFR African American >60 >60    Calcium 9.1 8.3 - 10.6 mg/dL    Total Protein 7.3 6.4 - 8.2 g/dL    Albumin 4.1 3.4 - 5.0 g/dL    Albumin/Globulin Ratio 1.3 1.1 - 2.2    Total Bilirubin 0.5 0.0 - 1.0 mg/dL    Alkaline Phosphatase 69 40 - 129 U/L    ALT 29 10 - 40 U/L    AST 38 (H) 15 - 37 U/L   Troponin   Result Value Ref Range    Troponin <0.01 <0.01 ng/mL   Brain Natriuretic Peptide   Result Value Ref Range    Pro- 0 - 449 pg/mL   D-Dimer, Quantitative   Result Value Ref Range    D-Dimer, Quant <200 0 - 229 ng/mL DDU   Blood Gas, Venous   Result Value Ref Range    pH, Joey 7.405 7.350 - 7.450    pCO2, Joey 53.7 (H) 40.0 - 50.0 mmHg    pO2, Joey 39.9 25.0 - 40.0 mmHg    HCO3, Venous 32.9 (H) 23.0 - 29.0 mmol/L    Base Excess, Joey 6.8 (H) -3.0 - 3.0 mmol/L    O2 Sat, Joey 74 Not Established %    Carboxyhemoglobin 1.5 0.0 - 1.5 %    MetHgb, Joey 0.3 <1.5 % TC02 (Calc), Joey 35 Not Established mmol/L    O2 Therapy Unknown    Lactic Acid, Plasma   Result Value Ref Range    Lactic Acid 2.3 (H) 0.4 - 2.0 mmol/L   Lactic acid, plasma   Result Value Ref Range    Lactic Acid 1.3 0.4 - 2.0 mmol/L   EKG 12 Lead   Result Value Ref Range    Ventricular Rate 123 BPM    Atrial Rate 123 BPM    P-R Interval 192 ms    QRS Duration 80 ms    Q-T Interval 286 ms    QTc Calculation (Bazett) 409 ms    P Axis 90 degrees    R Axis 57 degrees    T Axis 82 degrees    Diagnosis       Sinus tachycardiaOtherwise normal ECGNo significant change was foundWhen compared with ECG of1.4.21Confirmed by HEAVEN SAM, [address] ([id number]) on 1/31/2022 7:32:07 AM       RADIOLOGY  XR CHEST PORTABLE    Result Date: 1/30/2022  EXAMINATION: ONE XRAY VIEW OF THE CHEST 1/30/2022 8:08 pm COMPARISON: Chest x-ray January 12, 2021 HISTORY: ORDERING SYSTEM PROVIDED HISTORY: covid, SOB TECHNOLOGIST PROVIDED HISTORY: Reason for exam:->covid, SOB Reason for Exam: sob, covid + 1 week ago FINDINGS: Cardiac silhouette appears stable. Chronic interstitial changes of the lungs similar to previous exam.  No focal consolidation, pleural effusion, or pneumothorax identified. Osseous structures appear intact. 1. No acute cardiopulmonary process identified. ED COURSE/MDM  Patient seen and evaluated. Old records reviewed. Labs and imaging reviewed and results discussed with patient/daughter.    75-year-old male with known COVID-19, shortness of breath, and daughter called EMS but patient stated from the beginning of visit that he wanted to go home, I did offer admission and patient declined, we discussed the elevated heart rate, it did improve after fluids but was still right around 100 at discharge, he states it always runs a little high, but it is sinus rhythm today, is on Cardizem and Rythmol at home, he is also continuing his Eliquis, no significant infiltrates on x-ray, normal dimer, normal troponin, no acute ischemic change on EKG, lactate elevation resolved after fluids, I suspect this was related to mild dehydration, encouraged adequate fluid intake, blood sugar was elevated as well, encouraged better control of blood sugars, and follow-up with primary care, no current suspicion for bacterial pneumonia/sepsis, no increased oxygen requirements, he declined any nausea here, but he had some at home so he was agreeable to Zofran prescription, daughter comfortable taking him home at this time, he was afebrile nontoxic-appearing though chronically ill-appearing, discussed with daughter regarding monoclonal antibody/antiviral medications for Covid, encourage primary care follow-up, discussed limited supply/availability of these medications, strict return precautions given, all questions answered, will return if any worsening symptoms or new concerns, see AVS for further discharge information, patient verbalized understanding of plan, felt comfortable going home.       Orders Placed This Encounter   Procedures    XR CHEST PORTABLE    CBC Auto Differential    Comprehensive Metabolic Panel w/ Reflex to MG    Troponin    Brain Natriuretic Peptide    D-Dimer, Quantitative    Blood Gas, Venous    Lactic Acid, Plasma    Lactic acid, plasma    EKG 12 Lead     Orders Placed This Encounter   Medications    0.9 % sodium chloride bolus    ondansetron (ZOFRAN ODT) 4 MG disintegrating tablet     Sig: Take 1 tablet by mouth every 8 hours as needed for Nausea or Vomiting     Dispense:  16 tablet     Refill:  0     ED Course as of 02/02/22 2329   Donita Martinez Jan 30, 2022 2034 EKG 12 Lead  EKG interpretation by me: Sinus tachycardia, rate 123, normal axis, , no significant ST elevations or depressions [SY]   2106 Patient getting fluids now, will prefer to go home if possible, repeat lactate and recheck heart rate, explained we may be able to get him home if he can ambulate with steady gait with his walker, daughter here with him. [SY] ED Course User Index  [SY] Thu Collado DO       CLINICAL IMPRESSION  1. COVID-19    2. Tiredness    3. Mild dehydration    4. Non-intractable vomiting with nausea, unspecified vomiting type    5. Diabetes mellitus due to underlying condition with hyperglycemia, without long-term current use of insulin ([facility] [address]    6. History of COPD        Blood pressure (!) 111/54, pulse 101, temperature 98.9 °F (37.2 °C), resp. rate 20, weight 197 lb (89.4 kg), SpO2 95 %. DISPOSITION  Prudence Aidan was discharged to home in stable condition.                   Thu Collado DO  02/02/22 [id number]

## 2022-02-05 ENCOUNTER — APPOINTMENT (OUTPATIENT)
Dept: GENERAL RADIOLOGY | Age: 82
DRG: 177 | End: 2022-02-05
Payer: MEDICARE

## 2022-02-05 ENCOUNTER — HOSPITAL ENCOUNTER (INPATIENT)
Age: 82
LOS: 2 days | Discharge: HOME | DRG: 177 | End: 2022-02-07
Attending: EMERGENCY MEDICINE | Admitting: INTERNAL MEDICINE
Payer: MEDICARE

## 2022-02-05 DIAGNOSIS — J18.9 PNEUMONIA OF LEFT LOWER LOBE DUE TO INFECTIOUS ORGANISM: ICD-10-CM

## 2022-02-05 DIAGNOSIS — J44.9 COPD MIXED TYPE (HCC): ICD-10-CM

## 2022-02-05 DIAGNOSIS — R09.02 HYPOXIA: ICD-10-CM

## 2022-02-05 DIAGNOSIS — U07.1 COVID-19: Primary | ICD-10-CM

## 2022-02-05 PROBLEM — J12.82 PNEUMONIA DUE TO COVID-19 VIRUS: Status: ACTIVE | Noted: 2022-02-05

## 2022-02-05 LAB
ANION GAP SERPL CALCULATED.3IONS-SCNC: 14 MMOL/L (ref 3–16)
BASE EXCESS VENOUS: 6.2 MMOL/L (ref -3–3)
BASOPHILS ABSOLUTE: 0 K/UL (ref 0–0.2)
BASOPHILS RELATIVE PERCENT: 0.4 %
BUN BLDV-MCNC: 19 MG/DL (ref 7–20)
CALCIUM SERPL-MCNC: 8.9 MG/DL (ref 8.3–10.6)
CARBOXYHEMOGLOBIN: 2.3 % (ref 0–1.5)
CHLORIDE BLD-SCNC: 95 MMOL/L (ref 99–110)
CO2: 31 MMOL/L (ref 21–32)
CREAT SERPL-MCNC: 1 MG/DL (ref 0.8–1.3)
EOSINOPHILS ABSOLUTE: 0 K/UL (ref 0–0.6)
EOSINOPHILS RELATIVE PERCENT: 0.2 %
GFR AFRICAN AMERICAN: >60
GFR NON-AFRICAN AMERICAN: >60
GLUCOSE BLD-MCNC: 198 MG/DL (ref 70–99)
GLUCOSE BLD-MCNC: 335 MG/DL (ref 70–99)
HCO3 VENOUS: 33.4 MMOL/L (ref 23–29)
HCT VFR BLD CALC: 31.2 % (ref 40.5–52.5)
HEMOGLOBIN: 11 G/DL (ref 13.5–17.5)
LACTIC ACID: 2.6 MMOL/L (ref 0.4–2)
LYMPHOCYTES ABSOLUTE: 1 K/UL (ref 1–5.1)
LYMPHOCYTES RELATIVE PERCENT: 16 %
MCH RBC QN AUTO: 28.3 PG (ref 26–34)
MCHC RBC AUTO-ENTMCNC: 35.4 G/DL (ref 31–36)
MCV RBC AUTO: 80 FL (ref 80–100)
METHEMOGLOBIN VENOUS: 0 %
MONOCYTES ABSOLUTE: 0.4 K/UL (ref 0–1.3)
MONOCYTES RELATIVE PERCENT: 6.9 %
NEUTROPHILS ABSOLUTE: 4.9 K/UL (ref 1.7–7.7)
NEUTROPHILS RELATIVE PERCENT: 76.5 %
O2 CONTENT, VEN: 8 VOL %
O2 SAT, VEN: 40 %
O2 THERAPY: ABNORMAL
PCO2, VEN: 61.8 MMHG (ref 40–50)
PDW BLD-RTO: 16.4 % (ref 12.4–15.4)
PERFORMED ON: ABNORMAL
PH VENOUS: 7.35 (ref 7.35–7.45)
PLATELET # BLD: 193 K/UL (ref 135–450)
PMV BLD AUTO: 8.4 FL (ref 5–10.5)
PO2, VEN: 24.6 MMHG (ref 25–40)
POTASSIUM REFLEX MAGNESIUM: 3.9 MMOL/L (ref 3.5–5.1)
PRO-BNP: 509 PG/ML (ref 0–449)
RBC # BLD: 3.9 M/UL (ref 4.2–5.9)
SODIUM BLD-SCNC: 140 MMOL/L (ref 136–145)
TCO2 CALC VENOUS: 35 MMOL/L
TROPONIN: <0.01 NG/ML
WBC # BLD: 6.4 K/UL (ref 4–11)

## 2022-02-05 PROCEDURE — 84484 ASSAY OF TROPONIN QUANT: CPT

## 2022-02-05 PROCEDURE — 80048 BASIC METABOLIC PNL TOTAL CA: CPT

## 2022-02-05 PROCEDURE — 82803 BLOOD GASES ANY COMBINATION: CPT

## 2022-02-05 PROCEDURE — 6370000000 HC RX 637 (ALT 250 FOR IP): Performed by: INTERNAL MEDICINE

## 2022-02-05 PROCEDURE — 2580000003 HC RX 258: Performed by: INTERNAL MEDICINE

## 2022-02-05 PROCEDURE — 83605 ASSAY OF LACTIC ACID: CPT

## 2022-02-05 PROCEDURE — 6360000002 HC RX W HCPCS: Performed by: EMERGENCY MEDICINE

## 2022-02-05 PROCEDURE — 2060000000 HC ICU INTERMEDIATE R&B

## 2022-02-05 PROCEDURE — 36415 COLL VENOUS BLD VENIPUNCTURE: CPT

## 2022-02-05 PROCEDURE — 87040 BLOOD CULTURE FOR BACTERIA: CPT

## 2022-02-05 PROCEDURE — 96375 TX/PRO/DX INJ NEW DRUG ADDON: CPT

## 2022-02-05 PROCEDURE — 96365 THER/PROPH/DIAG IV INF INIT: CPT

## 2022-02-05 PROCEDURE — 99285 EMERGENCY DEPT VISIT HI MDM: CPT

## 2022-02-05 PROCEDURE — 93005 ELECTROCARDIOGRAM TRACING: CPT | Performed by: EMERGENCY MEDICINE

## 2022-02-05 PROCEDURE — 85025 COMPLETE CBC W/AUTO DIFF WBC: CPT

## 2022-02-05 PROCEDURE — 71045 X-RAY EXAM CHEST 1 VIEW: CPT

## 2022-02-05 PROCEDURE — 83880 ASSAY OF NATRIURETIC PEPTIDE: CPT

## 2022-02-05 RX ORDER — PANTOPRAZOLE SODIUM 40 MG/1
40 TABLET, DELAYED RELEASE ORAL
Status: DISCONTINUED | OUTPATIENT
Start: 2022-02-06 | End: 2022-02-07 | Stop reason: HOSPADM

## 2022-02-05 RX ORDER — FLUTICASONE PROPIONATE 50 MCG
1 SPRAY, SUSPENSION (ML) NASAL DAILY
Status: DISCONTINUED | OUTPATIENT
Start: 2022-02-06 | End: 2022-02-07 | Stop reason: HOSPADM

## 2022-02-05 RX ORDER — LEVOFLOXACIN 5 MG/ML
750 INJECTION, SOLUTION INTRAVENOUS EVERY 24 HOURS
Status: DISCONTINUED | OUTPATIENT
Start: 2022-02-06 | End: 2022-02-07 | Stop reason: HOSPADM

## 2022-02-05 RX ORDER — ONDANSETRON 2 MG/ML
4 INJECTION INTRAMUSCULAR; INTRAVENOUS EVERY 6 HOURS PRN
Status: DISCONTINUED | OUTPATIENT
Start: 2022-02-05 | End: 2022-02-07 | Stop reason: HOSPADM

## 2022-02-05 RX ORDER — ALBUTEROL SULFATE 90 UG/1
2 AEROSOL, METERED RESPIRATORY (INHALATION) 4 TIMES DAILY
Status: DISCONTINUED | OUTPATIENT
Start: 2022-02-05 | End: 2022-02-07 | Stop reason: HOSPADM

## 2022-02-05 RX ORDER — SODIUM CHLORIDE 9 MG/ML
25 INJECTION, SOLUTION INTRAVENOUS PRN
Status: DISCONTINUED | OUTPATIENT
Start: 2022-02-05 | End: 2022-02-07 | Stop reason: HOSPADM

## 2022-02-05 RX ORDER — ACETAMINOPHEN 650 MG/1
650 SUPPOSITORY RECTAL EVERY 6 HOURS PRN
Status: DISCONTINUED | OUTPATIENT
Start: 2022-02-05 | End: 2022-02-07 | Stop reason: HOSPADM

## 2022-02-05 RX ORDER — PROPAFENONE HYDROCHLORIDE 150 MG/1
150 TABLET, FILM COATED ORAL EVERY 8 HOURS SCHEDULED
Status: DISCONTINUED | OUTPATIENT
Start: 2022-02-05 | End: 2022-02-07 | Stop reason: HOSPADM

## 2022-02-05 RX ORDER — POLYETHYLENE GLYCOL 3350 17 G/17G
17 POWDER, FOR SOLUTION ORAL DAILY PRN
Status: DISCONTINUED | OUTPATIENT
Start: 2022-02-05 | End: 2022-02-07 | Stop reason: HOSPADM

## 2022-02-05 RX ORDER — SODIUM CHLORIDE 0.9 % (FLUSH) 0.9 %
5-40 SYRINGE (ML) INJECTION PRN
Status: DISCONTINUED | OUTPATIENT
Start: 2022-02-05 | End: 2022-02-07 | Stop reason: HOSPADM

## 2022-02-05 RX ORDER — DILTIAZEM HYDROCHLORIDE 180 MG/1
180 CAPSULE, COATED, EXTENDED RELEASE ORAL DAILY
Status: DISCONTINUED | OUTPATIENT
Start: 2022-02-06 | End: 2022-02-07 | Stop reason: HOSPADM

## 2022-02-05 RX ORDER — ATORVASTATIN CALCIUM 10 MG/1
10 TABLET, FILM COATED ORAL DAILY
Status: DISCONTINUED | OUTPATIENT
Start: 2022-02-06 | End: 2022-02-07 | Stop reason: HOSPADM

## 2022-02-05 RX ORDER — DEXAMETHASONE SODIUM PHOSPHATE 10 MG/ML
10 INJECTION, SOLUTION INTRAMUSCULAR; INTRAVENOUS ONCE
Status: COMPLETED | OUTPATIENT
Start: 2022-02-05 | End: 2022-02-05

## 2022-02-05 RX ORDER — ONDANSETRON 4 MG/1
4 TABLET, ORALLY DISINTEGRATING ORAL EVERY 8 HOURS PRN
Status: DISCONTINUED | OUTPATIENT
Start: 2022-02-05 | End: 2022-02-07 | Stop reason: HOSPADM

## 2022-02-05 RX ORDER — DEXAMETHASONE SODIUM PHOSPHATE 10 MG/ML
6 INJECTION, SOLUTION INTRAMUSCULAR; INTRAVENOUS EVERY 24 HOURS
Status: DISCONTINUED | OUTPATIENT
Start: 2022-02-06 | End: 2022-02-07 | Stop reason: HOSPADM

## 2022-02-05 RX ORDER — ACETAMINOPHEN 325 MG/1
650 TABLET ORAL EVERY 4 HOURS PRN
Status: DISCONTINUED | OUTPATIENT
Start: 2022-02-05 | End: 2022-02-07 | Stop reason: HOSPADM

## 2022-02-05 RX ORDER — PIOGLITAZONEHYDROCHLORIDE 30 MG/1
30 TABLET ORAL DAILY
Status: DISCONTINUED | OUTPATIENT
Start: 2022-02-06 | End: 2022-02-07 | Stop reason: HOSPADM

## 2022-02-05 RX ORDER — LEVOFLOXACIN 5 MG/ML
500 INJECTION, SOLUTION INTRAVENOUS ONCE
Status: COMPLETED | OUTPATIENT
Start: 2022-02-05 | End: 2022-02-05

## 2022-02-05 RX ORDER — GABAPENTIN 300 MG/1
600 CAPSULE ORAL 3 TIMES DAILY
Status: DISCONTINUED | OUTPATIENT
Start: 2022-02-05 | End: 2022-02-07 | Stop reason: HOSPADM

## 2022-02-05 RX ORDER — ONDANSETRON 4 MG/1
4 TABLET, ORALLY DISINTEGRATING ORAL EVERY 8 HOURS PRN
Status: DISCONTINUED | OUTPATIENT
Start: 2022-02-05 | End: 2022-02-05 | Stop reason: SDUPTHER

## 2022-02-05 RX ORDER — SODIUM CHLORIDE 0.9 % (FLUSH) 0.9 %
5-40 SYRINGE (ML) INJECTION EVERY 12 HOURS SCHEDULED
Status: DISCONTINUED | OUTPATIENT
Start: 2022-02-05 | End: 2022-02-07 | Stop reason: HOSPADM

## 2022-02-05 RX ORDER — ACETAMINOPHEN 325 MG/1
650 TABLET ORAL EVERY 6 HOURS PRN
Status: DISCONTINUED | OUTPATIENT
Start: 2022-02-05 | End: 2022-02-07 | Stop reason: HOSPADM

## 2022-02-05 RX ADMIN — METFORMIN HYDROCHLORIDE 500 MG: 500 TABLET ORAL at 23:54

## 2022-02-05 RX ADMIN — GABAPENTIN 600 MG: 300 CAPSULE ORAL at 23:54

## 2022-02-05 RX ADMIN — APIXABAN 5 MG: 5 TABLET, FILM COATED ORAL at 23:54

## 2022-02-05 RX ADMIN — PROPAFENONE HYDROCHLORIDE 150 MG: 150 TABLET, FILM COATED ORAL at 23:54

## 2022-02-05 RX ADMIN — SODIUM CHLORIDE, PRESERVATIVE FREE 5 ML: 5 INJECTION INTRAVENOUS at 23:55

## 2022-02-05 RX ADMIN — LEVOFLOXACIN 500 MG: 500 INJECTION, SOLUTION INTRAVENOUS at 17:19

## 2022-02-05 RX ADMIN — DEXAMETHASONE SODIUM PHOSPHATE 10 MG: 10 INJECTION, SOLUTION INTRAMUSCULAR; INTRAVENOUS at 17:17

## 2022-02-05 NOTE — PLAN OF CARE
68-year-old male with history of COPD, paroxysmal A. fib, on anticoagulation, on home oxygen 3 L. Recent diagnosis of Covid 1 week ago. Presenting with increased shortness of breath and hypoxia. O2 sats in the eighties on 5 L. Currently on 6 L in the ED, sats maintained in 93%. Tachycardic. Patient on Eliquis. Chest x-ray with no pneumonia. Troponin negative. .    Admit for acute on chronic hypoxic respiratory failure  COVID PNA  Possible superimposed bacterial community-acquired pneumonia.        O2, decadron, levaquin   pulm cx    cont rhythmol, cardizem, eliquis

## 2022-02-05 NOTE — ED NOTES
Bed: 03  Expected date:   Expected time:   Means of arrival:   Comments:  denise Messina  02/05/22 [id number]

## 2022-02-05 NOTE — ED PROVIDER NOTES
Emergency Department Encounter    Patient: Mikal Hoover  MRN: [id number]  : 1940  Date of Evaluation: 2022  ED Provider:  Kadi Barnard MD    Triage Chief Complaint:   Positive For Covid-19 (COVID + dx about a week ago, worsening SOB, hx copd on 3 l nc at home - EMS reports sats mid 80s on 3 liters on their arrival.  Pt alert NAD. 82 % on arrival to ED on 5 l nc. )    Ramona:  Mikal Hoover is a 78 y.o. male that presents supplemental oxygen dependent COPD, approximately 3 L was diagnosed with COVID 1 week prior, positive marked hypoxia, increasing shortness of breath, was 82% on 5 L, he is currently on 6 L on arrival satting at 90%. From home. Nonvaccinated. Positive shortness of breath. Afebrile.     ROS - see HPI, below listed is current ROS at time of my eval:  General:  No fevers, no chills  Eyes:  No recent vison changes, no discharge  ENT:  No sore throat, no nasal congestion, no hearing changes  Cardiovascular:  No chest pain, no palpitations  Respiratory:  + shortness of breath, no cough, + wheezing  Gastrointestinal:  No pain, no nausea, no vomiting, no diarrhea  Musculoskeletal:  No muscle pain, no joint pain  Skin:  No rash, no pruritis  Neurologic:  No speech problems, no headache  Psychiatric:  No anxiety  Genitourinary:  No dysuria, no hematuria  Endocrine:  No unexpected weight gain, no unexpected weight loss  Extremities:  no edema, no pain      Past Medical History:   Diagnosis Date    Arthritis     CAD (coronary artery disease)     [facility] [address]     Diabetes mellitus ([facility] [address] [facility]     Hepatitis A 2021    Hyperlipidemia     Hypertension     Influenza A 2017    Kidney calculi     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    ELAINE on CPAP     Osteoporosis     Skin cancer of face      Past Surgical History:   Procedure Laterality Date    BACK SURGERY      repair broken back L4 & L5    CHOLECYSTECTOMY, LAPAROSCOPIC N/A 2021    LAPAROSCOPIC CHOLECYSTECTOMY, WITH CHOLANGIOGRAMS (ATTEMPTED) OPEN CHOLECYSTECTOMY performed by Murphy Cornell MD at [address], OPEN N/A 2021    LAPAROSCOPIC CHOLECYSTECTOMY, WITH CHOLANGIOGRAMS (ATTEMPTED)     CYSTOSCOPY Right 10/9/15    RIght Ureteroscopy, Holmium Laser Lithotripsy with Stone Removal, Right Stent Placement    CYSTOSCOPY  2019    CYSTOSCOPY, RESECTION OF BLADDER NECK, URETHRAL DILATION    CYSTOSCOPY W BIOPSY OF BLADDER N/A 2019    CYSTOSCOPY, RESECTION OF BLADDER NECK, URETHRAL DILATION performed by Lita Belle MD at [address] Left 2016    cataract removal    GALLBLADDER SURGERY  2021    JOINT REPLACEMENT  2011    right knee    JOINT REPLACEMENT  2004    left knee    OTHER SURGICAL HISTORY  2015    wide excision basal cell carcinoma on the nose and bilateral auricles with frozen sections, plastic closure, full thickness skin graft    OTHER SURGICAL HISTORY  12/1/15    excision of lesion of lip    PROSTATE SURGERY      TURP  10/23/2015    with cystoscopy     Family History   Problem Relation Age of Onset    Cancer Mother     Diabetes Mother     Heart Disease Mother     Cancer Father     Diabetes Sister     Cancer Brother      Social History     Socioeconomic History    Marital status:      Spouse name: Jb Patel    Number of children: 3    Years of education: Not on file    Highest education level: Not on file   Occupational History    Not on file   Tobacco Use    Smoking status: Former Smoker     Packs/day: 1.50     Years: 45.00     Pack years: 67.50     Quit date: 2005     Years since quittin.7    Smokeless tobacco: Never Used   Vaping Use    Vaping Use: Never used   Substance and Sexual Activity    Alcohol use: No    Drug use: No    Sexual activity: Not Currently   Other Topics Concern    Not on file Social History Narrative    Not on file     Social Determinants of Health     Financial Resource Strain:     Difficulty of Paying Living Expenses: Not on file   Food Insecurity:     Worried About Running Out of Food in the Last Year: Not on file    Sara of Food in the Last Year: Not on file   Transportation Needs:     Lack of Transportation (Medical): Not on file    Lack of Transportation (Non-Medical):  Not on file   Physical Activity:     Days of Exercise per Week: Not on file    Minutes of Exercise per Session: Not on file   Stress:     Feeling of Stress : Not on file   Social Connections:     Frequency of Communication with Friends and Family: Not on file    Frequency of Social Gatherings with Friends and Family: Not on file    Attends Rastafari Services: Not on file    Active Member of [address] [place] [address] Sonics or Organizations: Not on file    Attends Club or Organization Meetings: Not on file    Marital Status: Not on file   Intimate Partner Violence:     Fear of Current or Ex-Partner: Not on file    Emotionally Abused: Not on file    Physically Abused: Not on file    Sexually Abused: Not on file   Housing Stability:     Unable to Pay for Housing in the Last Year: Not on file    Number of [place] in the Last Year: Not on file    Unstable Housing in the Last Year: Not on file     Current Facility-Administered Medications   Medication Dose Route Frequency Provider Last Rate Last Admin    levoFLOXacin (LEVAQUIN) 500 MG/100ML infusion 500 mg  500 mg IntraVENous Once Chery Rivera  mL/hr at 02/05/22 1719 500 mg at 02/05/22 1719     Current Outpatient Medications   Medication Sig Dispense Refill    ondansetron (ZOFRAN ODT) 4 MG disintegrating tablet Take 1 tablet by mouth every 8 hours as needed for Nausea or Vomiting 16 tablet 0    VENTOLIN  (90 Base) MCG/ACT inhaler Inhale 2 puffs into the lungs every 6 hours as needed for Wheezing orShortness of Breath 1 each 5    albuterol (PROVENTIL) (2.5 MG/3ML) 0.083% nebulizer solution USE 1 VIAL IN NEBULIZER 4 TIMES DAILY 120 each 11    dilTIAZem (CARTIA XT) 180 MG extended release capsule Take 1 capsule by mouth as needed (Ok to take as needed only 1 time a day for increased heart rate) 90 capsule 3    apixaban (ELIQUIS) 5 MG TABS tablet Take 1 tablet by mouth 2 times daily 180 tablet 3    propafenone (RYTHMOL) 150 MG tablet TAKE ONE TABLET BY MOUTH EVERY 8 HOURS 270 tablet 3    atorvastatin (LIPITOR) 10 MG tablet Take 1 tablet by mouth daily 90 tablet 3    TRELEGY ELLIPTA 100-62.5-25 MCG/INH AEPB INHALE 1 PUFF EVERY DAY 60 each 5    acetaminophen (AMINOFEN) 325 MG tablet Take 2 tablets by mouth every 4 hours as needed for Pain 120 tablet 0    alendronate (FOSAMAX) 70 MG tablet       fluticasone (FLONASE) 50 MCG/ACT nasal spray       pioglitazone (ACTOS) 30 MG tablet Take 30 mg by mouth daily      gabapentin (NEURONTIN) 300 MG capsule Take 600 mg by mouth 3 times daily .  OXYGEN Inhale 2.5 L into the lungs nightly Pt wears 2- 2/12 L at night. 3 L while walking/ moving around      metformin (GLUCOPHAGE) 500 MG tablet Take 500 mg by mouth 4 times daily       esomeprazole (NEXIUM) 40 MG capsule   Take 40 mg by mouth every morning (before breakfast) Indications: take dos        No Known Allergies    Nursing Notes Reviewed    Physical Exam:  Triage VS:    ED Triage Vitals [02/05/22 1535]   Enc Vitals Group      BP (!) 148/65      Pulse 117      Resp 26      Temp 98.9 °F (37.2 °C)      Temp Source Oral      SpO2 90 %      Weight 200 lb (90.7 kg)      Height 5' 11\" (1.803 m)      Head Circumference       Peak Flow       Pain Score       Pain Loc       Pain Edu? Excl. in [address]? My pulse ox interpretation is  hypoxia    General appearance:  + acute distress  Skin:  Warm. Dry. No pallor. No rash. Eye:  Normal conjuctiva. no Icterus.      Ears, nose, mouth and throat:  Oral mucosa moist   Heart:  Regular rate and rhythm, normal S1 & S2, no extra heart sounds, no murmurs. Perfusion:  intact  Respiratory:  inspiratory and expiratory wheezes in all lung fields, decreased air entry throughout, mild tachypnea noted, no accessory muscle use  Abdominal:  Soft. Nontender. Non distended. Extremity:  No edema or tenderness  Neurological:  Alert and oriented,  No focal neuro deficits.        I have reviewed and interpreted all of the currently available lab results from this visit (if applicable):  Results for orders placed or performed during the hospital encounter of 02/05/22   CBC Auto Differential   Result Value Ref Range    WBC 6.4 4.0 - 11.0 K/uL    RBC 3.90 (L) 4.20 - 5.90 M/uL    Hemoglobin 11.0 (L) 13.5 - 17.5 g/dL    Hematocrit 31.2 (L) 40.5 - 52.5 %    MCV 80.0 80.0 - 100.0 fL    MCH 28.3 26.0 - 34.0 pg    MCHC 35.4 31.0 - 36.0 g/dL    RDW 16.4 (H) 12.4 - 15.4 %    Platelets 334 218 - 789 K/uL    MPV 8.4 5.0 - 10.5 fL    Neutrophils % 76.5 %    Lymphocytes % 16.0 %    Monocytes % 6.9 %    Eosinophils % 0.2 %    Basophils % 0.4 %    Neutrophils Absolute 4.9 1.7 - 7.7 K/uL    Lymphocytes Absolute 1.0 1.0 - 5.1 K/uL    Monocytes Absolute 0.4 0.0 - 1.3 K/uL    Eosinophils Absolute 0.0 0.0 - 0.6 K/uL    Basophils Absolute 0.0 0.0 - 0.2 K/uL   Blood Gas, Venous   Result Value Ref Range    pH, Joey 7.351 7.350 - 7.450    pCO2, Joey 61.8 (H) 40.0 - 50.0 mmHg    pO2, Joey 24.6 (L) 25.0 - 40.0 mmHg    HCO3, Venous 33.4 (H) 23.0 - 29.0 mmol/L    Base Excess, Joey 6.2 (H) -3.0 - 3.0 mmol/L    O2 Sat, Joey 40 Not Established %    Carboxyhemoglobin 2.3 (H) 0.0 - 1.5 %    MetHgb, Joey 0.0 <1.5 %    TC02 (Calc), Joey 35 Not Established mmol/L    O2 Content, Joey 8 Not Established VOL %    O2 Therapy Unknown    Basic Metabolic Panel w/ Reflex to MG   Result Value Ref Range    Sodium 140 136 - 145 mmol/L    Potassium reflex Magnesium 3.9 3.5 - 5.1 mmol/L    Chloride 95 (L) 99 - 110 mmol/L    CO2 31 21 - 32 mmol/L    Anion Gap 14 3 - 16    Glucose 198 (H) 70 - 99 mg/dL    BUN 19 7 - 20 mg/dL    CREATININE 1.0 0.8 - 1.3 mg/dL    GFR Non-African American >60 >60    GFR African American >60 >60    Calcium 8.9 8.3 - 10.6 mg/dL   Brain Natriuretic Peptide   Result Value Ref Range    Pro- (H) 0 - 449 pg/mL   Troponin   Result Value Ref Range    Troponin <0.01 <0.01 ng/mL   Lactic Acid, Plasma   Result Value Ref Range    Lactic Acid 2.6 (H) 0.4 - 2.0 mmol/L   EKG 12 Lead   Result Value Ref Range    Ventricular Rate 118 BPM    Atrial Rate 118 BPM    P-R Interval 202 ms    QRS Duration 74 ms    Q-T Interval 292 ms    QTc Calculation (Bazett) 409 ms    P Axis 64 degrees    R Axis 48 degrees    T Axis 81 degrees    Diagnosis       Sinus tachycardiaNonspecific ST and T wave abnormalityAbnormal ECGWhen compared with ECG of 30-JAN-2022 19:11,No significant change was found      Radiographs (if obtained):  Radiologist's Report Reviewed:  X-ray: Left lower lobe pneumonia  EKG (if obtained): (All EKG's are interpreted by myself in the absence of a cardiologist)   sinus Tachycardia, normal axis, no acute ST segment abnormalities    MDM:  Patient presents ER for evaluation of COPD exacerbation with 3 L chronic supplemental oxygen setting of COVID-19 with marked increasing hypoxia with 6 L of oxygen. #1: Hypoxia. Multifactorial etiology, known COPD with chronic supplemental oxygen (currently on 6 L satting at 90%, he did have to go up to 10 L temporarily, IV steroids, supplemental oxygen. Chest x-ray demonstrates a possible left lower lobe pneumonia. Levaquin was added, in addition to steroids    2: COVID-19. No clinical concern at this point time for possible pulmonary embolism, if worsening hypoxia possible CT PE in the setting of COVID-19. She is on chronic Eliquis due to atrial fibrillation, continue Eliquis. 3: Left lower lobe pneumonia. Possible superimposed committee acquired pneumonia versus atypical presentation, Levaquin was added.     Total critical care time provided today was 35 minutes. This excludes seperately billable procedures and family discussion time. Critical care time provided for obtaining history, conducting a physical exam, performing and monitoring interventions, ordering, collecting and interpreting tests, and establishing medical decision-making. There was a potential for life/limb threatening pathology requiring close evaluation and intervention with concern for patient decompensation. Clinical Impression:  1. COVID-19    2. Hypoxia    3. COPD mixed type ([facility] [address]    4. Pneumonia of left lower lobe due to infectious organism      Disposition referral (if applicable):  No follow-up provider specified. Disposition medications (if applicable):  New Prescriptions    No medications on file       Comment: Please note this report has been produced using speech recognition software and may contain errors related to that system including errors in grammar, punctuation, and spelling, as well as words and phrases that may be inappropriate. Efforts were made to edit the dictations.       Dru Ramsey MD  96/73/36 [id number]

## 2022-02-06 LAB
A/G RATIO: 1 (ref 1.1–2.2)
ALBUMIN SERPL-MCNC: 3.3 G/DL (ref 3.4–5)
ALP BLD-CCNC: 60 U/L (ref 40–129)
ALT SERPL-CCNC: 26 U/L (ref 10–40)
ANION GAP SERPL CALCULATED.3IONS-SCNC: 10 MMOL/L (ref 3–16)
AST SERPL-CCNC: 26 U/L (ref 15–37)
BASOPHILS ABSOLUTE: 0 K/UL (ref 0–0.2)
BASOPHILS RELATIVE PERCENT: 0.2 %
BILIRUB SERPL-MCNC: 0.6 MG/DL (ref 0–1)
BUN BLDV-MCNC: 19 MG/DL (ref 7–20)
C-REACTIVE PROTEIN: 79.1 MG/L (ref 0–5.1)
CALCIUM SERPL-MCNC: 8.2 MG/DL (ref 8.3–10.6)
CHLORIDE BLD-SCNC: 95 MMOL/L (ref 99–110)
CO2: 29 MMOL/L (ref 21–32)
CREAT SERPL-MCNC: 0.8 MG/DL (ref 0.8–1.3)
EKG ATRIAL RATE: 118 BPM
EKG DIAGNOSIS: NORMAL
EKG P AXIS: 64 DEGREES
EKG P-R INTERVAL: 202 MS
EKG Q-T INTERVAL: 292 MS
EKG QRS DURATION: 74 MS
EKG QTC CALCULATION (BAZETT): 409 MS
EKG R AXIS: 48 DEGREES
EKG T AXIS: 81 DEGREES
EKG VENTRICULAR RATE: 118 BPM
EOSINOPHILS ABSOLUTE: 0 K/UL (ref 0–0.6)
EOSINOPHILS RELATIVE PERCENT: 0 %
GFR AFRICAN AMERICAN: >60
GFR NON-AFRICAN AMERICAN: >60
GLUCOSE BLD-MCNC: 128 MG/DL (ref 70–99)
GLUCOSE BLD-MCNC: 229 MG/DL (ref 70–99)
GLUCOSE BLD-MCNC: 285 MG/DL (ref 70–99)
GLUCOSE BLD-MCNC: 296 MG/DL (ref 70–99)
GLUCOSE BLD-MCNC: 298 MG/DL (ref 70–99)
GLUCOSE BLD-MCNC: 301 MG/DL (ref 70–99)
HCT VFR BLD CALC: 27.1 % (ref 40.5–52.5)
HEMOGLOBIN: 9.4 G/DL (ref 13.5–17.5)
INFLUENZA A: NOT DETECTED
INFLUENZA B: NOT DETECTED
LYMPHOCYTES ABSOLUTE: 0.5 K/UL (ref 1–5.1)
LYMPHOCYTES RELATIVE PERCENT: 15.3 %
MCH RBC QN AUTO: 28.6 PG (ref 26–34)
MCHC RBC AUTO-ENTMCNC: 34.8 G/DL (ref 31–36)
MCV RBC AUTO: 82.3 FL (ref 80–100)
MONOCYTES ABSOLUTE: 0.2 K/UL (ref 0–1.3)
MONOCYTES RELATIVE PERCENT: 6.3 %
NEUTROPHILS ABSOLUTE: 2.7 K/UL (ref 1.7–7.7)
NEUTROPHILS RELATIVE PERCENT: 78.2 %
PDW BLD-RTO: 15.3 % (ref 12.4–15.4)
PERFORMED ON: ABNORMAL
PLATELET # BLD: 169 K/UL (ref 135–450)
PMV BLD AUTO: 9.2 FL (ref 5–10.5)
POTASSIUM REFLEX MAGNESIUM: 4.4 MMOL/L (ref 3.5–5.1)
RBC # BLD: 3.3 M/UL (ref 4.2–5.9)
SARS-COV-2 RNA, RT PCR: DETECTED
SODIUM BLD-SCNC: 134 MMOL/L (ref 136–145)
TOTAL PROTEIN: 6.7 G/DL (ref 6.4–8.2)
WBC # BLD: 3.4 K/UL (ref 4–11)

## 2022-02-06 PROCEDURE — 87636 SARSCOV2 & INF A&B AMP PRB: CPT

## 2022-02-06 PROCEDURE — 6360000002 HC RX W HCPCS: Performed by: INTERNAL MEDICINE

## 2022-02-06 PROCEDURE — 6370000000 HC RX 637 (ALT 250 FOR IP): Performed by: INTERNAL MEDICINE

## 2022-02-06 PROCEDURE — 36415 COLL VENOUS BLD VENIPUNCTURE: CPT

## 2022-02-06 PROCEDURE — 93010 ELECTROCARDIOGRAM REPORT: CPT | Performed by: INTERNAL MEDICINE

## 2022-02-06 PROCEDURE — 86140 C-REACTIVE PROTEIN: CPT

## 2022-02-06 PROCEDURE — XW0DXM6 INTRODUCTION OF BARICITINIB INTO MOUTH AND PHARYNX, EXTERNAL APPROACH, NEW TECHNOLOGY GROUP 6: ICD-10-PCS | Performed by: INTERNAL MEDICINE

## 2022-02-06 PROCEDURE — 2060000000 HC ICU INTERMEDIATE R&B

## 2022-02-06 PROCEDURE — 2700000000 HC OXYGEN THERAPY PER DAY

## 2022-02-06 PROCEDURE — 80053 COMPREHEN METABOLIC PANEL: CPT

## 2022-02-06 PROCEDURE — 2580000003 HC RX 258: Performed by: INTERNAL MEDICINE

## 2022-02-06 PROCEDURE — 99223 1ST HOSP IP/OBS HIGH 75: CPT | Performed by: INTERNAL MEDICINE

## 2022-02-06 PROCEDURE — 85025 COMPLETE CBC W/AUTO DIFF WBC: CPT

## 2022-02-06 PROCEDURE — 83036 HEMOGLOBIN GLYCOSYLATED A1C: CPT

## 2022-02-06 PROCEDURE — 94761 N-INVAS EAR/PLS OXIMETRY MLT: CPT

## 2022-02-06 PROCEDURE — 94640 AIRWAY INHALATION TREATMENT: CPT

## 2022-02-06 RX ORDER — BENZONATATE 100 MG/1
200 CAPSULE ORAL 3 TIMES DAILY
Status: DISCONTINUED | OUTPATIENT
Start: 2022-02-06 | End: 2022-02-07 | Stop reason: HOSPADM

## 2022-02-06 RX ORDER — BUDESONIDE AND FORMOTEROL FUMARATE DIHYDRATE 160; 4.5 UG/1; UG/1
2 AEROSOL RESPIRATORY (INHALATION) 2 TIMES DAILY
Status: DISCONTINUED | OUTPATIENT
Start: 2022-02-06 | End: 2022-02-07 | Stop reason: HOSPADM

## 2022-02-06 RX ORDER — GUAIFENESIN/DEXTROMETHORPHAN 100-10MG/5
10 SYRUP ORAL EVERY 4 HOURS PRN
Status: DISCONTINUED | OUTPATIENT
Start: 2022-02-06 | End: 2022-02-07 | Stop reason: HOSPADM

## 2022-02-06 RX ADMIN — BENZONATATE 200 MG: 100 CAPSULE ORAL at 17:07

## 2022-02-06 RX ADMIN — ATORVASTATIN CALCIUM 10 MG: 10 TABLET, FILM COATED ORAL at 09:16

## 2022-02-06 RX ADMIN — PROPAFENONE HYDROCHLORIDE 150 MG: 150 TABLET, FILM COATED ORAL at 05:07

## 2022-02-06 RX ADMIN — Medication 2 PUFF: at 11:42

## 2022-02-06 RX ADMIN — INSULIN LISPRO 7 UNITS: 100 INJECTION, SOLUTION INTRAVENOUS; SUBCUTANEOUS at 14:39

## 2022-02-06 RX ADMIN — GABAPENTIN 600 MG: 300 CAPSULE ORAL at 20:26

## 2022-02-06 RX ADMIN — PIOGLITAZONE 30 MG: 30 TABLET ORAL at 09:16

## 2022-02-06 RX ADMIN — TIOTROPIUM BROMIDE INHALATION SPRAY 2 PUFF: 3.12 SPRAY, METERED RESPIRATORY (INHALATION) at 08:40

## 2022-02-06 RX ADMIN — Medication 2 PUFF: at 08:40

## 2022-02-06 RX ADMIN — METFORMIN HYDROCHLORIDE 500 MG: 500 TABLET ORAL at 12:38

## 2022-02-06 RX ADMIN — APIXABAN 5 MG: 5 TABLET, FILM COATED ORAL at 09:16

## 2022-02-06 RX ADMIN — BENZONATATE 200 MG: 100 CAPSULE ORAL at 20:25

## 2022-02-06 RX ADMIN — LEVOFLOXACIN 750 MG: 5 INJECTION, SOLUTION INTRAVENOUS at 14:36

## 2022-02-06 RX ADMIN — SODIUM CHLORIDE, PRESERVATIVE FREE 10 ML: 5 INJECTION INTRAVENOUS at 09:16

## 2022-02-06 RX ADMIN — PROPAFENONE HYDROCHLORIDE 150 MG: 150 TABLET, FILM COATED ORAL at 14:38

## 2022-02-06 RX ADMIN — SODIUM CHLORIDE, PRESERVATIVE FREE 10 ML: 5 INJECTION INTRAVENOUS at 20:25

## 2022-02-06 RX ADMIN — Medication 2 PUFF: at 20:07

## 2022-02-06 RX ADMIN — APIXABAN 5 MG: 5 TABLET, FILM COATED ORAL at 20:26

## 2022-02-06 RX ADMIN — PROPAFENONE HYDROCHLORIDE 150 MG: 150 TABLET, FILM COATED ORAL at 20:31

## 2022-02-06 RX ADMIN — GUAIFENESIN AND DEXTROMETHORPHAN 10 ML: 100; 10 SYRUP ORAL at 17:07

## 2022-02-06 RX ADMIN — PANTOPRAZOLE SODIUM 40 MG: 40 TABLET, DELAYED RELEASE ORAL at 05:07

## 2022-02-06 RX ADMIN — BARICITINIB 4 MG: 2 TABLET, FILM COATED ORAL at 20:31

## 2022-02-06 RX ADMIN — GABAPENTIN 600 MG: 300 CAPSULE ORAL at 14:38

## 2022-02-06 RX ADMIN — DEXAMETHASONE SODIUM PHOSPHATE 6 MG: 10 INJECTION INTRAMUSCULAR; INTRAVENOUS at 17:07

## 2022-02-06 RX ADMIN — DILTIAZEM HYDROCHLORIDE 180 MG: 180 CAPSULE, COATED, EXTENDED RELEASE ORAL at 09:16

## 2022-02-06 RX ADMIN — Medication 2 PUFF: at 16:16

## 2022-02-06 RX ADMIN — INSULIN LISPRO 7 UNITS: 100 INJECTION, SOLUTION INTRAVENOUS; SUBCUTANEOUS at 17:08

## 2022-02-06 RX ADMIN — Medication 2 PUFF: at 20:08

## 2022-02-06 RX ADMIN — GABAPENTIN 600 MG: 300 CAPSULE ORAL at 09:16

## 2022-02-06 RX ADMIN — METFORMIN HYDROCHLORIDE 500 MG: 500 TABLET ORAL at 09:16

## 2022-02-06 ASSESSMENT — PAIN SCALES - GENERAL
PAINLEVEL_OUTOF10: 0

## 2022-02-06 NOTE — ED NOTES
Spoke with patient son, Adriana Canavan, who was provided with update and room number on patient.       Turner Huff RN  02/05/22 3828

## 2022-02-06 NOTE — ED NOTES
Report to [facility]. Pt. Alert and oriented and VSS upon departure.       Zenobia Polanco RN  02/05/22 [id number]

## 2022-02-06 NOTE — PROGRESS NOTES
Shift assessment complete see flow sheet respirations easy and even. Patient denies any pain or needs at this time. Bed is locked in the lowest position with call light within reach. Patient has given me permission to call his daughter and son. I have called his son with an update.

## 2022-02-06 NOTE — CONSULTS
Baricitinib for COVID  Please give 4mg daily x14 days for COVID therapy. CrCl currently 65mL/min. Pharmacy will adjust if renal function declines. Continue to monitor LFTs, renal function, inflammatory markers and O2 requirements.     MICAH [facility]acy [facility] PharmD 2/6/2022 6:22 PM

## 2022-02-06 NOTE — PROGRESS NOTES
02/06/22 0500   RT Protocol   History Pulmonary Disease 2   Respiratory pattern 0   Breath sounds 2   Cough 1   Indications for Bronchodilator Therapy Decreased or absent breath sounds   Bronchodilator Assessment Score 5   RT Inhaler-Nebulizer Bronchodilator Protocol Note    There is a bronchodilator order in the chart from a provider indicating to follow the RT Bronchodilator Protocol and there is an Initiate RT Inhaler-Nebulizer Bronchodilator Protocol order as well (see protocol at bottom of note). CXR Findings:  XR CHEST PORTABLE    Result Date: 2/5/2022  Left lower lobe opacity could represent atelectasis or infection       The findings from the last RT Protocol Assessment were as follows:   History Pulmonary Disease: Chronic pulmonary disease  Respiratory Pattern: Regular pattern and RR 12-20 bpm  Breath Sounds: Slightly diminished and/or crackles  Cough: Strong, productive  Indication for Bronchodilator Therapy: Decreased or absent breath sounds  Bronchodilator Assessment Score: 5    Aerosolized bronchodilator medication orders have been revised according to the RT Inhaler-Nebulizer Bronchodilator Protocol below. Respiratory Therapist to perform RT Therapy Protocol Assessment initially then follow the protocol. Repeat RT Therapy Protocol Assessment PRN for score 0-3 or on second treatment, BID, and PRN for scores above 3. No Indications  adjust the frequency to every 6 hours PRN wheezing or bronchospasm, if no treatments needed after 48 hours then discontinue using Per Protocol order mode. If indication present, adjust the RT bronchodilator orders based on the Bronchodilator Assessment Score as indicated below.   Use Inhaler orders unless patient has one or more of the following: on home nebulizer, not able to hold breath for 10 seconds, is not alert and oriented, cannot activate and use MDI correctly, or respiratory rate 25 breaths per minute or more, then use the equivalent nebulizer order(s) with same Frequency and PRN reasons based on the score. If a patient is on this medication at home then do not decrease Frequency below that used at home. 0-3  enter or revise RT bronchodilator order(s) to equivalent RT Bronchodilator order with Frequency of every 4 hours PRN for wheezing or increased work of breathing using Per Protocol order mode. 4-6  enter or revise RT Bronchodilator order(s) to two equivalent RT bronchodilator orders with one order with BID Frequency and one order with Frequency of every 4 hours PRN wheezing or increased work of breathing using Per Protocol order mode. 7-10  enter or revise RT Bronchodilator order(s) to two equivalent RT bronchodilator orders with one order with TID Frequency and one order with Frequency of every 4 hours PRN wheezing or increased work of breathing using Per Protocol order mode. 11-13  enter or revise RT Bronchodilator order(s) to one equivalent RT bronchodilator order with QID Frequency and an Albuterol order with Frequency of every 4 hours PRN wheezing or increased work of breathing using Per Protocol order mode. Greater than 13  enter or revise RT Bronchodilator order(s) to one equivalent RT bronchodilator order with every 4 hours Frequency and an Albuterol order with Frequency of every 2 hours PRN wheezing or increased work of breathing using Per Protocol order mode.      Electronically signed by Roge Zacarias RCP on 2/6/2022 at 5:03 AM

## 2022-02-06 NOTE — ED NOTES
Patient sleeping comfortably in bed at this time. VSS. Call light within reach. Will continue to monitor.       Dell Steven RN  02/05/22 [id number]

## 2022-02-06 NOTE — CONSULTS
Patient is being seen at the request of Dr. Alan Luna for a consultation for COVID-19 and respiratory failure in an unvaccinated patient. Hospital Day: 2     HISTORY OF PRESENT ILLNESS: Khalida Ramos is a 78 y.o. male with a PMHx of very severe COPD f/b Dr. Deepali Olmedo, chronic hypoxic respiratory failure, A. Fib, & known COVID-19+ dx ~2 wks ago who presented to the ED a second time on 2/5/2022 with a few day h/o progressive severe shortness of breath, worse with activity, and associated with congested cough & hypoxia on baseline 3 L O2. On arrival to ED, pt was 82% on 5 L. Previous ER visit on 1/30/2022 with tachycardia & an array of flu-like sxs but pt declined admission at that time & was sent home with a Zofran Rx. Pt was not vaccinated for COVID-19 despite previous recommendations from Dr. Deepali Olmedo and Dr. Nicki Chairez.      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    ELAINE on CPAP     Osteoporosis     Skin cancer of face      PAST SURGICAL HISTORY:  Past Surgical History:   Procedure Laterality Date    BACK SURGERY      repair broken back L4 & L5    CHOLECYSTECTOMY, LAPAROSCOPIC N/A 1/6/2021    LAPAROSCOPIC CHOLECYSTECTOMY, WITH CHOLANGIOGRAMS (ATTEMPTED) OPEN CHOLECYSTECTOMY performed by Navi Cheng MD at [address] [place] [address], OPEN N/A 01/06/2021    LAPAROSCOPIC CHOLECYSTECTOMY, WITH CHOLANGIOGRAMS (ATTEMPTED)     CYSTOSCOPY Right 10/9/15    RIght Ureteroscopy, Holmium Laser Lithotripsy with Stone Removal, Right Stent Placement    CYSTOSCOPY  05/01/2019    CYSTOSCOPY, RESECTION OF BLADDER NECK, URETHRAL DILATION    CYSTOSCOPY W BIOPSY OF BLADDER N/A 5/1/2019    CYSTOSCOPY, RESECTION OF BLADDER NECK, URETHRAL DILATION performed by Melinda Paul MD at [address] Left 6/12/2016    cataract removal    GALLBLADDER SURGERY  01/2021    JOINT REPLACEMENT  6/29/2011    right knee    JOINT REPLACEMENT  11/2004    left knee    OTHER SURGICAL HISTORY  08/31/2015    wide excision basal cell carcinoma on the nose and bilateral auricles with frozen sections, plastic closure, full thickness skin graft    OTHER SURGICAL HISTORY  12/1/15    excision of lesion of lip    PROSTATE SURGERY      TURP  10/23/2015    with cystoscopy     FAMILY HISTORY:  family history includes Cancer in his brother, father, and mother; Diabetes in his mother and sister; Heart Disease in his mother. SOCIAL HISTORY:   reports that he quit smoking about 16 years ago. He has a 67.50 pack-year smoking history. He has never used smokeless tobacco.    Scheduled Meds:   budesonide-formoterol  2 puff Inhalation BID    And    tiotropium  2 puff Inhalation Daily    metFORMIN  500 mg Oral 4x Daily WC    pantoprazole  40 mg Oral QAM AC    gabapentin  600 mg Oral TID    pioglitazone  30 mg Oral Daily    fluticasone  1 spray Each Nostril Daily    apixaban  5 mg Oral BID    propafenone  150 mg Oral 3 times per day    atorvastatin  10 mg Oral Daily    albuterol sulfate HFA  2 puff Inhalation 4x daily    dilTIAZem  180 mg Oral Daily    sodium chloride flush  5-40 mL IntraVENous 2 times per day    levofloxacin  750 mg IntraVENous Q24H    dexamethasone  6 mg IntraVENous Q24H     Continuous Infusions:   sodium chloride       PRN Meds:  acetaminophen, sodium chloride flush, sodium chloride, ondansetron **OR** ondansetron, polyethylene glycol, acetaminophen **OR** acetaminophen    ALLERGIES:  Patient has No Known Allergies.     REVIEW OF SYSTEMS:  Constitutional: Negative for fever  HENT: Negative for sore throat  Eyes: Negative for redness   Respiratory: + for dyspnea, + cough  Cardiovascular: Negative for chest pain  Gastrointestinal: +N/V, diarrhea + decreased appetite  Genitourinary: Negative for hematuria   Musculoskeletal: Negative for arthralgias   Skin: Negative for rash  Neurological: Negative for syncope  Hematological: Negative for adenopathy  Psychiatric/Behavorial: Negative for anxiety    PHYSICAL EXAM:  Blood pressure 132/73, pulse 110, temperature 97.1 °F (36.2 °C), temperature source Oral, resp. rate 19, height 5' 11\" (1.803 m), weight 143 lb 11.2 oz (65.2 kg), SpO2 93 %.' on 4 L  General: ill appearing. Eyes: PERRL. No sclera icterus. No conjunctival injection. ENT: No discharge. Pharynx clear. Neck: Trachea midline. Normal thyroid. Resp: No accessory muscle use. No crackles. No wheezing. No rhonchi. No dullness on percussion. Very poor air movement bilaterally  CV: Regular rate. Regular rhythm. No mumur or rub. No edema. Peripheral pulses are 2+. Capillary refill is less than 3 seconds. GI: Non-tender. Non-distended. No masses. No organomegaly. Normal bowel sounds. No hernia. Skin: Warm and dry. No nodule on exposed extremities. No rash on exposed extremities. Lymph: No cervical LAD. No supraclavicular LAD. M/S: No cyanosis. No joint deformity. No clubbing. Neuro: Alert and oriented x3. Patellar reflexes are symmetric. Psych: No agitation, no anxiety, affect is full. I independently performed the physical exam  Kenya Duff MD on 2/6/22, 6:02 PM EST   LABS:  CBC:   Recent Labs     02/05/22  1552 02/06/22  0450   WBC 6.4 3.4*   HGB 11.0* 9.4*   HCT 31.2* 27.1*   MCV 80.0 82.3    169     BMP:   Recent Labs     02/05/22  1641 02/06/22  0450    134*   K 3.9 4.4   CL 95* 95*   CO2 31 29   BUN 19 19   CREATININE 1.0 0.8     LIVER PROFILE:   Recent Labs     02/06/22  0450   AST 26   ALT 26   BILITOT 0.6   ALKPHOS 60     PT/INR: No results for input(s): PROTIME, INR in the last 72 hours. APTT: No results for input(s):  APTT in the last 72 hours. UA:No results for input(s): NITRITE, COLORU, PHUR, LABCAST, WBCUA, RBCUA, MUCUS, TRICHOMONAS, YEAST, BACTERIA, CLARITYU, SPECGRAV, LEUKOCYTESUR, UROBILINOGEN, BILIRUBINUR, BLOODU, GLUCOSEU, AMORPHOUS in the last 72 hours. Invalid input(s): KETONESU  No results for input(s): PHART, DSO9PDT, PO2ART in the last 72 hours. Micro:  2/6/2022 SARS-CoV-2 positive  2/5/2022 BC pending    Imaging: Chest imaging was reviewed by me and showed   PFT 7/13/2017   FEV1 0.57 18%   FVC 1.55 35%   ratio 0.37   %   %   DLCO 7.27 25%  Significant BD response    Echo 4/1/2020  LVEF 55-60%  Normal LV diastolic filling pressure  RA mildly dilated  Mild mitral and tricuspid regurgitation  SPAP 36 mmHg (RA pressure 8 mmHg)    CXR 2/5/2022  left greater than right infiltrates    ASSESSMENT:  · COVID-19 pneumonia in an unvaccinated patient  · Acute on chronic hypoxemic respiratory failure; baseline 3 L O2  · Very severe COPD, f/b Dr. Geraldine Dubin  · Tachycardia  · Lymphopenia  · Chronic anemia  · H/O chronic thrombocytopenia  · Paroxysmal A. Fib, on Eliquis  · Former smoker, >60 pack-yr hx per EMR    PLAN:   COVID-19 isolation, droplet plus   Supplemental O2 to maintain SaO2 >92%; monitor sats closely    No Remdesevir given late presentation; sx-onset 2 wks ago   Decadron D#2, 6 mg daily    Levaquin D#2   Baricitinib day #1, monitor LFT, renal and CBC differential   Inhaled bronchodilators only as needed, MDI preferred    On propafenone &diltiazem   Home Eliquis    I contributed to updating portions of this encounter note.    Lalito Salazar PA-C on 2/6/22 at 10:30 AM EST

## 2022-02-06 NOTE — ED NOTES
Patient up to bedside commode x1 assist. Pt. Assisted back into bed at this time. Pt. Denies any other wants or needs. Pt. Requesting to sit up on side of bed. Call light within reach. No other wants or needs. Will continue to monitor.       Angélica Hawkins RN  02/05/22 1396

## 2022-02-06 NOTE — FLOWSHEET NOTE
02/05/22 2315   Vital Signs   Temp 97.3 °F (36.3 °C)   Temp Source Oral   Pulse 104   Heart Rate Source Monitor   Resp 18   /75   BP Location Left upper arm   Patient Position Semi fowlers   Level of Consciousness Alert (0)   MEWS Score 2   Oxygen Therapy   SpO2 93 %   O2 Device Nasal cannula   O2 Flow Rate (L/min) 4 L/min   Height and Weight   Height 5' 11\" (1.803 m)   Weight 200 lb (90.7 kg)   Weight Method Stated   BSA (Calculated - sq m) 2.13 sq meters   BMI (Calculated) 28     Patient admitted to room 323 from [facility]. Patient oriented to room, call light, bed rails, phone, lights and bathroom. Patient instructed about the schedule of the day including: vital sign frequency, lab draws, possible tests, frequency of MD and staff rounds, daily weights, I &O's and prescribed diet. bed alarm in place, patient aware of placement and reason. Telemetry box in place, patient aware of placement and reason. Bed locked, in lowest position, side rails up 2/4, call light within reach. Recliner Assessment  Patient is able to demonstrate the ability to move from a reclining position to an upright position within the recliner. 4 Eyes Skin Assessment     The patient is being assess for   Admission    I agree that 2 RN's have performed a thorough Head to Toe Skin Assessment on the patient. ALL assessment sites listed below have been assessed.       Areas assessed for pressure by both nurses:   [x]   Head, Face, and Ears   [x]   Shoulders, Back, and Chest, Abdomen  [x]   Arms, Elbows, and Hands   [x]   Coccyx, Sacrum, and Ischium  [x]   Legs, Feet, and Heels        Skin Assessed Under all Medical Devices by both nurses:  O2 device tubing              All Mepilex Borders were peeled back and area peeked at by both nurses:  No: none  Please list where Mepilex Borders are located:  none             **SHARE this note so that the co-signing nurse is able to place an eSignature**    Co-signer eSignature: Electronically signed by Danna Valadez RN on 2/6/22 at 6:38 AM EST    Does the Patient have Skin Breakdown related to pressure?   No              Santiago Prevention initiated:  No   Wound Care Orders initiated:  No      WOC nurse consulted for Pressure Injury (Stage 3,4, Unstageable, DTI, NWPT, Complex wounds)and New or Established Ostomies:  No      Primary Nurse eSignature: Electronically signed by Jenny Cavanaugh RN on 2/5/22 at 11:59 PM EST

## 2022-02-06 NOTE — ED NOTES
No distress eating dinner. Alert with sats mid 90s on 4 l nc. Report to Adrianna GILLESPIE for assigned room at [facility].        Olegario Cope RN  02/05/22 2013

## 2022-02-06 NOTE — FLOWSHEET NOTE
02/06/22 0230   Vital Signs   Temp 98.9 °F (37.2 °C)   Temp Source Oral   Pulse 90   Heart Rate Source Monitor   Resp 20   /75   BP Location Left upper arm   Patient Position Semi fowlers   Level of Consciousness Alert (0)   MEWS Score 1   Oxygen Therapy   SpO2 97 %   O2 Device Nasal cannula   O2 Flow Rate (L/min) 4 L/min   Height and Weight   Weight 143 lb 11.2 oz (65.2 kg)   Weight Method Actual;Bed scale   BMI (Calculated) 20.1     Vitals taken, shown above. Patient is stable with no current needs at this time. Call light in reach. Bed in lowest position.

## 2022-02-07 VITALS
WEIGHT: 143.7 LBS | OXYGEN SATURATION: 92 % | HEIGHT: 71 IN | BODY MASS INDEX: 20.12 KG/M2 | DIASTOLIC BLOOD PRESSURE: 72 MMHG | HEART RATE: 95 BPM | TEMPERATURE: 97.3 F | SYSTOLIC BLOOD PRESSURE: 129 MMHG | RESPIRATION RATE: 18 BRPM

## 2022-02-07 LAB
A/G RATIO: 1 (ref 1.1–2.2)
ALBUMIN SERPL-MCNC: 3.2 G/DL (ref 3.4–5)
ALP BLD-CCNC: 58 U/L (ref 40–129)
ALT SERPL-CCNC: 22 U/L (ref 10–40)
ANION GAP SERPL CALCULATED.3IONS-SCNC: 9 MMOL/L (ref 3–16)
AST SERPL-CCNC: 23 U/L (ref 15–37)
BILIRUB SERPL-MCNC: 0.6 MG/DL (ref 0–1)
BUN BLDV-MCNC: 28 MG/DL (ref 7–20)
CALCIUM SERPL-MCNC: 8.4 MG/DL (ref 8.3–10.6)
CHLORIDE BLD-SCNC: 95 MMOL/L (ref 99–110)
CO2: 29 MMOL/L (ref 21–32)
CREAT SERPL-MCNC: 0.9 MG/DL (ref 0.8–1.3)
ESTIMATED AVERAGE GLUCOSE: 168.6 MG/DL
GFR AFRICAN AMERICAN: >60
GFR NON-AFRICAN AMERICAN: >60
GLUCOSE BLD-MCNC: 268 MG/DL (ref 70–99)
GLUCOSE BLD-MCNC: 285 MG/DL (ref 70–99)
GLUCOSE BLD-MCNC: 297 MG/DL (ref 70–99)
HBA1C MFR BLD: 7.5 %
PERFORMED ON: ABNORMAL
PERFORMED ON: ABNORMAL
POTASSIUM REFLEX MAGNESIUM: 4.2 MMOL/L (ref 3.5–5.1)
SODIUM BLD-SCNC: 133 MMOL/L (ref 136–145)
TOTAL PROTEIN: 6.5 G/DL (ref 6.4–8.2)

## 2022-02-07 PROCEDURE — 80053 COMPREHEN METABOLIC PANEL: CPT

## 2022-02-07 PROCEDURE — 36415 COLL VENOUS BLD VENIPUNCTURE: CPT

## 2022-02-07 PROCEDURE — 94761 N-INVAS EAR/PLS OXIMETRY MLT: CPT

## 2022-02-07 PROCEDURE — 6370000000 HC RX 637 (ALT 250 FOR IP): Performed by: INTERNAL MEDICINE

## 2022-02-07 PROCEDURE — 99239 HOSP IP/OBS DSCHRG MGMT >30: CPT | Performed by: INTERNAL MEDICINE

## 2022-02-07 PROCEDURE — 2700000000 HC OXYGEN THERAPY PER DAY

## 2022-02-07 PROCEDURE — 2580000003 HC RX 258: Performed by: INTERNAL MEDICINE

## 2022-02-07 PROCEDURE — 94640 AIRWAY INHALATION TREATMENT: CPT

## 2022-02-07 PROCEDURE — 92610 EVALUATE SWALLOWING FUNCTION: CPT

## 2022-02-07 PROCEDURE — 6360000002 HC RX W HCPCS: Performed by: INTERNAL MEDICINE

## 2022-02-07 PROCEDURE — 99232 SBSQ HOSP IP/OBS MODERATE 35: CPT | Performed by: INTERNAL MEDICINE

## 2022-02-07 PROCEDURE — 92526 ORAL FUNCTION THERAPY: CPT

## 2022-02-07 RX ORDER — LEVOFLOXACIN 500 MG/1
500 TABLET, FILM COATED ORAL DAILY
Qty: 5 TABLET | Refills: 0 | Status: SHIPPED | OUTPATIENT
Start: 2022-02-07 | End: 2022-02-12

## 2022-02-07 RX ORDER — DEXAMETHASONE 6 MG/1
6 TABLET ORAL 2 TIMES DAILY WITH MEALS
Qty: 16 TABLET | Refills: 0 | Status: SHIPPED | OUTPATIENT
Start: 2022-02-07 | End: 2022-02-15

## 2022-02-07 RX ADMIN — APIXABAN 5 MG: 5 TABLET, FILM COATED ORAL at 09:17

## 2022-02-07 RX ADMIN — Medication 2 PUFF: at 07:30

## 2022-02-07 RX ADMIN — SODIUM CHLORIDE, PRESERVATIVE FREE 10 ML: 5 INJECTION INTRAVENOUS at 09:18

## 2022-02-07 RX ADMIN — LEVOFLOXACIN 750 MG: 5 INJECTION, SOLUTION INTRAVENOUS at 11:39

## 2022-02-07 RX ADMIN — PIOGLITAZONE 30 MG: 30 TABLET ORAL at 09:16

## 2022-02-07 RX ADMIN — GABAPENTIN 600 MG: 300 CAPSULE ORAL at 09:16

## 2022-02-07 RX ADMIN — PANTOPRAZOLE SODIUM 40 MG: 40 TABLET, DELAYED RELEASE ORAL at 05:10

## 2022-02-07 RX ADMIN — INSULIN LISPRO 7 UNITS: 100 INJECTION, SOLUTION INTRAVENOUS; SUBCUTANEOUS at 09:30

## 2022-02-07 RX ADMIN — PROPAFENONE HYDROCHLORIDE 150 MG: 150 TABLET, FILM COATED ORAL at 05:10

## 2022-02-07 RX ADMIN — BENZONATATE 200 MG: 100 CAPSULE ORAL at 09:16

## 2022-02-07 RX ADMIN — TIOTROPIUM BROMIDE INHALATION SPRAY 2 PUFF: 3.12 SPRAY, METERED RESPIRATORY (INHALATION) at 07:30

## 2022-02-07 RX ADMIN — BARICITINIB 4 MG: 2 TABLET, FILM COATED ORAL at 09:16

## 2022-02-07 RX ADMIN — DILTIAZEM HYDROCHLORIDE 180 MG: 180 CAPSULE, COATED, EXTENDED RELEASE ORAL at 09:17

## 2022-02-07 RX ADMIN — ATORVASTATIN CALCIUM 10 MG: 10 TABLET, FILM COATED ORAL at 09:17

## 2022-02-07 RX ADMIN — FLUTICASONE PROPIONATE 1 SPRAY: 50 SPRAY, METERED NASAL at 11:32

## 2022-02-07 RX ADMIN — SODIUM CHLORIDE 25 ML: 9 INJECTION, SOLUTION INTRAVENOUS at 11:36

## 2022-02-07 ASSESSMENT — PAIN SCALES - GENERAL
PAINLEVEL_OUTOF10: 0

## 2022-02-07 NOTE — CARE COORDINATION
Case Management Assessment  Initial Evaluation and Discharge      Patient Name: Mikal Hoover  YOB: 1940  Diagnosis: Hypoxia [R09.02]  COPD mixed type ([address] [J44.9]  Pneumonia of left lower lobe due to infectious organism [J18.9]  Pneumonia due to COVID-19 virus [U07.1, J12.82]  COVID-19 [U07.1]  Date / Time: 2/5/2022  3:30 PM    Admission status/Date:2/5/2022  Chart Reviewed: Yes      Patient Interviewed: Yes   Family Interviewed:  No      Hospitalization in the last 30 days:  No      Health Care Decision Maker :   Primary Decision MakerTgeorge Stovall - Child - [phone]    Secondary Decision Maker: Leightoni Michelle - Child - [phone]    (CM - must 1st enter selection under Navigator - emergency contact- Health Care Decision Maker Relationship and pick relationship)   Who do you trust or have selected to make healthcare decisions for you      Met with: pt  Interview conducted  (bedside/phone): phone    Current PCP: Yadira Xiao MD      Financial  Medicare  Precert required for SNF : Y, N          3 night stay required - Y, N    ADLS  Support Systems/Care Needs:    Transportation: daughter    Meal Preparation: self    Housing  Living Arrangements: ranch alone and daughter there 24/7   Steps: 1  Intent for return to present living arrangements: Yes  Identified Issues: [address] [address] [facility] with [address] : No Agency:(Services)     Passport/Waiver : No  :                      Phone Number:    Passport/Waiver Services: n/a          Durable Medical Equiptment   DME Provider: [facility] GUTIERREZ baseline home O2  Equipment: yes  Walker_X__Cane___RTS___ BSC___Shower Chair_X__Hospital Bed___W/C____Other________  02 at __3__Liter(s)---wears(frequency)___continuous____ HHN ___ CPAP___ BiPap___   N/A____      Home O2 Use :  Yes  If No for home O2---if presently on O2 during hospitalization:  Yes  if yes CM to follow for potential DC O2 need  Informed of need for care provider to bring portable home O2 tank on day of discharge for nursing to connect prior to leaving:   Yes  Verbalized agreement/Understanding:   Yes    Community Service Affiliation  Dialysis:  No    · Agency:  · Location:  · Dialysis Schedule:  · Phone:   · Fax: Other Community Services: (ex:PT/OT,Mental Health,Wound Clinic, Cardio/Pul [address])    DISCHARGE PLAN: Explained Case Management role/services. Reviewed chart. Role of discharge planner explained and patient verbalized understanding. Pt is alert and oriented. Discharge order is noted. Pt is being d/c'd to home today. Daughter is with pt 24/7. CM offered HC and pt declined. Pt was on Eliquis prior to admission. Pt's O2 sats are 92% on 3L. Pt is active with Far[facility] for home O2. No further discharge needs needed or noted. C19 pos as of 2/6/2021     Discharge timeout done with Paul Whitehead RN. All discharge needs and concerns addressed.

## 2022-02-07 NOTE — PROGRESS NOTES
Patient educated on discharge instructions as well as new medications use, dosage, administration and possible side effects. Patient verified knowledge. IV removed without difficulty and dry dressing in place. Telemetry monitor removed and returned to [facility]. Pt left facility in stable condition to Home with all of their personal belongings.  Shan Maradiaga RN

## 2022-02-07 NOTE — PLAN OF CARE
Problem: Nutrition  Intervention: Swallowing evaluation  Note: Evaluation completed  Wilma Kothari MS CCC-SLP  Speech Language Pathologist        Problem: Nutrition  Intervention: Aspiration precautions  Note: Evaluation completed  Wilma Kothari MS CCC-SLP  Speech Language Pathologist

## 2022-02-07 NOTE — PROGRESS NOTES
Shift assessment complete, morning medications given, patient resting with no complaints of pain,patietn remains on 3L O2 which is baseline for patient, will continue to monitor.  Alberto Trujillo RN

## 2022-02-07 NOTE — FLOWSHEET NOTE
Shift assessment complete. See flowsheet for full assessment. Pt denies any needs, call light within reach.        02/07/22 0252   Vital Signs   Temp 97.3 °F (36.3 °C)   Temp Source Oral   Pulse 90   Heart Rate Source Monitor   Resp 18   /70   BP Location Left upper arm   Patient Position Semi fowlers   Level of Consciousness Alert (0)   MEWS Score 1   Patient Currently in Pain Denies   Pain Assessment   Pain Assessment 0-10   Pain Level 0   Oxygen Therapy   SpO2 93 %   O2 Device Nasal cannula   O2 Flow Rate (L/min) 3 L/min

## 2022-02-07 NOTE — H&P
Hospital Medicine History & Physical      PCP: Harlan Egan MD    Date of Admission: 2/5/2022    Date of Service: Pt seen/examined on 2/6/22 and Admitted to Inpatient     Chief Complaint: SOB    History Of Present Illness: The patient is a 78 y.o. male who presents to [address] [place] [address] with SOB. Pt has a h/o very severe COPD f/u with Dr. Ambrosio Cooper, chronic hypoxic respiratory failure, A. Fib, & known COVID-19+ dx ~2 wks ago who presented to the ER with h/o progressive severe shortness of breath, worse with activity, and associated with congested cough ongoing for the last few days, has h/o hypoxia - on baseline 3 L O2. On arrival to ED, pt was 82% on 5 L.  Tested positive for covid and hence admitted.        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    ELAINE on CPAP     Osteoporosis     Skin cancer of face        Past Surgical History:        Procedure Laterality Date    BACK SURGERY      repair broken back L4 & L5    CHOLECYSTECTOMY, LAPAROSCOPIC N/A 1/6/2021    LAPAROSCOPIC CHOLECYSTECTOMY, WITH CHOLANGIOGRAMS (ATTEMPTED) OPEN CHOLECYSTECTOMY performed by Keri Christianson MD at [address] [place] [address], OPEN N/A 01/06/2021    LAPAROSCOPIC CHOLECYSTECTOMY, WITH CHOLANGIOGRAMS (ATTEMPTED)     CYSTOSCOPY Right 10/9/15    RIght Ureteroscopy, Holmium Laser Lithotripsy with Stone Removal, Right Stent Placement    CYSTOSCOPY  05/01/2019    CYSTOSCOPY, RESECTION OF BLADDER NECK, URETHRAL DILATION    CYSTOSCOPY W BIOPSY OF BLADDER N/A 5/1/2019    CYSTOSCOPY, RESECTION OF BLADDER NECK, URETHRAL DILATION performed by Nidia Trivedi MD at [address] Left 6/12/2016    cataract removal    GALLBLADDER SURGERY  01/2021    JOINT REPLACEMENT  6/29/2011    right knee    JOINT REPLACEMENT  11/2004    left knee    OTHER SURGICAL HISTORY  08/31/2015    wide excision basal cell carcinoma on the nose and bilateral auricles with frozen sections, plastic closure, full thickness skin graft    OTHER SURGICAL HISTORY  12/1/15    excision of lesion of lip    PROSTATE SURGERY      TURP  10/23/2015    with cystoscopy       Medications Prior to Admission:    Prior to Admission medications    Medication Sig Start Date End Date Taking?  Authorizing Provider   ondansetron (ZOFRAN ODT) 4 MG disintegrating tablet Take 1 tablet by mouth every 8 hours as needed for Nausea or Vomiting 1/30/22   Amparo Alberts DO   VENTOLIN  (02 Base) MCG/ACT inhaler Inhale 2 puffs into the lungs every 6 hours as needed for Wheezing orShortness of Breath 11/24/21   Sherri Paredes MD   albuterol (PROVENTIL) (2.5 MG/3ML) 0.083% nebulizer solution USE 1 VIAL IN NEBULIZER 4 TIMES DAILY 10/8/21   Sherri Paredes MD   dilTIAZem (CARTIA XT) 180 MG extended release capsule Take 1 capsule by mouth as needed (Ok to take as needed only 1 time a day for increased heart rate) 10/5/21   Sarah Almonte MD   apixaban (ELIQUIS) 5 MG TABS tablet Take 1 tablet by mouth 2 times daily 10/5/21   Sarah Almonte MD   propafenone (RYTHMOL) 150 MG tablet TAKE ONE TABLET BY MOUTH EVERY 8 HOURS 10/5/21   Sarah Almonte MD   atorvastatin (LIPITOR) 10 MG tablet Take 1 tablet by mouth daily 10/5/21   Sarah Almonte MD   Dejunior Schneiders 100-62.5-25 MCG/INH AEPB INHALE 1 PUFF EVERY DAY 10/1/21   Sherri Paredes MD   acetaminophen (AMINOFEN) 325 MG tablet Take 2 tablets by mouth every 4 hours as needed for Pain 8/11/20   Gurdeep Holloway MD   alendronate (FOSAMAX) 70 MG tablet  3/31/20   Historical Provider, MD   fluticasone Mittie Running) 50 MCG/ACT nasal spray  2/12/20   Historical Provider, MD   pioglitazone (ACTOS) 30 MG tablet Take 30 mg by mouth daily    Historical Provider, MD   gabapentin (NEURONTIN) 300 MG capsule Take 600 mg by mouth 3 times daily . Historical Provider, MD   OXYGEN Inhale 2.5 L into the lungs nightly Pt wears 2- 2/12 L at night. 3 L while walking/ moving around    Historical Provider, MD   metformin (GLUCOPHAGE) 500 MG tablet Take 500 mg by mouth 4 times daily     Historical Provider, MD   esomeprazole (NEXIUM) 40 MG capsule   Take 40 mg by mouth every morning (before breakfast) Indications: take [address] Provider, MD       Allergies:  Patient has no known allergies. Social History:  The patient currently lives at home    TOBACCO:   reports that he quit smoking about 16 years ago. He has a 67.50 pack-year smoking history. He has never used smokeless tobacco.  ETOH:   reports no history of alcohol use. Family History:  Reviewed in detail and negative for DM, Early CAD, Cancer, CVA. Positive as follows:        Problem Relation Age of Onset    Cancer Mother     Diabetes Mother     Heart Disease Mother     Cancer Father     Diabetes Sister     Cancer Brother        REVIEW OF SYSTEMS:   Positive for SOB and as noted in the HPI. All other systems reviewed and negative. PHYSICAL EXAM:    /68   Pulse 97   Temp 98.1 °F (36.7 °C) (Oral)   Resp 16   Ht 5' 11\" (1.803 m)   Wt 143 lb 11.2 oz (65.2 kg)   SpO2 93%   BMI 20.04 kg/m²     General appearance: No apparent distress appears stated age and cooperative. HEENT Normal cephalic, atraumatic without obvious deformity. Pupils equal, round, and reactive to light. Extra ocular muscles intact. Conjunctivae/corneas clear. Neck: Supple, No jugular venous distention/bruits. Trachea midline without thyromegaly or adenopathy with full range of motion.   Lungs: Clear to auscultation, bilaterally without Rales/Wheezes/Rhonchi with good respiratory effort. Heart: Regular rhythm, tachy with Normal S1/S2  Abdomen: Soft, non-tender or non-distended without rigidity or guarding and positive bowel sounds all four quadrants. Extremities: No clubbing, cyanosis, or edema bilaterally. Skin: Skin color, texture, turgor normal.  No rashes or lesions. Neurologic: Alert and oriented X 3, neurovascularly intact with sensory/motor intact upper extremities/lower extremities, bilaterally. Cranial nerves: II-XII intact, grossly non-focal.  Mental status: Alert, oriented, thought content appropriate. Capillary Refill: Acceptable  < 3 seconds  Peripheral Pulses: +3 Easily felt, not easily obliterated with pressure      CXR:  I have reviewed the CXR with the following interpretation: LLL PNA    CBC   Recent Labs     02/05/22  1552 02/06/22  0450   WBC 6.4 3.4*   HGB 11.0* 9.4*   HCT 31.2* 27.1*    169      RENAL  Recent Labs     02/05/22  1641 02/06/22  0450    134*   K 3.9 4.4   CL 95* 95*   CO2 31 29   BUN 19 19   CREATININE 1.0 0.8     LFT'S  Recent Labs     02/06/22  0450   AST 26   ALT 26   BILITOT 0.6   ALKPHOS 60     COAG  No results for input(s): INR in the last 72 hours.   CARDIAC ENZYMES  Recent Labs     02/05/22  1641   TROPONINI <0.01       U/A:    Lab Results   Component Value Date    COLORU Yellow 01/04/2021    WBCUA 6-9 01/04/2021    RBCUA 0-2 01/04/2021    MUCUS Rare 01/04/2021    BACTERIA Rare 12/14/2020    CLARITYU SL CLOUDY 01/04/2021    SPECGRAV 1.015 01/04/2021    LEUKOCYTESUR TRACE 01/04/2021    BLOODU TRACE-INTACT 01/04/2021    GLUCOSEU Negative 01/04/2021    AMORPHOUS 1+ 01/04/2021       ABG    Lab Results   Component Value Date    ZTD9XQY 31.1 01/08/2021    BEART 4.1 01/08/2021    Y3NTDVPT 95.5 01/08/2021    PHART 7.322 01/08/2021    XFM9EQN 61.5 01/08/2021    PO2ART 85.8 01/08/2021    IHW0UKJ 33.0 01/08/2021           Active Hospital Problems    Diagnosis Date Noted    Acute hypoxemic respiratory failure (HCC) [J96.01]     Pneumonia due to COVID-19 virus [U07.1, J12.82] 02/05/2022         PHYSICIANS CERTIFICATION:    I certify that Sd Saravia is expected to be hospitalized for > than 2 midnights based on the following assessment and plan:      ASSESSMENT/PLAN:    Acute on chr hypoxic resp failure - cont O2, wean as tolerated    COVID 19 viral PNA - started on decadron, check CRP level elevated. Started on baricitinib. pulm consulted. Cannot r/o bacterial PNA, started on levaquin    DM II - uncontrolled, suspect 2/2 steroids. Start SSI/lispro TID. Will consider lantus if BS uncontrolled    Par a fib - on cardizem/eliquis    Severe COPD - cont home inhalers, pulm following      DVT Prophylaxis: eliquis  Diet: ADULT DIET; Regular  Code Status: Full Code  PT/OT Eval Status: ordered    Rodrigo Dye MD    Thank you Hang Vásquez MD for the opportunity to be involved in this patient's care. If you have any questions or concerns please feel free to contact me at [phone].

## 2022-02-07 NOTE — PLAN OF CARE
Problem: Falls - Risk of:  Goal: Will remain free from falls  Description: Will remain free from falls  Outcome: Ongoing  Goal: Absence of physical injury  Description: Absence of physical injury  Outcome: Ongoing     Problem: Airway Clearance - Ineffective  Goal: Achieve or maintain patent airway  Outcome: Ongoing

## 2022-02-07 NOTE — DISCHARGE SUMMARY
Hospital Medicine Discharge Summary    Patient ID: Estefania Oliva      Patient's PCP: Bacilio Banks MD    Admit Date: 2/5/2022     Discharge Date:  2/7/2022    Admitting Provider: Burgess Junie MD     Discharge Provider: Stephanie Moore MD     Discharge Diagnoses: Active Hospital Problems    Diagnosis     Acute hypoxemic respiratory failure ([facility] [J96.01]     Pneumonia due to COVID-19 virus [U07.1, J12.82]        The patient was seen and examined on day of discharge and this discharge summary is in conjunction with any daily progress note from day of discharge. Hospital Course: 78 y.o. male who presents to [address] with SOB. Pt has a h/o very severe COPD f/u with Dr. De La Rosa, chronic hypoxic respiratory failure, A. Fib, & known COVID-19+ dx ~2 wks ago who presented to the ER with h/o progressive severe shortness of breath, worse with activity, and associated with congested cough ongoing for the last few days, has h/o hypoxia - on baseline 3 L O2.  On arrival to ED, pt was 82% on 5 L. Tested positive for covid and hence admitted.          Acute on chr hypoxic resp failure - cont O2, wean as tolerated  O2 back to baseline requirement 3L/min.          COVID 19 viral PNA unvaccinated pt with severe baseline CCOPD  - started on decadron, check CRP level elevated. Started on baricitinib. pulm consulted. Cannot r/o bacterial PNA, started on levaquin     DM II - uncontrolled, suspect 2/2 steroids. Start SSI/lispro TID. Will consider lantus if BS uncontrolled     Paroxysmal a fib - on cardizem/eliquis     Severe COPD - cont home inhalers, pulm following        Physical Exam Performed:     /72   Pulse 95   Temp 97.3 °F (36.3 °C) (Oral)   Resp 18   Ht 5' 11\" (1.803 m)   Wt 143 lb 11.2 oz (65.2 kg)   SpO2 92%   BMI 20.04 kg/m²       General appearance:  No apparent distress, appears stated age and cooperative.   HEENT:  Normal cephalic, atraumatic without obvious deformity. Pupils equal, round, and reactive to light. Extra ocular muscles intact. Conjunctivae/corneas clear. Neck: Supple, with full range of motion. No jugular venous distention. Trachea midline. Respiratory:  Normal respiratory effort. Clear to auscultation, bilaterally without Rales/Wheezes/Rhonchi. Cardiovascular:  Regular rate and rhythm with normal S1/S2 without murmurs, rubs or gallops. Abdomen: Soft, non-tender, non-distended with normal bowel sounds. Musculoskeletal:  No clubbing, cyanosis or edema bilaterally. Full range of motion without deformity. Skin: Skin color, texture, turgor normal.  No rashes or lesions. Neurologic:  Neurovascularly intact without any focal sensory/motor deficits. Cranial nerves: II-XII intact, grossly non-focal.  Psychiatric:  Alert and oriented, thought content appropriate, normal insight  Capillary Refill: Brisk,< 3 seconds   Peripheral Pulses: +2 palpable, equal bilaterally       Labs: For convenience and continuity at follow-up the following most recent labs are provided:      CBC:    Lab Results   Component Value Date    WBC 3.4 02/06/2022    HGB 9.4 02/06/2022    HCT 27.1 02/06/2022     02/06/2022       Renal:    Lab Results   Component Value Date     02/07/2022    K 4.2 02/07/2022    CL 95 02/07/2022    CO2 29 02/07/2022    BUN 28 02/07/2022    CREATININE 0.9 02/07/2022    CALCIUM 8.4 02/07/2022    PHOS 2.5 01/08/2021         Significant Diagnostic Studies    Radiology:   XR CHEST PORTABLE   Final Result   Left lower lobe opacity could represent atelectasis or infection                Consults:     IP CONSULT TO PULMONOLOGY  IP CONSULT TO PHARMACY    Disposition: home    Condition at Discharge: Stable    Discharge Instructions/Follow-up:  PCP 1 week.      Code Status:  Full Code     Activity: activity as tolerated    Diet: diabetic diet      Discharge Medications:     Current Discharge Medication List           Details   levoFLOXacin (LEVAQUIN) 500 MG tablet Take 1 tablet by mouth daily for 5 days  Qty: 5 tablet, Refills: 0      dexamethasone (DECADRON) 6 MG tablet Take 1 tablet by mouth 2 times daily (with meals) for 8 days  Qty: 16 tablet, Refills: 0              Details   ondansetron (ZOFRAN ODT) 4 MG disintegrating tablet Take 1 tablet by mouth every 8 hours as needed for Nausea or Vomiting  Qty: 16 tablet, Refills: 0      VENTOLIN  (90 Base) MCG/ACT inhaler Inhale 2 puffs into the lungs every 6 hours as needed for Wheezing orShortness of Breath  Qty: 1 each, Refills: 5      albuterol (PROVENTIL) (2.5 MG/3ML) 0.083% nebulizer solution USE 1 VIAL IN NEBULIZER 4 TIMES DAILY  Qty: 120 each, Refills: 11    Associated Diagnoses: Chronic obstructive pulmonary disease, unspecified COPD type (HCC)      dilTIAZem (CARTIA XT) 180 MG extended release capsule Take 1 capsule by mouth as needed (Ok to take as needed only 1 time a day for increased heart rate)  Qty: 90 capsule, Refills: 3      apixaban (ELIQUIS) 5 MG TABS tablet Take 1 tablet by mouth 2 times daily  Qty: 180 tablet, Refills: 3      propafenone (RYTHMOL) 150 MG tablet TAKE ONE TABLET BY MOUTH EVERY 8 HOURS  Qty: 270 tablet, Refills: 3      atorvastatin (LIPITOR) 10 MG tablet Take 1 tablet by mouth daily  Qty: 90 tablet, Refills: 3      TRELEGY ELLIPTA 100-62.5-25 MCG/INH AEPB INHALE 1 PUFF EVERY DAY  Qty: 60 each, Refills: 5      acetaminophen (AMINOFEN) 325 MG tablet Take 2 tablets by mouth every 4 hours as needed for Pain  Qty: 120 tablet, Refills: 0      alendronate (FOSAMAX) 70 MG tablet       fluticasone (FLONASE) 50 MCG/ACT nasal spray       pioglitazone (ACTOS) 30 MG tablet Take 30 mg by mouth daily      gabapentin (NEURONTIN) 300 MG capsule Take 600 mg by mouth 3 times daily . OXYGEN Inhale 2.5 L into the lungs nightly Pt wears 2- 2/12 L at night.  3 L while walking/ moving around      metformin (GLUCOPHAGE) 500 MG tablet Take 500 mg by mouth 4 times daily       esomeprazole (NEXIUM) 40

## 2022-02-07 NOTE — ACP (ADVANCE CARE PLANNING)
Advance Care Planning   Healthcare Decision Maker:    Primary Decision Maker: [place] Child - [phone]    Secondary Decision Maker: Pio Martinez - Child - [phone]    Click here to complete Healthcare Decision Makers including selection of the Healthcare Decision Maker Relationship (ie \"Primary\").

## 2022-02-07 NOTE — PROGRESS NOTES
02/06/22 2000   RT Protocol   History Pulmonary Disease 2   Respiratory pattern 0   Breath sounds 2   Cough 1   Indications for Bronchodilator Therapy Decreased or absent breath sounds   Bronchodilator Assessment Score 5   RT Inhaler-Nebulizer Bronchodilator Protocol Note    There is a bronchodilator order in the chart from a provider indicating to follow the RT Bronchodilator Protocol and there is an Initiate RT Inhaler-Nebulizer Bronchodilator Protocol order as well (see protocol at bottom of note). CXR Findings:  XR CHEST PORTABLE    Result Date: 2/5/2022  Left lower lobe opacity could represent atelectasis or infection       The findings from the last RT Protocol Assessment were as follows:   History Pulmonary Disease: Chronic pulmonary disease  Respiratory Pattern: Regular pattern and RR 12-20 bpm  Breath Sounds: Slightly diminished and/or crackles  Cough: Strong, productive  Indication for Bronchodilator Therapy: Decreased or absent breath sounds  Bronchodilator Assessment Score: 5    Aerosolized bronchodilator medication orders have been revised according to the RT Inhaler-Nebulizer Bronchodilator Protocol below. Respiratory Therapist to perform RT Therapy Protocol Assessment initially then follow the protocol. Repeat RT Therapy Protocol Assessment PRN for score 0-3 or on second treatment, BID, and PRN for scores above 3. No Indications  adjust the frequency to every 6 hours PRN wheezing or bronchospasm, if no treatments needed after 48 hours then discontinue using Per Protocol order mode. If indication present, adjust the RT bronchodilator orders based on the Bronchodilator Assessment Score as indicated below.   Use Inhaler orders unless patient has one or more of the following: on home nebulizer, not able to hold breath for 10 seconds, is not alert and oriented, cannot activate and use MDI correctly, or respiratory rate 25 breaths per minute or more, then use the equivalent nebulizer order(s) with same Frequency and PRN reasons based on the score. If a patient is on this medication at home then do not decrease Frequency below that used at home. 0-3  enter or revise RT bronchodilator order(s) to equivalent RT Bronchodilator order with Frequency of every 4 hours PRN for wheezing or increased work of breathing using Per Protocol order mode. 4-6  enter or revise RT Bronchodilator order(s) to two equivalent RT bronchodilator orders with one order with BID Frequency and one order with Frequency of every 4 hours PRN wheezing or increased work of breathing using Per Protocol order mode. 7-10  enter or revise RT Bronchodilator order(s) to two equivalent RT bronchodilator orders with one order with TID Frequency and one order with Frequency of every 4 hours PRN wheezing or increased work of breathing using Per Protocol order mode. 11-13  enter or revise RT Bronchodilator order(s) to one equivalent RT bronchodilator order with QID Frequency and an Albuterol order with Frequency of every 4 hours PRN wheezing or increased work of breathing using Per Protocol order mode. Greater than 13  enter or revise RT Bronchodilator order(s) to one equivalent RT bronchodilator order with every 4 hours Frequency and an Albuterol order with Frequency of every 2 hours PRN wheezing or increased work of breathing using Per Protocol order mode.          Electronically signed by Mary Howard RCP on 2/6/2022 at 8:57 PM

## 2022-02-07 NOTE — PROGRESS NOTES
Speech Language Pathology  Facility/Department: [facility] PCU TELEMETRY   CLINICAL BEDSIDE SWALLOW EVALUATION    NAME: Cheikh Baltazar  : 1940  MRN: [id number]    ADMISSION DATE: 2022  ADMITTING DIAGNOSIS: has HTN (hypertension); COPD, severe ([address] DM2 (diabetes mellitus, type 2) ([address] HLD (hyperlipidemia); Gallstones; PAF (paroxysmal atrial fibrillation) ([address] Chronic respiratory failure with hypoxia and hypercapnia 2.5 L home O2; Acute respiratory insufficiency; Acute on chronic respiratory failure with hypercapnia ([address] Sepsis ([address] COPD exacerbation ([address] CAD (coronary artery disease); Former smoker; Acute hyperglycemia; Chronic normocytic anemia; Chronic thrombocytopenia; Falls at home; Ambulatory dysfunction; General weakness; Acute on chronic respiratory failure with hypoxia and hypercapnia ([address] Pleural effusion; Rapid atrial fibrillation ([address] Atrial fibrillation with RVR ([address] Acute respiratory failure with hypoxia and hypercapnia (HCC); COPD with acute exacerbation ([address] Acute encephalopathy; Hyperglycemia; HCAP (healthcare-associated pneumonia); Acute calculous cholecystitis; Acute cholecystitis; Atherosclerotic ulcer of aorta ([address] Closed compression fracture of body of L1 vertebra ([address] Hypomagnesemia; Abdominal aortic aneurysm (AAA) 30 to 34 mm in diameter ([address] Elevated procalcitonin; Abdominal pain, right upper quadrant; Lactic acidosis; Elevated bilirubin; Bacteremia due to Klebsiella pneumoniae; Paroxysmal atrial fibrillation ([address] Moderate protein-calorie malnutrition ([address]  Debility; Pneumonia due to COVID-19 virus; and Acute hypoxemic respiratory failure (HCC) on their problem list.  ONSET DATE: 2022    Recent Chest Xray/CT of Chest:   22: Left lower lobe opacity could represent atelectasis or infection  21 (most recent admission):   Bilateral pleural effusions with bibasilar hypoaeration.  Otherwise, stable   chest     Date of Eval: 2022  Evaluating Therapist: Tomeka Molina SLP    Current Diet level:  Current Diet : Regular  Current Liquid Diet : Thin    Date of Admission: 2/5/2022     Date of Service: Pt seen/examined on 2/6/22 and Admitted to Inpatient      Chief Complaint: SOB     History Of Present Illness per Dr Stephani Omer 2/6/22: The patient is a 78 y.o. male who presents to [address] [place] [address] with SOB. Pt has a h/o very severe COPD f/u with Dr. De La Rosa, chronic hypoxic respiratory failure, A. Fib, & known COVID-19+ dx ~2 wks ago who presented to the ER with h/o progressive severe shortness of breath, worse with activity, and associated with congested cough ongoing for the last few days, has h/o hypoxia - on baseline 3 L O2.  On arrival to ED, pt was 82% on 5 L. Tested positive for covid and hence admitted.      Pain:  Pain Assessment  Pain Assessment: 0-10  Pain Level: 0    Reason for Referral  Prudence Aidan was referred for a bedside swallow evaluation to assess the efficiency of his swallow function, identify signs and symptoms of aspiration and make recommendations regarding safe dietary consistencies, effective compensatory strategies, and safe eating environment. Pt denies perception of any change in his swallow or ability to take PO since prior admission. MBS 4/8/2020: penetration of thin liquids above the vocal folds, is ejected from the airway. No observed aspiration. Rec Soft & Bite-Sized textures and thin liquids with compensatory strategies:  Alternate solids and liquids;Small bites/sips;Upright as possible for all oral intake;Remain upright for 30-45 minutes after meals;Eat/Feed slowly  Postural Changes and/or Swallow Maneuvers Trialed: Upright;Upright 30 min after meal;Head turn right    Today, pt recalls participating in [facility] but no specific results (\"everything was alright\")    Medical record review/interview: Known to  from admission to inpatient rehab unit in Jan 2021, discharged at that time with regular texture solids, thin liquids and independent use of safer swallow strategies. Pt denies following any compensatory strategies at this time (eg slower rate, smaller bites, alternating bites/sips). Predisposing dysphagia risk factors: COPD and Covid-19  Clinical signs of possible chronic dysphagia: hx of dysphagia  Precipitating dysphagia risk factors: reduced physical mobility    Vitals:    02/07/22 0900   BP: 129/72   Pulse: 95   Resp: 18   Temp: 97.3 °F (36.3 °C)   SpO2: 92%     Cranial nerve exam:   CN V (trigeminal): ophthalmic, maxillary, and mandibular facial sensation- WNLLeft, note Right upper facial twitching  CN VII (facial): WNLLeft, note Right upper facial twitching  CN IX/X (glossopharyngeal/vagus): MPT: Reduced; pitch range: Reduced; vocal quality: WNL; cough: Congested, non-productive  CN XII (hypoglossal): WNL    Oral Care Status: WFL    PO trials:   IDDSI 0 (thin):   - Cup: no clinical s/s of aspiration, no coughing and dry vocal qualityPt declined to use a straw as he does not use them at baseline  IDDSI 4 (puree): no anterior bolus loss , suspect functional A-P bolus transit and no clinical s/s of aspiration  IDDSI 7 (regular): no anterior bolus loss , grossly functional mastication, oral clearance grossly WFL, no clinical s/s of aspiration, no coughing and dry vocal quality  3 oz water: PASS    Clinical signs of oral dysphagia likely chronic related to respiratory illness, acute infection and Progressive nature of disease/condition. Swallow prognosis is good. Instrumental swallow study is not indicated. Given stable respiratory status, pt is safe for oral diet at this time    Instrumentation:   Diet recommendation: IDDSI 7 Regular Solids; IDDSI 0 Thin Liquids;  Meds PO as tolerated  Risk management: upright for all intake, stay upright for at least 30 mins after intake, small bites/sips, alternate bites/sips, slow rate of intake and hold PO and contact SLP if s/s of aspiration or worsening respiratory status develop. Impression  Dysphagia Diagnosis: Swallow function appears grossly intact  Dysphagia Impression : Pt noted to take small bites at a normal rate, functional and complete oral clearance post swallow of dry hard solids and purees. Pharyngeal swallow is grossly timely and appears coordinated. Per RN, pt swallowed 7 whole medications  with water this morning without overt difficulty. No overt s/s aspiration were appreciated across trials. Dysphagia Outcome Severity Scale: Level 6: Within functional limits/Modified independence     Treatment Plan  Requires SLP Intervention: No     D/C Recommendations: Home independently       Recommended Diet and Intervention  Diet Solids Recommendation: Regular  Liquid Consistency Recommendation: Thin  Recommended Form of Meds: Whole with water          Compensatory Swallowing Strategies  Compensatory Swallowing Strategies: Alternate solids and liquids;Small bites/sips;Eat/Feed slowly; Remain upright for 30-45 minutes after meals    Treatment/Goals       General  Chart Reviewed: Yes  Subjective  Subjective: OK to see per RN, may discharge home today  Behavior/Cognition: Alert; Cooperative;Pleasant mood  Respiratory Status: O2 via nasual cannula  O2 Device: Nasal cannula  Liters of Oxygen: 3 L  Communication Observation: Functional  Follows Directions: Simple  Dentition: Edentulous  Patient Positioning: Upright in bed  Baseline Vocal Quality: Normal  Volitional Cough: Congested    Consistencies Administered: Reg solid; Thin - cup;Dysphagia Pureed (Dysphagia I)           Vision/Hearing  Vision  Vision: Within Functional Limits  Hearing  Hearing: Within functional limits    Oral Motor Deficits  Oral/Motor  Oral Motor: Within functional limits    Oral Phase Dysfunction  Oral Phase  Oral Phase: WNL  Oral Phase  Oral Phase - Comment: Pt is edentulous, has no false teeth, eats this way habitually.      Indicators of Pharyngeal Phase Dysfunction   Pharyngeal Phase  Pharyngeal Phase: WFL    Prognosis  Prognosis  Prognosis for safe diet advancement: good  Individuals consulted  Consulted and agree with results and recommendations: Patient;RN    Education  Patient Education: SLP educated the patient re: Role of SLP, rationale for completion of assessment, anatomical components of swallow structures as they pertain to airway protection, results of assessment, recommendations and POC  Patient Education Response: Verbalizes understanding  Safety Devices in place: Yes  Type of devices: Nurse notified; Left in bed;Call light within reach       Therapy Time  SLP Individual Minutes  Time In: 9333  Time Out: 4639  Minutes: [address] [place] [address]  2/7/2022 11:40 AM

## 2022-02-07 NOTE — PROGRESS NOTES
Pt evening shift assessment complete. VSS and medication given as ordered. Pt assessed for comfort needs, given fresh ICE. Pt does not express any additional needs at this time, resting comfortably in bed.

## 2022-02-07 NOTE — PROGRESS NOTES
Pulmonary Progress Note    CC: COVID-19 in an unvaccinated patient. Subjective:  feels better      EXAM: /72   Pulse 95   Temp 97.3 °F (36.3 °C) (Oral)   Resp 18   Ht 5' 11\" (1.803 m)   Wt 143 lb 11.2 oz (65.2 kg)   SpO2 92%   BMI 20.04 kg/m²  on 4L  Constitutional:  No acute distress. Eyes: PERRL. Conjunctivae anicteric. ENT: Normal nose. Normal tongue. Neck:  Trachea is midline. No thyroid tenderness. Respiratory: No accessory muscle usage. decreased breath sounds. No wheezes. No rales. No Rhonchi. Cardiovascular: Normal S1S2. No digit clubbing. No digit cyanosis. No LE edema. Psychiatric: No anxiety or Agitation. Alert and Oriented to person, place and time.     Scheduled Meds:   budesonide-formoterol  2 puff Inhalation BID    And    tiotropium  2 puff Inhalation Daily    insulin lispro  0-12 Units SubCUTAneous TID WC    insulin lispro  0-6 Units SubCUTAneous Nightly    insulin lispro  7 Units SubCUTAneous TID WC    benzonatate  200 mg Oral TID    baricitinib  4 mg Oral Daily    pantoprazole  40 mg Oral QAM AC    gabapentin  600 mg Oral TID    pioglitazone  30 mg Oral Daily    fluticasone  1 spray Each Nostril Daily    apixaban  5 mg Oral BID    propafenone  150 mg Oral 3 times per day    atorvastatin  10 mg Oral Daily    albuterol sulfate HFA  2 puff Inhalation 4x daily    dilTIAZem  180 mg Oral Daily    sodium chloride flush  5-40 mL IntraVENous 2 times per day    levofloxacin  750 mg IntraVENous Q24H    dexamethasone  6 mg IntraVENous Q24H     Continuous Infusions:   sodium chloride 25 mL (02/07/22 1136)     PRN Meds:  guaiFENesin-dextromethorphan, acetaminophen, sodium chloride flush, sodium chloride, ondansetron **OR** ondansetron, polyethylene glycol, acetaminophen **OR** acetaminophen    Labs:  CBC:   Recent Labs     02/05/22  1552 02/06/22  0450   WBC 6.4 3.4*   HGB 11.0* 9.4*   HCT 31.2* 27.1*   MCV 80.0 82.3    169     BMP:   Recent Labs 02/05/22  1641 02/06/22  0450 02/07/22  0505    134* 133*   K 3.9 4.4 4.2   CL 95* 95* 95*   CO2 31 29 29   BUN 19 19 28*   CREATININE 1.0 0.8 0.9       PFT 7/13/2017   FEV1 0.57 18%   FVC 1.55 35%   ratio 0.37   %   %   DLCO 7.27 25%  Significant BD response     Echo 4/1/2020  LVEF 55-60%  Normal LV diastolic filling pressure  RA mildly dilated  Mild mitral and tricuspid regurgitation  SPAP 36 mmHg (RA pressure 8 mmHg)     CXR 2/5/2022  left greater than right infiltrates     ASSESSMENT:  · COVID-19 pneumonia in an unvaccinated patient  · Acute on chronic hypoxemic respiratory failure; baseline 3 L O2  · Very severe COPD, f/b Dr. Epifanio Ledbetter  · Tachycardia  · Lymphopenia  · Chronic anemia  · H/O chronic thrombocytopenia  · Paroxysmal A. Fib, on Eliquis  · Former smoker, >60 pack-yr hx per EMR     PLAN:  · COVID-19 isolation, droplet plus  · Supplemental O2 to maintain SaO2 >92%; monitor sats closely   · No Remdesevir given late presentation; sx-onset 2 wks ago  · Decadron D#3, 6 mg daily   · Levaquin D#3  · Baricitinib day #2, monitor LFT, renal and CBC differential  · Inhaled bronchodilators only as needed, MDI preferred   · On propafenone &diltiazem  · Home Eliquis  · Deann Granados with me for DC home

## 2022-02-08 ENCOUNTER — CARE COORDINATION (OUTPATIENT)
Dept: CASE MANAGEMENT | Age: 82
End: 2022-02-08

## 2022-02-08 NOTE — CARE COORDINATION
[address] Transitions Initial Follow Up Call    Call within 2 business days of discharge: Yes    Patient: London Stafford Patient : 1940   MRN: [id number]  Reason for Admission: COVID-19 Detected on 2022, acute on chronic hypoxic respiratory failure with baseline O2 3lpm, COVID-19 viral PNA, severe baseline COPD, DM2 uncontrolled 2/2 steroids, Paroxysmal A Fib. Discharge Date: 22 RARS: Readmission Risk Score: 16.2 ( )    Last Discharge [facility]       Complaint Diagnosis Description Type Department Provider    22 Positive For Covid-19 COVID-19 . .. ED to Hosp-Admission (Discharged) (ADMITTED) [facility] PCU Fiorella Schwartz MD; Karlos Miramontes. .. Was this an external facility discharge? No Discharge Facility:     Care Transition Nurse contacted the patient by telephone to perform post discharge assessment. Verified name and  with patient as identifiers. Provided introduction to self, and explanation of the CTN role, and reason for call due to risk factors for infection and/or exposure to COVID-19. Symptoms reviewed with patient who verbalized the following symptoms: no new symptoms and no worsening symptoms. Due to no new or worsening symptoms encounter was not routed to provider for escalation. Discussed follow-up appointments. If no appointment was previously scheduled, appointment scheduling offered: He states his daughter will contact PCP office of Dr Sonia Londono for follow up appointment.   [facility] follow up appointment(s):   Future Appointments   Date Time Provider Rachel Soler   3/2/2022 10:00 AM [facility] PCP - TBD (dtr to call)    Non-face-to-face services provided:  Obtained and reviewed discharge summary and/or continuity of care documents  Education of patient/family/caregiver/guardian to support self-management-s/s monitor and report  Assessment and support for treatment adherence and medication management-medications reviewed    Advance Care Planning:   Does patient have an Advance Directive: reviewed and matches HCPOA filed in [facility]. CTN reviewed discharge instructions, medical action plan and red flag symptoms patient who verbalized understanding. Discussed exposure protocols and quarantine with CDC Guidelines. States he was dx and symptomatic 2 weeks prior to his positive test date at [facility]. Patient was given an opportunity to verbalize any questions and concerns and agrees to contact the CTN or health care provider for questions related to their healthcare. Reviewed and educated patient on any new and changed medications related to discharge diagnosis. Was patient discharged with a pulse oximeter? Yes CTN discussed pulse oximeter instructions and when to notify provider or seek emergency care. Wearing oxygen at 3.5lpm and SaO2 94% at rest and only drops to 93% with exertion. Voice is hoarse but he reports improvement. Taking new medications as prescribed. Declines skilled home care or referrals for meals or assistance. CTN provided contact information for future needs. No further CTN outreach based on severity of symptoms and risk factors. Patient has CTN contact info for future needs.      Leah Cueva RN  Care Transitions Nurse  [phone] mobile

## 2022-02-10 LAB
BLOOD CULTURE, ROUTINE: NORMAL
CULTURE, BLOOD 2: NORMAL

## 2022-02-28 ENCOUNTER — TELEPHONE (OUTPATIENT)
Dept: PULMONOLOGY | Age: 82
End: 2022-02-28

## 2022-02-28 NOTE — TELEPHONE ENCOUNTER
Patient cancelled appointment on 3/2/22 with Dr Saleem Mckeon for 6 mo COPD fu.    Reason: na    Patient did reschedule appointment. Appointment rescheduled for 8/9/22. ASSESSMENT: 9/2/21  · Very Severe COPD   · Chronic hypoxic respiratory failure  · SUTTON due to the above  · Chronic Thrombcytopenia  · Not addressed: Afib, DM2     PLAN:   · Continue Trelegy  · Albuterol nebs q4hr PRN  · Prn albuterol nebs   · Supplemental O2 to 3lpm with exertion  · Pulmonary rehab declined.    · Pneumovax 23 and prevnar 13 UTD;  flu vaccine last year,  · But refused COVID vaccine  F/u in 6 months

## 2022-03-22 ENCOUNTER — HOSPITAL ENCOUNTER (OUTPATIENT)
Dept: WOUND CARE | Age: 82
Discharge: HOME | End: 2022-03-22
Payer: MEDICARE

## 2022-03-22 VITALS
HEIGHT: 71 IN | WEIGHT: 195.4 LBS | DIASTOLIC BLOOD PRESSURE: 81 MMHG | TEMPERATURE: 98.4 F | OXYGEN SATURATION: 98 % | HEART RATE: 109 BPM | BODY MASS INDEX: 27.35 KG/M2 | SYSTOLIC BLOOD PRESSURE: 141 MMHG

## 2022-03-22 DIAGNOSIS — L97.313 NON-PRESSURE CHRONIC ULCER OF RIGHT ANKLE WITH NECROSIS OF MUSCLE (HCC): Primary | ICD-10-CM

## 2022-03-22 DIAGNOSIS — I73.89 OTHER SPECIFIED PERIPHERAL VASCULAR DISEASES (HCC): ICD-10-CM

## 2022-03-22 DIAGNOSIS — E11.622 DIABETES MELLITUS WITH SKIN ULCER (HCC): ICD-10-CM

## 2022-03-22 DIAGNOSIS — L98.499 DIABETES MELLITUS WITH SKIN ULCER (HCC): ICD-10-CM

## 2022-03-22 PROCEDURE — 11043 DBRDMT MUSC&/FSCA 1ST 20/<: CPT

## 2022-03-22 PROCEDURE — 99204 OFFICE O/P NEW MOD 45 MIN: CPT

## 2022-03-22 PROCEDURE — 29581 APPL MULTLAYER CMPRN SYS LEG: CPT

## 2022-03-22 RX ORDER — BACITRACIN ZINC AND POLYMYXIN B SULFATE 500; 1000 [USP'U]/G; [USP'U]/G
OINTMENT TOPICAL ONCE
Status: CANCELLED | OUTPATIENT
Start: 2022-03-22 | End: 2022-03-22

## 2022-03-22 RX ORDER — LIDOCAINE HYDROCHLORIDE 40 MG/ML
SOLUTION TOPICAL ONCE
Status: CANCELLED | OUTPATIENT
Start: 2022-03-22 | End: 2022-03-22

## 2022-03-22 RX ORDER — LIDOCAINE HYDROCHLORIDE 20 MG/ML
JELLY TOPICAL ONCE
Status: CANCELLED | OUTPATIENT
Start: 2022-03-22 | End: 2022-03-22

## 2022-03-22 RX ORDER — BETAMETHASONE DIPROPIONATE 0.05 %
OINTMENT (GRAM) TOPICAL ONCE
Status: CANCELLED | OUTPATIENT
Start: 2022-03-22 | End: 2022-03-22

## 2022-03-22 RX ORDER — LIDOCAINE 40 MG/G
CREAM TOPICAL ONCE
Status: CANCELLED | OUTPATIENT
Start: 2022-03-22 | End: 2022-03-22

## 2022-03-22 RX ORDER — BACITRACIN, NEOMYCIN, POLYMYXIN B 400; 3.5; 5 [USP'U]/G; MG/G; [USP'U]/G
OINTMENT TOPICAL ONCE
Status: CANCELLED | OUTPATIENT
Start: 2022-03-22 | End: 2022-03-22

## 2022-03-22 RX ORDER — CLOBETASOL PROPIONATE 0.5 MG/G
OINTMENT TOPICAL ONCE
Status: CANCELLED | OUTPATIENT
Start: 2022-03-22 | End: 2022-03-22

## 2022-03-22 RX ORDER — LIDOCAINE 50 MG/G
OINTMENT TOPICAL ONCE
Status: CANCELLED | OUTPATIENT
Start: 2022-03-22 | End: 2022-03-22

## 2022-03-22 RX ORDER — GINSENG 100 MG
CAPSULE ORAL ONCE
Status: CANCELLED | OUTPATIENT
Start: 2022-03-22 | End: 2022-03-22

## 2022-03-22 RX ORDER — LIDOCAINE 40 MG/G
CREAM TOPICAL ONCE
Status: DISCONTINUED | OUTPATIENT
Start: 2022-03-22 | End: 2022-03-23 | Stop reason: HOSPADM

## 2022-03-22 RX ORDER — GENTAMICIN SULFATE 1 MG/G
OINTMENT TOPICAL ONCE
Status: CANCELLED | OUTPATIENT
Start: 2022-03-22 | End: 2022-03-22

## 2022-03-22 RX ORDER — GENTAMICIN SULFATE 1 MG/G
CREAM TOPICAL ONCE
Status: DISCONTINUED | OUTPATIENT
Start: 2022-03-22 | End: 2022-03-23 | Stop reason: HOSPADM

## 2022-03-22 ASSESSMENT — PAIN DESCRIPTION - DESCRIPTORS: DESCRIPTORS: STABBING

## 2022-03-22 ASSESSMENT — PAIN DESCRIPTION - PAIN TYPE: TYPE: CHRONIC PAIN

## 2022-03-22 ASSESSMENT — PAIN DESCRIPTION - LOCATION: LOCATION: FOOT

## 2022-03-22 ASSESSMENT — PAIN - FUNCTIONAL ASSESSMENT: PAIN_FUNCTIONAL_ASSESSMENT: PREVENTS OR INTERFERES SOME ACTIVE ACTIVITIES AND ADLS

## 2022-03-22 ASSESSMENT — PAIN DESCRIPTION - FREQUENCY: FREQUENCY: INTERMITTENT

## 2022-03-22 ASSESSMENT — PAIN SCALES - GENERAL: PAINLEVEL_OUTOF10: 4

## 2022-03-22 ASSESSMENT — PAIN DESCRIPTION - PROGRESSION: CLINICAL_PROGRESSION: NOT CHANGED

## 2022-03-22 ASSESSMENT — PAIN DESCRIPTION - ONSET: ONSET: ON-GOING

## 2022-03-22 ASSESSMENT — PAIN DESCRIPTION - ORIENTATION: ORIENTATION: RIGHT

## 2022-03-22 NOTE — PLAN OF CARE
Pt seen in [address] [place] [address],[address] as new patient referred from PCP - trauma wound to R lateral ankle - hit on piece of furniture Jan 2022- debride per Dr. Beata Gaviria as follows  RIGHT LATERAL ANKLE     Gentamycin 20%  And triad 80% to wound   Gauze  Compri 2   spandigrip- low compression   leave in place for the week    DEENA WNL - but has arrterial studies sched uled next week

## 2022-03-22 NOTE — FLOWSHEET NOTE
Patient was encouraged to bring a full medication list with him on his next visit to the [facility].

## 2022-03-22 NOTE — PROGRESS NOTES
Sari Malone  Progress Note and Procedure Note      Sd Saravia  AGE: 78 y.o. GENDER: male  : 1940  TODAY'S DATE:  3/22/2022    Subjective:     Chief Complaint   Patient presents with    Wound Check         HISTORY of PRESENT ILLNESS HPI     Sd Saravia is a 78 y.o. male who presents today for wound evaluation. History of Wound: Patient admits to having wound for least 6 weeks. He states it was a traumatic injury from bumping the ankle. He states that he was sleeping on his side when it first began but since then he has been sleeping in the chair without the legs fully elevated. He does admit to swelling. He denies current nausea, vomiting, fever, chills, shortness of breath or chest pain. He is diabetic with his blood sugars ranging between 150 and 200.   Wound Pain:  none  Severity:  3 / 10   Wound Type:  diabetic, pressure and traumatic  Modifying Factors:  edema, lymphedema, diabetes, poor glucose control, chronic pressure, decreased mobility, shear force and decreased tissue oxygenation  Associated Signs/Symptoms:  edema, drainage and pain        PAST MEDICAL HISTORY        Diagnosis Date    Arthritis     CAD (coronary artery disease)     [facility]) [facility]     Diabetes mellitus ([facility]     Hepatitis A 2021    Hyperlipidemia     Hypertension     Influenza A 2017    Kidney calculi     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    ELAINE on CPAP     Osteoporosis     Skin cancer of face        PAST SURGICAL HISTORY    Past Surgical History:   Procedure Laterality Date    BACK SURGERY      repair broken back L4 & L5    CHOLECYSTECTOMY, LAPAROSCOPIC N/A 2021    LAPAROSCOPIC CHOLECYSTECTOMY, WITH CHOLANGIOGRAMS (ATTEMPTED) OPEN CHOLECYSTECTOMY performed by Juju Lora MD at [address] [place] [address], OPEN N/A 2021    LAPAROSCOPIC CHOLECYSTECTOMY, WITH CHOLANGIOGRAMS (ATTEMPTED)     CYSTOSCOPY Right 10/9/15    RIght Ureteroscopy, Holmium Laser Lithotripsy with Stone Removal, Right Stent Placement    CYSTOSCOPY  2019    CYSTOSCOPY, RESECTION OF BLADDER NECK, URETHRAL DILATION    CYSTOSCOPY W BIOPSY OF BLADDER N/A 2019    CYSTOSCOPY, RESECTION OF BLADDER NECK, URETHRAL DILATION performed by Amol Garcia MD at [address] Left 2016    cataract removal    GALLBLADDER SURGERY  2021    JOINT REPLACEMENT  2011    right knee    JOINT REPLACEMENT  2004    left knee    OTHER SURGICAL HISTORY  2015    wide excision basal cell carcinoma on the nose and bilateral auricles with frozen sections, plastic closure, full thickness skin graft    OTHER SURGICAL HISTORY  12/1/15    excision of lesion of lip    PROSTATE SURGERY      TURP  10/23/2015    with cystoscopy       FAMILY HISTORY    Family History   Problem Relation Age of Onset    Cancer Mother     Diabetes Mother     Heart Disease Mother     Cancer Father     Diabetes Sister     Cancer Brother        SOCIAL HISTORY    Social History     Tobacco Use    Smoking status: Former Smoker     Packs/day: 1.50     Years: 45.00     Pack years: 67.50     Quit date: 2005     Years since quittin.8    Smokeless tobacco: Never Used   Vaping Use    Vaping Use: Never used   Substance Use Topics    Alcohol use: No    Drug use: No       ALLERGIES    No Known Allergies    MEDICATIONS    Current Outpatient Medications on File Prior to Encounter   Medication Sig Dispense Refill    ondansetron (ZOFRAN ODT) 4 MG disintegrating tablet Take 1 tablet by mouth every 8 hours as needed for Nausea or Vomiting 16 tablet 0    VENTOLIN  (90 Base) MCG/ACT inhaler Inhale 2 puffs into the lungs every 6 hours as needed for Wheezing orShortness of Breath 1 each 5    albuterol (PROVENTIL) (2.5 MG/3ML) 0.083% nebulizer solution USE 1 VIAL IN NEBULIZER 4 TIMES DAILY 120 each 11    dilTIAZem (CARTIA XT) 180 MG extended release capsule Take 1 capsule by mouth as needed (Ok to take as needed only 1 time a day for increased heart rate) 90 capsule 3    apixaban (ELIQUIS) 5 MG TABS tablet Take 1 tablet by mouth 2 times daily 180 tablet 3    propafenone (RYTHMOL) 150 MG tablet TAKE ONE TABLET BY MOUTH EVERY 8 HOURS 270 tablet 3    atorvastatin (LIPITOR) 10 MG tablet Take 1 tablet by mouth daily 90 tablet 3    TRELEGY ELLIPTA 100-62.5-25 MCG/INH AEPB INHALE 1 PUFF EVERY DAY 60 each 5    acetaminophen (AMINOFEN) 325 MG tablet Take 2 tablets by mouth every 4 hours as needed for Pain 120 tablet 0    alendronate (FOSAMAX) 70 MG tablet every 7 days On Wednesday      fluticasone (FLONASE) 50 MCG/ACT nasal spray 2 sprays by Nasal route daily       pioglitazone (ACTOS) 30 MG tablet Take 30 mg by mouth daily      gabapentin (NEURONTIN) 800 MG tablet Take 800 mg by mouth 3 times daily.  OXYGEN Inhale 2.5 L into the lungs nightly At 3.5lpm on 2/8/2022      metformin (GLUCOPHAGE) 500 MG tablet Take 500 mg by mouth 4 times daily       esomeprazole (NEXIUM) 40 MG capsule   Take 40 mg by mouth every morning (before breakfast) Indications: take dos        No current facility-administered medications on file prior to encounter. REVIEW OF SYSTEMS    Pertinent items are noted in HPI. Objective:      BP (!) 141/81   Pulse 109   Temp 98.4 °F (36.9 °C) (Oral)   Ht 5' 11\" (1.803 m)   Wt 195 lb 6.4 oz (88.6 kg)   SpO2 98%   BMI 27.25 kg/m²     PHYSICAL EXAM    Vascular: Vascular status Impaired  palpable pedal pulses, right DP2/4 and PT1/4, left DP1/4 and PT1/4. CFT 4 seconds digits 1 to 5 bilateral.  Hair growthAbsent  both lower extremities and feet. Skin temperature is warm to warm from pretibial area to distal digits bilateral.  Exam is negative for rubor, pallor, cyanosis or signs of acute vascular compromise bilaterally.   Exam is positive for edema bilateral lower extremity. Varicosities Absent  bilateral lower extremity. Neuro: Neurologic status diminished bilateral with epicritic Present , proprioceptive Present, vibratory sensationPresent and protopathicPresent  l. DTRs Present bilateral Achilles. There were no reproducible neuritic symptoms on exam bilateral feet/ankles. Derm: Ulceration to right lateral ankle. Ecchymosis Absent  bilateral feet/foot. Musculoskeletal: Pain with debridement of wound. 5/5 muscle strength in/eversion and dorsi/plantarflexion bilateral feet. No gross instability noted. 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 ([facility]    Diabetes mellitus with skin ulcer ([facility] [address] [facility]    Other specified peripheral vascular diseases ([facility] [address] [facility]          Procedure Note    Performed by: Maria De Jesus Chavez DPM    Consent obtained: Yes    Time out taken:  Yes    Pain Control: Anesthetic  Anesthetic: 4% Lidocaine Cream     Debridement:Excisional Debridement    Using curette the wound was sharply debrided    down through and including the removal of epidermis, dermis, subcutaneous tissue and muscle/fascia. Devitalized Tissue Debrided:  fibrin, biofilm, slough, necrotic/eschar and exudate    Pre Debridement Measurements:  Are located in the Wound Documentation Flow Sheet    Wound #: 1     Post  Debridement Measurements:  Wound 03/22/22 #1, Right Lateral Ankle, Traumatic, Partial Thickness (Onset 1/2022) (Active)   Wound Image   03/22/22 0809   Wound Etiology Traumatic 03/22/22 0809   Wound Cleansed Irrigated with saline; Soap and water 03/22/22 0809   Wound Length (cm) 0.5 cm 03/22/22 0809   Wound Width (cm) 0.5 cm 03/22/22 0809   Wound Depth (cm) 0.1 cm 03/22/22 0809   Wound Surface Area (cm^2) 0.25 cm^2 03/22/22 0809   Wound Volume (cm^3) 0.025 cm^3 03/22/22 0809   Post-Procedure Length (cm) 0.5 cm 03/22/22 0903   Post-Procedure Width (cm) 0.5 cm 03/22/22 5029 Post-Procedure Depth (cm) 0.3 cm 03/22/22 0903   Post-Procedure Surface Area (cm^2) 0.25 cm^2 03/22/22 0903   Post-Procedure Volume (cm^3) 0.075 cm^3 03/22/22 0903   Distance Tunneling (cm) 0 cm 03/22/22 0809   Undermining Maxium Distance (cm) 0 03/22/22 0809   Wound Assessment Dry 03/22/22 0809   Drainage Amount None 03/22/22 0809   Odor None 03/22/22 0809   Paula-wound Assessment Other (Comment) 03/22/22 0809   Number of days: 0           Total Surface Area Debrided:  0.25 sq cm     Percentage of wound debrided 100%    Bleeding:  Minimal    Hemostasis Achieved:  by pressure    Procedural Pain:  3  / 10     Post Procedural Pain:  0 / 10     Response to treatment:  Well tolerated by patient. Plan:   Patient examined and evaluated  Wound sharply debrided without incident  Discussed appropriate diabetic diet and protein intake  Keep leg elevated all times when not active  Continue plan for vascular studies  The nature of the patient's condition was explained in depth.  The patient was informed that their compliance to the treatment plan is paramount to successful healing and prevention of further ulceration and/or infection     Discharge Treatment  Multilayer compression wrap with gentamicin cream and triad to wound  Written Patient Discharge Instructions Given            Electronically signed by Jerry Correa DPM on 3/22/2022 at 9:36 AM

## 2022-03-29 ENCOUNTER — HOSPITAL ENCOUNTER (OUTPATIENT)
Dept: WOUND CARE | Age: 82
Discharge: HOME | End: 2022-03-29
Payer: MEDICARE

## 2022-03-29 VITALS
SYSTOLIC BLOOD PRESSURE: 172 MMHG | OXYGEN SATURATION: 100 % | HEART RATE: 106 BPM | RESPIRATION RATE: 20 BRPM | BODY MASS INDEX: 27.18 KG/M2 | DIASTOLIC BLOOD PRESSURE: 89 MMHG | HEIGHT: 71 IN | WEIGHT: 194.13 LBS | TEMPERATURE: 96.9 F

## 2022-03-29 DIAGNOSIS — E11.622 DIABETES MELLITUS WITH SKIN ULCER (HCC): Primary | ICD-10-CM

## 2022-03-29 DIAGNOSIS — L98.499 DIABETES MELLITUS WITH SKIN ULCER (HCC): Primary | ICD-10-CM

## 2022-03-29 PROCEDURE — 11042 DBRDMT SUBQ TIS 1ST 20SQCM/<: CPT

## 2022-03-29 RX ORDER — LIDOCAINE 40 MG/G
CREAM TOPICAL ONCE
Status: CANCELLED | OUTPATIENT
Start: 2022-03-29 | End: 2022-03-29

## 2022-03-29 RX ORDER — BACITRACIN ZINC AND POLYMYXIN B SULFATE 500; 1000 [USP'U]/G; [USP'U]/G
OINTMENT TOPICAL ONCE
Status: CANCELLED | OUTPATIENT
Start: 2022-03-29 | End: 2022-03-29

## 2022-03-29 RX ORDER — CLOBETASOL PROPIONATE 0.5 MG/G
OINTMENT TOPICAL ONCE
Status: CANCELLED | OUTPATIENT
Start: 2022-03-29 | End: 2022-03-29

## 2022-03-29 RX ORDER — LIDOCAINE 40 MG/G
CREAM TOPICAL ONCE
Status: DISCONTINUED | OUTPATIENT
Start: 2022-03-29 | End: 2022-03-30 | Stop reason: HOSPADM

## 2022-03-29 RX ORDER — LIDOCAINE HYDROCHLORIDE 20 MG/ML
JELLY TOPICAL ONCE
Status: CANCELLED | OUTPATIENT
Start: 2022-03-29 | End: 2022-03-29

## 2022-03-29 RX ORDER — BACITRACIN, NEOMYCIN, POLYMYXIN B 400; 3.5; 5 [USP'U]/G; MG/G; [USP'U]/G
OINTMENT TOPICAL ONCE
Status: CANCELLED | OUTPATIENT
Start: 2022-03-29 | End: 2022-03-29

## 2022-03-29 RX ORDER — LIDOCAINE 50 MG/G
OINTMENT TOPICAL ONCE
Status: CANCELLED | OUTPATIENT
Start: 2022-03-29 | End: 2022-03-29

## 2022-03-29 RX ORDER — BETAMETHASONE DIPROPIONATE 0.05 %
OINTMENT (GRAM) TOPICAL ONCE
Status: CANCELLED | OUTPATIENT
Start: 2022-03-29 | End: 2022-03-29

## 2022-03-29 RX ORDER — GINSENG 100 MG
CAPSULE ORAL ONCE
Status: CANCELLED | OUTPATIENT
Start: 2022-03-29 | End: 2022-03-29

## 2022-03-29 RX ORDER — LIDOCAINE HYDROCHLORIDE 40 MG/ML
SOLUTION TOPICAL ONCE
Status: CANCELLED | OUTPATIENT
Start: 2022-03-29 | End: 2022-03-29

## 2022-03-29 RX ORDER — GENTAMICIN SULFATE 1 MG/G
OINTMENT TOPICAL ONCE
Status: CANCELLED | OUTPATIENT
Start: 2022-03-29 | End: 2022-03-29

## 2022-03-29 ASSESSMENT — PAIN DESCRIPTION - ONSET: ONSET: ON-GOING

## 2022-03-29 ASSESSMENT — PAIN DESCRIPTION - LOCATION: LOCATION: FOOT

## 2022-03-29 ASSESSMENT — PAIN DESCRIPTION - PROGRESSION: CLINICAL_PROGRESSION: NOT CHANGED

## 2022-03-29 ASSESSMENT — PAIN SCALES - GENERAL: PAINLEVEL_OUTOF10: 2

## 2022-03-29 ASSESSMENT — PAIN DESCRIPTION - FREQUENCY: FREQUENCY: INTERMITTENT

## 2022-03-29 ASSESSMENT — PAIN DESCRIPTION - DESCRIPTORS: DESCRIPTORS: BURNING

## 2022-03-29 ASSESSMENT — PAIN DESCRIPTION - ORIENTATION: ORIENTATION: RIGHT

## 2022-03-29 NOTE — PROGRESS NOTES
[facility]  Progress Note and Procedure Note      London Stafford  AGE: 78 y.o. GENDER: male  : 1940  TODAY'S DATE:  3/29/2022    Subjective:     Chief Complaint   Patient presents with    Wound Check         HISTORY of PRESENT ILLNESS HPI     London Stafford is a 78 y.o. male who presents today for wound evaluation. History of Wound: Patient admits to having wound for least 6 weeks. He states it was a traumatic injury from bumping the ankle. He states that he was sleeping on his side when it first began but since then he has been sleeping in the chair without the legs fully elevated. Try to keep the legs elevated more. He is having some issues with getting pressure on the wound when he sleeping at night. His daughter reports that sometimes he gets pressures on the wound. He has his vascular studies tomorrow.   Wound Pain:  none  Severity:  3  10   Wound Type:  diabetic, pressure and traumatic  Modifying Factors:  edema, lymphedema, diabetes, poor glucose control, chronic pressure, decreased mobility, shear force and decreased tissue oxygenation  Associated Signs/Symptoms:  edema, drainage and pain        PAST MEDICAL HISTORY        Diagnosis Date    Arthritis     CAD (coronary artery disease)     [facility]) [facility] [address] [facility]     Diabetes mellitus ([facility] [address] [facility]     Hepatitis A 2021    Hyperlipidemia     Hypertension     Influenza A 2017    Kidney calculi     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    ELAINE on CPAP     Osteoporosis     Skin cancer of face        PAST SURGICAL HISTORY    Past Surgical History:   Procedure Laterality Date    BACK SURGERY      repair broken back L4 & L5    CHOLECYSTECTOMY, LAPAROSCOPIC N/A 2021    LAPAROSCOPIC CHOLECYSTECTOMY, WITH CHOLANGIOGRAMS (ATTEMPTED) OPEN CHOLECYSTECTOMY performed by Tavo Naqvi MD at [address] [place] [address], [facility] N/A 2021    LAPAROSCOPIC CHOLECYSTECTOMY, WITH CHOLANGIOGRAMS (ATTEMPTED)     CYSTOSCOPY Right 10/9/15    RIght Ureteroscopy, Holmium Laser Lithotripsy with Stone Removal, Right Stent Placement    CYSTOSCOPY  2019    CYSTOSCOPY, RESECTION OF BLADDER NECK, URETHRAL DILATION    CYSTOSCOPY W BIOPSY OF BLADDER N/A 2019    CYSTOSCOPY, RESECTION OF BLADDER NECK, URETHRAL DILATION performed by Windle Spurling, MD at [address] [facility] Left 2016    cataract removal    GALLBLADDER SURGERY  2021    JOINT REPLACEMENT  2011    right knee    JOINT REPLACEMENT  2004    left knee    OTHER SURGICAL HISTORY  2015    wide excision basal cell carcinoma on the nose and bilateral auricles with frozen sections, plastic closure, full thickness skin graft    OTHER SURGICAL HISTORY  12/1/15    excision of lesion of lip    PROSTATE SURGERY      TURP  10/23/2015    with cystoscopy       FAMILY HISTORY    Family History   Problem Relation Age of Onset    Cancer Mother     Diabetes Mother     Heart Disease Mother     Cancer Father     Diabetes Sister     Cancer Brother        SOCIAL HISTORY    Social History     Tobacco Use    Smoking status: Former Smoker     Packs/day: 1.50     Years: 45.00     Pack years: 67.50     Quit date: 2005     Years since quittin.8    Smokeless tobacco: Never Used   Vaping Use    Vaping Use: Never used   Substance Use Topics    Alcohol use: No    Drug use: No       ALLERGIES    No Known Allergies    MEDICATIONS    Current Outpatient Medications on File Prior to Encounter   Medication Sig Dispense Refill    ondansetron (ZOFRAN ODT) 4 MG disintegrating tablet Take 1 tablet by mouth every 8 hours as needed for Nausea or Vomiting 16 tablet 0    VENTOLIN  (90 Base) MCG/ACT inhaler Inhale 2 puffs into the lungs every 6 hours as needed for Wheezing orShortness of Breath 1 each 5    albuterol (PROVENTIL) (2.5 MG/3ML) 0.083% nebulizer solution USE 1 VIAL IN NEBULIZER 4 TIMES DAILY 120 each 11    dilTIAZem (CARTIA XT) 180 MG extended release capsule Take 1 capsule by mouth as needed (Ok to take as needed only 1 time a day for increased heart rate) 90 capsule 3    apixaban (ELIQUIS) 5 MG TABS tablet Take 1 tablet by mouth 2 times daily 180 tablet 3    propafenone (RYTHMOL) 150 MG tablet TAKE ONE TABLET BY MOUTH EVERY 8 HOURS 270 tablet 3    atorvastatin (LIPITOR) 10 MG tablet Take 1 tablet by mouth daily 90 tablet 3    TRELEGY ELLIPTA 100-62.5-25 MCG/INH AEPB INHALE 1 PUFF EVERY DAY 60 each 5    acetaminophen (AMINOFEN) 325 MG tablet Take 2 tablets by mouth every 4 hours as needed for Pain 120 tablet 0    alendronate (FOSAMAX) 70 MG tablet every 7 days On Wednesday      fluticasone (FLONASE) 50 MCG/ACT nasal spray 2 sprays by Nasal route daily       pioglitazone (ACTOS) 30 MG tablet Take 30 mg by mouth daily      gabapentin (NEURONTIN) 800 MG tablet Take 800 mg by mouth 3 times daily.  OXYGEN Inhale 2.5 L into the lungs nightly At 3.5lpm on 2/8/2022      metformin (GLUCOPHAGE) 500 MG tablet Take 500 mg by mouth 4 times daily       esomeprazole (NEXIUM) 40 MG capsule   Take 40 mg by mouth every morning (before breakfast) Indications: take dos        No current facility-administered medications on file prior to encounter. REVIEW OF SYSTEMS    Pertinent items are noted in HPI. Objective:      BP (!) 172/89   Pulse 106   Temp 96.9 °F (36.1 °C) (Oral)   Resp 20   Ht 5' 11\" (1.803 m)   Wt 194 lb 2 oz (88.1 kg)   SpO2 100%   BMI 27.07 kg/m²     PHYSICAL EXAM    Vascular: Vascular status Impaired  palpable pedal pulses, right DP2/4 and PT1/4, left DP1/4 and PT1/4. CFT 4 seconds digits 1 to 5 bilateral.  Hair growthAbsent  both lower extremities and feet.   Skin temperature is warm to warm from pretibial area to distal digits bilateral.  Exam is negative for rubor, pallor, cyanosis or signs of acute vascular compromise bilaterally. Exam is positive for edema bilateral lower extremity. Varicosities Absent  bilateral lower extremity. Neuro: Neurologic status diminished bilateral with epicritic Present , proprioceptive Present, vibratory sensationPresent and protopathicPresent  l. DTRs Present bilateral Achilles. There were no reproducible neuritic symptoms on exam bilateral feet/ankles. Derm: Ulceration to right lateral ankle. Ecchymosis Absent  bilateral feet/foot. Musculoskeletal: Pain with debridement of wound. 5/5 muscle strength in/eversion and dorsi/plantarflexion bilateral feet. No gross instability noted. Assessment:     Problem List Items Addressed This Visit     Diabetes mellitus with skin ulcer ([facility] [address] - Primary    Relevant Medications    lidocaine (LMX) 4 % cream (Start on 3/29/2022  9:00 AM)    Other Relevant Orders    Initiate Outpatient Wound Care Protocol          Procedure Note    Performed by: Ramone Wong DPM    Consent obtained: Yes    Time out taken:  Yes    Pain Control: Anesthetic  Anesthetic: 4% Lidocaine Cream     Debridement:Excisional Debridement    Using curette the wound was sharply debrided    down through and including the removal of epidermis, dermis, subcutaneous tissue and muscle/fascia. Devitalized Tissue Debrided:  fibrin, biofilm, slough, necrotic/eschar and exudate    Pre Debridement Measurements:  Are located in the Wound Documentation Flow Sheet    Wound #: 1     Wound Care Documentation:  Wound 03/22/22 #1, Right Lateral Ankle, Traumatic, Partial Thickness (Onset 1/2022) (Active)   Wound Image   03/22/22 0809   Wound Etiology Traumatic 03/29/22 0834   Dressing Status New dressing applied;Clean;Dry; Intact 03/22/22 0936   Wound Cleansed Soap and water 03/29/22 0834   Dressing/Treatment Other (comment) 03/22/22 0936   Wound Length (cm) 0.4 cm 03/29/22 0834   Wound Width (cm) 0.6 cm 03/29/22 0834   Wound Depth (cm) 0.2 cm 03/29/22 0834   Wound Surface Area (cm^2) 0.24 cm^2 03/29/22 0834   Change in Wound Size % (l*w) 4 03/29/22 0834   Wound Volume (cm^3) 0.024 cm^3 03/29/22 0834   Wound Healing % 4 03/29/22 0834   Post-Procedure Length (cm) 0.4 cm 03/29/22 0846   Post-Procedure Width (cm) 0.6 cm 03/29/22 0846   Post-Procedure Depth (cm) 0.1 cm 03/29/22 0846   Post-Procedure Surface Area (cm^2) 0.24 cm^2 03/29/22 0846   Post-Procedure Volume (cm^3) 0.024 cm^3 03/29/22 0846   Distance Tunneling (cm) 0 cm 03/22/22 0809   Undermining Maxium Distance (cm) 0 03/22/22 0809   Wound Assessment Pink/red 03/29/22 0834   Drainage Amount Scant 03/29/22 0834   Drainage Description Yellow 03/29/22 0834   Odor None 03/29/22 0834   Paula-wound Assessment Fragile; Intact 03/29/22 0834   Number of days: 7         Total Surface Area Debrided:  0.24 sq cm     Percentage of wound debrided 100%    Bleeding:  Minimal    Hemostasis Achieved:  by pressure    Procedural Pain:  3  / 10     Post Procedural Pain:  0 / 10     Response to treatment:  Well tolerated by patient. Plan:   Patient examined and evaluated  Wound sharply debrided without incident  Discussed appropriate diabetic diet and protein intake  Keep leg elevated all times when not active  Continue plan for vascular studies  Offloading padded boot  The nature of the patient's condition was explained in depth.  The patient was informed that their compliance to the treatment plan is paramount to successful healing and prevention of further ulceration and/or infection     Discharge Treatment  Multilayer compression wrap with gentamicin cream and triad to wound after vascular studies tomorrow  Written Patient Discharge Instructions Given            Electronically signed by Kiel Nunez DPM on 3/29/2022 at 8:48 AM

## 2022-03-29 NOTE — PLAN OF CARE
Pt seen in [address] [place] [address],[address] - R lateral wound improved - debride per Dr. Selina Donis - pt has vascular study tomorrw AM -  will apply dressing as directed with triad and gent  and apply ace wrap to RLE and spandigrip  to LLE to control swelling - pt has wound reassessmsnt tomorrow to reapply compri 2 lyte in AM - AVS reviewed - F/u in 1 week

## 2022-03-30 ENCOUNTER — HOSPITAL ENCOUNTER (OUTPATIENT)
Dept: VASCULAR LAB | Age: 82
Discharge: HOME | End: 2022-03-30
Payer: MEDICARE

## 2022-03-30 DIAGNOSIS — M25.571 PAIN IN RIGHT ANKLE AND JOINTS OF RIGHT FOOT: ICD-10-CM

## 2022-03-30 DIAGNOSIS — L97.312 NON-PRESSURE CHRONIC ULCER OF RIGHT ANKLE WITH FAT LAYER EXPOSED (HCC): ICD-10-CM

## 2022-03-30 DIAGNOSIS — L97.909 TYPE 2 DIABETES WITH SKIN ULCER OF LOWER EXTREMITY (HCC): ICD-10-CM

## 2022-03-30 DIAGNOSIS — E11.622 TYPE 2 DIABETES WITH SKIN ULCER OF LOWER EXTREMITY (HCC): ICD-10-CM

## 2022-03-30 DIAGNOSIS — I73.89 OTHER SPECIFIED PERIPHERAL VASCULAR DISEASES (HCC): ICD-10-CM
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
## 2022-03-30 PROCEDURE — 93925 LOWER EXTREMITY STUDY: CPT

## 2022-03-30 NOTE — PLAN OF CARE
Pt came to [facility]  after arterial studies - reapplied compression wrap to RLE - compri 2 w/ spandigrip - reviewed AVS - f/u in 1 week

## 2022-04-01 RX ORDER — FLUTICASONE FUROATE, UMECLIDINIUM BROMIDE AND VILANTEROL TRIFENATATE 100; 62.5; 25 UG/1; UG/1; UG/1
POWDER RESPIRATORY (INHALATION)
Qty: 1 EACH | Refills: 5 | Status: SHIPPED | OUTPATIENT
Start: 2022-04-01 | End: 2022-10-01

## 2022-04-05 ENCOUNTER — HOSPITAL ENCOUNTER (OUTPATIENT)
Dept: WOUND CARE | Age: 82
Discharge: HOME | End: 2022-04-05
Payer: MEDICARE

## 2022-04-05 VITALS
HEIGHT: 71 IN | BODY MASS INDEX: 27.86 KG/M2 | HEART RATE: 108 BPM | RESPIRATION RATE: 18 BRPM | WEIGHT: 199 LBS | SYSTOLIC BLOOD PRESSURE: 163 MMHG | DIASTOLIC BLOOD PRESSURE: 87 MMHG | OXYGEN SATURATION: 100 % | TEMPERATURE: 97.3 F

## 2022-04-05 DIAGNOSIS — L98.499 DIABETES MELLITUS WITH SKIN ULCER (HCC): Primary | ICD-10-CM

## 2022-04-05 DIAGNOSIS — E11.622 DIABETES MELLITUS WITH SKIN ULCER (HCC): Primary | ICD-10-CM

## 2022-04-05 PROCEDURE — 29581 APPL MULTLAYER CMPRN SYS LEG: CPT

## 2022-04-05 PROCEDURE — 11042 DBRDMT SUBQ TIS 1ST 20SQCM/<: CPT

## 2022-04-05 RX ORDER — CLOBETASOL PROPIONATE 0.5 MG/G
OINTMENT TOPICAL ONCE
Status: CANCELLED | OUTPATIENT
Start: 2022-04-05 | End: 2022-04-05

## 2022-04-05 RX ORDER — BACITRACIN ZINC AND POLYMYXIN B SULFATE 500; 1000 [USP'U]/G; [USP'U]/G
OINTMENT TOPICAL ONCE
Status: CANCELLED | OUTPATIENT
Start: 2022-04-05 | End: 2022-04-05

## 2022-04-05 RX ORDER — LIDOCAINE HYDROCHLORIDE 40 MG/ML
SOLUTION TOPICAL ONCE
Status: CANCELLED | OUTPATIENT
Start: 2022-04-05 | End: 2022-04-05

## 2022-04-05 RX ORDER — LIDOCAINE 40 MG/G
CREAM TOPICAL ONCE
Status: CANCELLED | OUTPATIENT
Start: 2022-04-05 | End: 2022-04-05

## 2022-04-05 RX ORDER — GINSENG 100 MG
CAPSULE ORAL ONCE
Status: CANCELLED | OUTPATIENT
Start: 2022-04-05 | End: 2022-04-05

## 2022-04-05 RX ORDER — BETAMETHASONE DIPROPIONATE 0.05 %
OINTMENT (GRAM) TOPICAL ONCE
Status: CANCELLED | OUTPATIENT
Start: 2022-04-05 | End: 2022-04-05

## 2022-04-05 RX ORDER — LIDOCAINE HYDROCHLORIDE 20 MG/ML
JELLY TOPICAL ONCE
Status: CANCELLED | OUTPATIENT
Start: 2022-04-05 | End: 2022-04-05

## 2022-04-05 RX ORDER — LIDOCAINE 50 MG/G
OINTMENT TOPICAL ONCE
Status: CANCELLED | OUTPATIENT
Start: 2022-04-05 | End: 2022-04-05

## 2022-04-05 RX ORDER — BACITRACIN, NEOMYCIN, POLYMYXIN B 400; 3.5; 5 [USP'U]/G; MG/G; [USP'U]/G
OINTMENT TOPICAL ONCE
Status: CANCELLED | OUTPATIENT
Start: 2022-04-05 | End: 2022-04-05

## 2022-04-05 RX ORDER — GENTAMICIN SULFATE 1 MG/G
OINTMENT TOPICAL ONCE
Status: CANCELLED | OUTPATIENT
Start: 2022-04-05 | End: 2022-04-05

## 2022-04-05 RX ORDER — LIDOCAINE 40 MG/G
CREAM TOPICAL ONCE
Status: DISCONTINUED | OUTPATIENT
Start: 2022-04-05 | End: 2022-04-06 | Stop reason: HOSPADM

## 2022-04-05 ASSESSMENT — PAIN SCALES - GENERAL: PAINLEVEL_OUTOF10: 0

## 2022-04-05 NOTE — PROGRESS NOTES
[facility]  Progress Note and Procedure Note      Kristien Mendoza  AGE: 78 y.o. GENDER: male  : 1940  TODAY'S DATE:  2022    Subjective:     Chief Complaint   Patient presents with    Wound Check         HISTORY of PRESENT ILLNESS HPI     Kristine Mendoza is a 78 y.o. male who presents today for wound evaluation. History of Wound: Patient admits to having wound for least 6 weeks. He states it was a traumatic injury from bumping the ankle. He states that he was sleeping on his side when it first began but since then he has been sleeping in the chair without the legs fully elevated. Try to keep the legs elevated more. He is having some issues with getting pressure on the wound when he sleeping at night. We forgot to give him an offloading boot last week. He has left the wrap on as directed. His vascular studies were completed.         Wound Pain:  none  Severity:  3 / 10   Wound Type:  diabetic, pressure and traumatic  Modifying Factors:  edema, lymphedema, diabetes, poor glucose control, chronic pressure, decreased mobility, shear force and decreased tissue oxygenation  Associated Signs/Symptoms:  edema, drainage and pain        PAST MEDICAL HISTORY        Diagnosis Date    Arthritis     CAD (coronary artery disease)     [facility] [address]     Diabetes mellitus ([facility] [address] [facility]     Hepatitis A 2021    Hyperlipidemia     Hypertension     Influenza A 2017    Kidney calculi     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    ELAINE on CPAP     Osteoporosis     Skin cancer of face        PAST SURGICAL HISTORY    Past Surgical History:   Procedure Laterality Date    BACK SURGERY      repair broken back L4 & L5    CHOLECYSTECTOMY, LAPAROSCOPIC N/A 2021    LAPAROSCOPIC CHOLECYSTECTOMY, WITH CHOLANGIOGRAMS (ATTEMPTED) OPEN CHOLECYSTECTOMY performed by Pura Aquino MD at [facility]    CHOLECYSTECTOMY, OPEN N/A 2021    LAPAROSCOPIC CHOLECYSTECTOMY, WITH CHOLANGIOGRAMS (ATTEMPTED)     CYSTOSCOPY Right 10/9/15    RIght Ureteroscopy, Holmium Laser Lithotripsy with Stone Removal, Right Stent Placement    CYSTOSCOPY  2019    CYSTOSCOPY, RESECTION OF BLADDER NECK, URETHRAL DILATION    CYSTOSCOPY W BIOPSY OF BLADDER N/A 2019    CYSTOSCOPY, RESECTION OF BLADDER NECK, URETHRAL DILATION performed by Bruna Trevizo MD at [address] Left 2016    cataract removal    GALLBLADDER SURGERY  2021    JOINT REPLACEMENT  2011    right knee    JOINT REPLACEMENT  2004    left knee    OTHER SURGICAL HISTORY  2015    wide excision basal cell carcinoma on the nose and bilateral auricles with frozen sections, plastic closure, full thickness skin graft    OTHER SURGICAL HISTORY  12/1/15    excision of lesion of lip    PROSTATE SURGERY      TURP  10/23/2015    with cystoscopy       FAMILY HISTORY    Family History   Problem Relation Age of Onset    Cancer Mother     Diabetes Mother     Heart Disease Mother     Cancer Father     Diabetes Sister     Cancer Brother        SOCIAL HISTORY    Social History     Tobacco Use    Smoking status: Former Smoker     Packs/day: 1.50     Years: 45.00     Pack years: 67.50     Quit date: 2005     Years since quittin.8    Smokeless tobacco: Never Used   Vaping Use    Vaping Use: Never used   Substance Use Topics    Alcohol use: No    Drug use: No       ALLERGIES    No Known Allergies    MEDICATIONS    Current Outpatient Medications on File Prior to Encounter   Medication Sig Dispense Refill    TRELEGY ELLIPTA 100-62.5-25 MCG/INH AEPB INHALE 1 PUFF EVERY DAY 1 each 5    ondansetron (ZOFRAN ODT) 4 MG disintegrating tablet Take 1 tablet by mouth every 8 hours as needed for Nausea or Vomiting 16 tablet 0    VENTOLIN  (90 Base) MCG/ACT inhaler Inhale 2 puffs into the lungs every 6 hours as needed for Wheezing orShortness of Breath 1 each 5    albuterol (PROVENTIL) (2.5 MG/3ML) 0.083% nebulizer solution USE 1 VIAL IN NEBULIZER 4 TIMES DAILY 120 each 11    dilTIAZem (CARTIA XT) 180 MG extended release capsule Take 1 capsule by mouth as needed (Ok to take as needed only 1 time a day for increased heart rate) 90 capsule 3    apixaban (ELIQUIS) 5 MG TABS tablet Take 1 tablet by mouth 2 times daily 180 tablet 3    propafenone (RYTHMOL) 150 MG tablet TAKE ONE TABLET BY MOUTH EVERY 8 HOURS 270 tablet 3    atorvastatin (LIPITOR) 10 MG tablet Take 1 tablet by mouth daily 90 tablet 3    acetaminophen (AMINOFEN) 325 MG tablet Take 2 tablets by mouth every 4 hours as needed for Pain 120 tablet 0    alendronate (FOSAMAX) 70 MG tablet every 7 days On Wednesday      fluticasone (FLONASE) 50 MCG/ACT nasal spray 2 sprays by Nasal route daily       pioglitazone (ACTOS) 30 MG tablet Take 30 mg by mouth daily      gabapentin (NEURONTIN) 800 MG tablet Take 800 mg by mouth 3 times daily.  OXYGEN Inhale 2.5 L into the lungs nightly At 3.5lpm on 2/8/2022      metformin (GLUCOPHAGE) 500 MG tablet Take 500 mg by mouth 4 times daily       esomeprazole (NEXIUM) 40 MG capsule   Take 40 mg by mouth every morning (before breakfast) Indications: take dos        No current facility-administered medications on file prior to encounter. REVIEW OF SYSTEMS    Pertinent items are noted in HPI. Objective:      BP (!) 163/87   Pulse 108   Temp 97.3 °F (36.3 °C) (Oral)   Resp 18   Ht 5' 11\" (1.803 m)   Wt 199 lb (90.3 kg)   SpO2 100%   BMI 27.75 kg/m²     PHYSICAL EXAM    Vascular: Vascular status Impaired  palpable pedal pulses, right DP2/4 and PT1/4, left DP1/4 and PT1/4. CFT 4 seconds digits 1 to 5 bilateral.  Hair growthAbsent  both lower extremities and feet.   Skin temperature is warm to warm from pretibial area to distal digits bilateral.  Exam is negative for rubor, pallor, cyanosis or signs of acute vascular compromise bilaterally. Exam is positive for edema bilateral lower extremity. Varicosities Absent  bilateral lower extremity. Neuro: Neurologic status diminished bilateral with epicritic Present , proprioceptive Present, vibratory sensationPresent and protopathic Present. DTRs Present bilateral Achilles. There were no reproducible neuritic symptoms on exam bilateral feet/ankles. Derm: Ulceration to right lateral ankle. Ecchymosis Absent  bilateral feet/foot. Musculoskeletal: Pain with debridement of wound. 5/5 muscle strength in/eversion and dorsi/plantarflexion bilateral feet. No gross instability noted. Assessment:     Problem List Items Addressed This Visit     Diabetes mellitus with skin ulcer ([facility] [address] - Primary    Relevant Medications    lidocaine (LMX) 4 % cream (Start on 4/5/2022 11:45 AM)    Other Relevant Orders    Initiate Outpatient Wound Care Protocol          Procedure Note    Performed by: Mayte Singh DPM    Consent obtained: Yes    Time out taken:  Yes    Pain Control: Anesthetic  Anesthetic: 4% Lidocaine Cream     Debridement:Excisional Debridement    Using curette the wound was sharply debrided    down through and including the removal of epidermis, dermis, subcutaneous tissue and muscle/fascia. Devitalized Tissue Debrided:  fibrin, biofilm, slough, necrotic/eschar and exudate    Pre Debridement Measurements:  Are located in the Wound Documentation Flow Sheet    Wound #: 1     Wound Care Documentation:  Wound 03/22/22 #1, Right Lateral Ankle, Traumatic, Partial Thickness (Onset 1/2022) (Active)   Wound Image   03/22/22 0809   Wound Etiology Traumatic 04/05/22 1051   Dressing Status New dressing applied;Clean;Dry; Intact 03/29/22 0856   Wound Cleansed Irrigated with saline; Soap and water 04/05/22 1051   Dressing/Treatment Other (comment) 03/22/22 0936   Wound Length (cm) 0.3 cm 04/05/22 1051   Wound Width (cm) 0.3 cm 04/05/22 1051   Wound Depth (cm) 0.1 cm 04/05/22 1051   Wound Surface Area (cm^2) 0.09 cm^2 04/05/22 1051   Change in Wound Size % (l*w) 64 04/05/22 1051   Wound Volume (cm^3) 0.009 cm^3 04/05/22 1051   Wound Healing % 64 04/05/22 1051   Post-Procedure Length (cm) 0.3 cm 04/05/22 1130   Post-Procedure Width (cm) 0.3 cm 04/05/22 1130   Post-Procedure Depth (cm) 0.2 cm 04/05/22 1130   Post-Procedure Surface Area (cm^2) 0.09 cm^2 04/05/22 1130   Post-Procedure Volume (cm^3) 0.018 cm^3 04/05/22 1130   Distance Tunneling (cm) 0 cm 04/05/22 1051   Undermining Maxium Distance (cm) 0 04/05/22 1051   Wound Assessment Other (Comment) 04/05/22 1051   Drainage Amount None 04/05/22 1051   Drainage Description Yellow 03/29/22 0834   Odor None 04/05/22 1051   Paula-wound Assessment Intact 04/05/22 1051   Number of days: 14         Total Surface Area Debrided:  0.09 sq cm     Percentage of wound debrided 100%    Bleeding:  Minimal    Hemostasis Achieved:  by pressure    Procedural Pain:  3  / 10     Post Procedural Pain:  0 / 10     Response to treatment:  Well tolerated by patient. Plan:   Patient examined and evaluated  Wound sharply debrided without incident  Discussed appropriate diabetic diet and protein intake  Keep leg elevated all times when not active  December studies reviewed and show adequate flow for healing  Offloading with quarter inch felt on the wound  Use offloading boot at night  The nature of the patient's condition was explained in depth.  The patient was informed that their compliance to the treatment plan is paramount to successful healing and prevention of further ulceration and/or infection     Discharge Treatment  Multilayer compression wrap with gentamicin cream and triad to wound after vascular studies tomorrow  Written Patient Discharge Instructions Given            Electronically signed by Mayte Singh DPM on 4/5/2022 at 11:41 AM

## 2022-04-05 NOTE — PLAN OF CARE
Pt seen in [address] [place] [address],[address] - discussed results of recent arterial studies - negative - Lateral ankle wound improved - [place] applied after Dr. Foreign Reddy debrided wound - treatment as follows   LEFT LOWER LEG -   medium spandigrip - apply in am and remove pm    RIGHT LATERAL ANKLE   Gentamycin  And collagen  Gauze  Compri 2   spandigrip- low compression   Reviewed AVS - f/u in 1 week

## 2022-04-14 ENCOUNTER — HOSPITAL ENCOUNTER (OUTPATIENT)
Dept: WOUND CARE | Age: 82
Discharge: HOME | End: 2022-04-14

## 2022-04-14 DIAGNOSIS — E11.622 DIABETES MELLITUS WITH SKIN ULCER (HCC): ICD-10-CM

## 2022-04-14 DIAGNOSIS — L98.499 DIABETES MELLITUS WITH SKIN ULCER (HCC): ICD-10-CM

## 2022-04-19 ENCOUNTER — HOSPITAL ENCOUNTER (OUTPATIENT)
Dept: WOUND CARE | Age: 82
Discharge: HOME | End: 2022-04-19
Payer: MEDICARE

## 2022-04-19 VITALS
RESPIRATION RATE: 18 BRPM | TEMPERATURE: 97 F | OXYGEN SATURATION: 99 % | HEIGHT: 71 IN | SYSTOLIC BLOOD PRESSURE: 147 MMHG | BODY MASS INDEX: 27.86 KG/M2 | WEIGHT: 199 LBS | DIASTOLIC BLOOD PRESSURE: 90 MMHG | HEART RATE: 107 BPM

## 2022-04-19 DIAGNOSIS — E11.622 DIABETES MELLITUS WITH SKIN ULCER (HCC): Primary | ICD-10-CM

## 2022-04-19 DIAGNOSIS — L98.499 DIABETES MELLITUS WITH SKIN ULCER (HCC): Primary | ICD-10-CM

## 2022-04-19 PROCEDURE — 11042 DBRDMT SUBQ TIS 1ST 20SQCM/<: CPT

## 2022-04-19 PROCEDURE — 29581 APPL MULTLAYER CMPRN SYS LEG: CPT

## 2022-04-19 RX ORDER — LIDOCAINE 40 MG/G
CREAM TOPICAL ONCE
Status: CANCELLED | OUTPATIENT
Start: 2022-04-19 | End: 2022-04-19

## 2022-04-19 RX ORDER — LIDOCAINE HYDROCHLORIDE 40 MG/ML
SOLUTION TOPICAL ONCE
Status: CANCELLED | OUTPATIENT
Start: 2022-04-19 | End: 2022-04-19

## 2022-04-19 RX ORDER — GINSENG 100 MG
CAPSULE ORAL ONCE
Status: CANCELLED | OUTPATIENT
Start: 2022-04-19 | End: 2022-04-19

## 2022-04-19 RX ORDER — GENTAMICIN SULFATE 1 MG/G
OINTMENT TOPICAL ONCE
Status: DISCONTINUED | OUTPATIENT
Start: 2022-04-19 | End: 2022-04-20 | Stop reason: HOSPADM

## 2022-04-19 RX ORDER — GENTAMICIN SULFATE 1 MG/G
OINTMENT TOPICAL ONCE
Status: CANCELLED | OUTPATIENT
Start: 2022-04-19 | End: 2022-04-19

## 2022-04-19 RX ORDER — CLOBETASOL PROPIONATE 0.5 MG/G
OINTMENT TOPICAL ONCE
Status: CANCELLED | OUTPATIENT
Start: 2022-04-19 | End: 2022-04-19

## 2022-04-19 RX ORDER — LIDOCAINE HYDROCHLORIDE 20 MG/ML
JELLY TOPICAL ONCE
Status: CANCELLED | OUTPATIENT
Start: 2022-04-19 | End: 2022-04-19

## 2022-04-19 RX ORDER — BACITRACIN, NEOMYCIN, POLYMYXIN B 400; 3.5; 5 [USP'U]/G; MG/G; [USP'U]/G
OINTMENT TOPICAL ONCE
Status: CANCELLED | OUTPATIENT
Start: 2022-04-19 | End: 2022-04-19

## 2022-04-19 RX ORDER — LIDOCAINE 40 MG/G
CREAM TOPICAL ONCE
Status: DISCONTINUED | OUTPATIENT
Start: 2022-04-19 | End: 2022-04-20 | Stop reason: HOSPADM

## 2022-04-19 RX ORDER — BETAMETHASONE DIPROPIONATE 0.05 %
OINTMENT (GRAM) TOPICAL ONCE
Status: CANCELLED | OUTPATIENT
Start: 2022-04-19 | End: 2022-04-19

## 2022-04-19 RX ORDER — LIDOCAINE 50 MG/G
OINTMENT TOPICAL ONCE
Status: CANCELLED | OUTPATIENT
Start: 2022-04-19 | End: 2022-04-19

## 2022-04-19 RX ORDER — BACITRACIN ZINC AND POLYMYXIN B SULFATE 500; 1000 [USP'U]/G; [USP'U]/G
OINTMENT TOPICAL ONCE
Status: CANCELLED | OUTPATIENT
Start: 2022-04-19 | End: 2022-04-19

## 2022-04-19 NOTE — PROGRESS NOTES
[facility]  Progress Note and Procedure Note      Laura Boyce  AGE: 78 y.o. GENDER: male  : 1940  TODAY'S DATE:  2022    Subjective:     Chief Complaint   Patient presents with    Wound Check         HISTORY of PRESENT ILLNESS HPI     Laura Boyce is a 78 y.o. male who presents today for wound evaluation. History of Wound: Patient admits to having wound for least 6 weeks. He states it was a traumatic injury from bumping the ankle. He states that he was sleeping on his side when it first began but since then he has been sleeping in the chair without the legs fully elevated. Try to keep the legs elevated more. He is having some issues with getting pressure on the wound when he sleeping at night. We forgot to give him an offloading boot last week. He has left the wrap on as directed. His vascular studies were completed.         Wound Pain:  none  Severity:  3 / 10   Wound Type:  diabetic, pressure and traumatic  Modifying Factors:  edema, lymphedema, diabetes, poor glucose control, chronic pressure, decreased mobility, shear force and decreased tissue oxygenation  Associated Signs/Symptoms:  edema, drainage and pain        PAST MEDICAL HISTORY        Diagnosis Date    Arthritis     CAD (coronary artery disease)     COPD [facility]chronic obstructive [facility] disease) ([facility] [address]     Diabetes mellitus ([facility] [address]     Hepatitis A 2021    Hyperlipidemia     Hypertension     Influenza A 2017    Kidney calculi     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    ELAINE on CPAP     Osteoporosis     Skin cancer of face        PAST SURGICAL HISTORY    Past Surgical History:   Procedure Laterality Date    BACK SURGERY      repair broken back L4 & L5    CHOLECYSTECTOMY, LAPAROSCOPIC N/A 2021    LAPAROSCOPIC CHOLECYSTECTOMY, WITH CHOLANGIOGRAMS (ATTEMPTED) OPEN CHOLECYSTECTOMY performed by Tila Carbajal MD at [facility]    CHOLECYSTECTOMY, OPEN N/A 2021    LAPAROSCOPIC CHOLECYSTECTOMY, WITH CHOLANGIOGRAMS (ATTEMPTED)     CYSTOSCOPY Right 10/9/15    RIght Ureteroscopy, Holmium Laser Lithotripsy with Stone Removal, Right Stent Placement    CYSTOSCOPY  2019    CYSTOSCOPY, RESECTION OF BLADDER NECK, URETHRAL DILATION    CYSTOSCOPY W BIOPSY OF BLADDER N/A 2019    CYSTOSCOPY, RESECTION OF BLADDER NECK, URETHRAL DILATION performed by Monse Rojas MD at [address] 2016    cataract removal    GALLBLADDER SURGERY  2021    JOINT REPLACEMENT  2011    right knee    JOINT REPLACEMENT  2004    left knee    OTHER SURGICAL HISTORY  2015    wide excision basal cell carcinoma on the nose and bilateral auricles with frozen sections, plastic closure, full thickness skin graft    OTHER SURGICAL HISTORY  12/1/15    excision of lesion of lip    PROSTATE SURGERY      TURP  10/23/2015    with cystoscopy       FAMILY HISTORY    Family History   Problem Relation Age of Onset    Cancer Mother     Diabetes Mother     Heart Disease Mother     Cancer Father     Diabetes Sister     Cancer Brother        SOCIAL HISTORY    Social History     Tobacco Use    Smoking status: Former Smoker     Packs/day: 1.50     Years: 45.00     Pack years: 67.50     Quit date: 2005     Years since quittin.9    Smokeless tobacco: Never Used   Vaping Use    Vaping Use: Never used   Substance Use Topics    Alcohol use: No    Drug use: No       ALLERGIES    No Known Allergies    MEDICATIONS    Current Outpatient Medications on File Prior to Encounter   Medication Sig Dispense Refill    TRELEGY ELLIPTA 100-62.5-25 MCG/INH AEPB INHALE 1 PUFF EVERY DAY 1 each 5    ondansetron (ZOFRAN ODT) 4 MG disintegrating tablet Take 1 tablet by mouth every 8 hours as needed for Nausea or Vomiting 16 tablet 0    VENTOLIN  (90 Base) MCG/ACT inhaler Inhale 2 puffs into the lungs every 6 hours as needed for Wheezing orShortness of Breath 1 each 5    albuterol (PROVENTIL) (2.5 MG/3ML) 0.083% nebulizer solution USE 1 VIAL IN NEBULIZER 4 TIMES DAILY 120 each 11    dilTIAZem (CARTIA XT) 180 MG extended release capsule Take 1 capsule by mouth as needed (Ok to take as needed only 1 time a day for increased heart rate) 90 capsule 3    apixaban (ELIQUIS) 5 MG TABS tablet Take 1 tablet by mouth 2 times daily 180 tablet 3    propafenone (RYTHMOL) 150 MG tablet TAKE ONE TABLET BY MOUTH EVERY 8 HOURS 270 tablet 3    atorvastatin (LIPITOR) 10 MG tablet Take 1 tablet by mouth daily 90 tablet 3    acetaminophen (AMINOFEN) 325 MG tablet Take 2 tablets by mouth every 4 hours as needed for Pain 120 tablet 0    alendronate (FOSAMAX) 70 MG tablet every 7 days On Wednesday      fluticasone (FLONASE) 50 MCG/ACT nasal spray 2 sprays by Nasal route daily       pioglitazone (ACTOS) 30 MG tablet Take 30 mg by mouth daily      gabapentin (NEURONTIN) 800 MG tablet Take 800 mg by mouth 3 times daily.  OXYGEN Inhale 2.5 L into the lungs nightly At 3.5lpm on 2/8/2022      metformin (GLUCOPHAGE) 500 MG tablet Take 500 mg by mouth 4 times daily       esomeprazole (NEXIUM) 40 MG capsule   Take 40 mg by mouth every morning (before breakfast) Indications: take dos        No current facility-administered medications on file prior to encounter. REVIEW OF SYSTEMS    Pertinent items are noted in HPI. Objective:      BP (!) 147/90   Pulse 107   Temp 97 °F (36.1 °C) (Oral)   Resp 18   Ht 5' 11\" (1.803 m)   Wt 199 lb (90.3 kg)   SpO2 99%   BMI 27.75 kg/m²     PHYSICAL EXAM    Vascular: Vascular status Impaired  palpable pedal pulses, right DP2/4 and PT1/4, left DP1/4 and PT1/4. CFT 4 seconds digits 1 to 5 bilateral.  Hair growthAbsent  both lower extremities and feet.   Skin temperature is warm to warm from pretibial area to distal digits bilateral.  Exam is negative for rubor, pallor, cyanosis or signs of acute vascular compromise bilaterally. Exam is positive for edema bilateral lower extremity. Varicosities Absent  bilateral lower extremity. Neuro: Neurologic status diminished bilateral with epicritic Present , proprioceptive Present, vibratory sensationPresent and protopathic Present. DTRs Present bilateral Achilles. There were no reproducible neuritic symptoms on exam bilateral feet/ankles. Derm: Ulceration to right lateral ankle. Ecchymosis Absent  bilateral feet/foot. Musculoskeletal: Pain with debridement of wound. 5/5 muscle strength in/eversion and dorsi/plantarflexion bilateral feet. No gross instability noted. Assessment:     Problem List Items Addressed This Visit     Diabetes mellitus with skin ulcer ([facility] [address] - Primary    Relevant Medications    lidocaine (LMX) 4 % cream (Start on 4/19/2022  9:45 AM)    gentamicin (GARAMYCIN) 0.1 % ointment (Start on 4/19/2022  9:45 AM)    Other Relevant Orders    Initiate Outpatient Wound Care Protocol          Procedure Note    Performed by: Rocky Callejas DPM    Consent obtained: Yes    Time out taken:  Yes    Pain Control: Anesthetic  Anesthetic: 4% Lidocaine Cream     Debridement:Excisional Debridement    Using curette the wound was sharply debrided    down through and including the removal of epidermis, dermis, subcutaneous tissue and muscle/fascia.         Devitalized Tissue Debrided:  fibrin, biofilm, slough, necrotic/eschar and exudate    Pre Debridement Measurements:  Are located in the Wound Documentation Flow Sheet    Wound #: 1     Wound Care Documentation:  Wound 03/22/22 #1, Right Lateral Ankle, Traumatic, Partial Thickness (Onset 1/2022) (Active)   Wound Image   03/22/22 0809   Wound Etiology Traumatic 04/19/22 0908   Dressing Status New dressing applied 04/05/22 1200   Wound Cleansed Soap and water 04/19/22 0908   Dressing/Treatment Other (comment) 04/05/22 1200   Offloading for Diabetic Foot Ulcers Felt or foam 04/19/22 0926   Wound Length (cm) 0.3 cm 04/19/22 0908   Wound Width (cm) 0.3 cm 04/19/22 0908   Wound Depth (cm) 0.1 cm 04/19/22 0908   Wound Surface Area (cm^2) 0.09 cm^2 04/19/22 0908   Change in Wound Size % (l*w) 64 04/19/22 0908   Wound Volume (cm^3) 0.009 cm^3 04/19/22 0908   Wound Healing % 64 04/19/22 0908   Post-Procedure Length (cm) 0.3 cm 04/19/22 0926   Post-Procedure Width (cm) 0.3 cm 04/19/22 0926   Post-Procedure Depth (cm) 0.1 cm 04/19/22 0926   Post-Procedure Surface Area (cm^2) 0.09 cm^2 04/19/22 0926   Post-Procedure Volume (cm^3) 0.009 cm^3 04/19/22 0926   Distance Tunneling (cm) 0 cm 04/19/22 0908   Undermining Maxium Distance (cm) 0 04/19/22 0908   Wound Assessment Other (Comment) 04/19/22 0908   Drainage Amount Scant 04/19/22 0908   Drainage Description Yellow 04/19/22 0908   Odor None 04/19/22 0908   Paula-wound Assessment Intact 04/19/22 0908   Number of days: 28         Total Surface Area Debrided:  0.09 sq cm     Percentage of wound debrided 100%    Bleeding:  Minimal    Hemostasis Achieved:  by pressure    Procedural Pain:  3  / 10     Post Procedural Pain:  0 / 10     Response to treatment:  Well tolerated by patient. Plan:   Patient examined and evaluated  Wound sharply debrided without incident depth significantly improved. Discussed appropriate diabetic diet and protein intake  Keep leg elevated all times when not active  March 30 studies for vascular reviewed and show adequate flow for healing  Offloading with quarter inch felt on the wound  Use offloading boot at night  The nature of the patient's condition was explained in depth.  The patient was informed that their compliance to the treatment plan is paramount to successful healing and prevention of further ulceration and/or infection     Discharge Treatment  Multilayer compression wrap with gentamicin cream and triad to wound        Written Patient Discharge Instructions Given            Electronically signed by Mohsen Elena DPM on 4/19/2022 at 9:33 AM

## 2022-04-19 NOTE — PLAN OF CARE
Pt seen in [address] [place] [address],[address] - Right lateral ankle wound with less depth- debride per Dr. Abhay Patel - reviewed AVS - f/u in 1 week - cont wound care as follows  LEFT LOWER LEG -   medium spandigrip - apply in am and remove pm     RIGHT LATERAL ANKLE   Felt applied per Dr. Abhay Patel  Gentamycin  And collagen  Gauze  Compri 2   spandigrip- low compression   leave in place for the week

## 2022-04-26 ENCOUNTER — HOSPITAL ENCOUNTER (OUTPATIENT)
Dept: WOUND CARE | Age: 82
Discharge: HOME | End: 2022-04-26
Payer: MEDICARE

## 2022-04-26 VITALS
RESPIRATION RATE: 18 BRPM | SYSTOLIC BLOOD PRESSURE: 135 MMHG | HEART RATE: 104 BPM | DIASTOLIC BLOOD PRESSURE: 77 MMHG | TEMPERATURE: 97.5 F | HEIGHT: 71 IN | OXYGEN SATURATION: 100 % | WEIGHT: 199.4 LBS | BODY MASS INDEX: 27.92 KG/M2

## 2022-04-26 DIAGNOSIS — L98.499 DIABETES MELLITUS WITH SKIN ULCER (HCC): Primary | ICD-10-CM

## 2022-04-26 DIAGNOSIS — E11.622 DIABETES MELLITUS WITH SKIN ULCER (HCC): Primary | ICD-10-CM

## 2022-04-26 PROCEDURE — 99212 OFFICE O/P EST SF 10 MIN: CPT

## 2022-04-26 RX ORDER — LIDOCAINE HYDROCHLORIDE 40 MG/ML
SOLUTION TOPICAL ONCE
Status: CANCELLED | OUTPATIENT
Start: 2022-04-26 | End: 2022-04-26

## 2022-04-26 RX ORDER — LIDOCAINE HYDROCHLORIDE 20 MG/ML
JELLY TOPICAL ONCE
Status: CANCELLED | OUTPATIENT
Start: 2022-04-26 | End: 2022-04-26

## 2022-04-26 RX ORDER — LIDOCAINE 40 MG/G
CREAM TOPICAL ONCE
Status: CANCELLED | OUTPATIENT
Start: 2022-04-26 | End: 2022-04-26

## 2022-04-26 RX ORDER — LIDOCAINE 50 MG/G
OINTMENT TOPICAL ONCE
Status: CANCELLED | OUTPATIENT
Start: 2022-04-26 | End: 2022-04-26

## 2022-04-26 RX ORDER — GINSENG 100 MG
CAPSULE ORAL ONCE
Status: CANCELLED | OUTPATIENT
Start: 2022-04-26 | End: 2022-04-26

## 2022-04-26 RX ORDER — BACITRACIN, NEOMYCIN, POLYMYXIN B 400; 3.5; 5 [USP'U]/G; MG/G; [USP'U]/G
OINTMENT TOPICAL ONCE
Status: CANCELLED | OUTPATIENT
Start: 2022-04-26 | End: 2022-04-26

## 2022-04-26 RX ORDER — GENTAMICIN SULFATE 1 MG/G
OINTMENT TOPICAL ONCE
Status: CANCELLED | OUTPATIENT
Start: 2022-04-26 | End: 2022-04-26

## 2022-04-26 RX ORDER — BACITRACIN ZINC AND POLYMYXIN B SULFATE 500; 1000 [USP'U]/G; [USP'U]/G
OINTMENT TOPICAL ONCE
Status: CANCELLED | OUTPATIENT
Start: 2022-04-26 | End: 2022-04-26

## 2022-04-26 RX ORDER — CLOBETASOL PROPIONATE 0.5 MG/G
OINTMENT TOPICAL ONCE
Status: CANCELLED | OUTPATIENT
Start: 2022-04-26 | End: 2022-04-26

## 2022-04-26 RX ORDER — BETAMETHASONE DIPROPIONATE 0.05 %
OINTMENT (GRAM) TOPICAL ONCE
Status: CANCELLED | OUTPATIENT
Start: 2022-04-26 | End: 2022-04-26

## 2022-04-26 ASSESSMENT — PAIN SCALES - GENERAL: PAINLEVEL_OUTOF10: 0

## 2022-04-26 NOTE — PROGRESS NOTES
Khai Monterroso MD at [facility], OPEN N/A 2021    LAPAROSCOPIC CHOLECYSTECTOMY, WITH CHOLANGIOGRAMS (ATTEMPTED)     CYSTOSCOPY Right 10/9/15    RIght Ureteroscopy, Holmium Laser Lithotripsy with Stone Removal, Right Stent Placement    CYSTOSCOPY  2019    CYSTOSCOPY, RESECTION OF BLADDER NECK, URETHRAL DILATION    CYSTOSCOPY W BIOPSY OF BLADDER N/A 2019    CYSTOSCOPY, RESECTION OF BLADDER NECK, URETHRAL DILATION performed by Devon oMreno MD at [address] Left 2016    cataract removal    GALLBLADDER SURGERY  2021    JOINT REPLACEMENT  2011    right knee    JOINT REPLACEMENT  2004    left knee    OTHER SURGICAL HISTORY  2015    wide excision basal cell carcinoma on the nose and bilateral auricles with frozen sections, plastic closure, full thickness skin graft    OTHER SURGICAL HISTORY  12/1/15    excision of lesion of lip    PROSTATE SURGERY      TURP  10/23/2015    with cystoscopy       FAMILY HISTORY    Family History   Problem Relation Age of Onset    Cancer Mother     Diabetes Mother     Heart Disease Mother     Cancer Father     Diabetes Sister     Cancer Brother        SOCIAL HISTORY    Social History     Tobacco Use    Smoking status: Former Smoker     Packs/day: 1.50     Years: 45.00     Pack years: 67.50     Quit date: 2005     Years since quittin.9    Smokeless tobacco: Never Used   Vaping Use    Vaping Use: Never used   Substance Use Topics    Alcohol use: No    Drug use: No       ALLERGIES    No Known Allergies    MEDICATIONS    Current Outpatient Medications on File Prior to Encounter   Medication Sig Dispense Refill    TRELEGY ELLIPTA 100-62.5-25 MCG/INH AEPB INHALE 1 PUFF EVERY DAY 1 each 5    ondansetron (ZOFRAN ODT) 4 MG disintegrating tablet Take 1 tablet by mouth every 8 hours as needed for Nausea or Vomiting 16 tablet 0    VENTOLIN  (90 Base) MCG/ACT inhaler Inhale 2 puffs into the lungs every 6 hours as needed for Wheezing orShortness of Breath 1 each 5    albuterol (PROVENTIL) (2.5 MG/3ML) 0.083% nebulizer solution USE 1 VIAL IN NEBULIZER 4 TIMES DAILY 120 each 11    dilTIAZem (CARTIA XT) 180 MG extended release capsule Take 1 capsule by mouth as needed (Ok to take as needed only 1 time a day for increased heart rate) 90 capsule 3    apixaban (ELIQUIS) 5 MG TABS tablet Take 1 tablet by mouth 2 times daily 180 tablet 3    propafenone (RYTHMOL) 150 MG tablet TAKE ONE TABLET BY MOUTH EVERY 8 HOURS 270 tablet 3    atorvastatin (LIPITOR) 10 MG tablet Take 1 tablet by mouth daily 90 tablet 3    acetaminophen (AMINOFEN) 325 MG tablet Take 2 tablets by mouth every 4 hours as needed for Pain 120 tablet 0    alendronate (FOSAMAX) 70 MG tablet every 7 days On Wednesday      fluticasone (FLONASE) 50 MCG/ACT nasal spray 2 sprays by Nasal route daily       pioglitazone (ACTOS) 30 MG tablet Take 30 mg by mouth daily      gabapentin (NEURONTIN) 800 MG tablet Take 800 mg by mouth 3 times daily.  OXYGEN Inhale 2.5 L into the lungs nightly At 3.5lpm on 2/8/2022      metformin (GLUCOPHAGE) 500 MG tablet Take 500 mg by mouth 4 times daily       esomeprazole (NEXIUM) 40 MG capsule   Take 40 mg by mouth every morning (before breakfast) Indications: take dos        No current facility-administered medications on file prior to encounter. REVIEW OF SYSTEMS    Pertinent items are noted in HPI. Objective:      /77   Pulse 104   Temp 97.5 °F (36.4 °C) (Oral)   Resp 18   Ht 5' 11\" (1.803 m)   Wt 199 lb 6.4 oz (90.4 kg)   SpO2 100%   BMI 27.81 kg/m²     PHYSICAL EXAM    Vascular: Vascular status Impaired  palpable pedal pulses, right DP2/4 and PT1/4, left DP1/4 and PT1/4. CFT 4 seconds digits 1 to 5 bilateral.  Hair growthAbsent  both lower extremities and feet.   Skin temperature is warm to warm from pretibial area to distal digits bilateral.  Exam is negative for rubor, pallor, cyanosis or signs of acute vascular compromise bilaterally. Exam is positive for edema bilateral lower extremity. Varicosities Absent  bilateral lower extremity. Neuro: Neurologic status diminished bilateral with epicritic Present , proprioceptive Present, vibratory sensationPresent and protopathic Present. DTRs Present bilateral Achilles. There were no reproducible neuritic symptoms on exam bilateral feet/ankles. Derm: Ulceration to right lateral ankle healed. Ecchymosis Absent  bilateral feet/foot. Musculoskeletal: Pain with debridement of wound. 5/5 muscle strength in/eversion and dorsi/plantarflexion bilateral feet. No gross instability noted.           Assessment:     Problem List Items Addressed This Visit     Diabetes mellitus with skin ulcer ([facility] [address] - Primary    Relevant Orders    Initiate Outpatient Wound Care Protocol          Wound Care Documentation:  Wound 03/22/22 #1, Right Lateral Ankle, Traumatic, Partial Thickness (Onset 1/2022) (Active)   Wound Image   04/26/22 1114   Wound Etiology Traumatic 04/19/22 0908   Dressing Status New dressing applied 04/19/22 0959   Wound Cleansed Soap and water;Irrigated with saline 04/26/22 1114   Dressing/Treatment Other (comment) 04/19/22 0959   Offloading for Diabetic Foot Ulcers Felt or foam 04/19/22 0926   Wound Length (cm) 0 cm 04/26/22 1114   Wound Width (cm) 0 cm 04/26/22 1114   Wound Depth (cm) 0 cm 04/26/22 1114   Wound Surface Area (cm^2) 0 cm^2 04/26/22 1114   Change in Wound Size % (l*w) 100 04/26/22 1114   Wound Volume (cm^3) 0 cm^3 04/26/22 1114   Wound Healing % 100 04/26/22 1114   Post-Procedure Length (cm) 0.3 cm 04/19/22 0926   Post-Procedure Width (cm) 0.3 cm 04/19/22 0926   Post-Procedure Depth (cm) 0.1 cm 04/19/22 0926   Post-Procedure Surface Area (cm^2) 0.09 cm^2 04/19/22 0926   Post-Procedure Volume (cm^3) 0.009 cm^3 04/19/22 0926   Distance Tunneling (cm) 0 cm 04/26/22 1114   Undermining Maxium Distance (cm) 0 04/26/22 1114 Wound Assessment Dry 04/26/22 1114   Drainage Amount None 04/26/22 1114   Drainage Description Yellow 04/19/22 0908   Odor None 04/26/22 1114   Paula-wound Assessment Intact 04/26/22 1114   Number of days: 35           Plan:   Patient examined and evaluated  Wound sharply debrided without incident depth significantly improved. Discussed appropriate diabetic diet and protein intake  Keep leg elevated all times when not active  March 30 studies for vascular reviewed and show adequate flow for healing  Offloading with quarter inch felt on the wound  Use offloading boot at night  The nature of the patient's condition was explained in depth.  The patient was informed that their compliance to the treatment plan is paramount to successful healing and prevention of further ulceration and/or infection     Discharge from wound care center      Written Patient Discharge Instructions Given            Electronically signed by Shelley Denson DPM on 4/26/2022 at 11:43 AM

## 2022-04-26 NOTE — PLAN OF CARE
Pt seen in [facility] - Lateral ankle wound healed - discussed preventative care - applied spandigrip for compression  to be worn for the next month - reviewed AVS - preventative care - treatment completed

## 2022-05-31 DIAGNOSIS — J96.11 CHRONIC RESPIRATORY FAILURE WITH HYPOXIA AND HYPERCAPNIA (HCC): Primary | ICD-10-CM

## 2022-05-31 DIAGNOSIS — J96.12 CHRONIC RESPIRATORY FAILURE WITH HYPOXIA AND HYPERCAPNIA (HCC): Primary | ICD-10-CM

## 2022-05-31 NOTE — TELEPHONE ENCOUNTER
Next apt: 8/9/22    Last filled: 11/24/21 (#1 with 5 refills)    Last seen: 9/2/21  ASSESSMENT:  · Very Severe COPD   · Chronic hypoxic respiratory failure  · SUTTON due to the above  · Chronic Thrombcytopenia  · Not addressed: Afib, DM2     PLAN:   · Continue Trelegy  · Albuterol nebs q4hr PRN  · Prn albuterol nebs   · Supplemental O2 to 3lpm with exertion  · Pulmonary rehab declined.    · Pneumovax 23 and prevnar 13 UTD;  flu vaccine last year,  · But refused COVID vaccine  F/u in 6 months

## 2022-08-26 DIAGNOSIS — J96.11 CHRONIC RESPIRATORY FAILURE WITH HYPOXIA AND HYPERCAPNIA (HCC): ICD-10-CM

## 2022-08-26 DIAGNOSIS — J96.12 CHRONIC RESPIRATORY FAILURE WITH HYPOXIA AND HYPERCAPNIA (HCC): ICD-10-CM

## 2022-09-26 NOTE — PROGRESS NOTES
[facility]    History of Present Illness:   Lory Troncoso is a 34 y.o. female with history of CHF, CAD, GERD, Arthritis, Fibromyalgia, HLD, Anxiety, HSV, OCD, Depression, Obesity  CC: Follow up  History provided by patient and Records    HPI:  CHF: Patient is following up for monitoring of HF with CAD. At this time patient reports her CHF symptoms are not well controlled. Current symptoms include SOB, slight limitation of physical activity, marked limitation of physical activity, and comfortable at rest.  Patient reports being frustrated. Recently started on Jardiance, and was on entresto, then stopped. -Lifestyle/Dietary Compliance: compliant most of the time               Limits Salt Intake? Yes              Limits Fluid intake? Yes              Use of Compression stockings? No                Exercise? No                Tobacco Use? No                Weight Stable? Yes   - Current medications:       Key CAD CHF Meds               rosuvastatin (CRESTOR) 20 mg tablet (Taking) Take 1 Tablet by mouth every Monday, Wednesday, Friday.    ezetimibe (ZETIA) 10 mg tablet (Taking) Take 1 Tablet by mouth daily. furosemide (LASIX) 40 mg tablet (Taking) Take 1 Tablet by mouth daily. carvediloL (COREG) 3.125 mg tablet (Taking) Take 1 Tablet by mouth two (2) times a day. prasugreL (EFFIENT) 10 mg tablet (Taking)     nitroglycerin (NITROSTAT) 0.4 mg SL tablet (Taking)     aspirin delayed-release 81 mg tablet (Taking) 81 mg. Compliance: compliant most of the time  - Associated conditions: Coronary Artery Disease (CAD)  - Last CHF exacerbation: 8/13/22 Cause: exertion  - Last hospitalization for CHF: 8/13/22  - Cardiologist: Going to see Kennedy Morrison. Last Visit: Upcoming.  - Last Echo:08/13/22    ECHO ADULT COMPLETE 08/14/2022 8/14/2022    Interpretation Summary    Left Ventricle: Severely reduced left ventricular systolic function with a visually estimated EF of 20 - 25%.  Left ventricle Occupational Therapy  Facility/Department: [facility]  Daily Treatment Note  NAME: Perez Pratt  : 1940  MRN: [id number]    Date of Service: 2021    Discharge Recommendations:  Home with assist PRN, Home with Home health OT, S Level 1       Assessment   Performance deficits / Impairments: Decreased functional mobility ; Decreased high-level IADLs;Decreased ADL status; Decreased balance;Decreased posture;Decreased endurance  Assessment: Pt agreeable to OT session. Pt performed standing tasks with improved balance and safety with good use of RW for bathroom mobility and transfers with supervision. Pt completed toileting, bathing, and UB dressing with supervision. Pt performed LB dressing with supervision and min VCs for proper use of a/e. Pt completed BUE ther ex with good strength for 3# weights and given handout for HEP. Pt verbalized understanding of exercises. Pt verbalized no concerns about going home and hoping for a scooter to use inside house. Continue POC. Prognosis: Good  OT Education: OT Role;Plan of Care;Precautions; ADL Adaptive Strategies;Transfer Training;Energy Conservation;Orientation;Equipment  Patient Education: safe hand placement, PLB, transfer safety, ADL a/e, recommendations for home  Barriers to Learning: Pt verbalized understanding but may benefit from reinforcement. REQUIRES OT FOLLOW UP: Yes  Activity Tolerance  Activity Tolerance: Patient Tolerated treatment well  Safety Devices  Safety Devices in place: Yes  Type of devices: Nurse notified; Chair alarm in place; Left in chair;Call light within reach;Gait belt         Patient Diagnosis(es): There were no encounter diagnoses. has a past medical history of Arthritis, CAD (coronary artery disease), COPD (chronic obstructive pulmonary disease) ([facility] [address] Diabetes mellitus ([facility] [address] Hepatitis A, Hyperlipidemia, Hypertension, Influenza A, Kidney calculi, MDRO (multiple drug resistant organisms) resistance, ELAINE on CPAP, Osteoporosis, and Skin cancer of face. has a past surgical history that includes Prostate surgery; eye surgery (Left, 6/12/2016); other surgical history (08/31/2015); Cystoscopy (Right, 10/9/15); joint replacement (6/29/2011); joint replacement (11/2004); TURP (10/23/2015); other surgical history (12/1/15); back surgery; Cystocopy (05/01/2019); cystoscopy w biopsy of bladder (N/A, 5/1/2019); Cholecystectomy, open (N/A, 01/06/2021); and Cholecystectomy, laparoscopic (N/A, 1/6/2021). Restrictions  Restrictions/Precautions  Restrictions/Precautions: General Precautions, Fall Risk  Required Braces or Orthoses?: No  Position Activity Restriction  Other position/activity restrictions: up with assistance, JARAD hose, 3L O2  Subjective   General  Chart Reviewed: Yes, Orders, Progress Notes, Labs, History and Physical  Patient assessed for rehabilitation services?: Yes  Additional Pertinent Hx: baseline 3L oxygen requirement, HTN  Family / Caregiver Present: No  Referring Practitioner: Tan Rogel MD  Diagnosis: Acute cholecystitis, Hypokalemia, L1 compression fracture  Subjective  Subjective: Pt sitting on EOB, pleasant, eating breakfast, agreeable to OT session and ADL.   General Comment  Comments: RN cleared for treatment  Vital Signs  Temp: 96.7 °F (35.9 °C)  Temp Source: Oral  Pulse: 82  Heart Rate Source: Monitor  Resp: 16  BP: 134/66  BP Location: Left upper arm  Patient Position: Sitting  Patient Currently in Pain: Denies  Oxygen Therapy  SpO2: 97 %  Pulse Oximeter Device Mode: Intermittent  Pulse Oximeter Device Location: Left;Finger  O2 Device: Nasal cannula  O2 Flow Rate (L/min): 3 L/min   Orientation  Orientation is mildly dilated. Increased wall thickness. See diagram for wall motion findings. Grade II diastolic dysfunction with increased LAP. Right Ventricle: Not well visualized. Tricuspid Valve: Moderately elevated RVSP. Signed by: Nayely Lopez DO on 8/14/2022  2:53 PM     Fibromyalgia: Not well controlled    Hypertriglyceridemia Follow up:   Cardiovascular risks for her are: existing CAD  hypertension  hyperlipidemia. Current Medications:  Current Medications:  Key Antihyperlipidemia Meds               rosuvastatin (CRESTOR) 20 mg tablet (Taking) Take 1 Tablet by mouth every Monday, Wednesday, Friday.    ezetimibe (ZETIA) 10 mg tablet (Taking) Take 1 Tablet by mouth daily. Compliance: YES              Myalgias: YES              Fatigue: YES              Other side effects: NO     Wt Readings from Last 3 Encounters:   09/26/22 210 lb (95.3 kg)   09/09/22 218 lb (98.9 kg)   09/08/22 218 lb (98.9 kg)       Lab Results   Component Value Date/Time    Cholesterol, total 158 08/13/2022 08:43 AM    HDL Cholesterol 67 08/13/2022 08:43 AM    LDL, calculated 66.2 08/13/2022 08:43 AM    VLDL, calculated 24.8 08/13/2022 08:43 AM    Triglyceride 124 08/13/2022 08:43 AM    CHOL/HDL Ratio 2.4 08/13/2022 08:43 AM      Lab Results   Component Value Date/Time    ALT (SGPT) 40 03/30/2022 09:52 AM    AST (SGOT) 20 03/30/2022 09:52 AM    Alk. phosphatase 133 (H) 03/30/2022 09:52 AM    Bilirubin, direct 0.12 12/08/2017 04:08 PM    Bilirubin, total 0.7 03/30/2022 09:52 AM      GERD: Patient noting has been worse since Heart attack. Anxiety/Depression: Patient is taking Diazepam 5 g TID. Had cut back from 10 mg.      Health Maintenance  Health Maintenance Due   Topic Date Due    Pneumococcal 0-64 years (1 - PCV) Never done    Flu Vaccine (1) Never done       Past Medical, Family, and Social History:     Current Outpatient Medications on File Prior to Visit   Medication Sig Dispense Refill    rosuvastatin (Bernestine Search) 20 mg tablet Take 1 Tablet by mouth every Monday, Wednesday, Friday. 90 Tablet 1    levocetirizine (XYZAL) 5 mg tablet TAKE 1 TABLET EVERY DAY 90 Tablet 1    famotidine (PEPCID) 20 mg tablet TAKE 1 TABLET BY MOUTH TWICE A  Tablet 1    ezetimibe (ZETIA) 10 mg tablet Take 1 Tablet by mouth daily. 90 Tablet 0    furosemide (LASIX) 40 mg tablet Take 1 Tablet by mouth daily. 30 Tablet 0    metFORMIN ER (GLUCOPHAGE XR) 500 mg tablet Take 1 Tablet by mouth daily (with dinner). 90 Tablet 1    empagliflozin (Jardiance) 10 mg tablet Take 1 Tablet by mouth daily. 30 Tablet 1    carvediloL (COREG) 3.125 mg tablet Take 1 Tablet by mouth two (2) times a day. 60 Tablet 1    montelukast (SINGULAIR) 10 mg tablet TAKE 1 TABLET EVERY DAY 90 Tablet 1    hydrocortisone (ANUSOL-HC) 2.5 % rectal cream Insert  into rectum four (4) times daily. 30 g 0    diazePAM (VALIUM) 5 mg tablet 5 mg. 5 mg, 3 times daily      albuterol-ipratropium (DUO-NEB) 2.5 mg-0.5 mg/3 ml nebu 3 mL by Nebulization route four (4) times daily. fluticasone propionate (FLONASE) 50 mcg/actuation nasal spray 2 Sprays by Both Nostrils route daily. ipratropium (ATROVENT) 21 mcg (0.03 %) nasal spray 1 [place] by Both Nostrils route every twelve (12) hours. 30 mL 0    acetaminophen (TYLENOL) 325 mg tablet Take  by mouth every four (4) hours as needed for Pain. albuterol (PROVENTIL HFA, VENTOLIN HFA, PROAIR HFA) 90 mcg/actuation inhaler Take  by inhalation.       prasugreL (EFFIENT) 10 mg tablet       nitroglycerin (NITROSTAT) 0.4 mg SL tablet       aspirin delayed-release 81 mg tablet 81 mg.      pantoprazole (PROTONIX) 40 mg tablet TAKE 1 TABLET TWICE DAILY 180 Tablet 1    ferrous sulfate 325 mg (65 mg iron) tablet TAKE 1 TABLET BY MOUTH ONCE DAILY BEFORE BREAKFAST 90 Tablet 1    traZODone (DESYREL) 100 mg tablet At bedtime      folic acid (FOLVITE) 1 mg tablet TAKE 1 TABLET EVERY DAY 90 Tablet 1    cyclobenzaprine (FLEXERIL) 10 mg tablet three (3) times Overall Orientation Status: Within Functional Limits  Objective    ADL  Feeding: Modified independent   UE Bathing: Setup  LE Bathing: Supervision  UE Dressing: Supervision(to doff/don gown)  LE Dressing: Supervision(in stance to manage pants/brief over hips)  Toileting: Supervision(in stance while managing clothing over hips)        Balance  Sitting Balance: Supervision  Standing Balance: Supervision(with RW)  Standing Balance  Time: 30 seconds x4  Activity: transfer to/from bathroom, toileting, LB dressing  Comment: with RW  Functional Mobility  Functional - Mobility Device: Rolling Walker  Activity: To/from bathroom  Assist Level: Supervision  Toilet Transfers  Toilet - Technique: Ambulating  Equipment Used: Standard toilet  Toilet Transfer: Supervision  Shower Transfers  Shower - Transfer From: Alla Pereirao - Transfer Type: To and From  Shower - Transfer To: Standing  Shower - Technique: Ambulating  Shower Transfers: Supervision     Transfers  Stand Step Transfers: Supervision  Sit to stand: Supervision  Stand to sit: Supervision        Coordination  Gross Motor: good coordination for all ADLs, min VCs for use of reacher to doff brief, pants, and socks              Cognition  Overall Cognitive Status: Exceptions  Arousal/Alertness: Appropriate responses to stimuli  Following Commands:  Follows multistep commands with repitition  Attention Span: Attends with cues to redirect  Memory: Appears intact  Safety Judgement: Decreased awareness of need for assistance  Problem Solving: Assistance required to generate solutions  Insights: Fully aware of deficits  Initiation: Does not require cues  Sequencing: Does not require cues                    Type of ROM/Therapeutic Exercise  Type of ROM/Therapeutic Exercise: Free weights  Comment: 3#  Exercises  Shoulder Flexion: x15  Shoulder Extension: x15  Horizontal ABduction: x15  Horizontal ADduction: x15  Elbow Flexion: x15  Elbow Extension: x15  Supination: x15 Pronation: x15  Wrist Flexion: x15  Wrist Extension: x15  Other: chest press x15                    Plan   Plan  Times per week: 5-7x/week  Current Treatment Recommendations: Strengthening, Balance Training, Functional Mobility Training, Endurance Training, Gait Training, Positioning, Pain Management, Safety Education & Training, Patient/Caregiver Education & Training, Self-Care / ADL, Equipment Evaluation, Education, & procurement, Home Management Training    Goals  Short term goals  Time Frame for Short term goals: 5 days (by 1/18/21)  Short term goal 1: Pt will complete functional transfers with Supervision, GOAL MET 1/20/21 Pt performed functional transfers with supervision. Short term goal 2: Pt will complete LE dressing with Supervision/setup, use of AE as needed. GOAL MET 1/20/21 Pt performed LB dressing with supervision. Short term goal 3: Pt will perform 3 minutes dynamic standing activity with SBA. GOAL MET 1/18/21 Pt performed 3 minutes of standing task with SBA.   Long term goals  Time Frame for Long term goals : 10 days- 1/23/21  Long term goal 1: Pt will complete LE dressing with Mod I, use of AE as needed  Long term goal 2: Pt will perform light housekeeping/meal prep/item retrieval task with Mod I  Long term goal 3: Pt will complete functional transfers with Mod I  Patient Goals   Patient goals : \"to work on my legs\"       Therapy Time   Individual Concurrent Group Co-treatment   Time In 0730         Time Out 0900         Minutes 90         Timed Code Treatment Minutes: [address], OT daily as needed. Has not needed      escitalopram oxalate (LEXAPRO) 20 mg tablet Take 20 mg by mouth daily. clonazePAM (KLONOPIN) 1 mg tablet Take 1 mg by mouth in the morning. At bedtime      cholecalciferol, vitamin D3, 50 mcg (2,000 unit) tab Take  by mouth. [DISCONTINUED] terconazole (TERAZOL 7) 0.4 % vaginal cream Insert 1 Applicator into vagina nightly. 45 g 0     No current facility-administered medications on file prior to visit. Patient Active Problem List   Diagnosis Code    Depression F32. A    GERD (gastroesophageal reflux disease) K21.9    Anemia, unspecified D64.9    Itch of skin L29.9    Anxiety F41.9    Costochondritis M94.0    HSV (herpes simplex virus) anogenital infection A60.9    OCD (obsessive compulsive disorder) F42.9    Hoarseness R49.0    IFG (impaired fasting glucose) R73.01    Hyperlipidemia E78.5    Thrombosed external hemorrhoid K64.5    Vitamin D deficiency E55.9    Inflammatory arthritis M19.90    Recurrent depression ([facility]) F33.9    Mood disorder ([facility]) F39    Chronic pain syndrome G89.4    Fibromyalgia M79.7    Panic attack F41.0    Tremor R25.1    Class 2 obesity due to excess calories without serious comorbidity in adult E66.09    Gastroparesis K31.84    Plantar fasciitis of left foot M72.2    Positive KITTY (antinuclear antibody) R76.8    Severe obesity (HCC) E66.01    Neuropathy G62.9    Acute exacerbation of CHF (congestive heart failure) ([facility]) I50.9    Coronary artery disease involving native coronary artery of native heart without angina pectoris I25.10    Chronic systolic (congestive) heart failure I50.22       Social History     Socioeconomic History    Marital status: SINGLE   Tobacco Use    Smoking status: Former     Packs/day: 0.25     Years: 8.00     Pack years: 2.00     Types: Cigarettes     Quit date: 2022     Years since quittin.1    Smokeless tobacco: Never   Substance and Sexual Activity    Alcohol use: Not Currently     Alcohol/week: 1.0 standard drink     Types: 1 Glasses of wine per week     Comment: 1 cup of wine/week    Drug use: Yes     Types: Marijuana    Sexual activity: Yes     Partners: Male     Birth control/protection: None     Social Determinants of Health     Financial Resource Strain: High Risk    Difficulty of Paying Living Expenses: Very hard   Food Insecurity: No Food Insecurity    Worried About Running Out of Food in the Last Year: Never true    Ran Out of Food in the Last Year: Never true        Review of Systems   Review of Systems   Constitutional:  Negative for chills and fever. Respiratory:  Negative for cough and hemoptysis. Cardiovascular:  Negative for chest pain and palpitations. Gastrointestinal:  Negative for abdominal pain, nausea and vomiting. Neurological:  Negative for dizziness, tingling, tremors and headaches. Objective:   Visit Vitals  /76 (BP 1 Location: Right upper arm, BP Patient Position: Sitting, BP Cuff Size: Large adult)   Pulse 82   Temp 97.3 °F (36.3 °C) (Oral)   Resp 22   Ht 5' 4\" (1.626 m)   Wt 210 lb (95.3 kg)   LMP 09/12/2022   SpO2 95%   BMI 36.05 kg/m²        Physical Exam  Vitals and nursing note reviewed. Constitutional:       Appearance: Normal appearance. HENT:      Head: Normocephalic and atraumatic. Cardiovascular:      Rate and Rhythm: Normal rate and regular rhythm. Pulses: Normal pulses. Heart sounds: Normal heart sounds. No murmur heard. No friction rub. No gallop. Pulmonary:      Effort: Pulmonary effort is normal.      Breath sounds: Normal breath sounds. Abdominal:      General: Abdomen is flat. Bowel sounds are normal.      Palpations: Abdomen is soft. Musculoskeletal:      Cervical back: Normal range of motion and neck supple. Skin:     General: Skin is warm and dry. Neurological:      General: No focal deficit present. Mental Status: She is alert and oriented to person, place, and time.    Psychiatric:         Mood and Affect: Mood normal.         Behavior: Behavior normal.         Thought Content: Thought content normal.       Pertinent Labs/Studies:      Assessment and orders:       ICD-10-CM ICD-9-CM    1. Chronic systolic (congestive) heart failure  I50.22 428.22      428.0       2. Yeast vaginitis  B37.3 112.1 fluconazole (DIFLUCAN) 150 mg tablet      3. Gastroesophageal reflux disease without esophagitis  K21.9 530.81       4. Coronary artery disease involving native coronary artery of native heart without angina pectoris  I25.10 414.01       5. Pure hypercholesterolemia  E78.00 272.0       6. Recurrent depression ([facility])  F33.9 296.30         Diagnoses and all orders for this visit:    1. Chronic systolic (congestive) heart failure: I am recommending that we stop the Jardiance. The patient has been having recurrent Yeast infections with this and is a controlled Pre-diabetic. Having to use anti-fungals repetitively is not ideal.     2. Yeast vaginitis: Diflucan course. -     fluconazole (DIFLUCAN) 150 mg tablet; Take 1 Tablet by mouth every seventy-two (72) hours for 2 doses. FDA advises cautious prescribing of oral fluconazole in pregnancy. 3. Gastroesophageal reflux disease without esophagitis    4. Coronary artery disease involving native coronary artery of native heart without angina pectoris: Persistent symptoms at this time. Some possibly related to Low normal blood pressures as well. 5. Pure hypercholesterolemia: The patient is aware of our goal to reduce or eliminate the long term problems (such as strokes and heart attacks) related to poorly controlled Triglycerides, LDL, Cholesterol. 6. [facility] Patient is frustrated due to medical condition. Follow-up and Dispositions    Return in about 4 weeks (around 10/24/2022). I have discussed the diagnosis with the patient and the intended plan as seen in the above orders. Social history, medical history, and labs were reviewed.   The patient has received an after-visit summary and questions were answered concerning future plans. I have discussed medication side effects and warnings with the patient as well.     MD URBAN Marte & MARSHAL CARMEN [facility]  09/26/22

## 2022-09-29 RX ORDER — DILTIAZEM HYDROCHLORIDE 180 MG/1
CAPSULE, COATED, EXTENDED RELEASE ORAL
Qty: 30 CAPSULE | Refills: 2 | Status: SHIPPED | OUTPATIENT
Start: 2022-09-29

## 2022-09-29 RX ORDER — PROPAFENONE HYDROCHLORIDE 150 MG/1
TABLET, FILM COATED ORAL
Qty: 90 TABLET | Refills: 2 | Status: SHIPPED | OUTPATIENT
Start: 2022-09-29

## 2022-10-01 RX ORDER — FLUTICASONE FUROATE, UMECLIDINIUM BROMIDE AND VILANTEROL TRIFENATATE 100; 62.5; 25 UG/1; UG/1; UG/1
POWDER RESPIRATORY (INHALATION)
Qty: 60 EACH | Refills: 1 | Status: SHIPPED | OUTPATIENT
Start: 2022-10-01

## 2022-11-02 DIAGNOSIS — J44.9 CHRONIC OBSTRUCTIVE PULMONARY DISEASE, UNSPECIFIED COPD TYPE (HCC): ICD-10-CM

## 2022-11-02 RX ORDER — ATORVASTATIN CALCIUM 10 MG/1
10 TABLET, FILM COATED ORAL DAILY
Qty: 30 TABLET | Refills: 1 | Status: SHIPPED | OUTPATIENT
Start: 2022-11-02

## 2022-11-03 RX ORDER — ALBUTEROL SULFATE 2.5 MG/3ML
SOLUTION RESPIRATORY (INHALATION)
Qty: 120 EACH | Refills: 0 | Status: SHIPPED | OUTPATIENT
Start: 2022-11-03

## 2022-11-06 ENCOUNTER — HOSPITAL ENCOUNTER (INPATIENT)
Age: 82
LOS: 2 days | Discharge: HOME | DRG: 193 | End: 2022-11-08
Attending: EMERGENCY MEDICINE | Admitting: INTERNAL MEDICINE
Payer: MEDICARE

## 2022-11-06 ENCOUNTER — APPOINTMENT (OUTPATIENT)
Dept: GENERAL RADIOLOGY | Age: 82
DRG: 193 | End: 2022-11-06
Payer: MEDICARE

## 2022-11-06 DIAGNOSIS — R09.02 HYPOXIA: ICD-10-CM

## 2022-11-06 DIAGNOSIS — J44.1 COPD EXACERBATION (HCC): Primary | ICD-10-CM

## 2022-11-06 LAB
A/G RATIO: 1.4 (ref 1.1–2.2)
ALBUMIN SERPL-MCNC: 4.4 G/DL (ref 3.4–5)
ALP BLD-CCNC: 76 U/L (ref 40–129)
ALT SERPL-CCNC: 29 U/L (ref 10–40)
ANION GAP SERPL CALCULATED.3IONS-SCNC: 9 MMOL/L (ref 3–16)
AST SERPL-CCNC: 24 U/L (ref 15–37)
BASOPHILS ABSOLUTE: 0 K/UL (ref 0–0.2)
BASOPHILS RELATIVE PERCENT: 0.3 %
BILIRUB SERPL-MCNC: 0.7 MG/DL (ref 0–1)
BUN BLDV-MCNC: 19 MG/DL (ref 7–20)
CALCIUM SERPL-MCNC: 9.9 MG/DL (ref 8.3–10.6)
CHLORIDE BLD-SCNC: 94 MMOL/L (ref 99–110)
CO2: 39 MMOL/L (ref 21–32)
CREAT SERPL-MCNC: 0.8 MG/DL (ref 0.8–1.3)
EKG ATRIAL RATE: 119 BPM
EKG DIAGNOSIS: NORMAL
EKG P AXIS: 101 DEGREES
EKG P-R INTERVAL: 188 MS
EKG Q-T INTERVAL: 278 MS
EKG QRS DURATION: 76 MS
EKG QTC CALCULATION (BAZETT): 391 MS
EKG R AXIS: 81 DEGREES
EKG T AXIS: 93 DEGREES
EKG VENTRICULAR RATE: 119 BPM
EOSINOPHILS ABSOLUTE: 0 K/UL (ref 0–0.6)
EOSINOPHILS RELATIVE PERCENT: 0.1 %
GFR SERPL CREATININE-BSD FRML MDRD: >60 ML/MIN/{1.73_M2}
GLUCOSE BLD-MCNC: 165 MG/DL (ref 70–99)
GLUCOSE BLD-MCNC: 278 MG/DL (ref 70–99)
GLUCOSE BLD-MCNC: 339 MG/DL (ref 70–99)
HCT VFR BLD CALC: 37 % (ref 40.5–52.5)
HEMOGLOBIN: 12.6 G/DL (ref 13.5–17.5)
INR BLD: 1.1 (ref 0.87–1.14)
LACTIC ACID: 2 MMOL/L (ref 0.4–2)
LYMPHOCYTES ABSOLUTE: 1.9 K/UL (ref 1–5.1)
LYMPHOCYTES RELATIVE PERCENT: 25.1 %
MCH RBC QN AUTO: 28.9 PG (ref 26–34)
MCHC RBC AUTO-ENTMCNC: 34 G/DL (ref 31–36)
MCV RBC AUTO: 84.9 FL (ref 80–100)
MONOCYTES ABSOLUTE: 1 K/UL (ref 0–1.3)
MONOCYTES RELATIVE PERCENT: 13.9 %
NEUTROPHILS ABSOLUTE: 4.5 K/UL (ref 1.7–7.7)
NEUTROPHILS RELATIVE PERCENT: 60.6 %
PDW BLD-RTO: 15.2 % (ref 12.4–15.4)
PERFORMED ON: ABNORMAL
PERFORMED ON: ABNORMAL
PLATELET # BLD: 151 K/UL (ref 135–450)
PMV BLD AUTO: 8.7 FL (ref 5–10.5)
POTASSIUM SERPL-SCNC: 4.2 MMOL/L (ref 3.5–5.1)
PRO-BNP: 511 PG/ML (ref 0–449)
PROTHROMBIN TIME: 14.1 SEC (ref 11.7–14.5)
RAPID INFLUENZA  B AGN: NEGATIVE
RAPID INFLUENZA A AGN: NEGATIVE
RBC # BLD: 4.36 M/UL (ref 4.2–5.9)
SARS-COV-2, NAAT: NOT DETECTED
SODIUM BLD-SCNC: 142 MMOL/L (ref 136–145)
TOTAL PROTEIN: 7.5 G/DL (ref 6.4–8.2)
WBC # BLD: 7.4 K/UL (ref 4–11)

## 2022-11-06 PROCEDURE — 2060000000 HC ICU INTERMEDIATE R&B

## 2022-11-06 PROCEDURE — 87635 SARS-COV-2 COVID-19 AMP PRB: CPT

## 2022-11-06 PROCEDURE — 6360000002 HC RX W HCPCS: Performed by: NURSE PRACTITIONER

## 2022-11-06 PROCEDURE — 36415 COLL VENOUS BLD VENIPUNCTURE: CPT

## 2022-11-06 PROCEDURE — U0005 INFEC AGEN DETEC AMPLI PROBE: HCPCS

## 2022-11-06 PROCEDURE — 84145 PROCALCITONIN (PCT): CPT

## 2022-11-06 PROCEDURE — 93005 ELECTROCARDIOGRAM TRACING: CPT | Performed by: EMERGENCY MEDICINE

## 2022-11-06 PROCEDURE — 93010 ELECTROCARDIOGRAM REPORT: CPT | Performed by: INTERNAL MEDICINE

## 2022-11-06 PROCEDURE — 6360000002 HC RX W HCPCS: Performed by: EMERGENCY MEDICINE

## 2022-11-06 PROCEDURE — 2700000000 HC OXYGEN THERAPY PER DAY

## 2022-11-06 PROCEDURE — 71045 X-RAY EXAM CHEST 1 VIEW: CPT

## 2022-11-06 PROCEDURE — 96374 THER/PROPH/DIAG INJ IV PUSH: CPT

## 2022-11-06 PROCEDURE — 6370000000 HC RX 637 (ALT 250 FOR IP): Performed by: EMERGENCY MEDICINE

## 2022-11-06 PROCEDURE — 2580000003 HC RX 258: Performed by: NURSE PRACTITIONER

## 2022-11-06 PROCEDURE — 87804 INFLUENZA ASSAY W/OPTIC: CPT

## 2022-11-06 PROCEDURE — 94640 AIRWAY INHALATION TREATMENT: CPT

## 2022-11-06 PROCEDURE — 99285 EMERGENCY DEPT VISIT HI MDM: CPT

## 2022-11-06 PROCEDURE — 2580000003 HC RX 258: Performed by: EMERGENCY MEDICINE

## 2022-11-06 PROCEDURE — 80053 COMPREHEN METABOLIC PANEL: CPT

## 2022-11-06 PROCEDURE — 6370000000 HC RX 637 (ALT 250 FOR IP): Performed by: NURSE PRACTITIONER

## 2022-11-06 PROCEDURE — U0003 INFECTIOUS AGENT DETECTION BY NUCLEIC ACID (DNA OR RNA); SEVERE ACUTE RESPIRATORY SYNDROME CORONAVIRUS 2 (SARS-COV-2) (CORONAVIRUS DISEASE [COVID-19]), AMPLIFIED PROBE TECHNIQUE, MAKING USE OF HIGH THROUGHPUT TECHNOLOGIES AS DESCRIBED BY CMS-2020-01-R: HCPCS

## 2022-11-06 PROCEDURE — 83880 ASSAY OF NATRIURETIC PEPTIDE: CPT

## 2022-11-06 PROCEDURE — 94761 N-INVAS EAR/PLS OXIMETRY MLT: CPT

## 2022-11-06 PROCEDURE — 83605 ASSAY OF LACTIC ACID: CPT

## 2022-11-06 PROCEDURE — 85610 PROTHROMBIN TIME: CPT

## 2022-11-06 PROCEDURE — 87040 BLOOD CULTURE FOR BACTERIA: CPT

## 2022-11-06 PROCEDURE — 85025 COMPLETE CBC W/AUTO DIFF WBC: CPT

## 2022-11-06 RX ORDER — IPRATROPIUM BROMIDE AND ALBUTEROL SULFATE 2.5; .5 MG/3ML; MG/3ML
1 SOLUTION RESPIRATORY (INHALATION)
Status: DISCONTINUED | OUTPATIENT
Start: 2022-11-06 | End: 2022-11-08 | Stop reason: HOSPADM

## 2022-11-06 RX ORDER — SODIUM CHLORIDE 0.9 % (FLUSH) 0.9 %
5-40 SYRINGE (ML) INJECTION PRN
Status: DISCONTINUED | OUTPATIENT
Start: 2022-11-06 | End: 2022-11-08 | Stop reason: HOSPADM

## 2022-11-06 RX ORDER — INSULIN LISPRO 100 [IU]/ML
0-4 INJECTION, SOLUTION INTRAVENOUS; SUBCUTANEOUS
Status: DISCONTINUED | OUTPATIENT
Start: 2022-11-06 | End: 2022-11-08 | Stop reason: HOSPADM

## 2022-11-06 RX ORDER — ALBUTEROL SULFATE 2.5 MG/3ML
2.5 SOLUTION RESPIRATORY (INHALATION) EVERY 6 HOURS PRN
Status: DISCONTINUED | OUTPATIENT
Start: 2022-11-06 | End: 2022-11-07

## 2022-11-06 RX ORDER — METHYLPREDNISOLONE SODIUM SUCCINATE 40 MG/ML
40 INJECTION, POWDER, LYOPHILIZED, FOR SOLUTION INTRAMUSCULAR; INTRAVENOUS EVERY 12 HOURS
Status: DISCONTINUED | OUTPATIENT
Start: 2022-11-06 | End: 2022-11-08 | Stop reason: HOSPADM

## 2022-11-06 RX ORDER — DEXTROSE MONOHYDRATE 100 MG/ML
INJECTION, SOLUTION INTRAVENOUS CONTINUOUS PRN
Status: DISCONTINUED | OUTPATIENT
Start: 2022-11-06 | End: 2022-11-08 | Stop reason: HOSPADM

## 2022-11-06 RX ORDER — ACETAMINOPHEN 650 MG/1
650 SUPPOSITORY RECTAL EVERY 6 HOURS PRN
Status: DISCONTINUED | OUTPATIENT
Start: 2022-11-06 | End: 2022-11-08 | Stop reason: HOSPADM

## 2022-11-06 RX ORDER — SODIUM CHLORIDE 0.9 % (FLUSH) 0.9 %
5-40 SYRINGE (ML) INJECTION EVERY 12 HOURS SCHEDULED
Status: DISCONTINUED | OUTPATIENT
Start: 2022-11-06 | End: 2022-11-08 | Stop reason: HOSPADM

## 2022-11-06 RX ORDER — POLYETHYLENE GLYCOL 3350 17 G/17G
17 POWDER, FOR SOLUTION ORAL DAILY PRN
Status: DISCONTINUED | OUTPATIENT
Start: 2022-11-06 | End: 2022-11-08 | Stop reason: HOSPADM

## 2022-11-06 RX ORDER — ACETAMINOPHEN 325 MG/1
650 TABLET ORAL EVERY 6 HOURS PRN
Status: DISCONTINUED | OUTPATIENT
Start: 2022-11-06 | End: 2022-11-08 | Stop reason: HOSPADM

## 2022-11-06 RX ORDER — INSULIN LISPRO 100 [IU]/ML
0-4 INJECTION, SOLUTION INTRAVENOUS; SUBCUTANEOUS NIGHTLY
Status: DISCONTINUED | OUTPATIENT
Start: 2022-11-06 | End: 2022-11-08 | Stop reason: HOSPADM

## 2022-11-06 RX ORDER — IPRATROPIUM BROMIDE AND ALBUTEROL SULFATE 2.5; .5 MG/3ML; MG/3ML
1 SOLUTION RESPIRATORY (INHALATION) ONCE
Status: COMPLETED | OUTPATIENT
Start: 2022-11-06 | End: 2022-11-06

## 2022-11-06 RX ORDER — METHYLPREDNISOLONE SODIUM SUCCINATE 125 MG/2ML
125 INJECTION, POWDER, LYOPHILIZED, FOR SOLUTION INTRAMUSCULAR; INTRAVENOUS ONCE
Status: COMPLETED | OUTPATIENT
Start: 2022-11-06 | End: 2022-11-06

## 2022-11-06 RX ORDER — ONDANSETRON 4 MG/1
4 TABLET, ORALLY DISINTEGRATING ORAL EVERY 8 HOURS PRN
Status: DISCONTINUED | OUTPATIENT
Start: 2022-11-06 | End: 2022-11-08 | Stop reason: HOSPADM

## 2022-11-06 RX ORDER — SODIUM CHLORIDE 9 MG/ML
INJECTION, SOLUTION INTRAVENOUS PRN
Status: DISCONTINUED | OUTPATIENT
Start: 2022-11-06 | End: 2022-11-08 | Stop reason: HOSPADM

## 2022-11-06 RX ORDER — ONDANSETRON 2 MG/ML
4 INJECTION INTRAMUSCULAR; INTRAVENOUS EVERY 6 HOURS PRN
Status: DISCONTINUED | OUTPATIENT
Start: 2022-11-06 | End: 2022-11-08 | Stop reason: HOSPADM

## 2022-11-06 RX ADMIN — METHYLPREDNISOLONE SODIUM SUCCINATE 40 MG: 40 INJECTION, POWDER, FOR SOLUTION INTRAMUSCULAR; INTRAVENOUS at 16:31

## 2022-11-06 RX ADMIN — IPRATROPIUM BROMIDE AND ALBUTEROL SULFATE 1 AMPULE: 2.5; .5 SOLUTION RESPIRATORY (INHALATION) at 08:43

## 2022-11-06 RX ADMIN — METHYLPREDNISOLONE SODIUM SUCCINATE 125 MG: 125 INJECTION, POWDER, FOR SOLUTION INTRAMUSCULAR; INTRAVENOUS at 08:40

## 2022-11-06 RX ADMIN — Medication 10 ML: at 20:56

## 2022-11-06 RX ADMIN — CEFTRIAXONE SODIUM 1000 MG: 1 INJECTION, POWDER, FOR SOLUTION INTRAMUSCULAR; INTRAVENOUS at 10:28

## 2022-11-06 RX ADMIN — IPRATROPIUM BROMIDE AND ALBUTEROL SULFATE 1 AMPULE: 2.5; .5 SOLUTION RESPIRATORY (INHALATION) at 15:18

## 2022-11-06 RX ADMIN — APIXABAN 5 MG: 5 TABLET, FILM COATED ORAL at 20:55

## 2022-11-06 RX ADMIN — INSULIN LISPRO 4 UNITS: 100 INJECTION, SOLUTION INTRAVENOUS; SUBCUTANEOUS at 21:01

## 2022-11-06 RX ADMIN — IPRATROPIUM BROMIDE AND ALBUTEROL SULFATE 1 AMPULE: 2.5; .5 SOLUTION RESPIRATORY (INHALATION) at 19:04

## 2022-11-06 RX ADMIN — DEXTROSE MONOHYDRATE 500 MG: 50 INJECTION, SOLUTION INTRAVENOUS at 11:10

## 2022-11-06 RX ADMIN — INSULIN LISPRO 2 UNITS: 100 INJECTION, SOLUTION INTRAVENOUS; SUBCUTANEOUS at 16:31

## 2022-11-06 ASSESSMENT — ENCOUNTER SYMPTOMS
STRIDOR: 0
ABDOMINAL PAIN: 0
EYE REDNESS: 0
CONSTIPATION: 0
EYE PAIN: 0
DIARRHEA: 0
PHOTOPHOBIA: 0
SINUS PRESSURE: 0
SHORTNESS OF BREATH: 1
CHEST TIGHTNESS: 0
BACK PAIN: 0
SINUS PAIN: 0
COUGH: 1
WHEEZING: 1

## 2022-11-06 ASSESSMENT — PAIN SCALES - GENERAL: PAINLEVEL_OUTOF10: 0

## 2022-11-06 NOTE — ED NOTES
Performed walking O2 sat with pt. Pt required 2 person assist while walking, uses a walker at baseline. Pt became sob while walking. Pt desated to 87% on 3L while ambulating, HR was 130. Pt then was sating 77% by the time we got back to the bed. MD Suarez notified. Pt now sating 96% on 4L in the bed, while resting.       Rivka Sadler RN  11/06/22 [id number]

## 2022-11-06 NOTE — PROGRESS NOTES
Admit to PCU on tele  Acute on chronic hypoxic respiratory failure  COPD  Possible pneumonia     [facility]  11/6/2022

## 2022-11-06 NOTE — ED PROVIDER NOTES
[facility]      Pt Name: Joseph Lamb  MRN: [id number]  [place] 1940  Date of evaluation: 11/6/2022  Provider: Lois Zimmerman MD    CHIEF COMPLAINT       Chief Complaint   Patient presents with    Shortness of Breath     Patient brought to ED via EMS from home. Per EMS they were called out for shortness of breath. Pt. Has hx COPD. SP02 89% on 3L upon EMS arrival. Pt. Given x1 duoneb in route. Pt. Reports that he has been short of breath x3-4 days. Pt. Reports negative COVID test at home. Pt sp02 100% on 3L upon arrival to ED. HISTORY OF PRESENT ILLNESS   (Location/Symptom, Timing/Onset, Context/Setting, Quality, Duration, Modifying Factors, Severity)  Note limiting factors. Joseph Lamb is a 78 y.o. male who presents to the emergency department     This patient is followed by pulmonology. Patient has history of COPD  Patient quit smoking about 20 years ago  Patient is oxygen dependent on 3 L. Patient wears the 3 L 24/7  He lives in his own home his daughter checks on him frequently. Patient does have a nebulizer machine has not taken any yet this morning but was noted for the last 3 to 4 days to be getting more more short of breath, he has had increased coughing up. He has felt kind of chilled  He did have COVID-pneumonia in February requiring admission for 2 days  Patient denies any other complaints. Denies chest pain says that he has been coughing more bringing up thick sputum patient wear oxygen and as mentioned. EMS did give him nebulized treatment and did turn his oxygen up. He is feeling a little bit better but still has some wheezing. Patient was started on prednisone and I believe on Friday by his primary care physician. He also has chronic atrial fibs and according to the daughter is on Cardizem and Rythmol for rate control  For his atrial fib he is anticoagulated on Eliquis.   Patient denies any history of cancer etc.      The history is provided by the patient, medical records and a relative. Nursing Notes were reviewed. REVIEW OF SYSTEMS    (2-9 systems for level 4, 10 or more for level 5)     Review of Systems   Constitutional:  Positive for activity change and chills. Negative for diaphoresis, fatigue and fever. HENT:  Negative for dental problem, ear pain, hearing loss, sinus pressure, sinus pain and sneezing. Eyes:  Negative for photophobia, pain, redness and visual disturbance. Respiratory:  Positive for cough, shortness of breath and wheezing. Negative for chest tightness and stridor. Cardiovascular:  Negative for chest pain, palpitations and leg swelling. Gastrointestinal:  Negative for abdominal pain, constipation and diarrhea. Endocrine: Negative for polyuria. Genitourinary:  Negative for difficulty urinating. Musculoskeletal:  Negative for back pain, neck pain and neck stiffness. Skin:  Negative for rash and wound. Allergic/Immunologic: Negative for immunocompromised state. Neurological:  Positive for light-headedness. Negative for tremors, seizures, syncope, speech difficulty, weakness, numbness and headaches. All other systems reviewed and are negative. Except as noted above the remainder of the review of systems was reviewed and negative.        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    ELAINE on CPAP     Osteoporosis     Skin cancer of face          SURGICAL HISTORY       Past Surgical History:   Procedure Laterality Date    BACK SURGERY      repair broken back L4 & L5    CHOLECYSTECTOMY, LAPAROSCOPIC N/A 1/6/2021    LAPAROSCOPIC CHOLECYSTECTOMY, WITH CHOLANGIOGRAMS (ATTEMPTED) OPEN CHOLECYSTECTOMY performed by Elisha Valentino MD at [facility], OPEN N/A 01/06/2021    LAPAROSCOPIC CHOLECYSTECTOMY, WITH CHOLANGIOGRAMS (ATTEMPTED)     CYSTOSCOPY Right 10/9/15    RIght Ureteroscopy, Holmium Laser Lithotripsy with Stone Removal, Right Stent Placement    CYSTOSCOPY  05/01/2019    CYSTOSCOPY, RESECTION OF BLADDER NECK, URETHRAL DILATION    CYSTOSCOPY W BIOPSY OF BLADDER N/A 5/1/2019    CYSTOSCOPY, RESECTION OF BLADDER NECK, URETHRAL DILATION performed by Kerri Macdonald MD at [address] [place] [address] Left 6/12/2016    cataract removal    GALLBLADDER SURGERY  01/2021    JOINT REPLACEMENT  6/29/2011    right knee    JOINT REPLACEMENT  11/2004    left knee    OTHER SURGICAL HISTORY  08/31/2015    wide excision basal cell carcinoma on the nose and bilateral auricles with frozen sections, plastic closure, full thickness skin graft    OTHER SURGICAL HISTORY  12/1/15    excision of lesion of lip    PROSTATE SURGERY      TRANSURETHRAL RESECTION OF PROSTATE  10/23/2015    with cystoscopy         CURRENT MEDICATIONS       Previous Medications    ACETAMINOPHEN (AMINOFEN) 325 MG TABLET    Take 2 tablets by mouth every 4 hours as needed for Pain    ALBUTEROL (PROVENTIL) (2.5 MG/3ML) 0.083% NEBULIZER SOLUTION    USE 1 VIAL IN NEBULIZER 4 TIMES DAILY    ALENDRONATE (FOSAMAX) 70 MG TABLET    every 7 days On Wednesday    APIXABAN (ELIQUIS) 5 MG TABS TABLET    Take 1 tablet by mouth 2 times daily    ATORVASTATIN (LIPITOR) 10 MG TABLET    Take 1 tablet by mouth daily    DILTIAZEM (CARDIZEM CD) 180 MG EXTENDED RELEASE CAPSULE    TAKE 1 CAPSULE BY MOUTH 1 TIME A DAY AS NEEDED FOR INCREASED HEART RATE    ESOMEPRAZOLE (NEXIUM) 40 MG CAPSULE      Take 40 mg by mouth every morning (before breakfast) Indications: take dos     FLUTICASONE (FLONASE) 50 MCG/ACT NASAL SPRAY    2 sprays by Nasal route daily     GABAPENTIN (NEURONTIN) 800 MG TABLET Take 800 mg by mouth 3 times daily. METFORMIN (GLUCOPHAGE) 500 MG TABLET    Take 500 mg by mouth 4 times daily     ONDANSETRON (ZOFRAN ODT) 4 MG DISINTEGRATING TABLET    Take 1 tablet by mouth every 8 hours as needed for Nausea or Vomiting    OXYGEN    Inhale 2.5 L into the lungs nightly At 3.5lpm on 2022    PIOGLITAZONE (ACTOS) 30 MG TABLET    Take 30 mg by mouth daily    PROPAFENONE (RYTHMOL) 150 MG TABLET    TAKE ONE TABLET BY MOUTH EVERY 8 HOURS    TRELEGY ELLIPTA 100-62.5-25 MCG/INH AEPB    INHALE 1 PUFF EVERY DAY    VENTOLIN  (90 BASE) MCG/ACT INHALER    Inhale 2 puffs into the lungs every 6 hours as needed for Wheezing orShortness of Breath       ALLERGIES     Patient has no known allergies. FAMILY HISTORY       Family History   Problem Relation Age of Onset    Cancer Mother     Diabetes Mother     Heart Disease Mother     Cancer Father     Diabetes Sister     Cancer Brother           SOCIAL HISTORY       Social History     Socioeconomic History    Marital status: Unknown     Spouse name: Polina Rodas    Number of children: 4   Tobacco Use    Smoking status: Former     Packs/day: 1.50     Years: 45.00     Pack years: 67.50     Types: Cigarettes     Quit date: 2005     Years since quittin.4    Smokeless tobacco: Never   Vaping Use    Vaping Use: Never used   Substance and Sexual Activity    Alcohol use: No    Drug use: No    Sexual activity: Not Currently       SCREENINGS    [place] Coma Scale  Eye Opening: Spontaneous  Best Verbal Response: Oriented  Best Motor Response: Obeys commands  [place] Coma Scale Score: 15          PHYSICAL EXAM    (up to 7 for level 4, 8 or more for level 5)     ED Triage Vitals [22 0736]   BP Temp Temp Source Heart Rate Resp SpO2 Height Weight   (!) 163/86 97.4 °F (36.3 °C) Oral (!) 119 22 98 % 6' (1.829 m) 198 lb (89.8 kg)       Physical Exam  Vitals and nursing note reviewed. Constitutional:       General: He is not in acute distress. Appearance: He is well-developed. He is ill-appearing. He is not toxic-appearing or diaphoretic. HENT:      Head: Normocephalic and atraumatic. Cardiovascular:      Rate and Rhythm: Normal rate. Rhythm irregular. Pulses: Normal pulses. Pulmonary:      Effort: Pulmonary effort is normal. Tachypnea present. No respiratory distress. Breath sounds: Wheezing, rhonchi and rales present. Musculoskeletal:         General: Normal range of motion. Neurological:      Mental Status: He is alert. DIAGNOSTIC RESULTS     EKG: All EKG's are interpreted by the Emergency Department Physician who either signs or Co-signs this chart in the absence of a cardiologist.  Rate 119  Rhythm sinus tach  Rhythm is very regular no signs of atrial fib this morning  No acute ST-T wave changes no injury pattern  No ectopy      RADIOLOGY:   Non-plain film images such as CT, Ultrasound and MRI are read by the radiologist. Plain radiographic images are visualized and preliminarily interpreted by the emergency physician with the below findings:        Interpretation per the Radiologist below, if available at the time of this note:    XR CHEST PORTABLE   Final Result   Questionable vague opacity in the apical to mid right lung could be due to   summation of overlying tissues but could also be seen with pneumonia or   neoplasia.                  LABS:  Results for orders placed or performed during the hospital encounter of 11/06/22   Rapid influenza A/B antigens    Specimen: Nasopharyngeal   Result Value Ref Range    Rapid Influenza A Ag Negative Negative    Rapid Influenza B Ag Negative Negative   COVID-19, Rapid    Specimen: Nasopharyngeal Swab   Result Value Ref Range    SARS-CoV-2, NAAT Not Detected Not Detected   CBC with Auto Differential   Result Value Ref Range    WBC 7.4 4.0 - 11.0 K/uL    RBC 4.36 4.20 - 5.90 M/uL    Hemoglobin 12.6 (L) 13.5 - 17.5 g/dL    Hematocrit 37.0 (L) 40.5 - 52.5 %    MCV 84.9 80.0 - 100.0 fL MCH 28.9 26.0 - 34.0 pg    MCHC 34.0 31.0 - 36.0 g/dL    RDW 15.2 12.4 - 15.4 %    Platelets 588 808 - 604 K/uL    MPV 8.7 5.0 - 10.5 fL    Neutrophils % 60.6 %    Lymphocytes % 25.1 %    Monocytes % 13.9 %    Eosinophils % 0.1 %    Basophils % 0.3 %    Neutrophils Absolute 4.5 1.7 - 7.7 K/uL    Lymphocytes Absolute 1.9 1.0 - 5.1 K/uL    Monocytes Absolute 1.0 0.0 - 1.3 K/uL    Eosinophils Absolute 0.0 0.0 - 0.6 K/uL    Basophils Absolute 0.0 0.0 - 0.2 K/uL   Comprehensive Metabolic Panel   Result Value Ref Range    Sodium 142 136 - 145 mmol/L    Potassium 4.2 3.5 - 5.1 mmol/L    Chloride 94 (L) 99 - 110 mmol/L    CO2 39 (H) 21 - 32 mmol/L    Anion Gap 9 3 - 16    Glucose 165 (H) 70 - 99 mg/dL    BUN 19 7 - 20 mg/dL    Creatinine 0.8 0.8 - 1.3 mg/dL    Est, Glom Filt Rate >60 >60    Calcium 9.9 8.3 - 10.6 mg/dL    Total Protein 7.5 6.4 - 8.2 g/dL    Albumin 4.4 3.4 - 5.0 g/dL    Albumin/Globulin Ratio 1.4 1.1 - 2.2    Total Bilirubin 0.7 0.0 - 1.0 mg/dL    Alkaline Phosphatase 76 40 - 129 U/L    ALT 29 10 - 40 U/L    AST 24 15 - 37 U/L   Protime-INR   Result Value Ref Range    Protime 14.1 11.7 - 14.5 sec    INR 1.10 0.87 - 1.14   Brain Natriuretic Peptide   Result Value Ref Range    Pro- (H) 0 - 449 pg/mL   EKG 12 Lead   Result Value Ref Range    Ventricular Rate 119 BPM    Atrial Rate 119 BPM    P-R Interval 188 ms    QRS Duration 76 ms    Q-T Interval 278 ms    QTc Calculation (Bazett) 391 ms    P Axis 101 degrees    R Axis 81 degrees    T Axis 93 degrees    Diagnosis       Sinus tachycardiaConfirmed by Maria De Jesus Calderon ([id number]) on 11/6/2022 9:44:31 AM            EMERGENCY DEPARTMENT COURSE and DIFFERENTIAL DIAGNOSIS/MDM:     Vitals:    11/06/22 0736 11/06/22 0824 11/06/22 0927 11/06/22 0953   BP: (!) 163/86 138/71 132/70 (!) 166/70   Pulse: (!) 119 (!) 116 (!) 121 (!) 127   Resp: 22 22 29 25   Temp: 97.4 °F (36.3 °C)      TempSrc: Oral      SpO2: 98% 92% 95% 96%   Weight: 198 lb (89.8 kg)      Height: 6' (1.829 m)              MDM    Patient continues to have shortness of breath. REASSESSMENT      Patient was reassessed after 2 breathing treatments. I.e. duo nebs and intravenous Solu-Medrol  He remains in sinus rhythm fortunately. His vital signs are all pretty stable his chest x-ray has a questionable read but nothing severe his influenza AMB are negative and his COVID rapid is negative unfortunately after 2 breathing treatments and Solu-Medrol he continues to feel little bit short of breath in fact we did get him up and walked him and by time he got back to the stretcher he was down to 77% which is a new finding according to the daughter  The patient will be admitted. Patient will be kept on his regular medications. I will add some antibiotics. CRITICAL CARE TIME     CONSULTS:  None      PROCEDURES:     Procedures    MEDICATIONS GIVEN THIS VISIT:  Medications   ipratropium-albuterol (DUONEB) nebulizer solution 1 ampule (1 ampule Inhalation Given 11/6/22 0843)   methylPREDNISolone sodium (SOLU-MEDROL) injection 125 mg (125 mg IntraVENous Given 11/6/22 0840)        FINAL IMPRESSION      1. COPD exacerbation ([address]    2. Hypoxia            DISPOSITION/PLAN   DISPOSITION Decision To Admit 11/06/2022 09:55:21 AM      PATIENT REFERRED TO:  No follow-up provider specified. DISCHARGE MEDICATIONS:  New Prescriptions    No medications on file       Controlled Substances Monitoring  RX Monitoring 1/8/2021   Acute Pain Prescriptions Not required given exclusionary diagnoses. .. Periodic Controlled Substance Monitoring -           (Please note that portions of this note were completed with a voice recognition program.  Efforts were made to edit the dictations but occasionally words are mis-transcribed.)    Patient was advised to return to the Emergency Department if there was any worsening.     Enoc Johnson MD (electronically signed)  Attending Emergency Physician         Sterling Johnson MD  11/06/22 6683

## 2022-11-06 NOTE — ED NOTES
Pt leaving at this time with [facility] transport, pt in no acute distress at this time.       Iwona Dinero, RN  11/06/22 1096

## 2022-11-06 NOTE — PROGRESS NOTES
Admission  questions were answered via telephone at this time by speaking with Akira Dunlap, patient's daughter, at his request.    RN messaged  that patient takes a cardizem 180 as needed every day for a HR greater than 120. Patient's current HR is 128.

## 2022-11-06 NOTE — PROGRESS NOTES
RT Inhaler-Nebulizer Bronchodilator Protocol Note    There is a bronchodilator order in the chart from a provider indicating to follow the RT Bronchodilator Protocol and there is an Initiate RT Inhaler-Nebulizer Bronchodilator Protocol order as well (see protocol at bottom of note). CXR Findings:  XR CHEST PORTABLE    Result Date: 11/6/2022  Questionable vague opacity in the apical to mid right lung could be due to summation of overlying tissues but could also be seen with pneumonia or neoplasia. The findings from the last RT Protocol Assessment were as follows:   History Pulmonary Disease: Chronic pulmonary disease  Respiratory Pattern: Dyspnea on exertion or RR 21-25 bpm  Breath Sounds: Slightly diminished and/or crackles  Cough: Strong, spontaneous, non-productive  Indication for Bronchodilator Therapy:    Bronchodilator Assessment Score: 6    Aerosolized bronchodilator medication orders have been revised according to the RT Inhaler-Nebulizer Bronchodilator Protocol below. Respiratory Therapist to perform RT Therapy Protocol Assessment initially then follow the protocol. Repeat RT Therapy Protocol Assessment PRN for score 0-3 or on second treatment, BID, and PRN for scores above 3. No Indications - adjust the frequency to every 6 hours PRN wheezing or bronchospasm, if no treatments needed after 48 hours then discontinue using Per Protocol order mode. If indication present, adjust the RT bronchodilator orders based on the Bronchodilator Assessment Score as indicated below. Use Inhaler orders unless patient has one or more of the following: on home nebulizer, not able to hold breath for 10 seconds, is not alert and oriented, cannot activate and use MDI correctly, or respiratory rate 25 breaths per minute or more, then use the equivalent nebulizer order(s) with same Frequency and PRN reasons based on the score.   If a patient is on this medication at home then do not decrease Frequency below that used at home. 0-3 - enter or revise RT bronchodilator order(s) to equivalent RT Bronchodilator order with Frequency of every 4 hours PRN for wheezing or increased work of breathing using Per Protocol order mode. 4-6 - enter or revise RT Bronchodilator order(s) to two equivalent RT bronchodilator orders with one order with BID Frequency and one order with Frequency of every 4 hours PRN wheezing or increased work of breathing using Per Protocol order mode. 7-10 - enter or revise RT Bronchodilator order(s) to two equivalent RT bronchodilator orders with one order with TID Frequency and one order with Frequency of every 4 hours PRN wheezing or increased work of breathing using Per Protocol order mode. 11-13 - enter or revise RT Bronchodilator order(s) to one equivalent RT bronchodilator order with QID Frequency and an Albuterol order with Frequency of every 4 hours PRN wheezing or increased work of breathing using Per Protocol order mode. Greater than 13 - enter or revise RT Bronchodilator order(s) to one equivalent RT bronchodilator order with every 4 hours Frequency and an Albuterol order with Frequency of every 2 hours PRN wheezing or increased work of breathing using Per Protocol order mode. RT to enter RT Home Evaluation for COPD & MDI Assessment order using Per Protocol order mode.     Electronically signed by Charlie Zimmerman Back on 11/6/2022 at 3:26 PM

## 2022-11-06 NOTE — ED TRIAGE NOTES
Unable to complete med rec during triage. Pt. Reports that he does not know his home medications and does not have home medication list with him.  Pt. States family member is coming and will bring list.

## 2022-11-06 NOTE — FLOWSHEET NOTE
11/06/22 1504   Vital Signs   Temp 98.2 °F (36.8 °C)   Temp Source Oral   Heart Rate (!) 128   Heart Rate Source Monitor   Resp 22   BP (!) 163/91   BP Location Left upper arm   BP Method Automatic   MAP (Calculated) 115   Patient Position Semi fowlers   Level of Consciousness 0   MEWS Score 4   Oxygen Therapy   SpO2 97 %   Pulse Oximeter Device Mode Intermittent   O2 Device Nasal cannula   O2 Flow Rate (L/min) 4 L/min   Patient admitted to PCU. Orders released. VSS. Dr. Stu Mcleod made aware of elevated HR. Patient is able to demonstrate the ability to move from a reclining position to an upright position within the recliner.  Tele placed and confirmed with CMU

## 2022-11-06 NOTE — PROGRESS NOTES
4 Eyes Skin Assessment     The patient is being assess for   Admission    I agree that 2 RN's have performed a thorough Head to Toe Skin Assessment on the patient. ALL assessment sites listed below have been assessed. Areas assessed for pressure by both nurses:   [x]   Head, Face, and Ears   [x]   Shoulders, Back, and Chest, Abdomen  [x]   Arms, Elbows, and Hands   [x]   Coccyx, Sacrum, and Ischium  [x]   Legs, Feet, and Heels  Scattered abrasions/bruises. Redness to coccyx. Redness to bilateral heels, slow to alirio      Skin Assessed Under all Medical Devices by both nurses:  O2 device tubing              All Mepilex Borders were peeled back and area peeked at by both nurses:  No: none  Please list where Mepilex Borders are located:  none present on admission             **SHARE this note so that the co-signing nurse is able to place an eSignature**    Co-signer eSignature: Electronically signed by Mick Anguiano RN on 22 at 6:54 PM EST    Does the Patient have Skin Breakdown related to pressure?   No              Santiago Prevention initiated:  No   Wound Care Orders initiated:  No      [facility] nurse consulted for Pressure Injury (Stage 3,4, Unstageable, DTI, NWPT, Complex wounds)and New or Established Ostomies:  No      Primary Nurse eSignature: Electronically signed by Artemio Rees RN on 22 at 3:17 PM EST

## 2022-11-07 ENCOUNTER — APPOINTMENT (OUTPATIENT)
Dept: CT IMAGING | Age: 82
DRG: 193 | End: 2022-11-07
Payer: MEDICARE

## 2022-11-07 PROBLEM — I48.91 ATRIAL FIBRILLATION, CONTROLLED (HCC): Status: ACTIVE | Noted: 2022-11-07

## 2022-11-07 PROBLEM — J18.9 COMMUNITY ACQUIRED PNEUMONIA: Status: ACTIVE | Noted: 2022-11-07

## 2022-11-07 LAB
A/G RATIO: 0.9 (ref 1.1–2.2)
ALBUMIN SERPL-MCNC: 3.6 G/DL (ref 3.4–5)
ALP BLD-CCNC: 72 U/L (ref 40–129)
ALT SERPL-CCNC: 24 U/L (ref 10–40)
ANION GAP SERPL CALCULATED.3IONS-SCNC: 10 MMOL/L (ref 3–16)
AST SERPL-CCNC: 19 U/L (ref 15–37)
BASOPHILS ABSOLUTE: 0 K/UL (ref 0–0.2)
BASOPHILS RELATIVE PERCENT: 0.1 %
BILIRUB SERPL-MCNC: 0.5 MG/DL (ref 0–1)
BUN BLDV-MCNC: 23 MG/DL (ref 7–20)
CALCIUM SERPL-MCNC: 9.3 MG/DL (ref 8.3–10.6)
CHLORIDE BLD-SCNC: 91 MMOL/L (ref 99–110)
CO2: 32 MMOL/L (ref 21–32)
CREAT SERPL-MCNC: 0.8 MG/DL (ref 0.8–1.3)
EOSINOPHILS ABSOLUTE: 0 K/UL (ref 0–0.6)
EOSINOPHILS RELATIVE PERCENT: 0 %
GFR SERPL CREATININE-BSD FRML MDRD: >60 ML/MIN/{1.73_M2}
GLUCOSE BLD-MCNC: 176 MG/DL (ref 70–99)
GLUCOSE BLD-MCNC: 200 MG/DL (ref 70–99)
GLUCOSE BLD-MCNC: 214 MG/DL (ref 70–99)
GLUCOSE BLD-MCNC: 264 MG/DL (ref 70–99)
GLUCOSE BLD-MCNC: 302 MG/DL (ref 70–99)
HCT VFR BLD CALC: 37.2 % (ref 40.5–52.5)
HEMOGLOBIN: 12 G/DL (ref 13.5–17.5)
LYMPHOCYTES ABSOLUTE: 1.4 K/UL (ref 1–5.1)
LYMPHOCYTES RELATIVE PERCENT: 21.5 %
MCH RBC QN AUTO: 27.7 PG (ref 26–34)
MCHC RBC AUTO-ENTMCNC: 32.3 G/DL (ref 31–36)
MCV RBC AUTO: 85.8 FL (ref 80–100)
MONOCYTES ABSOLUTE: 0.8 K/UL (ref 0–1.3)
MONOCYTES RELATIVE PERCENT: 12.1 %
NEUTROPHILS ABSOLUTE: 4.3 K/UL (ref 1.7–7.7)
NEUTROPHILS RELATIVE PERCENT: 66.3 %
PDW BLD-RTO: 15.1 % (ref 12.4–15.4)
PERFORMED ON: ABNORMAL
PLATELET # BLD: 158 K/UL (ref 135–450)
PMV BLD AUTO: 8.7 FL (ref 5–10.5)
POTASSIUM REFLEX MAGNESIUM: 4.3 MMOL/L (ref 3.5–5.1)
PROCALCITONIN: 0.09 NG/ML (ref 0–0.15)
RBC # BLD: 4.33 M/UL (ref 4.2–5.9)
SARS-COV-2, PCR: NOT DETECTED
SODIUM BLD-SCNC: 133 MMOL/L (ref 136–145)
TOTAL PROTEIN: 7.4 G/DL (ref 6.4–8.2)
WBC # BLD: 6.4 K/UL (ref 4–11)

## 2022-11-07 PROCEDURE — 2580000003 HC RX 258: Performed by: NURSE PRACTITIONER

## 2022-11-07 PROCEDURE — 94640 AIRWAY INHALATION TREATMENT: CPT

## 2022-11-07 PROCEDURE — 36415 COLL VENOUS BLD VENIPUNCTURE: CPT

## 2022-11-07 PROCEDURE — 6360000002 HC RX W HCPCS: Performed by: NURSE PRACTITIONER

## 2022-11-07 PROCEDURE — 80053 COMPREHEN METABOLIC PANEL: CPT

## 2022-11-07 PROCEDURE — 85025 COMPLETE CBC W/AUTO DIFF WBC: CPT

## 2022-11-07 PROCEDURE — 6370000000 HC RX 637 (ALT 250 FOR IP): Performed by: NURSE PRACTITIONER

## 2022-11-07 PROCEDURE — 94761 N-INVAS EAR/PLS OXIMETRY MLT: CPT

## 2022-11-07 PROCEDURE — 71250 CT THORAX DX C-: CPT

## 2022-11-07 PROCEDURE — 99223 1ST HOSP IP/OBS HIGH 75: CPT | Performed by: INTERNAL MEDICINE

## 2022-11-07 PROCEDURE — 99232 SBSQ HOSP IP/OBS MODERATE 35: CPT | Performed by: INTERNAL MEDICINE

## 2022-11-07 PROCEDURE — 2700000000 HC OXYGEN THERAPY PER DAY

## 2022-11-07 PROCEDURE — 2060000000 HC ICU INTERMEDIATE R&B

## 2022-11-07 RX ORDER — ALBUTEROL SULFATE 2.5 MG/3ML
2.5 SOLUTION RESPIRATORY (INHALATION) EVERY 4 HOURS PRN
Status: DISCONTINUED | OUTPATIENT
Start: 2022-11-07 | End: 2022-11-08 | Stop reason: HOSPADM

## 2022-11-07 RX ADMIN — INSULIN LISPRO 1 UNITS: 100 INJECTION, SOLUTION INTRAVENOUS; SUBCUTANEOUS at 08:04

## 2022-11-07 RX ADMIN — INSULIN LISPRO 2 UNITS: 100 INJECTION, SOLUTION INTRAVENOUS; SUBCUTANEOUS at 11:41

## 2022-11-07 RX ADMIN — IPRATROPIUM BROMIDE AND ALBUTEROL SULFATE 1 AMPULE: 2.5; .5 SOLUTION RESPIRATORY (INHALATION) at 11:56

## 2022-11-07 RX ADMIN — APIXABAN 5 MG: 5 TABLET, FILM COATED ORAL at 20:53

## 2022-11-07 RX ADMIN — AZITHROMYCIN DIHYDRATE 500 MG: 500 INJECTION, POWDER, LYOPHILIZED, FOR SOLUTION INTRAVENOUS at 12:21

## 2022-11-07 RX ADMIN — APIXABAN 5 MG: 5 TABLET, FILM COATED ORAL at 08:03

## 2022-11-07 RX ADMIN — METHYLPREDNISOLONE SODIUM SUCCINATE 40 MG: 40 INJECTION, POWDER, FOR SOLUTION INTRAMUSCULAR; INTRAVENOUS at 16:17

## 2022-11-07 RX ADMIN — Medication 10 ML: at 08:03

## 2022-11-07 RX ADMIN — METHYLPREDNISOLONE SODIUM SUCCINATE 40 MG: 40 INJECTION, POWDER, FOR SOLUTION INTRAMUSCULAR; INTRAVENOUS at 03:44

## 2022-11-07 RX ADMIN — IPRATROPIUM BROMIDE AND ALBUTEROL SULFATE 1 AMPULE: 2.5; .5 SOLUTION RESPIRATORY (INHALATION) at 20:12

## 2022-11-07 RX ADMIN — Medication 10 ML: at 20:53

## 2022-11-07 RX ADMIN — CEFTRIAXONE SODIUM 1000 MG: 1 INJECTION, POWDER, FOR SOLUTION INTRAMUSCULAR; INTRAVENOUS at 11:40

## 2022-11-07 RX ADMIN — IPRATROPIUM BROMIDE AND ALBUTEROL SULFATE 1 AMPULE: 2.5; .5 SOLUTION RESPIRATORY (INHALATION) at 15:21

## 2022-11-07 RX ADMIN — INSULIN LISPRO 4 UNITS: 100 INJECTION, SOLUTION INTRAVENOUS; SUBCUTANEOUS at 20:59

## 2022-11-07 RX ADMIN — IPRATROPIUM BROMIDE AND ALBUTEROL SULFATE 1 AMPULE: 2.5; .5 SOLUTION RESPIRATORY (INHALATION) at 07:36

## 2022-11-07 NOTE — PROGRESS NOTES
Pt in bed, eyes closed. No distress noted. Call light in reach. Will continue to monitor.   Dank Long RN

## 2022-11-07 NOTE — PROGRESS NOTES
IM Progress Note    Admit Date:  11/6/2022      Admitted with exacerbation of COPD, acute on chronic hypoxic respiratory failure. Patient normally on oxygen 3 L, wearing oxygen up to 4 L    Subjective:  Mr. Herminia Perla continues to require oxygen 4 L today. He has cough and shortness of breath; although patient states that he feels better than yesterday. He is hoping to go home tomorrow    Objective:   /77   Pulse 98   Temp 98.1 °F (36.7 °C) (Oral)   Resp 18   Ht 6' (1.829 m)   Wt 188 lb (85.3 kg)   SpO2 95%   BMI 25.50 kg/m²     Intake/Output Summary (Last 24 hours) at 11/7/2022 1133  Last data filed at 11/7/2022 1108  Gross per 24 hour   Intake 1190 ml   Output --   Net 1190 ml         Physical Exam:  General:  Awake, alert, NAD  Appears ill  Skin:  Warm and dry  Neck:  JVD absent. Neck supple  Chest: Very diminished breath sounds, mild tachypnea. no wheezes, rales or rhonchi. Cardiovascular:  RRR ,S1S2 normal  Abdomen:  Soft, non tender, non distended, BS +  Extremities:  No edema. Intact peripheral pulses.  Brisk cap refill, < 2 secs  Neuro: non focal      Medications:   Scheduled Meds:   apixaban  5 mg Oral BID    ipratropium-albuterol  1 ampule Inhalation Q4H WA    methylPREDNISolone  40 mg IntraVENous Q12H    cefTRIAXone (ROCEPHIN) IV  1,000 mg IntraVENous Q24H    azithromycin  500 mg IntraVENous Q24H    sodium chloride flush  5-40 mL IntraVENous 2 times per day    insulin lispro  0-4 Units SubCUTAneous TID     insulin lispro  0-4 Units SubCUTAneous Nightly       Continuous Infusions:   sodium chloride      dextrose         Data:  CBC:   Recent Labs     11/06/22  0735 11/07/22  0445   WBC 7.4 6.4   RBC 4.36 4.33   HGB 12.6* 12.0*   HCT 37.0* 37.2*   MCV 84.9 85.8   RDW 15.2 15.1    158     BMP:   Recent Labs     11/06/22  0735 11/07/22  0445    133*   K 4.2 4.3   CL 94* 91*   CO2 39* 32   BUN 19 23*   CREATININE 0.8 0.8     BNP: No results for input(s): BNP in the last 72 hours.  PT/INR:   Recent Labs     11/06/22  0735   PROTIME 14.1   INR 1.10     APTT: No results for input(s): APTT in the last 72 hours. CARDIAC ENZYMES: No results for input(s): CKMB, CKMBINDEX, TROPONINI in the last 72 hours. Invalid input(s): CKTOTAL;3  FASTING LIPID PANEL:  Lab Results   Component Value Date    CHOL 113 07/03/2021    HDL 37 07/03/2021    TRIG 99 07/03/2021     LIVER PROFILE:   Recent Labs     11/06/22  0735 11/07/22  0445   AST 24 19   ALT 29 24   BILITOT 0.7 0.5   ALKPHOS 68 72          Cultures  COVID and flu not detected    Radiology  XR CHEST PORTABLE   Final Result   Questionable vague opacity in the apical to mid right lung could be due to   summation of overlying tissues but could also be seen with pneumonia or   neoplasia. Assessment:  Principal Problem:    COPD exacerbation ([address]  Resolved Problems:    * No resolved hospital problems. *      Plan:    #COPD exacerbation  #Acute on chronic hypoxic respiratory failure  -He is normally on home oxygen 3 L ; currently requiring 4 L. Wean O2 as tolerated. -Continue IV solumedrol, HHN     # community-acquired pneumonia suspect strep pneumonia or other gram positive organism  -Continue IV abx-on Rocephin and Zithromax     # DM  Uncontrolled with hyperglycemia  - corrective ISS     # Afib  - continue eliquis     DVT Prophylaxis: eliquis  Diet: ADULT DIET;  Regular; 4 carb choices (60 gm/meal)  Code Status: Full Code        Dispo - inpt    Continue current management; likely discharge for tomorrow    Kaden Xiao MD   11/7/2022 11:33 AM

## 2022-11-07 NOTE — ACP (ADVANCE CARE PLANNING)
Advance Care Planning     General Advance Care Planning (ACP) Conversation    Date of Conversation: 11/6/2022  Conducted with: Patient with Decision Making Capacity    Healthcare Decision Maker:    Primary Decision Maker: Osmanyreyna Funes - [phone]    Secondary Decision Maker: Donavan Cota - [phone]    Supplemental (Other) Decision Maker: Kendal Ball - Juvenal - [phone]  Click here to complete Healthcare Decision Makers including selection of the Healthcare Decision Maker Relationship (ie \"Primary\"). Today we documented Decision Maker(s) consistent with Legal Next of Kin hierarchy. Content/Action Overview:   Has ACP document(s) on file - reflects the patient's care preferences  Reviewed DNR/DNI and patient elects Full Code (Attempt Resuscitation)        Length of Voluntary ACP Conversation in minutes:  <16 minutes (Non-Billable)    Chele Rankin RN

## 2022-11-07 NOTE — PROGRESS NOTES
Patient resting in bed, AM assessment completed. Lungs decreased. Moist productive cough noted. BS+. O2 at 4L per NC. No acute distress noted. Call light in reach. Patient denies any needs. Will continue to monitor.

## 2022-11-07 NOTE — PROGRESS NOTES
RT Inhaler-Nebulizer Bronchodilator Protocol Note    There is a bronchodilator order in the chart from a provider indicating to follow the RT Bronchodilator Protocol and there is an Initiate RT Inhaler-Nebulizer Bronchodilator Protocol order as well (see protocol at bottom of note). CXR Findings:  XR CHEST PORTABLE    Result Date: 11/6/2022  Questionable vague opacity in the apical to mid right lung could be due to summation of overlying tissues but could also be seen with pneumonia or neoplasia. The findings from the last RT Protocol Assessment were as follows:   History Pulmonary Disease: (P) Chronic pulmonary disease  Respiratory Pattern: (P) Dyspnea on exertion or RR 21-25 bpm  Breath Sounds: (P) Slightly diminished and/or crackles  Cough: (P) Strong, spontaneous, non-productive  Indication for Bronchodilator Therapy: (P) Decreased or absent breath sounds  Bronchodilator Assessment Score: (P) 6    Aerosolized bronchodilator medication orders have been revised according to the RT Inhaler-Nebulizer Bronchodilator Protocol below. Respiratory Therapist to perform RT Therapy Protocol Assessment initially then follow the protocol. Repeat RT Therapy Protocol Assessment PRN for score 0-3 or on second treatment, BID, and PRN for scores above 3. No Indications - adjust the frequency to every 6 hours PRN wheezing or bronchospasm, if no treatments needed after 48 hours then discontinue using Per Protocol order mode. If indication present, adjust the RT bronchodilator orders based on the Bronchodilator Assessment Score as indicated below. Use Inhaler orders unless patient has one or more of the following: on home nebulizer, not able to hold breath for 10 seconds, is not alert and oriented, cannot activate and use MDI correctly, or respiratory rate 25 breaths per minute or more, then use the equivalent nebulizer order(s) with same Frequency and PRN reasons based on the score.   If a patient is on this medication at home then do not decrease Frequency below that used at home. 0-3 - enter or revise RT bronchodilator order(s) to equivalent RT Bronchodilator order with Frequency of every 4 hours PRN for wheezing or increased work of breathing using Per Protocol order mode. 4-6 - enter or revise RT Bronchodilator order(s) to two equivalent RT bronchodilator orders with one order with BID Frequency and one order with Frequency of every 4 hours PRN wheezing or increased work of breathing using Per Protocol order mode. 7-10 - enter or revise RT Bronchodilator order(s) to two equivalent RT bronchodilator orders with one order with TID Frequency and one order with Frequency of every 4 hours PRN wheezing or increased work of breathing using Per Protocol order mode. 11-13 - enter or revise RT Bronchodilator order(s) to one equivalent RT bronchodilator order with QID Frequency and an Albuterol order with Frequency of every 4 hours PRN wheezing or increased work of breathing using Per Protocol order mode. Greater than 13 - enter or revise RT Bronchodilator order(s) to one equivalent RT bronchodilator order with every 4 hours Frequency and an Albuterol order with Frequency of every 2 hours PRN wheezing or increased work of breathing using Per Protocol order mode. RT to enter RT Home Evaluation for COPD & MDI Assessment order using Per Protocol order mode.     Electronically signed by César Jj RCP on 11/7/2022 at 7:39 AM

## 2022-11-07 NOTE — PROGRESS NOTES
Pt in bed, awake, A/O X4. Shift assessment complete, night meds given. No C/O pain at this time. . No other needs expressed. Call light in reach. Will monitor.  Leopoldo Briseno RN

## 2022-11-07 NOTE — CONSULTS
PULMONARY CONSULT NOTE  Jagruti Izaguirre is being seen at the request of Vicente Cueto CNP, for a consultation for COPD & pneumonia  Hospital Day: 2     HISTORY OF PRESENT ILLNESS: Jagruti Izaguirre is a 78 y.o. male with a PMHx of COPD previously f/b Dr. Rand Orourke in clinic, who presented to [facility] ED on 11/6/2022 with a 1 week history of progressive shortness of breath, now moderate in severity, worse with exertion, and associated with productive cough of grey sputum, similar to prior COPD exacerbations. He desaturated to 77% on his home 3 L in the ER and so was transferred to [facility] for admission and has been requiring 4 L O2. Feeling better since admission.      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    ELAINE on CPAP     Osteoporosis     Skin cancer of face      PAST SURGICAL HISTORY:  Past Surgical History:   Procedure Laterality Date    BACK SURGERY      repair broken back L4 & L5    CHOLECYSTECTOMY, LAPAROSCOPIC N/A 1/6/2021    LAPAROSCOPIC CHOLECYSTECTOMY, WITH CHOLANGIOGRAMS (ATTEMPTED) OPEN CHOLECYSTECTOMY performed by Silvana Matthew MD at [address] [place] [address], OPEN N/A 01/06/2021    LAPAROSCOPIC CHOLECYSTECTOMY, WITH CHOLANGIOGRAMS (ATTEMPTED)     CYSTOSCOPY Right 10/9/15    RIght Ureteroscopy, Holmium Laser Lithotripsy with Stone Removal, Right Stent Placement    CYSTOSCOPY  05/01/2019    CYSTOSCOPY, RESECTION OF BLADDER NECK, URETHRAL DILATION    CYSTOSCOPY W BIOPSY OF BLADDER N/A 5/1/2019    CYSTOSCOPY, RESECTION OF BLADDER NECK, URETHRAL DILATION performed by Litzy Vazquez MD at [address] Left 6/12/2016    cataract removal GALLBLADDER SURGERY  01/2021    JOINT REPLACEMENT  6/29/2011    right knee    JOINT REPLACEMENT  11/2004    left knee    OTHER SURGICAL HISTORY  08/31/2015    wide excision basal cell carcinoma on the nose and bilateral auricles with frozen sections, plastic closure, full thickness skin graft    OTHER SURGICAL HISTORY  12/1/15    excision of lesion of lip    PROSTATE SURGERY      TRANSURETHRAL RESECTION OF PROSTATE  10/23/2015    with cystoscopy     FAMILY HISTORY:  family history includes Cancer in his brother, father, and mother; Diabetes in his mother and sister; Heart Disease in his mother. SOCIAL HISTORY:   reports that he quit smoking about 17 years ago. He has a 67.50 pack-year smoking history. He has never used smokeless tobacco.    Scheduled Meds:   apixaban  5 mg Oral BID    ipratropium-albuterol  1 ampule Inhalation Q4H WA    methylPREDNISolone  40 mg IntraVENous Q12H    cefTRIAXone (ROCEPHIN) IV  1,000 mg IntraVENous Q24H    azithromycin  500 mg IntraVENous Q24H    sodium chloride flush  5-40 mL IntraVENous 2 times per day    insulin lispro  0-4 Units SubCUTAneous TID WC    insulin lispro  0-4 Units SubCUTAneous Nightly     Continuous Infusions:   sodium chloride      dextrose       PRN Meds:  albuterol, sodium chloride flush, sodium chloride, ondansetron **OR** ondansetron, polyethylene glycol, acetaminophen **OR** acetaminophen, glucose, dextrose bolus **OR** dextrose bolus, glucagon (rDNA), dextrose    ALLERGIES:  Patient has No Known Allergies.     REVIEW OF SYSTEMS:  Constitutional: Negative for fever  HENT: Negative for sore throat  Eyes: Negative for redness   Respiratory: + for dyspnea, + cough  Cardiovascular: Negative for chest pain  Gastrointestinal: Negative for vomiting, diarrhea   Genitourinary: Negative for hematuria   Musculoskeletal: Negative for arthralgias   Skin: Negative for rash  Neurological: Negative for syncope  Hematological: Negative for adenopathy  Psychiatric/Behavorial: Negative for anxiety    PHYSICAL EXAM:  Blood pressure 137/77, pulse 98, temperature 98.1 °F (36.7 °C), temperature source Oral, resp. rate 18, height 6' (1.829 m), weight 188 lb (85.3 kg), SpO2 95 %.' on 4 L  Gen:  No distress. Eyes:  PERRL. No sclera icterus. No conjunctival injection. ENT:  No discharge. Pharynx clear. Neck:  Trachea midline. No obvious mass. Resp:  + accessory muscle use. No crackles. + wheezes. No rhonchi. No dullness on percussion. CV:  Regular rate. Regular rhythm. No murmur or rub. No edema. Peripheral pulses are 2+. Capillary refill is less than 3 seconds. GI:  Non-tender. Non-distended. No hernia. Skin:  Warm and dry. No nodule on exposed extremities. Lymph:  No cervical LAD. No supraclavicular LAD. M/S:  No cyanosis. No joint deformity. No clubbing. Neuro:  Awake. Alert. Moves all four extremities. Psych:  Oriented x 3. No anxiety. LABS:  CBC:   Recent Labs     11/06/22 0735 11/07/22 0445   WBC 7.4 6.4   HGB 12.6* 12.0*   HCT 37.0* 37.2*   MCV 84.9 85.8    158     BMP:   Recent Labs     11/06/22 0735 11/07/22 0445    133*   K 4.2 4.3   CL 94* 91*   CO2 39* 32   BUN 19 23*   CREATININE 0.8 0.8     LIVER PROFILE:   Recent Labs     11/06/22 0735 11/07/22 0445   AST 24 19   ALT 29 24   BILITOT 0.7 0.5   ALKPHOS 76 72     PT/INR:   Recent Labs     11/06/22 0735   PROTIME 14.1   INR 1.10     APTT: No results for input(s): APTT in the last 72 hours. UA:No results for input(s): NITRITE, COLORU, PHUR, LABCAST, WBCUA, RBCUA, MUCUS, TRICHOMONAS, YEAST, BACTERIA, CLARITYU, SPECGRAV, LEUKOCYTESUR, UROBILINOGEN, BILIRUBINUR, BLOODU, GLUCOSEU, AMORPHOUS in the last 72 hours. Invalid input(s): KETONESU  No results for input(s): PHART, JRB2WKG, PO2ART in the last 72 hours.     Micro:   11/6/22 SARS-CoV-2 & flu not detected  11/6/22 BC pending    Imaging: Chest imaging was reviewed by me and showed:  CXR 11/6/2022  Questionable vague opacity in the apical to mid right lung could be due to   summation of overlying tissues but could also be seen with pneumonia or   neoplasia. ASSESSMENT:  Acute on chronic hypoxemic respiratory failure, baseline 3 L  Abnormal CXR, possible CAP  Very severe COPD, f/b our group, with acute exacerbation   AFIB on Eliquis     PLAN:  Supplemental O2 to maintain SaO2 >92%; wean as tolerated    IV solumedrol with plan to convert to oral prednisone with improvement  Inhaled bronchodilators   Azithromycin/Ceftriaxone D#2  CT Chest now   Upcoming appt with Dr. Justine Orlando 11/16/22  On Eliquis     I spent a total of 16 minutes on this encounter personally obtaining the patient history, reviewing labs, imaging and other data. I independently performed the above physical exam and updated this encounter note, assessment and plan as needed. Eduar Darby MD on 11/7/22 at 5:30 PM EST    I spent a total of 12 minutes on this encounter on chart review, history taking and review of data. I updated this encounter note as needed.    ANEUDY Herzog on 11/7/22 at 10:10 AM EST

## 2022-11-07 NOTE — PROGRESS NOTES
RT Inhaler-Nebulizer Bronchodilator Protocol Note    There is a bronchodilator order in the chart from a provider indicating to follow the RT Bronchodilator Protocol and there is an Initiate RT Inhaler-Nebulizer Bronchodilator Protocol order as well (see protocol at bottom of note). CXR Findings:  XR CHEST PORTABLE    Result Date: 11/6/2022  Questionable vague opacity in the apical to mid right lung could be due to summation of overlying tissues but could also be seen with pneumonia or neoplasia. The findings from the last RT Protocol Assessment were as follows:   History Pulmonary Disease: Chronic pulmonary disease  Respiratory Pattern: Dyspnea on exertion or RR 21-25 bpm  Breath Sounds: Slightly diminished and/or crackles  Cough: Strong, spontaneous, non-productive  Indication for Bronchodilator Therapy: Decreased or absent breath sounds  Bronchodilator Assessment Score: 6    Aerosolized bronchodilator medication orders have been revised according to the RT Inhaler-Nebulizer Bronchodilator Protocol below. Respiratory Therapist to perform RT Therapy Protocol Assessment initially then follow the protocol. Repeat RT Therapy Protocol Assessment PRN for score 0-3 or on second treatment, BID, and PRN for scores above 3. No Indications - adjust the frequency to every 6 hours PRN wheezing or bronchospasm, if no treatments needed after 48 hours then discontinue using Per Protocol order mode. If indication present, adjust the RT bronchodilator orders based on the Bronchodilator Assessment Score as indicated below. Use Inhaler orders unless patient has one or more of the following: on home nebulizer, not able to hold breath for 10 seconds, is not alert and oriented, cannot activate and use MDI correctly, or respiratory rate 25 breaths per minute or more, then use the equivalent nebulizer order(s) with same Frequency and PRN reasons based on the score.   If a patient is on this medication at home then do not decrease Frequency below that used at home. 0-3 - enter or revise RT bronchodilator order(s) to equivalent RT Bronchodilator order with Frequency of every 4 hours PRN for wheezing or increased work of breathing using Per Protocol order mode. 4-6 - enter or revise RT Bronchodilator order(s) to two equivalent RT bronchodilator orders with one order with BID Frequency and one order with Frequency of every 4 hours PRN wheezing or increased work of breathing using Per Protocol order mode. 7-10 - enter or revise RT Bronchodilator order(s) to two equivalent RT bronchodilator orders with one order with TID Frequency and one order with Frequency of every 4 hours PRN wheezing or increased work of breathing using Per Protocol order mode. 11-13 - enter or revise RT Bronchodilator order(s) to one equivalent RT bronchodilator order with QID Frequency and an Albuterol order with Frequency of every 4 hours PRN wheezing or increased work of breathing using Per Protocol order mode. Greater than 13 - enter or revise RT Bronchodilator order(s) to one equivalent RT bronchodilator order with every 4 hours Frequency and an Albuterol order with Frequency of every 2 hours PRN wheezing or increased work of breathing using Per Protocol order mode.        Electronically signed by Claus Nielsen RCP on 11/6/2022 at 7:07 PM

## 2022-11-07 NOTE — H&P
Hospital Medicine History & Physical      PCP: Olman Villatoro MD    Date of Admission: 11/6/2022    Date of Service: Pt seen/examined on 11/6/22 and Admitted to Inpatient with expected LOS greater than two midnights due to medical therapy. Chief Complaint:  SOB      History Of Present Illness:      78 y.o. male with history of COPD on 3 lpm NC O2 at home, presents with a 1 week history of increasing SOB, cough. Cough has been productive of grey sputum. He denies difficulty swallowing, n/v/d, chills, diaphoresis or fever. He denies palpitations, chest pain, leg swelling. He presents tachycardic, requiring 4 lpm NC O2 but appears to be in no respiratory distress, awake, alert and oriented denying worsening sob.     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    ELAINE on CPAP     Osteoporosis     Skin cancer of face        Past Surgical History:          Procedure Laterality Date    BACK SURGERY      repair broken back L4 & L5    CHOLECYSTECTOMY, LAPAROSCOPIC N/A 1/6/2021    LAPAROSCOPIC CHOLECYSTECTOMY, WITH CHOLANGIOGRAMS (ATTEMPTED) OPEN CHOLECYSTECTOMY performed by Arely Marks MD at [address] [place] [address], OPEN N/A 01/06/2021    LAPAROSCOPIC CHOLECYSTECTOMY, WITH CHOLANGIOGRAMS (ATTEMPTED)     CYSTOSCOPY Right 10/9/15    RIght Ureteroscopy, Holmium Laser Lithotripsy with Stone Removal, Right Stent Placement    CYSTOSCOPY  05/01/2019    CYSTOSCOPY, RESECTION OF BLADDER NECK, URETHRAL DILATION    CYSTOSCOPY W BIOPSY OF BLADDER N/A 5/1/2019    CYSTOSCOPY, RESECTION OF BLADDER NECK, URETHRAL DILATION performed by Marium Ledbetter MD at [facility] EYE SURGERY Left 6/12/2016    cataract removal    GALLBLADDER SURGERY  01/2021    JOINT REPLACEMENT  6/29/2011    right knee    JOINT REPLACEMENT  11/2004    left knee    OTHER SURGICAL HISTORY  08/31/2015    wide excision basal cell carcinoma on the nose and bilateral auricles with frozen sections, plastic closure, full thickness skin graft    OTHER SURGICAL HISTORY  12/1/15    excision of lesion of lip    PROSTATE SURGERY      TRANSURETHRAL RESECTION OF PROSTATE  10/23/2015    with cystoscopy       Medications Prior to Admission:      Prior to Admission medications    Medication Sig Start Date End Date Taking?  Authorizing Provider   albuterol (PROVENTIL) (2.5 MG/3ML) 0.083% nebulizer solution USE 1 VIAL IN NEBULIZER 4 TIMES DAILY 11/3/22   Myranda Robison MD   atorvastatin (LIPITOR) 10 MG tablet Take 1 tablet by mouth daily 11/2/22   Ulises Stroud MD   Grazyna Gather 100-62.5-25 MCG/INH AEPB INHALE 1 PUFF EVERY DAY 10/1/22   Myranda Robison MD   propafenone (RYTHMOL) 150 MG tablet TAKE ONE TABLET BY MOUTH EVERY 8 HOURS 9/29/22   Ulises Stroud MD   apixaban (ELIQUIS) 5 MG TABS tablet Take 1 tablet by mouth 2 times daily 9/29/22   Ulises Stroud MD   dilTIAZem (CARDIZEM CD) 180 MG extended release capsule TAKE 1 CAPSULE BY MOUTH 1 TIME A DAY AS NEEDED FOR INCREASED HEART RATE 9/29/22   Ulises Stroud MD   VENTOLIN  (90 Base) MCG/ACT inhaler Inhale 2 puffs into the lungs every 6 hours as needed for Wheezing orShortness of Breath 8/26/22   Myranda Robison MD   ondansetron (ZOFRAN ODT) 4 MG disintegrating tablet Take 1 tablet by mouth every 8 hours as needed for Nausea or Vomiting  Patient not taking: Reported on 11/6/2022 1/30/22   Tiago Shaikh DO   acetaminophen (AMINOFEN) 325 MG tablet Take 2 tablets by mouth every 4 hours as needed for Pain 8/11/20   Joanne Marshall MD   alendronate (FOSAMAX) 70 MG tablet every 7 days On Wednesday  Patient not taking: Reported on 11/6/2022 3/31/20 Historical Provider, MD   fluticasone Jesus Walsh) 50 MCG/ACT nasal spray 2 sprays by Nasal route daily  2/12/20   Historical Provider, MD   pioglitazone (ACTOS) 30 MG tablet Take 30 mg by mouth daily    Historical Provider, MD   gabapentin (NEURONTIN) 800 MG tablet Take 800 mg by mouth 3 times daily. Historical Provider, MD   OXYGEN Inhale 2.5 L into the lungs nightly At 3.5lpm on 2/8/2022    Historical Provider, MD   metformin (GLUCOPHAGE) 500 MG tablet Take 500 mg by mouth 4 times daily     Historical Provider, MD   esomeprazole (NEXIUM) 40 MG capsule   Take 40 mg by mouth every morning (before breakfast) Indications: take [address] Provider, MD       Allergies:  Patient has no known allergies. Social History:         TOBACCO:   reports that he quit smoking about 17 years ago. He has a 67.50 pack-year smoking history. He has never used smokeless tobacco.  ETOH:   reports no history of alcohol use. Family History:             Problem Relation Age of Onset    Cancer Mother     Diabetes Mother     Heart Disease Mother     Cancer Father     Diabetes Sister     Cancer Brother        REVIEW OF SYSTEMS:   Pertinent positives as noted in the HPI. All other systems reviewed and negative. PHYSICAL EXAM PERFORMED:    BP (!) 149/75   Pulse (!) 114   Temp 98.6 °F (37 °C) (Oral)   Resp 20   Ht 6' (1.829 m)   Wt 198 lb (89.8 kg)   SpO2 100%   BMI 26.85 kg/m²     General appearance:  No apparent distress, appears stated age and cooperative. HEENT:  Normal cephalic, atraumatic without obvious deformity. Pupils equal, round, Extra ocular muscles intact. Conjunctivae/corneas clear. Neck: Supple, with full range of motion. No jugular venous distention. Trachea midline.   Respiratory:  Normal respiratory effort, no use of accessory muscles, speaking in full sentences  Cardiovascular:  tachy rate and reg rhythm   Abdomen: Soft, non-tender, non-distended   Musculoskeletal:  No clubbing, cyanosis or edema bilaterally. No calf tenderness  Skin: Skin color, texture, turgor normal.     Neurologic:  grossly non-focal.  Psychiatric:  Alert and oriented, thought content appropriate, normal insight         Labs:     Recent Labs     11/06/22  0735   WBC 7.4   HGB 12.6*   HCT 37.0*        Recent Labs     11/06/22  0735      K 4.2   CL 94*   CO2 39*   BUN 19   CREATININE 0.8   CALCIUM 9.9     Recent Labs     11/06/22  0735   AST 24   ALT 29   BILITOT 0.7   ALKPHOS 76     Recent Labs     11/06/22  0735   INR 1.10     No results for input(s): Yessica Rowez in the last 72 hours. Urinalysis:      Lab Results   Component Value Date/Time    NITRU Negative 01/04/2021 09:55 PM    WBCUA 6-9 01/04/2021 09:55 PM    BACTERIA Rare 12/14/2020 10:58 PM    RBCUA 0-2 01/04/2021 09:55 PM    BLOODU TRACE-INTACT 01/04/2021 09:55 PM    SPECGRAV 1.015 01/04/2021 09:55 PM    GLUCOSEU Negative 01/04/2021 09:55 PM       Radiology:        XR CHEST PORTABLE   Final Result   Questionable vague opacity in the apical to mid right lung could be due to   summation of overlying tissues but could also be seen with pneumonia or   neoplasia. ASSESSMENT:    Active Hospital Problems    Diagnosis Date Noted    COPD exacerbation ([facility] [address] [J44.1]          PLAN:    COPD exac  - IV solumedrol, HHN    CAP  - IV abx      DM  - corrective ISS    Afib  - continue eliquis    DVT Prophylaxis: eliquis  Diet: ADULT DIET; Regular; 4 carb choices (60 gm/meal)  Code Status: Full Jed Justin MD    Thank you Ro Mckeon MD for the opportunity to be involved in this patient's care. If you have any questions or concerns please feel free to contact me at [phone].

## 2022-11-07 NOTE — CARE COORDINATION
Case Management Assessment  Initial Evaluation      Patient Name: Thony Bean  YOB: 1940  Diagnosis: Hypoxia [R09.02]  COPD exacerbation ([address] [J44.1]  Date / Time: 11/6/2022  7:31 AM    Admission status/Date:11/6/2022 inpt  Chart Reviewed: Yes      Patient Interviewed: Yes   Family Interviewed:  No      Hospitalization in the last 30 days:  No      Health Care Decision Maker :   Primary Decision MakerFernando Henry Child - [phone]    Secondary Decision Maker: Rock Stockia - Child - [phone]    Supplemental (Other) Decision Maker: Kenney Hernandez - Child - [phone]    (CM - must 1st enter selection under Navigator - emergency contact- Health Care Decision Maker Relationship and pick relationship)   Who do you trust or have selected to make healthcare decisions for you      Met with: pt  Interview conducted  (bedside/phone):bedside    Current PCP:   Sterling Broussard MD      Financial  Medicare  Precert required for SNF : N          3 night stay required - N    ADLS  Support Systems/Care Needs: Children, Family Members  Transportation:  daughter     Meal Preparation: self    Housing  Living Arrangements: lives in 1 story house alone, dtr there mostly 24/7  Steps: 1  Intent for return to present living arrangements: Yes  Identified Issues:     [address] with [address] : Yes - home care for SN has no idea name of agency Agency:(Services)  Type of Home Care Services: None  Passport/Waiver : No  :                      Phone Number:    Passport/Waiver Services:           Durable Medical Equiptment   DME Provider: [address] (confirmed 3L cont with Meghan @ [phone]--needs new orders faxed to [phone])  Equipment:   Walker_x__Cane___RTS___ BSC___Shower Chair_x__Hospital Bed_x__W/C__x__Other________  02 at __3__Liter(s)---wears(frequency)_cont______ HHN ___ CPAP___ BiPap___   N/A____      Home O2 Use :  Yes    If No for home O2---if presently on O2 during hospitalization:  Yes  if yes CM to follow for potential DC O2 need  Informed of need for care provider to bring portable home O2 tank on day of discharge for nursing to connect prior to leaving:   Yes  Verbalized agreement/Understanding:   Yes    Community Service Affiliation  Dialysis:  No      Other Community Services: none    DISCHARGE PLAN: Explained Case Management role/services. Chart reviewed. Met with pt at bedside, he is from home alone and states dtr is with him 24/7. Pt has home care for SN and is unsure of agency. Spoke with dtr Venkatesh Wood and she is unsure of agency name. Pt is active with [facility] for home O2 @ 3L, new O2 orders needed at dc. Follow.

## 2022-11-08 VITALS
DIASTOLIC BLOOD PRESSURE: 76 MMHG | WEIGHT: 190.4 LBS | HEIGHT: 72 IN | BODY MASS INDEX: 25.79 KG/M2 | HEART RATE: 104 BPM | TEMPERATURE: 97.9 F | RESPIRATION RATE: 18 BRPM | SYSTOLIC BLOOD PRESSURE: 144 MMHG | OXYGEN SATURATION: 94 %

## 2022-11-08 LAB
A/G RATIO: 1.6 (ref 1.1–2.2)
ALBUMIN SERPL-MCNC: 3.6 G/DL (ref 3.4–5)
ALP BLD-CCNC: 68 U/L (ref 40–129)
ALT SERPL-CCNC: 36 U/L (ref 10–40)
ANION GAP SERPL CALCULATED.3IONS-SCNC: 8 MMOL/L (ref 3–16)
AST SERPL-CCNC: 34 U/L (ref 15–37)
BASOPHILS ABSOLUTE: 0 K/UL (ref 0–0.2)
BASOPHILS RELATIVE PERCENT: 0.1 %
BILIRUB SERPL-MCNC: 0.3 MG/DL (ref 0–1)
BUN BLDV-MCNC: 29 MG/DL (ref 7–20)
CALCIUM SERPL-MCNC: 8.4 MG/DL (ref 8.3–10.6)
CHLORIDE BLD-SCNC: 96 MMOL/L (ref 99–110)
CO2: 38 MMOL/L (ref 21–32)
CREAT SERPL-MCNC: 0.8 MG/DL (ref 0.8–1.3)
EOSINOPHILS ABSOLUTE: 0 K/UL (ref 0–0.6)
EOSINOPHILS RELATIVE PERCENT: 0 %
GFR SERPL CREATININE-BSD FRML MDRD: >60 ML/MIN/{1.73_M2}
GLUCOSE BLD-MCNC: 208 MG/DL (ref 70–99)
GLUCOSE BLD-MCNC: 229 MG/DL (ref 70–99)
GLUCOSE BLD-MCNC: 363 MG/DL (ref 70–99)
HCT VFR BLD CALC: 32.2 % (ref 40.5–52.5)
HEMOGLOBIN: 10.6 G/DL (ref 13.5–17.5)
LYMPHOCYTES ABSOLUTE: 1.3 K/UL (ref 1–5.1)
LYMPHOCYTES RELATIVE PERCENT: 23.9 %
MCH RBC QN AUTO: 27.9 PG (ref 26–34)
MCHC RBC AUTO-ENTMCNC: 32.9 G/DL (ref 31–36)
MCV RBC AUTO: 85 FL (ref 80–100)
MONOCYTES ABSOLUTE: 0.7 K/UL (ref 0–1.3)
MONOCYTES RELATIVE PERCENT: 13.1 %
NEUTROPHILS ABSOLUTE: 3.4 K/UL (ref 1.7–7.7)
NEUTROPHILS RELATIVE PERCENT: 62.9 %
PDW BLD-RTO: 15.2 % (ref 12.4–15.4)
PERFORMED ON: ABNORMAL
PERFORMED ON: ABNORMAL
PLATELET # BLD: 136 K/UL (ref 135–450)
PMV BLD AUTO: 9 FL (ref 5–10.5)
POTASSIUM REFLEX MAGNESIUM: 4.1 MMOL/L (ref 3.5–5.1)
RBC # BLD: 3.79 M/UL (ref 4.2–5.9)
SODIUM BLD-SCNC: 142 MMOL/L (ref 136–145)
TOTAL PROTEIN: 5.9 G/DL (ref 6.4–8.2)
WBC # BLD: 5.3 K/UL (ref 4–11)

## 2022-11-08 PROCEDURE — 99238 HOSP IP/OBS DSCHRG MGMT 30/<: CPT | Performed by: INTERNAL MEDICINE

## 2022-11-08 PROCEDURE — 6360000002 HC RX W HCPCS: Performed by: NURSE PRACTITIONER

## 2022-11-08 PROCEDURE — 80053 COMPREHEN METABOLIC PANEL: CPT

## 2022-11-08 PROCEDURE — 6370000000 HC RX 637 (ALT 250 FOR IP): Performed by: NURSE PRACTITIONER

## 2022-11-08 PROCEDURE — 2700000000 HC OXYGEN THERAPY PER DAY

## 2022-11-08 PROCEDURE — 99233 SBSQ HOSP IP/OBS HIGH 50: CPT | Performed by: INTERNAL MEDICINE

## 2022-11-08 PROCEDURE — 2580000003 HC RX 258: Performed by: NURSE PRACTITIONER

## 2022-11-08 PROCEDURE — 94761 N-INVAS EAR/PLS OXIMETRY MLT: CPT

## 2022-11-08 PROCEDURE — 85025 COMPLETE CBC W/AUTO DIFF WBC: CPT

## 2022-11-08 PROCEDURE — 36415 COLL VENOUS BLD VENIPUNCTURE: CPT

## 2022-11-08 PROCEDURE — 94640 AIRWAY INHALATION TREATMENT: CPT

## 2022-11-08 RX ORDER — PREDNISONE 10 MG/1
TABLET ORAL
Qty: 30 TABLET | Refills: 0 | Status: SHIPPED | OUTPATIENT
Start: 2022-11-08

## 2022-11-08 RX ORDER — PROPAFENONE HYDROCHLORIDE 150 MG/1
150 TABLET, FILM COATED ORAL EVERY 8 HOURS SCHEDULED
Status: DISCONTINUED | OUTPATIENT
Start: 2022-11-08 | End: 2022-11-08 | Stop reason: HOSPADM

## 2022-11-08 RX ORDER — DOXYCYCLINE HYCLATE 100 MG
100 TABLET ORAL 2 TIMES DAILY
Qty: 10 TABLET | Refills: 0 | Status: SHIPPED | OUTPATIENT
Start: 2022-11-08 | End: 2022-11-13

## 2022-11-08 RX ADMIN — IPRATROPIUM BROMIDE AND ALBUTEROL SULFATE 1 AMPULE: 2.5; .5 SOLUTION RESPIRATORY (INHALATION) at 07:24

## 2022-11-08 RX ADMIN — Medication 10 ML: at 08:11

## 2022-11-08 RX ADMIN — IPRATROPIUM BROMIDE AND ALBUTEROL SULFATE 1 AMPULE: 2.5; .5 SOLUTION RESPIRATORY (INHALATION) at 11:11

## 2022-11-08 RX ADMIN — CEFTRIAXONE SODIUM 1000 MG: 1 INJECTION, POWDER, FOR SOLUTION INTRAMUSCULAR; INTRAVENOUS at 10:17

## 2022-11-08 RX ADMIN — AZITHROMYCIN DIHYDRATE 500 MG: 500 INJECTION, POWDER, LYOPHILIZED, FOR SOLUTION INTRAVENOUS at 10:49

## 2022-11-08 RX ADMIN — APIXABAN 5 MG: 5 TABLET, FILM COATED ORAL at 08:11

## 2022-11-08 RX ADMIN — INSULIN LISPRO 4 UNITS: 100 INJECTION, SOLUTION INTRAVENOUS; SUBCUTANEOUS at 11:50

## 2022-11-08 RX ADMIN — METHYLPREDNISOLONE SODIUM SUCCINATE 40 MG: 40 INJECTION, POWDER, FOR SOLUTION INTRAMUSCULAR; INTRAVENOUS at 04:14

## 2022-11-08 RX ADMIN — IPRATROPIUM BROMIDE AND ALBUTEROL SULFATE 1 AMPULE: 2.5; .5 SOLUTION RESPIRATORY (INHALATION) at 00:32

## 2022-11-08 RX ADMIN — INSULIN LISPRO 1 UNITS: 100 INJECTION, SOLUTION INTRAVENOUS; SUBCUTANEOUS at 08:12

## 2022-11-08 ASSESSMENT — PAIN SCALES - GENERAL: PAINLEVEL_OUTOF10: 0

## 2022-11-08 NOTE — PROGRESS NOTES
Pt ambulated to bathroom. Desat to 85% on 3L. After laying back down pt oxygen increased to 93% on 3L.  Velinda Paget, RN

## 2022-11-08 NOTE — PROGRESS NOTES
PULMONARY PROGRESS NOTE  Hospital Day: 3   CC: COPD & pneumonia    Subjective:   Requiring 3-4 L O2; he wears 3 L at home. Feels good and wants to go home     IV line: Peripheral    PHYSICAL EXAM:   BP (!) 166/76   Pulse 95   Temp 98 °F (36.7 °C) (Oral)   Resp 18   Ht 6' (1.829 m)   Wt 190 lb 6.4 oz (86.4 kg)   SpO2 98%   BMI 25.82 kg/m² ' on 3 L  Gen:  No distress. Eyes:  PERRL. No sclera icterus. No conjunctival injection. ENT:  No discharge. Pharynx clear. Neck:  Trachea midline. No obvious mass. Resp:  + accessory muscle use. No crackles. minimal wheezes. No rhonchi. No dullness on percussion. CV:  Regular rate. Regular rhythm. No murmur or rub. No edema. Peripheral pulses are 2+. Capillary refill is less than 3 seconds. GI:  Non-tender. Non-distended. No hernia. Skin:  Warm and dry. No nodule on exposed extremities. Lymph:  No cervical LAD. No supraclavicular LAD. M/S:  No cyanosis. No joint deformity. No clubbing. Neuro:  Awake. Alert. Moves all four extremities. Psych:  Oriented x 3. No anxiety.      Scheduled Meds:   apixaban  5 mg Oral BID    ipratropium-albuterol  1 ampule Inhalation Q4H WA    methylPREDNISolone  40 mg IntraVENous Q12H    cefTRIAXone (ROCEPHIN) IV  1,000 mg IntraVENous Q24H    azithromycin  500 mg IntraVENous Q24H    sodium chloride flush  5-40 mL IntraVENous 2 times per day    insulin lispro  0-4 Units SubCUTAneous TID WC    insulin lispro  0-4 Units SubCUTAneous Nightly     Continuous Infusions:   sodium chloride      dextrose       PRN Meds:  albuterol, sodium chloride flush, sodium chloride, ondansetron **OR** ondansetron, polyethylene glycol, acetaminophen **OR** acetaminophen, glucose, dextrose bolus **OR** dextrose bolus, glucagon (rDNA), dextrose    Labs:  CBC:   Recent Labs     11/06/22  0735 11/07/22  0445 11/08/22  0417   WBC 7.4 6.4 5.3   HGB 12.6* 12.0* 10.6*   HCT 37.0* 37.2* 32.2*   MCV 84.9 85.8 85.0    158 136     BMP:   Recent Labs 11/06/22  0735 11/07/22  0445    133*   K 4.2 4.3   CL 94* 91*   CO2 39* 32   BUN 19 23*   CREATININE 0.8 0.8     Micro:   11/6/22 SARS-CoV-2 & flu not detected  11/6/22 BC pending  Procalcitonin 0.09    Imaging:   CXR 11/6/2022  Questionable vague opacity in the apical to mid right lung could be due to   summation of overlying tissues but could also be seen with pneumonia or   neoplasia. CT Chest 11/7/22: imaging was reviewed by me  Impression   There is a subtle infiltrates seen within the right upper lobe posteriorly,   corresponding to the vague opacity seen on the chest radiograph. Findings   would be most compatible with pneumonia versus less likely aspiration. Bronchial wall thickening, which may be seen in inflammatory conditions such   as bronchitis, reactive airways disease, pulmonary vascular congestion, and   smoking. Emphysema. ASSESSMENT:  Acute on chronic hypoxemic respiratory failure, baseline 3 L  Possible early CAP  Very severe COPD, f/b our group, with acute exacerbation   AFIB on Eliquis     PLAN:  Supplemental O2 to maintain SaO2 >92%; wean as tolerated    Home with prednisone taper   Inhaled bronchodilators   Azithromycin/Ceftriaxone D#3  On Eliquis   D/C planning on Omnicef/Doxy for total 7 days tx. Keep upcoming appt with Dr. Jenni Lindsay 11/16/22. I spent a total of 15 minutes on this encounter personally obtaining the patient history, reviewing labs, imaging and other data. I independently performed the above physical exam and updated this encounter note, assessment and plan as needed. Aramis Garza MD on 11/8/22 at 8:29 AM EST        I spent a total of 7 minutes on this encounter on chart review, history taking and review of data. I updated this encounter note as needed.    Lalito Salazar PA-C on 11/8/22 at 6:20 AM EST

## 2022-11-08 NOTE — PROGRESS NOTES
AM Shift assessment completed. Pt is alert and oriented. Vital signs are WNL. Respirations are even & easy. Patient on 3 L 02 at this time; baseline. SAT 94%. No complaints voiced. Pt denies needs at this time. SR up x 2 and bed in low position. Call light is within reach. Will monitor. .Bedside Mobility Assessment Tool (BMAT):     Assessment Level 1- Sit and Shake    1. From a semi-reclined position, ask patient to sit up and rotate to a seated position at the side of the bed. Can use the bedrail. 2. Ask patient to reach out and grab your hand and shake making sure patient reaches across his/her midline. Pass- Patient is able to come to a seated position, maintain core strength. Maintains seated balance while reaching across midline. Move on to Assessment Level 2. Assessment Level 2- Stretch and Point   1. With patient in seated position at the side of the bed, have patient place both feet on the floor (or stool) with knees no higher than hips. 2. Ask patient to stretch one leg and straighten the knee, then bend the ankle/flex and point the toes. If appropriate, repeat with the other leg. Pass- Patient is able to demonstrate appropriate quad strength on intended weight bearing limb(s). Move onto Assessment Level 3. Assessment Level 3- Stand   1. Ask patient to elevate off the bed or chair (seated to standing) using an assistive device (cane, bedrail). 2. Patient should be able to raise buttocks off be and hold for a count of five. May repeat once. Pass- Patient maintains standing stability for at least 5 seconds, proceed to assessment level 4. Assessment Level 4- Walk   1. Ask patient to march in place at bedside. 2. Then ask patient to advance step and return each foot. Some medical conditions may render a patient from stepping backwards, use your best clinical judgement.    Pass- Patient demonstrates balance while shifting weight and ability to step, takes independent steps, does not use assistive device patient is MOBILITY LEVEL 4.       Mobility Level- 4

## 2022-11-08 NOTE — PLAN OF CARE
Problem: Discharge Planning  Goal: Discharge to home or other facility with appropriate resources  11/8/2022 1320 by Donny Krishnamurthy RN  Outcome: Completed  11/8/2022 1033 by Donny Krishnamurthy RN  Outcome: Progressing     Problem: Pain  Goal: Verbalizes/displays adequate comfort level or baseline comfort level  11/8/2022 1320 by Donny Krishnamurthy RN  Outcome: Completed  11/8/2022 1033 by Donny Krishnamurthy RN  Outcome: Progressing     Problem: Safety - Adult  Goal: Free from fall injury  11/8/2022 1320 by Donny Krishnamurthy RN  Outcome: Completed  11/8/2022 1033 by Donny Krishnamurthy RN  Outcome: Progressing     Problem: Chronic Conditions and Co-morbidities  Goal: Patient's chronic conditions and co-morbidity symptoms are monitored and maintained or improved  11/8/2022 1320 by Donny Krishnamurthy RN  Outcome: Completed  11/8/2022 1033 by Donny Krishnamurthy RN  Outcome: Progressing

## 2022-11-08 NOTE — CARE COORDINATION
DISCHARGE ORDER  Date/Time 2022 1:26 PM  Completed by: James Walsh RN, Case Management    Patient Name: Brittani Cid      : 1940  Admitting Diagnosis: Hypoxia [R09.02]  COPD exacerbation ([facility] [address] [J44.1]      Admit order Date and Status:2022 inpt  (verify MD's last order for status of admission)      Noted discharge order. If applicable PT/OT recommendation at Discharge: NA  DME recommendation by PT/OT:NA  Confirmed discharge plan: Yes  with whom__pt_____________  If pt confirmed DC plan does family need to be contacted by CM No if yes who______  Discharge Plan: Chart reviewed. Met with pt at bedside and he is returning home with dtr who provides  and pt wants to resume home care for SN with [facility]. TC to [facility]. spoke with Katheryne Canavan and she is aware pt will discharge home today, all needed documentation faxed to [phone]. No further dc needs voiced. Date of Last IMM Given: 2022    Reviewed chart. Role of discharge planner explained and patient verbalized understanding. Discharge order is noted. Has Home O2 in place on admit:  Yes  Informed of need to bring portable home O2 tank on day of discharge for nursing to connect prior to leaving:   Yes  Verbalized agreement/Understanding:   Yes  Pt is being d/c'd to home today. Pt's O2 sats are 94% on 3L. Discharge timeout done with Lois. All discharge needs and concerns addressed.

## 2022-11-08 NOTE — PROGRESS NOTES
Patient discharge instructions gone over at this time with patient and daughter. Verbalized understanding. Tele removed; cmu notified. Patient education on new medications and where to get them. Transport placed. Patient and belongings gathered.

## 2022-11-08 NOTE — PROGRESS NOTES
Pt in bed, eyes closed. No distress noted. Call light in reach. Will continue to monitor.    Leopoldo Briseno RN

## 2022-11-08 NOTE — DISCHARGE SUMMARY
Name:  Johnathan Somers  Room:  /0611-46  MRN:    [id number]    Discharge Summary      This discharge summary is in conjunction with a complete physical exam done on the day of discharge. Attending Physician: Dr. Mike Sandy  Discharging Physician: Dr. Bindu Wheeler: 11/6/2022  Discharge:  11/8/2022    HPI:  78 y.o. male with history of COPD on 3 lpm NC O2 at home, presents with a 1 week history of increasing SOB, cough. Cough has been productive of grey sputum. He denies difficulty swallowing, n/v/d, chills, diaphoresis or fever. He denies palpitations, chest pain, leg swelling. He presents tachycardic, requiring 4 lpm NC O2 but appears to be in no respiratory distress, awake, alert and oriented denying worsening sob. Diagnoses this Admission and Hospital Course:    #COPD exacerbation  #Acute on chronic hypoxic respiratory failure  -He is normally on home oxygen 3 L ; was  requiring 4 L. Weaned O2 as tolerated. -Continued IV solumedrol, HHN  Oxygen saturation stable on 3 L today ;  patient is feeling better today;cough and shortness of breath have improved. Eliud Cárdenas He can be discharged home on oral antibiotics and on a prednisone taper  He has follow-up with pulmonologist Dr. Meir garcia on 11/16/2022     # community-acquired pneumonia suspect strep pneumonia or other gram positive organism  -Continue IV abx-on Rocephin and Zithromax  DC on Po abx      # DM  Uncontrolled with hyperglycemia  - corrective ISS     # Afib  - continue eliquis, rhythmol     DVT Prophylaxis: eliquis    Procedures (Please Review Full Report for Details)  N/A    Consults    Pulmonology       Physical Exam at Discharge:    BP (!) 144/76   Pulse (!) 104   Temp 97.9 °F (36.6 °C) (Oral)   Resp 18   Ht 6' (1.829 m)   Wt 190 lb 6.4 oz (86.4 kg)   SpO2 94%   BMI 25.82 kg/m²   General:  Awake, alert, NAD  Skin:  Warm and dry  Neck:  JVD absent. Neck supple  Chest: Very diminished breath sounds,   no wheezes, rales or rhonchi. Cardiovascular:  RRR ,S1S2 normal  Abdomen:  Soft, non tender, non distended, BS +  Extremities:  No edema. Intact peripheral pulses. Brisk cap refill, < 2 secs  Neuro: non focal       CBC:   Recent Labs     11/06/22 0735 11/07/22 0445 11/08/22 0417   WBC 7.4 6.4 5.3   HGB 12.6* 12.0* 10.6*   HCT 37.0* 37.2* 32.2*   MCV 84.9 85.8 85.0    158 136     BMP:   Recent Labs     11/06/22 0735 11/07/22 0445 11/08/22 0417    133* 142   K 4.2 4.3 4.1   CL 94* 91* 96*   CO2 39* 32 38*   BUN 19 23* 29*   CREATININE 0.8 0.8 0.8     LIVER PROFILE:   Recent Labs     11/06/22 0735 11/07/22 0445 11/08/22 0417   AST 24 19 34   ALT 29 24 36   BILITOT 0.7 0.5 0.3   ALKPHOS 76 72 68     PT/INR:   Recent Labs     11/06/22 0735   PROTIME 14.1   INR 1.10       U/A:    Lab Results   Component Value Date/Time    COLORU Yellow 01/04/2021 09:55 PM    WBCUA 6-9 01/04/2021 09:55 PM    RBCUA 0-2 01/04/2021 09:55 PM    MUCUS Rare 01/04/2021 09:55 PM    BACTERIA Rare 12/14/2020 10:58 PM    CLARITYU SL CLOUDY 01/04/2021 09:55 PM    SPECGRAV 1.015 01/04/2021 09:55 PM    LEUKOCYTESUR TRACE 01/04/2021 09:55 PM    BLOODU TRACE-INTACT 01/04/2021 09:55 PM    GLUCOSEU Negative 01/04/2021 09:55 PM    AMORPHOUS 1+ 01/04/2021 09:55 PM       ABG    Lab Results   Component Value Date/Time    OOZ5NLM 31.1 01/08/2021 07:08 AM    BEART 4.1 01/08/2021 07:08 AM    M0OWYQWU 95.5 01/08/2021 07:08 AM    PHART 7.322 01/08/2021 07:08 AM    BQC6JEF 61.5 01/08/2021 07:08 AM    PO2ART 85.8 01/08/2021 07:08 AM    SQZ3MFB 33.0 01/08/2021 07:08 AM         CULTURES  COVID and flu not detected    RADIOLOGY  CT CHEST WO CONTRAST   Final Result   There is a subtle infiltrates seen within the right upper lobe posteriorly,   corresponding to the vague opacity seen on the chest radiograph. Findings   would be most compatible with pneumonia versus less likely aspiration.       Bronchial wall thickening, which may be seen in inflammatory conditions such   as bronchitis, reactive airways disease, pulmonary vascular congestion, and   smoking. Emphysema. XR CHEST PORTABLE   Final Result   Questionable vague opacity in the apical to mid right lung could be due to   summation of overlying tissues but could also be seen with pneumonia or   neoplasia. Discharge Medications     Medication List        START taking these medications      doxycycline hyclate 100 MG tablet  Commonly known as: VIBRA-TABS  Take 1 tablet by mouth 2 times daily for 5 days     predniSONE 10 MG tablet  Commonly known as: DELTASONE  Take 4 tabs a day for 3 days , then 3 tabs a day for 3 days,Then 2 tabs a day for 3 days ,Then 1 tab a day for 3 days.             CONTINUE taking these medications      acetaminophen 325 MG tablet  Commonly known as: Aminofen  Take 2 tablets by mouth every 4 hours as needed for Pain     apixaban 5 MG Tabs tablet  Commonly known as: Eliquis  Take 1 tablet by mouth 2 times daily     atorvastatin 10 MG tablet  Commonly known as: LIPITOR  Take 1 tablet by mouth daily     dilTIAZem 180 MG extended release capsule  Commonly known as: CARDIZEM CD  TAKE 1 CAPSULE BY MOUTH 1 TIME A DAY AS NEEDED FOR INCREASED HEART RATE     esomeprazole 40 MG delayed release capsule  Commonly known as: NEXIUM     fluticasone 50 MCG/ACT nasal spray  Commonly known as: FLONASE     gabapentin 800 MG tablet  Commonly known as: NEURONTIN     metFORMIN 500 MG tablet  Commonly known as: GLUCOPHAGE     OXYGEN     pioglitazone 30 MG tablet  Commonly known as: ACTOS     propafenone 150 MG tablet  Commonly known as: RYTHMOL  TAKE ONE TABLET BY MOUTH EVERY 8 HOURS     Trelegy Ellipta 100-62.5-25 MCG/INH Aepb  Generic drug: fluticasone-umeclidin-vilant  INHALE 1 PUFF EVERY DAY     * Ventolin  (90 Base) MCG/ACT inhaler  Generic drug: albuterol sulfate HFA  Inhale 2 puffs into the lungs every 6 hours as needed for Wheezing orShortness of Breath     * albuterol (2.5 MG/3ML) 0.083% nebulizer solution  Commonly known as: PROVENTIL  USE 1 VIAL IN NEBULIZER 4 TIMES DAILY           * This list has 2 medication(s) that are the same as other medications prescribed for you. Read the directions carefully, and ask your doctor or other care provider to review them with you. STOP taking these medications      alendronate 70 MG tablet  Commonly known as: FOSAMAX     ondansetron 4 MG disintegrating tablet  Commonly known as: Zofran ODT               Where to Get Your Medications        These medications were sent to [address] [place] [address] - F [phone] [address] [facility]      Phone: [phone]   doxycycline hyclate 100 MG tablet  predniSONE 10 MG tablet           Discharged in stable condition to home. Follow Up:   Follow up with PCP, Keshia Rizvi MD

## 2022-11-08 NOTE — CARE COORDINATION
[facility]  Received request from Rogers Burks CM [facility]. Pt is not currently active with [facility]. Ran MCR report. Pt is active with [facility] . [facility] confirm. [phone]. LM. Updated Jessica Elias. Tuyet Gutiérrez Electronically signed by Lm Bowser RN on 11/8/2022 at 12:06 PM

## 2022-11-08 NOTE — DISCHARGE INSTR - COC
Continuity of Care Form    Patient Name: Renetta Kramer   :  1940  MRN:  [id number]    Admit date:  2022  Discharge date:  22    Code Status Order: Full Code   Advance Directives:     Admitting Physician:  Patricio Sandoval MD  PCP: Raiza Shook MD    Discharging Nurse:  [address]/Room#: /2852-23  Discharging Unit Phone Number: [phone]    Emergency Contact:   Extended Emergency Contact Information  Primary Emergency Contact: [facility] [address] Phone: [phone]  Mobile Phone: [phone]  Relation: Child  Secondary Emergency Contact: [address] [facility] Phone: [phone]  Mobile Phone: [phone]  Relation: Child    Past Surgical History:  Past Surgical History:   Procedure Laterality Date    BACK SURGERY      repair broken back L4 & L5    CHOLECYSTECTOMY, LAPAROSCOPIC N/A 2021    LAPAROSCOPIC CHOLECYSTECTOMY, WITH CHOLANGIOGRAMS (ATTEMPTED) OPEN CHOLECYSTECTOMY performed by Livier Darling MD at [address] [place] [address], OPEN N/A 2021    LAPAROSCOPIC CHOLECYSTECTOMY, WITH CHOLANGIOGRAMS (ATTEMPTED)     CYSTOSCOPY Right 10/9/15    RIght Ureteroscopy, Holmium Laser Lithotripsy with Stone Removal, Right Stent Placement    CYSTOSCOPY  2019    CYSTOSCOPY, RESECTION OF BLADDER NECK, URETHRAL DILATION    CYSTOSCOPY W BIOPSY OF BLADDER N/A 2019    CYSTOSCOPY, RESECTION OF BLADDER NECK, URETHRAL DILATION performed by Jorge Marques MD at [address] [place] [address] Left 2016    cataract removal    GALLBLADDER SURGERY  2021    JOINT REPLACEMENT  2011    right knee    JOINT REPLACEMENT  2004    left knee    OTHER SURGICAL HISTORY  2015    wide excision basal cell carcinoma on the nose and bilateral auricles with frozen sections, plastic closure, full thickness skin graft    OTHER SURGICAL HISTORY  12/1/15    excision of lesion of lip    PROSTATE SURGERY TRANSURETHRAL RESECTION OF PROSTATE  10/23/2015    with cystoscopy       Immunization History:   Immunization History   Administered Date(s) Administered    Influenza Vaccine, unspecified formulation 11/18/2009, 10/14/2010, 11/08/2011, 09/21/2012, 10/18/2013, 10/07/2016    Influenza Virus Vaccine 10/12/2015, 10/07/2016, 10/18/2017    Influenza Whole 10/01/2010    Influenza, FLUARIX, FLULAVAL, FLUZONE (age 10 mo+) AND AFLURIA, (age 1 y+), PF, 0.5mL 11/01/2017, 08/28/2018    Influenza, High Dose (Fluzone 65 yrs and older) 09/18/2018, 09/13/2019, 10/01/2020    Pneumococcal Conjugate 13-valent (Wtlujla92) 08/02/2017, 02/14/2018    Pneumococcal Conjugate 7-valent (Prevnar7) 10/01/2006    Pneumococcal Polysaccharide ([id number]) 10/24/2015, 10/07/2016    Td vaccine (adult) 09/21/2012       Active Problems:  Patient Active Problem List   Diagnosis Code    HTN (hypertension) I10    COPD, severe ([facility] J44.9    DM2 (diabetes mellitus, type 2) ([facility] E11.9    HLD (hyperlipidemia) E78.5    Gallstones K80.20    PAF (paroxysmal atrial fibrillation) ([facility]) I48.0    Chronic respiratory failure with hypoxia and hypercapnia 2.5 L home O2 J96.11, J96.12    Acute respiratory insufficiency R06.89    Acute on chronic respiratory failure with hypercapnia ([facility]) J96.22    Sepsis ([facility]) A41.9    COPD exacerbation ([facility]) J44.1    CAD (coronary artery disease) I25.10    Former smoker Z87.891    Acute hyperglycemia R73.9    Chronic normocytic anemia D64.9    Chronic thrombocytopenia D69.6    Falls at home [address] XXXA, Y92.009    Ambulatory dysfunction R26.2    General weakness R53.1    Acute on chronic respiratory failure with hypoxia and hypercapnia ([facility]) J96.21, J96.22    Pleural effusion J90    Rapid atrial fibrillation ([facility]) I48.91    Atrial fibrillation with RVR ([facility]) I48.91    Acute respiratory failure with hypoxia and hypercapnia ([facility]) J96.01, J96.02    COPD with acute exacerbation ([facility]) J44.1    Acute encephalopathy G93.40    Hyperglycemia R73.9    Pneumonia of left lower lobe due to infectious organism J18.9    Acute calculous cholecystitis K80.00    Acute cholecystitis K81.0    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, 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    Closed compression fracture of body of L1 vertebra ([facility]) S32.010A    Hypomagnesemia E83.42    Abdominal aortic aneurysm (AAA) 30 to 34 mm in diameter I71.40    Elevated procalcitonin R79.89    Abdominal pain, right upper quadrant R10.11    Lactic acidosis E87.20    Elevated bilirubin R17    Bacteremia due to Klebsiella pneumoniae R78.81, B96.1    Paroxysmal atrial fibrillation ([facility]) I48.0    Moderate protein-calorie malnutrition ([facility]) E44.0    Debility R53.81    COVID-19 U07.1    Acute on chronic respiratory failure with hypoxemia ([facility]) J96.21    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 ([facility] L97.313    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.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, L98.499    Other specified peripheral vascular diseases ([facility]) I73.89    Hypoxia R09.02    Atrial fibrillation, controlled ([facility] I48.91    Community acquired pneumonia J18.9       Isolation/Infection:   Isolation            No Isolation          Patient Infection Status       Infection Onset Added Last Indicated Last Indicated By Review Planned Expiration Resolved Resolved By    None active    Resolved    COVID-19 (Rule Out) 22 COVID-19 (Ordered)   22 Rule-Out Test Resulted    COVID-19 (Rule Out) 22 COVID-19, Rapid (Ordered)   22 Rule-Out Test Resulted    COVID-19 22 COVID-19 & Influenza Combo   22     COVID-19 (Rule Out) 22 COVID-19 & Influenza Combo (Ordered)   22 Rule-Out Test Resulted    COVID-19 (Rule Out) 21 COVID-19 (Ordered)   21 Rule-Out Test Resulted    COVID-19 (Rule Out) 0121 COVID-19 (Ordered)   21 Rule-Out Test Resulted    COVID-19 (Rule Out) 12/15/20 12/15/20 12/15/20 COVID-19 (Ordered)   12/15/20 Rule-Out Test Resulted    COVID-19 (Rule Out) 04/16/20 04/16/20 04/16/20 COVID-19 (Ordered)   04/17/20 Rule-Out Test Resulted    COVID-19 (Rule Out) 04/01/20 04/01/20 04/01/20 Emergent Disease Panel (Ordered)   04/03/20 Tra Mederos RN    MDRO (multi-drug resistant organism)  03/13/17 03/13/17 Anuja Evans RN   01/01/18 Anuja Evans RN            Nurse Assessment:  Last Vital Signs: BP (!) 144/76   Pulse (!) 104   Temp 97.9 °F (36.6 °C) (Oral)   Resp 18   Ht 6' (1.829 m)   Wt 190 lb 6.4 oz (86.4 kg)   SpO2 94%   BMI 25.82 kg/m²     Last documented pain score (0-10 scale): Pain Level: 0  Last Weight:   Wt Readings from Last 1 Encounters:   11/08/22 190 lb 6.4 oz (86.4 kg)     Mental Status:  oriented and alert    IV Access:  - None    Nursing Mobility/ADLs:  Walking   Independent  Transfer  Independent  Bathing  Independent  Dressing  Independent  Toileting  Independent  Feeding  Independent  Med Admin  Independent  Med Delivery   whole    Wound Care Documentation and Therapy:  Incision 01/06/21 Abdomen Mid (Active)   Number of days: 670        Elimination:  Continence: Bowel: Yes  Bladder: Yes  Urinary Catheter: None   Colostomy/Ileostomy/Ileal Conduit: No       Date of Last BM: 11/7/22    Intake/Output Summary (Last 24 hours) at 11/8/2022 1147  Last data filed at 11/8/2022 0833  Gross per 24 hour   Intake 1680 ml   Output 2 ml   Net 1678 ml     I/O last 3 completed shifts: In: 36 [P.O.:2260]  Out: 2 [Urine:2]    Safety Concerns:      At Risk for Falls    Impairments/Disabilities:      None    Nutrition Therapy:  Current Nutrition Therapy:   - Oral Diet:  Carb Control 4 carbs/meal (1800kcals/day)    Routes of Feeding: Oral  Liquids: No Restrictions  Daily Fluid Restriction: no  Last Modified Barium Swallow with Video (Video Swallowing Test): not done    Treatments at the Time of Hospital Discharge:   Respiratory Treatments:   Oxygen Therapy:  is on oxygen at 3 L/min per nasal cannula. Ventilator:    - No ventilator support    Rehab Therapies: NA  Weight Bearing Status/Restrictions: No weight bearing restrictions  Other Medical Equipment (for information only, NOT a DME order):  NA  Other Treatments: NA    Patient's personal belongings (please select all that are sent with patient):  None    RN SIGNATURE:  Electronically signed by Jem Torres RN on 11/8/22 at 1:08 PM EST    CASE MANAGEMENT/SOCIAL WORK SECTION    Inpatient Status Date: 11/6/2022    Readmission Risk Assessment Score:  Readmission Risk              Risk of Unplanned Readmission:  17           Discharging to Facility/ Agency   Name: [facility]  Address:  Phone:[phone]  Fax:    Dialysis Facility (if applicable)   Name:  Address:  Dialysis Schedule:  Phone:  Fax:    / signature: Electronically signed by Lesley Vidales RN on 11/8/22 at 1:19 PM EST    PHYSICIAN SECTION    Prognosis: Fair    Condition at Discharge: Stable    Rehab Potential (if transferring to Rehab): Fair    Recommended Labs or Other Treatments After Discharge:     Physician Certification: I certify the above information and transfer of Leopold Moon  is necessary for the continuing treatment of the diagnosis listed and that he requires [address] for less 30 days.      Update Admission H&P: No change in H&P    PHYSICIAN SIGNATURE:  Electronically signed by Humberto Rowland MD on 11/8/22 at 11:47 AM EST

## 2022-11-08 NOTE — PROGRESS NOTES
RT Inhaler-Nebulizer Bronchodilator Protocol Note    There is a bronchodilator order in the chart from a provider indicating to follow the RT Bronchodilator Protocol and there is an Initiate RT Inhaler-Nebulizer Bronchodilator Protocol order as well (see protocol at bottom of note). CXR Findings:  XR CHEST PORTABLE    Result Date: 11/6/2022  Questionable vague opacity in the apical to mid right lung could be due to summation of overlying tissues but could also be seen with pneumonia or neoplasia. The findings from the last RT Protocol Assessment were as follows:   History Pulmonary Disease: (P) Chronic pulmonary disease  Respiratory Pattern: (P) Dyspnea on exertion or RR 21-25 bpm  Breath Sounds: (P) Slightly diminished and/or crackles  Cough: (P) Strong, spontaneous, non-productive  Indication for Bronchodilator Therapy: (P) Decreased or absent breath sounds  Bronchodilator Assessment Score: (P) 6    Aerosolized bronchodilator medication orders have been revised according to the RT Inhaler-Nebulizer Bronchodilator Protocol below. Respiratory Therapist to perform RT Therapy Protocol Assessment initially then follow the protocol. Repeat RT Therapy Protocol Assessment PRN for score 0-3 or on second treatment, BID, and PRN for scores above 3. No Indications - adjust the frequency to every 6 hours PRN wheezing or bronchospasm, if no treatments needed after 48 hours then discontinue using Per Protocol order mode. If indication present, adjust the RT bronchodilator orders based on the Bronchodilator Assessment Score as indicated below. Use Inhaler orders unless patient has one or more of the following: on home nebulizer, not able to hold breath for 10 seconds, is not alert and oriented, cannot activate and use MDI correctly, or respiratory rate 25 breaths per minute or more, then use the equivalent nebulizer order(s) with same Frequency and PRN reasons based on the score.   If a patient is on this medication at home then do not decrease Frequency below that used at home. 0-3 - enter or revise RT bronchodilator order(s) to equivalent RT Bronchodilator order with Frequency of every 4 hours PRN for wheezing or increased work of breathing using Per Protocol order mode. 4-6 - enter or revise RT Bronchodilator order(s) to two equivalent RT bronchodilator orders with one order with BID Frequency and one order with Frequency of every 4 hours PRN wheezing or increased work of breathing using Per Protocol order mode. 7-10 - enter or revise RT Bronchodilator order(s) to two equivalent RT bronchodilator orders with one order with TID Frequency and one order with Frequency of every 4 hours PRN wheezing or increased work of breathing using Per Protocol order mode. 11-13 - enter or revise RT Bronchodilator order(s) to one equivalent RT bronchodilator order with QID Frequency and an Albuterol order with Frequency of every 4 hours PRN wheezing or increased work of breathing using Per Protocol order mode. Greater than 13 - enter or revise RT Bronchodilator order(s) to one equivalent RT bronchodilator order with every 4 hours Frequency and an Albuterol order with Frequency of every 2 hours PRN wheezing or increased work of breathing using Per Protocol order mode.      Electronically signed by Madhavi Chaudhry RCP on 11/7/2022 at 8:15 PM

## 2022-11-08 NOTE — PROGRESS NOTES
RT Inhaler-Nebulizer Bronchodilator Protocol Note    There is a bronchodilator order in the chart from a provider indicating to follow the RT Bronchodilator Protocol and there is an Initiate RT Inhaler-Nebulizer Bronchodilator Protocol order as well (see protocol at bottom of note). CXR Findings:  XR CHEST PORTABLE    Result Date: 11/6/2022  Questionable vague opacity in the apical to mid right lung could be due to summation of overlying tissues but could also be seen with pneumonia or neoplasia. The findings from the last RT Protocol Assessment were as follows:   History Pulmonary Disease: Chronic pulmonary disease  Respiratory Pattern: Dyspnea on exertion or RR 21-25 bpm  Breath Sounds: Slightly diminished and/or crackles  Cough: Strong, spontaneous, non-productive  Indication for Bronchodilator Therapy: Decreased or absent breath sounds  Bronchodilator Assessment Score: 6    Aerosolized bronchodilator medication orders have been revised according to the RT Inhaler-Nebulizer Bronchodilator Protocol below. Respiratory Therapist to perform RT Therapy Protocol Assessment initially then follow the protocol. Repeat RT Therapy Protocol Assessment PRN for score 0-3 or on second treatment, BID, and PRN for scores above 3. No Indications - adjust the frequency to every 6 hours PRN wheezing or bronchospasm, if no treatments needed after 48 hours then discontinue using Per Protocol order mode. If indication present, adjust the RT bronchodilator orders based on the Bronchodilator Assessment Score as indicated below. Use Inhaler orders unless patient has one or more of the following: on home nebulizer, not able to hold breath for 10 seconds, is not alert and oriented, cannot activate and use MDI correctly, or respiratory rate 25 breaths per minute or more, then use the equivalent nebulizer order(s) with same Frequency and PRN reasons based on the score.   If a patient is on this medication at home then do not decrease Frequency below that used at home. 0-3 - enter or revise RT bronchodilator order(s) to equivalent RT Bronchodilator order with Frequency of every 4 hours PRN for wheezing or increased work of breathing using Per Protocol order mode. 4-6 - enter or revise RT Bronchodilator order(s) to two equivalent RT bronchodilator orders with one order with BID Frequency and one order with Frequency of every 4 hours PRN wheezing or increased work of breathing using Per Protocol order mode. 7-10 - enter or revise RT Bronchodilator order(s) to two equivalent RT bronchodilator orders with one order with TID Frequency and one order with Frequency of every 4 hours PRN wheezing or increased work of breathing using Per Protocol order mode. 11-13 - enter or revise RT Bronchodilator order(s) to one equivalent RT bronchodilator order with QID Frequency and an Albuterol order with Frequency of every 4 hours PRN wheezing or increased work of breathing using Per Protocol order mode. Greater than 13 - enter or revise RT Bronchodilator order(s) to one equivalent RT bronchodilator order with every 4 hours Frequency and an Albuterol order with Frequency of every 2 hours PRN wheezing or increased work of breathing using Per Protocol order mode.        Electronically signed by Paco Carbajal RCP on 11/8/2022 at 7:27 AM

## 2022-11-11 LAB
BLOOD CULTURE, ROUTINE: NORMAL
CULTURE, BLOOD 2: NORMAL

## 2022-11-16 ENCOUNTER — TELEPHONE (OUTPATIENT)
Dept: PULMONOLOGY | Age: 82
End: 2022-11-16

## 2022-11-16 NOTE — TELEPHONE ENCOUNTER
Daughter Jo Ann Jimenez cancelled pt appointment on 11/16/22 with Dr. John Every for 6 month COPD. Reason: something came up    Patient did reschedule appointment. Appointment rescheduled for 1/27/23. Last OV 9/2/21:    ASSESSMENT:  Very Severe COPD   Chronic hypoxic respiratory failure  SUTTON due to the above  Chronic Thrombcytopenia  Not addressed: Afib, DM2     PLAN:   Continue Trelegy  Albuterol nebs q4hr PRN  Prn albuterol nebs   Supplemental O2 to 3lpm with exertion  Pulmonary rehab declined.    Pneumovax 23 and prevnar 13 UTD;  flu vaccine last year,  But refused COVID vaccine  F/u in 6 months

## 2022-12-01 RX ORDER — FLUTICASONE FUROATE, UMECLIDINIUM BROMIDE AND VILANTEROL TRIFENATATE 100; 62.5; 25 UG/1; UG/1; UG/1
POWDER RESPIRATORY (INHALATION)
Qty: 60 EACH | Refills: 0 | Status: SHIPPED | OUTPATIENT
Start: 2022-12-01

## 2022-12-06 NOTE — PROGRESS NOTES
[address] Office Note  12/7/2022     Subjective:  Mr. Maggy Whittington is here for follow up for Paroxysmal atrial fibrillation. HTN HLD   He has COPD chronic resp failure home O2 3L/min   C/o No cardiac complaints today. [place]:  LOV 10/5/21 Stopped Aspirin    Admitted 2/5-2/7/22 PNA due to Covid+. Admitted 11/6-11/8/22 SOB, COPD exacerbation, PNA suspect strep PNA or other gram positive organism. Today, his daughter Valencia Grant is present. He reports his heart rate at home ranges from  bpm.  He takes  diltiazem prn at home that he only takes when his heart rate reaches a certain amount. He uses 3L chronic O2. Patient denies current edema, chest pain, sob, palpitations, dizziness or syncope. Patient is taking all cardiac medications as prescribed and tolerates them well. Patient is not vaccinated against Covid. Covid+ 1/2022    HPI:   followed with DR. Tello for AAA measuring 3.1 cm noted on CT in 1/4/2021    Admitted to the hospital 4/7/20  presented to the hospital with complaints of  Shortness of breath. treated for acute exacerbation of COPD. Last night he went in to afib RVR. known to have PAF documented in 2015  Readmission to [facility] 4/16/20 with c/o SOB. He has PMH of PAF on eliquis dx 10/15, HTN, DM, HLD, and severe COPD on 3.5L [place] home oxygen. Note admitted to [facility] 10/8/15 with kidney stone treated with cysto/laser removal by Dr. Juvenal Schirmer. He developed asymptomatic afib. He has a [id number] score of 4. Placed on eliquis for [facility]. 12 point ROS negative in all areas as listed below except as in [address], EENT, Cardiovascular, pulmonary, GI, , Musculoskeletal, skin, neurological, hematological, endocrine, Psychiatric    Reviewed past medical history, social, and family history. Smoking none  Alcohol none  Family history patient does not know details due to time elapsed.   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    ELAINE on CPAP     Osteoporosis     Skin cancer of face      Past Surgical History:   Procedure Laterality Date    BACK SURGERY      repair broken back L4 & L5    CHOLECYSTECTOMY, LAPAROSCOPIC N/A 1/6/2021    LAPAROSCOPIC CHOLECYSTECTOMY, WITH CHOLANGIOGRAMS (ATTEMPTED) OPEN CHOLECYSTECTOMY performed by Roddy Bay MD at [address] [place] [address], OPEN N/A 01/06/2021    LAPAROSCOPIC CHOLECYSTECTOMY, WITH CHOLANGIOGRAMS (ATTEMPTED)     CYSTOSCOPY Right 10/9/15    RIght Ureteroscopy, Holmium Laser Lithotripsy with Stone Removal, Right Stent Placement    CYSTOSCOPY  05/01/2019    CYSTOSCOPY, RESECTION OF BLADDER NECK, URETHRAL DILATION    CYSTOSCOPY W BIOPSY OF BLADDER N/A 5/1/2019    CYSTOSCOPY, RESECTION OF BLADDER NECK, URETHRAL DILATION performed by Tanisha Raphael MD at [address] [place] [address] Left 6/12/2016    cataract removal    GALLBLADDER SURGERY  01/2021    JOINT REPLACEMENT  6/29/2011    right knee    JOINT REPLACEMENT  11/2004    left knee    OTHER SURGICAL HISTORY  08/31/2015    wide excision basal cell carcinoma on the nose and bilateral auricles with frozen sections, plastic closure, full thickness skin graft    OTHER SURGICAL HISTORY  12/1/15    excision of lesion of lip    PROSTATE SURGERY      TURP  10/23/2015    with cystoscopy       Objective:   /78   Pulse (!) 106   Ht 5' 10.5\" (1.791 m)   Wt 195 lb (88.5 kg)   SpO2 96%   BMI 27.58 kg/m²     Wt Readings from Last 3 Encounters:   12/07/22 195 lb (88.5 kg)   11/08/22 190 lb 6.4 oz (86.4 kg)   04/26/22 199 lb 6.4 oz (90.4 kg)       Physical Exam:  General: No Respiratory distress, appears well developed and well nourished.    Eyes:  Sclera nonicteric frequent deep congested  moist cough non productive  Nose/Sinuses:  negative findings: nose shows no deformity, asymmetry, or inflammation, nasal mucosa normal, septum midline with no perforation or bleeding  Back:  no pain to palpation  Joint:  no active joint inflammation  Musculoskeletal:  negative  Skin:  Warm and dry bruises ecchymoses on exposed areas  Neck:  Negative for JVD and Carotid Bruits. Chest:  Clear to auscultation, respiration easy  Cardiovascular:   bpm, S1S2  distant , no murmur, no rub or thrill. Extremities:   trace ankle edema, clubbing, cyanosis,  Neuro: intact    Medications:   Outpatient Encounter Medications as of 12/7/2022   Medication Sig Dispense Refill    dilTIAZem (CARDIZEM CD) 180 MG extended release capsule Take 1 capsule by mouth daily 90 capsule 3    fluticasone-umeclidin-vilant (TRELEGY ELLIPTA) 100-62.5-25 MCG/ACT AEPB inhaler INHALE 1 PUFF EVERY DAY 60 each 0    predniSONE (DELTASONE) 10 MG tablet Take 4 tabs a day for 3 days , then 3 tabs a day for 3 days,Then 2 tabs a day for 3 days ,Then 1 tab a day for 3 days.  30 tablet 0    albuterol (PROVENTIL) (2.5 MG/3ML) 0.083% nebulizer solution USE 1 VIAL IN NEBULIZER 4 TIMES DAILY 120 each 0    atorvastatin (LIPITOR) 10 MG tablet Take 1 tablet by mouth daily 30 tablet 1    propafenone (RYTHMOL) 150 MG tablet TAKE ONE TABLET BY MOUTH EVERY 8 HOURS 90 tablet 2    apixaban (ELIQUIS) 5 MG TABS tablet Take 1 tablet by mouth 2 times daily 60 tablet 2    [DISCONTINUED] dilTIAZem (CARDIZEM CD) 180 MG extended release capsule TAKE 1 CAPSULE BY MOUTH 1 TIME A DAY AS NEEDED FOR INCREASED HEART RATE 30 capsule 2    VENTOLIN  (90 Base) MCG/ACT inhaler Inhale 2 puffs into the lungs every 6 hours as needed for Wheezing orShortness of Breath 18 g 5    acetaminophen (AMINOFEN) 325 MG tablet Take 2 tablets by mouth every 4 hours as needed for Pain 120 tablet 0    fluticasone (FLONASE) 50 MCG/ACT nasal spray 2 sprays by Nasal route daily       pioglitazone (ACTOS) 30 MG tablet Take 30 mg by mouth daily      gabapentin (NEURONTIN) 800 MG tablet Take 800 mg by mouth 3 times daily. OXYGEN Inhale 2.5 L into the lungs nightly At 3.5lpm on 2/8/2022      metformin (GLUCOPHAGE) 500 MG tablet Take 500 mg by mouth 4 times daily       esomeprazole (NEXIUM) 40 MG capsule   Take 40 mg by mouth every morning (before breakfast) Indications: take dos        No facility-administered encounter medications on file as of 12/7/2022. Lab Data:  CBC: No results for input(s): WBC, HGB, HCT, MCV, PLT in the last 72 hours. BMP: No results for input(s): NA, K, CL, CO2, PHOS, BUN, CREATININE, CA in the last 72 hours. LIVER PROFILE: No results for input(s): AST, ALT, LIPASE, BILIDIR, BILITOT, ALKPHOS in the last 72 hours. Invalid input(s): AMYLASE,  ALB  LIPID:   Lab Results   Component Value Date    CHOL 113 07/03/2021     Lab Results   Component Value Date    TRIG 99 07/03/2021     Lab Results   Component Value Date    HDL 37 07/03/2021     Lab Results   Component Value Date    LDLCALC 57 07/03/2021     Lab Results   Component Value Date    VLDL 19 07/03/2021     No results found for: CHOLHDLRATIO  PT/INR: No results for input(s): PROTIME, INR in the last 72 hours. A1C:   Lab Results   Component Value Date    LABA1C 7.5 02/06/2022     BNP:  No results for input(s): BNP in the last 72 hours. IMAGING:   CT Chest 11/7/22  There is a subtle infiltrates seen within the right upper lobe posteriorly, corresponding to the vague opacity seen on the chest radiograph. Findings would be most compatible with pneumonia versus less likely aspiration. Bronchial wall thickening, which may be seen in inflammatory conditions such as bronchitis, reactive airways disease, pulmonary vascular congestion, and smoking. Emphysema.      CXR 11/6/22  Questionable vague opacity in the apical to mid right lung could be due to summation of overlying tissues but could also be seen with pneumonia or neoplasia. EKG 11/6/22  Sinus tachycardia     10.5.21 EKG   NSR first degree AV block     VL DUPLEX BLE 3/30/2021  Normal bilateral lower extremity ankle brachial indices. Normal bilateral lower extremity arterial Doppler ultrasound. No significant limitation in arterial blood flow in the right and left lower extremities. CTA: Chest/Abd 1/4/2021    Impression   1. CTA CHEST: No acute vascular abnormality; no aneurysm or dissection. 2.  Pulmonary sequela typical of that seen with smoking, including COPD. 3. Chronic mild T6 compression fracture. 4. CTA ABDOMEN/PELVIS: No acute vascular abnormality; no aneurysm leak or   dissection. 5. 3.0 cm abdominal aortic aneurysm. Recommend follow-up every 3 years. EKG 1/4/2021  Sinus tachycardiaNonspecific T wave abnormalityAbnormal  bpm    ECHO 4/1/2020  Summary  Left ventricular systolic function is normal with ejection fraction  estimated at 55-60 %. No regional wall motion abnormalities are noted. The left ventricular size and wall thickness were not well visualized for measurement. Normal left ventricular diastolic filling pressure. The right atrium is mildly dilated. Mild mitral regurgitation. Aortic valve sclerosis without aortic stenosis. Mild tricuspid regurgitation. Normal systolic pulmonary artery pressure (SPAP) estimated at 36 mmHg (RA pressure 8 mmHg). Assessment:  Dean Pedroza was seen today for 1 year follow up, atrial fibrillation, hypertension, hyperlipidemia, other and coronary artery disease. Diagnoses and all orders for this visit:    PAF (paroxysmal atrial fibrillation) ([facility] [address]    Primary hypertension    Atherosclerotic ulcer of aorta (HCC)    Abdominal aortic aneurysm (AAA) 30 to 34 mm in diameter    Mixed hyperlipidemia    Former smoker    Medication management  -     CBC with Auto Differential; Future  -     Comprehensive Metabolic Panel;  Future  -     TSH with Reflex to FT4; Future  -     Lipid Panel; Future    Other orders  -     dilTIAZem (CARDIZEM CD) 180 MG extended release capsule; Take 1 capsule by mouth daily        BP is controlled continue diltiazem 180 mg daily  Rhythm of heart is regular and sinus on EKG from November when admitted for pneumonia  He is both rate controlled rhythm controlled with Propafenone EKG shows normal QRS duration  He is fully anticoagulated  No need for aspirin since he has not had  Any recent vascular intervention. Continue statin no recent lipids in epic I ordered new labs    Plan:  1. Current epic labs reviewed with patient  2. Current medications reviewed. Refills given as warranted. 3. Increase Diltiazem 180 mg to everyday instead of as needed   -monitor your heart rate at home high 60's for heart rate is ok. 4. Repeat blood work about a week before you see me in 6 months   -make sure you are fasting   -CBC, CMP, TSH, and fasting lipids (8 hours) ordered. 5. No cardiac testing at this time. Follow up with me in 6 months    This note is scribed in the presence of Dr. Everett Nielsen MD by Pranay Torre RN.    I, Dr. Gaby Caputo, personally performed the services described in this documentation, as scribed by the above signed scribe in my presence. It is both accurate and complete to my knowledge. I agree with the details independently gathered by the clinical support staff, while the remaining scribed note accurately describes my personal service to the patient.         Everett Nielsen MD  [facility]  [phone] [facility] office  [phone] [facility]

## 2022-12-07 ENCOUNTER — OFFICE VISIT (OUTPATIENT)
Dept: CARDIOLOGY CLINIC | Age: 82
End: 2022-12-07
Payer: MEDICARE

## 2022-12-07 VITALS
DIASTOLIC BLOOD PRESSURE: 78 MMHG | BODY MASS INDEX: 27.3 KG/M2 | OXYGEN SATURATION: 96 % | WEIGHT: 195 LBS | SYSTOLIC BLOOD PRESSURE: 135 MMHG | HEIGHT: 71 IN | HEART RATE: 106 BPM

## 2022-12-07 DIAGNOSIS — I10 PRIMARY HYPERTENSION: Chronic | ICD-10-CM

## 2022-12-07 DIAGNOSIS — I70.0 ATHEROSCLEROTIC ULCER OF AORTA (HCC): ICD-10-CM

## 2022-12-07 DIAGNOSIS — Z79.899 MEDICATION MANAGEMENT: ICD-10-CM

## 2022-12-07 DIAGNOSIS — Z87.891 FORMER SMOKER: Chronic | ICD-10-CM

## 2022-12-07 DIAGNOSIS — I71.9 ATHEROSCLEROTIC ULCER OF AORTA (HCC): ICD-10-CM

## 2022-12-07 DIAGNOSIS — I71.40 ABDOMINAL AORTIC ANEURYSM (AAA) 30 TO 34 MM IN DIAMETER: ICD-10-CM

## 2022-12-07 DIAGNOSIS — E78.2 MIXED HYPERLIPIDEMIA: Chronic | ICD-10-CM

## 2022-12-07 DIAGNOSIS — I48.0 PAF (PAROXYSMAL ATRIAL FIBRILLATION) (HCC): Primary | Chronic | ICD-10-CM

## 2022-12-07 PROCEDURE — 1036F TOBACCO NON-USER: CPT | Performed by: INTERNAL MEDICINE

## 2022-12-07 PROCEDURE — G8427 DOCREV CUR MEDS BY ELIG CLIN: HCPCS | Performed by: INTERNAL MEDICINE

## 2022-12-07 PROCEDURE — 99214 OFFICE O/P EST MOD 30 MIN: CPT | Performed by: INTERNAL MEDICINE

## 2022-12-07 PROCEDURE — G8484 FLU IMMUNIZE NO ADMIN: HCPCS | Performed by: INTERNAL MEDICINE

## 2022-12-07 PROCEDURE — 1123F ACP DISCUSS/DSCN MKR DOCD: CPT | Performed by: INTERNAL MEDICINE

## 2022-12-07 PROCEDURE — 1111F DSCHRG MED/CURRENT MED MERGE: CPT | Performed by: INTERNAL MEDICINE

## 2022-12-07 PROCEDURE — 3074F SYST BP LT 130 MM HG: CPT | Performed by: INTERNAL MEDICINE

## 2022-12-07 PROCEDURE — 3078F DIAST BP <80 MM HG: CPT | Performed by: INTERNAL MEDICINE

## 2022-12-07 PROCEDURE — G8417 CALC BMI ABV UP PARAM F/U: HCPCS | Performed by: INTERNAL MEDICINE

## 2022-12-07 RX ORDER — DILTIAZEM HYDROCHLORIDE 180 MG/1
180 CAPSULE, COATED, EXTENDED RELEASE ORAL DAILY
Qty: 90 CAPSULE | Refills: 3 | Status: SHIPPED | OUTPATIENT
Start: 2022-12-07

## 2022-12-07 NOTE — LETTER
[facility] [address]  Phone: [phone]  Fax: [phone]    Zo Shafer MD    December 7, 2022     Sterling Broussard MD  [address] [place] [address] [address] [facility]    Patient: Thony Bean   MR Number: [id number]   YOB: 1940   Date of Visit: 12/7/2022       Dear Sterling Broussard:    Thank you for referring Elda Padilla to me for evaluation/treatment. Below are the relevant portions of my assessment and plan of care. If you have questions, please do not hesitate to call me. I look forward to following Giovanna Watson along with you.     Sincerely,      Zo Shafer MD

## 2022-12-07 NOTE — PATIENT INSTRUCTIONS
Plan:  1. Current epic labs reviewed with patient  2. Current medications reviewed. Refills given as warranted. 3. Increase Diltiazem 180 mg to everyday instead of as needed   -monitor your heart rate at home high 60's for heart rate is ok. 4. Repeat blood work about a week before you see me in 6 months   -make sure you are fasting   -CBC, CMP, TSH, and fasting lipids (8 hours) ordered. 5. No cardiac testing at this time.     Follow up with me in 6 months

## 2023-01-01 ENCOUNTER — TELEPHONE (OUTPATIENT)
Dept: PULMONOLOGY | Age: 83
End: 2023-01-01

## 2023-01-01 DIAGNOSIS — J96.11 CHRONIC RESPIRATORY FAILURE WITH HYPOXIA AND HYPERCAPNIA (HCC): ICD-10-CM

## 2023-01-01 DIAGNOSIS — J96.12 CHRONIC RESPIRATORY FAILURE WITH HYPOXIA AND HYPERCAPNIA (HCC): ICD-10-CM

## 2023-01-01 DIAGNOSIS — C34.90 SQUAMOUS CELL CARCINOMA OF LUNG, UNSPECIFIED LATERALITY (HCC): Primary | ICD-10-CM

## 2023-01-01 DIAGNOSIS — C32.0 SQUAMOUS CELL CARCINOMA OF VOCAL CORD (HCC): ICD-10-CM

## 2023-01-01 RX ORDER — FLUTICASONE FUROATE, UMECLIDINIUM BROMIDE AND VILANTEROL TRIFENATATE 100; 62.5; 25 UG/1; UG/1; UG/1
POWDER RESPIRATORY (INHALATION)
Qty: 60 EACH | Refills: 0 | Status: SHIPPED | OUTPATIENT
Start: 2023-01-01

## 2023-01-01 RX ORDER — ALBUTEROL SULFATE 90 UG/1
AEROSOL, METERED RESPIRATORY (INHALATION)
Qty: 18 G | Refills: 0 | OUTPATIENT
Start: 2023-01-01

## 2023-01-02 ENCOUNTER — HOSPITAL ENCOUNTER (OUTPATIENT)
Age: 83
Discharge: HOME | End: 2023-01-02
Payer: MEDICARE

## 2023-01-02 ENCOUNTER — HOSPITAL ENCOUNTER (OUTPATIENT)
Dept: GENERAL RADIOLOGY | Age: 83
Discharge: HOME | End: 2023-01-02
Payer: MEDICARE

## 2023-01-02 DIAGNOSIS — R04.2 BLOOD IN SPUTUM: ICD-10-CM

## 2023-01-02 PROCEDURE — 71046 X-RAY EXAM CHEST 2 VIEWS: CPT

## 2023-01-05 RX ORDER — PROPAFENONE HYDROCHLORIDE 150 MG/1
TABLET, FILM COATED ORAL
Qty: 270 TABLET | Refills: 3 | Status: SHIPPED | OUTPATIENT
Start: 2023-01-05

## 2023-01-05 RX ORDER — ATORVASTATIN CALCIUM 10 MG/1
10 TABLET, FILM COATED ORAL DAILY
Qty: 90 TABLET | Refills: 2 | Status: SHIPPED | OUTPATIENT
Start: 2023-01-05

## 2023-01-24 DIAGNOSIS — J44.9 CHRONIC OBSTRUCTIVE PULMONARY DISEASE, UNSPECIFIED COPD TYPE (HCC): Primary | ICD-10-CM

## 2023-01-24 RX ORDER — FLUTICASONE FUROATE, UMECLIDINIUM BROMIDE AND VILANTEROL TRIFENATATE 100; 62.5; 25 UG/1; UG/1; UG/1
POWDER RESPIRATORY (INHALATION)
Qty: 1 EACH | Refills: 0 | Status: SHIPPED | OUTPATIENT
Start: 2023-01-24

## 2023-01-27 ENCOUNTER — OFFICE VISIT (OUTPATIENT)
Dept: PULMONOLOGY | Age: 83
End: 2023-01-27

## 2023-01-27 VITALS
DIASTOLIC BLOOD PRESSURE: 66 MMHG | WEIGHT: 194 LBS | OXYGEN SATURATION: 97 % | HEIGHT: 71 IN | BODY MASS INDEX: 27.16 KG/M2 | SYSTOLIC BLOOD PRESSURE: 118 MMHG | HEART RATE: 94 BPM | RESPIRATION RATE: 24 BRPM

## 2023-01-27 DIAGNOSIS — J44.9 CHRONIC OBSTRUCTIVE PULMONARY DISEASE, UNSPECIFIED COPD TYPE (HCC): Primary | ICD-10-CM

## 2023-01-27 RX ORDER — ALENDRONATE SODIUM 70 MG/1
70 TABLET ORAL
COMMUNITY

## 2023-01-27 RX ORDER — ROFLUMILAST 250 UG/1
250 TABLET ORAL DAILY
Qty: 28 TABLET | Refills: 1 | Status: SHIPPED | OUTPATIENT
Start: 2023-01-27

## 2023-01-27 NOTE — PROGRESS NOTES
P  [facility]                                               Pulmonary Clinic Consult     I had the pleasure of seeing  Susan Ross     Chief Complaint   Patient presents with    COPD     6 month          HISTORY OF PRESENT ILLNESS:    Susan Ross is a 78y.o. year old  Who smoke for n40 year and has 50-60 PPy smoking hsitory   She  quit smoking 2000    The Patient comes in with SOB that has been going on the last 10 years   Associated with .some cough  She  states that it get worse with exercise or walking long distance and he can walk . 50 feet .  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    ELAINE on CPAP     Osteoporosis     Skin cancer of face        MEDICATIONS:   Current Outpatient Medications   Medication Sig Dispense Refill    alendronate (FOSAMAX) 70 MG tablet Take 70 mg by mouth every 7 days      TRELEGY ELLIPTA 100-62.5-25 MCG/ACT AEPB inhaler INHALE 1 PUFF BY MOUTH EVERY DAY 1 each 0    propafenone (RYTHMOL) 150 MG tablet TAKE ONE TABLET BY MOUTH EVERY 8 HOURS 270 tablet 3    apixaban (ELIQUIS) 5 MG TABS tablet Take 1 tablet by mouth 2 times daily 60 tablet 10    atorvastatin (LIPITOR) 10 MG tablet Take 1 tablet by mouth daily 90 tablet 2    dilTIAZem (CARDIZEM CD) 180 MG extended release capsule Take 1 capsule by mouth daily 90 capsule 3    albuterol (PROVENTIL) (2.5 MG/3ML) 0.083% nebulizer solution USE 1 VIAL IN NEBULIZER 4 TIMES DAILY 120 each 0    acetaminophen (AMINOFEN) 325 MG tablet Take 2 tablets by mouth every 4 hours as needed for Pain 120 tablet 0    fluticasone (FLONASE) 50 MCG/ACT nasal spray 2 sprays by Nasal route daily       pioglitazone (ACTOS) 30 MG tablet Take 30 mg by mouth daily      gabapentin (NEURONTIN) 800 MG tablet Take 800 mg by mouth 3 times daily. metformin (GLUCOPHAGE) 500 MG tablet Take 500 mg by mouth 4 times daily       esomeprazole (NEXIUM) 40 MG capsule   Take 40 mg by mouth every morning (before breakfast) Indications: take dos       VENTOLIN  (90 Base) MCG/ACT inhaler Inhale 2 puffs into the lungs every 6 hours as needed for Wheezing orShortness of Breath 18 g 5    OXYGEN Inhale 2.5 L into the lungs nightly At 3.5lpm on 2022       No current facility-administered medications for this visit.        SOCIAL AND OCCUPATIONAL HEALTH:  Social History     Tobacco Use   Smoking Status Former    Packs/day: 1.50    Years: 45.00    Pack years: 67.50    Types: Cigarettes    Quit date: 2005    Years since quittin.6   Smokeless Tobacco Never     TB :no   Pets   Industrial exposure:  Birds :    SURGICAL HISTORY:   Past Surgical History:   Procedure Laterality Date    BACK SURGERY      repair broken back L4 & L5    CHOLECYSTECTOMY, LAPAROSCOPIC N/A 2021    LAPAROSCOPIC CHOLECYSTECTOMY, WITH CHOLANGIOGRAMS (ATTEMPTED) OPEN CHOLECYSTECTOMY performed by Janessa Cotter MD at [address] [place] [address], OPEN N/A 2021    LAPAROSCOPIC CHOLECYSTECTOMY, WITH CHOLANGIOGRAMS (ATTEMPTED)     CYSTOSCOPY Right 10/9/15    RIght Ureteroscopy, Holmium Laser Lithotripsy with Stone Removal, Right Stent Placement    CYSTOSCOPY  2019    CYSTOSCOPY, RESECTION OF BLADDER NECK, URETHRAL DILATION    CYSTOSCOPY W BIOPSY OF BLADDER N/A 2019    CYSTOSCOPY, RESECTION OF BLADDER NECK, URETHRAL DILATION performed by Chloe Santamaria MD at [facility]    EYE SURGERY Left 6/12/2016    cataract removal    GALLBLADDER SURGERY  01/2021    JOINT REPLACEMENT  6/29/2011    right knee    JOINT REPLACEMENT  11/2004    left knee    OTHER SURGICAL HISTORY  08/31/2015    wide excision basal cell carcinoma on the nose and bilateral auricles with frozen sections, plastic closure, full thickness skin graft    OTHER SURGICAL HISTORY  12/1/15    excision of lesion of lip    PROSTATE SURGERY      TURP  10/23/2015    with cystoscopy       FAMILY HISTORY:   Lung cancer:no   DVT or PE no     REVIEW OF SYSTEMS:  Constitutional: General health is good . There has been no weight changes. No fevers, fatigue or weakness. Head: Patient denies any history of trauma, convulsive disorder or syncope. Skin:  Patient denies history of changes in pigmentation, eruptions or pruritus. No easy bruising or bleeding. EENT: no nasal congestion   Cardiovascular ,No chest pain ,No edema ,  Respiration:SOB: + ,SUTTON :+  Gastrointestinal:No GI bleed ,no melena  ,no hematemesis    Musculoskeletal: no joint pain ,no swelling  Neurological:no , syncope. Denies twitching, convulsions, loss of consciousness or memory. Endocrine:  . No history of goiter, exophthalmos or dryness of skin. The patient has no history of diabetes. Hematopoietic:  No history of bleeding disorders or easy bruising. Rheumatic:  No connective tissue disease history or polyarthritis/inflammatory joint disease. PHYSICAL EXAMINATION:  Vitals:    01/27/23 0938   BP: 118/66   Pulse: 94   Resp: 24   SpO2: 97%     Constitutional: This is a well developed, well nourished 78y.o. year old male who is alert, oriented, cooperative and in no apparent distress. Head was normocephalic and atraumatic. EENT: Mallampati class :  Extraocular muscles intact. External canals are patent and no discharge was appreciated. Septum was midline,   mucosa was without erythema, exudates or cobblestoning. No thrush was noted. Neck: Supple without thyromegaly. No elevated JVP. Trachea was midline. No carotid bruits were auscultated. Respiratory: scattered rhonchi     Cardiovascular: Regular without murmur, clicks, gallops or rubs. There is no left or right ventricular heave. Pulses:  Carotid, radial and femoral pulses were equally bilaterally. Abdomen: Slightly rounded and soft without organomegaly. No rebound, rigidity or guarding was appreciated. Lymphatic: No lymphadenopathy. Musculoskeletal: no joint . Extremities: no edema  Skin:  Warm and dry. Good color, turgor and pigmentation. No lesions or scars. Neurological/Psychiatric: The patient's general behavior, level of consciousness, thought content and emotional status is normal.  Cranial nerves II-XII are intact. DATA:      PFTs 7/13/17  FVC  (35%) FEV1 0.57 (18%)  FEV1/FVC ratio 37  TLC  (146%)  RV  (300%)   DLCO (25%) Bronchodilator response:+++    6MWT: 360ft, 3lpm        IMPRESSION:    1-very severe copd   2-chronic respiratory failure   3-cough   4-A fib on elquis               PLAN:      -keep O2 at 3 L   -CT in Nov ,no nodules ,or mass will do ct as needed,discuss with daughter will hold for now  -declined pulmonary rehab   -CXR next visit  _ start daliresp and will increase as tolerated      Flu and Pneumovax as per primary     Thank you for allowing me to participate in [facility]. I will keep following with you ,should you have any concerns ,please contact us at [place] pulmonary office     Sincerely,        Rosina Gan MD  Pulmonary & Critical Care Medicine     NOTE: This report was transcribed using voice recognition software. Every effort was made to ensure accuracy; however, inadvertent computerized transcription errors may be present.

## 2023-02-12 ENCOUNTER — APPOINTMENT (OUTPATIENT)
Dept: CT IMAGING | Age: 83
DRG: 871 | End: 2023-02-12
Payer: MEDICARE

## 2023-02-12 ENCOUNTER — APPOINTMENT (OUTPATIENT)
Dept: GENERAL RADIOLOGY | Age: 83
DRG: 871 | End: 2023-02-12
Payer: MEDICARE

## 2023-02-12 ENCOUNTER — HOSPITAL ENCOUNTER (INPATIENT)
Age: 83
LOS: 3 days | Discharge: HOME | DRG: 871 | End: 2023-02-15
Attending: EMERGENCY MEDICINE | Admitting: INTERNAL MEDICINE
Payer: MEDICARE

## 2023-02-12 DIAGNOSIS — J96.22 ACUTE ON CHRONIC RESPIRATORY FAILURE WITH HYPOXIA AND HYPERCAPNIA (HCC): Primary | ICD-10-CM

## 2023-02-12 DIAGNOSIS — I95.9 HYPOTENSION, UNSPECIFIED HYPOTENSION TYPE: ICD-10-CM

## 2023-02-12 DIAGNOSIS — R04.2 HEMOPTYSIS: ICD-10-CM

## 2023-02-12 DIAGNOSIS — J69.0 ASPIRATION PNEUMONIA OF BOTH LUNGS, UNSPECIFIED ASPIRATION PNEUMONIA TYPE, UNSPECIFIED PART OF LUNG (HCC): ICD-10-CM

## 2023-02-12 DIAGNOSIS — J96.21 ACUTE ON CHRONIC RESPIRATORY FAILURE WITH HYPOXIA AND HYPERCAPNIA (HCC): Primary | ICD-10-CM

## 2023-02-12 PROBLEM — J96.00 ACUTE RESPIRATORY FAILURE (HCC): Status: ACTIVE | Noted: 2023-02-12

## 2023-02-12 LAB
A/G RATIO: 1.5 (ref 1.1–2.2)
ALBUMIN SERPL-MCNC: 4.4 G/DL (ref 3.4–5)
ALP BLD-CCNC: 89 U/L (ref 40–129)
ALT SERPL-CCNC: 11 U/L (ref 10–40)
ANION GAP SERPL CALCULATED.3IONS-SCNC: 4 MMOL/L (ref 3–16)
APPEARANCE BAL (LAVAGE): ABNORMAL
AST SERPL-CCNC: 14 U/L (ref 15–37)
BASE EXCESS ARTERIAL: 3.5 MMOL/L (ref -3–3)
BASE EXCESS VENOUS: 2.9 MMOL/L (ref -3–3)
BASOPHILS ABSOLUTE: 0 K/UL (ref 0–0.2)
BASOPHILS RELATIVE PERCENT: 0.2 %
BILIRUB SERPL-MCNC: 0.8 MG/DL (ref 0–1)
BILIRUBIN URINE: NEGATIVE
BLOOD, URINE: NEGATIVE
BUN BLDV-MCNC: 25 MG/DL (ref 7–20)
CALCIUM SERPL-MCNC: 9.5 MG/DL (ref 8.3–10.6)
CARBOXYHEMOGLOBIN ARTERIAL: 1.2 % (ref 0–1.5)
CARBOXYHEMOGLOBIN: 3.7 % (ref 0–1.5)
CHLORIDE BLD-SCNC: 94 MMOL/L (ref 99–110)
CLARITY: CLEAR
CLOT EVALUATION BAL: ABNORMAL
CO2: 39 MMOL/L (ref 21–32)
COLOR LAVAGE: ABNORMAL
COLOR: YELLOW
CREAT SERPL-MCNC: 1.2 MG/DL (ref 0.8–1.3)
EKG ATRIAL RATE: 119 BPM
EKG DIAGNOSIS: NORMAL
EKG Q-T INTERVAL: 272 MS
EKG QRS DURATION: 82 MS
EKG QTC CALCULATION (BAZETT): 382 MS
EKG R AXIS: -81 DEGREES
EKG T AXIS: 268 DEGREES
EKG VENTRICULAR RATE: 119 BPM
EOSINOPHILS ABSOLUTE: 0 K/UL (ref 0–0.6)
EOSINOPHILS RELATIVE PERCENT: 0.1 %
GFR SERPL CREATININE-BSD FRML MDRD: 60 ML/MIN/{1.73_M2}
GLUCOSE BLD-MCNC: 131 MG/DL (ref 70–99)
GLUCOSE BLD-MCNC: 152 MG/DL (ref 70–99)
GLUCOSE BLD-MCNC: 154 MG/DL (ref 70–99)
GLUCOSE URINE: NEGATIVE MG/DL
HCO3 ARTERIAL: 29.2 MMOL/L (ref 21–29)
HCO3 VENOUS: 33.6 MMOL/L (ref 23–29)
HCT VFR BLD CALC: 33.8 % (ref 40.5–52.5)
HEMOGLOBIN, ART, EXTENDED: 11.1 G/DL (ref 13.5–17.5)
HEMOGLOBIN: 11.1 G/DL (ref 13.5–17.5)
INR BLD: 1.32 (ref 0.87–1.14)
KETONES, URINE: 40 MG/DL
LACTIC ACID: 1 MMOL/L (ref 0.4–2)
LEUKOCYTE ESTERASE, URINE: NEGATIVE
LYMPHOCYTES ABSOLUTE: 1.8 K/UL (ref 1–5.1)
LYMPHOCYTES RELATIVE PERCENT: 20.3 %
LYMPHOCYTES, BAL: 2 % (ref 5–10)
MACROPHAGES, BAL: 7 % (ref 90–95)
MAGNESIUM: 1.9 MG/DL (ref 1.8–2.4)
MCH RBC QN AUTO: 28.1 PG (ref 26–34)
MCHC RBC AUTO-ENTMCNC: 32.9 G/DL (ref 31–36)
MCV RBC AUTO: 85.3 FL (ref 80–100)
METHEMOGLOBIN ARTERIAL: 0.3 %
METHEMOGLOBIN VENOUS: 0.3 %
MICROSCOPIC EXAMINATION: ABNORMAL
MONOCYTES ABSOLUTE: 0.9 K/UL (ref 0–1.3)
MONOCYTES RELATIVE PERCENT: 10.5 %
MONONUCLEAR UNIDENTIFIED CELLS FLUID BAL: 2 %
NEUTROPHILS ABSOLUTE: 6.2 K/UL (ref 1.7–7.7)
NEUTROPHILS RELATIVE PERCENT: 68.9 %
NITRITE, URINE: NEGATIVE
NUMBER OF CELLS COUNTED BAL (LAVAGE): 100
O2 CONTENT, VEN: 16 VOL %
O2 SAT, ARTERIAL: 85.8 %
O2 SAT, VEN: 93 %
O2 THERAPY: ABNORMAL
O2 THERAPY: ABNORMAL
PCO2 ARTERIAL: 49.4 MMHG (ref 35–45)
PCO2, VEN: 89.7 MMHG (ref 40–50)
PDW BLD-RTO: 15.6 % (ref 12.4–15.4)
PERFORMED ON: ABNORMAL
PERFORMED ON: ABNORMAL
PH ARTERIAL: 7.39 (ref 7.35–7.45)
PH UA: 6 (ref 5–8)
PH VENOUS: 7.19 (ref 7.35–7.45)
PLATELET # BLD: 159 K/UL (ref 135–450)
PMV BLD AUTO: 8.6 FL (ref 5–10.5)
PO2 ARTERIAL: 51.6 MMHG (ref 75–108)
PO2, VEN: 83.3 MMHG (ref 25–40)
POTASSIUM REFLEX MAGNESIUM: 4.5 MMOL/L (ref 3.5–5.1)
PROTEIN UA: NEGATIVE MG/DL
PROTHROMBIN TIME: 16.2 SEC (ref 11.7–14.5)
RBC # BLD: 3.96 M/UL (ref 4.2–5.9)
RBC, BAL: 8300 /CUMM
SARS-COV-2, NAAT: NOT DETECTED
SEGMENTED NEUTROPHILS, BAL: 89 % (ref 5–10)
SODIUM BLD-SCNC: 137 MMOL/L (ref 136–145)
SPECIFIC GRAVITY UA: 1.01 (ref 1–1.03)
TCO2 ARTERIAL: 30.7 MMOL/L
TCO2 CALC VENOUS: 36 MMOL/L
TOTAL PROTEIN: 7.3 G/DL (ref 6.4–8.2)
TROPONIN: <0.01 NG/ML
URINE REFLEX TO CULTURE: ABNORMAL
URINE TYPE: ABNORMAL
UROBILINOGEN, URINE: 0.2 E.U./DL
WBC # BLD: 8.9 K/UL (ref 4–11)
WBC/EPI CELLS BAL: 2755 /CUMM

## 2023-02-12 PROCEDURE — 87181 SC STD AGAR DILUTION PER AGT: CPT

## 2023-02-12 PROCEDURE — 0B948ZZ DRAINAGE OF RIGHT UPPER LOBE BRONCHUS, VIA NATURAL OR ARTIFICIAL OPENING ENDOSCOPIC: ICD-10-PCS | Performed by: INTERNAL MEDICINE

## 2023-02-12 PROCEDURE — 83735 ASSAY OF MAGNESIUM: CPT

## 2023-02-12 PROCEDURE — 88305 TISSUE EXAM BY PATHOLOGIST: CPT

## 2023-02-12 PROCEDURE — 6360000002 HC RX W HCPCS: Performed by: INTERNAL MEDICINE

## 2023-02-12 PROCEDURE — 0BH17EZ INSERTION OF ENDOTRACHEAL AIRWAY INTO TRACHEA, VIA NATURAL OR ARTIFICIAL OPENING: ICD-10-PCS | Performed by: EMERGENCY MEDICINE

## 2023-02-12 PROCEDURE — 31645 BRNCHSC W/THER ASPIR 1ST: CPT | Performed by: INTERNAL MEDICINE

## 2023-02-12 PROCEDURE — 89220 SPUTUM SPECIMEN COLLECTION: CPT

## 2023-02-12 PROCEDURE — 0B9D8ZX DRAINAGE OF RIGHT MIDDLE LUNG LOBE, VIA NATURAL OR ARTIFICIAL OPENING ENDOSCOPIC, DIAGNOSTIC: ICD-10-PCS | Performed by: INTERNAL MEDICINE

## 2023-02-12 PROCEDURE — 87070 CULTURE OTHR SPECIMN AEROBIC: CPT

## 2023-02-12 PROCEDURE — 85025 COMPLETE CBC W/AUTO DIFF WBC: CPT

## 2023-02-12 PROCEDURE — 2700000000 HC OXYGEN THERAPY PER DAY

## 2023-02-12 PROCEDURE — 80053 COMPREHEN METABOLIC PANEL: CPT

## 2023-02-12 PROCEDURE — 85610 PROTHROMBIN TIME: CPT

## 2023-02-12 PROCEDURE — 71260 CT THORAX DX C+: CPT | Performed by: EMERGENCY MEDICINE

## 2023-02-12 PROCEDURE — 99285 EMERGENCY DEPT VISIT HI MDM: CPT

## 2023-02-12 PROCEDURE — 94660 CPAP INITIATION&MGMT: CPT

## 2023-02-12 PROCEDURE — 96366 THER/PROPH/DIAG IV INF ADDON: CPT

## 2023-02-12 PROCEDURE — 99291 CRITICAL CARE FIRST HOUR: CPT | Performed by: INTERNAL MEDICINE

## 2023-02-12 PROCEDURE — 89051 BODY FLUID CELL COUNT: CPT

## 2023-02-12 PROCEDURE — 96365 THER/PROPH/DIAG IV INF INIT: CPT

## 2023-02-12 PROCEDURE — 71045 X-RAY EXAM CHEST 1 VIEW: CPT

## 2023-02-12 PROCEDURE — 87205 SMEAR GRAM STAIN: CPT

## 2023-02-12 PROCEDURE — 2500000003 HC RX 250 WO HCPCS

## 2023-02-12 PROCEDURE — 87186 SC STD MICRODIL/AGAR DIL: CPT

## 2023-02-12 PROCEDURE — 82803 BLOOD GASES ANY COMBINATION: CPT

## 2023-02-12 PROCEDURE — 5A1935Z RESPIRATORY VENTILATION, LESS THAN 24 CONSECUTIVE HOURS: ICD-10-PCS | Performed by: EMERGENCY MEDICINE

## 2023-02-12 PROCEDURE — 31500 INSERT EMERGENCY AIRWAY: CPT

## 2023-02-12 PROCEDURE — 83605 ASSAY OF LACTIC ACID: CPT

## 2023-02-12 PROCEDURE — 94761 N-INVAS EAR/PLS OXIMETRY MLT: CPT

## 2023-02-12 PROCEDURE — 31624 DX BRONCHOSCOPE/LAVAGE: CPT | Performed by: INTERNAL MEDICINE

## 2023-02-12 PROCEDURE — 2580000003 HC RX 258: Performed by: EMERGENCY MEDICINE

## 2023-02-12 PROCEDURE — 2000000000 HC ICU R&B

## 2023-02-12 PROCEDURE — 96375 TX/PRO/DX INJ NEW DRUG ADDON: CPT

## 2023-02-12 PROCEDURE — 31622 DX BRONCHOSCOPE/WASH: CPT

## 2023-02-12 PROCEDURE — 6370000000 HC RX 637 (ALT 250 FOR IP): Performed by: EMERGENCY MEDICINE

## 2023-02-12 PROCEDURE — 93005 ELECTROCARDIOGRAM TRACING: CPT | Performed by: EMERGENCY MEDICINE

## 2023-02-12 PROCEDURE — 6360000002 HC RX W HCPCS: Performed by: EMERGENCY MEDICINE

## 2023-02-12 PROCEDURE — 0B958ZZ DRAINAGE OF RIGHT MIDDLE LOBE BRONCHUS, VIA NATURAL OR ARTIFICIAL OPENING ENDOSCOPIC: ICD-10-PCS | Performed by: INTERNAL MEDICINE

## 2023-02-12 PROCEDURE — 88112 CYTOPATH CELL ENHANCE TECH: CPT

## 2023-02-12 PROCEDURE — 96361 HYDRATE IV INFUSION ADD-ON: CPT

## 2023-02-12 PROCEDURE — 2580000003 HC RX 258: Performed by: INTERNAL MEDICINE

## 2023-02-12 PROCEDURE — 93010 ELECTROCARDIOGRAM REPORT: CPT | Performed by: INTERNAL MEDICINE

## 2023-02-12 PROCEDURE — 87040 BLOOD CULTURE FOR BACTERIA: CPT

## 2023-02-12 PROCEDURE — 87635 SARS-COV-2 COVID-19 AMP PRB: CPT

## 2023-02-12 PROCEDURE — 2500000003 HC RX 250 WO HCPCS: Performed by: EMERGENCY MEDICINE

## 2023-02-12 PROCEDURE — 88342 IMHCHEM/IMCYTCHM 1ST ANTB: CPT

## 2023-02-12 PROCEDURE — 0B968ZZ DRAINAGE OF RIGHT LOWER LOBE BRONCHUS, VIA NATURAL OR ARTIFICIAL OPENING ENDOSCOPIC: ICD-10-PCS | Performed by: INTERNAL MEDICINE

## 2023-02-12 PROCEDURE — 6360000002 HC RX W HCPCS

## 2023-02-12 PROCEDURE — 36415 COLL VENOUS BLD VENIPUNCTURE: CPT

## 2023-02-12 PROCEDURE — 94002 VENT MGMT INPAT INIT DAY: CPT

## 2023-02-12 PROCEDURE — 6360000004 HC RX CONTRAST MEDICATION: Performed by: EMERGENCY MEDICINE

## 2023-02-12 PROCEDURE — 81003 URINALYSIS AUTO W/O SCOPE: CPT

## 2023-02-12 PROCEDURE — 36556 INSERT NON-TUNNEL CV CATH: CPT

## 2023-02-12 PROCEDURE — 87077 CULTURE AEROBIC IDENTIFY: CPT

## 2023-02-12 PROCEDURE — 84484 ASSAY OF TROPONIN QUANT: CPT

## 2023-02-12 RX ORDER — PROPAFENONE HYDROCHLORIDE 150 MG/1
150 TABLET, FILM COATED ORAL EVERY 8 HOURS SCHEDULED
Status: DISCONTINUED | OUTPATIENT
Start: 2023-02-12 | End: 2023-02-15 | Stop reason: HOSPADM

## 2023-02-12 RX ORDER — EPINEPHRINE 0.1 MG/ML
0.02 SYRINGE (ML) INJECTION ONCE
Status: DISCONTINUED | OUTPATIENT
Start: 2023-02-12 | End: 2023-02-12

## 2023-02-12 RX ORDER — ALBUTEROL SULFATE 2.5 MG/3ML
2.5 SOLUTION RESPIRATORY (INHALATION)
Status: DISCONTINUED | OUTPATIENT
Start: 2023-02-12 | End: 2023-02-13

## 2023-02-12 RX ORDER — ONDANSETRON 2 MG/ML
4 INJECTION INTRAMUSCULAR; INTRAVENOUS EVERY 6 HOURS PRN
Status: DISCONTINUED | OUTPATIENT
Start: 2023-02-12 | End: 2023-02-15 | Stop reason: HOSPADM

## 2023-02-12 RX ORDER — ACETAMINOPHEN 325 MG/1
650 TABLET ORAL EVERY 6 HOURS PRN
Status: DISCONTINUED | OUTPATIENT
Start: 2023-02-12 | End: 2023-02-15 | Stop reason: HOSPADM

## 2023-02-12 RX ORDER — ROCURONIUM BROMIDE 10 MG/ML
1 INJECTION, SOLUTION INTRAVENOUS ONCE
Status: COMPLETED | OUTPATIENT
Start: 2023-02-12 | End: 2023-02-12

## 2023-02-12 RX ORDER — INSULIN LISPRO 100 [IU]/ML
0-4 INJECTION, SOLUTION INTRAVENOUS; SUBCUTANEOUS NIGHTLY
Status: DISCONTINUED | OUTPATIENT
Start: 2023-02-12 | End: 2023-02-15 | Stop reason: HOSPADM

## 2023-02-12 RX ORDER — PROPOFOL 10 MG/ML
5-50 INJECTION, EMULSION INTRAVENOUS ONCE
Status: COMPLETED | OUTPATIENT
Start: 2023-02-12 | End: 2023-02-12

## 2023-02-12 RX ORDER — DEXTROSE MONOHYDRATE 100 MG/ML
INJECTION, SOLUTION INTRAVENOUS CONTINUOUS PRN
Status: DISCONTINUED | OUTPATIENT
Start: 2023-02-12 | End: 2023-02-15 | Stop reason: HOSPADM

## 2023-02-12 RX ORDER — LIDOCAINE HYDROCHLORIDE 40 MG/ML
SOLUTION TOPICAL
Status: DISCONTINUED
Start: 2023-02-12 | End: 2023-02-12

## 2023-02-12 RX ORDER — PROPOFOL 10 MG/ML
5-50 INJECTION, EMULSION INTRAVENOUS CONTINUOUS
Status: DISCONTINUED | OUTPATIENT
Start: 2023-02-12 | End: 2023-02-14

## 2023-02-12 RX ORDER — INSULIN LISPRO 100 [IU]/ML
0-4 INJECTION, SOLUTION INTRAVENOUS; SUBCUTANEOUS
Status: DISCONTINUED | OUTPATIENT
Start: 2023-02-13 | End: 2023-02-15 | Stop reason: HOSPADM

## 2023-02-12 RX ORDER — ONDANSETRON 4 MG/1
4 TABLET, ORALLY DISINTEGRATING ORAL EVERY 8 HOURS PRN
Status: DISCONTINUED | OUTPATIENT
Start: 2023-02-12 | End: 2023-02-15 | Stop reason: HOSPADM

## 2023-02-12 RX ORDER — PROPOFOL 10 MG/ML
INJECTION, EMULSION INTRAVENOUS
Status: COMPLETED
Start: 2023-02-12 | End: 2023-02-12

## 2023-02-12 RX ORDER — METHYLPREDNISOLONE SODIUM SUCCINATE 40 MG/ML
40 INJECTION, POWDER, LYOPHILIZED, FOR SOLUTION INTRAMUSCULAR; INTRAVENOUS EVERY 12 HOURS
Status: DISCONTINUED | OUTPATIENT
Start: 2023-02-12 | End: 2023-02-15

## 2023-02-12 RX ORDER — 0.9 % SODIUM CHLORIDE 0.9 %
1000 INTRAVENOUS SOLUTION INTRAVENOUS ONCE
Status: COMPLETED | OUTPATIENT
Start: 2023-02-12 | End: 2023-02-12

## 2023-02-12 RX ORDER — MIDAZOLAM HYDROCHLORIDE 1 MG/ML
2 INJECTION INTRAMUSCULAR; INTRAVENOUS ONCE
Status: COMPLETED | OUTPATIENT
Start: 2023-02-12 | End: 2023-02-12

## 2023-02-12 RX ORDER — ETOMIDATE 2 MG/ML
0.3 INJECTION INTRAVENOUS ONCE
Status: COMPLETED | OUTPATIENT
Start: 2023-02-12 | End: 2023-02-12

## 2023-02-12 RX ORDER — SODIUM CHLORIDE 0.9 % (FLUSH) 0.9 %
5-40 SYRINGE (ML) INJECTION PRN
Status: DISCONTINUED | OUTPATIENT
Start: 2023-02-12 | End: 2023-02-15 | Stop reason: HOSPADM

## 2023-02-12 RX ORDER — LIDOCAINE HYDROCHLORIDE 10 MG/ML
INJECTION, SOLUTION INFILTRATION; PERINEURAL
Status: DISCONTINUED
Start: 2023-02-12 | End: 2023-02-12 | Stop reason: WASHOUT

## 2023-02-12 RX ORDER — SODIUM CHLORIDE 9 MG/ML
INJECTION, SOLUTION INTRAVENOUS PRN
Status: DISCONTINUED | OUTPATIENT
Start: 2023-02-12 | End: 2023-02-15 | Stop reason: HOSPADM

## 2023-02-12 RX ORDER — SODIUM CHLORIDE 0.9 % (FLUSH) 0.9 %
5-40 SYRINGE (ML) INJECTION EVERY 12 HOURS SCHEDULED
Status: DISCONTINUED | OUTPATIENT
Start: 2023-02-12 | End: 2023-02-15 | Stop reason: HOSPADM

## 2023-02-12 RX ORDER — ACETAMINOPHEN 650 MG/1
650 SUPPOSITORY RECTAL EVERY 6 HOURS PRN
Status: DISCONTINUED | OUTPATIENT
Start: 2023-02-12 | End: 2023-02-15 | Stop reason: HOSPADM

## 2023-02-12 RX ORDER — IPRATROPIUM BROMIDE AND ALBUTEROL SULFATE 2.5; .5 MG/3ML; MG/3ML
1 SOLUTION RESPIRATORY (INHALATION)
Status: COMPLETED | OUTPATIENT
Start: 2023-02-12 | End: 2023-02-12

## 2023-02-12 RX ORDER — POLYETHYLENE GLYCOL 3350 17 G/17G
17 POWDER, FOR SOLUTION ORAL DAILY PRN
Status: DISCONTINUED | OUTPATIENT
Start: 2023-02-12 | End: 2023-02-15 | Stop reason: HOSPADM

## 2023-02-12 RX ADMIN — Medication 10 ML: at 19:32

## 2023-02-12 RX ADMIN — SODIUM CHLORIDE 5 ML/HR: 9 INJECTION, SOLUTION INTRAVENOUS at 20:06

## 2023-02-12 RX ADMIN — AZITHROMYCIN DIHYDRATE 500 MG: 500 INJECTION, POWDER, LYOPHILIZED, FOR SOLUTION INTRAVENOUS at 20:54

## 2023-02-12 RX ADMIN — CEFEPIME 2000 MG: 2 INJECTION, POWDER, FOR SOLUTION INTRAVENOUS at 11:44

## 2023-02-12 RX ADMIN — METHYLPREDNISOLONE SODIUM SUCCINATE 40 MG: 40 INJECTION, POWDER, FOR SOLUTION INTRAMUSCULAR; INTRAVENOUS at 20:50

## 2023-02-12 RX ADMIN — PROPOFOL 30 MCG/KG/MIN: 10 INJECTION, EMULSION INTRAVENOUS at 23:29

## 2023-02-12 RX ADMIN — ROCURONIUM BROMIDE 87 MG: 10 INJECTION, SOLUTION INTRAVENOUS at 13:42

## 2023-02-12 RX ADMIN — IPRATROPIUM BROMIDE AND ALBUTEROL SULFATE 1 AMPULE: 2.5; .5 SOLUTION RESPIRATORY (INHALATION) at 11:59

## 2023-02-12 RX ADMIN — MIDAZOLAM HYDROCHLORIDE 2 MG: 2 INJECTION, SOLUTION INTRAMUSCULAR; INTRAVENOUS at 15:27

## 2023-02-12 RX ADMIN — NOREPINEPHRINE BITARTRATE 5 MCG/MIN: 1 INJECTION, SOLUTION, CONCENTRATE INTRAVENOUS at 16:10

## 2023-02-12 RX ADMIN — IOPAMIDOL 85 ML: 755 INJECTION, SOLUTION INTRAVENOUS at 14:44

## 2023-02-12 RX ADMIN — PROPOFOL INJECTABLE EMULSION 20 MCG/KG/MIN: 10 INJECTION, EMULSION INTRAVENOUS at 14:25

## 2023-02-12 RX ADMIN — CEFTRIAXONE SODIUM 1000 MG: 1 INJECTION, POWDER, FOR SOLUTION INTRAMUSCULAR; INTRAVENOUS at 20:11

## 2023-02-12 RX ADMIN — ETOMIDATE INJECTION 26 MG: 2 SOLUTION INTRAVENOUS at 13:41

## 2023-02-12 RX ADMIN — SODIUM CHLORIDE 1000 ML: 9 INJECTION, SOLUTION INTRAVENOUS at 11:45

## 2023-02-12 RX ADMIN — SODIUM CHLORIDE 1000 ML: 9 INJECTION, SOLUTION INTRAVENOUS at 15:35

## 2023-02-12 RX ADMIN — PROPOFOL 1000 MG: 10 INJECTION, EMULSION INTRAVENOUS at 18:58

## 2023-02-12 ASSESSMENT — PULMONARY FUNCTION TESTS
PIF_VALUE: 22
PIF_VALUE: 22
PIF_VALUE: 25

## 2023-02-12 ASSESSMENT — ENCOUNTER SYMPTOMS
ABDOMINAL PAIN: 0
DIARRHEA: 0
EYE DISCHARGE: 0
SORE THROAT: 0
BACK PAIN: 0
COUGH: 0
VOMITING: 0
SHORTNESS OF BREATH: 1
NAUSEA: 0

## 2023-02-12 ASSESSMENT — PAIN - FUNCTIONAL ASSESSMENT: PAIN_FUNCTIONAL_ASSESSMENT: NONE - DENIES PAIN

## 2023-02-12 NOTE — PROGRESS NOTES
Transported pt to ICU-6 via portable ventilator. No complications. Placed pt back on original vent settings.

## 2023-02-12 NOTE — ED NOTES
Pt's oxygen steadily 90% and intermittently dropping down to 89%. Provider notified and verbal orders to increase oxygen to 60% on BiPAP. Oxygen increased, family educated.      Remberto Valenzuela RN  02/12/23 1178

## 2023-02-12 NOTE — PROGRESS NOTES
4 Eyes Skin Assessment     The patient is being assess for   Admission    I agree that 2 RN's have performed a thorough Head to Toe Skin Assessment on the patient. ALL assessment sites listed below have been assessed. Areas assessed for pressure by both nurses:   [x]   Head, Face, and Ears   [x]   Shoulders, Back, and Chest, Abdomen  [x]   Arms, Elbows, and Hands   [x]   Coccyx, Sacrum, and Ischium  [x]   Legs, Feet, and Heels        Skin Assessed Under all Medical Devices by both nurses:  ETT, vázquez, OG, and securement devices              All Mepilex Borders were peeled back and area peeked at by both nurses:  No: na  Please list where Mepilex Borders are located:  placed mepilex to coccyx: Shear to buttock vs stage 2             **SHARE this note so that the co-signing nurse is able to place an eSignature**    Co-signer eSignature: {Esignature:[id number]}    Does the Patient have Skin Breakdown related to pressure?   Yes LDA WOUND CARE was Initiated documentation include the Paula-wound, Wound Assessment, Measurements, Dressing Treatment, Drainage, and Color\",           Santiago Prevention initiated:  Yes   Wound Care Orders initiated:  Yes      [id number]  nurse consulted for Pressure Injury (Stage 3,4, Unstageable, DTI, NWPT, Complex wounds)and New or Established Ostomies:  No      Primary Nurse eSignature: Electronically signed by Nabil Polo RN on 2/12/23 at 6:45 PM EST

## 2023-02-12 NOTE — ED NOTES
Family to bedside at this time. Provider in room updating family on pt condition and plan of care.      Remy Gardner RN  02/12/23 6847

## 2023-02-12 NOTE — PROGRESS NOTES
Assisted MMICKEY with bronchoscopy. Pt placed on PCV 15 and 100% Fio2 during procedure. Pt. barbara well and without complication. Returned to previous settings after procedure. Sent samples of RML and bronchial wash. Used 60 ml for sample and wash.

## 2023-02-12 NOTE — CONSULTS
Reason for referral and CC:     HISTORY OF PRESENT ILLNESS: 79 yo male with a h/o COPD, CAD, DM2 presented with a c/o hemoptysis. He reports gagging and vomiting at home and since then having hemoptysis the last three days. + SOB. He has had to turn up his O2. He became lethargic in the ED and failed Bipap and was intubated. Required frequent suctioning of thick bloody mucus. CT showed debris in the airway. Trouble keeping up O2 sats even on 100% Fio2 in the ED. I was requested to come to ED for bronchoscopy. No food particles seen but thick bloody secretions were cleared.      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    ELAINE on CPAP     Osteoporosis     Skin cancer of face      Past Surgical History:   Procedure Laterality Date    BACK SURGERY      repair broken back L4 & L5    CHOLECYSTECTOMY, LAPAROSCOPIC N/A 1/6/2021    LAPAROSCOPIC CHOLECYSTECTOMY, WITH CHOLANGIOGRAMS (ATTEMPTED) OPEN CHOLECYSTECTOMY performed by Earl Nieto MD at [address] [place] [address], OPEN N/A 01/06/2021    LAPAROSCOPIC CHOLECYSTECTOMY, WITH CHOLANGIOGRAMS (ATTEMPTED)     CYSTOSCOPY Right 10/9/15    RIght Ureteroscopy, Holmium Laser Lithotripsy with Stone Removal, Right Stent Placement    CYSTOSCOPY  05/01/2019    CYSTOSCOPY, RESECTION OF BLADDER NECK, URETHRAL DILATION    CYSTOSCOPY W BIOPSY OF BLADDER N/A 5/1/2019    CYSTOSCOPY, RESECTION OF BLADDER NECK, URETHRAL DILATION performed by Gina Barlow MD at [address] Left 6/12/2016    cataract removal    GALLBLADDER SURGERY  01/2021    JOINT REPLACEMENT  6/29/2011    right knee    JOINT REPLACEMENT  11/2004    left knee OTHER SURGICAL HISTORY  08/31/2015    wide excision basal cell carcinoma on the nose and bilateral auricles with frozen sections, plastic closure, full thickness skin graft    OTHER SURGICAL HISTORY  12/1/15    excision of lesion of lip    PROSTATE SURGERY      TURP  10/23/2015    with cystoscopy     Family History  family history includes Cancer in his brother, father, and mother; Diabetes in his mother and sister; Heart Disease in his mother. Social History:  reports that he quit smoking about 17 years ago. His smoking use included cigarettes. He has a 67.50 pack-year smoking history. He has never used smokeless tobacco.   reports no history of alcohol use. ALLERGIES:  Patient has No Known Allergies. Continuous Infusions:    Scheduled Meds:   sodium chloride  1,000 mL IntraVENous Once     PHYSICAL EXAM: BP 89/72   Pulse (!) 118   Temp 98.6 °F (37 °C) (Oral)   Resp 19   Ht 6' (1.829 m)   Wt 191 lb (86.6 kg)   SpO2 (!) 87%   BMI 25.90 kg/m²  on vnt  Constitutional:  No acute distress. Eyes: PERRL. Conjunctivae anicteric. ENT: Normal nose. ETT in place  Neck:  Trachea is midline. No thyroid tenderness. Respiratory: No accessory muscle usage. decreased breath sounds. No wheezes. No rales. + Rhonchi. Cardiovascular: Normal S1S2. No digit clubbing. No digit cyanosis. No LE edema. Capillary refill is normal  Gastrointestinal: No mass palpated. No tenderness palpated. Skin: Dry appearing. No rash on the exposed extremities. No Nodules or induration on exposed extremities. Psychiatric: No anxiety or Agitation. Sedated on vent.   CBC:   Recent Labs     02/12/23  1115   WBC 8.9   HGB 11.1*   HCT 33.8*   MCV 85.3        BMP:   Recent Labs     02/12/23  1115      K 4.5   CL 94*   CO2 39*   BUN 25*   CREATININE 1.2        Recent Labs     02/12/23  1115   AST 14*   ALT 11   BILITOT 0.8   ALKPHOS 89     Recent Labs     02/12/23  1115   PROTIME 16.2*   INR 1.32*     No results for input(s): NITRITE, COLORU, PHUR, LABCAST, WBCUA, RBCUA, MUCUS, TRICHOMONAS, YEAST, BACTERIA, CLARITYU, SPECGRAV, LEUKOCYTESUR, UROBILINOGEN, BILIRUBINUR, BLOODU, GLUCOSEU, AMORPHOUS in the last 72 hours. Invalid input(s): Daniela Aden  Recent Labs     02/12/23  1455   PHART 7.389   QHD9HOQ 49.4*   PO2ART 51.6*       Chest imaging was reviewed by me and showed 2/12/23 CTPA:   Endotracheal tube extends to the right mainstem bronchus. Recommend   repositioning. Extensive airway filling defects are seen within the right greater than left   mainstem bronchi, bronchus intermedius, right upper, middle, lower lobe   bronchi, likely reflecting aspiration. Heterogeneous right lower lobe   opacity, likely aspiration pneumonia. Small right pleural effusion. No gross findings of pulmonary embolism. 5 mm left lower lobe pulmonary nodule which may be followed based upon   clinical risk factors. ASSESSMENT:  Acute on chronic hypercarbic and hypoxic respiratory failure   Suspected aspiration pneumonia with reports of dried vomit on clothes  Hemoptysis - possible related to aspiration and pneumonia but current unclear etiology, no endobronchial lesions seen on bronch  Very severe COPD with exacerbation  Afib on Eliquis    PLAN:  Mechanical ventilation as per my orders. The ventilator was adjusted by me at the bedside for unstable, life threatening respiratory failure. IV Propofol for sedation, target RASS -2, with daily spontaneous awakening trial.  Fentanyl PRN pain, gtt as needed  Head of bed 30 degrees or higher at all times  Daily spontaneous breathing trial once PEEP less than 8, FiO2 less than 55%. Intensive inhaled bronchodilator therapy. IV solumedrol 40 mg IV Q12 hrs.    CTX and Azithromycin  F/u BAL culture  Monitor hemoptysis  Stop Eliquis  MIVF  SCD for SVT prophylaxis  PPI  Due to at least single organ failure and risk of rapid deterioration, I spent 35 minutes of Critical care time reviewing labs/films, examining patient, collaborating with other physicians. This does not include time performing critical procedures.      Thank you Dante Abad MD for this consult

## 2023-02-12 NOTE — ED NOTES
1540-Call placed to Pulmonology for consult placed by Dr. Jenna Mejia. 1542-Perfect Serve sent to Dr. Solo Rosario for consult placed by Dr. Jenna Mejia. Taco Luciano  02/12/23 1544  1545-Call back received from Dr. Denise Draper spoke with Dr. Jenna Mejia. Taco Luciano  02/12/23 1547  1648-Consult completed admission orders placed by Dr. Solo Rosario.      Taco Luciano  02/12/23 5016

## 2023-02-12 NOTE — ED NOTES
RT x2, RN x2, and physician in room at this time.        Urvashi Arriaza, JOSE MIGUEL  02/12/23 1026

## 2023-02-12 NOTE — ED NOTES
Provider at bedside reassessing pt. Pt remains lethargic. VS remain stable.      Anibal Carreno RN  02/12/23 [id number]

## 2023-02-12 NOTE — ED NOTES
While this RN setting up abx and fluids, family ambulates in room. Pt noted to be increasingly lethargic and difficult to arouse. VS remain stable. Provider notified and to room. Provider assesses pt and orders BiPAP. RT to room shortly after. Pt's oxygen remains >94% on 6L via NC.      Mariela Carvajal RN  02/12/23 0148

## 2023-02-12 NOTE — ED PROVIDER NOTES
[address]        Pt Name: Keyonna Dennison  MRN: [id number]  [place] 1940  Date of evaluation: 2/12/2023  Provider: Jean Carlos Ron MD  PCP: Hali Campos MD  ED Attending: Jean Carlos Ron MD    CHIEF COMPLAINT       Chief Complaint   Patient presents with    Hemoptysis     Pt to ED with CC of hemoptysis for the past several weeks, worsening over the past two days. Pt denies CP or SOB. HISTORY OF PRESENT ILLNESS   (Location/Symptom, Timing/Onset, Context/Setting, Quality, Duration, Modifying Factors, Severity)  Note limiting factors. Keyonna Dennison is a 78 y.o. male who presents to the emergency department with hemoptysis. Patient is unable to tell me more than its been present for \"a while\". Patient states that just prior to it starting he had a gagging episode at home. Since then he has been having hemoptysis daily. Its associated with some shortness of breath. Patient denies any fevers or chills. He denies any chest pain. No weight loss. Patient was admitted back in November secondary to pneumonia. He has required additional oxygen above his normal baseline for the last couple of weeks. Because the patient was not getting any better he decided to call 911. EMS subsequently transported him to the emergency department. History is obtained from the patient. REVIEW OF SYSTEMS    (2-9 systems for level 4, 10 or more for level 5)     Review of Systems   Constitutional:  Negative for chills and fever. HENT:  Negative for congestion and sore throat. Eyes:  Negative for discharge. Respiratory:  Positive for shortness of breath. Negative for cough. Positive hemoptysis   Cardiovascular:  Negative for chest pain. Gastrointestinal:  Negative for abdominal pain, diarrhea, nausea and vomiting. Endocrine: Negative for polydipsia. Genitourinary:  Negative for dysuria and urgency.    Musculoskeletal: Negative for back pain and myalgias. Skin:  Negative for rash. Neurological:  Negative for weakness and headaches. Hematological:  Does not bruise/bleed easily. Psychiatric/Behavioral:  Negative for confusion. Positives and Pertinent negatives as per HPI. Except as noted above in the ROS, all other systems were reviewed and negative.        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    ELAINE on CPAP     Osteoporosis     Skin cancer of face          SURGICAL HISTORY     Past Surgical History:   Procedure Laterality Date    BACK SURGERY      repair broken back L4 & L5    CHOLECYSTECTOMY, LAPAROSCOPIC N/A 1/6/2021    LAPAROSCOPIC CHOLECYSTECTOMY, WITH CHOLANGIOGRAMS (ATTEMPTED) OPEN CHOLECYSTECTOMY performed by Genaro Chaudhari MD at [address] [place] [address], OPEN N/A 01/06/2021    LAPAROSCOPIC CHOLECYSTECTOMY, WITH CHOLANGIOGRAMS (ATTEMPTED)     CYSTOSCOPY Right 10/9/15    RIght Ureteroscopy, Holmium Laser Lithotripsy with Stone Removal, Right Stent Placement    CYSTOSCOPY  05/01/2019    CYSTOSCOPY, RESECTION OF BLADDER NECK, URETHRAL DILATION    CYSTOSCOPY W BIOPSY OF BLADDER N/A 5/1/2019    CYSTOSCOPY, RESECTION OF BLADDER NECK, URETHRAL DILATION performed by Edouard Ying MD at [address] [place] [address] Left 6/12/2016    cataract removal    GALLBLADDER SURGERY  01/2021    JOINT REPLACEMENT  6/29/2011    right knee    JOINT REPLACEMENT  11/2004    left knee    OTHER SURGICAL HISTORY  08/31/2015    wide excision basal cell carcinoma on the nose and bilateral auricles with frozen sections, plastic closure, full thickness skin graft    OTHER SURGICAL HISTORY  12/1/15    excision of lesion of lip    PROSTATE SURGERY      TURP  10/23/2015    with cystoscopy         CURRENTMEDICATIONS       Discharge Medication List as of 2/15/2023  1:59 PM        CONTINUE these medications which have NOT CHANGED    Details   alendronate (FOSAMAX) 70 MG tablet Take 70 mg by mouth every 7 daysHistorical Med      Roflumilast (DALIRESP) 250 MCG tablet Take 1 tablet by mouth daily, Disp-28 tablet, R-1Normal      TRELEGY ELLIPTA 100-62.5-25 MCG/ACT AEPB inhaler INHALE 1 PUFF BY MOUTH EVERY DAY, Disp-1 each, R-0Normal      propafenone (RYTHMOL) 150 MG tablet TAKE ONE TABLET BY MOUTH EVERY 8 HOURS, Disp-270 tablet, R-3Normal      apixaban (ELIQUIS) 5 MG TABS tablet Take 1 tablet by mouth 2 times daily, Disp-60 tablet, R-10Normal      atorvastatin (LIPITOR) 10 MG tablet Take 1 tablet by mouth daily, Disp-90 tablet, R-2Normal      dilTIAZem (CARDIZEM CD) 180 MG extended release capsule Take 1 capsule by mouth daily, Disp-90 capsule, R-3Normal      albuterol (PROVENTIL) (2.5 MG/3ML) 0.083% nebulizer solution USE 1 VIAL IN NEBULIZER 4 TIMES DAILY, Disp-120 each, R-0Normal      VENTOLIN  (90 Base) MCG/ACT inhaler Inhale 2 puffs into the lungs every 6 hours as needed for Wheezing orShortness of Breath, Disp-18 g, R-5Normal      acetaminophen (AMINOFEN) 325 MG tablet Take 2 tablets by mouth every 4 hours as needed for Pain, Disp-120 tablet,R-0Print      fluticasone (FLONASE) 50 MCG/ACT nasal spray 2 sprays by Nasal route daily Historical Med      pioglitazone (ACTOS) 30 MG tablet Take 30 mg by mouth dailyHistorical Med      gabapentin (NEURONTIN) 800 MG tablet Take 800 mg by mouth 3 times daily.  Historical Med      OXYGEN Inhale 2.5 L into the lungs nightly At 3.5lpm on 2/8/2022Historical Med      metformin (GLUCOPHAGE) 500 MG tablet Take 500 mg by mouth 4 times daily Historical Med      esomeprazole (NEXIUM) 40 MG capsule   Take 40 mg by mouth every morning (before breakfast) Indications: take  N      Patient has no known allergies. FAMILYHISTORY       Family History   Problem Relation Age of Onset    Cancer Mother     Diabetes Mother     Heart Disease Mother     Cancer Father     Diabetes Sister     Cancer Brother           SOCIAL HISTORY       Social History     Socioeconomic History    Marital status: Unknown     Spouse name: Jose Gunter    Number of children: 4    Years of education: None    Highest education level: None   Tobacco Use    Smoking status: Former     Packs/day: 1.50     Years: 45.00     Pack years: 67.50     Types: Cigarettes     Quit date: 2005     Years since quittin.7    Smokeless tobacco: Never   Vaping Use    Vaping Use: Never used   Substance and Sexual Activity    Alcohol use: No    Drug use: No    Sexual activity: Not Currently       SCREENINGS    [place] Coma Scale  Eye Opening: Spontaneous  Best Verbal Response: Oriented  Best Motor Response: Obeys commands  [place] Coma Scale Score: 15        PHYSICAL EXAM    (up to 7 for level 4, 8 or more for level 5)     ED Triage Vitals [23 1022]   BP Temp Temp Source Heart Rate Resp SpO2 Height Weight   122/69 98.6 °F (37 °C) Oral (!) 115 18 92 % 6' (1.829 m) 191 lb (86.6 kg)       Physical Exam  Vitals and nursing note reviewed. Constitutional:       Appearance: He is well-developed. Comments: Chronically ill in appearance. HENT:      Head: Normocephalic and atraumatic. Right Ear: External ear normal.      Left Ear: External ear normal.      Nose: Nose normal.      Mouth/Throat:      Pharynx: No oropharyngeal exudate. Eyes:      Conjunctiva/sclera: Conjunctivae normal.      Pupils: Pupils are equal, round, and reactive to light. Cardiovascular:      Rate and Rhythm: Regular rhythm. Tachycardia present. Heart sounds: Normal heart sounds. No murmur heard. No friction rub. No gallop.    Pulmonary:      Effort: Pulmonary effort is normal. No respiratory distress. Breath sounds: Normal breath sounds. Decreased air movement present. No wheezing. Abdominal:      General: Bowel sounds are normal. There is no distension. Palpations: Abdomen is soft. Tenderness: There is no abdominal tenderness. There is no guarding or rebound. Musculoskeletal:         General: No tenderness. Normal range of motion. Cervical back: Normal range of motion and neck supple. Lymphadenopathy:      Cervical: No cervical adenopathy. Skin:     General: Skin is warm and dry. Capillary Refill: Capillary refill takes less than 2 seconds. Findings: No rash. Neurological:      Mental Status: He is alert and oriented to person, place, and time. Psychiatric:         Mood and Affect: Mood normal.       DIAGNOSTIC RESULTS   LABS:    COVID 19: negative  Blood cultures pending. VBG: hypercarbic respiratory failure  Lactic acid: normal  CMP: Slight hypercarbia  CBC: Slight anemia  Troponin: normal  Repeat ABG: respiratory acidosis now corrected. All other labs were within normal range ornot returned as of this dictation. EKG: All EKG's are interpreted by the Emergency Department Physician who either signs or Co-signs this chart in the absence of a cardiologist.  Please see their note for interpretation of EKG. EKG Interpretation    Interpreted by emergency department physician    Rhythm: sinus tachycardia  Rate: tachycardia  Axis: normal  Ectopy: none  Conduction: normal  ST Segments: normal  T Waves: normal  Q Waves: inferior leads    Clinical Impression: sinus tachycardia, prior inferior infarction      RADIOLOGY:   Non-plain film images such as CT, Ultrasound and MRI are read by the radiologist.  Plain radiographic images are visualized and preliminarily interpreted by the ED Provider with the belowfindings:    Interpretation per the Radiologist below, if available at the time of this note:    1133 XR Chest: COPD, possible atelectasis.   1429 XR Chest: ET tube 3.4cm above the flora. OGT in stomach. 1524 CT Chest: Right mainstem intubation. Multiple filling defects in the bilateral bronchi, and RLL opacification concerning for aspiration. 5mm LLL pulmonary nodule. PROCEDURES   Unless otherwise noted below, none     Intubation    Date/Time: 2/12/2023 2:15 PM  Performed by: Oksana Beverly MD  Authorized by: Yo Persaud MD     Consent:     Consent obtained:  Emergent situation and verbal    Consent given by:  Healthcare agent    Risks, benefits, and alternatives were discussed: yes      Risks discussed:  Aspiration, pneumothorax, hypoxia, death and bleeding    Alternatives discussed:  No treatment  Universal protocol:     Procedure explained and questions answered to patient or proxy's satisfaction: yes      Test results available: yes      Imaging studies available: yes      Required blood products, implants, devices, and special equipment available: yes      Immediately prior to procedure, a time out was called: yes      Patient identity confirmed:  Arm band  Pre-procedure details:     Indication: failure to oxygenate and failure to ventilate      Patient status:  Unresponsive    Look externally: no concerns      Mouth opening - incisor distance:  3 or more finger widths    Hyoid-mental distance: 3 or more finger widths      Hyoid-thyroid distance: 2 or more finger widths      Mallampati score:  III    Obstruction: none      Neck mobility: normal      Pharmacologic strategy: RSI      Induction agents:  Etomidate    Paralytics:  Rocuronium  Procedure details:     Preoxygenation:  BiLevel    CPR in progress: no      Intubation method:  Oral    Intubation technique: video assisted      Laryngoscope blade:   Mac 4    Bougie used: no      Tube size (mm):  7.0    Tube type:  Cuffed    Number of attempts:  2    Ventilation between attempts: yes with mask      Tube visualized through cords: yes    Placement assessment:     ETT at teeth/gumline (cm): 26    Tube secured with:  ETT galarza    Breath sounds:  Equal    Placement verification: chest rise, CXR verification, direct visualization, equal breath sounds, tube exhalation and waveform ETCO2      CXR findings:  Appropriate position  Post-procedure details:     Procedure completion:  Tolerated with difficulty  Comments:      Patient has extensive nodularity to his vocal cords bilaterally with 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 Central Line    Date/Time: 2/12/2023 4:45 PM  Performed by: Nataly Campos MD  Authorized by: Curtis Webber MD     Consent:     Consent obtained:  Verbal    Consent given by:  Healthcare agent    Risks, benefits, and alternatives were discussed: yes      Risks discussed:  Arterial puncture, incorrect placement, nerve damage, infection and bleeding    Alternatives discussed:  Alternative treatment  Universal protocol:     Procedure explained and questions answered to patient or proxy's satisfaction: yes      Test results available: yes      Required blood products, implants, devices, and special equipment available: yes    Pre-procedure details:     Hand hygiene: Hand hygiene performed prior to insertion      Sterile barrier technique:  All elements of maximal sterile technique followed      Skin preparation:  Chlorhexidine    Skin preparation agent: Skin preparation agent completely dried prior to procedure    Sedation:     Sedation type:  None  Anesthesia:     Anesthesia method:  None  Procedure details:     Location:  L femoral    Patient position:  Supine    Procedural supplies:  Triple lumen    Landmarks identified: yes      Ultrasound guidance: yes      Ultrasound guidance timing: prior to insertion and real time      Sterile ultrasound techniques: Sterile gel and sterile probe covers were used      Number of attempts:  1    Successful placement: yes    Post-procedure details:     Post-procedure:  Dressing applied and line sutured    Assessment:  Blood return through all ports and free fluid flow    Procedure completion:  Tolerated    CRITICAL CARE TIME   Total critical care time today provided was 62 minutes. This excludes seperately billable procedures and family discussion time. Provided for acute respiratory failure, acute hypotension requiring intervention with concern for potential decompensation. CONSULTS:  IP CONSULT TO HOSPITALIST  IP CONSULT TO PULMONOLOGY      EMERGENCY DEPARTMENT COURSE and DIFFERENTIAL DIAGNOSIS/MDM:   Vitals:    Vitals:    02/14/23 2125 02/15/23 0239 02/15/23 0726 02/15/23 0748   BP: 138/75 128/67 (!) 143/76    Pulse: (!) 107 90 97 87   Resp: 20 20 18 18   Temp: 98.8 °F (37.1 °C) 98.8 °F (37.1 °C) 98.3 °F (36.8 °C)    TempSrc: Oral Oral Oral    SpO2: 100% 98% 91% 95%   Weight:       Height:           Patient was given the following medications:  Medications   ipratropium-albuterol (DUONEB) nebulizer solution 1 ampule (1 ampule Inhalation Given 2/12/23 1159)   cefepime (MAXIPIME) 2,000 mg in sodium chloride 0.9 % 100 mL IVPB (mini-bag) (0 mg IntraVENous Stopped 2/12/23 1340)   0.9 % sodium chloride bolus (0 mLs IntraVENous Stopped 2/12/23 1454)   iopamidol (ISOVUE-370) 76 % injection 85 mL (85 mLs IntraVENous Given 2/12/23 1444)   etomidate (AMIDATE) injection 26 mg (26 mg IntraVENous Given by Other 2/12/23 1341)   rocuronium (ZEMURON) injection 87 mg (87 mg IntraVENous Given by Other 2/12/23 1342)   propofol injection (25 mcg/kg/min × 86.6 kg IntraVENous Rate/Dose Change 2/12/23 1538)   midazolam (VERSED) injection 2 mg (2 mg IntraVENous Given 2/12/23 1527)   0.9 % sodium chloride bolus (0 mLs IntraVENous Stopped 2/12/23 1849)   propofol 1000 MG/100ML injection (1,000 mg  New Bag 2/12/23 1858)       ED Course as of 02/18/23 1320   Sun Feb 12, 2023   1146 Blood gas, venous(!!):    pH, Joey 7.191(!!)   pCO2, Joey 89.7(!)   pO2, Joey 83. 3(!)   HCO3, Venous 33.6(!)   Base Excess, Joey 2.9   O2 Sat, Joey 93   Carboxyhemoglobin 3.7(!) MetHgb, Joey 0.3   TC02 (Calc), Joey 36   O2 Content, Joey 16   O2 Therapy Unknown  Patient's blood gas is markedly abnormal.  He is hypercarbic. I was also asked to assess the patient by nursing. Patient is more somnolent however is still arousable. At this point we are going to start the patient on BiPAP. I did have a discussion with the daughters at the bedside about whether or not he would want to be intubated should BiPAP fail and the answer was that yes he would. [TC]      ED Course User Milady Chahal MD     Patient is an elderly male with a history of COPD presenting to the ED with hemoptysis \"for a while\". Imaging initially did not reveal obvious mass or pneumonia. Patient's blood work indicated hypercarbic respiratory failure. We initially trialed the patient on BiPAP however subsequent examination revealed that the patient was more somnolent. Decision with the daughters was then made to intubate the patient. Patient intubated, however I noted vocal cord stenosis and nodularity. Post-intubation patient developed hypotension requiring initial push-dose epinephrine followed by levophed drip. Patient had some episodes of hypoxia. Case discussed with Dr. Olivia Nunez who agreed to ED bronchoscopy given his CT findings. Patient has since been much easier to oxygenate. Hospitalist service agreed to admission to the ICU. Family updated on several occasions. They're agreeable with the current plan. Differential diagnosis included but was not limited to: acute coronary syndrome, pulmonary embolism, COPD/asthma, pneumonia, sepsis, pericardial tamponade, pneumothorax, CHF, thoracic aortic dissection, anxiety, aspiration, cancer. FINAL IMPRESSION      1. Acute on chronic respiratory failure with hypoxia and hypercapnia (HCC)    2. Aspiration pneumonia of both lungs, unspecified aspiration pneumonia type, unspecified part of lung ([address]    3. Hemoptysis    4.  Hypotension, unspecified hypotension type          DISPOSITION/PLAN   DISPOSITION Admitted 02/12/2023 04:48:38 PM         (Please note that portions of this note were completed with a voice recognition program.  Efforts were made to edit the dictations but occasionally words are mis-transcribed.)    Wanda Park MD(electronically signed)             Wanda Park MD  02/18/23 4517

## 2023-02-12 NOTE — ED NOTES
Pt brought too and from CT scan via stretcher accompanied with this RN and RT. Pt brought back to room.      Reece Monge RN  02/12/23 3751

## 2023-02-12 NOTE — ED NOTES
1341 etomidate administered at this time by Shara GILLESPIE  1342 Rocuronium administered by Lamont Epps RN at this time. Provider in position to intubate.      Lorelei Salazar RN  02/12/23 1170

## 2023-02-12 NOTE — ED NOTES
1543:  2mL Push dose epi 1:10 administered per provider order. 1554:  1mL Push dose epi 1:10 administered per provider order.      Walter Reza RN  02/12/23 6335

## 2023-02-13 ENCOUNTER — APPOINTMENT (OUTPATIENT)
Dept: GENERAL RADIOLOGY | Age: 83
DRG: 871 | End: 2023-02-13
Payer: MEDICARE

## 2023-02-13 PROBLEM — R04.2 HEMOPTYSIS: Status: ACTIVE | Noted: 2023-02-13

## 2023-02-13 PROBLEM — J69.0 ASPIRATION PNEUMONIA (HCC): Status: ACTIVE | Noted: 2023-01-01

## 2023-02-13 LAB
ANION GAP SERPL CALCULATED.3IONS-SCNC: 13 MMOL/L (ref 3–16)
BASE EXCESS ARTERIAL: 0.8 MMOL/L (ref -3–3)
BASE EXCESS ARTERIAL: 2.6 MMOL/L (ref -3–3)
BASOPHILS ABSOLUTE: 0 K/UL (ref 0–0.2)
BASOPHILS RELATIVE PERCENT: 0.5 %
BUN BLDV-MCNC: 31 MG/DL (ref 7–20)
CALCIUM SERPL-MCNC: 8.8 MG/DL (ref 8.3–10.6)
CARBOXYHEMOGLOBIN ARTERIAL: 0.3 % (ref 0–1.5)
CARBOXYHEMOGLOBIN ARTERIAL: 0.3 % (ref 0–1.5)
CHLORIDE BLD-SCNC: 94 MMOL/L (ref 99–110)
CO2: 29 MMOL/L (ref 21–32)
CREAT SERPL-MCNC: 1.2 MG/DL (ref 0.8–1.3)
EOSINOPHILS ABSOLUTE: 0 K/UL (ref 0–0.6)
EOSINOPHILS RELATIVE PERCENT: 0 %
GFR SERPL CREATININE-BSD FRML MDRD: 60 ML/MIN/{1.73_M2}
GLUCOSE BLD-MCNC: 147 MG/DL (ref 70–99)
GLUCOSE BLD-MCNC: 164 MG/DL (ref 70–99)
GLUCOSE BLD-MCNC: 193 MG/DL (ref 70–99)
GLUCOSE BLD-MCNC: 204 MG/DL (ref 70–99)
GLUCOSE BLD-MCNC: 215 MG/DL (ref 70–99)
HCO3 ARTERIAL: 26.3 MMOL/L (ref 21–29)
HCO3 ARTERIAL: 26.4 MMOL/L (ref 21–29)
HCT VFR BLD CALC: 28.7 % (ref 40.5–52.5)
HEMOGLOBIN, ART, EXTENDED: 10.3 G/DL (ref 13.5–17.5)
HEMOGLOBIN, ART, EXTENDED: 11.4 G/DL (ref 13.5–17.5)
HEMOGLOBIN: 9.6 G/DL (ref 13.5–17.5)
LYMPHOCYTES ABSOLUTE: 0.9 K/UL (ref 1–5.1)
LYMPHOCYTES RELATIVE PERCENT: 17.1 %
MCH RBC QN AUTO: 28.3 PG (ref 26–34)
MCHC RBC AUTO-ENTMCNC: 33.5 G/DL (ref 31–36)
MCV RBC AUTO: 84.3 FL (ref 80–100)
METHEMOGLOBIN ARTERIAL: 0.3 %
METHEMOGLOBIN ARTERIAL: 0.3 %
MONOCYTES ABSOLUTE: 0.2 K/UL (ref 0–1.3)
MONOCYTES RELATIVE PERCENT: 3 %
NEUTROPHILS ABSOLUTE: 4.1 K/UL (ref 1.7–7.7)
NEUTROPHILS RELATIVE PERCENT: 79.4 %
O2 SAT, ARTERIAL: 96.5 %
O2 SAT, ARTERIAL: 97.5 %
O2 THERAPY: ABNORMAL
O2 THERAPY: ABNORMAL
PCO2 ARTERIAL: 37.4 MMHG (ref 35–45)
PCO2 ARTERIAL: 46.2 MMHG (ref 35–45)
PDW BLD-RTO: 15.6 % (ref 12.4–15.4)
PERFORMED ON: ABNORMAL
PH ARTERIAL: 7.37 (ref 7.35–7.45)
PH ARTERIAL: 7.47 (ref 7.35–7.45)
PLATELET # BLD: 118 K/UL (ref 135–450)
PMV BLD AUTO: 8.9 FL (ref 5–10.5)
PO2 ARTERIAL: 88.9 MMHG (ref 75–108)
PO2 ARTERIAL: 93 MMHG (ref 75–108)
POTASSIUM REFLEX MAGNESIUM: 4.3 MMOL/L (ref 3.5–5.1)
RBC # BLD: 3.41 M/UL (ref 4.2–5.9)
SODIUM BLD-SCNC: 136 MMOL/L (ref 136–145)
TCO2 ARTERIAL: 27.5 MMOL/L
TCO2 ARTERIAL: 27.8 MMOL/L
WBC # BLD: 5.2 K/UL (ref 4–11)

## 2023-02-13 PROCEDURE — 94003 VENT MGMT INPAT SUBQ DAY: CPT

## 2023-02-13 PROCEDURE — 82803 BLOOD GASES ANY COMBINATION: CPT

## 2023-02-13 PROCEDURE — 6360000002 HC RX W HCPCS: Performed by: INTERNAL MEDICINE

## 2023-02-13 PROCEDURE — 94640 AIRWAY INHALATION TREATMENT: CPT

## 2023-02-13 PROCEDURE — 71045 X-RAY EXAM CHEST 1 VIEW: CPT

## 2023-02-13 PROCEDURE — 2000000000 HC ICU R&B

## 2023-02-13 PROCEDURE — 99233 SBSQ HOSP IP/OBS HIGH 50: CPT | Performed by: INTERNAL MEDICINE

## 2023-02-13 PROCEDURE — 94761 N-INVAS EAR/PLS OXIMETRY MLT: CPT

## 2023-02-13 PROCEDURE — C9113 INJ PANTOPRAZOLE SODIUM, VIA: HCPCS | Performed by: INTERNAL MEDICINE

## 2023-02-13 PROCEDURE — 85025 COMPLETE CBC W/AUTO DIFF WBC: CPT

## 2023-02-13 PROCEDURE — 36592 COLLECT BLOOD FROM PICC: CPT

## 2023-02-13 PROCEDURE — 2700000000 HC OXYGEN THERAPY PER DAY

## 2023-02-13 PROCEDURE — 99291 CRITICAL CARE FIRST HOUR: CPT | Performed by: INTERNAL MEDICINE

## 2023-02-13 PROCEDURE — 92610 EVALUATE SWALLOWING FUNCTION: CPT

## 2023-02-13 PROCEDURE — 92526 ORAL FUNCTION THERAPY: CPT

## 2023-02-13 PROCEDURE — 2580000003 HC RX 258: Performed by: INTERNAL MEDICINE

## 2023-02-13 PROCEDURE — 6370000000 HC RX 637 (ALT 250 FOR IP): Performed by: INTERNAL MEDICINE

## 2023-02-13 PROCEDURE — 80048 BASIC METABOLIC PNL TOTAL CA: CPT

## 2023-02-13 RX ORDER — PANTOPRAZOLE SODIUM 40 MG/10ML
40 INJECTION, POWDER, LYOPHILIZED, FOR SOLUTION INTRAVENOUS DAILY
Status: DISCONTINUED | OUTPATIENT
Start: 2023-02-13 | End: 2023-02-15 | Stop reason: HOSPADM

## 2023-02-13 RX ORDER — ENOXAPARIN SODIUM 100 MG/ML
40 INJECTION SUBCUTANEOUS DAILY
Status: DISCONTINUED | OUTPATIENT
Start: 2023-02-13 | End: 2023-02-14

## 2023-02-13 RX ORDER — ALBUTEROL SULFATE 2.5 MG/3ML
2.5 SOLUTION RESPIRATORY (INHALATION) 4 TIMES DAILY
Status: DISCONTINUED | OUTPATIENT
Start: 2023-02-13 | End: 2023-02-14

## 2023-02-13 RX ADMIN — Medication 10 ML: at 23:05

## 2023-02-13 RX ADMIN — ALBUTEROL SULFATE 2.5 MG: 2.5 SOLUTION RESPIRATORY (INHALATION) at 19:13

## 2023-02-13 RX ADMIN — ALBUTEROL SULFATE 2.5 MG: 2.5 SOLUTION RESPIRATORY (INHALATION) at 11:47

## 2023-02-13 RX ADMIN — ENOXAPARIN SODIUM 40 MG: 100 INJECTION SUBCUTANEOUS at 11:10

## 2023-02-13 RX ADMIN — PANTOPRAZOLE SODIUM 40 MG: 40 INJECTION, POWDER, FOR SOLUTION INTRAVENOUS at 11:10

## 2023-02-13 RX ADMIN — ALBUTEROL SULFATE 2.5 MG: 2.5 SOLUTION RESPIRATORY (INHALATION) at 07:31

## 2023-02-13 RX ADMIN — AZITHROMYCIN DIHYDRATE 500 MG: 500 INJECTION, POWDER, LYOPHILIZED, FOR SOLUTION INTRAVENOUS at 21:14

## 2023-02-13 RX ADMIN — Medication 10 ML: at 11:10

## 2023-02-13 RX ADMIN — METHYLPREDNISOLONE SODIUM SUCCINATE 40 MG: 40 INJECTION, POWDER, FOR SOLUTION INTRAMUSCULAR; INTRAVENOUS at 05:03

## 2023-02-13 RX ADMIN — METHYLPREDNISOLONE SODIUM SUCCINATE 40 MG: 40 INJECTION, POWDER, FOR SOLUTION INTRAMUSCULAR; INTRAVENOUS at 18:28

## 2023-02-13 RX ADMIN — ALBUTEROL SULFATE 2.5 MG: 2.5 SOLUTION RESPIRATORY (INHALATION) at 15:39

## 2023-02-13 RX ADMIN — CEFTRIAXONE SODIUM 1000 MG: 1 INJECTION, POWDER, FOR SOLUTION INTRAMUSCULAR; INTRAVENOUS at 21:23

## 2023-02-13 RX ADMIN — INSULIN LISPRO 1 UNITS: 100 INJECTION, SOLUTION INTRAVENOUS; SUBCUTANEOUS at 17:20

## 2023-02-13 RX ADMIN — PROPOFOL 30 MCG/KG/MIN: 10 INJECTION, EMULSION INTRAVENOUS at 05:01

## 2023-02-13 RX ADMIN — MUPIROCIN: 20 OINTMENT TOPICAL at 11:10

## 2023-02-13 RX ADMIN — MUPIROCIN: 20 OINTMENT TOPICAL at 23:07

## 2023-02-13 ASSESSMENT — PULMONARY FUNCTION TESTS
PIF_VALUE: 21
PIF_VALUE: 23

## 2023-02-13 NOTE — PLAN OF CARE
Bedside swallow evaluation completed this date.     Garfield Wilkinson M.S. [id number] [facility]  Speech-language pathologist  [phone]

## 2023-02-13 NOTE — H&P
Hospital Medicine History & Physical      PCP: Ruslan Lucas MD    Date of Admission: 2/12/2023    Date of Service: Pt seen/examined on 2/12/23 and Admitted to Inpatient with expected LOS greater than two midnights due to medical therapy. Chief Complaint:  Hemoptysis      History Of Present Illness:       78 y.o. male with afib, DM,COPD on 3 lpm O2,presents with increased sob,cough, hemoptysis. Presents hypercapnic,hypoxemic,was trialed on bipap, required intubation. Currently patient is seen in ICU, sedated, intubated, tolerating vent, decreased in FiO2, off pressors.     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    ELAINE on CPAP     Osteoporosis     Skin cancer of face        Past Surgical History:          Procedure Laterality Date    BACK SURGERY      repair broken back L4 & L5    CHOLECYSTECTOMY, LAPAROSCOPIC N/A 1/6/2021    LAPAROSCOPIC CHOLECYSTECTOMY, WITH CHOLANGIOGRAMS (ATTEMPTED) OPEN CHOLECYSTECTOMY performed by Shelbie Byers MD at [address] [place] [address], OPEN N/A 01/06/2021    LAPAROSCOPIC CHOLECYSTECTOMY, WITH CHOLANGIOGRAMS (ATTEMPTED)     CYSTOSCOPY Right 10/9/15    RIght Ureteroscopy, Holmium Laser Lithotripsy with Stone Removal, Right Stent Placement    CYSTOSCOPY  05/01/2019    CYSTOSCOPY, RESECTION OF BLADDER NECK, URETHRAL DILATION    CYSTOSCOPY W BIOPSY OF BLADDER N/A 5/1/2019    CYSTOSCOPY, RESECTION OF BLADDER NECK, URETHRAL DILATION performed by Criss Mcnair MD at [address] [place] [address] Left 6/12/2016    cataract removal    GALLBLADDER SURGERY  01/2021    JOINT REPLACEMENT  6/29/2011    right knee    JOINT REPLACEMENT  11/2004    left knee    OTHER SURGICAL HISTORY  08/31/2015    wide excision basal cell carcinoma on the nose and bilateral auricles with frozen sections, plastic closure, full thickness skin graft    OTHER SURGICAL HISTORY  12/1/15    excision of lesion of lip    PROSTATE SURGERY      TURP  10/23/2015    with cystoscopy       Medications Prior to Admission:      Prior to Admission medications    Medication Sig Start Date End Date Taking? Authorizing Provider   alendronate (FOSAMAX) 70 MG tablet Take 70 mg by mouth every 7 days    Historical Provider, MD   Roflumilast (DALIRESP) 250 MCG tablet Take 1 tablet by mouth daily 1/27/23   Alanna Mayorag MD   TRELEGY ELLIPTA 100-62.5-25 MCG/ACT AEPB inhaler INHALE 1 PUFF BY MOUTH EVERY DAY 1/24/23   Myranda Guzman MD   propafenone (RYTHMOL) 150 MG tablet TAKE ONE TABLET BY MOUTH EVERY 8 HOURS 1/5/23   Charlie Messina MD   apixaban (ELIQUIS) 5 MG TABS tablet Take 1 tablet by mouth 2 times daily 1/5/23   Charlie Messina MD   atorvastatin (LIPITOR) 10 MG tablet Take 1 tablet by mouth daily 1/5/23   Charlie Messina MD   dilTIAZem (CARDIZEM CD) 180 MG extended release capsule Take 1 capsule by mouth daily 12/7/22   Charlie Messina MD   albuterol (PROVENTIL) (2.5 MG/3ML) 0.083% nebulizer solution USE 1 VIAL IN NEBULIZER 4 TIMES DAILY 11/3/22   Myranda Guzman MD   VENTOLIN  (90 Base) MCG/ACT inhaler Inhale 2 puffs into the lungs every 6 hours as needed for Wheezing orShortness of Breath 8/26/22   Myranda Guzman MD   acetaminophen (AMINOFEN) 325 MG tablet Take 2 tablets by mouth every 4 hours as needed for Pain 8/11/20   Gayla Macias MD   fluticasone (FLONASE) 50 MCG/ACT nasal spray 2 sprays by Nasal route daily  2/12/20   Historical Provider, MD   pioglitazone (ACTOS) 30 MG tablet Take 30 mg by mouth daily    Historical Provider, MD   gabapentin (NEURONTIN) 800 MG tablet Take 800 mg by mouth 3 times daily.      Historical Provider, MD   OXYGEN Inhale 2.5 L into the lungs nightly At 3.5lpm on 2/8/2022    Historical Provider, MD   metformin (GLUCOPHAGE) 500 MG tablet Take 500 mg by mouth 4 times daily     Historical Provider, MD   esomeprazole (NEXIUM) 40 MG capsule   Take 40 mg by mouth every morning (before breakfast) Indications: take [address] Provider, MD       Allergies:  Patient has no known allergies. Social History:         TOBACCO:   reports that he quit smoking about 17 years ago. His smoking use included cigarettes. He has a 67.50 pack-year smoking history. He has never used smokeless tobacco.  ETOH:   reports no history of alcohol use. Family History:           Problem Relation Age of Onset    Cancer Mother     Diabetes Mother     Heart Disease Mother     Cancer Father     Diabetes Sister     Cancer Brother        REVIEW OF SYSTEMS:   Pertinent positives as noted in the HPI. All other systems reviewed and negative. PHYSICAL EXAM PERFORMED:    /64   Pulse 93   Temp (!) 101.2 °F (38.4 °C) (Bladder)   Resp 20   Ht 6' (1.829 m)   Wt 191 lb (86.6 kg)   SpO2 94%   BMI 25.90 kg/m²     General appearance:  sedated,intubated  HEENT:  Normal cephalic, atraumatic without obvious deformity. Neck: Supple, with full range of motion. No jugular venous distention. Trachea midline. Respiratory:  equal lung excursion  Cardiovascular:  Regular rate and rhythm    Abdomen: Soft, non-tender, non-distended with normal bowel sounds. Musculoskeletal:  No clubbing, cyanosis or edema bilaterally.     Skin: Skin color, texture, turgor normal.          Labs:     Recent Labs     02/12/23  1115   WBC 8.9   HGB 11.1*   HCT 33.8*        Recent Labs     02/12/23  1115      K 4.5   CL 94*   CO2 39*   BUN 25*   CREATININE 1.2   CALCIUM 9.5     Recent Labs     02/12/23  1115   AST 14*   ALT 11   BILITOT 0.8   ALKPHOS 89     Recent Labs     02/12/23  1115   INR 1.32*     Recent Labs     02/12/23  1115   TROPONINI <0.01       Urinalysis: Lab Results   Component Value Date/Time    NITRU Negative 01/04/2021 09:55 PM    [address] 6-9 01/04/2021 09:55 PM    BACTERIA Rare 12/14/2020 10:58 PM    RBCUA 0-2 01/04/2021 09:55 PM    BLOODU TRACE-INTACT 01/04/2021 09:55 PM    SPECGRAV 1.015 01/04/2021 09:55 PM    GLUCOSEU Negative 01/04/2021 09:55 PM       Radiology:        XR CHEST PORTABLE   Final Result   Endotracheal tube is above the flora in satisfactory position. CT CHEST PULMONARY EMBOLISM W CONTRAST   Final Result   Endotracheal tube extends to the right mainstem bronchus. Recommend   repositioning. Extensive airway filling defects are seen within the right greater than left   mainstem bronchi, bronchus intermedius, right upper, middle, lower lobe   bronchi, likely reflecting aspiration. Heterogeneous right lower lobe   opacity, likely aspiration pneumonia. Small right pleural effusion. No gross findings of pulmonary embolism. 5 mm left lower lobe pulmonary nodule which may be followed based upon   clinical risk factors. Findings were discussed with No Sandoval at 15:15 on 2/12/2023. RECOMMENDATIONS:   Fleischner Society guidelines for follow-up and management of incidentally   detected pulmonary nodules:      Single Solid Nodule:      Nodule size less than 6 mm   In a low-risk patient, no routine follow-up. In a high-risk patient, optional CT at 12 months. - Low risk patients include individuals with minimal or absent history of   smoking and other known risk factors. - High risk patients include individuals with a history or smoking or known   risk factors. Radiology 2017 http://pubs. rsna.org/doi/full/10.1148/radiol. 1680007408         XR CHEST PORTABLE   Final Result   Endotracheal tube tip is 3.4 cm above the flora. Enteric tube is noted   likely within the stomach. Otherwise stable chest.         XR CHEST PORTABLE   Final Result   Findings of generalized COPD.   Low lung volumes with probable basilar   atelectasis. ASSESSMENT:    Active Hospital Problems    Diagnosis Date Noted    Acute respiratory failure ([facility] [address] [J96.00] 02/12/2023     Priority: Medium    Aspiration pneumonia of right middle lobe ([facility] [address] [J69.0] 02/12/2023     Priority: Medium         PLAN:    Acute hypoxemic resp failure with hypercarbia  - sec to CAP, COPD exac    Sepsis / CAP   - IV cefepime, vanc  - follow up blood cultures    COPD exac  - Duoneb, IV solumedrol        DM  - corrective ISS      DVT Prophylaxis: scd  Diet: Diet NPO  Code Status: Full Code       Bismark Wynn MD    Thank you Sindhu Yadav MD for the opportunity to be involved in this patient's care. If you have any questions or concerns please feel free to contact me at [phone].

## 2023-02-13 NOTE — PROGRESS NOTES
ABG's drawn from Right Radial x1 attempt. Site prepped using proper technique. Modified Dmitri's test performed and positive. Pressure held on site for 5 minutes after procedure. Pt tolerated well. Specimen sent to lab.

## 2023-02-13 NOTE — PROGRESS NOTES
L femoral CVC removed per order. Catheter tip intact- RN held pressure for appropriate amount of time. Dsg placed.

## 2023-02-13 NOTE — PROGRESS NOTES
Report from [facility] at bedside for transfer of care. Granddaughters at bedside, daughter updated per phone with request for [facility] T 101.2/ bladder temp vázquez. 2000-  Sputum sample sent to lab. Resting in bed, intubated on [place] ventilator, full support. ETT @ 25 cm LL, OG patient at 63 cm. OG clamped. Physical assessment at this time. Sedated on propofol IV drip. BUE soft wrist restraints in use due to life saving medical tubing in place. All ICU monitoring leads in place. Vázquez catheter intact, draining yellow urine with sediment in tubing.  . IV Propofol drip  titrated for RASS -3     2030- urine sample sent to lab for UA reflex to culture. Sister updated on status per phone. SCDs in place, T now 100.8/bladder. IV Propofol drip titrated for cont. RASS -3. See eMAR for IV drip titration record.

## 2023-02-13 NOTE — PROGRESS NOTES
Pt awake and alarming ventilator o2 sat 83% [place], RT called- fio2 increased to 40% from 35%- suctioned large amount of secretions via ETT. Propofol gtt increased to 40 mcg.

## 2023-02-13 NOTE — PROGRESS NOTES
02/12/23 2331   Patient Observation   Heart Rate 82   Resp 20   SpO2 100 %   Observations 8ett 25 at lip   Breath Sounds   Right Upper Lobe Diminished   Right Middle Lobe Diminished   Right Lower Lobe Diminished   Left Upper Lobe Diminished   Left Lower Lobe Diminished   Vent Information   Vent Mode AC/VC   Ventilator Settings   FiO2  60 %   Vt (Set, mL) 450 mL   Resp Rate (Set) 20 bmp   PEEP/CPAP (cmH2O) 5   Vent Patient Data (Readings)   Vt (Measured) 450 mL   Peak Inspiratory Pressure (cmH2O) 22 cmH2O   Rate Measured 20 br/min   Minute Volume (L/min) 8.9 Liters   Mean Airway Pressure (cmH2O) 10 cmH20   I:E Ratio 1:2.3   Vent Alarm Settings   High Pressure (cmH2O) 45 cmH2O   Low Minute Volume (lpm) 3 L/min   Low Exhaled Vt (ml) 250 mL   RR High (bpm) 40 br/min   Apnea (secs) 20 secs   Airway Clearance   Suction ET Tube   Suction Device Inline suction catheter   Sputum Method Obtained Endotracheal   Sputum Amount Small   Sputum Color/Odor Bloody   Sputum Consistency Thick   ETT    Placement Date: 02/12/23   Present on Admission/Arrival: No  Placed By: In ED  Location: Oral  Measured From: Lips   Secured At 25 cm   Measured From Lips   ETT Placement Left   Secured By Commercial tube galarza

## 2023-02-13 NOTE — ACP (ADVANCE CARE PLANNING)
Advance Care Planning     General Advance Care Planning (ACP) Conversation    Date of Conversation: 2/12/2023  Conducted with: Patient with Decision Making Capacity    Healthcare Decision Maker:    Primary Decision Maker: Arnol Lorenzana - [phone]    Secondary Decision Maker: Hannah Beck - [phone]    Supplemental (Other) Decision Maker: Claudia Marie Pham Juvenal - [phone]  Click here to complete Healthcare Decision Makers including selection of the Healthcare Decision Maker Relationship (ie \"Primary\").       Content/Action Overview:    Reviewed DNR/DNI and patient elects Full Code at this time      Length of Voluntary ACP Conversation in minutes:  <16 minutes (Non-Billable)    Shelly Mac, RN

## 2023-02-13 NOTE — RT PROTOCOL NOTE
RT Inhaler-Nebulizer Bronchodilator Protocol Note    There is a bronchodilator order in the chart from a provider indicating to follow the RT Bronchodilator Protocol and there is an Initiate RT Inhaler-Nebulizer Bronchodilator Protocol order as well (see protocol at bottom of note). CXR Findings:  XR CHEST PORTABLE    Result Date: 2/12/2023  Endotracheal tube is above the flora in satisfactory position. XR CHEST PORTABLE    Result Date: 2/12/2023  Endotracheal tube tip is 3.4 cm above the flora. Enteric tube is noted likely within the stomach. Otherwise stable chest.     XR CHEST PORTABLE    Result Date: 2/12/2023  Findings of generalized COPD. Low lung volumes with probable basilar atelectasis. The findings from the last RT Protocol Assessment were as follows:   History Pulmonary Disease: Chronic pulmonary disease  Respiratory Pattern: Regular pattern and RR 12-20 bpm  Breath Sounds: Slightly diminished and/or crackles  Cough: Weak, productive  Indication for Bronchodilator Therapy: Decreased or absent breath sounds  Bronchodilator Assessment Score: 6    Aerosolized bronchodilator medication orders have been revised according to the RT Inhaler-Nebulizer Bronchodilator Protocol below. Respiratory Therapist to perform RT Therapy Protocol Assessment initially then follow the protocol. Repeat RT Therapy Protocol Assessment PRN for score 0-3 or on second treatment, BID, and PRN for scores above 3. No Indications - adjust the frequency to every 6 hours PRN wheezing or bronchospasm, if no treatments needed after 48 hours then discontinue using Per Protocol order mode. If indication present, adjust the RT bronchodilator orders based on the Bronchodilator Assessment Score as indicated below.   Use Inhaler orders unless patient has one or more of the following: on home nebulizer, not able to hold breath for 10 seconds, is not alert and oriented, cannot activate and use MDI correctly, or respiratory rate 25 breaths per minute or more, then use the equivalent nebulizer order(s) with same Frequency and PRN reasons based on the score. If a patient is on this medication at home then do not decrease Frequency below that used at home. 0-3 - enter or revise RT bronchodilator order(s) to equivalent RT Bronchodilator order with Frequency of every 4 hours PRN for wheezing or increased work of breathing using Per Protocol order mode. 4-6 - enter or revise RT Bronchodilator order(s) to two equivalent RT bronchodilator orders with one order with BID Frequency and one order with Frequency of every 4 hours PRN wheezing or increased work of breathing using Per Protocol order mode. 7-10 - enter or revise RT Bronchodilator order(s) to two equivalent RT bronchodilator orders with one order with TID Frequency and one order with Frequency of every 4 hours PRN wheezing or increased work of breathing using Per Protocol order mode. 11-13 - enter or revise RT Bronchodilator order(s) to one equivalent RT bronchodilator order with QID Frequency and an Albuterol order with Frequency of every 4 hours PRN wheezing or increased work of breathing using Per Protocol order mode. Greater than 13 - enter or revise RT Bronchodilator order(s) to one equivalent RT bronchodilator order with every 4 hours Frequency and an Albuterol order with Frequency of every 2 hours PRN wheezing or increased work of breathing using Per Protocol order mode.      Electronically signed by Rosalin Peabody, RCP on 2/13/2023 at 1:39 AM

## 2023-02-13 NOTE — PROGRESS NOTES
4 Eyes Skin Assessment     The patient is being assess for   Shift Handoff    I agree that 2 RN's have performed a thorough Head to Toe Skin Assessment on the patient. ALL assessment sites listed below have been assessed. Areas assessed for pressure by both nurses:   [x]   Head, Face, and Ears   [x]   Shoulders, Back, and Chest, Abdomen  [x]   Arms, Elbows, and Hands   [x]   Coccyx, Sacrum, and Ischium  [x]   Legs, Feet, and Heels        Skin Assessed Under all Medical Devices by both nurses:  vázquez and OG              All Mepilex Borders were peeled back and area peeked at by both nurses:  No:    Please list where Mepilex Borders are located:  n/a             **SHARE this note so that the co-signing nurse is able to place an eSignature**    Co-signer eSignature: Electronically signed by Rhiannon Benitez RN on 2/13/23 at 6:18 PM EST    Does the Patient have Skin Breakdown related to pressure?   No              Santiago Prevention initiated:  Yes   Wound Care Orders initiated:  No      [facility] nurse consulted for Pressure Injury (Stage 3,4, Unstageable, DTI, NWPT, Complex wounds)and New or Established Ostomies:  No      Primary Nurse eSignature: Electronically signed by Laura Covington RN on 2/13/23 at 6:14 PM EST

## 2023-02-13 NOTE — PROGRESS NOTES
02/13/23 0100   RT Protocol   History Pulmonary Disease 2   Respiratory pattern 0   Breath sounds 2   Cough 2   Indications for Bronchodilator Therapy Decreased or absent breath sounds   Bronchodilator Assessment Score 6   RT Inhaler-Nebulizer Bronchodilator Protocol Note    There is a bronchodilator order in the chart from a provider indicating to follow the RT Bronchodilator Protocol and there is an Initiate RT Inhaler-Nebulizer Bronchodilator Protocol order as well (see protocol at bottom of note). CXR Findings:  XR CHEST PORTABLE    Result Date: 2/12/2023  Endotracheal tube is above the flora in satisfactory position. XR CHEST PORTABLE    Result Date: 2/12/2023  Endotracheal tube tip is 3.4 cm above the flora. Enteric tube is noted likely within the stomach. Otherwise stable chest.     XR CHEST PORTABLE    Result Date: 2/12/2023  Findings of generalized COPD. Low lung volumes with probable basilar atelectasis. The findings from the last RT Protocol Assessment were as follows:   History Pulmonary Disease: Chronic pulmonary disease  Respiratory Pattern: Regular pattern and RR 12-20 bpm  Breath Sounds: Slightly diminished and/or crackles  Cough: Weak, productive  Indication for Bronchodilator Therapy: Decreased or absent breath sounds  Bronchodilator Assessment Score: 6    Aerosolized bronchodilator medication orders have been revised according to the RT Inhaler-Nebulizer Bronchodilator Protocol below. Respiratory Therapist to perform RT Therapy Protocol Assessment initially then follow the protocol. Repeat RT Therapy Protocol Assessment PRN for score 0-3 or on second treatment, BID, and PRN for scores above 3. No Indications - adjust the frequency to every 6 hours PRN wheezing or bronchospasm, if no treatments needed after 48 hours then discontinue using Per Protocol order mode.      If indication present, adjust the RT bronchodilator orders based on the Bronchodilator Assessment Score as indicated below. Use Inhaler orders unless patient has one or more of the following: on home nebulizer, not able to hold breath for 10 seconds, is not alert and oriented, cannot activate and use MDI correctly, or respiratory rate 25 breaths per minute or more, then use the equivalent nebulizer order(s) with same Frequency and PRN reasons based on the score. If a patient is on this medication at home then do not decrease Frequency below that used at home. 0-3 - enter or revise RT bronchodilator order(s) to equivalent RT Bronchodilator order with Frequency of every 4 hours PRN for wheezing or increased work of breathing using Per Protocol order mode. 4-6 - enter or revise RT Bronchodilator order(s) to two equivalent RT bronchodilator orders with one order with BID Frequency and one order with Frequency of every 4 hours PRN wheezing or increased work of breathing using Per Protocol order mode. 7-10 - enter or revise RT Bronchodilator order(s) to two equivalent RT bronchodilator orders with one order with TID Frequency and one order with Frequency of every 4 hours PRN wheezing or increased work of breathing using Per Protocol order mode. 11-13 - enter or revise RT Bronchodilator order(s) to one equivalent RT bronchodilator order with QID Frequency and an Albuterol order with Frequency of every 4 hours PRN wheezing or increased work of breathing using Per Protocol order mode. Greater than 13 - enter or revise RT Bronchodilator order(s) to one equivalent RT bronchodilator order with every 4 hours Frequency and an Albuterol order with Frequency of every 2 hours PRN wheezing or increased work of breathing using Per Protocol order mode.        Electronically signed by Megan Wynn RCP on 2/13/2023 at 1:40 AM

## 2023-02-13 NOTE — PROGRESS NOTES
02/12/23 1921   Patient Observation   Heart Rate 93   Resp 20   SpO2 94 %   Observations 8ett 25 at lip   Breath Sounds   Right Upper Lobe Diminished   Right Middle Lobe Diminished   Right Lower Lobe Diminished   Left Upper Lobe Diminished   Left Lower Lobe Diminished   Vent Information   Vent Mode AC/VC   Ventilator Settings   FiO2  60 %   Vt (Set, mL) 450 mL   Resp Rate (Set) 20 bmp   PEEP/CPAP (cmH2O) 5   Vent Patient Data (Readings)   Vt (Measured) 452 mL   Peak Inspiratory Pressure (cmH2O) 22 cmH2O   Rate Measured 20 br/min   Minute Volume (L/min) 8.9 Liters   Mean Airway Pressure (cmH2O) 10 cmH20   Plateau Pressure (cm H2O) 14 cm H2O   Driving Pressure 9   I:E Ratio 1:2.3   Static Compliance (L/cm H2O) 50   Dynamic Compliance (L/cm H2O) 26 L/cm H2O   Vent Alarm Settings   High Pressure (cmH2O) 45 cmH2O   Low Minute Volume (lpm) 3 L/min   Low Exhaled Vt (ml) 250 mL   RR High (bpm) 40 br/min   Apnea (secs) 20 secs   Airway Clearance   Suction ET Tube   Suction Device Inline suction catheter   Sputum Method Obtained Endotracheal   Sputum Amount Moderate   Sputum Color/Odor Bloody   Sputum Consistency Thick   Non-Surgical Airway 02/12/23 Endotracheal tube   Placement Date/Time: 02/12/23 1346   Present on Admission/Arrival: No  Placed By: In ED  Placement Verified By: Chest X-ray;Direct visualization;Capnometry; Auscultation;Colorimetric ETCO2 device  Mask Ventilation: Ventilated by mask (1)  Insertion att. ..    Secured At 25 cm   Measured From Lips   Secured Location Left   Secured By Commercial tube galarza

## 2023-02-13 NOTE — PROGRESS NOTES
Continues on Propofol IV drip at 30 mcg/kg/min while intubated and on [facility] ventilation. RASS -1 to -2. More alert with tactile stimulation. Opens eyes to name. Pulls head, shoulders up off bed, trying to grab vázquez and ETT/OG. OG clamped. BUE soft wrist restraints remain in place due to attempts to grab life saving medical tubing. Vázquez intact, secured with statlock. Urine myla with small amts sediment. Alex RT in room, Fi02 on vent decreased to 40% Peep+5,  RR 20. Monitoring per current POC.

## 2023-02-13 NOTE — PROGRESS NOTES
RT Inhaler-Nebulizer Bronchodilator Protocol Note    There is a bronchodilator order in the chart from a provider indicating to follow the RT Bronchodilator Protocol and there is an Initiate RT Inhaler-Nebulizer Bronchodilator Protocol order as well (see protocol at bottom of note). CXR Findings:  XR CHEST PORTABLE    Result Date: 2/12/2023  Endotracheal tube is above the flora in satisfactory position. XR CHEST PORTABLE    Result Date: 2/12/2023  Endotracheal tube tip is 3.4 cm above the flora. Enteric tube is noted likely within the stomach. Otherwise stable chest.     XR CHEST PORTABLE    Result Date: 2/12/2023  Findings of generalized COPD. Low lung volumes with probable basilar atelectasis. The findings from the last RT Protocol Assessment were as follows:   History Pulmonary Disease: (P) Chronic pulmonary disease  Respiratory Pattern: (P) Dyspnea on exertion or RR 21-25 bpm  Breath Sounds: (P) Slightly diminished and/or crackles  Cough: (P) Weak, productive  Indication for Bronchodilator Therapy: (P) Decreased or absent breath sounds  Bronchodilator Assessment Score: (P) 8    Aerosolized bronchodilator medication orders have been revised according to the RT Inhaler-Nebulizer Bronchodilator Protocol below. Respiratory Therapist to perform RT Therapy Protocol Assessment initially then follow the protocol. Repeat RT Therapy Protocol Assessment PRN for score 0-3 or on second treatment, BID, and PRN for scores above 3. No Indications - adjust the frequency to every 6 hours PRN wheezing or bronchospasm, if no treatments needed after 48 hours then discontinue using Per Protocol order mode. If indication present, adjust the RT bronchodilator orders based on the Bronchodilator Assessment Score as indicated below.   Use Inhaler orders unless patient has one or more of the following: on home nebulizer, not able to hold breath for 10 seconds, is not alert and oriented, cannot activate and use MDI correctly, or respiratory rate 25 breaths per minute or more, then use the equivalent nebulizer order(s) with same Frequency and PRN reasons based on the score. If a patient is on this medication at home then do not decrease Frequency below that used at home. 0-3 - enter or revise RT bronchodilator order(s) to equivalent RT Bronchodilator order with Frequency of every 4 hours PRN for wheezing or increased work of breathing using Per Protocol order mode. 4-6 - enter or revise RT Bronchodilator order(s) to two equivalent RT bronchodilator orders with one order with BID Frequency and one order with Frequency of every 4 hours PRN wheezing or increased work of breathing using Per Protocol order mode. 7-10 - enter or revise RT Bronchodilator order(s) to two equivalent RT bronchodilator orders with one order with TID Frequency and one order with Frequency of every 4 hours PRN wheezing or increased work of breathing using Per Protocol order mode. 11-13 - enter or revise RT Bronchodilator order(s) to one equivalent RT bronchodilator order with QID Frequency and an Albuterol order with Frequency of every 4 hours PRN wheezing or increased work of breathing using Per Protocol order mode. Greater than 13 - enter or revise RT Bronchodilator order(s) to one equivalent RT bronchodilator order with every 4 hours Frequency and an Albuterol order with Frequency of every 2 hours PRN wheezing or increased work of breathing using Per Protocol order mode.        Electronically signed by Augusto Kumar RCP on 2/13/2023 at 7:41 AM

## 2023-02-13 NOTE — PROGRESS NOTES
High risk vesicant drug infusing:  __________    Multiple incompatible medications infusing:  _________    CVP Monitoring:  _________    Extremely difficult IV access challenge:  ___x_____    Continued need for central line access:  _____x_____    Addressed with physician:  ____x____    RIGHT PATIENT, RIGHT TIME, RIGHT LINE

## 2023-02-13 NOTE — PROGRESS NOTES
Pulmonary & Critical Care Medicine ICU Progress Note    CC: Respiratory failure    Events of Last 24 hours:   Propofol 40 mcg/kg/min   Levophed off   PEEP 5  FiO2 40%  Large secretions       Vascular lines: IV: PIVs and L femoral line     MV:       Vent Mode: AC/VC Resp Rate (Set): 20 bmp/Vt (Set, mL): 450 mL/ /FiO2 : 40 %  Recent Labs     23  1455 23  0420   PHART 7.389 7.466*   OKQ1CJB 49.4* 37.4   PO2ART 51.6* 93.0       IV:   norepinephrine Stopped (23)    dextrose      sodium chloride 5 mL/hr (23)    propofol 30 mcg/kg/min (23)       Vitals:  Blood pressure 108/60, pulse 94, temperature 98.7 °F (37.1 °C), temperature source Bladder, resp. rate 20, height 6' (1.829 m), weight 191 lb (86.6 kg), SpO2 98 %. on AC 20/450  Temp  Av °F (37.8 °C)  Min: 98.6 °F (37 °C)  Max: 101.2 °F (38.4 °C)    Intake/Output Summary (Last 24 hours) at 2023 0713  Last data filed at 2023 0500  Gross per 24 hour   Intake 1719 ml   Output 755 ml   Net 964 ml     PE:  EXAM:  General: ill appearing. Intubated sedated. Eyes: No sclera icterus. No conjunctival injection. ENT: No discharge. ET tube in place  Neck: Trachea midline. Resp: No accessory muscle use. No crackles. Few wheezing. No rhonchi. CV: Regular rate. Regular rhythm. No mumur or rub. No edema. GI: Non-tender. Non-distended. Skin: Warm and dry. No rash on exposed extremities. M/S: No cyanosis. No clubbing. Neuro: Intubated sedated. Not following commands.  Responsive to painful stimuli  Psych: Noncommunicative unable to obtain      Scheduled Meds:   albuterol  2.5 mg Nebulization 4x daily    propafenone  150 mg Oral 3 times per day    sodium chloride flush  5-40 mL IntraVENous 2 times per day    insulin lispro  0-4 Units SubCUTAneous TID WC    insulin lispro  0-4 Units SubCUTAneous Nightly    cefTRIAXone (ROCEPHIN) IV  1,000 mg IntraVENous Q24H    And    azithromycin  500 mg IntraVENous Q24H methylPREDNISolone  40 mg IntraVENous Q12H       Data:  CBC:   Recent Labs     02/12/23  1115 02/13/23  0345   WBC 8.9 5.2   HGB 11.1* 9.6*   HCT 33.8* 28.7*   MCV 85.3 84.3    118*     BMP:   Recent Labs     02/12/23  1115 02/13/23  0345    136   K 4.5 4.3   CL 94* 94*   CO2 39* 29   BUN 25* 31*   CREATININE 1.2 1.2     LIVER PROFILE:   Recent Labs     02/12/23  1115   AST 14*   ALT 11   BILITOT 0.8   ALKPHOS 80       Microbiology:  2/12 BAL    Imaging:  Chest x-ray 2/12 imaging reviewed by me and showed  Satisfactory ETT position  RLL ASD     CTPA 2/12 imaging reviewed by me and showed  Endotracheal tube extends to the right mainstem bronchus. Extensive airway filling defects are seen within the right greater than left   mainstem bronchi, bronchus intermedius, right upper, middle, lower lobe   bronchi, likely reflecting aspiration. Heterogeneous right lower lobe   opacity, likely aspiration pneumonia. Small right pleural effusion. No gross findings of pulmonary embolism. 5 mm left lower lobe pulmonary nodule which may be followed based upon clinical risk factors. ASSESSMENT:  Acute on chronic hypercarbic and hypoxic respiratory failure   Aspiration PNA   Septic shock   Thrombocytopenia   Hemoptysis - possible related to aspiration and pneumonia but current unclear etiology, no endobronchial lesions seen on bronch  Very severe COPD with exacerbation  Afib on Eliquis  L femoral line      PLAN:  Mechanical ventilation as per my orders. The ventilator was adjusted by me at the bedside for unstable, life threatening respiratory failure. IV Propofol for sedation, target RASS -2, with daily spontaneous awakening trial.  Fentanyl PRN pain, gtt as needed  Head of bed 30 degrees or higher at all times  Daily spontaneous breathing trial once PEEP less than 8, FiO2 less than 55%. Intensive inhaled bronchodilator therapy. IV solumedrol 40 mg IV Q12 hrs.    CTX and Azithromycin D#2  Follow up Cx Monitor hemoptysis  D/C Femoal line   Holding Eliquis  Tube feeds   Hold diltiazem   BM regiment   Blood sugar control ISS, with goal 150-180  GI prophylaxis: home PPI   DVT prophylaxis: Lovenox and holding Eliquis   MRSA prophylaxis: Bactroban          Total critical care time caring for this patient with life threatening, unstable organ failure, including direct patient contact, management of life support systems, review of data including imaging and labs, discussions with other team members and physicians is 32 minutes, excluding procedures.

## 2023-02-13 NOTE — PROGRESS NOTES
Internal Medicine ICU Progress Note      Events of Last 24 hours:       Patient is intubated. He has a history of COPD, CAD, diabetes mellitus type 2. He presents with hemoptysis. He was gagging and vomiting at home and had hemoptysis for 3 days prior to admission. He was also short of breath. Patient became lethargic in the ER. He was tried on BiPAP. He failed and he was intubated. There was some issues maintaining his oxygen saturation. Bronchoscopy was done emergently. No foreign particles seen but thick bloody secretions were cleared. This morning he is on propofol. He is off Levophed. He is on FiO2 40% with PEEP of 5. Large amount of secretions. Invasive Lines: Left femoral CVC with plans for removal        MV: Intubated on 2023    Recent Labs     23  0420 23  1025   PHART 7.466* 7.374   TMM0YDA 37.4 46.2*   PO2ART 93.0 88.9       MV Settings:  Vent Mode: AC/VC Resp Rate (Set): 20 bmp/Vt (Set, mL): 450 mL/ /FiO2 : 40 %    IV:   dextrose      sodium chloride 5 mL/hr (23)    propofol 30 mcg/kg/min (23)       Vitals:  Temp  Av.9 °F (37.7 °C)  Min: 98.5 °F (36.9 °C)  Max: 101.2 °F (38.4 °C)  Pulse  Av.5  Min: 65  Max: 130  BP  Min: 62/48  Max: 171/77  SpO2  Av.6 %  Min: 87 %  Max: 100 %  FiO2   Av.4 %  Min: 35 %  Max: 60 %  Patient Vitals for the past 4 hrs:   BP Temp Temp src Pulse Resp SpO2   23 1148 -- -- -- -- -- 90 %   23 1130 124/71 -- -- (!) 116 24 92 %   23 1100 (!) 146/124 99.4 °F (37.4 °C) Bladder (!) 126 27 93 %   23 1000 (!) 171/77 -- -- (!) 121 (!) 32 96 %       CVP:        Intake/Output Summary (Last 24 hours) at 2023 1327  Last data filed at 2023 0500  Gross per 24 hour   Intake 1719 ml   Output 755 ml   Net 964 ml       EXAM:  General: Ill appearing on the vent. Sedated  Eyes: PERRL. No sclera icterus. No conjunctiva injected. ENT: No discharge. Intubated  Neck: Trachea midline.  Normal thyroid. Resp: No accessory muscle use. No crackles. Mild wheezing. No rhonchi. No dullness on percussion. CV: Regular rate. Regular rhythm. No mumur or rub. No edema. No JVD. Palpable pedal pulses. GI: Non-tender. Non-distended. No masses. No organmegaly. Normal bowel sounds. No hernia. Skin: Warm and dry. No nodule on exposed extremities. No rash on exposed extremities. Lymph: No cervical LAD. No supraclavicular LAD. M/S: No cyanosis. No joint deformity. No clubbing. Neuro: JOSE. Psych: JOSE. Medications:  Scheduled Meds:   albuterol  2.5 mg Nebulization 4x daily    mupirocin   Nasal BID    pantoprazole  40 mg IntraVENous Daily    enoxaparin  40 mg SubCUTAneous Daily    propafenone  150 mg Oral 3 times per day    sodium chloride flush  5-40 mL IntraVENous 2 times per day    insulin lispro  0-4 Units SubCUTAneous TID WC    insulin lispro  0-4 Units SubCUTAneous Nightly    cefTRIAXone (ROCEPHIN) IV  1,000 mg IntraVENous Q24H    And    azithromycin  500 mg IntraVENous Q24H    methylPREDNISolone  40 mg IntraVENous Q12H       PRN Meds:  glucose, dextrose bolus **OR** dextrose bolus, glucagon (rDNA), dextrose, sodium chloride flush, sodium chloride, ondansetron **OR** ondansetron, polyethylene glycol, acetaminophen **OR** acetaminophen    Results:  CBC:   Recent Labs     02/12/23  1115 02/13/23  0345   WBC 8.9 5.2   HGB 11.1* 9.6*   HCT 33.8* 28.7*   MCV 85.3 84.3    118*     BMP:   Recent Labs     02/12/23  1115 02/13/23  0345    136   K 4.5 4.3   CL 94* 94*   CO2 39* 29   BUN 25* 31*   CREATININE 1.2 1.2     LIVER PROFILE:   Recent Labs     02/12/23  1115   AST 14*   ALT 11   BILITOT 0.8   ALKPHOS 89     PT/INR:   Recent Labs     02/12/23  1115   PROTIME 16.2*   INR 1.32*     APTT: No results for input(s): APTT in the last 72 hours.   UA:  Recent Labs     02/12/23  2100   COLORU Yellow   PHUR 6.0   CLARITYU Clear   SPECGRAV 1.010   LEUKOCYTESUR Negative   UROBILINOGEN 0.2   BILIRUBINUR Negative   BLOODU Negative   GLUCOSEU Negative       Cultures:  COVID-19 negative  Influenza A and B not detected    Films:    XR CHEST PORTABLE   Final Result   Persistent patchy right-sided airspace disease, with aeration slightly   improved at the right base as compared to prior. XR CHEST PORTABLE   Final Result   Endotracheal tube is above the flora in satisfactory position. CT CHEST PULMONARY EMBOLISM W CONTRAST   Final Result   Endotracheal tube extends to the right mainstem bronchus. Recommend   repositioning. Extensive airway filling defects are seen within the right greater than left   mainstem bronchi, bronchus intermedius, right upper, middle, lower lobe   bronchi, likely reflecting aspiration. Heterogeneous right lower lobe   opacity, likely aspiration pneumonia. Small right pleural effusion. No gross findings of pulmonary embolism. 5 mm left lower lobe pulmonary nodule which may be followed based upon   clinical risk factors. Findings were discussed with Carlo Ford at 15:15 on 2/12/2023. RECOMMENDATIONS:   Fleischner Society guidelines for follow-up and management of incidentally   detected pulmonary nodules:      Single Solid Nodule:      Nodule size less than 6 mm   In a low-risk patient, no routine follow-up. In a high-risk patient, optional CT at 12 months. - Low risk patients include individuals with minimal or absent history of   smoking and other known risk factors. - High risk patients include individuals with a history or smoking or known   risk factors. Radiology 2017 http://pubs. rsna.org/doi/full/10.1148/radiol. 2468461392         XR CHEST PORTABLE   Final Result   Endotracheal tube tip is 3.4 cm above the flora. Enteric tube is noted   likely within the stomach. Otherwise stable chest.         XR CHEST PORTABLE   Final Result   Findings of generalized COPD. Low lung volumes with probable basilar   atelectasis. Assessment:    Principal Problem:    Acute respiratory failure (HCC)  Active Problems:    DM2 (diabetes mellitus, type 2) (HCC)    Aspiration pneumonia of right middle lobe (HCC)    HTN (hypertension)    Septic shock (HCC)    COPD exacerbation (HCC)    Chronic thrombocytopenia    Paroxysmal atrial fibrillation (HCC)  Resolved Problems:    * No resolved hospital problems. *       Plan:    #Acute respiratory failure. Admitted to ICU. Failed BiPAP. Pulmonology consulted. Pulmonary is managing vent. #Hemoptysis. Had emergent bronchoscopy and bloody secretions were removed. Possibly related to pneumonia. No endobronchial lesion seen. #Aspiration pneumonia. Because of underlying respiratory failure. On Rocephin and azithromycin. #Septic shock. Was on Levophed that has been weaned off. Sepsis due to pneumonia. #Mild thrombocytopenia likely related to sepsis. Monitor closely. #A-fib with -in sinus rhythm. Eliquis held. Hold Cardizem. #Acute exacerbation of COPD. Use nebulizers and IV Solu-Medrol. #CvC-femoral line discontinued. #DM type 2. Monitor sugars. Lovenox for DVT prophylaxis. Prognosis guarded. IMPORTANT: Please note that some portions of this note may have been created using Dragon voice recognition software. Some \"sound-alike\" and totally wrong word substitutions may have taken place due to known inherent limitations of any such software, including this voice recognition software. In spite of efforts to eliminate such errors, some may not have been corrected. So please read the note with this in mind and recognize such mistakes and understand the correct version using the  context. If there are still uncertainties in the mind of the medical provider reading this note about any aspect of the note, the provider can feel free to contact me. Thanks. All questions and concerns were addressed to the patient/family. Alternatives to my treatment were discussed. The note was completed using EMR. Every effort was made to ensure accuracy; however, inadvertent computerized transcription errors may be present.          Michael Malik MD 1:27 PM 2/13/2023

## 2023-02-13 NOTE — CARE COORDINATION
Case Management Assessment  Initial Evaluation    Date/Time of Evaluation: 2/13/2023 3:40 PM  Assessment Completed by: Karen Ames RN    If patient is discharged prior to next notation, then this note serves as note for discharge by case management. Patient Name: Susan Ross                   YOB: 1940  Diagnosis: Acute respiratory failure ([facility] [address] [address] [J96.00]  Acute on chronic respiratory failure with hypoxia and hypercapnia (HCC) [J96.21, J96.22]  Aspiration pneumonia of both lungs, unspecified aspiration pneumonia type, unspecified part of lung ([facility] [address] [facility] [J69.0]                   Date / Time: 2/12/2023 10:14 AM    Patient Admission Status: Inpatient   Readmission Risk (Low < 19, Mod (19-27), High > 27): Readmission Risk Score: 14.7    Current PCP: Harris West MD  PCP verified by CM? Yes    Chart Reviewed: Yes      History Provided by: Patient, Child/Family  Patient Orientation: Alert and Oriented    Patient Cognition: Alert    Hospitalization in the last 30 days (Readmission):  No    If yes, Readmission Assessment in CM Navigator will be completed.     Advance Directives:      Code Status: Full Code   Patient's Primary Decision Maker is: Named in [address] [place] [address]    Primary Decision MakerCalvin Gill Child - [phone]    Secondary Decision Maker: Terafern Jj - Child - [phone]    Supplemental (Other) Decision Maker: Orin Barfield Child - [phone]    Discharge Planning:    Patient lives with: Children Type of Home: House  Primary Care Giver: Family  Patient Support Systems include: Children   Current Financial resources: Medicare  Current community resources: ECF/Home Care  Current services prior to admission: Home Care            Current DME:              Type of Home Care services:       ADLS  Prior functional level: Assistance with the following:, Dressing, Cooking, Housework, Shopping  Current functional level: Assistance with the following:, Dressing, Cooking, Housework, Shopping    PT AM-PAC:   /24  OT AM-PAC:   /24    Family can provide assistance at DC: Yes  Would you like Case Management to discuss the discharge plan with any other family members/significant others, and if so, who? Yes (pt's daughter,Mitzy)  Plans to Return to Present Housing: Yes  Other Identified Issues/Barriers to RETURNING to current housing: none  Potential Assistance needed at discharge: Jeronimo Galvanuel            Potential DME:    Patient expects to discharge to: [address] [place] [address] for transportation at discharge: Family    Financial    Payor: [address] [place] [address] / Plan: MEDICARE PART A AND B / Product Type: *No Product type* /     Does insurance require precert for SNF: No    Potential assistance Purchasing Medications: No  Meds-to-Beds request:        [facility], [address] [facility] [address] [address] [phone] - f [phone]  [address]  Phone: [phone] Fax: [address], [address] [place] [address] Drive [phone] [address] [facility] [address]  Phone: [phone] Fax: [phone]    [address] [phone] Chloe Arriola [phone]  [address] [facility] [address]  Phone: [phone] Fax: Janie Clemente [phone] - [address] [facility], [address] [place] [address] Road [phone] Chloe Arriola [phone]  [address] [place] [address]  Phone: [phone] Fax: [phone]    [address] [facility], [address] [facility] [phone] - F [phone]  [address] [place], [place] [address]garret. [address]  Phone: [phone] Fax: [phone]      Notes:    Factors facilitating achievement of predicted outcomes: Family support    Barriers to discharge: none    Additional Case Management Notes: Reviewed chart. Met with the pt and pt's son at bedside.  Pt from home with daughter,Mitzy, and plan return. Pt uses walker at baseline. [facility], uses 3 liters at baseline. Pt active with [facility] for [employer]. Following. The Plan for Transition of Care is related to the following treatment goals of Acute respiratory failure (HCC) [J96.00]  Acute on chronic respiratory failure with hypoxia and hypercapnia (HCC) [J96.21, J96.22]  Aspiration pneumonia of both lungs, unspecified aspiration pneumonia type, unspecified part of lung ([facility] [X39.9]    IF APPLICABLE: The Patient and/or patient representative Gayla Cunha and his family were provided with a choice of provider and agrees with the discharge plan. Freedom of choice list with basic dialogue that supports the patient's individualized plan of care/goals and shares the quality data associated with the providers was provided to: Patient   Patient Representative Name:       The Patient and/or Patient Representative Agree with the Discharge Plan?  Yes    Eliot Lundborg, RN  Case Management Department  Ph: [phone] Fax: [phone]

## 2023-02-13 NOTE — PROGRESS NOTES
02/13/23 0320   Patient Observation   Heart Rate 75   Resp 20   SpO2 100 %   Observations 8ett 25 at lip   Breath Sounds   Right Upper Lobe Diminished   Right Middle Lobe Diminished   Right Lower Lobe Diminished   Left Upper Lobe Diminished   Left Lower Lobe Diminished   Vent Information   Vent Mode AC/VC   Ventilator Settings   FiO2  50 %   Vt (Set, mL) 450 mL   Resp Rate (Set) 20 bmp   PEEP/CPAP (cmH2O) 5   Vent Patient Data (Readings)   Vt (Measured) 450 mL   Peak Inspiratory Pressure (cmH2O) 21 cmH2O   Rate Measured 20 br/min   Minute Volume (L/min) 8.8 Liters   Mean Airway Pressure (cmH2O) 9.9 cmH20   I:E Ratio 1:2.3   Vent Alarm Settings   High Pressure (cmH2O) 45 cmH2O   Low Minute Volume (lpm) 3 L/min   Low Exhaled Vt (ml) 250 mL   RR High (bpm) 40 br/min   Apnea (secs) 20 secs   Airway Clearance   Suction ET Tube   Suction Device Inline suction catheter   Sputum Method Obtained Endotracheal   Sputum Amount Small   Sputum Color/Odor Bloody   Sputum Consistency Thick   ETT    Placement Date: 02/12/23   Present on Admission/Arrival: No  Placed By: In ED  Location: Oral  Measured From: Lips   Secured At 25 cm   Measured From Lips   ETT Placement Right   Secured By Commercial tube galarza

## 2023-02-14 ENCOUNTER — APPOINTMENT (OUTPATIENT)
Dept: GENERAL RADIOLOGY | Age: 83
DRG: 871 | End: 2023-02-14
Payer: MEDICARE

## 2023-02-14 PROBLEM — J15.1 PNEUMONIA OF RIGHT LOWER LOBE DUE TO PSEUDOMONAS SPECIES (HCC): Status: ACTIVE | Noted: 2023-02-14

## 2023-02-14 LAB
ANION GAP SERPL CALCULATED.3IONS-SCNC: 7 MMOL/L (ref 3–16)
BASOPHILS ABSOLUTE: 0 K/UL (ref 0–0.2)
BASOPHILS RELATIVE PERCENT: 0.5 %
BUN BLDV-MCNC: 24 MG/DL (ref 7–20)
CALCIUM SERPL-MCNC: 8.3 MG/DL (ref 8.3–10.6)
CHLORIDE BLD-SCNC: 99 MMOL/L (ref 99–110)
CO2: 36 MMOL/L (ref 21–32)
CREAT SERPL-MCNC: 0.8 MG/DL (ref 0.8–1.3)
CULTURE, RESPIRATORY: ABNORMAL
EOSINOPHILS ABSOLUTE: 0 K/UL (ref 0–0.6)
EOSINOPHILS RELATIVE PERCENT: 0 %
GFR SERPL CREATININE-BSD FRML MDRD: >60 ML/MIN/{1.73_M2}
GLUCOSE BLD-MCNC: 127 MG/DL (ref 70–99)
GLUCOSE BLD-MCNC: 147 MG/DL (ref 70–99)
GLUCOSE BLD-MCNC: 149 MG/DL (ref 70–99)
GLUCOSE BLD-MCNC: 159 MG/DL (ref 70–99)
GLUCOSE BLD-MCNC: 187 MG/DL (ref 70–99)
GRAM STAIN RESULT: ABNORMAL
HCT VFR BLD CALC: 29.8 % (ref 40.5–52.5)
HEMOGLOBIN: 9.8 G/DL (ref 13.5–17.5)
LYMPHOCYTES ABSOLUTE: 1.1 K/UL (ref 1–5.1)
LYMPHOCYTES RELATIVE PERCENT: 19.7 %
MAGNESIUM: 1.8 MG/DL (ref 1.8–2.4)
MCH RBC QN AUTO: 27.8 PG (ref 26–34)
MCHC RBC AUTO-ENTMCNC: 32.9 G/DL (ref 31–36)
MCV RBC AUTO: 84.5 FL (ref 80–100)
MONOCYTES ABSOLUTE: 0.5 K/UL (ref 0–1.3)
MONOCYTES RELATIVE PERCENT: 8.4 %
NEUTROPHILS ABSOLUTE: 4.2 K/UL (ref 1.7–7.7)
NEUTROPHILS RELATIVE PERCENT: 71.4 %
ORGANISM: ABNORMAL
PDW BLD-RTO: 15.8 % (ref 12.4–15.4)
PERFORMED ON: ABNORMAL
PHOSPHORUS: 2.2 MG/DL (ref 2.5–4.9)
PLATELET # BLD: 127 K/UL (ref 135–450)
PMV BLD AUTO: 8.9 FL (ref 5–10.5)
POTASSIUM SERPL-SCNC: 3.7 MMOL/L (ref 3.5–5.1)
RBC # BLD: 3.53 M/UL (ref 4.2–5.9)
SODIUM BLD-SCNC: 142 MMOL/L (ref 136–145)
WBC # BLD: 5.8 K/UL (ref 4–11)

## 2023-02-14 PROCEDURE — 2700000000 HC OXYGEN THERAPY PER DAY

## 2023-02-14 PROCEDURE — 6370000000 HC RX 637 (ALT 250 FOR IP): Performed by: INTERNAL MEDICINE

## 2023-02-14 PROCEDURE — 97165 OT EVAL LOW COMPLEX 30 MIN: CPT

## 2023-02-14 PROCEDURE — 83735 ASSAY OF MAGNESIUM: CPT

## 2023-02-14 PROCEDURE — 80048 BASIC METABOLIC PNL TOTAL CA: CPT

## 2023-02-14 PROCEDURE — 99233 SBSQ HOSP IP/OBS HIGH 50: CPT | Performed by: INTERNAL MEDICINE

## 2023-02-14 PROCEDURE — 97530 THERAPEUTIC ACTIVITIES: CPT

## 2023-02-14 PROCEDURE — 6360000002 HC RX W HCPCS: Performed by: INTERNAL MEDICINE

## 2023-02-14 PROCEDURE — 85025 COMPLETE CBC W/AUTO DIFF WBC: CPT

## 2023-02-14 PROCEDURE — 94640 AIRWAY INHALATION TREATMENT: CPT

## 2023-02-14 PROCEDURE — 2580000003 HC RX 258: Performed by: INTERNAL MEDICINE

## 2023-02-14 PROCEDURE — 94761 N-INVAS EAR/PLS OXIMETRY MLT: CPT

## 2023-02-14 PROCEDURE — 97162 PT EVAL MOD COMPLEX 30 MIN: CPT

## 2023-02-14 PROCEDURE — 84100 ASSAY OF PHOSPHORUS: CPT

## 2023-02-14 PROCEDURE — 1200000000 HC SEMI PRIVATE

## 2023-02-14 PROCEDURE — 97116 GAIT TRAINING THERAPY: CPT

## 2023-02-14 PROCEDURE — C9113 INJ PANTOPRAZOLE SODIUM, VIA: HCPCS | Performed by: INTERNAL MEDICINE

## 2023-02-14 PROCEDURE — 36415 COLL VENOUS BLD VENIPUNCTURE: CPT

## 2023-02-14 PROCEDURE — 71045 X-RAY EXAM CHEST 1 VIEW: CPT

## 2023-02-14 PROCEDURE — 92526 ORAL FUNCTION THERAPY: CPT

## 2023-02-14 RX ORDER — GABAPENTIN 400 MG/1
800 CAPSULE ORAL 3 TIMES DAILY
Status: DISCONTINUED | OUTPATIENT
Start: 2023-02-14 | End: 2023-02-15 | Stop reason: HOSPADM

## 2023-02-14 RX ORDER — LANOLIN ALCOHOL/MO/W.PET/CERES
3 CREAM (GRAM) TOPICAL NIGHTLY PRN
Status: DISCONTINUED | OUTPATIENT
Start: 2023-02-14 | End: 2023-02-15 | Stop reason: HOSPADM

## 2023-02-14 RX ORDER — ALBUTEROL SULFATE 2.5 MG/3ML
2.5 SOLUTION RESPIRATORY (INHALATION) 2 TIMES DAILY
Status: DISCONTINUED | OUTPATIENT
Start: 2023-02-14 | End: 2023-02-15 | Stop reason: HOSPADM

## 2023-02-14 RX ORDER — ALBUTEROL SULFATE 2.5 MG/3ML
2.5 SOLUTION RESPIRATORY (INHALATION) EVERY 4 HOURS PRN
Status: DISCONTINUED | OUTPATIENT
Start: 2023-02-14 | End: 2023-02-15 | Stop reason: HOSPADM

## 2023-02-14 RX ORDER — LEVOFLOXACIN 5 MG/ML
750 INJECTION, SOLUTION INTRAVENOUS EVERY 24 HOURS
Status: DISCONTINUED | OUTPATIENT
Start: 2023-02-14 | End: 2023-02-15 | Stop reason: HOSPADM

## 2023-02-14 RX ADMIN — PROPAFENONE HYDROCHLORIDE 150 MG: 150 TABLET, FILM COATED ORAL at 01:30

## 2023-02-14 RX ADMIN — GABAPENTIN 800 MG: 400 CAPSULE ORAL at 21:35

## 2023-02-14 RX ADMIN — PROPAFENONE HYDROCHLORIDE 150 MG: 150 TABLET, FILM COATED ORAL at 07:57

## 2023-02-14 RX ADMIN — METHYLPREDNISOLONE SODIUM SUCCINATE 40 MG: 40 INJECTION, POWDER, FOR SOLUTION INTRAMUSCULAR; INTRAVENOUS at 07:57

## 2023-02-14 RX ADMIN — METHYLPREDNISOLONE SODIUM SUCCINATE 40 MG: 40 INJECTION, POWDER, FOR SOLUTION INTRAMUSCULAR; INTRAVENOUS at 17:46

## 2023-02-14 RX ADMIN — Medication 3 MG: at 22:09

## 2023-02-14 RX ADMIN — ALBUTEROL SULFATE 2.5 MG: 2.5 SOLUTION RESPIRATORY (INHALATION) at 07:43

## 2023-02-14 RX ADMIN — Medication 10 ML: at 21:36

## 2023-02-14 RX ADMIN — PANTOPRAZOLE SODIUM 40 MG: 40 INJECTION, POWDER, FOR SOLUTION INTRAVENOUS at 08:47

## 2023-02-14 RX ADMIN — ALBUTEROL SULFATE 2.5 MG: 2.5 SOLUTION RESPIRATORY (INHALATION) at 19:11

## 2023-02-14 RX ADMIN — PROPAFENONE HYDROCHLORIDE 150 MG: 150 TABLET, FILM COATED ORAL at 21:35

## 2023-02-14 RX ADMIN — LEVOFLOXACIN 750 MG: 5 INJECTION, SOLUTION INTRAVENOUS at 11:53

## 2023-02-14 RX ADMIN — MUPIROCIN: 20 OINTMENT TOPICAL at 08:47

## 2023-02-14 RX ADMIN — GABAPENTIN 800 MG: 400 CAPSULE ORAL at 11:49

## 2023-02-14 RX ADMIN — ENOXAPARIN SODIUM 40 MG: 100 INJECTION SUBCUTANEOUS at 08:48

## 2023-02-14 RX ADMIN — APIXABAN 5 MG: 5 TABLET, FILM COATED ORAL at 17:46

## 2023-02-14 RX ADMIN — Medication 10 ML: at 08:47

## 2023-02-14 ASSESSMENT — PAIN SCALES - GENERAL: PAINLEVEL_OUTOF10: 2

## 2023-02-14 NOTE — PROGRESS NOTES
Speech Language Pathology    Speech Language Pathology  Dysphagia Treatment/Follow-Up Note  Facility/Department: [facility] ICU    Recommendations:  Solid Consistency: IDDSI 6 Soft and Bite Sized Solids  Liquid Consistency: IDDSI 0 Thin Liquids  Medication: Meds PO as tolerated  ENT consult recommended to assess vocal fold function and integrity; pt with persistent hoarse vocal quality \"for over a month now\" per pt's family      NAME:Jarrett Green  : 1940 (78 y.o.)   MRN: [id number]  ROOM: [address] [place] [address]  ADMISSION DATE: 2023  PATIENT DIAGNOSIS(ES): Acute respiratory failure (HCC) [J96.00]  Acute on chronic respiratory failure with hypoxia and hypercapnia (HCC) [J96.21, J96.22]  Aspiration pneumonia of both lungs, unspecified aspiration pneumonia type, unspecified part of lung ([facility] [address] [J69.0]  No Known Allergies    DATE ONSET: 2023    Pain: The patient does not complain of pain     Current Diet: ADULT DIET; Dysphagia - Soft and Bite Sized; 4 carb choices (60 gm/meal)    Diet Tolerance:  Patient tolerating current diet level without signs/symptoms of aspiration per chart, pt, and RN      Dysphagia Treatment and Impressions:  Subjective: Pt seen in room at bedside with RN permission (Libby). Pt seen upright in bed, daughter at bedside. RN Report/Chart Review: No reports of dysphagia per chart review, RN. Patient tolerance: pt denied dysphagia, tolerating recommended diet without difficulty. He reported continued intermittent productive of dark-tinged secretions via [place]. Respiratory Status: Pt with SPO2% of 95-96 on 3 LPM NC (baseline O2 per pt and pt's family) with RR of 18-22/min.     Liquid PO Trials:   IDDSI 0 Thin:  Assessed via cup: no anterior bolus loss , swallow timing subjectively appears timely, no clinical s/s of aspiration, and vitals stable    Solid PO Trials: pt pleasantly declined     Education: SLP edu pt re: Role of SLP, Rationale for dysphagia tx, Recommended compensatory strategies, Aspiration precautions, BSE results, POC, Anatomical components of swallow structures as they pertain to airway protection, and techniques to improve respiratory-swallow coordination. Pt verbalized understanding, would benefit from ongoing education, pt's daughter verbalized understanding. RN aware of recommendations. SLP discussed rationale for ENT consult recommendation to assess pt's vocal fold function and integrity d/t pt's persistent hoarse and dysphonic vocal quality \"for over a month now\" per pt's daughter. RN notified of recs for ENT consult as well. Assessment: Pt tolerating current diet with no clinical s/s of aspiration. Some carryover of recommended compensatory strategies    Recommendations: SLP recommends to continue with IDDSI 6 Soft and Bite Sized Solids; IDDSI 0 Thin Liquids; Meds PO as tolerated  Risk Management: upright for all intake, stay upright for at least 30 mins after intake, small bites/sips, distant supervision with intake, oral care 2-3x/day to reduce adverse affects in the event of aspiration, alternate bites/sips, slow rate of intake, general GERD precautions, general aspiration precautions, and hold PO and contact SLP if s/s of aspiration or worsening respiratory status develop. Kerri Reno Dysphagia Goals:  Timeframe for Long-term Goals: 7 days, 2/20/23  Goal 1: The pt will tolerate safest and least restrictive diet without clinical s/s of aspiration or change in respiratory status. 02/14: ongoing, continue current diet     Short-term Goals  Timeframe for Short-term Goals: 5 days, 2/18/23  1) The patient will tolerate recommended diet without observed clinical signs of aspiration. 02/14: ongoing, see above  2) The patient will tolerate instrumental swallowing procedure. 02/14: will continue to monitor for need. 3) The patient/caregiver will demonstrate understanding of compensatory strategies for improved swallowing safety.  02/14: ongoing, see above.    Speech/Language/Cog Goals: N/A    Recommendations:  Solid Consistency: IDDSI 6 Soft and Bite Sized Solids  Liquid Consistency: IDDSI 0 Thin Liquids  Medication: Meds PO as tolerated  ENT consult recommended to assess vocal fold function and integrity; pt with persistent hoarse vocal quality \"for over a month now\" per pt's family    Plan:    Continued Dysphagia treatment with goals per plan of care. Discharge Recommendations: TBD    If pt discharges from hospital prior to Speech/Swallowing discharge, this note serves as tx and discharge summary.      Total Treatment Time / Charges     Time in Time out Total Time / units   Cognitive Tx         Speech Tx      Dysphagia Tx [id number] 10 min / 1 unit (DT)     Signature:  Waylan Severs, M.S. Pardeep Pavon  Speech-language pathologist  [id number]

## 2023-02-14 NOTE — PROGRESS NOTES
Pt requesting home medication gabapentin- states his feet/legs are burning. Spoke w/ Dr. Kristin Mistry- okay to restart.

## 2023-02-14 NOTE — PROGRESS NOTES
RT Inhaler-Nebulizer Bronchodilator Protocol Note    There is a bronchodilator order in the chart from a provider indicating to follow the RT Bronchodilator Protocol and there is an Initiate RT Inhaler-Nebulizer Bronchodilator Protocol order as well (see protocol at bottom of note). CXR Findings:  XR CHEST PORTABLE    Result Date: 2/13/2023  Persistent patchy right-sided airspace disease, with aeration slightly improved at the right base as compared to prior. XR CHEST PORTABLE    Result Date: 2/12/2023  Endotracheal tube is above the flora in satisfactory position. XR CHEST PORTABLE    Result Date: 2/12/2023  Endotracheal tube tip is 3.4 cm above the flora. Enteric tube is noted likely within the stomach. Otherwise stable chest.     XR CHEST PORTABLE    Result Date: 2/12/2023  Findings of generalized COPD. Low lung volumes with probable basilar atelectasis. The findings from the last RT Protocol Assessment were as follows:   History Pulmonary Disease: (P) Chronic pulmonary disease  Respiratory Pattern: (P) Dyspnea on exertion or RR 21-25 bpm  Breath Sounds: (P) Slightly diminished and/or crackles  Cough: (P) Strong, spontaneous, non-productive  Indication for Bronchodilator Therapy: (P) On home bronchodilators  Bronchodilator Assessment Score: (P) 6    Aerosolized bronchodilator medication orders have been revised according to the RT Inhaler-Nebulizer Bronchodilator Protocol below. Respiratory Therapist to perform RT Therapy Protocol Assessment initially then follow the protocol. Repeat RT Therapy Protocol Assessment PRN for score 0-3 or on second treatment, BID, and PRN for scores above 3. No Indications - adjust the frequency to every 6 hours PRN wheezing or bronchospasm, if no treatments needed after 48 hours then discontinue using Per Protocol order mode. If indication present, adjust the RT bronchodilator orders based on the Bronchodilator Assessment Score as indicated below. Use Inhaler orders unless patient has one or more of the following: on home nebulizer, not able to hold breath for 10 seconds, is not alert and oriented, cannot activate and use MDI correctly, or respiratory rate 25 breaths per minute or more, then use the equivalent nebulizer order(s) with same Frequency and PRN reasons based on the score. If a patient is on this medication at home then do not decrease Frequency below that used at home. 0-3 - enter or revise RT bronchodilator order(s) to equivalent RT Bronchodilator order with Frequency of every 4 hours PRN for wheezing or increased work of breathing using Per Protocol order mode. 4-6 - enter or revise RT Bronchodilator order(s) to two equivalent RT bronchodilator orders with one order with BID Frequency and one order with Frequency of every 4 hours PRN wheezing or increased work of breathing using Per Protocol order mode. 7-10 - enter or revise RT Bronchodilator order(s) to two equivalent RT bronchodilator orders with one order with TID Frequency and one order with Frequency of every 4 hours PRN wheezing or increased work of breathing using Per Protocol order mode. 11-13 - enter or revise RT Bronchodilator order(s) to one equivalent RT bronchodilator order with QID Frequency and an Albuterol order with Frequency of every 4 hours PRN wheezing or increased work of breathing using Per Protocol order mode. Greater than 13 - enter or revise RT Bronchodilator order(s) to one equivalent RT bronchodilator order with every 4 hours Frequency and an Albuterol order with Frequency of every 2 hours PRN wheezing or increased work of breathing using Per Protocol order mode.          Electronically signed by Christiano Yu RCP on 2/14/2023 at 7:48 AM

## 2023-02-14 NOTE — PROGRESS NOTES
Inpatient Physical Therapy Evaluation    Unit: 2 [address] [facility]  Date:  2/14/2023  Patient Name:    Stephanie Steven  Admitting diagnosis:  Acute respiratory failure ([facility] [address] [J96.00]  Acute on chronic respiratory failure with hypoxia and hypercapnia (HCC) [J96.21, J96.22]  Aspiration pneumonia of both lungs, unspecified aspiration pneumonia type, unspecified part of lung ([facility] [address] [J69.0]  Admit Date:  2/12/2023  Precautions/Restrictions/WB Status/ Lines/ Wounds/ Oxygen: Fall risk, Bed/chair alarm, Lines (IV and Supplemental O2 (3L)), and Telemetry    Treatment Time:  1530- 1603   Treatment Number:  1  Timed Code Treatment Minutes: 23 minutes  Total Treatment Minutes:  33  minutes    Patient Stated Goals for Therapy: \" to get back home \"          Discharge Recommendations: Home PT  DME needs for discharge:  recommend use of RW but pt owns       Therapy recommendation for EMS Transport: can transport by wheelchair    Therapy recommendations for staff:   Assist of 1 for ambulation with use of rolling walker (RW) to/from bathroom    History of Present Illness:   Per Stephanie Hayes MD H&P: 79 yo male with a h/o COPD, CAD, DM2 presented with a c/o hemoptysis. He reports gagging and vomiting at home and since then having hemoptysis the last three days. + SOB. He has had to turn up his O2. He became lethargic in the ED and failed Bipap and was intubated. Required frequent suctioning of thick bloody mucus. CT showed debris in the airway. Trouble keeping up O2 sats even on 100% Fio2 in the ED. I was requested to come to ED for bronchoscopy. No food particles seen but thick bloody secretions were cleared. Pt intubated from 2/12/23 to 2/14/23. Home Health S4 Level Recommendation:  Level 2 Social    AM-PAC Mobility Score    AM-PAC Inpatient Mobility Raw Score : 17       Subjective  Patient lying reclined bed with family present (daughter). Pt agreeable to this PT session.      Cognition    A&O x4   Able to follow 2 step commands    Pain No  Location: denies  Rating: NA /10  Pain Medicine Status: Denies need    Preadmission Environment    Pt. Lives Alone and family in and out often  Preadmission Environment    Pt. Lives alone, dtr lives near by to assist  Home environment:    one story home  Steps to enter first floor: 2 steps to enter and no Rail  Bathroom: Walk-in Shower, Grab bars and Shower Chair, comfort [place] with [employer] owned: [employer] [facility], [facility] Walker, manual W/C, home O2 (3 L) continuous, pulse ox, reacher, inhaler, nebulizer and life alert     Preadmission Status:  Pt. Able to drive: No  Pt Fully independent with ADLs: Yes  Pt. Is independent with cooking and laundry. HHA 3x/wk - assist with cleaning, laundry, grocery shopping  Pt. Fully independent for transfers and gait and walked with walker intermittently       Objective  Does this pt have an acute or acute on chronic diagnosis of CHF? No    Upper Extremity ROM/Strength  Please see OT evaluation. Lower Extremity ROM / Strength   AROM WFL: Yes  ROM limitations: none    BLE strength WFL, but not formally assessed with MMT. Lower Extremity Sensation    Impaired numbness and tingling in bilateral feet    Balance  Static Sitting:  Good ; Supervision  Dynamic Sitting:  Good ; Supervision  Comments:     Static Standing: Fair ; CGA  Dynamic Standing: Fair ; CGA  Comments:     Posture  Seated: WNL  Standing:  WNL    Bed Mobility   Supine to Sit:    SBA  Sit to Supine:   Not Tested  Rolling:   Not Tested  Scooting in sitting: SBA  Scooting in supine:  Not Tested  Bridging:  Not Tested    Transfer Training     Sit to stand:   CGA  Stand to sit:   CGA  Bed to Chair:   CGA with use of rolling walker (RW)    Gait gait completed as indicated below  Distance:      40 ft  Deviations (firm surface/linoleum):  decreased cindy, decreased foot clearance bilaterally, and decreased step length bilaterally  Assistive Device Used:    rolling walker (RW)  Level of Assist: CGA  Comment: assistance for O2 line mangement    Stair Training deferred, pt unsafe/ not appropriate to complete stairs at this time  # of Steps:   N/A  Level of Assist:  Not Tested  UE Support:  NA  Assistive Device:  N/A  Pattern:   N/A  Comments:      Therapeutic Exercises Initiated  Aime deferred secondary to treatment focus on functional mobility  Supine:  N/A    Seated:  N/A    Standing:  N/A    Activity Tolerance   During therapy session noted pt with see vitals chart below for details     BP (mmHg) HR (bpm) SpO2 (%) on 3  Comments   Supine at rest       Seated at EOB       Standing       End of session  87 96      Positioning Needs   Pt up in chair, alarm set, positioned in proper neutral alignment and pressure relief provided. Call light provided and all needs within reach    Other Activities  None. Patient/Family Education   Pt educated on role of inpatient PT, POC, importance of continued activity, DC recommendations, safety awareness, transfer techniques, pursed lip breathing, and calling for assist with mobility. Assessment  Pt seen today for physical therapy Evaluation. Pt demonstrated decreased Activity tolerance, Balance, Safety, and Strength as well as decreased independence with Ambulation and Transfers. Recommending Home with home PT and 24 hr initial assistance upon discharge as patient functioning below baseline level    Goals : To be met in 3 visits:  1). Independent with LE Ex x 10 reps  2). CHF goal: N/A    To be met in 6 visits:  1). Supine to/from sit: Independent  2). Sit to/from stand: Independent  3). Bed to chair: Independent  4). Gait: Ambulate 150 ft.  with  Independent and use of LRAD (least restrictive assistive device)  5). Tolerate B LE exercises 3 sets of 10-15 reps  6).   Ascend/descend 2 steps with Independent with use of no handrail and LRAD (least restrictive assistive device)    Rehabilitation Potential: Fair  Strengths for achieving goals include:   Pt motivated, PLOF, Family Support, and Pt cooperative   Barriers to achieving goals include:    Complexity of condition    Plan    To be seen 3-5 x / week  while in acute care setting for therapeutic exercises, bed mobility, transfers, progressive gait training, balance training, and family/patient education. Signature: Milan Lewis, PT, DPT PT [id number]      If patient discharges from this facility prior to next visit, this note will serve as the Discharge Summary.

## 2023-02-14 NOTE — PROGRESS NOTES
Internal Medicine ICU Progress Note      Events of Last 24 hours:       Patient is intubated. He has a history of COPD, CAD, diabetes mellitus type 2. He presents with hemoptysis. He was gagging and vomiting at home and had hemoptysis for 3 days prior to admission. He was also short of breath. Patient became lethargic in the ER. He was tried on BiPAP. He failed and he was intubated. There was some issues maintaining his oxygen saturation. Bronchoscopy was done emergently. No foreign particles seen but thick bloody secretions were cleared. This morning he is on propofol. He is off Levophed. He is on FiO2 40% with PEEP of 5. Large amount of secretions. -patient is extubated on on 2022. He is on 3 L oxygen. He is doing well. He had a speech eval and is eating. Invasive Lines: Left femoral CVC with plans for removal        MV: Intubated on 2023    Recent Labs     23  0420 23  1025   PHART 7.466* 7.374   SMX2SGL 37.4 46.2*   PO2ART 93.0 88.9         MV Settings:  Vent Mode: AC/VC Resp Rate (Set): 20 bmp/Vt (Set, mL): 450 mL/ /FiO2 : 40 %    IV:   dextrose      sodium chloride Stopped (23 1721)       Vitals:  Temp  Av.2 °F (37.3 °C)  Min: 98.7 °F (37.1 °C)  Max: 99.4 °F (37.4 °C)  Pulse  Av.7  Min: 87  Max: 128  BP  Min: 115/92  Max: 154/71  SpO2  Av.9 %  Min: 91 %  Max: 100 %  Patient Vitals for the past 4 hrs:   BP Temp Pulse Resp SpO2   23 1146 -- 99.4 °F (37.4 °C) -- -- --   23 0900 (!) 154/71 -- (!) 101 18 95 %         CVP:        Intake/Output Summary (Last 24 hours) at 2023 1259  Last data filed at 2023 0716  Gross per 24 hour   Intake 980.5 ml   Output 1170 ml   Net -189.5 ml         EXAM:  General: Awake and alert. Not in distress. Chronically ill-appearing. Eyes: PERRL. No sclera icterus. No conjunctiva injected. ENT: No discharge. Neck: Trachea midline. Normal thyroid. Resp: No accessory muscle use. No crackles. Mild wheezing. No rhonchi. No dullness on percussion. CV: Regular rate. Regular rhythm. No mumur or rub. No edema. No JVD. Palpable pedal pulses. GI: Non-tender. Non-distended. No masses. No organmegaly. Normal bowel sounds. No hernia. Skin: Warm and dry. No nodule on exposed extremities. No rash on exposed extremities. Lymph: No cervical LAD. No supraclavicular LAD. M/S: No cyanosis. No joint deformity. No clubbing. Neuro: Awake and alert. Nonfocal.  Psych: Alert oriented x3. Medications:  Scheduled Meds:   albuterol  2.5 mg Nebulization BID    levofloxacin  750 mg IntraVENous Q24H    apixaban  5 mg Oral BID    gabapentin  800 mg Oral TID    mupirocin   Nasal BID    pantoprazole  40 mg IntraVENous Daily    propafenone  150 mg Oral 3 times per day    sodium chloride flush  5-40 mL IntraVENous 2 times per day    insulin lispro  0-4 Units SubCUTAneous TID WC    insulin lispro  0-4 Units SubCUTAneous Nightly    methylPREDNISolone  40 mg IntraVENous Q12H       PRN Meds:  albuterol, glucose, dextrose bolus **OR** dextrose bolus, glucagon (rDNA), dextrose, sodium chloride flush, sodium chloride, ondansetron **OR** ondansetron, polyethylene glycol, acetaminophen **OR** acetaminophen    Results:  CBC:   Recent Labs     02/12/23  1115 02/13/23  0345 02/14/23  0741   WBC 8.9 5.2 5.8   HGB 11.1* 9.6* 9.8*   HCT 33.8* 28.7* 29.8*   MCV 85.3 84.3 84.5    118* 127*       BMP:   Recent Labs     02/12/23  1115 02/13/23  0345 02/14/23  0741    136 142   K 4.5 4.3 3.7   CL 94* 94* 99   CO2 39* 29 36*   PHOS  --   --  2.2*   BUN 25* 31* 24*   CREATININE 1.2 1.2 0.8       LIVER PROFILE:   Recent Labs     02/12/23 1115   AST 14*   ALT 11   BILITOT 0.8   ALKPHOS 89       PT/INR:   Recent Labs     02/12/23  1115   PROTIME 16.2*   INR 1.32*       APTT: No results for input(s): APTT in the last 72 hours.   UA:  Recent Labs     02/12/23  2100   COLORU Yellow   PHUR 6.0   CLARITYU Clear   SPECGRAV 1.010 LEUKOCYTESUR Negative   UROBILINOGEN 0.2   BILIRUBINUR Negative   BLOODU Negative   GLUCOSEU Negative         Cultures:  COVID-19 negative  Influenza A and B not detected    Films:    XR CHEST PORTABLE   Final Result   Chronic pattern of interstitial change with improved aeration of the right   lung from recent prior imaging. XR CHEST PORTABLE   Final Result   Persistent patchy right-sided airspace disease, with aeration slightly   improved at the right base as compared to prior. XR CHEST PORTABLE   Final Result   Endotracheal tube is above the flora in satisfactory position. CT CHEST PULMONARY EMBOLISM W CONTRAST   Final Result   Endotracheal tube extends to the right mainstem bronchus. Recommend   repositioning. Extensive airway filling defects are seen within the right greater than left   mainstem bronchi, bronchus intermedius, right upper, middle, lower lobe   bronchi, likely reflecting aspiration. Heterogeneous right lower lobe   opacity, likely aspiration pneumonia. Small right pleural effusion. No gross findings of pulmonary embolism. 5 mm left lower lobe pulmonary nodule which may be followed based upon   clinical risk factors. Findings were discussed with Zack Pal at 15:15 on 2/12/2023. RECOMMENDATIONS:   Fleischner Society guidelines for follow-up and management of incidentally   detected pulmonary nodules:      Single Solid Nodule:      Nodule size less than 6 mm   In a low-risk patient, no routine follow-up. In a high-risk patient, optional CT at 12 months. - Low risk patients include individuals with minimal or absent history of   smoking and other known risk factors. - High risk patients include individuals with a history or smoking or known   risk factors. Radiology 2017 http://pubs. rsna.org/doi/full/10.1148/radiol. 3433510493         XR CHEST PORTABLE   Final Result   Endotracheal tube tip is 3.4 cm above the flora. Enteric tube is noted   likely within the stomach. Otherwise stable chest.         XR CHEST PORTABLE   Final Result   Findings of generalized COPD. Low lung volumes with probable basilar   atelectasis. Assessment:    Principal Problem:    Acute respiratory failure (HCC)  Active Problems:    DM2 (diabetes mellitus, type 2) (HCC)    Aspiration pneumonia of both lungs (HCC)    Hemoptysis    Aspiration pneumonia (HCC)    Pneumonia of right lower lobe due to Pseudomonas species ([address]    HTN (hypertension)    Septic shock (HCC)    COPD exacerbation (HCC)    Chronic thrombocytopenia    Paroxysmal atrial fibrillation (HCC)  Resolved Problems:    * No resolved hospital problems. *       Plan:    #Acute respiratory failure. Admitted to ICU. Failed BiPAP. Pulmonology consulted. He is intubated. He was extubated on 2/14/2023. On 3 L of oxygen. #Hemoptysis. Had emergent bronchoscopy and bloody secretions were removed. Possibly related to pneumonia. No endobronchial lesion seen. Minimal hemoptysis. H&H stable. #Aspiration pneumonia. Due to Pseudomonas. On Rocephin and azithromycin. Changed to Levaquin. #Septic shock. Was on Levophed that has been weaned off. Sepsis due to pneumonia. Septic shock resolved. #Mild thrombocytopenia likely related to sepsis. Monitor closely. #A-fib with -in sinus rhythm. Okay to resume Eliquis. Hold Cardizem. #Acute exacerbation of COPD. Use nebulizers and IV Solu-Medrol. #CVC-femoral line discontinued. #DM type 2. Monitor sugars. Lovenox for DVT prophylaxis. Transfer to [place] [id number] with telemetry    IMPORTANT: Please note that some portions of this note may have been created using Dragon voice recognition software. Some \"sound-alike\" and totally wrong word substitutions may have taken place due to known inherent limitations of any such software, including this voice recognition software.   In spite of efforts to eliminate such errors, some may not have been corrected. So please read the note with this in mind and recognize such mistakes and understand the correct version using the  context. If there are still uncertainties in the mind of the medical provider reading this note about any aspect of the note, the provider can feel free to contact me. Thanks. All questions and concerns were addressed to the patient/family. Alternatives to my treatment were discussed. The note was completed using EMR. Every effort was made to ensure accuracy; however, inadvertent computerized transcription errors may be present.          Norberto Real MD 12:59 PM 2/14/2023

## 2023-02-14 NOTE — PROGRESS NOTES
RT Inhaler-Nebulizer Bronchodilator Protocol Note    There is a bronchodilator order in the chart from a provider indicating to follow the RT Bronchodilator Protocol and there is an Initiate RT Inhaler-Nebulizer Bronchodilator Protocol order as well (see protocol at bottom of note). CXR Findings:  XR CHEST PORTABLE    Result Date: 2/13/2023  Persistent patchy right-sided airspace disease, with aeration slightly improved at the right base as compared to prior. XR CHEST PORTABLE    Result Date: 2/12/2023  Endotracheal tube is above the flora in satisfactory position. XR CHEST PORTABLE    Result Date: 2/12/2023  Endotracheal tube tip is 3.4 cm above the flora. Enteric tube is noted likely within the stomach. Otherwise stable chest.     XR CHEST PORTABLE    Result Date: 2/12/2023  Findings of generalized COPD. Low lung volumes with probable basilar atelectasis. The findings from the last RT Protocol Assessment were as follows:   History Pulmonary Disease: Chronic pulmonary disease  Respiratory Pattern: Dyspnea on exertion or RR 21-25 bpm  Breath Sounds: Slightly diminished and/or crackles  Cough: Strong, spontaneous, non-productive  Indication for Bronchodilator Therapy: Decreased or absent breath sounds  Bronchodilator Assessment Score: 6    Aerosolized bronchodilator medication orders have been revised according to the RT Inhaler-Nebulizer Bronchodilator Protocol below. Respiratory Therapist to perform RT Therapy Protocol Assessment initially then follow the protocol. Repeat RT Therapy Protocol Assessment PRN for score 0-3 or on second treatment, BID, and PRN for scores above 3. No Indications - adjust the frequency to every 6 hours PRN wheezing or bronchospasm, if no treatments needed after 48 hours then discontinue using Per Protocol order mode. If indication present, adjust the RT bronchodilator orders based on the Bronchodilator Assessment Score as indicated below.   Use Inhaler orders unless patient has one or more of the following: on home nebulizer, not able to hold breath for 10 seconds, is not alert and oriented, cannot activate and use MDI correctly, or respiratory rate 25 breaths per minute or more, then use the equivalent nebulizer order(s) with same Frequency and PRN reasons based on the score. If a patient is on this medication at home then do not decrease Frequency below that used at home. 0-3 - enter or revise RT bronchodilator order(s) to equivalent RT Bronchodilator order with Frequency of every 4 hours PRN for wheezing or increased work of breathing using Per Protocol order mode. 4-6 - enter or revise RT Bronchodilator order(s) to two equivalent RT bronchodilator orders with one order with BID Frequency and one order with Frequency of every 4 hours PRN wheezing or increased work of breathing using Per Protocol order mode. 7-10 - enter or revise RT Bronchodilator order(s) to two equivalent RT bronchodilator orders with one order with TID Frequency and one order with Frequency of every 4 hours PRN wheezing or increased work of breathing using Per Protocol order mode. 11-13 - enter or revise RT Bronchodilator order(s) to one equivalent RT bronchodilator order with QID Frequency and an Albuterol order with Frequency of every 4 hours PRN wheezing or increased work of breathing using Per Protocol order mode. Greater than 13 - enter or revise RT Bronchodilator order(s) to one equivalent RT bronchodilator order with every 4 hours Frequency and an Albuterol order with Frequency of every 2 hours PRN wheezing or increased work of breathing using Per Protocol order mode. PATIENT WILL BENEFIT FROM TREATMENTS.      Electronically signed by Lucero Rico RCP on 2/13/2023 at 7:17 PM

## 2023-02-14 NOTE — PROGRESS NOTES
AM assessment completed. AM labs reviewed. VSS. Pt on 3 LNC. Pt requesting sleeping pill for HS. PIVs WNL. No new orders at present time.   J Luis Aviles RN, BSN

## 2023-02-14 NOTE — PROGRESS NOTES
Patient a/o, denies needs, oxygen on 3 liters NC, lung sounds diminished with expiratory wheezing bilaterally. Bowel sounds active, vázquez in place, urine myla, slight sediment noted. A/O, tolerating clear diet, raspy voice. Sinus tach on monitor. Repositioned, snack given.

## 2023-02-14 NOTE — PROGRESS NOTES
PT arrived to [facility]. Vitals and assessment complete. Pt has no complaints of pain at this time. Daughter at bedside.     Pipo Escalante, RN

## 2023-02-14 NOTE — PROGRESS NOTES
No changes to prior assessment, lung sounds diminished, expiratory wheezing, oxygen on 3 liters, urine myla, approximately 200ml since start of shift, denies needs, call light in reach, will continue to monitor.

## 2023-02-14 NOTE — PROGRESS NOTES
Inpatient Occupational Therapy Evaluation and Treatment    [facility]  Date:  2/14/2023  Patient Name:    Ernestine Suárez  Admitting diagnosis:  Acute respiratory failure ([facility] [address] [J96.00]  Acute on chronic respiratory failure with hypoxia and hypercapnia (HCC) [J96.21, J96.22]  Aspiration pneumonia of both lungs, unspecified aspiration pneumonia type, unspecified part of lung ([facility] [address] [J69.0]  Admit Date:  2/12/2023  Precautions/Restrictions/WB Status/ Lines/ Wounds/ Oxygen: Fall risk, Bed/chair alarm, Lines (IV and Supplemental O2 (3L)), and Telemetry    Treatment Time:  0777-9014  Treatment Number:  1  Timed Code Treatment Minutes: 23 minutes  Total Treatment Minutes:  33  minutes    Patient Goals for Therapy: \"go home \"          Discharge Recommendations: Home with 24/7 initial assist and Home OT  DME needs for discharge:  consider sock aid, reacher       Therapy recommendations for staff:   Assist of 1 for ambulation with use of rolling walker (RW) and gait belt to/from bathroom    History of Present Illness: per Dr Elham Bernal H&P 2/12/23:  Lior.[age]us[age] y.o. male with afib, DM,COPD on 3 lpm O2,presents with increased sob,cough, hemoptysis. Presents hypercapnic,hypoxemic,was trialed on bipap, required intubation. Currently patient is seen in ICU, sedated, intubated, tolerating vent, decreased in FiO2, off pressors. \"    Patient intubated 2/12/23-2/14/23     Home Health S4 Level Recommendation:  Level 2 Social    AM-PAC Score: AM-PAC Inpatient Daily Activity Raw Score: 17     Subjective:  Patient lying supine in bed with visitor present. Dtr left at session start. Pt agreeable to this OT session. Cognition:    A&O x4   Able to follow 2 step commands    Pain:   No  Location:   Rating: NA /10  Pain Medicine Status: Denies need    Preadmission Environment    Pt.  Lives alone, dtr lives near by to assist  Home environment:    one story home  Steps to enter first floor: 2 steps to enter and no Rail  Bathroom: Walk-in Shower, Grab bars and Shower Chair, comfort height toilet with armrests  Equipment owned: [facility], Replaced by [facility] Walker, manual W/C, home O2 (3 L) continuous, pulse ox, reacher, inhaler, nebulizer and life alert     Preadmission Status:  Pt. Able to drive: No  Pt Fully independent with ADLs: Yes  Pt. Is independent with cooking and laundry. HHA 3x/wk - assist with cleaning, laundry, grocery shopping  Pt. Fully independent for transfers and gait and walked with walker intermittently   History of falls: yes \"legs gave out\"   Pt was getting home PT/OT 5x/wk    Objective:  Does this pt have an acute or acute on chronic diagnosis of CHF? No    Upper Extremity ROM:    WFL,  pt able to perform all bed mobility, transfers, and gait without ROM limitation. Upper Extremity Strength:    Strength Assessment (measured on a 0-5 scale):  4/5 overall bilaterally    Upper Extremity Sensation:    WFL    Upper Extremity Proprioception:  WFL    Coordination and Tone  WFL    Balance:  Sitting:    Supervision  Standing:    SBA      Bed Mobility:   Supine to Sit:    SBA  Sit to Supine:   Not Tested  Rolling:   Not Tested  Scooting in sitting: SBA  Scooting in supine:  Not Tested    Transfers:    Sit to stand:    CGA  Stand to sit:    CGA  Bed to chair:     CGA  Bed/ chair to standard toilet:  Not Tested  Bed/chair to [facility]:   Not Tested  Functional Mobility:   CGA with RW, gait belt    See PT note for gait analysis. ADLs:  Dressing:      UE:   Not Tested  LE:    Max A  don socks, MIN A don underwear    Bathing:    UE:  Not Tested  LE:  Not Tested    Eating:   Independent    Toileting:  Not Tested    Grooming/hygiene: Not Tested    Activity Tolerance:   Pt completed therapy session with no adverse symptoms     BP (mmHg) HR (bpm) SpO2 (%) on 3L Comments   End of session  87 96      Positioning Needs:   Pt up in chair, alarm set, call light provided and all needs within reach .      Ther Ex / Activities Initiated:   N/A    Patient/Family Education:   Pt educated on role of inpatient OT, plan of care, importance of continued activity, DC recommendations, transfer techniques, energy conservation, pacing activity, and calling for assist with mobility. Assessment:    Pt seen for Occupational therapy evaluation in acute care setting. Pt demonstrated decreased Activity tolerance, ADLs, IADLs, Bed mobility, Safety Awareness, and Transfers. Pt functioning below baseline and will likely benefit from skilled occupational therapy services to maximize safety and independence. Recommending Home 24 hr assist and with home OT upon discharge as patient functioning below baseline level    Goal(s) : To be met in 3 Visits:  1). Bed to toilet/BSC:        Supervision  2.) Pt will complete 3/3 CHF goals      N/A    To be met in 5 Visits:  1). Supine to/from Sit in preparation for ADL task:    Supervision  2.) Toileting         SBA  3.) Grooming        Supervision  4). Upper Body Dressing:       SBA  5). Lower Body Dressing:       CGA with AE PRN  6). Pt to demonstrate UE therapeutic exs x 15 reps with minimal cues    Rehabilitation Potential: Good  Strengths for achieving goals include: Pt motivated, PLOF, Family Support, and Pt cooperative   Barriers to achieving goals include:  Complexity of condition and Weakness    Plan: To be seen 3-5 x/wk while in acute care setting for therapeutic exercises, bed mobility, transfers, family/patient education, ADL/IADL retraining, and energy conservation training.     Signature: HAIM Fisher/L [id number]        If patient discharges from this facility prior to next visit, this note will serve as the Discharge Summary

## 2023-02-14 NOTE — PROGRESS NOTES
Pulmonary & Critical Care Medicine ICU Progress Note    CC: Respiratory failure    Events of Last 24 hours:   Extubated yesterday on 3L this am  Uses 3L at home       Vascular lines: IV: PIVs and L femoral line     MV:  -    Vent Mode: AC/VC Resp Rate (Set): 20 bmp/Vt (Set, mL): 450 mL/ /FiO2 : 40 %  Recent Labs     23  0420 23  1025   PHART 7.466* 7.374   ABM9DRL 37.4 46.2*   PO2ART 93.0 88.9       IV:   dextrose      sodium chloride Stopped (23 172)    propofol Stopped (23 09)       Vitals:  Blood pressure (!) 136/54, pulse 88, temperature 98.7 °F (37.1 °C), temperature source Bladder, resp. rate 14, height 6' (1.829 m), weight 191 lb (86.6 kg), SpO2 97 %. on AC   Temp  Av.2 °F (37.3 °C)  Min: 98.7 °F (37.1 °C)  Max: 99.4 °F (37.4 °C)    Intake/Output Summary (Last 24 hours) at 2023 0716  Last data filed at 2023 2307  Gross per 24 hour   Intake 980.5 ml   Output 720 ml   Net 260.5 ml     PGen: No distress. Eyes: PERRL. No sclera icterus. No conjunctival injection. ENT: No discharge. Pharynx clear. Neck: Trachea midline. No obvious mass. Resp: + accessory muscle use. No crackles. + wheezes. No rhonchi. No dullness on percussion. Good air entry. CV: Regular rate. Irregular rhythm. No murmur or rub. No edema. GI: Non-tender. Non-distended. No hernia. Skin: Warm and dry. No nodule on exposed extremities. Lymph: No cervical LAD. No supraclavicular LAD. M/S: No cyanosis. No joint deformity. No clubbing. Neuro: Awake. Alert. Moves all four extremities. Psych: Oriented x 3. No anxiety.          Scheduled Meds:   albuterol  2.5 mg Nebulization 4x daily    mupirocin   Nasal BID    pantoprazole  40 mg IntraVENous Daily    enoxaparin  40 mg SubCUTAneous Daily    propafenone  150 mg Oral 3 times per day    sodium chloride flush  5-40 mL IntraVENous 2 times per day    insulin lispro  0-4 Units SubCUTAneous TID     insulin lispro  0-4 Units SubCUTAneous Nightly    cefTRIAXone (ROCEPHIN) IV  1,000 mg IntraVENous Q24H    And    azithromycin  500 mg IntraVENous Q24H    methylPREDNISolone  40 mg IntraVENous Q12H       Data:  CBC:   Recent Labs     02/12/23  1115 02/13/23  0345   WBC 8.9 5.2   HGB 11.1* 9.6*   HCT 33.8* 28.7*   MCV 85.3 84.3    118*     BMP:   Recent Labs     02/12/23  1115 02/13/23  0345    136   K 4.5 4.3   CL 94* 94*   CO2 39* 29   BUN 25* 31*   CREATININE 1.2 1.2     LIVER PROFILE:   Recent Labs     02/12/23  1115   AST 14*   ALT 11   BILITOT 0.8   ALKPHOS 80       Microbiology:  2/12 BAL Pseudomonas    Imaging:  Chest x-ray 2/13 imaging reviewed by me and showed  Emphysema with patchy ASD       CTPA 2/12 imaging reviewed by me and showed  Endotracheal tube extends to the right mainstem bronchus. Extensive airway filling defects are seen within the right greater than left   mainstem bronchi, bronchus intermedius, right upper, middle, lower lobe   bronchi, likely reflecting aspiration. Heterogeneous right lower lobe   opacity, likely aspiration pneumonia. Small right pleural effusion. No gross findings of pulmonary embolism. 5 mm left lower lobe pulmonary nodule which may be followed based upon clinical risk factors. ASSESSMENT:  Acute on chronic hypercarbic and hypoxic respiratory failure   Pseudomonas PNA   Septic shock   Thrombocytopenia   Hemoptysis - possible related to aspiration and pneumonia but current unclear etiology, no endobronchial lesions seen on bronch. Better   Very severe COPD with exacerbation  Afib on Eliquis  L femoral line - removed 2/13      PLAN:  Supplemental oxygen to maintain SaO2 >92%; wean as tolerated  Intensive inhaled bronchodilator therapy. IV solumedrol 40 mg IV Q12 hrs.    CTX and Azithromycin D#3---> Levaquin   Follow up Cx   Monitor hemoptysis-minimal now  Tube feeds   Hold diltiazem   BM regiment   PT/OT   Blood sugar control ISS, with goal 150-180  GI prophylaxis: home PPI DVT prophylaxis: resume home Eliquis  MRSA prophylaxis: Bactroban

## 2023-02-14 NOTE — PROGRESS NOTES
Report given to [facility] for transfer in pt care.   Ashlee Santana RN, BSN

## 2023-02-15 VITALS
SYSTOLIC BLOOD PRESSURE: 143 MMHG | DIASTOLIC BLOOD PRESSURE: 76 MMHG | TEMPERATURE: 98.3 F | HEIGHT: 72 IN | HEART RATE: 87 BPM | OXYGEN SATURATION: 95 % | RESPIRATION RATE: 18 BRPM | BODY MASS INDEX: 25.87 KG/M2 | WEIGHT: 191 LBS

## 2023-02-15 LAB
ANION GAP SERPL CALCULATED.3IONS-SCNC: 8 MMOL/L (ref 3–16)
BASOPHILS ABSOLUTE: 0 K/UL (ref 0–0.2)
BASOPHILS RELATIVE PERCENT: 0.1 %
BUN BLDV-MCNC: 26 MG/DL (ref 7–20)
CALCIUM SERPL-MCNC: 9.1 MG/DL (ref 8.3–10.6)
CHLORIDE BLD-SCNC: 95 MMOL/L (ref 99–110)
CO2: 33 MMOL/L (ref 21–32)
CREAT SERPL-MCNC: 0.7 MG/DL (ref 0.8–1.3)
EOSINOPHILS ABSOLUTE: 0 K/UL (ref 0–0.6)
EOSINOPHILS RELATIVE PERCENT: 0 %
GFR SERPL CREATININE-BSD FRML MDRD: >60 ML/MIN/{1.73_M2}
GLUCOSE BLD-MCNC: 149 MG/DL (ref 70–99)
GLUCOSE BLD-MCNC: 175 MG/DL (ref 70–99)
GLUCOSE BLD-MCNC: 318 MG/DL (ref 70–99)
HCT VFR BLD CALC: 30.7 % (ref 40.5–52.5)
HEMOGLOBIN: 10 G/DL (ref 13.5–17.5)
LYMPHOCYTES ABSOLUTE: 0.8 K/UL (ref 1–5.1)
LYMPHOCYTES RELATIVE PERCENT: 20.1 %
MAGNESIUM: 1.7 MG/DL (ref 1.8–2.4)
MCH RBC QN AUTO: 27.9 PG (ref 26–34)
MCHC RBC AUTO-ENTMCNC: 32.7 G/DL (ref 31–36)
MCV RBC AUTO: 85.4 FL (ref 80–100)
MONOCYTES ABSOLUTE: 0.4 K/UL (ref 0–1.3)
MONOCYTES RELATIVE PERCENT: 10 %
NEUTROPHILS ABSOLUTE: 2.8 K/UL (ref 1.7–7.7)
NEUTROPHILS RELATIVE PERCENT: 69.8 %
PDW BLD-RTO: 16.2 % (ref 12.4–15.4)
PERFORMED ON: ABNORMAL
PERFORMED ON: ABNORMAL
PHOSPHORUS: 3.6 MG/DL (ref 2.5–4.9)
PLATELET # BLD: 127 K/UL (ref 135–450)
PMV BLD AUTO: 9 FL (ref 5–10.5)
POTASSIUM REFLEX MAGNESIUM: 4.3 MMOL/L (ref 3.5–5.1)
POTASSIUM SERPL-SCNC: 4.3 MMOL/L (ref 3.5–5.1)
RBC # BLD: 3.59 M/UL (ref 4.2–5.9)
SODIUM BLD-SCNC: 136 MMOL/L (ref 136–145)
WBC # BLD: 4 K/UL (ref 4–11)

## 2023-02-15 PROCEDURE — 85025 COMPLETE CBC W/AUTO DIFF WBC: CPT

## 2023-02-15 PROCEDURE — 6360000002 HC RX W HCPCS: Performed by: INTERNAL MEDICINE

## 2023-02-15 PROCEDURE — 99233 SBSQ HOSP IP/OBS HIGH 50: CPT | Performed by: INTERNAL MEDICINE

## 2023-02-15 PROCEDURE — 6370000000 HC RX 637 (ALT 250 FOR IP): Performed by: INTERNAL MEDICINE

## 2023-02-15 PROCEDURE — 84100 ASSAY OF PHOSPHORUS: CPT

## 2023-02-15 PROCEDURE — 2700000000 HC OXYGEN THERAPY PER DAY

## 2023-02-15 PROCEDURE — 94640 AIRWAY INHALATION TREATMENT: CPT

## 2023-02-15 PROCEDURE — 36415 COLL VENOUS BLD VENIPUNCTURE: CPT

## 2023-02-15 PROCEDURE — 80048 BASIC METABOLIC PNL TOTAL CA: CPT

## 2023-02-15 PROCEDURE — 2580000003 HC RX 258: Performed by: INTERNAL MEDICINE

## 2023-02-15 PROCEDURE — 94761 N-INVAS EAR/PLS OXIMETRY MLT: CPT

## 2023-02-15 PROCEDURE — 99239 HOSP IP/OBS DSCHRG MGMT >30: CPT | Performed by: INTERNAL MEDICINE

## 2023-02-15 PROCEDURE — C9113 INJ PANTOPRAZOLE SODIUM, VIA: HCPCS | Performed by: INTERNAL MEDICINE

## 2023-02-15 PROCEDURE — 83735 ASSAY OF MAGNESIUM: CPT

## 2023-02-15 RX ORDER — LEVOFLOXACIN 500 MG/1
500 TABLET, FILM COATED ORAL DAILY
Qty: 4 TABLET | Refills: 0 | Status: SHIPPED | OUTPATIENT
Start: 2023-02-15 | End: 2023-02-19

## 2023-02-15 RX ORDER — PREDNISONE 20 MG/1
40 TABLET ORAL DAILY
Status: DISCONTINUED | OUTPATIENT
Start: 2023-02-16 | End: 2023-02-15 | Stop reason: HOSPADM

## 2023-02-15 RX ORDER — LEVOFLOXACIN 500 MG/1
500 TABLET, FILM COATED ORAL DAILY
Qty: 4 TABLET | Refills: 0
Start: 2023-02-15 | End: 2023-02-15 | Stop reason: SDUPTHER

## 2023-02-15 RX ORDER — PREDNISONE 20 MG/1
TABLET ORAL
Qty: 13 TABLET | Refills: 0 | Status: ON HOLD | OUTPATIENT
Start: 2023-02-15 | End: 2023-03-04 | Stop reason: HOSPADM

## 2023-02-15 RX ADMIN — PANTOPRAZOLE SODIUM 40 MG: 40 INJECTION, POWDER, FOR SOLUTION INTRAVENOUS at 08:57

## 2023-02-15 RX ADMIN — PROPAFENONE HYDROCHLORIDE 150 MG: 150 TABLET, FILM COATED ORAL at 05:50

## 2023-02-15 RX ADMIN — GABAPENTIN 800 MG: 400 CAPSULE ORAL at 08:57

## 2023-02-15 RX ADMIN — ALBUTEROL SULFATE 2.5 MG: 2.5 SOLUTION RESPIRATORY (INHALATION) at 07:47

## 2023-02-15 RX ADMIN — INSULIN LISPRO 3 UNITS: 100 INJECTION, SOLUTION INTRAVENOUS; SUBCUTANEOUS at 11:59

## 2023-02-15 RX ADMIN — LEVOFLOXACIN 750 MG: 5 INJECTION, SOLUTION INTRAVENOUS at 11:00

## 2023-02-15 RX ADMIN — METHYLPREDNISOLONE SODIUM SUCCINATE 40 MG: 40 INJECTION, POWDER, FOR SOLUTION INTRAMUSCULAR; INTRAVENOUS at 05:51

## 2023-02-15 RX ADMIN — APIXABAN 5 MG: 5 TABLET, FILM COATED ORAL at 08:57

## 2023-02-15 RX ADMIN — Medication 10 ML: at 08:57

## 2023-02-15 ASSESSMENT — PAIN SCALES - GENERAL: PAINLEVEL_OUTOF10: 0

## 2023-02-15 NOTE — DISCHARGE SUMMARY
Name:  Ivy Patterson  Room:  0213/0213-02  MRN:    [id number]    Discharge Summary      This discharge summary is in conjunction with a complete physical exam done on the day of discharge. Discharging Physician: Alfred An MD      Admit: 2/12/2023  Discharge:  2/15/2023    Diagnoses this Admission    Principal Problem:    Acute respiratory failure ([address]  Active Problems:    DM2 (diabetes mellitus, type 2) ([address]    Aspiration pneumonia of both lungs (HCC)    Hemoptysis    Aspiration pneumonia ([address]    Pneumonia of right lower lobe due to Pseudomonas species (HCC)    HTN (hypertension)    Septic shock (HCC)    COPD exacerbation (HCC)    Chronic thrombocytopenia    Paroxysmal atrial fibrillation (HCC)  Resolved Problems:    * No resolved hospital problems. *          Procedures (Please Review Full Report for Details)      Consults    IP CONSULT TO HOSPITALIST  IP CONSULT TO PULMONOLOGY      HPI:  78 y.o. male with afib, DM,COPD on 3 lpm O2,presents with increased sob,cough, hemoptysis. Presents hypercapnic,hypoxemic,was trialed on bipap, required intubation. Currently patient is seen in ICU, sedated, intubated, tolerating vent, decreased in FiO2, off pressors. Hospital Course    #Acute on chronic respiratory failure. Admitted to ICU. Failed BiPAP. Pulmonology consulted. He is intubated. He was extubated on 2/14/2023. Now  On 3 L of oxygen. #Hemoptysis. Had emergent bronchoscopy and bloody secretions were removed. Possibly related to pneumonia. No endobronchial lesion seen. Minimal hemoptysis. H&H stable. resolved      #Aspiration pneumonia. Due to Pseudomonas. On Rocephin and azithromycin. Changed to Levaquin. #Septic shock. Was on Levophed that has been weaned off. Sepsis due to pneumonia. Septic shock resolved. #Mild thrombocytopenia likely related to sepsis. Monitor closely. #A-fib with -in sinus rhythm. Okay to resume Eliquis. Hold Cardizem.      #Acute exacerbation of COPD. Use nebulizers and IV Solu-Medrol. #DM type 2. Monitor sugars. XR CHEST PORTABLE   Final Result   Chronic pattern of interstitial change with improved aeration of the right   lung from recent prior imaging. XR CHEST PORTABLE   Final Result   Persistent patchy right-sided airspace disease, with aeration slightly   improved at the right base as compared to prior. XR CHEST PORTABLE   Final Result   Endotracheal tube is above the flora in satisfactory position. CT CHEST PULMONARY EMBOLISM W CONTRAST   Final Result   Endotracheal tube extends to the right mainstem bronchus. Recommend   repositioning. Extensive airway filling defects are seen within the right greater than left   mainstem bronchi, bronchus intermedius, right upper, middle, lower lobe   bronchi, likely reflecting aspiration. Heterogeneous right lower lobe   opacity, likely aspiration pneumonia. Small right pleural effusion. No gross findings of pulmonary embolism. 5 mm left lower lobe pulmonary nodule which may be followed based upon   clinical risk factors. Findings were discussed with Rosi Vargas at 15:15 on 2/12/2023. RECOMMENDATIONS:   Fleischner Society guidelines for follow-up and management of incidentally   detected pulmonary nodules:      Single Solid Nodule:      Nodule size less than 6 mm   In a low-risk patient, no routine follow-up. In a high-risk patient, optional CT at 12 months. - Low risk patients include individuals with minimal or absent history of   smoking and other known risk factors. - High risk patients include individuals with a history or smoking or known   risk factors. Radiology 2017 http://pubs. rsna.org/doi/full/10.1148/radiol. 9120986587         XR CHEST PORTABLE   Final Result   Endotracheal tube tip is 3.4 cm above the flora. Enteric tube is noted   likely within the stomach.   Otherwise stable chest.         XR CHEST PORTABLE   Final Result   Findings of generalized COPD. Low lung volumes with probable basilar   atelectasis.                 Discharge Medications     Medication List        START taking these medications      levoFLOXacin 500 MG tablet  Commonly known as: LEVAQUIN  Take 1 tablet by mouth daily for 4 days     predniSONE 20 MG tablet  Commonly known as: DELTASONE  2 qd- 2 days, 1 1/2 qd- 3 days, 1 qd- 3 days, 1/2 qd- 3 days            CONTINUE taking these medications      acetaminophen 325 MG tablet  Commonly known as: Aminofen  Take 2 tablets by mouth every 4 hours as needed for Pain     alendronate 70 MG tablet  Commonly known as: FOSAMAX     apixaban 5 MG Tabs tablet  Commonly known as: Eliquis  Take 1 tablet by mouth 2 times daily     atorvastatin 10 MG tablet  Commonly known as: LIPITOR  Take 1 tablet by mouth daily     dilTIAZem 180 MG extended release capsule  Commonly known as: CARDIZEM CD  Take 1 capsule by mouth daily     esomeprazole 40 MG delayed release capsule  Commonly known as: NEXIUM     fluticasone 50 MCG/ACT nasal spray  Commonly known as: FLONASE     gabapentin 800 MG tablet  Commonly known as: NEURONTIN     metFORMIN 500 MG tablet  Commonly known as: GLUCOPHAGE     OXYGEN     pioglitazone 30 MG tablet  Commonly known as: ACTOS     propafenone 150 MG tablet  Commonly known as: RYTHMOL  TAKE ONE TABLET BY MOUTH EVERY 8 HOURS     Roflumilast 250 MCG tablet  Commonly known as: Daliresp  Take 1 tablet by mouth daily     Trelegy Ellipta 100-62.5-25 MCG/ACT Aepb inhaler  Generic drug: fluticasone-umeclidin-vilant  INHALE 1 PUFF BY MOUTH EVERY DAY     * Ventolin  (90 Base) MCG/ACT inhaler  Generic drug: albuterol sulfate HFA  Inhale 2 puffs into the lungs every 6 hours as needed for Wheezing orShortness of Breath     * albuterol (2.5 MG/3ML) 0.083% nebulizer solution  Commonly known as: PROVENTIL  USE 1 VIAL IN NEBULIZER 4 TIMES DAILY           * This list has 2 medication(s) that are the same as other medications prescribed for you. Read the directions carefully, and ask your doctor or other care provider to review them with you. Where to Get Your Medications        These medications were sent to [id number] [place] [address] [phone] [address] [facility], [address] [place] [address]      Phone: [phone]   predniSONE 20 MG tablet       Information about where to get these medications is not yet available    Ask your nurse or doctor about these medications  levoFLOXacin 500 MG tablet           Discharge Condition/Location: Stable t ohome     Follow Up: Follow up with PCP.     Total time spent on discharge is 35 minutes      Yola Laura MD 2/15/2023 11:28 AM

## 2023-02-15 NOTE — DISCHARGE INSTR - COC
Continuity of Care Form    Patient Name: Misael Castillo   :  1940  MRN:  [id number]    Admit date:  2023  Discharge date:  ***    Code Status Order: Full Code   Advance Directives:     Admitting Physician:  Mily Dodd MD  PCP: Chris Suárez MD    Discharging Nurse: [facility] Unit/Room#: 0213/0213-02  Discharging Unit Phone Number: ***    Emergency Contact:   Extended Emergency Contact Information  Primary Emergency Contact: Rosangela Orozcos [address] Phone: [phone]  Mobile Phone: [phone]  Relation: Child  Secondary Emergency Contact: [address] [place] [address] Phone: [phone]  Mobile Phone: [phone]  Relation: Child    Past Surgical History:  Past Surgical History:   Procedure Laterality Date    BACK SURGERY      repair broken back L4 & L5    CHOLECYSTECTOMY, LAPAROSCOPIC N/A 2021    LAPAROSCOPIC CHOLECYSTECTOMY, WITH CHOLANGIOGRAMS (ATTEMPTED) OPEN CHOLECYSTECTOMY performed by Jose Griffith MD at [address] [place] [address], OPEN N/A 2021    LAPAROSCOPIC CHOLECYSTECTOMY, WITH CHOLANGIOGRAMS (ATTEMPTED)     CYSTOSCOPY Right 10/9/15    RIght Ureteroscopy, Holmium Laser Lithotripsy with Stone Removal, Right Stent Placement    CYSTOSCOPY  2019    CYSTOSCOPY, RESECTION OF BLADDER NECK, URETHRAL DILATION    CYSTOSCOPY W BIOPSY OF BLADDER N/A 2019    CYSTOSCOPY, RESECTION OF BLADDER NECK, URETHRAL DILATION performed by Claudean Roots, MD at [address] [place] [address] Left 2016    cataract removal    GALLBLADDER SURGERY  2021    JOINT REPLACEMENT  2011    right knee    JOINT REPLACEMENT  2004    left knee    OTHER SURGICAL HISTORY  2015    wide excision basal cell carcinoma on the nose and bilateral auricles with frozen sections, plastic closure, full thickness skin graft    OTHER SURGICAL HISTORY  12/1/15    excision of lesion of lip    PROSTATE SURGERY      TURP  10/23/2015    with cystoscopy       Immunization History:   Immunization History   Administered Date(s) Administered    Influenza Vaccine, unspecified formulation 11/18/2009, 10/14/2010, 11/08/2011, 09/21/2012, 10/18/2013, 10/07/2016    Influenza Virus Vaccine 10/12/2015, 10/07/2016, 10/18/2017    Influenza Whole 10/01/2010    Influenza, FLUARIX, FLULAVAL, FLUZONE (age 10 mo+) AND AFLURIA, (age 1 y+), PF, 0.5mL 11/01/2017, 08/28/2018    Influenza, FLUZONE (age 72 y+), High Dose, 0.7mL 10/01/2021, 10/13/2022    Influenza, High Dose (Fluzone 65 yrs and older) 09/18/2018, 09/13/2019, 10/01/2020    Pneumococcal Conjugate 13-valent (Pnfdwtt50) 08/02/2017, 02/14/2018    Pneumococcal Conjugate 7-valent (Prevnar7) 10/01/2006    Pneumococcal Polysaccharide (Aunyhnnjo42) 10/24/2015, 10/07/2016    Td vaccine (adult) 09/21/2012       Active Problems:  Patient Active Problem List   Diagnosis Code    HTN (hypertension) I10    COPD, severe ([facility] J44.9    DM2 (diabetes mellitus, type 2) ([facility] E11.9    HLD (hyperlipidemia) E78.5    Gallstones K80.20    PAF (paroxysmal atrial fibrillation) ([facility]) I48.0    Chronic respiratory failure with hypoxia and hypercapnia 2.5 L home O2 J96.11, J96.12    Acute respiratory insufficiency R06.89    Acute on chronic respiratory failure with hypercapnia ([facility]) J96.22    Septic shock ([facility]) A41.9, R65.21    COPD exacerbation ([facility] J44.1    Former smoker Z87.891    Acute hyperglycemia R73.9    Chronic normocytic anemia D64.9    Chronic thrombocytopenia D69.6    Falls at home W19. XXXA, Y92.009    Ambulatory dysfunction R26.2    General weakness R53.1    Acute on chronic respiratory failure with hypoxia and hypercapnia ([facility]) J96.21, J96.22    Pleural effusion J90    Rapid atrial fibrillation ([facility]) I48.91    Atrial fibrillation with RVR ([facility]) I48.91    Acute respiratory failure with hypoxia and hypercapnia ([facility]) J96.01, J96.02    COPD with acute exacerbation ([facility]) J44.1    Acute encephalopathy G93.40    Hyperglycemia R73.9 Pneumonia of left lower lobe due to infectious organism J18.9    Acute calculous cholecystitis K80.00    Acute cholecystitis K81.0    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.0, I71.9    Closed compression fracture of body of L1 vertebra ([facility]) S32.010A    Hypomagnesemia E83.42    Abdominal aortic aneurysm (AAA) 30 to 34 mm in diameter I71.40    Elevated procalcitonin R79.89    Abdominal pain, right upper quadrant R10.11    Lactic acidosis E87.20    Elevated bilirubin R17    Bacteremia due to Klebsiella pneumoniae R78.81, B96.1    Paroxysmal atrial fibrillation ([facility]) I48.0    Moderate protein-calorie malnutrition ([facility]) E44.0    Debility R53.81    COVID-19 U07.1    Acute on chronic respiratory failure with hypoxemia ([facility]) J96.21    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 L97.313    Diabetes mellitus with skin ulcer ([facility]) E11.622, L98.499    Other specified peripheral vascular diseases ([facility] I73.89    Hypoxia R09.02    Atrial fibrillation, controlled ([address] I48.91    Community acquired pneumonia J18.9    Acute respiratory failure ([facility]) J96.00    Aspiration pneumonia of both lungs ([facility]) J69.0    Hemoptysis R04.2    Aspiration pneumonia ([address] J69.0    Pneumonia of right lower lobe due to Pseudomonas species ([facility] J15.1       Isolation/Infection:   Isolation            No Isolation          Patient Infection Status       Infection Onset Added Last Indicated Last Indicated By Review Planned Expiration Resolved Resolved By    None active    Resolved    COVID-19 (Rule Out) 23 COVID-19, Rapid (Ordered)   23 Rule-Out Test Resulted    COVID-19 (Rule Out) 22 COVID-19 (Ordered)   22 Rule-Out Test Resulted    COVID-19 (Rule Out) 22 COVID-19, Rapid (Ordered)   22 Rule-Out Test Resulted    COVID-19 22 COVID-19 & Influenza Combo   22     COVID-19 (Rule Out) 22 02/06/22 COVID-19 & Influenza Combo (Ordered)   02/06/22 Rule-Out Test Resulted    COVID-19 (Rule Out) 01/13/21 01/13/21 01/13/21 COVID-19 (Ordered)   01/14/21 Rule-Out Test Resulted    COVID-19 (Rule Out) 01/04/21 01/04/21 01/04/21 COVID-19 (Ordered)   01/04/21 Rule-Out Test Resulted    COVID-19 (Rule Out) 12/15/20 12/15/20 12/15/20 COVID-19 (Ordered)   12/15/20 Rule-Out Test Resulted    COVID-19 (Rule Out) 04/16/20 04/16/20 04/16/20 COVID-19 (Ordered)   04/17/20 Rule-Out Test Resulted    COVID-19 (Rule Out) 04/01/20 04/01/20 04/01/20 Emergent Disease Panel (Ordered)   04/03/20 Monico Valerio RN    MDRO (multi-drug resistant organism)  03/13/17 03/13/17 Janette Doll RN   01/01/18 Janette Doll RN            Nurse Assessment:  Last Vital Signs: BP (!) 143/76   Pulse 87   Temp 98.3 °F (36.8 °C) (Oral)   Resp 18   Ht 6' (1.829 m)   Wt 191 lb (86.6 kg)   SpO2 95%   BMI 25.90 kg/m²     Last documented pain score (0-10 scale): Pain Level: 0  Last Weight:   Wt Readings from Last 1 Encounters:   02/12/23 191 lb (86.6 kg)     Mental Status:  {IP PT MENTAL STATUS:63470}    IV Access:  { GALILEO IV ACCESS:088322449}    Nursing Mobility/ADLs:  Walking   {CHP DME YTSZ:363980237}  Transfer  {CHP DME NLHO:096215114}  Bathing  {CHP DME BPUI:300601633}  Dressing  {CHP DME UQTO:041381566}  Toileting  {CHP DME AQCD:293627900}  Feeding  {CHP DME XOCM:293419546}  Med Admin  {CHP DME AVYB:718539933}  Med Delivery   { GALILEO MED Delivery:119208119}    Wound Care Documentation and Therapy:  Wound 02/12/23 Coccyx Medial stage 2 (Active)   Wound Etiology Other 02/13/23 2256   Dressing Status Intact;Dry 02/13/23 2256   Dressing/Treatment Foam 02/13/23 2256   Dressing Change Due 02/14/23 02/13/23 0340   Wound Assessment Dry;Erythema;Pink/red 02/13/23 2256   Drainage Amount None 02/13/23 2256   Paula-wound Assessment Blanchable erythema 02/13/23 2256   Number of days: 2       Incision 01/06/21 Abdomen Mid (Active)   Number of days: 592 Elimination:  Continence: Bowel: {YES / XF:34185}  Bladder: {YES / ON:13051}  Urinary Catheter: {Urinary Catheter:096771749}   Colostomy/Ileostomy/Ileal Conduit: {YES / MA:94332}       Date of Last BM: ***    Intake/Output Summary (Last 24 hours) at 2/15/2023 1240  Last data filed at 2/15/2023 0902  Gross per 24 hour   Intake 360 ml   Output 750 ml   Net -390 ml     I/O last 3 completed shifts:   In: 523.2 [P.O.:240; IV Piggyback:283.2]  Out: 2204 [Urine:1650]    Safety Concerns:     508 Accountable Safety Concerns:069692709}    Impairments/Disabilities:      508 Accountable Impairments/Disabilities:900392304}    Nutrition Therapy:  Current Nutrition Therapy:   508 Accountable Diet List:184628542}    Routes of Feeding: {CHP DME Other Feedings:986802766}  Liquids: {Slp liquid thickness:40333}  Daily Fluid Restriction: {CHP DME Yes amt example:860598839}  Last Modified Barium Swallow with Video (Video Swallowing Test): {Done Not Done PQNX:438303574}    Treatments at the Time of Hospital Discharge:   Respiratory Treatments: ***  Oxygen Therapy:  {Therapy; copd oxygen:42861}  Ventilator:    { CC Vent ETLM:830975712}    Rehab Therapies: {THERAPEUTIC INTERVENTION:8630745521}  Weight Bearing Status/Restrictions: [facility] Weight Bearin}  Other Medical Equipment (for information only, NOT a DME order):  {EQUIPMENT:466363108}  Other Treatments: ***    Patient's personal belongings (please select all that are sent with patient):  {CHP DME Belongings:450310815}    RN SIGNATURE:  {Esignature:140763869}    CASE MANAGEMENT/SOCIAL WORK SECTION    Inpatient Status Date: ***    Readmission Risk Assessment Score:  Readmission Risk              Risk of Unplanned Readmission:  25           Discharging to Facility/ Agency   Name:   Address:  Phone:  Fax:    Dialysis Facility (if applicable)   Name:  Address:  Dialysis Schedule:  Phone:  Fax:    / signature: {Esignature:354842076}    PHYSICIAN SECTION    Prognosis: {Prognosis:5969786532}    Condition at Discharge: [place] Patient Condition:112416570}    Rehab Potential (if transferring to Rehab): {Prognosis:3739911647}    Recommended Labs or Other Treatments After Discharge: ***    Physician Certification: I certify the above information and transfer of Susan Ross  is necessary for the continuing treatment of the diagnosis listed and that he requires {Admit to Appropriate Level of Care:85829} for {GREATER/LESS:843091637} 30 days.      Update Admission H&P: {CHP DME Changes in KXGSR:489051758}    PHYSICIAN SIGNATURE:  {Esignature:967691948}

## 2023-02-15 NOTE — PROGRESS NOTES
Patient currently resting in bed with eyes closed. Alert and oriented. ON oxygen NC 3 lpm and tolerating well. Sinus rhythm on telemetry. Up ambulating and transferring with stand by assist and tolerating well. Bed alarm on for patient safety. Patient in no acute distress. Nurse will continue to monitor/reassess. Call light within reach.

## 2023-02-15 NOTE — PROGRESS NOTES
BP (!) 143/76   Pulse 87   Temp 98.3 °F (36.8 °C) (Oral)   Resp 18   Ht 6' (1.829 m)   Wt 191 lb (86.6 kg)   SpO2 95%   BMI 25.90 kg/m²     Assessment complete. Meds passed. Pt denies needs at this time. Diminished sounds and wheezes noted bilateral lung fields. Trace edema BLE. Pt has a hoarse voice and is coughing up some blood still. Okay to give eliquis per Coco SAM. Family at bedside. Patient wears 3l of o2 at baseline. Bedside Mobility Assessment Tool (BMAT):     Assessment Level 1- Sit and Shake    1. From a semi-reclined position, ask patient to sit up and rotate to a seated position at the side of the bed. Can use the bedrail. 2. Ask patient to reach out and grab your hand and shake making sure patient reaches across his/her midline. Pass- Patient is able to come to a seated position, maintain core strength. Maintains seated balance while reaching across midline. Move on to Assessment Level 2. Assessment Level 2- Stretch and Point   1. With patient in seated position at the side of the bed, have patient place both feet on the floor (or stool) with knees no higher than hips. 2. Ask patient to stretch one leg and straighten the knee, then bend the ankle/flex and point the toes. If appropriate, repeat with the other leg. Pass- Patient is able to demonstrate appropriate quad strength on intended weight bearing limb(s). Move onto Assessment Level 3. Assessment Level 3- Stand   1. Ask patient to elevate off the bed or chair (seated to standing) using an assistive device (cane, bedrail). 2. Patient should be able to raise buttocks off be and hold for a count of five. May repeat once. Pass- Patient maintains standing stability for at least 5 seconds, proceed to assessment level 4. Assessment Level 4- Walk   1. Ask patient to march in place at bedside. 2. Then ask patient to advance step and return each foot.  Some medical conditions may render a patient from stepping backwards, use your best clinical judgement. Fail- Patient not able to complete tasks OR requires use of assistive device. Patient is MOBILITY LEVEL 3.        Mobility Level- 3 x1 assist

## 2023-02-15 NOTE — PROGRESS NOTES
RN notified MD regarding the patient having levaquin ordered to go home on, but no script or physical presence of medication.

## 2023-02-15 NOTE — CARE COORDINATION
INTERDISCIPLINARY PLAN OF CARE CONFERENCE    Date/Time: 2/15/2023 2:52 PM  Completed by: Lake Rangel RN, Case Management      Patient Name:  Madalyn Guerra  YOB: 1940  Admitting Diagnosis: Acute respiratory failure ([facility] [J96.00]  Acute on chronic respiratory failure with hypoxia and hypercapnia ([address] [J96.21, J96.22]  Aspiration pneumonia of both lungs, unspecified aspiration pneumonia type, unspecified part of lung ([address] [J69.0]     Admit Date/Time:  2/12/2023 10:14 AM    Chart reviewed. Interdisciplinary team contacted or reviewed plan related to patient progress and discharge plans. Disciplines included Case Management, Nursing, and Dietitian. Current Status:Stable  PT/OT recommendation for discharge plan of care: Home PT    Expected D/C Disposition:  Home  Confirmed plan with patient  Yes   Met with:patient  Discharge Plan Comments: Reviewed chart and met with pt who cont plan for home. Discussed HHC and pt declines. Chart reviewed and no other dc needs identified.     Home O2 in place on admit: No  Pt informed of need to bring portable home O2 tank on day of discharge for nursing to connect prior to leaving:  Not Indicated  Verbalized agreement/Understanding:  Not Indicated

## 2023-02-15 NOTE — PROGRESS NOTES
Pulmonary & Critical Care Medicine ICU Progress Note    CC: Respiratory failure    Events of Last 24 hours: + cough    MV:  2/12-2/13    Vent Mode: AC/VC Resp Rate (Set): 20 bmp/Vt (Set, mL): 450 mL/ /FiO2 : 40 %    Vitals:  Blood pressure (!) 143/76, pulse 87, temperature 98.3 °F (36.8 °C), temperature source Oral, resp. rate 18, height 6' (1.829 m), weight 191 lb (86.6 kg), SpO2 95 %. on 3L  PGen: No distress. Eyes: PERRL. No sclera icterus. No conjunctival injection. ENT: No discharge. Pharynx clear. Neck: Trachea midline. No obvious mass. Resp: + accessory muscle use. No crackles. No wheezes. No rhonchi. Good air entry. CV: Regular rate. Irregular rhythm. No murmur or rub. No edema. Neuro: Awake. Alert. Moves all four extremities. Psych: Oriented x 3. No anxiety.          Scheduled Meds:   albuterol  2.5 mg Nebulization BID    levofloxacin  750 mg IntraVENous Q24H    apixaban  5 mg Oral BID    gabapentin  800 mg Oral TID    mupirocin   Nasal BID    pantoprazole  40 mg IntraVENous Daily    propafenone  150 mg Oral 3 times per day    sodium chloride flush  5-40 mL IntraVENous 2 times per day    insulin lispro  0-4 Units SubCUTAneous TID WC    insulin lispro  0-4 Units SubCUTAneous Nightly    methylPREDNISolone  40 mg IntraVENous Q12H       Data:  CBC:   Recent Labs     02/13/23  0345 02/14/23  0741 02/15/23  0531   WBC 5.2 5.8 4.0   HGB 9.6* 9.8* 10.0*   HCT 28.7* 29.8* 30.7*   MCV 84.3 84.5 85.4   * 127* 127*       BMP:   Recent Labs     02/13/23  0345 02/14/23  0741 02/15/23  0531    142 136   K 4.3 3.7 4.3  4.3   CL 94* 99 95*   CO2 29 36* 33*   PHOS  --  2.2* 3.6   BUN 31* 24* 26*   CREATININE 1.2 0.8 0.7*       LIVER PROFILE:   Recent Labs     02/12/23  1115   AST 14*   ALT 11   BILITOT 0.8   ALKPHOS 89         Microbiology:  2/12 BAL Pseudomonas    Imaging:  Chest x-ray 2/13 imaging reviewed by me and showed  Emphysema with patchy ASD       CTPA 2/12 imaging reviewed by me and showed  Endotracheal tube extends to the right mainstem bronchus. Extensive airway filling defects are seen within the right greater than left   mainstem bronchi, bronchus intermedius, right upper, middle, lower lobe   bronchi, likely reflecting aspiration. Heterogeneous right lower lobe   opacity, likely aspiration pneumonia. Small right pleural effusion. No gross findings of pulmonary embolism. 5 mm left lower lobe pulmonary nodule which may be followed based upon clinical risk factors. ASSESSMENT:  Acute on chronic hypercarbic and hypoxic respiratory failure   Pseudomonas PNA   Septic shock   Thrombocytopenia   Hemoptysis - possible related to aspiration and pneumonia but current unclear etiology, no endobronchial lesions seen on bronch. Better   Very severe COPD with exacerbation  Afib on Eliquis       PLAN:  Supplemental oxygen to maintain SaO2 >92%; wean as tolerated  Intensive inhaled bronchodilator therapy. Change to prednisone taper  Complete 7 days of levaquin  Follow up Cx   Monitor hemoptysis-minimal now  Addendum: prelim bronchial washing cytology shows squamous cell lung cancer. Will have lose follow up in office to obtain a PET CT and probably repeat bronchoscopy.

## 2023-02-15 NOTE — DISCHARGE INSTR - DIET

## 2023-02-15 NOTE — PROGRESS NOTES
RT Inhaler-Nebulizer Bronchodilator Protocol Note    There is a bronchodilator order in the chart from a provider indicating to follow the RT Bronchodilator Protocol and there is an Initiate RT Inhaler-Nebulizer Bronchodilator Protocol order as well (see protocol at bottom of note). CXR Findings:  XR CHEST PORTABLE    Result Date: 2/14/2023  Chronic pattern of interstitial change with improved aeration of the right lung from recent prior imaging. XR CHEST PORTABLE    Result Date: 2/13/2023  Persistent patchy right-sided airspace disease, with aeration slightly improved at the right base as compared to prior. The findings from the last RT Protocol Assessment were as follows:   History Pulmonary Disease: (P) Chronic pulmonary disease  Respiratory Pattern: (P) Dyspnea on exertion or RR 21-25 bpm  Breath Sounds: (P) Slightly diminished and/or crackles  Cough: (P) Strong, spontaneous, non-productive  Indication for Bronchodilator Therapy: (P) On home bronchodilators  Bronchodilator Assessment Score: (P) 6    Aerosolized bronchodilator medication orders have been revised according to the RT Inhaler-Nebulizer Bronchodilator Protocol below. Respiratory Therapist to perform RT Therapy Protocol Assessment initially then follow the protocol. Repeat RT Therapy Protocol Assessment PRN for score 0-3 or on second treatment, BID, and PRN for scores above 3. No Indications - adjust the frequency to every 6 hours PRN wheezing or bronchospasm, if no treatments needed after 48 hours then discontinue using Per Protocol order mode. If indication present, adjust the RT bronchodilator orders based on the Bronchodilator Assessment Score as indicated below.   Use Inhaler orders unless patient has one or more of the following: on home nebulizer, not able to hold breath for 10 seconds, is not alert and oriented, cannot activate and use MDI correctly, or respiratory rate 25 breaths per minute or more, then use the equivalent nebulizer order(s) with same Frequency and PRN reasons based on the score. If a patient is on this medication at home then do not decrease Frequency below that used at home. 0-3 - enter or revise RT bronchodilator order(s) to equivalent RT Bronchodilator order with Frequency of every 4 hours PRN for wheezing or increased work of breathing using Per Protocol order mode. 4-6 - enter or revise RT Bronchodilator order(s) to two equivalent RT bronchodilator orders with one order with BID Frequency and one order with Frequency of every 4 hours PRN wheezing or increased work of breathing using Per Protocol order mode. 7-10 - enter or revise RT Bronchodilator order(s) to two equivalent RT bronchodilator orders with one order with TID Frequency and one order with Frequency of every 4 hours PRN wheezing or increased work of breathing using Per Protocol order mode. 11-13 - enter or revise RT Bronchodilator order(s) to one equivalent RT bronchodilator order with QID Frequency and an Albuterol order with Frequency of every 4 hours PRN wheezing or increased work of breathing using Per Protocol order mode. Greater than 13 - enter or revise RT Bronchodilator order(s) to one equivalent RT bronchodilator order with every 4 hours Frequency and an Albuterol order with Frequency of every 2 hours PRN wheezing or increased work of breathing using Per Protocol order mode. RT to enter RT Home Evaluation for COPD & MDI Assessment order using Per Protocol order mode.     Electronically signed by Radha Plata RCP on 2/15/2023 at 7:50 AM

## 2023-02-15 NOTE — PROGRESS NOTES
Internal Medicine Progress Note      Events of Last 24 hours:       Patient is intubated. He has a history of COPD, CAD, diabetes mellitus type 2. He presents with hemoptysis. He was gagging and vomiting at home and had hemoptysis for 3 days prior to admission. He was also short of breath. Patient became lethargic in the ER. He was tried on BiPAP. He failed and he was intubated. There was some issues maintaining his oxygen saturation. Bronchoscopy was done emergently. No foreign particles seen but thick bloody secretions were cleared. This morning he is on propofol. He is off Levophed. He is on FiO2 40% with PEEP of 5. Large amount of secretions. 2/15-doing well currently on 3 L of oxygen  Minimal hemoptysis    Invasive Lines: Left femoral CVC with plans for removal        MV: Intubated on 2023    Recent Labs     23  0420 23  1025   PHART 7.466* 7.374   DRC5PUT 37.4 46.2*   PO2ART 93.0 88.9         MV Settings:  Vent Mode: AC/VC Resp Rate (Set): 20 bmp/Vt (Set, mL): 450 mL/ /FiO2 : 40 %    IV:   dextrose      sodium chloride Stopped (23 1721)       Vitals:  Temp  Av.8 °F (37.1 °C)  Min: 98.3 °F (36.8 °C)  Max: 99.4 °F (37.4 °C)  Pulse  Av.4  Min: 87  Max: 107  BP  Min: 128/67  Max: 154/71  SpO2  Av.3 %  Min: 91 %  Max: 100 %  Patient Vitals for the past 4 hrs:   BP Temp Temp src Pulse Resp SpO2   02/15/23 0748 -- -- -- 87 18 95 %   02/15/23 0726 (!) 143/76 98.3 °F (36.8 °C) Oral 97 18 91 %         CVP:        Intake/Output Summary (Last 24 hours) at 2/15/2023 0841  Last data filed at 2023 1738  Gross per 24 hour   Intake 240 ml   Output 750 ml   Net -510 ml         EXAM:  General: Awake and alert. Not in distress. Chronically ill-appearing. Eyes: PERRL. No sclera icterus. No conjunctiva injected. ENT: No discharge. Neck: Trachea midline. Normal thyroid. Resp: No accessory muscle use. No crackles. Mild wheezing. No rhonchi.  No dullness on percussion. CV: Regular rate. Regular rhythm. No mumur or rub. No edema. No JVD. Palpable pedal pulses. GI: Non-tender. Non-distended. No masses. No organmegaly. Normal bowel sounds. No hernia. Skin: Warm and dry. No nodule on exposed extremities. No rash on exposed extremities. Lymph: No cervical LAD. No supraclavicular LAD. M/S: No cyanosis. No joint deformity. No clubbing. Neuro: Awake and alert. Nonfocal.  Psych: Alert oriented x3. Medications:  Scheduled Meds:   albuterol  2.5 mg Nebulization BID    levofloxacin  750 mg IntraVENous Q24H    apixaban  5 mg Oral BID    gabapentin  800 mg Oral TID    mupirocin   Nasal BID    pantoprazole  40 mg IntraVENous Daily    propafenone  150 mg Oral 3 times per day    sodium chloride flush  5-40 mL IntraVENous 2 times per day    insulin lispro  0-4 Units SubCUTAneous TID WC    insulin lispro  0-4 Units SubCUTAneous Nightly    methylPREDNISolone  40 mg IntraVENous Q12H       PRN Meds:  albuterol, melatonin, glucose, dextrose bolus **OR** dextrose bolus, glucagon (rDNA), dextrose, sodium chloride flush, sodium chloride, ondansetron **OR** ondansetron, polyethylene glycol, acetaminophen **OR** acetaminophen    Results:  CBC:   Recent Labs     02/13/23  0345 02/14/23  0741 02/15/23  0531   WBC 5.2 5.8 4.0   HGB 9.6* 9.8* 10.0*   HCT 28.7* 29.8* 30.7*   MCV 84.3 84.5 85.4   * 127* 127*       BMP:   Recent Labs     02/13/23  0345 02/14/23  0741 02/15/23  0531    142 136   K 4.3 3.7 4.3  4.3   CL 94* 99 95*   CO2 29 36* 33*   PHOS  --  2.2* 3.6   BUN 31* 24* 26*   CREATININE 1.2 0.8 0.7*       LIVER PROFILE:   Recent Labs     02/12/23  1115   AST 14*   ALT 11   BILITOT 0.8   ALKPHOS 89       PT/INR:   Recent Labs     02/12/23  1115   PROTIME 16.2*   INR 1.32*       APTT: No results for input(s): APTT in the last 72 hours.   UA:  Recent Labs     02/12/23  2100   COLORU Yellow   PHUR 6.0   CLARITYU Clear   SPECGRAV 1.010   LEUKOCYTESUR Negative UROBILINOGEN 0.2   BILIRUBINUR Negative   BLOODU Negative   GLUCOSEU Negative         Cultures:  COVID-19 negative  Influenza A and B not detected    Films:    XR CHEST PORTABLE   Final Result   Chronic pattern of interstitial change with improved aeration of the right   lung from recent prior imaging. XR CHEST PORTABLE   Final Result   Persistent patchy right-sided airspace disease, with aeration slightly   improved at the right base as compared to prior. XR CHEST PORTABLE   Final Result   Endotracheal tube is above the flora in satisfactory position. CT CHEST PULMONARY EMBOLISM W CONTRAST   Final Result   Endotracheal tube extends to the right mainstem bronchus. Recommend   repositioning. Extensive airway filling defects are seen within the right greater than left   mainstem bronchi, bronchus intermedius, right upper, middle, lower lobe   bronchi, likely reflecting aspiration. Heterogeneous right lower lobe   opacity, likely aspiration pneumonia. Small right pleural effusion. No gross findings of pulmonary embolism. 5 mm left lower lobe pulmonary nodule which may be followed based upon   clinical risk factors. Findings were discussed with Yvrose Messina at 15:15 on 2/12/2023. RECOMMENDATIONS:   Fleischner Society guidelines for follow-up and management of incidentally   detected pulmonary nodules:      Single Solid Nodule:      Nodule size less than 6 mm   In a low-risk patient, no routine follow-up. In a high-risk patient, optional CT at 12 months. - Low risk patients include individuals with minimal or absent history of   smoking and other known risk factors. - High risk patients include individuals with a history or smoking or known   risk factors. Radiology 2017 http://pubs. rsna.org/doi/full/10.1148/radiol. 8432972235         XR CHEST PORTABLE   Final Result   Endotracheal tube tip is 3.4 cm above the flora.   Enteric tube is noted likely within the stomach. Otherwise stable chest.         XR CHEST PORTABLE   Final Result   Findings of generalized COPD. Low lung volumes with probable basilar   atelectasis. Assessment:    Principal Problem:    Acute respiratory failure (HCC)  Active Problems:    DM2 (diabetes mellitus, type 2) (HCC)    Aspiration pneumonia of both lungs (HCC)    Hemoptysis    Aspiration pneumonia (HCC)    Pneumonia of right lower lobe due to Pseudomonas species ([address]    HTN (hypertension)    Septic shock (HCC)    COPD exacerbation (HCC)    Chronic thrombocytopenia    Paroxysmal atrial fibrillation (HCC)  Resolved Problems:    * No resolved hospital problems. *       Plan:    #Acute on chronic respiratory failure. Admitted to ICU. Failed BiPAP. Pulmonology consulted. He is intubated. He was extubated on 2/14/2023. On 3 L of oxygen. #Hemoptysis. Had emergent bronchoscopy and bloody secretions were removed. Possibly related to pneumonia. No endobronchial lesion seen. Minimal hemoptysis. H&H stable. #Aspiration pneumonia. Due to Pseudomonas. On Rocephin and azithromycin. Changed to Levaquin. #Septic shock. Was on Levophed that has been weaned off. Sepsis due to pneumonia. Septic shock resolved. #Mild thrombocytopenia likely related to sepsis. Monitor closely. #A-fib with -in sinus rhythm. Okay to resume Eliquis. Hold Cardizem. #Acute exacerbation of COPD. Use nebulizers and IV Solu-Medrol. #DM type 2. Monitor sugars.      Ada Pearl MD 8:41 AM 2/15/2023

## 2023-02-15 NOTE — PROGRESS NOTES
RT Inhaler-Nebulizer Bronchodilator Protocol Note    There is a bronchodilator order in the chart from a provider indicating to follow the RT Bronchodilator Protocol and there is an Initiate RT Inhaler-Nebulizer Bronchodilator Protocol order as well (see protocol at bottom of note). CXR Findings:  XR CHEST PORTABLE    Result Date: 2/14/2023  Chronic pattern of interstitial change with improved aeration of the right lung from recent prior imaging. XR CHEST PORTABLE    Result Date: 2/13/2023  Persistent patchy right-sided airspace disease, with aeration slightly improved at the right base as compared to prior. The findings from the last RT Protocol Assessment were as follows:   History Pulmonary Disease: Chronic pulmonary disease  Respiratory Pattern: Dyspnea on exertion or RR 21-25 bpm  Breath Sounds: Slightly diminished and/or crackles  Cough: Strong, spontaneous, non-productive  Indication for Bronchodilator Therapy: On home bronchodilators  Bronchodilator Assessment Score: 6    Aerosolized bronchodilator medication orders have been revised according to the RT Inhaler-Nebulizer Bronchodilator Protocol below. Respiratory Therapist to perform RT Therapy Protocol Assessment initially then follow the protocol. Repeat RT Therapy Protocol Assessment PRN for score 0-3 or on second treatment, BID, and PRN for scores above 3. No Indications - adjust the frequency to every 6 hours PRN wheezing or bronchospasm, if no treatments needed after 48 hours then discontinue using Per Protocol order mode. If indication present, adjust the RT bronchodilator orders based on the Bronchodilator Assessment Score as indicated below.   Use Inhaler orders unless patient has one or more of the following: on home nebulizer, not able to hold breath for 10 seconds, is not alert and oriented, cannot activate and use MDI correctly, or respiratory rate 25 breaths per minute or more, then use the equivalent nebulizer order(s) with same Frequency and PRN reasons based on the score. If a patient is on this medication at home then do not decrease Frequency below that used at home. 0-3 - enter or revise RT bronchodilator order(s) to equivalent RT Bronchodilator order with Frequency of every 4 hours PRN for wheezing or increased work of breathing using Per Protocol order mode. 4-6 - enter or revise RT Bronchodilator order(s) to two equivalent RT bronchodilator orders with one order with BID Frequency and one order with Frequency of every 4 hours PRN wheezing or increased work of breathing using Per Protocol order mode. 7-10 - enter or revise RT Bronchodilator order(s) to two equivalent RT bronchodilator orders with one order with TID Frequency and one order with Frequency of every 4 hours PRN wheezing or increased work of breathing using Per Protocol order mode. 11-13 - enter or revise RT Bronchodilator order(s) to one equivalent RT bronchodilator order with QID Frequency and an Albuterol order with Frequency of every 4 hours PRN wheezing or increased work of breathing using Per Protocol order mode. Greater than 13 - enter or revise RT Bronchodilator order(s) to one equivalent RT bronchodilator order with every 4 hours Frequency and an Albuterol order with Frequency of every 2 hours PRN wheezing or increased work of breathing using Per Protocol order mode.      Electronically signed by Lj Martinez RCP on 2/14/2023 at 7:15 PM

## 2023-02-15 NOTE — PLAN OF CARE
Problem: Discharge Planning  Goal: Discharge to home or other facility with appropriate resources  2/15/2023 1229 by Joseph Bennett RN  Outcome: Completed  2/14/2023 2237 by Deidra Reyes RN  Outcome: Progressing     Problem: Safety - Medical Restraint  Goal: Remains free of injury from restraints (Restraint for Interference with Medical Device)  Description: INTERVENTIONS:  1. Determine that other, less restrictive measures have been tried or would not be effective before applying the restraint  2. Evaluate the patient's condition at the time of restraint application  3. Inform patient/family regarding the reason for restraint  4. Q2H: Monitor safety, psychosocial status, comfort, nutrition and hydration  2/15/2023 1229 by Joseph Bennett RN  Outcome: Completed  2/14/2023 2237 by Deidra Reyes RN  Outcome: Progressing     Problem: Pain  Goal: Verbalizes/displays adequate comfort level or baseline comfort level  Outcome: Completed     Problem: Chronic Conditions and Co-morbidities  Goal: Patient's chronic conditions and co-morbidity symptoms are monitored and maintained or improved  Outcome: Completed     Problem: Skin/Tissue Integrity  Goal: Absence of new skin breakdown  Description: 1. Monitor for areas of redness and/or skin breakdown  2. Assess vascular access sites hourly  3. Every 4-6 hours minimum:  Change oxygen saturation probe site  4. Every 4-6 hours:  If on nasal continuous positive airway pressure, respiratory therapy assess nares and determine need for appliance change or resting period.   Outcome: Completed

## 2023-02-15 NOTE — PLAN OF CARE
Problem: Discharge Planning  Goal: Discharge to home or other facility with appropriate resources  2/14/2023 2237 by Melodie Vasquez RN  Outcome: Progressing  4/95/2889 [id number] by Danis Brasher RN  Outcome: Progressing     Problem: Safety - Medical Restraint  Goal: Remains free of injury from restraints (Restraint for Interference with Medical Device)  Description: INTERVENTIONS:  1. Determine that other, less restrictive measures have been tried or would not be effective before applying the restraint  2. Evaluate the patient's condition at the time of restraint application  3. Inform patient/family regarding the reason for restraint  4.  Q2H: Monitor safety, psychosocial status, comfort, nutrition and hydration  2/14/2023 2237 by Melodie Vasquez RN  Outcome: Progressing  7/05/2099 8844 by Danis Brasher, RN  Outcome: Progressing

## 2023-02-16 LAB
BLOOD CULTURE, ROUTINE: NORMAL
CULTURE, BLOOD 2: NORMAL

## 2023-02-17 ENCOUNTER — TELEPHONE (OUTPATIENT)
Dept: PULMONOLOGY | Age: 83
End: 2023-02-17

## 2023-02-17 NOTE — TELEPHONE ENCOUNTER
Patient daughter Edith Tsang called (on HIPAA) to schedule a hospital follow up. He just got out of the hospital for pneumonia. Dr. [facility] does not have anything available soon.  Please advise when you would like patient to have a follow up

## 2023-02-20 NOTE — TELEPHONE ENCOUNTER
Zander Keita MA 3 days ago     TR  Patient daughter Venkatesh Wood called (on HIPAA) to schedule a hospital follow up. He just got out of the hospital for pneumonia. Dr. Naren Sethi does not have anything available soon.  Please advise when you would like patient to have a follow up

## 2023-02-22 DIAGNOSIS — J44.9 CHRONIC OBSTRUCTIVE PULMONARY DISEASE, UNSPECIFIED COPD TYPE (HCC): ICD-10-CM

## 2023-02-22 RX ORDER — FLUTICASONE FUROATE, UMECLIDINIUM BROMIDE AND VILANTEROL TRIFENATATE 100; 62.5; 25 UG/1; UG/1; UG/1
POWDER RESPIRATORY (INHALATION)
Qty: 60 EACH | Refills: 1 | Status: SHIPPED | OUTPATIENT
Start: 2023-02-22

## 2023-02-22 NOTE — TELEPHONE ENCOUNTER
Please sign PET order. Patient is scheduled for 3/9/23 at 8:00am with a 7:30am arrival MMO. Spoke with patient daughter pt has another appt on this day unable to attend.  R/s pt PET to 3/16/23 @ 10:00am with a 9:30am arrival. Watch for result

## 2023-02-26 ENCOUNTER — APPOINTMENT (OUTPATIENT)
Dept: CT IMAGING | Age: 83
DRG: 190 | End: 2023-02-26
Payer: MEDICARE

## 2023-02-26 ENCOUNTER — HOSPITAL ENCOUNTER (EMERGENCY)
Age: 83
Discharge: HOME | DRG: 190 | End: 2023-02-26
Attending: EMERGENCY MEDICINE
Payer: MEDICARE

## 2023-02-26 VITALS
HEART RATE: 102 BPM | BODY MASS INDEX: 25.87 KG/M2 | WEIGHT: 191 LBS | OXYGEN SATURATION: 100 % | TEMPERATURE: 98.4 F | SYSTOLIC BLOOD PRESSURE: 136 MMHG | RESPIRATION RATE: 20 BRPM | DIASTOLIC BLOOD PRESSURE: 61 MMHG | HEIGHT: 72 IN

## 2023-02-26 DIAGNOSIS — J96.11 CHRONIC RESPIRATORY FAILURE WITH HYPOXIA, ON HOME O2 THERAPY (HCC): ICD-10-CM

## 2023-02-26 DIAGNOSIS — T17.500A MUCUS PLUGGING OF BRONCHI: ICD-10-CM

## 2023-02-26 DIAGNOSIS — R04.2 HEMOPTYSIS: ICD-10-CM

## 2023-02-26 DIAGNOSIS — J44.9 CHRONIC OBSTRUCTIVE PULMONARY DISEASE, UNSPECIFIED COPD TYPE (HCC): ICD-10-CM

## 2023-02-26 DIAGNOSIS — J02.9 PHARYNGITIS, UNSPECIFIED ETIOLOGY: Primary | ICD-10-CM

## 2023-02-26 DIAGNOSIS — Z99.81 CHRONIC RESPIRATORY FAILURE WITH HYPOXIA, ON HOME O2 THERAPY (HCC): ICD-10-CM

## 2023-02-26 DIAGNOSIS — R91.8 PULMONARY NODULES: ICD-10-CM

## 2023-02-26 LAB
A/G RATIO: 1.6 (ref 1.1–2.2)
ALBUMIN SERPL-MCNC: 3.8 G/DL (ref 3.4–5)
ALP BLD-CCNC: 63 U/L (ref 40–129)
ALT SERPL-CCNC: 14 U/L (ref 10–40)
ANION GAP SERPL CALCULATED.3IONS-SCNC: 6 MMOL/L (ref 3–16)
AST SERPL-CCNC: 13 U/L (ref 15–37)
BASOPHILS ABSOLUTE: 0 K/UL (ref 0–0.2)
BASOPHILS RELATIVE PERCENT: 0.1 %
BILIRUB SERPL-MCNC: 0.7 MG/DL (ref 0–1)
BUN BLDV-MCNC: 15 MG/DL (ref 7–20)
CALCIUM SERPL-MCNC: 9.3 MG/DL (ref 8.3–10.6)
CHLORIDE BLD-SCNC: 100 MMOL/L (ref 99–110)
CO2: 39 MMOL/L (ref 21–32)
CREAT SERPL-MCNC: 0.7 MG/DL (ref 0.8–1.3)
EOSINOPHILS ABSOLUTE: 0 K/UL (ref 0–0.6)
EOSINOPHILS RELATIVE PERCENT: 0.5 %
GFR SERPL CREATININE-BSD FRML MDRD: >60 ML/MIN/{1.73_M2}
GLUCOSE BLD-MCNC: 124 MG/DL (ref 70–99)
HCT VFR BLD CALC: 33.3 % (ref 40.5–52.5)
HEMOGLOBIN: 10.9 G/DL (ref 13.5–17.5)
LYMPHOCYTES ABSOLUTE: 1.5 K/UL (ref 1–5.1)
LYMPHOCYTES RELATIVE PERCENT: 22.4 %
MCH RBC QN AUTO: 27.4 PG (ref 26–34)
MCHC RBC AUTO-ENTMCNC: 32.7 G/DL (ref 31–36)
MCV RBC AUTO: 83.8 FL (ref 80–100)
MONOCYTES ABSOLUTE: 0.4 K/UL (ref 0–1.3)
MONOCYTES RELATIVE PERCENT: 6.6 %
NEUTROPHILS ABSOLUTE: 4.8 K/UL (ref 1.7–7.7)
NEUTROPHILS RELATIVE PERCENT: 70.4 %
PDW BLD-RTO: 15.6 % (ref 12.4–15.4)
PLATELET # BLD: 104 K/UL (ref 135–450)
PMV BLD AUTO: 7.8 FL (ref 5–10.5)
POTASSIUM REFLEX MAGNESIUM: 4 MMOL/L (ref 3.5–5.1)
RBC # BLD: 3.97 M/UL (ref 4.2–5.9)
SODIUM BLD-SCNC: 145 MMOL/L (ref 136–145)
TOTAL PROTEIN: 6.2 G/DL (ref 6.4–8.2)
WBC # BLD: 6.8 K/UL (ref 4–11)

## 2023-02-26 PROCEDURE — 36415 COLL VENOUS BLD VENIPUNCTURE: CPT

## 2023-02-26 PROCEDURE — 70491 CT SOFT TISSUE NECK W/DYE: CPT

## 2023-02-26 PROCEDURE — 85025 COMPLETE CBC W/AUTO DIFF WBC: CPT

## 2023-02-26 PROCEDURE — 6360000004 HC RX CONTRAST MEDICATION: Performed by: PHYSICIAN ASSISTANT

## 2023-02-26 PROCEDURE — 99285 EMERGENCY DEPT VISIT HI MDM: CPT

## 2023-02-26 PROCEDURE — 71250 CT THORAX DX C-: CPT

## 2023-02-26 PROCEDURE — 80053 COMPREHEN METABOLIC PANEL: CPT

## 2023-02-26 RX ADMIN — IOPAMIDOL 75 ML: 755 INJECTION, SOLUTION INTRAVENOUS at 14:17

## 2023-02-26 ASSESSMENT — PAIN DESCRIPTION - FREQUENCY: FREQUENCY: CONTINUOUS

## 2023-02-26 ASSESSMENT — PAIN - FUNCTIONAL ASSESSMENT
PAIN_FUNCTIONAL_ASSESSMENT: PREVENTS OR INTERFERES SOME ACTIVE ACTIVITIES AND ADLS
PAIN_FUNCTIONAL_ASSESSMENT: 0-10

## 2023-02-26 ASSESSMENT — ENCOUNTER SYMPTOMS
SORE THROAT: 1
COUGH: 1
VOMITING: 0
TROUBLE SWALLOWING: 0
SHORTNESS OF BREATH: 1

## 2023-02-26 ASSESSMENT — PAIN DESCRIPTION - PAIN TYPE: TYPE: ACUTE PAIN

## 2023-02-26 ASSESSMENT — PAIN SCALES - GENERAL: PAINLEVEL_OUTOF10: 4

## 2023-02-26 ASSESSMENT — PAIN DESCRIPTION - ONSET: ONSET: ON-GOING

## 2023-02-26 ASSESSMENT — PAIN DESCRIPTION - DESCRIPTORS: DESCRIPTORS: SORE

## 2023-02-26 ASSESSMENT — LIFESTYLE VARIABLES: HOW OFTEN DO YOU HAVE A DRINK CONTAINING ALCOHOL: NEVER

## 2023-02-26 ASSESSMENT — PAIN DESCRIPTION - LOCATION: LOCATION: THROAT

## 2023-02-26 NOTE — ED NOTES
AVS provided and reviewed with the patient. The patient verbalized understanding of care at home, follow up care, and emergent symptoms to return for. No questions or concerns verbalized at this time. The patient is alert, oriented, stable, and assisted out of the department via wheelchair at the time of discharge.        Silas Castaneda RN  02/26/23 9549

## 2023-02-26 NOTE — ED PROVIDER NOTES
This patient was seen by the Mid-Level Provider. I have seen and examined the patient, agree with the workup, evaluation, management and diagnosis. Care plan has been discussed. My assessment reveals an 42-year-old male who presents with a sore throat. This is an 42-year-old male who presents with a sore throat he has had for some time now. The patient states is worse on the left. The patient has a history of recent severe pneumonia with intubation. The patient states he is coughing up some pink mucus but this is actually improving from his recent pneumonia. He denies any difficulties breathing or swallowing. The patient states is actually improving as far as his breathing is concerned. Radiology results:    CT SOFT TISSUE NECK W CONTRAST   Preliminary Result   Mild soft tissue thickening involving the vocal cords greater on the left. These changes could represent mild inflammatory process such as viral   infection. No focal mass or abscess is detected. Moderate COPD         CT CHEST WO CONTRAST   Preliminary Result   1. Interval improvement in appearance of multifocal mucous plugging, with   persistent notable mucous plugging within the right lower lobe. 2. Interval improvement in appearance of airspace consolidation within the   basilar right lower lobe since the prior exam of 02/12/2013, with mild   residual curvilinear opacities remaining, likely resolving aspiration or   pneumonia. 3. Stable 5 mm triangular nodule within the left lower lobe. However, there   are two new part solid nodules within each of the bilateral lower lobes, both   measuring 6 mm in size, new from all prior exams. Recommend a follow-up   chest CT in 3-6 months to ensure stability or resolution of these nodules, as   advised below. RECOMMENDATIONS:   Multiple pulmonary nodules. Most severe: 6 mm left part-solid pulmonary   nodule. Recommend a non-contrast Chest CT at 3-6 months.  Subsequent   management based on the most suspicious nodule(s). These guidelines do not apply to immunocompromised patients and patients with   cancer. Follow up in patients with significant comorbidities as clinically   warranted. For lung cancer screening, adhere to Lung-RADS guidelines. Reference: Radiology. 2017; 284(1):228-43. LABS:    Labs Reviewed   CBC WITH AUTO DIFFERENTIAL - Abnormal; Notable for the following components:       Result Value    RBC 3.97 (*)     Hemoglobin 10.9 (*)     Hematocrit 33.3 (*)     RDW 15.6 (*)     Platelets 806 (*)     All other components within normal limits   COMPREHENSIVE METABOLIC PANEL W/ REFLEX TO MG FOR LOW K - Abnormal; Notable for the following components:    CO2 39 (*)     Glucose 124 (*)     Creatinine 0.7 (*)     Total Protein 6.2 (*)     AST 13 (*)     All other components within normal limits               Exam:    Well-nourished male in no acute distress. Oropharynx was clear. There is no erythema or exudate noted. There is no stridor noted. He had good breath sounds bilaterally. The patient was just discharged a week and a half ago with aspiration pneumonia. The patient states he is feeling much better. However, the patient did have the sore throat that actually started before his recent hospitalization. Medical decision makin-year-old male who presents with a sore throat. The patient's work-up today included a CT of the soft tissues of the neck that was read as unremarkable except for some vocal cord swelling that was worse on the left than on the right. In addition, the patient's chest x-ray shows improving infiltrates with some subsequent incidental findings with 2 new pulmonary nodules. On reexamination, the patient is remained otherwise stable and well. This could be viral in nature. There are no signs of epiglottitis. The patient will be referred back to his pulmonologist for further care and evaluation.   I also told the patient about the pulmonary nodules and that this has to be rechecked in 3 to 6 months. He is to return for any other problems or worsening symptoms. FINAL IMPRESSION:    1. Pharyngitis, unspecified etiology    2. Pulmonary nodules    3. Chronic respiratory failure with hypoxia, on home O2 therapy ([facility] [address]    4.  Hemoptysis           Dede Pal MD  02/26/23 5137

## 2023-02-26 NOTE — ED PROVIDER NOTES
[facility]        Pt Name: Hattie Blackburn  MRN: [id number]  [place] 1940  Date of evaluation: 2/26/2023  Provider: Sandra Queen PA-C  PCP: Hannah Castaneda MD  Note Started: 1:28 PM EST 2/26/23       I have seen and evaluated this patient with my supervising physician Angelo Ly MD.      CHIEF COMPLAINT       Chief Complaint   Patient presents with    Pharyngitis     S/p intubation for pneumonia       HISTORY OF PRESENT ILLNESS: 1 or more Elements     History From: Patient  Limitations to history : None    Hattie Blackburn is a 78 y.o. male who presents to the emergency department for evaluation of sore throat. States symptoms started about 2 months ago he yawned and felt a pop to the left neck and has had pain in his throat since. He was recently sick had aspiration pneumonia and was intubated a couple of weeks ago was in the hospital had hemoptysis at the time had CTPA negative for PE but showed extensive pneumonia and he had bronchoscopy. States throat has been bothering him worse since he was in the hospital.  States does not feel he is having any trouble swallowing he is able to eat and drink but having a lot of pain in his throat still coughing pink-tinged sputum but is improving. He has chronic respiratory failure on 3 L nasal cannula oxygen all the time he has COPD. No change in his shortness of breath. No chest pain. He is on Eliquis. Nursing Notes were all reviewed and agreed with or any disagreements were addressed in the HPI. REVIEW OF SYSTEMS :      Review of Systems   Constitutional:  Negative for fever. HENT:  Positive for sore throat. Negative for trouble swallowing. Respiratory:  Positive for cough and shortness of breath (chronic, no changes). Hemoptysis    Cardiovascular:  Negative for chest pain. Gastrointestinal:  Negative for vomiting.      Positives and Pertinent negatives as per HPI.     SURGICAL HISTORY     Past Surgical History:   Procedure Laterality Date    BACK SURGERY      repair broken back L4 & L5    CHOLECYSTECTOMY, LAPAROSCOPIC N/A 1/6/2021    LAPAROSCOPIC CHOLECYSTECTOMY, WITH CHOLANGIOGRAMS (ATTEMPTED) OPEN CHOLECYSTECTOMY performed by Stiven Dubois MD at [address] [place] [address], OPEN N/A 01/06/2021    LAPAROSCOPIC CHOLECYSTECTOMY, WITH CHOLANGIOGRAMS (ATTEMPTED)     CYSTOSCOPY Right 10/9/15    RIght Ureteroscopy, Holmium Laser Lithotripsy with Stone Removal, Right Stent Placement    CYSTOSCOPY  05/01/2019    CYSTOSCOPY, RESECTION OF BLADDER NECK, URETHRAL DILATION    CYSTOSCOPY W BIOPSY OF BLADDER N/A 5/1/2019    CYSTOSCOPY, RESECTION OF BLADDER NECK, URETHRAL DILATION performed by Neal Smith MD at [facility] Left 6/12/2016    cataract removal    GALLBLADDER SURGERY  01/2021    JOINT REPLACEMENT  6/29/2011    right knee    JOINT REPLACEMENT  11/2004    left knee    OTHER SURGICAL HISTORY  08/31/2015    wide excision basal cell carcinoma on the nose and bilateral auricles with frozen sections, plastic closure, full thickness skin graft    OTHER SURGICAL HISTORY  12/1/15    excision of lesion of lip    PROSTATE SURGERY      TURP  10/23/2015    with cystoscopy       CURRENTMEDICATIONS       Discharge Medication List as of 2/26/2023  3:09 PM        CONTINUE these medications which have NOT CHANGED    Details   TRELEGY ELLIPTA 100-62.5-25 MCG/ACT AEPB inhaler INHALE 1 PUFF BY MOUTH EVERY DAY, Disp-60 each, R-1Normal      predniSONE (DELTASONE) 20 MG tablet 2 qd- 2 days, 1 1/2 qd- 3 days, 1 qd- 3 days, 1/2 qd- 3 days, Disp-13 tablet, R-0Normal      alendronate (FOSAMAX) 70 MG tablet Take 70 mg by mouth every 7 daysHistorical Med      Roflumilast (DALIRESP) 250 MCG tablet Take 1 tablet by mouth daily, Disp-28 tablet, R-1Normal      propafenone (RYTHMOL) 150 MG tablet TAKE ONE TABLET BY MOUTH EVERY 8 HOURS, Disp-270 tablet, R-3Normal      apixaban (ELIQUIS) 5 MG TABS tablet Take 1 tablet by mouth 2 times daily, Disp-60 tablet, R-10Normal      atorvastatin (LIPITOR) 10 MG tablet Take 1 tablet by mouth daily, Disp-90 tablet, R-2Normal      dilTIAZem (CARDIZEM CD) 180 MG extended release capsule Take 1 capsule by mouth daily, Disp-90 capsule, R-3Normal      albuterol (PROVENTIL) (2.5 MG/3ML) 0.083% nebulizer solution USE 1 VIAL IN NEBULIZER 4 TIMES DAILY, Disp-120 each, R-0Normal      VENTOLIN  (90 Base) MCG/ACT inhaler Inhale 2 puffs into the lungs every 6 hours as needed for Wheezing orShortness of Breath, Disp-18 g, R-5Normal      fluticasone (FLONASE) 50 MCG/ACT nasal spray 2 sprays by Nasal route daily Historical Med      pioglitazone (ACTOS) 30 MG tablet Take 30 mg by mouth dailyHistorical Med      gabapentin (NEURONTIN) 800 MG tablet Take 800 mg by mouth 3 times daily. Historical Med      OXYGEN Inhale 2.5 L into the lungs nightly At 3.5lpm on 2022Historical Med      metformin (GLUCOPHAGE) 500 MG tablet Take 500 mg by mouth 4 times daily Historical Med      esomeprazole (NEXIUM) 40 MG capsule   Take 40 mg by mouth every morning (before breakfast) Indications: take [address]     Patient has no known allergies.     FAMILYHISTORY       Family History   Problem Relation Age of Onset    Cancer Mother     Diabetes Mother     Heart Disease Mother     Cancer Father     Diabetes Sister     Cancer Brother         SOCIAL HISTORY       Social History     Tobacco Use    Smoking status: Former     Packs/day: 1.50     Years: 45.00     Pack years: 67.50     Types: Cigarettes     Quit date: 2005     Years since quittin.7    Smokeless tobacco: Never   Vaping Use    Vaping Use: Never used   Substance Use Topics    Alcohol use: No    Drug use: No       SCREENINGS        [place] Coma Scale  Eye Opening: Spontaneous  Best Verbal Response: Oriented  Best Motor Response: Obeys commands  [place] Coma Scale Score: 15                CIWA Assessment  BP: 136/61  Heart Rate: (!) 102           PHYSICAL EXAM  1 or more Elements     ED Triage Vitals [02/26/23 1251]   BP Temp Temp Source Heart Rate Resp SpO2 Height Weight   127/72 98.4 °F (36.9 °C) Oral (!) 109 20 95 % 6' (1.829 m) 191 lb (86.6 kg)       Physical Exam  Vitals and nursing note reviewed. Constitutional:       Appearance: He is not toxic-appearing. Interventions: Nasal cannula in place. Comments: Chronically ill appearing   HENT:      Head: Normocephalic and atraumatic. Mouth/Throat:      Mouth: Mucous membranes are moist.      Pharynx: Oropharynx is clear. Uvula midline. No oropharyngeal exudate, posterior oropharyngeal erythema or uvula swelling. Cardiovascular:      Rate and Rhythm: Regular rhythm. Comments: Mild tachycardia   Pulmonary:      Effort: Pulmonary effort is normal.      Breath sounds: No wheezing or rales. Skin:     General: Skin is warm and dry. Neurological:      Mental Status: He is alert and oriented to person, place, and time. GCS: GCS eye subscore is 4. GCS verbal subscore is 5. GCS motor subscore is 6. Motor: No abnormal muscle tone.    Psychiatric:         Behavior: Behavior normal.         DIAGNOSTIC RESULTS   LABS:    Labs Reviewed   CBC WITH AUTO DIFFERENTIAL - Abnormal; Notable for the following components:       Result Value    RBC 3.97 (*)     Hemoglobin 10.9 (*)     Hematocrit 33.3 (*)     RDW 15.6 (*)     Platelets 680 (*)     All other components within normal limits   COMPREHENSIVE METABOLIC PANEL W/ REFLEX TO MG FOR LOW K - Abnormal; Notable for the following components:    CO2 39 (*)     Glucose 124 (*)     Creatinine 0.7 (*)     Total Protein 6.2 (*)     AST 13 (*)     All other components within normal limits     Results for orders placed or performed during the hospital encounter of 02/26/23   CBC with Auto Differential   Result Value Ref Range    WBC 6.8 4.0 - 11.0 K/uL    RBC 3.97 (L) 4.20 - 5.90 M/uL Hemoglobin 10.9 (L) 13.5 - 17.5 g/dL    Hematocrit 33.3 (L) 40.5 - 52.5 %    MCV 83.8 80.0 - 100.0 fL    MCH 27.4 26.0 - 34.0 pg    MCHC 32.7 31.0 - 36.0 g/dL    RDW 15.6 (H) 12.4 - 15.4 %    Platelets 887 (L) 857 - 450 K/uL    MPV 7.8 5.0 - 10.5 fL    Neutrophils % 70.4 %    Lymphocytes % 22.4 %    Monocytes % 6.6 %    Eosinophils % 0.5 %    Basophils % 0.1 %    Neutrophils Absolute 4.8 1.7 - 7.7 K/uL    Lymphocytes Absolute 1.5 1.0 - 5.1 K/uL    Monocytes Absolute 0.4 0.0 - 1.3 K/uL    Eosinophils Absolute 0.0 0.0 - 0.6 K/uL    Basophils Absolute 0.0 0.0 - 0.2 K/uL   Comprehensive Metabolic Panel w/ Reflex to MG   Result Value Ref Range    Sodium 145 136 - 145 mmol/L    Potassium reflex Magnesium 4.0 3.5 - 5.1 mmol/L    Chloride 100 99 - 110 mmol/L    CO2 39 (H) 21 - 32 mmol/L    Anion Gap 6 3 - 16    Glucose 124 (H) 70 - 99 mg/dL    BUN 15 7 - 20 mg/dL    Creatinine 0.7 (L) 0.8 - 1.3 mg/dL    Est, Glom Filt Rate >60 >60    Calcium 9.3 8.3 - 10.6 mg/dL    Total Protein 6.2 (L) 6.4 - 8.2 g/dL    Albumin 3.8 3.4 - 5.0 g/dL    Albumin/Globulin Ratio 1.6 1.1 - 2.2    Total Bilirubin 0.7 0.0 - 1.0 mg/dL    Alkaline Phosphatase 63 40 - 129 U/L    ALT 14 10 - 40 U/L    AST 13 (L) 15 - 37 U/L       When ordered only abnormal lab results are displayed. All other labs were within normal range or not returned as of this dictation. EKG: When ordered, EKG's are interpreted by the Emergency Department Physician in the absence of a cardiologist.  Please see their note for interpretation of EKG. RADIOLOGY:   Non-plain film images such as CT, Ultrasound and MRI are read by the radiologist. Plain radiographic images are visualized and preliminarily interpreted by the ED Provider with the below findings:        Interpretation per the Radiologist below, if available at the time of this note:    CT SOFT TISSUE NECK W CONTRAST   Preliminary Result   Mild soft tissue thickening involving the vocal cords greater on the left. These changes could represent mild inflammatory process such as viral   infection. No focal mass or abscess is detected. Moderate COPD         CT CHEST WO CONTRAST   Preliminary Result   1. Interval improvement in appearance of multifocal mucous plugging, with   persistent notable mucous plugging within the right lower lobe. 2. Interval improvement in appearance of airspace consolidation within the   basilar right lower lobe since the prior exam of 02/12/2013, with mild   residual curvilinear opacities remaining, likely resolving aspiration or   pneumonia. 3. Stable 5 mm triangular nodule within the left lower lobe. However, there   are two new part solid nodules within each of the bilateral lower lobes, both   measuring 6 mm in size, new from all prior exams. Recommend a follow-up   chest CT in 3-6 months to ensure stability or resolution of these nodules, as   advised below. RECOMMENDATIONS:   Multiple pulmonary nodules. Most severe: 6 mm left part-solid pulmonary   nodule. Recommend a non-contrast Chest CT at 3-6 months. Subsequent   management based on the most suspicious nodule(s). These guidelines do not apply to immunocompromised patients and patients with   cancer. Follow up in patients with significant comorbidities as clinically   warranted. For lung cancer screening, adhere to Lung-RADS guidelines. Reference: Radiology. 2017; 284(1):228-43. CT SOFT TISSUE NECK W CONTRAST    Result Date: 2/26/2023  Mild soft tissue thickening involving the vocal cords greater on the left. These changes could represent mild inflammatory process such as viral infection. No focal mass or abscess is detected. Moderate COPD     CT CHEST WO CONTRAST    Result Date: 2/26/2023  1. Interval improvement in appearance of multifocal mucous plugging, with persistent notable mucous plugging within the right lower lobe.  2. Interval improvement in appearance of airspace consolidation within the basilar right lower lobe since the prior exam of 02/12/2013, with mild residual curvilinear opacities remaining, likely resolving aspiration or pneumonia. 3. Stable 5 mm triangular nodule within the left lower lobe. However, there are two new part solid nodules within each of the bilateral lower lobes, both measuring 6 mm in size, new from all prior exams. Recommend a follow-up chest CT in 3-6 months to ensure stability or resolution of these nodules, as advised below. RECOMMENDATIONS: Multiple pulmonary nodules. Most severe: 6 mm left part-solid pulmonary nodule. Recommend a non-contrast Chest CT at 3-6 months. Subsequent management based on the most suspicious nodule(s). These guidelines do not apply to immunocompromised patients and patients with cancer. Follow up in patients with significant comorbidities as clinically warranted. For lung cancer screening, adhere to Lung-RADS guidelines. Reference: Radiology. 2017; 284(1):228-43. No results found. 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      EMERGENCY DEPARTMENT COURSE and DIFFERENTIAL DIAGNOSIS/MDM:   Vitals:    Vitals:    02/26/23 1251 02/26/23 1345 02/26/23 1441   BP: 127/72 (!) 125/97 136/61   Pulse: (!) 109 90 (!) 102   Resp: 20 20 20   Temp: 98.4 °F (36.9 °C)     TempSrc: Oral     SpO2: 95% 100% 100%   Weight: 191 lb (86.6 kg)     Height: 6' (1.829 m)         Patient was given the following medications:  Medications   iopamidol (ISOVUE-370) 76 % injection 75 mL (75 mLs IntraVENous Given 2/26/23 1417) Is this patient to be included in the SEP-1 Core Measure due to severe sepsis or septic shock? 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 CONSULTS: (Who and What was discussed)  None          Records Reviewed (External and Source)   Inpatient records reviewed patient admitted to the hospital at [facility] 2/12/2023 for acute on chronic respiratory failure, hemoptysis, aspiration pneumonia. CTPA negative for PE but showed extensive pneumonia. Initially treated with Rocephin and Zithromax changed to Levaquin. Pseudomonas. He had emergent bronchoscopy and bloody secretions were removed possibly related to pneumonia no Endo brachial lesions seen. H&H was stable. Okay to resume his Eliquis. Improved during admission and discharged on Levaquin and prednisone taper. CC/HPI Summary, DDx, ED Course, and Reassessment:     Patient presented to the ER for evaluation of sore throat states his throat has bothered him for a couple of months but has been worse since he was in the hospital recent intubation for respiratory failure and aspiration pneumonia. States he is still coughing but is improved. Still having hemoptysis but not as much states just a little pink-tinged sputum. He has COPD and chronic respiratory failure on 3 L all the time. States no worsening shortness of breath. States mainly his throat that is bothering him.     Differential diagnosis includes but is not limited to esophageal tear, throat mass, pneumonia. Stable H&H.   CT soft tissue neck showing mild soft tissue thickening involving the vocal cords greater on the left changes could represent mild inflammatory process possible viral infection. No focal mass or abscess. Moderate COPD. CT chest showing mucous plugging overall improvement of recent pneumonia. Also noted pulmonary nodules. Patient notified and need for follow-up. Referred back to his pulmonologist.  Patient is not toxic or septic appearing. Hemodynamically stable. Stable for discharge home close follow-up. Return for worsening. I estimate there is LOW risk for PERITONSILLAR ABSCESS, SEPSIS, EPIGLOTTITIS, AIRWAY COMPROMISE thus I consider the discharge disposition reasonable. I am the Primary Clinician of Record. FINAL IMPRESSION      1. Pharyngitis, unspecified etiology    2. Pulmonary nodules    3. Chronic respiratory failure with hypoxia, on home O2 therapy ([address]    4. Hemoptysis    5. Chronic obstructive pulmonary disease, unspecified COPD type ([address]    6. Mucus plugging of bronchi          DISPOSITION/PLAN     DISPOSITION Decision to Discharge    PATIENT REFERRED TO:  Odette Reynolds MD  [address] [phone]    Call in 1 day  call to arrange follow up appointment from ER visit    Rocky Garcia MD  [address] [facility]   [facility]  [phone]    Call in 1 day  call to arrange follow up appointment from [facility].  [facility] Emergency Department  [address]  [phone]    If symptoms worsen    DISCHARGE MEDICATIONS:  Discharge Medication List as of 2/26/2023  3:09 PM          DISCONTINUED MEDICATIONS:  Discharge Medication List as of 2/26/2023  3:09 PM        STOP taking these medications       acetaminophen (AMINOFEN) 325 MG tablet Comments:   Reason for Stopping:                      (Please note that portions of this note were completed with a voice recognition program.  Efforts were made to edit the dictations but occasionally words are mis-transcribed.)    Otto Raymond PA-C (electronically signed)          Marvin Sims PA-C  02/26/23 [phone]

## 2023-02-27 ENCOUNTER — TELEPHONE (OUTPATIENT)
Dept: OTHER | Facility: CLINIC | Age: 83
End: 2023-02-27

## 2023-02-27 ENCOUNTER — APPOINTMENT (OUTPATIENT)
Dept: CT IMAGING | Age: 83
DRG: 190 | End: 2023-02-27
Payer: MEDICARE

## 2023-02-27 ENCOUNTER — APPOINTMENT (OUTPATIENT)
Dept: GENERAL RADIOLOGY | Age: 83
DRG: 190 | End: 2023-02-27
Payer: MEDICARE

## 2023-02-27 ENCOUNTER — HOSPITAL ENCOUNTER (INPATIENT)
Age: 83
LOS: 5 days | Discharge: HOME | DRG: 190 | End: 2023-03-04
Attending: STUDENT IN AN ORGANIZED HEALTH CARE EDUCATION/TRAINING PROGRAM | Admitting: INTERNAL MEDICINE
Payer: MEDICARE

## 2023-02-27 DIAGNOSIS — J96.02 ACUTE RESPIRATORY FAILURE WITH HYPERCAPNIA (HCC): Primary | ICD-10-CM

## 2023-02-27 DIAGNOSIS — J44.9 CHRONIC OBSTRUCTIVE PULMONARY DISEASE, UNSPECIFIED COPD TYPE (HCC): ICD-10-CM

## 2023-02-27 LAB
A/G RATIO: 1.4 (ref 1.1–2.2)
ALBUMIN SERPL-MCNC: 4.2 G/DL (ref 3.4–5)
ALP BLD-CCNC: 69 U/L (ref 40–129)
ALT SERPL-CCNC: 14 U/L (ref 10–40)
ANION GAP SERPL CALCULATED.3IONS-SCNC: 7 MMOL/L (ref 3–16)
AST SERPL-CCNC: 14 U/L (ref 15–37)
BASE EXCESS VENOUS: 2.9 MMOL/L (ref -3–3)
BASE EXCESS VENOUS: 4 MMOL/L (ref -3–3)
BASE EXCESS VENOUS: 5 MMOL/L (ref -3–3)
BASE EXCESS VENOUS: 6.3 MMOL/L (ref -3–3)
BASOPHILS ABSOLUTE: 0 K/UL (ref 0–0.2)
BASOPHILS RELATIVE PERCENT: 0.2 %
BILIRUB SERPL-MCNC: 0.8 MG/DL (ref 0–1)
BILIRUBIN URINE: NEGATIVE
BLOOD, URINE: ABNORMAL
BUN BLDV-MCNC: 11 MG/DL (ref 7–20)
CALCIUM SERPL-MCNC: 9.3 MG/DL (ref 8.3–10.6)
CARBOXYHEMOGLOBIN: 1.6 % (ref 0–1.5)
CARBOXYHEMOGLOBIN: 2.3 % (ref 0–1.5)
CARBOXYHEMOGLOBIN: 3.7 % (ref 0–1.5)
CARBOXYHEMOGLOBIN: 4 % (ref 0–1.5)
CHLORIDE BLD-SCNC: 94 MMOL/L (ref 99–110)
CLARITY: CLEAR
CO2: 40 MMOL/L (ref 21–32)
COLOR: YELLOW
CREAT SERPL-MCNC: 0.7 MG/DL (ref 0.8–1.3)
EKG ATRIAL RATE: 115 BPM
EKG DIAGNOSIS: NORMAL
EKG P AXIS: 80 DEGREES
EKG P-R INTERVAL: 184 MS
EKG Q-T INTERVAL: 310 MS
EKG QRS DURATION: 80 MS
EKG QTC CALCULATION (BAZETT): 428 MS
EKG R AXIS: 41 DEGREES
EKG T AXIS: 80 DEGREES
EKG VENTRICULAR RATE: 115 BPM
EOSINOPHILS ABSOLUTE: 0 K/UL (ref 0–0.6)
EOSINOPHILS RELATIVE PERCENT: 0.3 %
GFR SERPL CREATININE-BSD FRML MDRD: >60 ML/MIN/{1.73_M2}
GLUCOSE BLD-MCNC: 140 MG/DL (ref 70–99)
GLUCOSE BLD-MCNC: 269 MG/DL (ref 70–99)
GLUCOSE URINE: NEGATIVE MG/DL
HCO3 VENOUS: 31.2 MMOL/L (ref 23–29)
HCO3 VENOUS: 32.8 MMOL/L (ref 23–29)
HCO3 VENOUS: 34.7 MMOL/L (ref 23–29)
HCO3 VENOUS: 36.4 MMOL/L (ref 23–29)
HCT VFR BLD CALC: 37 % (ref 40.5–52.5)
HEMOGLOBIN: 11.9 G/DL (ref 13.5–17.5)
INFLUENZA A: NOT DETECTED
INFLUENZA B: NOT DETECTED
KETONES, URINE: 15 MG/DL
LACTIC ACID, SEPSIS: 1 MMOL/L (ref 0.4–1.9)
LEUKOCYTE ESTERASE, URINE: NEGATIVE
LYMPHOCYTES ABSOLUTE: 1.1 K/UL (ref 1–5.1)
LYMPHOCYTES RELATIVE PERCENT: 18.5 %
MCH RBC QN AUTO: 27.5 PG (ref 26–34)
MCHC RBC AUTO-ENTMCNC: 32.3 G/DL (ref 31–36)
MCV RBC AUTO: 85.2 FL (ref 80–100)
METHEMOGLOBIN VENOUS: 0.1 %
METHEMOGLOBIN VENOUS: 0.3 %
MICROSCOPIC EXAMINATION: YES
MONOCYTES ABSOLUTE: 0.4 K/UL (ref 0–1.3)
MONOCYTES RELATIVE PERCENT: 7.3 %
NEUTROPHILS ABSOLUTE: 4.4 K/UL (ref 1.7–7.7)
NEUTROPHILS RELATIVE PERCENT: 73.7 %
NITRITE, URINE: NEGATIVE
O2 CONTENT, VEN: 13 VOL %
O2 SAT, VEN: 45 %
O2 SAT, VEN: 51 %
O2 SAT, VEN: 70 %
O2 SAT, VEN: 94 %
O2 THERAPY: ABNORMAL
PCO2, VEN: 59.7 MMHG (ref 40–50)
PCO2, VEN: 81.5 MMHG (ref 40–50)
PCO2, VEN: 81.9 MMHG (ref 40–50)
PCO2, VEN: 83.7 MMHG (ref 40–50)
PDW BLD-RTO: 15.9 % (ref 12.4–15.4)
PERFORMED ON: ABNORMAL
PH UA: 6.5 (ref 5–8)
PH VENOUS: 7.22 (ref 7.35–7.45)
PH VENOUS: 7.25 (ref 7.35–7.45)
PH VENOUS: 7.26 (ref 7.35–7.45)
PH VENOUS: 7.34 (ref 7.35–7.45)
PLATELET # BLD: 115 K/UL (ref 135–450)
PMV BLD AUTO: 9.4 FL (ref 5–10.5)
PO2, VEN: 29.7 MMHG (ref 25–40)
PO2, VEN: 32.7 MMHG (ref 25–40)
PO2, VEN: 45.2 MMHG (ref 25–40)
PO2, VEN: 75.1 MMHG (ref 25–40)
POTASSIUM REFLEX MAGNESIUM: 4.1 MMOL/L (ref 3.5–5.1)
PRO-BNP: 173 PG/ML (ref 0–449)
PROCALCITONIN: 0.08 NG/ML (ref 0–0.15)
PROTEIN UA: ABNORMAL MG/DL
RBC # BLD: 4.34 M/UL (ref 4.2–5.9)
RBC UA: ABNORMAL /HPF (ref 0–4)
SARS-COV-2 RNA, RT PCR: NOT DETECTED
SODIUM BLD-SCNC: 141 MMOL/L (ref 136–145)
SPECIFIC GRAVITY UA: <=1.005 (ref 1–1.03)
TCO2 CALC VENOUS: 33 MMOL/L
TCO2 CALC VENOUS: 35 MMOL/L
TCO2 CALC VENOUS: 37 MMOL/L
TCO2 CALC VENOUS: 39 MMOL/L
TOTAL PROTEIN: 7.1 G/DL (ref 6.4–8.2)
TROPONIN: <0.01 NG/ML
URINE REFLEX TO CULTURE: ABNORMAL
URINE TYPE: ABNORMAL
UROBILINOGEN, URINE: 2 E.U./DL
WBC # BLD: 6 K/UL (ref 4–11)
WBC UA: ABNORMAL /HPF (ref 0–5)

## 2023-02-27 PROCEDURE — 71260 CT THORAX DX C+: CPT | Performed by: PHYSICIAN ASSISTANT

## 2023-02-27 PROCEDURE — 81001 URINALYSIS AUTO W/SCOPE: CPT

## 2023-02-27 PROCEDURE — 83880 ASSAY OF NATRIURETIC PEPTIDE: CPT

## 2023-02-27 PROCEDURE — 93005 ELECTROCARDIOGRAM TRACING: CPT | Performed by: PHYSICIAN ASSISTANT

## 2023-02-27 PROCEDURE — 6360000002 HC RX W HCPCS

## 2023-02-27 PROCEDURE — 6370000000 HC RX 637 (ALT 250 FOR IP)

## 2023-02-27 PROCEDURE — 83605 ASSAY OF LACTIC ACID: CPT

## 2023-02-27 PROCEDURE — 6360000002 HC RX W HCPCS: Performed by: PHYSICIAN ASSISTANT

## 2023-02-27 PROCEDURE — 94761 N-INVAS EAR/PLS OXIMETRY MLT: CPT

## 2023-02-27 PROCEDURE — 84145 PROCALCITONIN (PCT): CPT

## 2023-02-27 PROCEDURE — 2580000003 HC RX 258: Performed by: PHYSICIAN ASSISTANT

## 2023-02-27 PROCEDURE — 36415 COLL VENOUS BLD VENIPUNCTURE: CPT

## 2023-02-27 PROCEDURE — 87636 SARSCOV2 & INF A&B AMP PRB: CPT

## 2023-02-27 PROCEDURE — 71046 X-RAY EXAM CHEST 2 VIEWS: CPT

## 2023-02-27 PROCEDURE — 83036 HEMOGLOBIN GLYCOSYLATED A1C: CPT

## 2023-02-27 PROCEDURE — 85025 COMPLETE CBC W/AUTO DIFF WBC: CPT

## 2023-02-27 PROCEDURE — 84484 ASSAY OF TROPONIN QUANT: CPT

## 2023-02-27 PROCEDURE — 99285 EMERGENCY DEPT VISIT HI MDM: CPT

## 2023-02-27 PROCEDURE — 94660 CPAP INITIATION&MGMT: CPT

## 2023-02-27 PROCEDURE — 80053 COMPREHEN METABOLIC PANEL: CPT

## 2023-02-27 PROCEDURE — 94640 AIRWAY INHALATION TREATMENT: CPT

## 2023-02-27 PROCEDURE — 96374 THER/PROPH/DIAG INJ IV PUSH: CPT

## 2023-02-27 PROCEDURE — 2700000000 HC OXYGEN THERAPY PER DAY

## 2023-02-27 PROCEDURE — 82803 BLOOD GASES ANY COMBINATION: CPT

## 2023-02-27 PROCEDURE — 2500000003 HC RX 250 WO HCPCS: Performed by: PHYSICIAN ASSISTANT

## 2023-02-27 PROCEDURE — 6370000000 HC RX 637 (ALT 250 FOR IP): Performed by: PHYSICIAN ASSISTANT

## 2023-02-27 PROCEDURE — 93010 ELECTROCARDIOGRAM REPORT: CPT | Performed by: INTERNAL MEDICINE

## 2023-02-27 PROCEDURE — 2060000000 HC ICU INTERMEDIATE R&B

## 2023-02-27 PROCEDURE — 87040 BLOOD CULTURE FOR BACTERIA: CPT

## 2023-02-27 RX ORDER — INSULIN LISPRO 100 [IU]/ML
0-4 INJECTION, SOLUTION INTRAVENOUS; SUBCUTANEOUS EVERY 4 HOURS
Status: DISCONTINUED | OUTPATIENT
Start: 2023-02-27 | End: 2023-02-28

## 2023-02-27 RX ORDER — ACETAMINOPHEN 650 MG/1
650 SUPPOSITORY RECTAL EVERY 6 HOURS PRN
Status: DISCONTINUED | OUTPATIENT
Start: 2023-02-27 | End: 2023-03-04 | Stop reason: HOSPADM

## 2023-02-27 RX ORDER — SODIUM CHLORIDE 9 MG/ML
INJECTION, SOLUTION INTRAVENOUS PRN
Status: DISCONTINUED | OUTPATIENT
Start: 2023-02-27 | End: 2023-03-04 | Stop reason: HOSPADM

## 2023-02-27 RX ORDER — ACETAMINOPHEN 325 MG/1
650 TABLET ORAL EVERY 6 HOURS PRN
Status: DISCONTINUED | OUTPATIENT
Start: 2023-02-27 | End: 2023-03-04 | Stop reason: HOSPADM

## 2023-02-27 RX ORDER — ROFLUMILAST 500 UG/1
250 TABLET ORAL NIGHTLY
Status: DISCONTINUED | OUTPATIENT
Start: 2023-02-27 | End: 2023-03-04 | Stop reason: HOSPADM

## 2023-02-27 RX ORDER — PROPAFENONE HYDROCHLORIDE 150 MG/1
150 TABLET, FILM COATED ORAL EVERY 8 HOURS SCHEDULED
Status: DISCONTINUED | OUTPATIENT
Start: 2023-02-27 | End: 2023-03-04 | Stop reason: HOSPADM

## 2023-02-27 RX ORDER — 0.9 % SODIUM CHLORIDE 0.9 %
1000 INTRAVENOUS SOLUTION INTRAVENOUS ONCE
Status: COMPLETED | OUTPATIENT
Start: 2023-02-27 | End: 2023-02-27

## 2023-02-27 RX ORDER — METHYLPREDNISOLONE SODIUM SUCCINATE 40 MG/ML
40 INJECTION, POWDER, LYOPHILIZED, FOR SOLUTION INTRAMUSCULAR; INTRAVENOUS 2 TIMES DAILY
Status: COMPLETED | OUTPATIENT
Start: 2023-02-27 | End: 2023-03-01

## 2023-02-27 RX ORDER — PANTOPRAZOLE SODIUM 40 MG/1
40 TABLET, DELAYED RELEASE ORAL
Status: DISCONTINUED | OUTPATIENT
Start: 2023-02-28 | End: 2023-03-04 | Stop reason: HOSPADM

## 2023-02-27 RX ORDER — ATORVASTATIN CALCIUM 10 MG/1
10 TABLET, FILM COATED ORAL DAILY
Status: DISCONTINUED | OUTPATIENT
Start: 2023-02-28 | End: 2023-03-04 | Stop reason: HOSPADM

## 2023-02-27 RX ORDER — DEXTROSE MONOHYDRATE 100 MG/ML
INJECTION, SOLUTION INTRAVENOUS CONTINUOUS PRN
Status: DISCONTINUED | OUTPATIENT
Start: 2023-02-27 | End: 2023-03-04 | Stop reason: HOSPADM

## 2023-02-27 RX ORDER — LEVOFLOXACIN 5 MG/ML
750 INJECTION, SOLUTION INTRAVENOUS EVERY 24 HOURS
Status: DISCONTINUED | OUTPATIENT
Start: 2023-02-27 | End: 2023-03-01

## 2023-02-27 RX ORDER — IPRATROPIUM BROMIDE AND ALBUTEROL SULFATE 2.5; .5 MG/3ML; MG/3ML
1 SOLUTION RESPIRATORY (INHALATION) 4 TIMES DAILY
Status: DISCONTINUED | OUTPATIENT
Start: 2023-02-28 | End: 2023-02-28

## 2023-02-27 RX ORDER — ALBUTEROL SULFATE 2.5 MG/3ML
2.5 SOLUTION RESPIRATORY (INHALATION) 4 TIMES DAILY
Status: DISCONTINUED | OUTPATIENT
Start: 2023-02-27 | End: 2023-02-27

## 2023-02-27 RX ORDER — SODIUM CHLORIDE 0.9 % (FLUSH) 0.9 %
5-40 SYRINGE (ML) INJECTION PRN
Status: DISCONTINUED | OUTPATIENT
Start: 2023-02-27 | End: 2023-03-04 | Stop reason: HOSPADM

## 2023-02-27 RX ORDER — POLYETHYLENE GLYCOL 3350 17 G/17G
17 POWDER, FOR SOLUTION ORAL DAILY PRN
Status: DISCONTINUED | OUTPATIENT
Start: 2023-02-27 | End: 2023-03-04 | Stop reason: HOSPADM

## 2023-02-27 RX ORDER — ALBUTEROL SULFATE 2.5 MG/3ML
2.5 SOLUTION RESPIRATORY (INHALATION) EVERY 4 HOURS PRN
Status: DISCONTINUED | OUTPATIENT
Start: 2023-02-27 | End: 2023-03-04 | Stop reason: HOSPADM

## 2023-02-27 RX ORDER — METHYLPREDNISOLONE SODIUM SUCCINATE 125 MG/2ML
125 INJECTION, POWDER, LYOPHILIZED, FOR SOLUTION INTRAMUSCULAR; INTRAVENOUS ONCE
Status: COMPLETED | OUTPATIENT
Start: 2023-02-27 | End: 2023-02-27

## 2023-02-27 RX ORDER — ONDANSETRON 2 MG/ML
4 INJECTION INTRAMUSCULAR; INTRAVENOUS EVERY 6 HOURS PRN
Status: DISCONTINUED | OUTPATIENT
Start: 2023-02-27 | End: 2023-03-04 | Stop reason: HOSPADM

## 2023-02-27 RX ORDER — DILTIAZEM HYDROCHLORIDE 180 MG/1
180 CAPSULE, COATED, EXTENDED RELEASE ORAL DAILY
Status: DISCONTINUED | OUTPATIENT
Start: 2023-02-28 | End: 2023-03-04 | Stop reason: HOSPADM

## 2023-02-27 RX ORDER — SODIUM CHLORIDE 0.9 % (FLUSH) 0.9 %
5-40 SYRINGE (ML) INJECTION EVERY 12 HOURS SCHEDULED
Status: DISCONTINUED | OUTPATIENT
Start: 2023-02-27 | End: 2023-03-04 | Stop reason: HOSPADM

## 2023-02-27 RX ORDER — IPRATROPIUM BROMIDE AND ALBUTEROL SULFATE 2.5; .5 MG/3ML; MG/3ML
1 SOLUTION RESPIRATORY (INHALATION)
Status: DISCONTINUED | OUTPATIENT
Start: 2023-02-28 | End: 2023-02-27

## 2023-02-27 RX ORDER — GABAPENTIN 400 MG/1
800 CAPSULE ORAL 3 TIMES DAILY
Status: DISCONTINUED | OUTPATIENT
Start: 2023-02-27 | End: 2023-02-28

## 2023-02-27 RX ORDER — ONDANSETRON 4 MG/1
4 TABLET, ORALLY DISINTEGRATING ORAL EVERY 8 HOURS PRN
Status: DISCONTINUED | OUTPATIENT
Start: 2023-02-27 | End: 2023-03-04 | Stop reason: HOSPADM

## 2023-02-27 RX ORDER — IPRATROPIUM BROMIDE AND ALBUTEROL SULFATE 2.5; .5 MG/3ML; MG/3ML
1 SOLUTION RESPIRATORY (INHALATION) ONCE
Status: COMPLETED | OUTPATIENT
Start: 2023-02-27 | End: 2023-02-27

## 2023-02-27 RX ORDER — PREDNISONE 20 MG/1
40 TABLET ORAL DAILY
Status: DISCONTINUED | OUTPATIENT
Start: 2023-03-02 | End: 2023-03-04 | Stop reason: HOSPADM

## 2023-02-27 RX ORDER — BUDESONIDE AND FORMOTEROL FUMARATE DIHYDRATE 160; 4.5 UG/1; UG/1
2 AEROSOL RESPIRATORY (INHALATION) 2 TIMES DAILY
Status: DISCONTINUED | OUTPATIENT
Start: 2023-02-27 | End: 2023-03-04 | Stop reason: HOSPADM

## 2023-02-27 RX ORDER — FLUTICASONE PROPIONATE 50 MCG
2 SPRAY, SUSPENSION (ML) NASAL DAILY
Status: DISCONTINUED | OUTPATIENT
Start: 2023-02-28 | End: 2023-03-04 | Stop reason: HOSPADM

## 2023-02-27 RX ADMIN — INSULIN LISPRO 2 UNITS: 100 INJECTION, SOLUTION INTRAVENOUS; SUBCUTANEOUS at 22:59

## 2023-02-27 RX ADMIN — DOXYCYCLINE 100 MG: 100 INJECTION, POWDER, LYOPHILIZED, FOR SOLUTION INTRAVENOUS at 16:50

## 2023-02-27 RX ADMIN — SODIUM CHLORIDE 1000 ML: 9 INJECTION, SOLUTION INTRAVENOUS at 12:21

## 2023-02-27 RX ADMIN — LEVOFLOXACIN 750 MG: 5 INJECTION, SOLUTION INTRAVENOUS at 22:52

## 2023-02-27 RX ADMIN — METHYLPREDNISOLONE SODIUM SUCCINATE 125 MG: 125 INJECTION, POWDER, FOR SOLUTION INTRAMUSCULAR; INTRAVENOUS at 12:19

## 2023-02-27 RX ADMIN — APIXABAN 5 MG: 5 TABLET, FILM COATED ORAL at 22:52

## 2023-02-27 RX ADMIN — IPRATROPIUM BROMIDE AND ALBUTEROL SULFATE 1 AMPULE: .5; 2.5 SOLUTION RESPIRATORY (INHALATION) at 23:08

## 2023-02-27 RX ADMIN — METHYLPREDNISOLONE SODIUM SUCCINATE 40 MG: 40 INJECTION, POWDER, FOR SOLUTION INTRAMUSCULAR; INTRAVENOUS at 22:55

## 2023-02-27 RX ADMIN — Medication 2 PUFF: at 23:08

## 2023-02-27 RX ADMIN — IPRATROPIUM BROMIDE AND ALBUTEROL SULFATE 1 AMPULE: 2.5; .5 SOLUTION RESPIRATORY (INHALATION) at 12:19

## 2023-02-27 RX ADMIN — ROFLUMILAST 250 MCG: 500 TABLET ORAL at 22:52

## 2023-02-27 RX ADMIN — GABAPENTIN 800 MG: 400 CAPSULE ORAL at 22:55

## 2023-02-27 RX ADMIN — PROPAFENONE HYDROCHLORIDE 150 MG: 150 TABLET, FILM COATED ORAL at 23:29

## 2023-02-27 ASSESSMENT — PAIN - FUNCTIONAL ASSESSMENT: PAIN_FUNCTIONAL_ASSESSMENT: NONE - DENIES PAIN

## 2023-02-27 ASSESSMENT — ENCOUNTER SYMPTOMS
SHORTNESS OF BREATH: 1
COUGH: 1
GASTROINTESTINAL NEGATIVE: 1

## 2023-02-27 NOTE — ED PROVIDER NOTES
[address] ED  EMERGENCY DEPARTMENT ENCOUNTER        Pt Name: Ruth Sommer  MRN: [id number]  [place] 1940  Date of evaluation: 2/27/2023  Provider: Valencia Swanson PA-C  PCP: Annabel Espitia MD  Note Started: 11:41 AM EST 2/27/23       I have seen and evaluated this patient with my supervising physician Pablo Valentino MD.      CHIEF COMPLAINT       Chief Complaint   Patient presents with    Fatigue     Recent admission with pneumonia and just finished antibiotics. States he started feeling generalized weakness last night. HISTORY OF PRESENT ILLNESS: 1 or more Elements     History From: patient      Ruth Sommer is a 78 y.o. male with a past medical history of hypertension diabetes skin cancer CAD hepatitis A COPD hyperlipidemia sleep apnea former smoker brought in today by EMS for evaluation of fatigue, cough and increased shortness of breath. Recent ICU admission for pneumonia started on Levaquin has since completed Levaquin. States over the past several days reports increased fatigue, cough with productive sputum with some blood streaked sputum, increased shortness of breath. Denies chest pain. Denies nausea vomiting diarrhea. Denies any fevers or chills. No aggravating or alleviating complaints. Otherwise denies any other symptoms. Has been using his nebulizer and breathing treatments at home. No aggravating or alleviating complaints. Nothing seems to make symptoms better or worse. Denies sick contacts. He is anticoagulated on blood thinners. Denies abdominal or urinary complaints. Nursing Notes were all reviewed and agreed with or any disagreements were addressed in the HPI. REVIEW OF SYSTEMS :      Review of Systems   Constitutional:  Positive for fatigue. HENT: Negative. Respiratory:  Positive for cough and shortness of breath. Cardiovascular: Negative. Gastrointestinal: Negative. Genitourinary: Negative.     Musculoskeletal: Negative. Skin: Negative. Neurological:  Positive for weakness. Positives and Pertinent negatives as per HPI.      SURGICAL HISTORY     Past Surgical History:   Procedure Laterality Date    BACK SURGERY      repair broken back L4 & L5    CHOLECYSTECTOMY, LAPAROSCOPIC N/A 1/6/2021    LAPAROSCOPIC CHOLECYSTECTOMY, WITH CHOLANGIOGRAMS (ATTEMPTED) OPEN CHOLECYSTECTOMY performed by Ishaan Chappell MD at [facility], OPEN N/A 01/06/2021    LAPAROSCOPIC CHOLECYSTECTOMY, WITH CHOLANGIOGRAMS (ATTEMPTED)     CYSTOSCOPY Right 10/9/15    RIght Ureteroscopy, Holmium Laser Lithotripsy with Stone Removal, Right Stent Placement    CYSTOSCOPY  05/01/2019    CYSTOSCOPY, RESECTION OF BLADDER NECK, URETHRAL DILATION    CYSTOSCOPY W BIOPSY OF BLADDER N/A 5/1/2019    CYSTOSCOPY, RESECTION OF BLADDER NECK, URETHRAL DILATION performed by Padma Walters MD at [address] [place] [address] Left 6/12/2016    cataract removal    GALLBLADDER SURGERY  01/2021    JOINT REPLACEMENT  6/29/2011    right knee    JOINT REPLACEMENT  11/2004    left knee    OTHER SURGICAL HISTORY  08/31/2015    wide excision basal cell carcinoma on the nose and bilateral auricles with frozen sections, plastic closure, full thickness skin graft    OTHER SURGICAL HISTORY  12/1/15    excision of lesion of lip    PROSTATE SURGERY      TURP  10/23/2015    with cystoscopy       CURRENTMEDICATIONS       Previous Medications    ALBUTEROL (PROVENTIL) (2.5 MG/3ML) 0.083% NEBULIZER SOLUTION    USE 1 VIAL IN NEBULIZER 4 TIMES DAILY    ALENDRONATE (FOSAMAX) 70 MG TABLET    Take 70 mg by mouth every 7 days    APIXABAN (ELIQUIS) 5 MG TABS TABLET    Take 1 tablet by mouth 2 times daily    ATORVASTATIN (LIPITOR) 10 MG TABLET    Take 1 tablet by mouth daily    DILTIAZEM (CARDIZEM CD) 180 MG EXTENDED RELEASE CAPSULE    Take 1 capsule by mouth daily    ESOMEPRAZOLE (NEXIUM) 40 MG CAPSULE      Take 40 mg by mouth every morning (before breakfast) Indications: take dos     FLUTICASONE (FLONASE) 50 MCG/ACT NASAL SPRAY    2 sprays by Nasal route daily     GABAPENTIN (NEURONTIN) 800 MG TABLET    Take 800 mg by mouth 3 times daily. METFORMIN (GLUCOPHAGE) 500 MG TABLET    Take 500 mg by mouth 4 times daily     OXYGEN    Inhale 2.5 L into the lungs nightly At 3.5lpm on 2022    PIOGLITAZONE (ACTOS) 30 MG TABLET    Take 30 mg by mouth daily    PREDNISONE (DELTASONE) 20 MG TABLET    2 qd- 2 days, 1 1/2 qd- 3 days, 1 qd- 3 days, 1/2 qd- 3 days    PROPAFENONE (RYTHMOL) 150 MG TABLET    TAKE ONE TABLET BY MOUTH EVERY 8 HOURS    ROFLUMILAST (DALIRESP) 250 MCG TABLET    Take 1 tablet by mouth daily    TRELEGY ELLIPTA 100-62.5-25 MCG/ACT AEPB INHALER    INHALE 1 PUFF BY MOUTH EVERY DAY    VENTOLIN  (90 BASE) MCG/ACT INHALER    Inhale 2 puffs into the lungs every 6 hours as needed for Wheezing orShortness of Breath       ALLERGIES     Patient has no known allergies. FAMILYHISTORY       Family History   Problem Relation Age of Onset    Cancer Mother     Diabetes Mother     Heart Disease Mother     Cancer Father     Diabetes Sister     Cancer Brother         SOCIAL HISTORY       Social History     Tobacco Use    Smoking status: Former     Packs/day: 1.50     Years: 45.00     Pack years: 67.50     Types: Cigarettes     Quit date: 2005     Years since quittin.7    Smokeless tobacco: Never   Vaping Use    Vaping Use: Never used   Substance Use Topics    Alcohol use: No    Drug use: No       SCREENINGS        [place] Coma Scale  Eye Opening: Spontaneous  Best Verbal Response: Confused  Best Motor Response: Obeys commands  [place] Coma Scale Score: 14                CIWA Assessment  BP: 106/63  Heart Rate: (!) 103           PHYSICAL EXAM  1 or more Elements     ED Triage Vitals [23 1130]   BP Temp Temp Source Heart Rate Resp SpO2 Height Weight   (!) 142/78 97.5 °F (36.4 °C) Oral (!) 114 18 100 % -- --       Physical Exam  Vitals and nursing note reviewed. Constitutional:       General: He is awake. He is not in acute distress. Appearance: Normal appearance. He is well-developed and overweight. He is ill-appearing. He is not toxic-appearing or diaphoretic. Interventions: Nasal cannula in place. Comments: 3 L NC   HENT:      Head: Normocephalic and atraumatic. Nose: Nose normal.   Eyes:      General:         Right eye: No discharge. Left eye: No discharge. Cardiovascular:      Rate and Rhythm: Normal rate and regular rhythm. Pulses:           Radial pulses are 2+ on the right side and 2+ on the left side. Heart sounds: Normal heart sounds. No murmur heard. No gallop. Pulmonary:      Effort: Pulmonary effort is normal. No respiratory distress. Breath sounds: Decreased breath sounds and wheezing present. No rhonchi or rales. Chest:      Chest wall: No tenderness. Musculoskeletal:         General: No deformity. Normal range of motion. Cervical back: Normal range of motion and neck supple. Right lower leg: No edema. Left lower leg: No edema. Skin:     General: Skin is warm and dry. Neurological:      General: No focal deficit present. Mental Status: He is alert and oriented to person, place, and time. GCS: GCS eye subscore is 4. GCS verbal subscore is 5. GCS motor subscore is 6. Psychiatric:         Behavior: Behavior normal. Behavior is cooperative.            DIAGNOSTIC RESULTS   LABS:    Labs Reviewed   CBC WITH AUTO DIFFERENTIAL - Abnormal; Notable for the following components:       Result Value    Hemoglobin 11.9 (*)     Hematocrit 37.0 (*)     RDW 15.9 (*)     Platelets 939 (*)     All other components within normal limits   COMPREHENSIVE METABOLIC PANEL W/ REFLEX TO MG FOR LOW K - Abnormal; Notable for the following components:    Chloride 94 (*)     CO2 40 (*)     Glucose 140 (*)     Creatinine 0.7 (*)     AST 14 (*)     All other components within normal limits   BLOOD GAS, VENOUS - Abnormal; Notable for the following components:    pH, Joey 7.256 (*)     pCO2, Joey 83.7 (*)     HCO3, Venous 36.4 (*)     Base Excess, Joey 6.3 (*)     Carboxyhemoglobin 4.0 (*)     All other components within normal limits   BLOOD GAS, VENOUS - Abnormal; Notable for the following components:    pH, Joey 7.247 (*)     pCO2, Joey 81.5 (*)     HCO3, Venous 34.7 (*)     Base Excess, Joey 5.0 (*)     Carboxyhemoglobin 3.7 (*)     All other components within normal limits   COVID-19 & INFLUENZA COMBO   CULTURE, BLOOD 1   CULTURE, BLOOD 2   PROCALCITONIN   LACTATE, SEPSIS   TROPONIN   BRAIN NATRIURETIC PEPTIDE   URINALYSIS WITH REFLEX TO CULTURE       When ordered only abnormal lab results are displayed. All other labs were within normal range or not returned as of this dictation. EKG: When ordered, EKG's are interpreted by the Emergency Department Physician in the absence of a cardiologist.  Please see their note for interpretation of EKG. RADIOLOGY:   Non-plain film images such as CT, Ultrasound and MRI are read by the radiologist. Plain radiographic images are visualized and preliminarily interpreted by the ED Provider with the below findings:        Interpretation per the Radiologist below, if available at the time of this note:    CT CHEST PULMONARY EMBOLISM W CONTRAST   Final Result   1. No acute pulmonary arterial thromboembolic disease. 2.  No new pulmonary mass or consolidation. Emphysema. 3.  Heterogeneous material in the right middle and lower lobe airways is   slightly more pronounced than yesterday's chest CT and may relate to   aspiration or retained secretions. XR CHEST (2 VW)   Final Result   No acute process.            CT SOFT TISSUE NECK W CONTRAST    Result Date: 2/26/2023  EXAMINATION: CT OF THE NECK SOFT TISSUE WITH CONTRAST  2/26/2023 TECHNIQUE: CT of the neck was performed with the administration of intravenous contrast. Multiplanar reformatted images are provided for review. Automated exposure control, iterative reconstruction, and/or weight based adjustment of the mA/kV was utilized to reduce the radiation dose to as low as reasonably achievable. COMPARISON: None HISTORY: ORDERING SYSTEM PROVIDED HISTORY: st TECHNOLOGIST PROVIDED HISTORY: Reason for exam:->st Decision Support Exception - unselect if not a suspected or confirmed emergency medical condition->Emergency Medical Condition (MA) Reason for Exam: pahryngitis,cough FINDINGS: PHARYNX/LARYNX:  The palatine tonsils are normal in appearance. The tongue is normal in appearance. The valleculae, epiglottis, aryepiglottic folds and pyriform sinuses appear unremarkable. Mild diffuse thickening is identified within the vocal cords greater on the left. No focal mass or abscess collection is identified at this location. SALIVARY GLANDS/THYROID:  The parotid and submandibular glands appear unremarkable. The thyroid gland appears unremarkable. LYMPH NODES:  No cervical or supraclavicular lymphadenopathy is seen. SOFT TISSUES:  No appreciable soft tissue swelling or mass is seen. BRAIN/ORBITS/SINUSES:  The visualized portion of the intracranial contents appear unremarkable. The visualized portion of the orbits, paranasal sinuses and mastoid air cells demonstrate no acute abnormality. LUNG APICES/SUPERIOR MEDIASTINUM:  Moderate COPD is noted. No focal consolidation is seen within the visualized lung apices. No superior mediastinal lymphadenopathy or mass. The visualized portion of the trachea appears unremarkable. BONES:  No aggressive appearing lytic or blastic bony lesion. Mild soft tissue thickening involving the vocal cords greater on the left. These changes could represent mild inflammatory process such as viral infection. No focal mass or abscess is detected. Moderate COPD.      CT CHEST WO CONTRAST    Result Date: 2/26/2023  EXAMINATION: CT OF THE CHEST WITHOUT CONTRAST 2/26/2023 1:34 pm TECHNIQUE: CT of the chest was performed without the administration of intravenous contrast. Multiplanar reformatted images are provided for review. Automated exposure control, iterative reconstruction, and/or weight based adjustment of the mA/kV was utilized to reduce the radiation dose to as low as reasonably achievable. COMPARISON: 02/12/2023, 11/07/2022, 07/21/2017 HISTORY: ORDERING SYSTEM PROVIDED HISTORY: cough TECHNOLOGIST PROVIDED HISTORY: Reason for exam:->cough Decision Support Exception - unselect if not a suspected or confirmed emergency medical condition->Emergency Medical Condition (MA) Reason for Exam: pharyngitis,cough FINDINGS: Mediastinum: The thyroid is unremarkable. There is no pathologic lymphadenopathy within the chest or mediastinum. There are stable borderline enlarged mediastinal lymph nodes, unchanged from prior exams, most likely benign and reactive in etiology given its stability. There is atherosclerotic disease and tortuosity of the intrathoracic aorta, with a stable 3.3 cm fusiform ectasia of the descending thoracic aorta. There is coronary atherosclerosis. No pericardial abnormality is identified. Lungs/pleura: There are mild retained secretions within the lower thoracic trachea. There are layering secretions within the bilateral mainstem bronchi. There is focal mucus plugging within the bronchus intermedius with multifocal mucus plugging within the right lower lobe, though to a lesser degree in comparison with the study of 02/12/2023. Remainder of the central airways are patent. There is minimal biapical pleural parenchymal scarring. There is a background of moderate emphysema. Basilar right lower lobe airspace disease as seen on the prior exam of 02/12/2023 has improved, with mild residual curvilinear opacities remaining within the basilar right lower lobe, likely either resolving aspiration or pneumonia. There is minimal subpleural atelectasis within the basilar aspect of the left lower lobe. There is mild parenchymal scarring within the lingula. No new focus of airspace consolidation is identified. There is no evidence of a pneumothorax or pleural effusion. There is a stable 5 mm triangular nodule within the left lower lobe, image 81 of series 2, unchanged from the study of 02/12/2023, though new from more remote exams. There is also a new 6 mm part solid nodule within the left lower lobe, just anterolateral to this previous nodule, image 82 of series 2. There is a new 6 mm part solid nodule within the posterior basal segment of the right lower lobe, just above the right diaphragm, image 25 of series 2, new from all prior studies. There are stable focal tree-in-bud opacities within the peripheral aspect of the right upper lobe, unchanged from multiple prior exams, likely a chronic infectious or inflammatory bronchiolitis given its stability and appearance. No additional suspicious pulmonary nodule or mass is identified. Upper Abdomen: The adrenal glands are stable and unremarkable. There is a 3 mm calculus within the mid left kidney. The patient is status post cholecystectomy. The remainder of the upper abdomen is unremarkable. Soft Tissues/Bones: There is multilevel degenerative change throughout the thoracic spine. There are stable chronic wedge compression fractures of T6 and L1. No osteolytic or osteoblastic lesion is seen. 1. Interval improvement in appearance of multifocal mucus plugging, with persistent notable mucus plugging within the right lower lobe. 2. Interval improvement in appearance of airspace consolidation within the basilar right lower lobe since the prior exam of 02/12/2013, with mild residual curvilinear opacities remaining, likely resolving aspiration or pneumonia. 3. Stable 5 mm triangular nodule within the left lower lobe. However, there are two new part solid nodules within each of the bilateral lower lobes, both measuring 6 mm in size, new from all prior exams. Recommend a follow-up chest CT in 3-6 months to ensure stability or resolution of these nodules, as advised below. RECOMMENDATIONS: Multiple pulmonary nodules. Most severe: 6 mm left part-solid pulmonary nodule. Recommend a non-contrast Chest CT at 3-6 months. Subsequent management based on the most suspicious nodule(s). These guidelines do not apply to immunocompromised patients and patients with cancer. Follow up in patients with significant comorbidities as clinically warranted. For lung cancer screening, adhere to Lung-RADS guidelines. Reference: Radiology. 2017; 284(1):228-43. No results found. PROCEDURES   Unless otherwise noted below, none     Procedures    CRITICAL CARE TIME (.cctime)       Total Critical Care time was 35 minutes, excluding separately reportable procedures. There was a high probability of clinically significant/life threatening deterioration in the patient's condition which required my urgent intervention. 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      EMERGENCY DEPARTMENT COURSE and DIFFERENTIAL DIAGNOSIS/MDM:   Vitals:    Vitals:    02/27/23 1331 02/27/23 1405 02/27/23 1435 02/27/23 1501   BP: (!) 149/70  121/65 106/63   Pulse: (!) 111  (!) 107 (!) 103   Resp: 24 20 17 19   Temp:       TempSrc:       SpO2: 100%  98% 98%   Weight:       Height:           Patient was given the following medications:  Medications   iopamidol (ISOVUE-370) 76 % injection 85 mL (has no administration in time range)   methylPREDNISolone sodium (SOLU-MEDROL) injection 125 mg (125 mg IntraVENous Given 2/27/23 1219)   ipratropium-albuterol (Almetta Lapping) nebulizer solution 1 ampule (1 ampule Inhalation Given 2/27/23 1219)   0.9 % sodium chloride bolus (0 mLs IntraVENous Stopped 2/27/23 1431)       ED Course as of 02/27/23 1532   Mon Feb 27, 2023   1147 Hx of COPD on 3L  Recent admission for pna- was intubated- back down to home O2  Completed levaquin  Increased productive cough,   On Xerelto [ER]   1410 Flu and COVID swab negative [ER]   1410 BNP within normal limits, no evidence of fluid overload on exam [ER]   1410 Troponin within normal limits [ER]   1410 Blood gas shows acidemia to 7.25, hypercarbia to 83 [ER]   1511 Repeat blood gas after steroids and breathing treatments show stable hypercarbia to 81. Patient started on BiPAP. [ER]      ED Course User Index  [ER] Aspen Sandoval MD        Is this patient to be included in the SEP-1 Core Measure due to severe sepsis or septic shock? 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 CONSULTS: (Who and What was discussed)  IP CONSULT TO PULMONOLOGY    Hospitalist to arrange for admission        Records Reviewed (External and Source) NONE    CC/HPI Summary, DDx, ED Course, and Reassessment:     Patient brought in today by EMS from home for evaluation of increased fatigue. On exam he is chronically ill-appearing is tachycardic breathing on his baseline 3 L nasal cannula at 100%. Appears ill. No acute respiratory distress at this time.   Patient seen by myself as well as my attending Dr. Leesa Lafleur. CBC shows no white count. Hemoglobin of 11.9. EKG was obtained, please see my attendings note. Patient given DuoNeb IV steroids IV fluids. VBG shows a pH of 7.256 with a CO2 of 83. Procalcitonin of 0.08. Lactic of 1 COVID flu negative. BNP of 173. Chest x-ray shows no acute process. No acute electrolyte abnormalities. CO2 of 40 no anion gap kidney function liver function unremarkable. Troponin less than 0.01. Patient was placed on BiPAP per respiratory while here in the ED. Repeat VBG shows a CO2 of 81 with pH of 7.247. CT PE study shows no acute pulmonary arterial thromboembolic disease no new pulmonary mass or consolidation. Emphysema. Heterogenous material in the right middle and lower lobe airways slightly more pronounced than yesterday's chest CT may reflect aspiration or retained secretions. Disposition Considerations (tests considered but not done, Admit vs D/C, Shared Decision Making, Pt Expectation of Test or Tx.):     Plan at this time will be to admit for acute respiratory failure with hypercapnia. Hospitalist consulted. I spoke To Dain WEBB with hospitalist team who did accept this admission. We will consult pulmonary as patient will be going to ICU. I am the Primary Clinician of Record. FINAL IMPRESSION      1. Acute respiratory failure with hypercapnia (HCC)    2. Chronic obstructive pulmonary disease, unspecified COPD type ([facility] [address]          DISPOSITION/PLAN     DISPOSITION Decision To Admit 02/27/2023 03:07:37 PM      PATIENT REFERRED TO:  No follow-up provider specified.     DISCHARGE MEDICATIONS:  New Prescriptions    No medications on file       DISCONTINUED MEDICATIONS:  Discontinued Medications    No medications on file              (Please note that portions of this note were completed with a voice recognition program.  Efforts were made to edit the dictations but occasionally words are mis-transcribed.)    Scarlet Jolly PA-C (electronically signed)       Esteban Wilks PA-C  02/27/23 [id number]

## 2023-02-27 NOTE — ED NOTES
Pt was trialled off of his bipap per pt request. Pt placed back on 2L nasal canula; oxygen sat has stayed above 96%. Provider has been notified.        Nadege Dolan, RN  02/27/23 [address] Taylor Macias, JOSE MIGUEL  02/27/23 [id number]

## 2023-02-27 NOTE — ED NOTES
1543-Call placed to Pulmonology for consult placed by Dylan Duenas  02/27/23 1543  154-Call back received from Dr. Abdiaziz Cheung Pulnoolgy spoke with Bertha Duenas  02/27/23 7585

## 2023-02-27 NOTE — PROGRESS NOTES
Patient placed on BIPAP 16/6 40% at this time.         02/27/23 1405   NIV Type   $NIV $Daily Charge   Skin Assessment Clean, dry, & intact   Skin Protection for O2 Device Yes   Orientation Middle   Location Nose   Suction Setup and Functional Yes   NIV Started/Stopped On   Equipment Type V60   Mode Bilevel   Mask Type Full face mask   Mask Size Large   Settings/Measurements   PIP Observed 16 cm H20   IPAP 16 cmH20   CPAP/EPAP 6 cmH2O   Vt (Measured) 439 mL   Rate Ordered 18   Resp 20   FiO2  40 %   I Time/ I Time % 1 s   Minute Volume (L/min) 8.3 Liters   Mask Leak (lpm) 3 lpm   Comfort Level Good   Using Accessory Muscles No   SpO2 97   Alarm Settings   Alarms On Y   Low Pressure (cmH2O) 8 cmH2O   High Pressure (cmH2O) 30 cmH2O   Apnea (secs) 20 secs   RR Low (bpm) 16   RR High (bpm) 40 br/min   Breath Sounds   Right Upper Lobe Diminished   Right Middle Lobe Diminished   Right Lower Lobe Diminished   Left Upper Lobe Diminished   Left Lower Lobe Diminished

## 2023-02-27 NOTE — PLAN OF CARE
Acute on chronic respiratory failure   COPDae  Requiring BIPAP   Recent admission for same, requiring intubation   Cultures grew pseudomonas and he was discharged on Levaquin  Pulmonology c/s  Start IV Levaquin   Continue home inhalers, duonebs and solumedrol  SLP c/s    Per ED, patient full-code and agreeable to intubation if needed    Admit to ICU    Virginia Diamond PA-C  2/27/2023   4:08 PM

## 2023-02-27 NOTE — TELEPHONE ENCOUNTER
Writer contacted ED provider to inform of 30 day readmission risk. Writer's attempt to contact ED provider was unsuccessful.     Call Back: If you need to call back to inform of disposition you can contact me at [phone]

## 2023-02-28 ENCOUNTER — APPOINTMENT (OUTPATIENT)
Dept: GENERAL RADIOLOGY | Age: 83
DRG: 190 | End: 2023-02-28
Payer: MEDICARE

## 2023-02-28 PROBLEM — J44.1 ACUTE EXACERBATION OF CHRONIC OBSTRUCTIVE PULMONARY DISEASE (COPD) (HCC): Status: ACTIVE | Noted: 2023-01-01

## 2023-02-28 PROBLEM — T17.500A MUCUS PLUGGING OF BRONCHI: Status: ACTIVE | Noted: 2023-01-01

## 2023-02-28 LAB
ANION GAP SERPL CALCULATED.3IONS-SCNC: 8 MMOL/L (ref 3–16)
BASOPHILS ABSOLUTE: 0 K/UL (ref 0–0.2)
BASOPHILS RELATIVE PERCENT: 0.1 %
BUN BLDV-MCNC: 15 MG/DL (ref 7–20)
CALCIUM SERPL-MCNC: 8.8 MG/DL (ref 8.3–10.6)
CHLORIDE BLD-SCNC: 95 MMOL/L (ref 99–110)
CO2: 32 MMOL/L (ref 21–32)
CREAT SERPL-MCNC: 0.6 MG/DL (ref 0.8–1.3)
EOSINOPHILS ABSOLUTE: 0 K/UL (ref 0–0.6)
EOSINOPHILS RELATIVE PERCENT: 0 %
ESTIMATED AVERAGE GLUCOSE: 125.5 MG/DL
GFR SERPL CREATININE-BSD FRML MDRD: >60 ML/MIN/{1.73_M2}
GLUCOSE BLD-MCNC: 160 MG/DL (ref 70–99)
GLUCOSE BLD-MCNC: 167 MG/DL (ref 70–99)
GLUCOSE BLD-MCNC: 225 MG/DL (ref 70–99)
GLUCOSE BLD-MCNC: 254 MG/DL (ref 70–99)
GLUCOSE BLD-MCNC: 368 MG/DL (ref 70–99)
GLUCOSE BLD-MCNC: 95 MG/DL (ref 70–99)
HBA1C MFR BLD: 6 %
HCT VFR BLD CALC: 34.3 % (ref 40.5–52.5)
HEMOGLOBIN: 11.2 G/DL (ref 13.5–17.5)
LYMPHOCYTES ABSOLUTE: 0.8 K/UL (ref 1–5.1)
LYMPHOCYTES RELATIVE PERCENT: 15.9 %
MCH RBC QN AUTO: 28.1 PG (ref 26–34)
MCHC RBC AUTO-ENTMCNC: 32.7 G/DL (ref 31–36)
MCV RBC AUTO: 85.9 FL (ref 80–100)
MONOCYTES ABSOLUTE: 0.2 K/UL (ref 0–1.3)
MONOCYTES RELATIVE PERCENT: 5.1 %
NEUTROPHILS ABSOLUTE: 3.8 K/UL (ref 1.7–7.7)
NEUTROPHILS RELATIVE PERCENT: 78.9 %
PDW BLD-RTO: 15.6 % (ref 12.4–15.4)
PERFORMED ON: ABNORMAL
PERFORMED ON: NORMAL
PLATELET # BLD: 108 K/UL (ref 135–450)
PMV BLD AUTO: 9.2 FL (ref 5–10.5)
POTASSIUM REFLEX MAGNESIUM: 4.2 MMOL/L (ref 3.5–5.1)
RBC # BLD: 3.99 M/UL (ref 4.2–5.9)
SODIUM BLD-SCNC: 135 MMOL/L (ref 136–145)
WBC # BLD: 4.8 K/UL (ref 4–11)

## 2023-02-28 PROCEDURE — 36415 COLL VENOUS BLD VENIPUNCTURE: CPT

## 2023-02-28 PROCEDURE — 2580000003 HC RX 258

## 2023-02-28 PROCEDURE — 92610 EVALUATE SWALLOWING FUNCTION: CPT

## 2023-02-28 PROCEDURE — 94669 MECHANICAL CHEST WALL OSCILL: CPT

## 2023-02-28 PROCEDURE — 94640 AIRWAY INHALATION TREATMENT: CPT

## 2023-02-28 PROCEDURE — 6370000000 HC RX 637 (ALT 250 FOR IP): Performed by: INTERNAL MEDICINE

## 2023-02-28 PROCEDURE — 92611 MOTION FLUOROSCOPY/SWALLOW: CPT

## 2023-02-28 PROCEDURE — 2060000000 HC ICU INTERMEDIATE R&B

## 2023-02-28 PROCEDURE — 6370000000 HC RX 637 (ALT 250 FOR IP)

## 2023-02-28 PROCEDURE — 2700000000 HC OXYGEN THERAPY PER DAY

## 2023-02-28 PROCEDURE — 74230 X-RAY XM SWLNG FUNCJ C+: CPT

## 2023-02-28 PROCEDURE — 94660 CPAP INITIATION&MGMT: CPT

## 2023-02-28 PROCEDURE — 94761 N-INVAS EAR/PLS OXIMETRY MLT: CPT

## 2023-02-28 PROCEDURE — 6360000002 HC RX W HCPCS: Performed by: NURSE PRACTITIONER

## 2023-02-28 PROCEDURE — 99223 1ST HOSP IP/OBS HIGH 75: CPT | Performed by: INTERNAL MEDICINE

## 2023-02-28 PROCEDURE — 80048 BASIC METABOLIC PNL TOTAL CA: CPT

## 2023-02-28 PROCEDURE — 85025 COMPLETE CBC W/AUTO DIFF WBC: CPT

## 2023-02-28 PROCEDURE — 6360000002 HC RX W HCPCS

## 2023-02-28 PROCEDURE — 2580000003 HC RX 258: Performed by: NURSE PRACTITIONER

## 2023-02-28 RX ORDER — LEVALBUTEROL 1.25 MG/.5ML
0.63 SOLUTION, CONCENTRATE RESPIRATORY (INHALATION) EVERY 8 HOURS
Status: DISCONTINUED | OUTPATIENT
Start: 2023-02-28 | End: 2023-02-28

## 2023-02-28 RX ORDER — SODIUM CHLORIDE FOR INHALATION 3 %
4 VIAL, NEBULIZER (ML) INHALATION 2 TIMES DAILY
Status: DISCONTINUED | OUTPATIENT
Start: 2023-02-28 | End: 2023-02-28

## 2023-02-28 RX ORDER — LEVALBUTEROL 1.25 MG/.5ML
0.63 SOLUTION, CONCENTRATE RESPIRATORY (INHALATION) EVERY 8 HOURS
Status: DISCONTINUED | OUTPATIENT
Start: 2023-02-28 | End: 2023-03-04 | Stop reason: HOSPADM

## 2023-02-28 RX ORDER — SODIUM CHLORIDE FOR INHALATION 3 %
4 VIAL, NEBULIZER (ML) INHALATION 2 TIMES DAILY
Status: DISCONTINUED | OUTPATIENT
Start: 2023-02-28 | End: 2023-03-04 | Stop reason: HOSPADM

## 2023-02-28 RX ORDER — IPRATROPIUM BROMIDE AND ALBUTEROL SULFATE 2.5; .5 MG/3ML; MG/3ML
1 SOLUTION RESPIRATORY (INHALATION) 4 TIMES DAILY
Status: DISCONTINUED | OUTPATIENT
Start: 2023-02-28 | End: 2023-02-28

## 2023-02-28 RX ORDER — GABAPENTIN 400 MG/1
400 CAPSULE ORAL 3 TIMES DAILY
Status: DISCONTINUED | OUTPATIENT
Start: 2023-02-28 | End: 2023-03-02

## 2023-02-28 RX ORDER — INSULIN LISPRO 100 [IU]/ML
0-4 INJECTION, SOLUTION INTRAVENOUS; SUBCUTANEOUS
Status: DISCONTINUED | OUTPATIENT
Start: 2023-02-28 | End: 2023-03-04 | Stop reason: HOSPADM

## 2023-02-28 RX ORDER — INSULIN LISPRO 100 [IU]/ML
0-4 INJECTION, SOLUTION INTRAVENOUS; SUBCUTANEOUS NIGHTLY
Status: DISCONTINUED | OUTPATIENT
Start: 2023-02-28 | End: 2023-03-04 | Stop reason: HOSPADM

## 2023-02-28 RX ORDER — IPRATROPIUM BROMIDE AND ALBUTEROL SULFATE 2.5; .5 MG/3ML; MG/3ML
1 SOLUTION RESPIRATORY (INHALATION) EVERY 4 HOURS PRN
Status: DISCONTINUED | OUTPATIENT
Start: 2023-02-28 | End: 2023-03-04 | Stop reason: HOSPADM

## 2023-02-28 RX ADMIN — INSULIN LISPRO 4 UNITS: 100 INJECTION, SOLUTION INTRAVENOUS; SUBCUTANEOUS at 17:05

## 2023-02-28 RX ADMIN — LEVALBUTEROL 0.63 MG: 1.25 SOLUTION, CONCENTRATE RESPIRATORY (INHALATION) at 15:54

## 2023-02-28 RX ADMIN — GABAPENTIN 400 MG: 400 CAPSULE ORAL at 21:36

## 2023-02-28 RX ADMIN — INSULIN LISPRO 1 UNITS: 100 INJECTION, SOLUTION INTRAVENOUS; SUBCUTANEOUS at 01:03

## 2023-02-28 RX ADMIN — METHYLPREDNISOLONE SODIUM SUCCINATE 40 MG: 40 INJECTION, POWDER, FOR SOLUTION INTRAMUSCULAR; INTRAVENOUS at 21:36

## 2023-02-28 RX ADMIN — Medication 2 PUFF: at 08:53

## 2023-02-28 RX ADMIN — GABAPENTIN 400 MG: 400 CAPSULE ORAL at 08:35

## 2023-02-28 RX ADMIN — METHYLPREDNISOLONE SODIUM SUCCINATE 40 MG: 40 INJECTION, POWDER, FOR SOLUTION INTRAMUSCULAR; INTRAVENOUS at 08:35

## 2023-02-28 RX ADMIN — SODIUM CHLORIDE, PRESERVATIVE FREE 10 ML: 5 INJECTION INTRAVENOUS at 08:38

## 2023-02-28 RX ADMIN — LEVALBUTEROL 0.63 MG: 1.25 SOLUTION, CONCENTRATE RESPIRATORY (INHALATION) at 22:48

## 2023-02-28 RX ADMIN — Medication 2 PUFF: at 22:48

## 2023-02-28 RX ADMIN — SODIUM CHLORIDE, PRESERVATIVE FREE 10 ML: 5 INJECTION INTRAVENOUS at 21:40

## 2023-02-28 RX ADMIN — LEVOFLOXACIN 750 MG: 5 INJECTION, SOLUTION INTRAVENOUS at 22:35

## 2023-02-28 RX ADMIN — DILTIAZEM HYDROCHLORIDE 180 MG: 180 CAPSULE, COATED, EXTENDED RELEASE ORAL at 08:35

## 2023-02-28 RX ADMIN — PROPAFENONE HYDROCHLORIDE 150 MG: 150 TABLET, FILM COATED ORAL at 14:37

## 2023-02-28 RX ADMIN — GABAPENTIN 400 MG: 400 CAPSULE ORAL at 14:37

## 2023-02-28 RX ADMIN — INSULIN LISPRO 2 UNITS: 100 INJECTION, SOLUTION INTRAVENOUS; SUBCUTANEOUS at 12:39

## 2023-02-28 RX ADMIN — PROPAFENONE HYDROCHLORIDE 150 MG: 150 TABLET, FILM COATED ORAL at 21:36

## 2023-02-28 RX ADMIN — IPRATROPIUM BROMIDE AND ALBUTEROL SULFATE 1 AMPULE: 2.5; .5 SOLUTION RESPIRATORY (INHALATION) at 08:53

## 2023-02-28 RX ADMIN — APIXABAN 5 MG: 5 TABLET, FILM COATED ORAL at 08:35

## 2023-02-28 RX ADMIN — ATORVASTATIN CALCIUM 10 MG: 10 TABLET, FILM COATED ORAL at 08:35

## 2023-02-28 RX ADMIN — ROFLUMILAST 250 MCG: 500 TABLET ORAL at 21:35

## 2023-02-28 RX ADMIN — Medication 4 ML: at 22:48

## 2023-02-28 ASSESSMENT — PAIN SCALES - GENERAL: PAINLEVEL_OUTOF10: 0

## 2023-02-28 ASSESSMENT — LIFESTYLE VARIABLES: HOW OFTEN DO YOU HAVE A DRINK CONTAINING ALCOHOL: NEVER

## 2023-02-28 NOTE — CONSULTS
PULMONARY CONSULT NOTE  Paola Pereira is being seen at the request of Jazmin Farias PA-C for a consultation for COPD exacerbation     HISTORY OF PRESENT ILLNESS: Paola Pereira is a 78 y.o. 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 He was discharged on Levaquin and has since been to the ED on 2/26/23 for sore throat and again presented on 2/27/2023 with recurrence of generalized weakness beginning the prior evening associated with ongoing cough with blood-streaked sputum and shortness of breath that became severe. No modifying factors. He was found to be hypercapnic with VBG CO2 of 81 and placed on BiPAP. CTPA was unremarkable. He was initially going to the ICU but downgraded to PCU after doing well off BiPAP on 2 L O2.      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    ELAINE on CPAP     Osteoporosis     Skin cancer of face      PAST SURGICAL HISTORY:  Past Surgical History:   Procedure Laterality Date    BACK SURGERY      repair broken back L4 & L5    CHOLECYSTECTOMY, LAPAROSCOPIC N/A 1/6/2021    LAPAROSCOPIC CHOLECYSTECTOMY, WITH CHOLANGIOGRAMS (ATTEMPTED) OPEN CHOLECYSTECTOMY performed by Earl Nieto MD at [address] [place] [address], OPEN N/A 01/06/2021    LAPAROSCOPIC CHOLECYSTECTOMY, WITH CHOLANGIOGRAMS (ATTEMPTED)     CYSTOSCOPY Right 10/9/15    RIght Ureteroscopy, Holmium Laser Lithotripsy with Stone Removal, Right Stent Placement    CYSTOSCOPY  05/01/2019    CYSTOSCOPY, RESECTION OF BLADDER NECK, URETHRAL DILATION    CYSTOSCOPY W BIOPSY OF BLADDER N/A 5/1/2019    CYSTOSCOPY, RESECTION OF BLADDER NECK, URETHRAL DILATION performed by Rebeca Leonard MD at [address] [facility] Left 6/12/2016    cataract removal    GALLBLADDER SURGERY  01/2021    JOINT REPLACEMENT  6/29/2011    right knee    JOINT REPLACEMENT  11/2004    left knee    OTHER SURGICAL HISTORY  08/31/2015    wide excision basal cell carcinoma on the nose and bilateral auricles with frozen sections, plastic closure, full thickness skin graft    OTHER SURGICAL HISTORY  12/1/15    excision of lesion of lip    PROSTATE SURGERY      TURP  10/23/2015    with cystoscopy     FAMILY HISTORY:  family history includes Cancer in his brother, father, and mother; Diabetes in his mother and sister; Heart Disease in his mother. SOCIAL HISTORY:   reports that he quit smoking about 17 years ago. His smoking use included cigarettes. He has a 67.50 pack-year smoking history.  He has never used smokeless tobacco.    Scheduled Meds:   gabapentin  400 mg Oral TID    apixaban  5 mg Oral BID    atorvastatin  10 mg Oral Daily    dilTIAZem  180 mg Oral Daily    pantoprazole  40 mg Oral QAM AC    fluticasone  2 spray Nasal Daily    sodium chloride flush  5-40 mL IntraVENous 2 times per day    methylPREDNISolone  40 mg IntraVENous BID    Followed by    Ihsan Alvares ON 3/2/2023] predniSONE  40 mg Oral Daily    insulin lispro  0-4 Units SubCUTAneous Q4H    levofloxacin  750 mg IntraVENous Q24H    propafenone  150 mg Oral 3 times per day    Roflumilast  250 mcg Oral Nightly    budesonide-formoterol  2 puff Inhalation BID    ipratropium-albuterol  1 ampule Inhalation 4x daily     Continuous Infusions:   sodium chloride      dextrose       PRN Meds:  sodium chloride flush, sodium chloride, ondansetron **OR** ondansetron, polyethylene glycol, acetaminophen **OR** acetaminophen, glucose, dextrose bolus **OR** dextrose bolus, glucagon (rDNA), dextrose, albuterol    ALLERGIES:  Patient has No Known Allergies. REVIEW OF SYSTEMS:  Constitutional: Negative for fever + weakness  HENT: + for sore throat  Eyes: Negative for redness   Respiratory: + for dyspnea, + cough  Cardiovascular: Negative for chest pain  Gastrointestinal: Negative for vomiting, diarrhea   Genitourinary: Negative for hematuria   Musculoskeletal: Negative for arthralgias   Skin: Negative for rash  Neurological: Negative for syncope  Hematological: Negative for adenopathy  Psychiatric/Behavorial: Negative for anxiety    PHYSICAL EXAM:  Blood pressure (!) 141/79, pulse (!) 106, temperature 97.4 °F (36.3 °C), temperature source Oral, resp. rate 20, height 6' (1.829 m), weight 175 lb 8 oz (79.6 kg), SpO2 100 %.' on 2 L  Gen:  No distress. Chronically ill-appearing. Eyes:  PERRL. No sclera icterus. No conjunctival injection. ENT:  No discharge. Pharynx clear. Neck:  Trachea midline. No obvious mass. Resp:  No accessory muscle use. No crackles. No wheezes. No rhonchi. No dullness on percussion. CV:  Regular rate. Regular rhythm. No murmur or rub. No edema. Peripheral pulses are 2+. Capillary refill is less than 3 seconds. GI:  Non-tender. Non-distended. No hernia. Skin:  Warm and dry. No nodule on exposed extremities. Lymph:  No cervical LAD. No supraclavicular LAD. M/S:  No cyanosis. No joint deformity. No clubbing. Neuro:  Awake. Alert. Moves all four extremities. Psych:  Oriented x 3. No anxiety.      LABS:  CBC:   Recent Labs     02/26/23 1330 02/27/23  1136   WBC 6.8 6.0   HGB 10.9* 11.9*   HCT 33.3* 37.0*   MCV 83.8 85.2   * 115*     BMP:   Recent Labs     02/26/23 1330 02/27/23  1136    141   K 4.0 4.1    94*   CO2 39* 40*   BUN 15 11   CREATININE 0.7* 0.7*     LIVER PROFILE:   Recent Labs     02/26/23 1330 02/27/23  1136   AST 13* 14*   ALT 14 14   BILITOT 0.7 0.8   ALKPHOS 63 69     PT/INR: No results for input(s): PROTIME, INR in the last 72 hours. APTT: No results for input(s): APTT in the last 72 hours. UA:  Recent Labs     02/27/23  1650   COLORU Yellow   PHUR 6.5   WBCUA None seen   RBCUA 5-10*   CLARITYU Clear   SPECGRAV <=1.005   LEUKOCYTESUR Negative   UROBILINOGEN 2.0*   BILIRUBINUR Negative   BLOODU TRACE-INTACT*   GLUCOSEU Negative     No results for input(s): PHART, ZQC3TFT, PO2ART in the last 72 hours. Micro:   2/12/23 BAL Pensensitive Pseudomonas aeruginosa   2/12/23 Suctioned sputum Pseudomonas aeruginosa    2/27/23 SARS-CoV-2 & influenza not detected  2/27/23 BC sent  2/28/23 Pneumonia molecular panel sent  Sputum cx has been ordered  Procalcitonin 0.08    Cytology 2/12/23  Lung, Bronchial Washings:   - Positive for malignant cells, at least squamous cell carcinoma in situ. COMMENT:    Specimen B shows fragments of malignant squamous cells   consistent with squamous cell carcinoma. No definitive invasive   component is present. Imaging: Chest imaging was reviewed by me and showed:  CTPA 2/27/2023  Pulmonary Arteries: Pulmonary arteries are adequately opacified for   evaluation. No evidence of intraluminal filling defect to suggest pulmonary   embolism. Main pulmonary artery is normal in caliber. Mediastinum: No evidence of mediastinal lymphadenopathy. The heart and   pericardium demonstrate no acute abnormality. There is no acute abnormality   of the thoracic aorta. Atherosclerosis in the thoracic aorta. Coronary   artery calcifications. Lungs/pleura: Respiratory motion. Mild biapical pleuroparenchymal scarring. Emphysema. Heterogeneous material in the right middle and lower lobe airways   is more pronounced than the prior study. No new pulmonary mass or   consolidation. No pleural effusion or pneumothorax. Upper Abdomen: Limited images of the upper abdomen are unremarkable. Soft Tissues/Bones: No acute bone or soft tissue abnormality. Impression   1. No acute pulmonary arterial thromboembolic disease. 2.  No new pulmonary mass or consolidation. Emphysema. 3.  Heterogeneous material in the right middle and lower lobe airways is   slightly more pronounced than yesterday's chest CT and may relate to   aspiration or retained secretions. ASSESSMENT:  Acute on chronic hypercapnic respiratory failure   Mucus plugging of lower lobe airways   COPD f/b Dr. Shahla Rogers, with severe acute exacerbation   Recent cytology suggesting squamous cell carcinoma on bronch washing cytology 2/12/23 - no clear primary   Chronic hypoxemic respiratory failure; baseline 3 L O2  Recent pseudomonas pneumonia, bronch 2/12/23 with blood suctioned RUL & right mainstem airways; on MV 2/12/23 - 2/13/23. 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 Lexus Mullen D#2  Hold Eliquis, last dose AM 2/28/23 for tentative therapeutic bronchoscopy and to re-evaluate for possible endobronchial lesion. Will need outpatient PET imaging, is currently scheduled 3/16/23    I spent a total of 30 minutes on this encounter personally obtaining the patient history, reviewing labs, imaging and other data. I independently performed the above physical exam and updated this encounter note, assessment and plan as needed. Samantha Middleton MD on 2/28/23 at 4:31 PM EST    I spent a total of 15 minutes on this encounter on chart review, history taking and review of data. I updated this encounter note as needed.    ANEUDY Herzog on 2/28/23 at 7:34 AM EST

## 2023-02-28 NOTE — PROGRESS NOTES
RT Inhaler-Nebulizer Bronchodilator Protocol Note    There is a bronchodilator order in the chart from a provider indicating to follow the RT Bronchodilator Protocol and there is an Initiate RT Inhaler-Nebulizer Bronchodilator Protocol order as well (see protocol at bottom of note). CXR Findings:  No results found. The findings from the last RT Protocol Assessment were as follows:   History Pulmonary Disease: (P) Chronic pulmonary disease  Respiratory Pattern: (P) Mild dyspnea at rest, irregular pattern, or RR 21-25 bpm  Breath Sounds: (P) Slightly diminished and/or crackles  Cough: (P) Strong, spontaneous, non-productive  Indication for Bronchodilator Therapy: (P) On home bronchodilators  Bronchodilator Assessment Score: (P) 8    Aerosolized bronchodilator medication orders have been revised according to the RT Inhaler-Nebulizer Bronchodilator Protocol below. Respiratory Therapist to perform RT Therapy Protocol Assessment initially then follow the protocol. Repeat RT Therapy Protocol Assessment PRN for score 0-3 or on second treatment, BID, and PRN for scores above 3. No Indications - adjust the frequency to every 6 hours PRN wheezing or bronchospasm, if no treatments needed after 48 hours then discontinue using Per Protocol order mode. If indication present, adjust the RT bronchodilator orders based on the Bronchodilator Assessment Score as indicated below. Use Inhaler orders unless patient has one or more of the following: on home nebulizer, not able to hold breath for 10 seconds, is not alert and oriented, cannot activate and use MDI correctly, or respiratory rate 25 breaths per minute or more, then use the equivalent nebulizer order(s) with same Frequency and PRN reasons based on the score. If a patient is on this medication at home then do not decrease Frequency below that used at home.     0-3 - enter or revise RT bronchodilator order(s) to equivalent RT Bronchodilator order with Frequency of every 4 hours PRN for wheezing or increased work of breathing using Per Protocol order mode. 4-6 - enter or revise RT Bronchodilator order(s) to two equivalent RT bronchodilator orders with one order with BID Frequency and one order with Frequency of every 4 hours PRN wheezing or increased work of breathing using Per Protocol order mode. 7-10 - enter or revise RT Bronchodilator order(s) to two equivalent RT bronchodilator orders with one order with TID Frequency and one order with Frequency of every 4 hours PRN wheezing or increased work of breathing using Per Protocol order mode. 11-13 - enter or revise RT Bronchodilator order(s) to one equivalent RT bronchodilator order with QID Frequency and an Albuterol order with Frequency of every 4 hours PRN wheezing or increased work of breathing using Per Protocol order mode. Greater than 13 - enter or revise RT Bronchodilator order(s) to one equivalent RT bronchodilator order with every 4 hours Frequency and an Albuterol order with Frequency of every 2 hours PRN wheezing or increased work of breathing using Per Protocol order mode.          Electronically signed by Terrell Shen RCP on 2/28/2023 at 8:59 AM

## 2023-02-28 NOTE — PROGRESS NOTES
Pt off Bipap per ED nurse since 1800. Stable on 2 Lnc. VBG drawn, results discussed with Dr. Shaunna Perez. Okay to downgrade to U. Ordered placed.

## 2023-02-28 NOTE — CARE COORDINATION
Case Management Assessment  Initial Evaluation    Date/Time of Evaluation: 2/28/2023 3:08 PM  Assessment Completed by: Yesenia Valelcillo RN    If patient is discharged prior to next notation, then this note serves as note for discharge by case management. Patient Name: Yola Finn                   YOB: 1940  Diagnosis: COPD exacerbation ([facility] [J44.1]  Acute respiratory failure with hypercapnia ([facility] [J96.02]  Chronic obstructive pulmonary disease, unspecified COPD type ([facility] [J44.9]                   Date / Time: 2/27/2023 11:20 AM    Patient Admission Status: Inpatient   Readmission Risk (Low < 19, Mod (19-27), High > 27): Readmission Risk Score: 22.3    Current PCP: Nohelia Serrano MD  PCP verified by CM? Yes    Chart Reviewed: Yes      History Provided by: Patient  Patient Orientation: Alert and Oriented, Person, Place, Situation    Patient Cognition: Alert    Hospitalization in the last 30 days (Readmission):  Yes    If yes, Readmission Assessment in  Navigator will be completed.     Advance Directives:      Code Status: Full Code   Patient's Primary Decision Maker is: Named in [address] [place] [address]    Primary Decision MakerMarge Alexis Child - [phone]    Secondary Decision Maker: Leonor Nath Child - [phone]    Supplemental (Other) Decision Maker: Jimy Negron Child - [phone]    Discharge Planning:    Patient lives with: Family Members Type of Home: House  Primary Care Giver: Self  Patient Support Systems include: Children   Current Financial resources: Medicaid, Medicare  Current community resources: ECF/Home Care  Current services prior to admission: Durable Medical Equipment, Oxygen Therapy            Current DME: Oxygen Therapy (Comment) ([employer] 3 liters (baseline))            Type of Home Care services:  Aide Services, OT, PT, Nursing Services    ADLS  Prior functional level: Assistance with the following:, Dressing, Cooking, Housework, Shopping  Current functional level: Assistance with the following:, Dressing, Cooking, Housework, Mobility    PT AM-PAC:   /24  OT AM-PAC:   /24    Family can provide assistance at DC: Yes  Would you like Case Management to discuss the discharge plan with any other family members/significant others, and if so, who? Yes (daughters)  Plans to Return to Present Housing: Yes  Other Identified Issues/Barriers to RETURNING to current housing: NONE  Potential Assistance needed at discharge: [address] [place] [address] DME:   None  Patient expects to discharge to: [address] [place] [place] [address] for transportation at discharge:   family    Financial    Payor: Gurdeep Ledbetter / Plan: MEDICARE PART A AND B / Product Type: *No Product type* /     Does insurance require precert for SNF: Yes    Potential assistance Purchasing Medications: No  Meds-to-Beds request:        [address] [facility], [address] [phone] - f [phone]  [address]  Phone: [phone] Fax: [address], [address] [place] [address] [phone]  [address] [facility]  [address] [address]  Phone: [phone] Fax: [phone]    [address] [phone]  [facility] [phone]  [address] [facility] [address]  Phone: [phone] Fax: Zackary Chapa [phone] - [phone] [facility], [address] [phone] [facility] [phone]  [address] [place] [address]  Phone: [phone] Fax: [phone]    [address], [address] Janette Clarke [phone] - F [phone]  Christi Brown.   [facility] [address] [facility]  Phone: [phone] Fax: [phone]      Notes:    Factors facilitating achievement of predicted outcomes: Family support, Cooperative, and Pleasant    Barriers to discharge: Decreased endurance    Additional Case Management Notes: Chart reviewed. Met with pt and daughters (caregivers) at bedside and explained the role of the CM. Lives with daughter Misbah Mercado and plans to return home. Denies any new HHc or DME needs. Will need OP PET scan on DC and follow up with oncology. CM will follow. The Plan for Transition of Care is related to the following treatment goals of COPD exacerbation ([facility] [J44.1]  Acute respiratory failure with hypercapnia (HCC) [J96.02]  Chronic obstructive pulmonary disease, unspecified COPD type ([facility] [E69.3]    IF APPLICABLE: The Patient and/or patient representative Brock Yoon and his family were provided with a choice of provider and agrees with the discharge plan. Freedom of choice list with basic dialogue that supports the patient's individualized plan of care/goals and shares the quality data associated with the providers was provided to: Patient   Patient Representative Name:       The Patient and/or Patient Representative Agree with the Discharge Plan?  Yes    Emily Enriquez RN  Case Management Department  Ph: [phone]

## 2023-02-28 NOTE — PROGRESS NOTES
Patient is resting comfortably. Call light is with in reach. Patient is breathing is unlabored. Will continue to monitor.    Alicia Crawley RN

## 2023-02-28 NOTE — FLOWSHEET NOTE
02/28/23 0815   Vital Signs   Temp 97.8 °F (36.6 °C)   Temp Source Axillary   Heart Rate (!) 112   Heart Rate Source Monitor   Resp 20   BP (!) 142/82   MAP (Calculated) 102   BP Location Right upper arm   BP Method Automatic   Level of Consciousness 0   MEWS Score 3   Pain Assessment   Pain Assessment 0-10   Pain Level 0   Oxygen Therapy   SpO2 98 %   O2 Device Nasal cannula   O2 Flow Rate (L/min) 2 L/min   Patient awake and alert speech doing swallow eval.5 min after completion oxygen dropped to 78% on 2 liters then RN increased to 3 liters then pt up to 93%. Patient called daughter to give her update and he was annoyed on phone stating he just wanted to go home. Reminded pt just need to get answers if he is not swallowing correctly and he was in agreement.   Call light in reach

## 2023-02-28 NOTE — PROGRESS NOTES
Speech Language Pathology    Speech Language Pathology  Clinical Bedside Swallow Assessment  Facility/Department: [facility] PCU TELEMETRY      Recommendations: Instrumentation: Yes. MBSS is warranted to further assess oropharyngeal structures and functions. Pt declined FEES. Diet recommendation: NPO; NPO; Meds whole in puree pending MBSS results/recs  Risk management: oral care 2-3x/day to reduce adverse affects in the event of aspiration, general aspiration precautions, and hold PO and contact SLP if s/s of aspiration or worsening respiratory status develop. ENT consult recommended to assess vocal fold function and integrity; pt with persistent hoarse vocal quality \"since December\" per pt and pt's family      NAME:Jarrett Rosario [facility]  : 1940 (78 y.o.)   MRN: [id number]  ROOM: /0317-01  ADMISSION DATE: 2023  PATIENT DIAGNOSIS(ES): COPD exacerbation (HCC) [J44.1]  Acute respiratory failure with hypercapnia (HCC) [J96.02]  Chronic obstructive pulmonary disease, unspecified COPD type [facility]) [J44.9]  Chief Complaint   Patient presents with    Fatigue     Recent admission with pneumonia and just finished antibiotics. States he started feeling generalized weakness last night.      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 ([address] 2022    Diabetes mellitus with skin ulcer ([address] 2022    Other specified peripheral vascular diseases ([address] 2022    Acute on chronic respiratory failure with hypoxemia ([address]     COVID-19 02/05/2022    Debility 01/14/2021    Moderate protein-calorie malnutrition ([address] 01/12/2021    Bacteremia due to Klebsiella pneumoniae     Paroxysmal atrial fibrillation (HCC)     Abdominal pain, right upper quadrant     Lactic acidosis     Elevated bilirubin     Elevated procalcitonin     Acute calculous cholecystitis 12/15/2020    Acute cholecystitis 12/15/2020    Atherosclerotic ulcer of aorta (HCC)     Closed compression fracture of body of L1 vertebra (HCC)     Hypomagnesemia     Abdominal aortic aneurysm (AAA) 30 to 34 mm in diameter     Pneumonia of left lower lobe due to infectious organism     Atrial fibrillation with RVR ([address]     Acute respiratory failure with hypoxia and hypercapnia (HCC)     COPD with acute exacerbation (HCC)     Acute encephalopathy     Hyperglycemia     Rapid atrial fibrillation ([address] 04/07/2020    Pleural effusion     Acute on chronic respiratory failure with hypoxia and hypercapnia ([address] 04/01/2020    Former smoker 01/24/2018    Acute hyperglycemia 01/24/2018    Chronic normocytic anemia 01/24/2018    Chronic thrombocytopenia 01/24/2018    Falls at home 01/24/2018    Ambulatory dysfunction 01/24/2018    General weakness 01/24/2018    Acute respiratory insufficiency 12/29/2017    Acute on chronic respiratory failure with hypercapnia (HCC)     Septic shock (HCC)     COPD exacerbation ([address]     Chronic respiratory failure with hypoxia and hypercapnia 2.5 L home O2 07/13/2017    PAF (paroxysmal atrial fibrillation) ([address] 10/11/2015    Gallstones      Past Medical History:   Diagnosis Date    Arthritis     CAD (coronary artery disease)     COPD (chronic obstructive pulmonary disease) ([address]     Diabetes mellitus ([address]     Hepatitis A 01/05/2021    Hyperlipidemia     Hypertension     Influenza A 12/29/2017    Kidney calculi     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, OPEN N/A 01/06/2021    LAPAROSCOPIC CHOLECYSTECTOMY, WITH CHOLANGIOGRAMS (ATTEMPTED)     CYSTOSCOPY Right 10/9/15    RIght Ureteroscopy, Holmium Laser Lithotripsy with Stone Removal, Right Stent Placement    CYSTOSCOPY  05/01/2019    CYSTOSCOPY, RESECTION OF BLADDER NECK, URETHRAL DILATION    CYSTOSCOPY W BIOPSY OF BLADDER N/A 5/1/2019    CYSTOSCOPY, RESECTION OF BLADDER NECK, URETHRAL DILATION performed by Kang Dooley MD at [facility] Left 6/12/2016    cataract removal    GALLBLADDER SURGERY  01/2021    JOINT REPLACEMENT  6/29/2011    right knee    JOINT REPLACEMENT  11/2004    left knee    OTHER SURGICAL HISTORY  08/31/2015    wide excision basal cell carcinoma on the nose and bilateral auricles with frozen sections, plastic closure, full thickness skin graft    OTHER SURGICAL HISTORY  12/1/15    excision of lesion of lip    PROSTATE SURGERY      TURP  10/23/2015    with cystoscopy     No Known Allergies    DATE ONSET: Pt admitted to [facility] on 2/27/23    Date of Evaluation: 2/28/2023   Evaluating Therapist: MAUREEN Pierre    Chart Reviewed: : [x] Yes [] No    Current Diet: Diet NPO    Recent Chest Radiography: [] Chest XR   [x] CT of Chest Pulmonary Embolism  Date: 2/27/23  Impressions  Impression   1. No acute pulmonary arterial thromboembolic disease. 2.  No new pulmonary mass or consolidation. Emphysema. 3.  Heterogeneous material in the right middle and lower lobe airways is   slightly more pronounced than yesterday's chest CT and may relate to   aspiration or retained secretions.        Pain: The patient does not complain of pain    Reason for Referral  Jean Marie Chaidez was referred for a bedside swallow evaluation to assess the efficiency of their swallow function, identify signs and symptoms of aspiration and make recommendations regarding safe dietary consistencies, effective compensatory strategies, and safe eating environment. Assessment    Medical record review/interview: Per PA H&P: \" Jean Marie Chaidez is a 78 y.o. male with a past medical history of hypertension diabetes skin cancer CAD hepatitis A COPD hyperlipidemia sleep apnea former smoker brought in today by EMS for evaluation of fatigue, cough and increased shortness of breath. Recent ICU admission for pneumonia started on Levaquin has since completed Levaquin. States over the past several days reports increased fatigue, cough with productive sputum with some blood streaked sputum, increased shortness of breath. Denies chest pain. Denies nausea vomiting diarrhea. Denies any fevers or chills. No aggravating or alleviating complaints. Otherwise denies any other symptoms. Has been using his nebulizer and breathing treatments at home. No aggravating or alleviating complaints. Nothing seems to make symptoms better or worse. Denies sick contacts. He is anticoagulated on blood thinners. Denies abdominal or urinary complaints\".       Patient Complaints:  Odynophagia: [x] Yes [] No (\"throat pain\" at times per pt)  Globus Sensation: [] Yes [x] No  SOB with PO intake: [] Yes [x] No  Increased WOB with PO intake: [] Yes [x] No  Reflux Sx's: [] Yes [x] No  Weight loss: [] Yes [x] No  Coughing/Choking with PO intake: [x] Yes [] No (\"every once in awhile\" per pt)  Reduced Appetite: [] Yes [x] No  Trouble with Mastication:  [] Yes [x] No  Avoidance of Certain Foods:  [] Yes [x] No  Premature Satiety:  [] Yes [x] No  Recent PNA:  [x] Yes [] No  Recurring PNA:  [] Yes [x] No  Changes in vocal quality: [x] Yes [] No    Baseline Method of Oral Meds:  [x] Whole with liquid [] Cut with liquid    [] Whole with puree    [] Cut in puree    [] Crushed in puree    [] Crushed in liquid    [] Via TF    Additional Reported Symptoms/Complaints: Pt admitted d/t acute respiratory failure with hypercapnia, COPD. Pt has PMHx of severe COPD and GERD per chart. Pt most recently esen by  on 2/14/23 with recommendations for a soft and bite sized diet with thin liquids. ENT consult also recommended at that time given pt's dysphonic vocal quality (\"since December\" per pt and pt's family).        Predisposing dysphagia risk factors: COPD, Age, and Hx of dysphagia  Clinical signs of possible chronic dysphagia: hx of dysphagia  Precipitating dysphagia risk factors: reduced physical mobility    Vitals/labs:     Vitals:    02/27/23 2324 02/28/23 0000 02/28/23 0403 02/28/23 0815   BP:   (!) 141/79    Pulse:  (!) 116 (!) 106 (!) 112   Resp:  29 20    Temp: 99 °F (37.2 °C)  97.4 °F (36.3 °C)    TempSrc: Oral  Oral    SpO2:  93% 100% 98%   Weight:   175 lb 8 oz (79.6 kg)    Height:   6' (1.829 m)         CBC:   Recent Labs     02/28/23  0816   WBC 4.8   HGB 11.2*   *      BMP:  Recent Labs     02/28/23  0816   *   K 4.2   CL 95*   CO2 32   BUN 15   CREATININE 0.6*   GLUCOSE 167*          Cranial nerve exam:   CN V (trigeminal): ophthalmic, maxillary, and mandibular facial sensation- WFL  CN VII (facial): WFL  CN IX/X (glossopharyngeal/vagus): MPT: Reduced; pitch range: Reduced; vocal quality: hoarse and weak; cough: Congested and Non-Productive  CN XII (hypoglossal): WFL      Laryngeal function exam:   Secretions: n/a  Vocal quality: See CN exam above  MPT: See CN exam above  S/Z ratio: DNT  Pitch range: See CN exam above  Cough: See CN exam above    Oral Care Status:    [] Oral Care [facility]  [] Poor oral care status  [x] Edentulous  [] Upper Dentures  [] Lower Dentures  [] Missing/Broken Teeth  [] Evidence of dental cavities/carries    PO trials:   Ice: x2; adequate A-P bolus transit and control, grossly timely swallow initiation. Immediate congested cough post-swallow in 1/2 trials, not productive. No change in vital signs. IDDSI 0 (thin):   - Cup: no anterior bolus loss , swallow timing subjectively appears timely, intermittent congested cough post-swallow (*also noted at baseline), and vitals stable    IDDSI 4 (puree): no anterior bolus loss , suspect functional A-P bolus transit, oral clearance grossly WFL, congested cough post-swallow in 1/5 trials, and vitals stable    IDDSI 7 (regular): DNT    3 oz water: DNT    Impressions:  Pt oriented x4, recalled working with SLP during recent admission. He largely denied dysphagia with PO intake stating \"just every once in awhile\". Pt currently on 2 lpm O2 NC (wears 3 lpm O2 NC at baseline per pt). Pt reported continued intermittent hemoptysis. Pt presents with baseline congested cough, not productive. He also continues with dysphonic vocal quality, not improved per pt. Intermittent congested cough noted with and without PO trials (see above). Further PO trials held pending instrumental swallow study via MBSS. SLP educated pt on both FEES and MBSS, contraindications and indications for both studies. Pt pleasantly declined FEES, agreeable to MBSS. *Of note, [facility] called several minutes after final PO trials and reported decrease in pt's O2 sats to low 80s. Pulse oximeter applied at bedside by RN, 82% initially, quickly climbed to 92-93% (<15 seconds). Recommend that pt continue to be NPO at this time exception of meds whole in puree as tolerated, low volume ice chips for comfort pending MBSS results/recs.   RN notified of recs, SLP to speak with MD regarding MBSS order request.    Clinical signs of oropharyngeal dysphagia likely acute-on-chronic related to reduced physical mobility, increased O2 demands, respiratory illness, acute infection, fatigue, generalized weakness, Progressive nature of disease/condition, impaired respiratory-swallow coordination, the aging swallow, and co-morbidities. Swallow prognosis is fair. Instrumental swallow study is indicated. Given pt's acuity of illness, reduced physical mobility, immunocompromised state, and overt clinical s/s of aspiration at bedside, pt is not safe for oral diet at this time / prior to instrumental swallow study. Recommendations: Instrumentation: Yes. MBSS is warranted to further assess oropharyngeal structures and functions. Pt declined FEES. Diet recommendation: NPO; NPO; Meds whole in puree pending MBSS results/recs  Risk management: oral care 2-3x/day to reduce adverse affects in the event of aspiration, general aspiration precautions, and hold PO and contact SLP if s/s of aspiration or worsening respiratory status develop.   ENT consult recommended to assess vocal fold function and integrity; pt with persistent hoarse vocal quality \"since December\" per pt and pt's family    Prognosis: Fair - Good    Recommended Intervention:  [x] Dysphagia tx  [] Videostroboscopy                      [x] NPO   [x] MBS       [] Speech/Cog Eval    [] Therapeutic PO Trials     [x] Ice Chips   [] Other:  [] FEES                                                 Dysphagia Therapeutic Intervention:  []  Bolus control Exercises  []  Oral Motor Exercises  []  [employer] Protocol  []  Thermal Stimulation  [x]  Oral Care    []  Vital Stim/NMES  []  Laryngeal Exercises  [x]  Patient/Family Education  []  Pharyngeal Exercises  [x]  Therapeutic PO trials with SLP  [x]  Diet tolerance monitoring  []  Other:     Referrals:  [] ENT    [] PT  [] Pulmonology [] GI  [] Neurology  [] RD  [] OT   []     Goals:  Short Term Goals:  Timeframe for Short Term Goals: (5 days, 3/5/23)  Goal 1: The patient will tolerate recommended diet with no clinical s/s of aspiration 5/5  Goal 2: The patient/caregiver will demonstrate understanding of compensatory swallow strategies, for improved swallow safety  Goal 3: The patient will tolerate instrumental assessment when able     Long Term Goals:   Timeframe for Long Term Goals: (7 days, 3/7/23)  Goal 1: The patient will tolerate least restrictive diet with no clinical s/s of aspiration or worsening respiratory/pulmonary status    Treatment:  Skilled instruction completed with patient re: evidenced based practice regarding recommendations and POC, importance of oral care to reduce adverse affects in the event of aspiration, and instruction of recommended compensatory strategies developed based upon clinical exam. Pt able to recall/demonstrate compensatory strategies with min cues. Pt Education: SLP educated the patient re: Role of SLP, rationale for completion of assessment, results of assessment, recommendations, POC, rationale for FEES, and rationale for MBS  Pt Education Response: verbalized understanding, would benefit from ongoing education, and RN aware    Duration/Frequency of Tx: 3-5x/wk    Individuals Consulted:   [x]  Patient     []  NP         [x]  RN   []  RD                   [x]  MD      []  Family Member                        []  PA    []  Other:      Safety Devices / Report:  [x]  All fall risk precautions in place [x]  Safety handoff completed with RN  [x]  Bed alarm in place  [x]  Left in bed     []  Chair alarm in place  []  Left in chair   [x]  Call light in reach   []  Other:        Total Treatment Time / Charges       Time in Time out Total Time / units   Swallow Eval/Tx Time  0814 0834 20 min / 1 unit (DE)     Signature:  WILD Morales  Speech-language pathologist  [id number]

## 2023-02-28 NOTE — PROGRESS NOTES
Patient admitted to room 317 from ER. Patient oriented to room, call light, bed rails, phone, lights and bathroom. Patient instructed about the schedule of the day including: vital sign frequency, lab draws, possible tests, frequency of MD and staff rounds, daily weights, I &O's and prescribed diet. 1 Telemetry box in place, patient aware of placement and reason. Bed locked, in lowest position, side rails up 2/4, call light within reach. Recliner Assessment  Patient is not able to demonstrate the ability to move from a reclining position to an upright position within the recliner due to knees buckling. 4 Eyes Skin Assessment     The patient is being assess for   Admission    I agree that 2 RN's have performed a thorough Head to Toe Skin Assessment on the patient. ALL assessment sites listed below have been assessed. Areas assessed for pressure by both nurses:   [x]   Head, Face, and Ears   [x]   Shoulders, Back, and Chest, Abdomen  [x]   Arms, Elbows, and Hands   [x]   Coccyx, Sacrum, and Ischium  [x]   Legs, Feet, and Heels   Stage 1 coccyx, scattered bruises and abrasions. Skin Assessed Under all Medical Devices by both nurses:  O2 device tubing              All Mepilex Borders were peeled back and area peeked at by both nurses:  Yes  Please list where Mepilex Borders are located:  Coccyx             **SHARE this note so that the co-signing nurse is able to place an eSignature**    Co-signer eSignature: Electronically signed by Jasmine Glez RN on 2/28/23 at 4:25 AM EST    Does the Patient have Skin Breakdown related to pressure?   Yes LDA WOUND CARE was Initiated documentation include the Paula-wound, Wound Assessment, Measurements, Dressing Treatment, Drainage, and Color\",     (Insert Photo here)         Santiago Prevention initiated:  No   Wound Care Orders initiated:  No      WOC nurse consulted for Pressure Injury (Stage 3,4, Unstageable, DTI, NWPT, Complex wounds)and New or Established Ostomies:  NA      Primary Nurse eSignature: Electronically signed by Anibal Finley RN on 2/28/23 at 4:21 AM EST

## 2023-02-28 NOTE — PROGRESS NOTES
02/27/23 2303   RT Protocol   History Pulmonary Disease 2   Respiratory pattern 4   Breath sounds 2   Cough 0   Indications for Bronchodilator Therapy Decreased or absent breath sounds   Bronchodilator Assessment Score 8   RT Inhaler-Nebulizer Bronchodilator Protocol Note    There is a bronchodilator order in the chart from a provider indicating to follow the RT Bronchodilator Protocol and there is an Initiate RT Inhaler-Nebulizer Bronchodilator Protocol order as well (see protocol at bottom of note). CXR Findings:  No results found. The findings from the last RT Protocol Assessment were as follows:   History Pulmonary Disease: Chronic pulmonary disease  Respiratory Pattern: Mild dyspnea at rest, irregular pattern, or RR 21-25 bpm  Breath Sounds: Slightly diminished and/or crackles  Cough: Strong, spontaneous, non-productive  Indication for Bronchodilator Therapy: Decreased or absent breath sounds  Bronchodilator Assessment Score: 8    Aerosolized bronchodilator medication orders have been revised according to the RT Inhaler-Nebulizer Bronchodilator Protocol below. Respiratory Therapist to perform RT Therapy Protocol Assessment initially then follow the protocol. Repeat RT Therapy Protocol Assessment PRN for score 0-3 or on second treatment, BID, and PRN for scores above 3. No Indications - adjust the frequency to every 6 hours PRN wheezing or bronchospasm, if no treatments needed after 48 hours then discontinue using Per Protocol order mode. If indication present, adjust the RT bronchodilator orders based on the Bronchodilator Assessment Score as indicated below.   Use Inhaler orders unless patient has one or more of the following: on home nebulizer, not able to hold breath for 10 seconds, is not alert and oriented, cannot activate and use MDI correctly, or respiratory rate 25 breaths per minute or more, then use the equivalent nebulizer order(s) with same Frequency and PRN reasons based on the score.  If a patient is on this medication at home then do not decrease Frequency below that used at home. 0-3 - enter or revise RT bronchodilator order(s) to equivalent RT Bronchodilator order with Frequency of every 4 hours PRN for wheezing or increased work of breathing using Per Protocol order mode. 4-6 - enter or revise RT Bronchodilator order(s) to two equivalent RT bronchodilator orders with one order with BID Frequency and one order with Frequency of every 4 hours PRN wheezing or increased work of breathing using Per Protocol order mode. 7-10 - enter or revise RT Bronchodilator order(s) to two equivalent RT bronchodilator orders with one order with TID Frequency and one order with Frequency of every 4 hours PRN wheezing or increased work of breathing using Per Protocol order mode. 11-13 - enter or revise RT Bronchodilator order(s) to one equivalent RT bronchodilator order with QID Frequency and an Albuterol order with Frequency of every 4 hours PRN wheezing or increased work of breathing using Per Protocol order mode. Greater than 13 - enter or revise RT Bronchodilator order(s) to one equivalent RT bronchodilator order with every 4 hours Frequency and an Albuterol order with Frequency of every 2 hours PRN wheezing or increased work of breathing using Per Protocol order mode.        Electronically signed by Ángela Mckinnon RCP on 2/27/2023 at 11:13 PM

## 2023-02-28 NOTE — PROCEDURES
SPEECH/LANGUAGE PATHOLOGY  VIDEOFLUOROSCOPIC STUDY OF SWALLOWING (MBS)      Recommendations:  Diet Recommendation: IDDSI 6 Soft and Bite Sized Solids; IDDSI 0 Thin Liquids - NO straws ; Meds whole or crushed in puree  Risk Management: upright for all intake, stay upright for at least 30 mins after intake, small bites/sips, no straws, downgrade to mildly thick if s/s of aspiration develop, distant supervision with intake, oral care 2-3x/day to reduce adverse affects in the event of aspiration, alternate bites/sips, slow rate of intake, general GERD precautions, general aspiration precautions, and hold PO and contact SLP if s/s of aspiration or worsening respiratory status develop. Specialist Referrals: ENT consult recommended to assess vocal fold function and integrity  Ancillary Tests: n/a  Goals: Tolerate LRD  Follow-up exam: n/a      PATIENT NAME:  Vinny Abarca      :  1940    Room: /0644-35   TODAY'S DATE:  2023    [x]The admitting diagnosis and active problem list, as listed below have been reviewed prior to initiation of this evaluation.      ADMITTING DIAGNOSIS: COPD exacerbation ([facility]) [J44.1]  Acute respiratory failure with hypercapnia ([facility]) [J96.02]  Chronic obstructive pulmonary disease, unspecified COPD type ([facility] [J44.9]     ACTIVE PROBLEM LIST:   Patient Active Problem List   Diagnosis    HTN (hypertension)    COPD, severe (HCC)    DM2 (diabetes mellitus, type 2) ([facility]    HLD (hyperlipidemia)    Gallstones    PAF (paroxysmal atrial fibrillation) (HCC)    Chronic respiratory failure with hypoxia and hypercapnia 2.5 L home O2    Acute respiratory insufficiency    Acute on chronic respiratory failure with hypercapnia (HCC)    Septic shock (HCC)    COPD exacerbation (HCC)    Former smoker    Acute hyperglycemia    Chronic normocytic anemia    Chronic thrombocytopenia    Falls at home    Ambulatory dysfunction    General weakness    Acute on chronic respiratory failure with hypoxia and hypercapnia (HCC)    Pleural effusion    Rapid atrial fibrillation (HCC)    Atrial fibrillation with RVR (HCC)    Acute respiratory failure with hypoxia and hypercapnia (HCC)    COPD with acute exacerbation (HCC)    Acute encephalopathy    Hyperglycemia    Pneumonia of left lower lobe due to infectious organism    Acute calculous cholecystitis    Acute cholecystitis    Atherosclerotic ulcer of aorta (HCC)    Closed compression fracture of body of L1 vertebra (HCC)    Hypomagnesemia    Abdominal aortic aneurysm (AAA) 30 to 34 mm in diameter    Elevated procalcitonin    Abdominal pain, right upper quadrant    Lactic acidosis    Elevated bilirubin    Bacteremia due to Klebsiella pneumoniae    Paroxysmal atrial fibrillation (HCC)    Moderate protein-calorie malnutrition ([address]    Debility    COVID-19    Acute on chronic respiratory failure with hypoxemia (HCC)    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)    Diabetes mellitus with skin ulcer ([address]    Other specified peripheral vascular diseases (HCC)    Hypoxia    Atrial fibrillation, controlled ([address]    Community acquired pneumonia    Acute respiratory failure ([address]    Aspiration pneumonia of both lungs ([address]    Hemoptysis    Aspiration pneumonia ([address]    Pneumonia of right lower lobe due to Pseudomonas species ([address]           PAST MEDICAL HISTORY      Diagnosis Date    Arthritis     CAD (coronary artery disease)     COPD (chronic obstructive pulmonary disease) ([address]     Diabetes mellitus ([address]     Hepatitis A 01/05/2021    Hyperlipidemia     Hypertension     Influenza A 12/29/2017    Kidney calculi     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    ELAINE on CPAP     Osteoporosis     Skin cancer of face        PAST SURGICAL HISTORY  Past Surgical History:   Procedure Laterality Date    BACK SURGERY      repair broken back L4 & L5    CHOLECYSTECTOMY, LAPAROSCOPIC N/A 1/6/2021 LAPAROSCOPIC CHOLECYSTECTOMY, WITH CHOLANGIOGRAMS (ATTEMPTED) OPEN CHOLECYSTECTOMY performed by Reshma Mulligan MD at [address] [place] [address], OPEN N/A 01/06/2021    LAPAROSCOPIC CHOLECYSTECTOMY, WITH CHOLANGIOGRAMS (ATTEMPTED)     CYSTOSCOPY Right 10/9/15    RIght Ureteroscopy, Holmium Laser Lithotripsy with Stone Removal, Right Stent Placement    CYSTOSCOPY  05/01/2019    CYSTOSCOPY, RESECTION OF BLADDER NECK, URETHRAL DILATION    CYSTOSCOPY W BIOPSY OF BLADDER N/A 5/1/2019    CYSTOSCOPY, RESECTION OF BLADDER NECK, URETHRAL DILATION performed by Guerda Gardner MD at [facility] 6/12/2016    cataract removal    GALLBLADDER SURGERY  01/2021    JOINT REPLACEMENT  6/29/2011    right knee    JOINT REPLACEMENT  11/2004    left knee    OTHER SURGICAL HISTORY  08/31/2015    wide excision basal cell carcinoma on the nose and bilateral auricles with frozen sections, plastic closure, full thickness skin graft    OTHER SURGICAL HISTORY  12/1/15    excision of lesion of lip    PROSTATE SURGERY      TURP  10/23/2015    with cystoscopy       ALLERGIES    No Known Allergies    Referring Provider: Dr. Carolin Hutchison  Radiologist: Dr. Bri Brush      Reason For Assessment: to assess oropharyngeal swallow function, identify signs and symptoms of aspiration and make recommendations regarding safe dietary consistencies, effective compensatory strategies, and safe eating environment. Recent Chest Radiography: [] Chest XR   [x] CT of Chest Pulmonary Embolism [] No recent chest  radiography on file  Date: 2/27/23  Impressions  Impression   1. No acute pulmonary arterial thromboembolic disease. 2.  No new pulmonary mass or consolidation. Emphysema. 3.  Heterogeneous material in the right middle and lower lobe airways is   slightly more pronounced than yesterday's chest CT and may relate to   aspiration or retained secretions.        Medical record review/interview: Per PA H&P: \"Jarrett RONALD Shi Rowez is a 78 y.o. male with a past medical history of hypertension diabetes skin cancer CAD hepatitis A COPD hyperlipidemia sleep apnea former smoker brought in today by EMS for evaluation of fatigue, cough and increased shortness of breath. Recent ICU admission for pneumonia started on Levaquin has since completed Levaquin. States over the past several days reports increased fatigue, cough with productive sputum with some blood streaked sputum, increased shortness of breath. Denies chest pain. Denies nausea vomiting diarrhea. Denies any fevers or chills. No aggravating or alleviating complaints. Otherwise denies any other symptoms. Has been using his nebulizer and breathing treatments at home. No aggravating or alleviating complaints. Nothing seems to make symptoms better or worse. Denies sick contacts. He is anticoagulated on blood thinners. Denies abdominal or urinary complaints\".       Patient Complaints:  Odynophagia: [x] Yes [] No (\"throat pain\" at times per pt)  Globus Sensation: [] Yes [x] No  SOB with PO intake: [] Yes [x] No  Increased WOB with PO intake: [] Yes [x] No  Reflux Sx's: [] Yes [x] No  Weight loss: [] Yes [x] No  Coughing/Choking with PO intake: [x] Yes [] No  Reduced Appetite: [] Yes [x] No      Predisposing dysphagia risk factors: COPD, Age, and Hx of dysphagia  Clinical signs of possible chronic dysphagia: hx of dysphagia  Precipitating dysphagia risk factors: reduced physical mobility      Current respiratory status:    [] Room Air   [x] Nasal cannula  (2 lpm O2 NC)  [] O2 mask           []  Non-rebreather mask  []  Tracheostomy  []  Vent dependent    Vocal quality prior to PO intake:  []  WFL  [x]  Weak  []  Wet    Cognitive status:    [x]  Alert    []  Lethargic  [x] Functional   [] Confused  [] Aphasic   [] Dysarthric   [] Impulsive   [] Cognitive Deficits                   DIET LEVEL PRIOR TO THIS EVALUATION/PRIOR LEVEL OF FUNCTION :  Diet NPO pending MBSS results/recs    PROCEDURE Patient positioning: Seated 70-90°  Viewing Plane(s): LATERAL ONLY  Contrast: commercially prepared, standardized barium viscosities via Varibar; graduated from thin liquid to pudding consistency. Administered via tsp (5 ml boluses), by cup or straw in single and sequentially swallowed boluses as tolerated. Solid evaluated with 1/2 amanda cracker/3 ml barium pudding coated as tolerated. Consistencies Administered During the Evaluation   Liquids: [x] Thin (IDDSI 0)        [x] Mildly Thick (Nectar-IDDSI 2)         [] Moderately Thick (Honey- IDDSI 3)   Solids:  [x] Pureed/Pudding (IDDSI 4)    [] Soft Solid      [x] Regular Solid  [] Other:     Method of Intake:      [x]Self Fed       []Fed by Clinician     [x]Cup      [x]Spoon     [x]Straw               Oral Phase Severity: Mild-Moderate  Oral Phase Characteristics: reduced bolus control resulted in premature bolus loss to the pharynx with all PO trials. Pt demonstrated lingual pumping, mildly reduced A-P bolus transit, and piecemeal deglutition with regular solid trials (pt is edentulous). Pharyngeal Phase Severity: Mild-Moderate  Pharyngeal Phase Characteristics: decreased laryngeal elevation, reduced epiglottic retroversion, and delayed swallow initiation (premature spillage to the pyriforms with thin liquids at times) resulted in reduced airway/laryngeal closure and episodes of penetration during the swallow with thin liquids. Increased depth of penetration (to the level of the vocal folds) observed with thin liquid via straw that only partially cleared. Shallow penetration observed with thin liquid via cup (with and without use of strategies, I.e. chin tuck and bolus hold) that was largely self-clearing. No penetration/aspiration with mildly thick (nectar) via cup or solid PO trials. Mildly reduced tongue base retraction resulted in minimal residue at valleculae post-swallow with all PO trials.   Majority of this residue successfully cleared with cued second swallow. Esophageal Phase: instances of esophageal backflow to UES (*trace amount) noted with thin liquids during study. LARYNGEAL PENETRATION/ASPIRATION  []Laryngeal penetration was not present during this evaluation    [x]Aspiration was not present during this evaluation      Response to aspiration:  [x] N/A- no aspiration occurred  [] spontaneous cough  [] throat clearing  [] inconsistent/delayedcough   [] absent cough          Aspiration Scale  []  1 Material does not enter the airway   []  2 Material enters the airway, remains above the vocal folds, and is ejected from the airway   []  3 Material enters the airway, remains above the vocal folds, and is not ejected from the airway   []  4 Material enters the airway, contacts the vocal folds, an is ejected from the airway   [x]  5 Material enters the airway, contacts the vocal folds, and is not ejected from the airway   []  6 Material enters the airway, passes below the vocal folds and is ejected into the larynx or out of the airway   []  7 Material enters the airway, passes below the vocal folds, and is not ejected from the trachea despite effort   []  8 Material enters the airway, passes below the vocal folds, and no effort is made to eject. Assessment: mild-moderate oropharyngeal dysphagia, likely acute-on-chronic related to hx of dysphagia, reduced physical mobility, increased O2 demands, respiratory illness, acute infection, generalized weakness, impaired respiratory-swallow coordination, the aging swallow, and co-morbidities. Swallow safety is preserved ; swallow efficiency is preserved  Pt appears to be at low risk for aspiration PNA, and low risk for malnutrition/dehydration. Diet modification is warranted indicated.  Swallow prognosis is fair given progressive nature of condition/disease, stable respiratory status, pt's acuity of illness, willingness to participate in therapy, caregiver support, and lack of aspiration noted on instrumental assessment. Patient appears to be a good candidate for behavioral swallow rehabilitation. Recommendations:  Diet Recommendation: IDDSI 6 Soft and Bite Sized Solids; IDDSI 0 Thin Liquids - NO straws ; Meds whole or crushed in puree  Risk Management: upright for all intake, stay upright for at least 30 mins after intake, small bites/sips, no straws, downgrade to mildly thick if s/s of aspiration develop, distant supervision with intake, oral care 2-3x/day to reduce adverse affects in the event of aspiration, alternate bites/sips, slow rate of intake, general GERD precautions, general aspiration precautions, and hold PO and contact SLP if s/s of aspiration or worsening respiratory status develop. Specialist Referrals: ENT consult recommended to assess vocal fold function and integrity  Ancillary Tests: n/a  Goals:  Tolerate LRD  Follow-up exam: n/a      Education  SLP educated the patient re: Role of SLP, rationale for completion of assessment, anatomical components of swallow structures as they pertain to airway protection, results of assessment, recommendations, and POC  Response to education:   [x] Verbalized Understanding  [] Demonstrated Understanding  [] No evidence of learning  [x] Ongoing education is recommended  [x] RN aware of results and recommendations    Goals  Short Term Goals:  Timeframe for Short Term Goals: (5 days, 3/5/23)  Goal 1: The patient will tolerate recommended diet with no clinical s/s of aspiration 5/5  Goal 2: The patient/caregiver will demonstrate understanding of compensatory swallow strategies, for improved swallow safety  Goal 3: The patient will tolerate instrumental assessment when able      Long Term Goals:   Timeframe for Long Term Goals: (7 days, 3/7/23)  Goal 1: The patient will tolerate least restrictive diet with no clinical s/s of aspiration or worsening respiratory/pulmonary status         Pain: The patient does not complain of pain         Therapy Time: Individual   Time In  0940   Time Out  0955   Minutes  15 (MBSS procedure)      Signature:  Valery Sharma M.S. [id number] [facility]  Speech-language pathologist  [id number]

## 2023-02-28 NOTE — FLOWSHEET NOTE
02/27/23 2230   Assessment   Charting Type Admission   Psychosocial   Psychosocial ([facility]) WD   Neurological   Neuro ([facility]) [facility]   Level of Consciousness 0   Orientation Level Oriented to person;Oriented to place;Oriented to time;Oriented to situation   Cognition Follows commands;Poor attention/concentration   Speech Clear   R Pupil Size (mm) 3   R Pupil Shape Round   R Pupil Reaction Brisk   L Pupil Size (mm) 3   L Pupil Shape Round   L Pupil Reaction Brisk   R Hand  Moderate   L Hand  Moderate   R Foot Dorsiflexion Moderate   L Foot Dorsiflexion Moderate   R Foot Plantar Flexion Moderate   L Foot Plantar Flexion Moderate   RUE Motor Response Normal extension;Normal flexion; Responds to command   RUE Sensation  Full sensation   LUE Motor Response Normal extension;Normal flexion; Responds to command   LUE Sensation  Full sensation   RLE Motor Response Normal extension;Normal flexion; Responds to command   RLE Sensation  Full sensation   LLE Motor Response Normal extension;Normal flexion; Responds to command   LLE Sensation  Full sensation   [place] Coma Scale   Eye Opening 4   Best Verbal Response 5   Best Motor Response 6   [place] Coma Scale Score 15   Cranial Nerves   Eye Assessment Blurry vision   Eye Opening Spontaneously   Cranial Nerves   Facial Symmetry Within functional limits   HEENT (Head, Ears, Eyes, Nose, & Throat)   HEENT ([facility]) X   Right Eye Impaired vision   Left Eye Impaired vision   Voice Hoarse   Teeth Edentulous   Respiratory   Respiratory ([facility]) X   Respiratory Interventions H.O.B. elevated;Cough & deep breathe   Respiratory Pattern Regular   Respiratory Depth Normal   Respiratory Quality/Effort Dyspnea with exertion   Chest Assessment Chest expansion symmetrical;Trachea midline   L Breath Sounds Rhonchi   R Breath Sounds Rhonchi   Level of Activity/Mobility 1   Breath Sounds   Right Upper Lobe Rhonchi   Right Middle Lobe Rhonchi   Right Lower Lobe Rhonchi   Left Upper Lobe Rhonchi   Left Lower Lobe Rhonchi   Cardiac   Cardiac ([facility]) X   Cardiac Regularity Regular   Heart Sounds S1, S2   Cardiac Rhythm Sinus tachy   Gastrointestinal   Abdominal ([facility]) WDL   RUQ Bowel Sounds Active   LUQ Bowel Sounds Active   RLQ Bowel Sounds Active   LLQ Bowel Sounds Active   Genitourinary   Genitourinary (WDL) WDL   Flank Tenderness JOSE   Suprapubic Tenderness No   Dysuria (Pain/Burning w/Urination) No   Difficulty Urinating/Starting Stream No   Peripheral Vascular   Peripheral Vascular ([facility]) WDL   Edema Right lower extremity; Left lower extremity   Edema Generalized Trace   RUE Edema None   LUE Edema None   RLE Edema Trace   LLE Edema Trace   Facial Edema None   Perineal Edema None   Sacral Edema None   Dual Clinician Skin Assessment   Dual Skin Assessment (4 Eyes) WDL   Skin Integumentary    Skin Integumentary ([facility]) X   Skin Color Ecchymosis (comment)   Skin Condition/Temp Dry; Warm   Skin Integrity Ecchymosis; Redness   Location scattered, coccyx  (scattered bruising and blanchable redness to coccyx)   Wound Prevention/Protection Method Yes   Location of Wound Prevention Coccyx   Wound Offloading (Prevention Methods) Repositioning; Foam silicone   Dressing Present  Yes   Skin Assessed Underneath Dressing This Shift Yes   Care Plan - Skin/Tissue Integrity Goals   Skin Integrity Remains Intact Monitor for areas of redness and/or skin breakdown   Musculoskeletal   Musculoskeletal ([facility]) X   RUE Weakness   LUE Weakness   RL Extremity Weakness   LL Extremity Weakness   Wound 02/12/23 Coccyx Medial stage 2   Date First Assessed/Time First Assessed: 02/12/23 1846   Present on Hospital Admission: Yes  Primary Wound Type: (c) Pressure Injury  Location: Coccyx  Wound Location Orientation: Medial  Wound Description (Comments): stage 2   Dressing Status Intact;Dry   Dressing/Treatment Foam   Wound Assessment Dry;Erythema;Pink/red   Drainage Amount None   Paula-wound Assessment Blanchable erythema   Care Plan - Chronic Conditions and Co-Morbidity   Care Plan - Patient's Chronic Conditions and Co-Morbidity Symptoms are Monitored and Maintained or Improved Monitor and assess patient's chronic conditions and comorbid symptoms for stability, deterioration, or improvement   Care Plan - Discharge Planning Goals   Discharge to home or other facility with appropriate resources Identify barriers to discharge with patient and caregiver   RASS   [place]ral Agitation Sedation Scale (RASS) 0

## 2023-02-28 NOTE — PLAN OF CARE
Problem: Discharge Planning  Goal: Discharge to home or other facility with appropriate resources  Outcome: Progressing  Flowsheets  Taken 2/28/2023 0403 by Theresa Hylton RN  Discharge to home or other facility with appropriate resources: Identify barriers to discharge with patient and caregiver  Taken 2/28/2023 0251 by Ju Iraheta RN  Discharge to home or other facility with appropriate resources: Identify barriers to discharge with patient and caregiver  Taken 2/27/2023 2230 by Ju Iraheta RN  Discharge to home or other facility with appropriate resources: Identify barriers to discharge with patient and caregiver     Problem: Pain  Goal: Verbalizes/displays adequate comfort level or baseline comfort level  Outcome: Progressing

## 2023-02-28 NOTE — H&P
Hospital Medicine History & Physical      PCP: Lizette Bo MD    Date of Admission: 2/27/2023    Date of Service: Pt seen/examined on 02/28/23      Chief Complaint:    Chief Complaint   Patient presents with    Fatigue     Recent admission with pneumonia and just finished antibiotics. States he started feeling generalized weakness last night. History Of Present Illness: The patient is a 78 y.o. male with PMH of atrial fibrillation, CAD, COPD, DM, HLD, HTN, MDRO in sputum, hx of pseudomonas in sputum, ELAINE on CPAP who presents to [facility] with fatigue. History obtained from the patient and review of EMR. Pt ws recently admitted this month with sepsis, failed BIPAP and was intubated. Sputum cultures at that time showed pseudomonas. He underwent a bronchoscopy and washings noted positive for malignant cells, squamous cell carcinoma in situ. andHe was discharged home on Levaquin. He returned to ED with complaints of fatigue, shortness of breath, blood streaked sputum. In ED he was placed on BIPAP for hypercapnia. He was initially admitted to ICU, but downgraded to PCU after being on BIPAP for a couple of hours and transitioned to 2 liters n/c. Pulmonary consulted, pt admitted for further care. Denies fever, chest pain, palpitations, abdominal pain, nausea, vomiting, constipation or diarrhea.        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    ELAINE on CPAP     Osteoporosis     Skin cancer of face        Past Surgical History:        Procedure Laterality Date    BACK SURGERY      repair broken back L4 & L5    CHOLECYSTECTOMY, LAPAROSCOPIC N/A 1/6/2021 LAPAROSCOPIC CHOLECYSTECTOMY, WITH CHOLANGIOGRAMS (ATTEMPTED) OPEN CHOLECYSTECTOMY performed by Yuniel Montemayor MD at [address], OPEN N/A 01/06/2021    LAPAROSCOPIC CHOLECYSTECTOMY, WITH CHOLANGIOGRAMS (ATTEMPTED)     CYSTOSCOPY Right 10/9/15    RIght Ureteroscopy, Holmium Laser Lithotripsy with Stone Removal, Right Stent Placement    CYSTOSCOPY  05/01/2019    CYSTOSCOPY, RESECTION OF BLADDER NECK, URETHRAL DILATION    CYSTOSCOPY W BIOPSY OF BLADDER N/A 5/1/2019    CYSTOSCOPY, RESECTION OF BLADDER NECK, URETHRAL DILATION performed by Maciel Sandoval MD at [facility] Left 6/12/2016    cataract removal    GALLBLADDER SURGERY  01/2021    JOINT REPLACEMENT  6/29/2011    right knee    JOINT REPLACEMENT  11/2004    left knee    OTHER SURGICAL HISTORY  08/31/2015    wide excision basal cell carcinoma on the nose and bilateral auricles with frozen sections, plastic closure, full thickness skin graft    OTHER SURGICAL HISTORY  12/1/15    excision of lesion of lip    PROSTATE SURGERY      TURP  10/23/2015    with cystoscopy       Medications Prior to Admission:    Prior to Admission medications    Medication Sig Start Date End Date Taking?  Authorizing Provider   Edouard Rogel 100-62.5-25 MCG/ACT AEPB inhaler INHALE 1 PUFF BY MOUTH EVERY DAY 2/22/23   Myranda Segura MD   predniSONE (DELTASONE) 20 MG tablet 2 qd- 2 days, 1 1/2 qd- 3 days, 1 qd- 3 days, 1/2 qd- 3 days 2/15/23   Alka Mora MD   alendronate (FOSAMAX) 70 MG tablet Take 70 mg by mouth every 7 days    Historical Provider, MD   Roflumilast (DALIRESP) 250 MCG tablet Take 1 tablet by mouth daily 1/27/23   Myranda Segura MD   propafenone (RYTHMOL) 150 MG tablet TAKE ONE TABLET BY MOUTH EVERY 8 HOURS 1/5/23   Sanjuanita Ledbetter MD   apixaban (ELIQUIS) 5 MG TABS tablet Take 1 tablet by mouth 2 times daily 1/5/23   Sanjuanita Ledbetter MD   atorvastatin (LIPITOR) 10 MG tablet Take 1 tablet by mouth daily 1/5/23   [facility] Jose Rodney MD   dilTIAZem (CARDIZEM CD) 180 MG extended release capsule Take 1 capsule by mouth daily 12/7/22   Onesimo Garg MD   albuterol (PROVENTIL) (2.5 MG/3ML) 0.083% nebulizer solution USE 1 VIAL IN NEBULIZER 4 TIMES DAILY 11/3/22   Denniseeklarissa Hipolito Counter, MD   VENTOLIN  (90 Base) MCG/ACT inhaler Inhale 2 puffs into the lungs every 6 hours as needed for Wheezing orShortness of Breath 8/26/22   Denniseer Hipolito Counter, MD   fluticasone (FLONASE) 50 MCG/ACT nasal spray 2 sprays by Nasal route daily  2/12/20   Historical Provider, MD   pioglitazone (ACTOS) 30 MG tablet Take 30 mg by mouth daily    Historical Provider, MD   gabapentin (NEURONTIN) 800 MG tablet Take 800 mg by mouth 3 times daily. Historical Provider, MD   OXYGEN Inhale 2.5 L into the lungs nightly At 3.5lpm on 2/8/2022    Historical Provider, MD   metformin (GLUCOPHAGE) 500 MG tablet Take 500 mg by mouth 4 times daily     Historical Provider, MD   esomeprazole (NEXIUM) 40 MG capsule   Take 40 mg by mouth every morning (before breakfast) Indications: take [address] Provider, MD       Allergies:  Patient has no known allergies. Social History:  The patient currently lives home     TOBACCO:   reports that he quit smoking about 17 years ago. His smoking use included cigarettes. He has a 67.50 pack-year smoking history. He has never used smokeless tobacco.  ETOH:   reports no history of alcohol use.       Family History:   Positive as follows:        Problem Relation Age of Onset    Cancer Mother     Diabetes Mother     Heart Disease Mother     Cancer Father     Diabetes Sister     Cancer Brother        REVIEW OF SYSTEMS:       Constitutional: Negative for fever   HENT: Negative for sore throat   Eyes: Negative for redness   Respiratory: + dyspnea, + cough and hemoptysis   Cardiovascular: Negative for chest pain   Gastrointestinal: Negative for vomiting, diarrhea   Genitourinary: Negative for hematuria   Musculoskeletal: Negative for arthralgias Skin: Negative for rash   Neurological: Negative for syncope   Hematological: Negative for adenopathy   Psychiatric/Behavorial: Negative for anxiety    PHYSICAL EXAM:    BP (!) 142/82   Pulse (!) 112   Temp 97.8 °F (36.6 °C) (Axillary)   Resp 20   Ht 6' (1.829 m)   Wt 175 lb 8 oz (79.6 kg)   SpO2 99%   BMI 23.80 kg/m²     Gen: No distress. Alert. Appears chronically ill, pleasant elderly male  Eyes: PERRL. No sclera icterus. No conjunctival injection. ENT: No discharge. Pharynx clear. Neck: No JVD. Trachea midline. Resp: No accessory muscle use. No crackles. No wheezes. No rhonchi. CV: Tachycardic, Regular rhythm. No murmur. No rub. Trace BLE edema. GI: Non-tender. Non-distended. Normal bowel sounds. Skin: Warm and dry. No nodule on exposed extremities. No rash on exposed extremities. Scattered ecchymosis BUE  M/S: No cyanosis. No joint deformity. No clubbing. Neuro: Awake. Grossly nonfocal    Psych: Oriented x 3. No anxiety or agitation. Yann Cardenas MD have reviewed the chart on [facility] and personally interviewed and examined patient, reviewed the data (labs and imaging) and after discussion with my PA formulated the plan. Agree with note with the following edits. HPI:     I reviewed the patient's Past Medical History, Past Surgical History, Medications, and Allergies. 78 y.o. male with PMH of atrial fibrillation, CAD, COPD, DM, HLD, HTN, MDRO in sputum, hx of pseudomonas in sputum, ELAINE on CPAP who presents to [facility] with fatigue. History obtained from the patient and review of EMR. Pt ws recently admitted this month with sepsis, failed BIPAP and was intubated. Sputum cultures at that time showed pseudomonas. He underwent a bronchoscopy and washings noted positive for malignant cells, squamous cell carcinoma in situ. andHe was discharged home on Levaquin. He returned to ED with complaints of fatigue, shortness of breath, blood streaked sputum.  In ED he was placed on BIPAP for hypercapnia. He was initially admitted to ICU, but downgraded to PCU after being on BIPAP for a couple of hours and transitioned to 2 liters n/c. Pulmonary consulted, pt admitted for further care  Denies fever, chest pain, palpitations, abdominal pain, nausea, vomiting, constipation or diarrhea. General: elderly male, up in bed,  Awake, alert and oriented. Appears to be not in any distress  Mucous Membranes:  Pink , anicteric  Neck: No JVD, no carotid bruit, no thyromegaly  Chest:  Clear to auscultation bilaterally, minimal basilar crackles  Cardiovascular:  RRR S1S2 heard, no murmurs or gallops  Abdomen:  Soft, undistended, non tender, no organomegaly, BS present  Extremities: No edema or cyanosis. Distal pulses well felt  Neurological : grossly normal with gen weakness        CBC:   Recent Labs     02/26/23  1330 02/27/23  1136 02/28/23  0816   WBC 6.8 6.0 4.8   HGB 10.9* 11.9* 11.2*   HCT 33.3* 37.0* 34.3*   MCV 83.8 85.2 85.9   * 115* 108*     BMP:   Recent Labs     02/26/23  1330 02/27/23  1136 02/28/23  0816    141 135*   K 4.0 4.1 4.2    94* 95*   CO2 39* 40* 32   BUN 15 11 15   CREATININE 0.7* 0.7* 0.6*     LIVER PROFILE:   Recent Labs     02/26/23  1330 02/27/23  1136   AST 13* 14*   ALT 14 14   BILITOT 0.7 0.8   ALKPHOS 63 69     PT/INR: No results for input(s): PROTIME, INR in the last 72 hours. APTT: No results for input(s): APTT in the last 72 hours.   UA:  Recent Labs     02/27/23  1650   COLORU Yellow   PHUR 6.5   WBCUA None seen   RBCUA 5-10*   CLARITYU Clear   SPECGRAV <=1.005   LEUKOCYTESUR Negative   UROBILINOGEN 2.0*   BILIRUBINUR Negative   BLOODU TRACE-INTACT*   GLUCOSEU Negative         CARDIAC ENZYMES  Recent Labs     02/27/23  1136   TROPONINI <0.01       U/A:    Lab Results   Component Value Date/Time    COLORU Yellow 02/27/2023 04:50 PM    WBCUA None seen 02/27/2023 04:50 PM    RBCUA 5-10 02/27/2023 04:50 PM    MUCUS Rare 01/04/2021 09:55 PM    BACTERIA Rare 12/14/2020 10:58 PM    CLARITYU Clear 02/27/2023 04:50 PM    SPECGRAV <=1.005 02/27/2023 04:50 PM    LEUKOCYTESUR Negative 02/27/2023 04:50 PM    BLOODU TRACE-INTACT 02/27/2023 04:50 PM    GLUCOSEU Negative 02/27/2023 04:50 PM    AMORPHOUS 1+ 01/04/2021 09:55 PM       CULTURES  Pneumonia panel: ordered  Respiratory culture: ordered   Blood Culture: pending     EKG:   Sinus tachycardiaLow voltage QRSBorderline ECGWhen compared with ECG of 12-FEB-2023 12:20,Previous ECG has undetermined rhythm, needs reviewQRS axis Shifted rightCriteria for Inferior infarct are no longer PresentT wave inversion no longer evident in Inferior leadsNonspecific T wave abnormality, improved in Lateral leadsConfirmed by Neelam Chandler MD, [address] ([id number]) on 2/27/2023 5:46:19 PM    RADIOLOGY    CT CHEST PULMONARY EMBOLISM W CONTRAST   Final Result   1. No acute pulmonary arterial thromboembolic disease. 2.  No new pulmonary mass or consolidation. Emphysema. 3.  Heterogeneous material in the right middle and lower lobe airways is   slightly more pronounced than yesterday's chest CT and may relate to   aspiration or retained secretions. XR CHEST (2 VW)   Final Result   No acute process.          FL MODIFIED BARIUM SWALLOW W VIDEO    (Results Pending)             ASSESSMENT/PLAN:    Acute on chronic hypoxic and hypercapnia   - baseline oxygen 3 liters   - placed on BIPAP in the the ED, originally admitted to ICU  - BIPAP removed and placed on 2 liters, able to downgrade to PCU  - pulmonary following       Hemoptysis-scant   - blood streaked sputum noted at home  - small amount  - continue eliquis and monitor   - sputum culture ordered       COPD  AE  - solumedrol for now, prednisone once improved   - inhaled bronchodilators   - Levaquin  D# 2  - continue Daliresp   - check sputum cultures, pneumonia panel   - monitor on continuous pulse ox       Squamous Cell carcinoma   - noted on bronch washings completed on 2/12/23  - oncology consulted  - pulmonary following   - discussed with patient and family at bedside  - pt does have PET scan scheduled outpatient       Hx of aspiration pneumonia  Hx of pseudomonas in sputum   - pulmonary following  - speech evaluation and treat  - barium swallow ordered    Tachycardia   - continue Cardizem  - change Duonebs to Xopenex   - CTPA negative for PE  - continue to monitor on telemetry     Dysphagia  - speech evaluation and treat  - barium swallow this am  - speech recommends no straws, meds whole or crushed in puree, strict aspiration precautions  - strict aspiration precautions ordered  - continue to monitor for s/sx of aspiration. PAF  - ST on EKG  - continue eliquis, Rythmol, Cardizem   - monitor on telemetry       DM type 2 with neuropathy   - hold Actos and metformin  - SSI--changed from NPO SSI to [facility] and HS  - monitor blood sugars  - continue Neurontin       Thrombocytopenia   - platelet 957  - monitor       HLD  - Continue statin      DVT Prophylaxis: eliquis   Diet: ADULT DIET;  Dysphagia - Soft and Bite Sized  Code Status: Full Code     DEMOND Garcia - CNP     Agree with above  Changes made to note    Nathanael Stone MD,2/28/2023 1:08 PM

## 2023-02-28 NOTE — PROGRESS NOTES
Admission assessment is complete, see flow sheet. Patient knees buckled while transferring form wheelchair to bed. Patient educated to call on bed alarm and to use call light for assistance. Bed alarm is on and call light within reach.

## 2023-02-28 NOTE — PROGRESS NOTES
Speech Language Pathology  MBSS    MBSS completed -- deep penetration to level of vocal folds with thin liquid trials via straw. No aspiration. Recommend initiating Soft and bite sized diet (IDDSI 6) with thin liquids (IDDSI 0) / no straws, meds whole or crushed in puree, strict aspiration precautions. Full report to follow.     Kimberly Kilgore M.S. Pablo Stallworth  Speech-language pathologist  [phone]

## 2023-02-28 NOTE — ACP (ADVANCE CARE PLANNING)
Advance Care Planning     General Advance Care Planning (ACP) Conversation    Date of Conversation: 2/27/2023  Conducted with: Patient with Decision Making Capacity    Healthcare Decision Maker:    Primary Decision Maker: Nessa Mackey - [phone]    Secondary Decision Maker: Danielle Paris - [phone]    Supplemental (Other) Decision Maker: Madeline Sanford - [phone]  Click here to complete Healthcare Decision Makers including selection of the Healthcare Decision Maker Relationship (ie \"Primary\"). Today we documented Decision Maker(s) consistent with ACP documents on file. Content/Action Overview:   Has ACP document(s) on file - reflects the patient's care preferences  Reviewed DNR/DNI and patient elects Full Code (Attempt Resuscitation)        Length of Voluntary ACP Conversation in minutes:  <16 minutes (Non-Billable)    Travis Wadsworth RN

## 2023-02-28 NOTE — PROGRESS NOTES
Patient admitted to room 11 from 6. Patient oriented to room, call light, bed rails, phone, lights and bathroom. Patient instructed about the schedule of the day including: vital sign frequency, lab draws, possible tests, frequency of MD and staff rounds, daily weights, I &O's and prescribed diet. Telemetry box in place, patient aware of placement and reason. Bed locked, in lowest position, side rails up 2/4, call light within reach. Recliner Assessment  Patient is able to demonstrate the ability to move from a reclining position to an upright position within the recliner. 4 Eyes Skin Assessment     The patient is being assess for   Admission    I agree that 2 RN's have performed a thorough Head to Toe Skin Assessment on the patient. ALL assessment sites listed below have been assessed. Areas assessed for pressure by both nurses:   [x]   Head, Face, and Ears   [x]   Shoulders, Back, and Chest, Abdomen  [x]   Arms, Elbows, and Hands   [x]   Coccyx, Sacrum, and Ischium  [x]   Legs, Feet, and Heels    Patient has scattered bruising and blanchable redness to coccyx. Mepilex was applied      Skin Assessed Under all Medical Devices by both nurses:  O2 device tubing              All Mepilex Borders were peeled back and area peeked at by both nurses:  Yes  Please list where Mepilex Borders are located:  coccyx             **SHARE this note so that the co-signing nurse is able to place an eSignature**    Co-signer eSignature: Quin Bellamy RN    Does the Patient have Skin Breakdown related to pressure?   NO           Santiago Prevention initiated:  NA   Wound Care Orders initiated:  NA      [facility] nurse consulted for Pressure Injury (Stage 3,4, Unstageable, DTI, NWPT, Complex wounds)and New or Established Ostomies:  No      Primary Nurse eSignature: Electronically signed by Cleo Palma RN on 2/27/23 at 11:43 PM EST

## 2023-02-28 NOTE — CONSULTS
[facility]    Consult Note      Requesting Physician:  Dr. Francia Jessica:  Lung cancer      HISTORY OF PRESENT ILLNESS:    Mr. Andrey Varela  is a 78 y.o. male we are seeing in consultation for  squamous cell lung cancer. He has a pmh DM2, PAF, HLD and COPD. He was hospitalized 2/2023 with RLL pneumonia. He underwent bronchoscopy during the admission and cytology showed a squamous cell carcinoma. Pulmonary has arranged a PET scan for 3/16/23. He has now been readmitted with SOB and COPD. He is a former smoker who quit in 2005. ICD-10-CM    1. Acute respiratory failure with hypercapnia (HCC)  J96.02       2.  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    ELAINE on CPAP     Osteoporosis     Skin cancer of face        Past Surgical History:  Past Surgical History:   Procedure Laterality Date    BACK SURGERY      repair broken back L4 & L5    CHOLECYSTECTOMY, LAPAROSCOPIC N/A 1/6/2021    LAPAROSCOPIC CHOLECYSTECTOMY, WITH CHOLANGIOGRAMS (ATTEMPTED) OPEN CHOLECYSTECTOMY performed by Crys Osullivan MD at [address] [place] [address], OPEN N/A 01/06/2021    LAPAROSCOPIC CHOLECYSTECTOMY, WITH CHOLANGIOGRAMS (ATTEMPTED)     CYSTOSCOPY Right 10/9/15    RIght Ureteroscopy, Holmium Laser Lithotripsy with Stone Removal, Right Stent Placement    CYSTOSCOPY  05/01/2019    CYSTOSCOPY, RESECTION OF BLADDER NECK, URETHRAL DILATION    CYSTOSCOPY W BIOPSY OF BLADDER N/A 5/1/2019    CYSTOSCOPY, RESECTION OF BLADDER NECK, URETHRAL DILATION performed by Isela Rosas MD at [address] Left 6/12/2016    cataract removal    GALLBLADDER SURGERY  01/2021    JOINT REPLACEMENT  6/29/2011    right knee    JOINT REPLACEMENT  11/2004    left knee    OTHER SURGICAL HISTORY  08/31/2015    wide excision basal cell carcinoma on the nose and bilateral auricles with frozen sections, plastic closure, full thickness skin graft    OTHER SURGICAL HISTORY  12/1/15    excision of lesion of lip    PROSTATE SURGERY      TURP  10/23/2015    with cystoscopy       Current Medications:  Current Facility-Administered Medications   Medication Dose Route Frequency Provider Last Rate Last Admin    gabapentin (NEURONTIN) capsule 400 mg  400 mg Oral TID Paul Butler MD   400 mg at 02/28/23 2136    insulin lispro (HUMALOG) injection vial 0-4 Units  0-4 Units SubCUTAneous TID WC [place], APRN - CNP   1 Units at 03/01/23 0810    insulin lispro (HUMALOG) injection vial 0-4 Units  0-4 Units SubCUTAneous Nightly [place] [facility], APRN - CNP        levalbuterol Laura Going) nebulizer solution 0.63 mg  0.63 mg Nebulization Q8H [place], APRN - CNP   0.63 mg at 03/01/23 0750    ipratropium-albuterol (DUONEB) nebulizer solution 1 ampule  1 ampule Inhalation Q4H PRN [place], APRN - CNP        sodium chloride (Inhalant) 3 % nebulizer solution 4 mL  4 mL Nebulization BID [place] [facility], APRN - CNP   4 mL at 03/01/23 0750    [Held by provider] apixaban (ELIQUIS) tablet 5 mg  5 mg Oral BID Ashley Lagos PA-C   5 mg at 02/28/23 0835    atorvastatin (LIPITOR) tablet 10 mg  10 mg Oral Daily Ashley Lagos PA-C   10 mg at 02/28/23 0835    dilTIAZem (CARDIZEM CD) extended release capsule 180 mg  180 mg Oral Daily Ashley Lagos PA-C   180 mg at 02/28/23 0835    pantoprazole (PROTONIX) tablet 40 mg  40 mg Oral QAM AC Ashley Lagos PA-C   40 mg at 03/01/23 0517    fluticasone (FLONASE) 50 MCG/ACT nasal spray 2 spray  2 spray Nasal Daily Ashley Lagos PA-C        sodium chloride flush 0.9 % injection 5-40 mL  5-40 mL IntraVENous 2 times per day Ashley Lagos PA-C   10 mL at 02/28/23 2140    sodium chloride flush 0.9 % injection 5-40 mL  5-40 mL IntraVENous PRN Ashley Lagos PA-C        0.9 % sodium chloride infusion   IntraVENous PRN Ashley Lagos PA-C        ondansetron (ZOFRAN-ODT) disintegrating tablet 4 mg  4 mg Oral Q8H PRN Ashley Lagos PA-C        Or    ondansetron (ZOFRAN) injection 4 mg  4 mg IntraVENous Q6H PRN Ashley Lagos PA-C        polyethylene glycol (GLYCOLAX) packet 17 g  17 g Oral Daily PRN Ashley Lagos PA-C        acetaminophen (TYLENOL) tablet 650 mg  650 mg Oral Q6H PRN Ashley Lagos PA-C        Or    acetaminophen (TYLENOL) suppository 650 mg  650 mg Rectal Q6H PRN Ashley Lagos PA-C        methylPREDNISolone sodium (SOLU-MEDROL) injection 40 mg  40 mg IntraVENous BID Ashley Lagos PA-C   40 mg at 02/28/23 2136    Followed by    Hua Alicia ON 3/2/2023] predniSONE (DELTASONE) tablet 40 mg  40 mg Oral Daily Ashley Lagos PA-C        glucose chewable tablet 16 g  4 tablet Oral PRN Ashley Lagos PA-C        dextrose bolus 10% 125 mL  125 mL IntraVENous PRN Ashley Lagos PA-C        Or    dextrose bolus 10% 250 mL  250 mL IntraVENous PRN Ashley Lagos PA-C        glucagon (rDNA) injection 1 mg  1 mg SubCUTAneous PRN Ashley Lagos PA-C        dextrose 10 % infusion   IntraVENous Continuous PRN Ashley Lagos PA-C        levoFLOXacin (LEVAQUIN) 750 MG/150ML infusion 750 mg  750 mg IntraVENous Q24H Ashley Lagos PA-C   Stopped at 03/01/23 0024    propafenone (RYTHMOL) tablet 150 mg  150 mg Oral 3 times per day Ashley Lagos PA-C   150 mg at 03/01/23 0517    Roflumilast (DALIRESP) tablet 250 mcg  250 mcg Oral Nightly Ashley Lagos PA-C   250 mcg at 02/28/23 2135    budesonide-formoterol (SYMBICORT) 160-4.5 MCG/ACT inhaler 2 puff  2 puff Inhalation BID Ashley Lagos PA-C   2 puff at 03/01/23 0749    albuterol (PROVENTIL) nebulizer solution 2.5 mg  2.5 mg Nebulization Q4H PRN Rk Maldonado MD           Allergies:  No Known Allergies    Social History:  Social History     Socioeconomic History    Marital status: Unknown     Spouse name: Jessica Chowdhury    Number of children: 4    Years of education: Not on file    Highest education level: Not on file   Occupational History    Not on file   Tobacco Use    Smoking status: Former     Packs/day: 1.50     Years: 45.00     Pack years: 67.50     Types: Cigarettes     Quit date: 2005     Years since quittin.7    Smokeless tobacco: Never   Vaping Use    Vaping Use: Never used   Substance and Sexual Activity    Alcohol use: No    Drug use: No    Sexual activity: Not Currently   Other Topics Concern    Not on file   Social History Narrative    Not on file     Social Determinants of Health     Financial Resource Strain: Not on file   Food Insecurity: Not on file   Transportation Needs: Not on file   Physical Activity: Not on file   Stress: Not on file   Social Connections: Not on file   Intimate Partner Violence: Not on file   Housing Stability: Not on file          Family History:  Family History   Problem Relation Age of Onset    Cancer Mother     Diabetes Mother     Heart Disease Mother     Cancer Father     Diabetes Sister     Cancer Brother        REVIEW OF SYSTEMS:      Constitutional: Denies fever, sweats, weight loss  Eyes: No visual changes or diplopia. No scleral icterus. Ent: No headaches, hearing loss or vertigo. No mouth sores or sore throat. Cardiovascular: No chest pain, dyspnea on exertion, palpitations or loss of consciousness. Respiratory: No cough or wheezing, no sputum production. No hemoptysis. Gastrointestinal: No abdominal pain, appetite loss, blood in stools. No change in bowel habits. Genitourinary: No dysuria, trouble voiding, or hematuria. Musculoskeletal: No generalized weakness. No joint complaints.   Integumentary: No rash or pruritus. Neurological: No headache, diplopia. No change in gait, balance, or coordination. No paresthesias. Endocrine: No temperature intolerance. No excessive thirst, fluid intake, urination. Hematologic/lymphatic: No abnormal bruising or ecchymosis, blood clots or swollen lymph nodes. Allergic/immunologic: No nasal congestion or hives.         PHYSICAL EXAM:      Vitals:  Patient Vitals for the past 24 hrs:   BP Temp Temp src Pulse Resp SpO2 Weight   03/01/23 0751 -- -- -- -- -- 99 % --   03/01/23 0517 129/69 -- -- -- -- -- --   03/01/23 0230 116/61 98.4 °F (36.9 °C) Oral 76 16 99 % 174 lb 8 oz (79.2 kg)   02/28/23 2248 -- -- -- 89 18 99 % --   02/28/23 2130 139/76 97.7 °F (36.5 °C) Oral 95 18 98 % --   02/28/23 1645 131/75 -- -- (!) 112 18 98 % --   02/28/23 1557 -- -- -- -- -- 98 % --   02/28/23 1430 129/69 98.5 °F (36.9 °C) Oral (!) 118 18 -- --   02/28/23 0855 -- -- -- -- -- 99 % --       Date 03/01/23 0000 - 03/01/23 2359   Shift 2006-0314 7719-4328 9236-9912 24 Hour Total   INTAKE   P.O.(mL/kg/hr) 180(0.3)   180   Shift Total(mL/kg) 180(2.3)   180(2.3)   OUTPUT   Urine(mL/kg/hr) 450(0.7)   450   Shift Total(mL/kg) 450(5.7)   450(5.7)   Weight (kg) 79.2 79.2 79.2 79.2       CONSTITUTIONAL: awake, alert, cooperative, no apparent distress   EYES: pupils equal, round and reactive to light, sclera clear and conjunctiva normal  ENT: Normocephalic, without obvious abnormality, atraumatic  NECK: supple, symmetrical, no jugular venous distension and no carotid bruits   HEMATOLOGIC/LYMPHATIC: no cervical, supraclavicular or axillary lymphadenopathy   LUNGS: no increased work of breathing and clear to auscultation   CARDIOVASCULAR: regular rate and rhythm, normal S1 and S2, no murmur noted  ABDOMEN: normal bowel sounds x 4, soft, non-distended, non-tender, no masses palpated, no hepatosplenomegaly   MUSCULOSKELETAL: full range of motion noted, tone is normal  NEUROLOGIC: awake, alert, oriented to name, place and time. Motor skills grossly intact. SKIN: Normal skin color, texture, turgor and no jaundice. appears intact   EXTREMITIES: no LE edema       DATA:    PT/INR:    Recent Labs     02/27/23  1136 02/26/23  1330 02/12/23  1115   PROT 7.1 6.2* 7.3   INR  --   --  1.32*     PTT:  No results for input(s): APTT in the last 720 hours. CMP:    Recent Labs     03/01/23  0414 02/28/23  0816 02/27/23  1136      < > 141   K 4.2   < > 4.1   CL 96*   < > 94*   CO2 37*   < > 40*   BUN 18   < > 11   PROT  --   --  7.1    < > = values in this interval not displayed. Mg:    Recent Labs     02/15/23  0531 02/14/23  0741 02/12/23  1115   MG 1.70* 1.80 1.90       Lab Results   Component Value Date    CALCIUM 8.5 03/01/2023    PHOS 3.6 02/15/2023       CBC:    Recent Labs     03/01/23  0414 02/28/23  0816 02/27/23  1136 02/26/23  1330 02/15/23  0531   WBC 5.6 4.8 6.0 6.8 4.0   NEUTROABS 4.9 3.8 4.4 4.8 2.8   LYMPHOPCT 8.9 15.9 18.5 22.4 20.1   RBC 3.66* 3.99* 4.34 3.97* 3.59*   HGB 10.3* 11.2* 11.9* 10.9* 10.0*   HCT 30.6* 34.3* 37.0* 33.3* 30.7*   MCV 83.6 85.9 85.2 83.8 85.4   MCH 28.0 28.1 27.5 27.4 27.9   MCHC 33.5 32.7 32.3 32.7 32.7   RDW 15.5* 15.6* 15.9* 15.6* 16.2*   PLT 86* 108* 115* 104* 127*        LDH:No results for input(s): LDH in the last 720 hours. Radiology Review:  FL MODIFIED BARIUM SWALLOW W VIDEO  Narrative: EXAMINATION:  MODIFIED BARIUM SWALLOW WAS PERFORMED IN CONJUNCTION WITH SPEECH PATHOLOGY  SERVICES    TECHNIQUE:  Under fluoroscopic evaluation cineradiography/videoradiography recordings  were performed in conjunction with the speech-language pathologist (SLP). Various liquid, solid and/or semi-solid barium preparations were used to  assess swallowing function.     FLUOROSCOPY DOSE AND TYPE:    1 minute 34 seconds fluoroscopy    Total fluoroscopic dose 13.90 mGy    COMPARISON:  None    HISTORY:  ORDERING SYSTEM PROVIDED HISTORY: dysphagia  TECHNOLOGIST PROVIDED HISTORY:  Reason for exam:->dysphagia  Reason for Exam: dysphagia    FINDINGS:  Deep penetration was seen to the level of the cords. Impression: Deep penetration was seen to the level of the cords. Please see separate speech pathology report for full discussion of findings  and recommendations. CTPA 2/27/23    1. No acute pulmonary arterial thromboembolic disease. 2.  No new pulmonary mass or consolidation. Emphysema. 3.  Heterogeneous material in the right middle and lower lobe airways is   slightly more pronounced than yesterday's chest CT and may relate to   aspiration or retained secretions. PATHOLOGY:    Bronchoscopy 2/12/23    FINAL DIAGNOSIS:     A.  Lung, Right Middle Lobe, Bronchial Alveolar Lavage:   - No malignant cells identified. B.  Lung, Bronchial Washings:   - Positive for malignant cells, at least squamous cell carcinoma in situ. COMMENT:    Specimen B shows fragments of malignant squamous cells   consistent with squamous cell carcinoma. No definitive invasive   component is present. Clinical/radiologic correlation is recommended. Case discussed with Dr. Meghann Garnica on 2/15/23 at [phone].          BUCCA/BUCCA       Problem List  Patient Active Problem List   Diagnosis    HTN (hypertension)    COPD, severe (HCC)    DM2 (diabetes mellitus, type 2) ([facility] [address]    HLD (hyperlipidemia)    Gallstones    PAF (paroxysmal atrial fibrillation) (HCC)    Chronic respiratory failure with hypoxia and hypercapnia 2.5 L home O2    Acute respiratory insufficiency    Acute on chronic respiratory failure with hypercapnia (HCC)    Septic shock (HCC)    COPD exacerbation (HCC)    Former smoker    Acute hyperglycemia    Chronic normocytic anemia    Chronic thrombocytopenia    Falls at home    Ambulatory dysfunction    General weakness    Acute on chronic respiratory failure with hypoxia and hypercapnia (HCC)    Pleural effusion    Rapid atrial fibrillation (HCC)    Atrial fibrillation with RVR (HCC)    Acute respiratory failure with hypoxia and hypercapnia (HCC)    Chronic obstructive pulmonary disease (HCC)    Acute encephalopathy    Hyperglycemia    Pneumonia of left lower lobe due to infectious organism    Acute calculous cholecystitis    Acute cholecystitis    Atherosclerotic ulcer of aorta (HCC)    Closed compression fracture of body of L1 vertebra (HCC)    Hypomagnesemia    Abdominal aortic aneurysm (AAA) 30 to 34 mm in diameter    Elevated procalcitonin    Abdominal pain, right upper quadrant    Lactic acidosis    Elevated bilirubin    Bacteremia due to Klebsiella pneumoniae    Paroxysmal atrial fibrillation (HCC)    Moderate protein-calorie malnutrition ([address]    Debility    COVID-19    Acute on chronic respiratory failure with hypoxemia (HCC)    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)    Diabetes mellitus with skin ulcer (HCC)    Other specified peripheral vascular diseases (HCC)    Hypoxia    Atrial fibrillation, controlled ([address]    Community acquired pneumonia    Acute respiratory failure ([address]    Aspiration pneumonia of both lungs ([address]    Hemoptysis    Aspiration pneumonia ([address]    Pneumonia of right lower lobe due to Pseudomonas species ([address]    Mucus plugging of bronchi    Acute exacerbation of chronic obstructive pulmonary disease (COPD) ([address]       IMPRESSION/RECOMMENDATIONS:    Squamous Cell Lung Cancer    - diagnosed on bronchoscopy when admitted for pneumonia 2/2023  - suspected R lung primary based on CT  - needs PET scan for staging as next step   - this is scheduled 3/16/22  - once stage established, can consider for treatment options    2. Resp Failure/COPD    - per IM and pulmonary    3. DM2    4. PAF    5. HLD        Thank you for asking me to see the patient.        Morris Alvarenga MD  Please Contact Through [employer]

## 2023-02-28 NOTE — PLAN OF CARE
Bedside swallow evaluation completed this date.     Sandra Yanes M.S. [id number] [facility]  Speech-language pathologist  [id number]

## 2023-03-01 LAB
ANION GAP SERPL CALCULATED.3IONS-SCNC: 5 MMOL/L (ref 3–16)
BASOPHILS ABSOLUTE: 0 K/UL (ref 0–0.2)
BASOPHILS RELATIVE PERCENT: 0 %
BUN BLDV-MCNC: 18 MG/DL (ref 7–20)
CALCIUM SERPL-MCNC: 8.5 MG/DL (ref 8.3–10.6)
CHLORIDE BLD-SCNC: 96 MMOL/L (ref 99–110)
CO2: 37 MMOL/L (ref 21–32)
CREAT SERPL-MCNC: 0.6 MG/DL (ref 0.8–1.3)
EOSINOPHILS ABSOLUTE: 0 K/UL (ref 0–0.6)
EOSINOPHILS RELATIVE PERCENT: 0 %
GFR SERPL CREATININE-BSD FRML MDRD: >60 ML/MIN/{1.73_M2}
GLUCOSE BLD-MCNC: 194 MG/DL (ref 70–99)
GLUCOSE BLD-MCNC: 216 MG/DL (ref 70–99)
GLUCOSE BLD-MCNC: 222 MG/DL (ref 70–99)
GLUCOSE BLD-MCNC: 283 MG/DL (ref 70–99)
GLUCOSE BLD-MCNC: 369 MG/DL (ref 70–99)
HCT VFR BLD CALC: 30.6 % (ref 40.5–52.5)
HEMOGLOBIN: 10.3 G/DL (ref 13.5–17.5)
LYMPHOCYTES ABSOLUTE: 0.5 K/UL (ref 1–5.1)
LYMPHOCYTES RELATIVE PERCENT: 8.9 %
MCH RBC QN AUTO: 28 PG (ref 26–34)
MCHC RBC AUTO-ENTMCNC: 33.5 G/DL (ref 31–36)
MCV RBC AUTO: 83.6 FL (ref 80–100)
MONOCYTES ABSOLUTE: 0.1 K/UL (ref 0–1.3)
MONOCYTES RELATIVE PERCENT: 2.6 %
NEUTROPHILS ABSOLUTE: 4.9 K/UL (ref 1.7–7.7)
NEUTROPHILS RELATIVE PERCENT: 88.5 %
PDW BLD-RTO: 15.5 % (ref 12.4–15.4)
PERFORMED ON: ABNORMAL
PLATELET # BLD: 86 K/UL (ref 135–450)
PMV BLD AUTO: 8.6 FL (ref 5–10.5)
POTASSIUM REFLEX MAGNESIUM: 4.2 MMOL/L (ref 3.5–5.1)
RBC # BLD: 3.66 M/UL (ref 4.2–5.9)
SODIUM BLD-SCNC: 138 MMOL/L (ref 136–145)
WBC # BLD: 5.6 K/UL (ref 4–11)

## 2023-03-01 PROCEDURE — 2060000000 HC ICU INTERMEDIATE R&B

## 2023-03-01 PROCEDURE — 94761 N-INVAS EAR/PLS OXIMETRY MLT: CPT

## 2023-03-01 PROCEDURE — 2700000000 HC OXYGEN THERAPY PER DAY

## 2023-03-01 PROCEDURE — 6370000000 HC RX 637 (ALT 250 FOR IP): Performed by: INTERNAL MEDICINE

## 2023-03-01 PROCEDURE — 6360000002 HC RX W HCPCS: Performed by: NURSE PRACTITIONER

## 2023-03-01 PROCEDURE — 94669 MECHANICAL CHEST WALL OSCILL: CPT

## 2023-03-01 PROCEDURE — 99232 SBSQ HOSP IP/OBS MODERATE 35: CPT | Performed by: INTERNAL MEDICINE

## 2023-03-01 PROCEDURE — 6370000000 HC RX 637 (ALT 250 FOR IP)

## 2023-03-01 PROCEDURE — 85025 COMPLETE CBC W/AUTO DIFF WBC: CPT

## 2023-03-01 PROCEDURE — 2580000003 HC RX 258

## 2023-03-01 PROCEDURE — 80048 BASIC METABOLIC PNL TOTAL CA: CPT

## 2023-03-01 PROCEDURE — 94640 AIRWAY INHALATION TREATMENT: CPT

## 2023-03-01 PROCEDURE — 6360000002 HC RX W HCPCS

## 2023-03-01 PROCEDURE — 36415 COLL VENOUS BLD VENIPUNCTURE: CPT

## 2023-03-01 PROCEDURE — 2580000003 HC RX 258: Performed by: NURSE PRACTITIONER

## 2023-03-01 RX ORDER — LEVOFLOXACIN 750 MG/1
750 TABLET ORAL DAILY
Status: DISCONTINUED | OUTPATIENT
Start: 2023-03-01 | End: 2023-03-04 | Stop reason: HOSPADM

## 2023-03-01 RX ADMIN — PROPAFENONE HYDROCHLORIDE 150 MG: 150 TABLET, FILM COATED ORAL at 21:24

## 2023-03-01 RX ADMIN — ROFLUMILAST 250 MCG: 500 TABLET ORAL at 21:24

## 2023-03-01 RX ADMIN — Medication 4 ML: at 23:08

## 2023-03-01 RX ADMIN — PROPAFENONE HYDROCHLORIDE 150 MG: 150 TABLET, FILM COATED ORAL at 05:17

## 2023-03-01 RX ADMIN — LEVOFLOXACIN 750 MG: 750 TABLET, FILM COATED ORAL at 21:25

## 2023-03-01 RX ADMIN — FLUTICASONE PROPIONATE 2 SPRAY: 50 SPRAY, METERED NASAL at 10:02

## 2023-03-01 RX ADMIN — SODIUM CHLORIDE, PRESERVATIVE FREE 5 ML: 5 INJECTION INTRAVENOUS at 09:48

## 2023-03-01 RX ADMIN — Medication 2 PUFF: at 07:49

## 2023-03-01 RX ADMIN — Medication 2 PUFF: at 20:57

## 2023-03-01 RX ADMIN — INSULIN LISPRO 2 UNITS: 100 INJECTION, SOLUTION INTRAVENOUS; SUBCUTANEOUS at 17:04

## 2023-03-01 RX ADMIN — GABAPENTIN 400 MG: 400 CAPSULE ORAL at 14:16

## 2023-03-01 RX ADMIN — METHYLPREDNISOLONE SODIUM SUCCINATE 40 MG: 40 INJECTION, POWDER, FOR SOLUTION INTRAMUSCULAR; INTRAVENOUS at 09:47

## 2023-03-01 RX ADMIN — INSULIN LISPRO 4 UNITS: 100 INJECTION, SOLUTION INTRAVENOUS; SUBCUTANEOUS at 12:03

## 2023-03-01 RX ADMIN — PROPAFENONE HYDROCHLORIDE 150 MG: 150 TABLET, FILM COATED ORAL at 14:16

## 2023-03-01 RX ADMIN — GABAPENTIN 400 MG: 400 CAPSULE ORAL at 21:24

## 2023-03-01 RX ADMIN — ONDANSETRON 4 MG: 4 TABLET, ORALLY DISINTEGRATING ORAL at 19:23

## 2023-03-01 RX ADMIN — PANTOPRAZOLE SODIUM 40 MG: 40 TABLET, DELAYED RELEASE ORAL at 05:17

## 2023-03-01 RX ADMIN — SODIUM CHLORIDE, PRESERVATIVE FREE 10 ML: 5 INJECTION INTRAVENOUS at 21:26

## 2023-03-01 RX ADMIN — LEVALBUTEROL 0.63 MG: 1.25 SOLUTION, CONCENTRATE RESPIRATORY (INHALATION) at 07:50

## 2023-03-01 RX ADMIN — DILTIAZEM HYDROCHLORIDE 180 MG: 180 CAPSULE, COATED, EXTENDED RELEASE ORAL at 09:42

## 2023-03-01 RX ADMIN — GABAPENTIN 400 MG: 400 CAPSULE ORAL at 09:42

## 2023-03-01 RX ADMIN — INSULIN LISPRO 1 UNITS: 100 INJECTION, SOLUTION INTRAVENOUS; SUBCUTANEOUS at 08:10

## 2023-03-01 RX ADMIN — LEVALBUTEROL 0.63 MG: 1.25 SOLUTION, CONCENTRATE RESPIRATORY (INHALATION) at 23:08

## 2023-03-01 RX ADMIN — ATORVASTATIN CALCIUM 10 MG: 10 TABLET, FILM COATED ORAL at 09:42

## 2023-03-01 RX ADMIN — Medication 4 ML: at 07:50

## 2023-03-01 RX ADMIN — LEVALBUTEROL 0.63 MG: 1.25 SOLUTION, CONCENTRATE RESPIRATORY (INHALATION) at 15:28

## 2023-03-01 NOTE — CARE COORDINATION
INTERDISCIPLINARY PLAN OF CARE CONFERENCE    Date/Time: 3/1/2023 10:18 AM  Completed by: Ty Welch RN, Case Management      Patient Name:  Hattie Blackburn  YOB: 1940  Admitting Diagnosis: COPD exacerbation ([facility] [address] [J44.1]  Acute respiratory failure with hypercapnia ([facility] [address] [J96.02]  Chronic obstructive pulmonary disease, unspecified COPD type ([facility] [address] [J44.9]     Admit Date/Time:  2/27/2023 11:20 AM    Chart reviewed. Interdisciplinary team contacted or reviewed plan related to patient progress and discharge plans. Disciplines included Case Management, Nursing, and Dietitian. Current Status:IP 02/27.2023  PT/OT recommendation for discharge plan of care: NA  SLP/BS- No aspiration  Diet recs: IDDSI 6 Soft and Bite Sized Solids; IDDSI 0 Thin Liquids - NO straws ; Meds whole or crushed in puree  Expected D/C Disposition:  Home    Discharge Plan Comments: Chart reviewed. IPTA. Lives w/dtr. Plans to return home. + [employer] home O2 (3 liters baseline). Planned Bronchoscopy for Thursday per pulm. Oncology consult. Active w/ Senior Kristine Booker - notification call to [facility] (SN only)- service on hold. Fax HC orders to [phone]. +Active with [employer] notification call Nasim De Luna, services include [facility] (through [address] [place] [address]), emergency alert button. Services on hold. Fax G[facility]O/[facility] to [phone]. CM provide pt/family private duty caregiver freedom of choice list. Interested in hiring [facility]/ for night time. CM following.      Home O2 in place on admit: Yes  Pt informed of need to bring portable home O2 tank on day of discharge for nursing to connect prior to leaving:  Not Indicated  Verbalized agreement/Understanding:  Not Indicated

## 2023-03-01 NOTE — PROGRESS NOTES
PM assessment completed. Scheduled medications given per MAR. VSS x4, A/O 3 liter, patient denies any needs at this time.  Call light in reach, will monitor,

## 2023-03-01 NOTE — FLOWSHEET NOTE
03/01/23 0230   Vital Signs   Temp 98.4 °F (36.9 °C)   Temp Source Oral   Heart Rate 76   Heart Rate Source Monitor   Resp 16   /61   MAP (Calculated) 79   BP Location Left upper arm   BP Method Automatic   Patient Position Semi fowlers   Level of Consciousness 0   MEWS Score 1   Pain Assessment   Pain Assessment None - Denies Pain   Oxygen Therapy   SpO2 99 %   O2 Device Nasal cannula   O2 Flow Rate (L/min) 3 L/min   Height and Weight   Weight 174 lb 8 oz (79.2 kg)   Weight Method Standing scale   BMI (Calculated) 23.7   Patient vital signs taken and stable at this time. Patient has call light within reach.

## 2023-03-01 NOTE — ED PROVIDER NOTES
Barb [address] ENCOUNTER        Patient Name: Cedrick Bolton  MRN: [id number]  [place] 1940  Date of evaluation: 2/27/2023  Provider: Mindi Swain MD  PCP: Zenon Lara MD  Note Started: 10:00 PM EST 2/28/23    I independently examined and evaluated Cedrick Bolton. I personally saw the patient and performed a substantive portion of the visit including all aspects of the medical decision making. I am the primary physician of record. CHIEF COMPLAINT  Fatigue        HISTORY OF PRESENT ILLNESS  History from : Patient    Limitations to history : None    In brief, Cedrick Bolton is a 78 y.o. male  has a past medical history of Arthritis, CAD (coronary artery disease), COPD (chronic obstructive pulmonary disease) ([facility] [address] Diabetes mellitus ([facility] [address] Hepatitis A (01/05/2021), Hyperlipidemia, Hypertension, Influenza A (12/29/2017), Kidney calculi, MDRO (multiple drug resistant organisms) resistance (03/09/2017), ELAINE on CPAP, Osteoporosis, and Skin cancer of face. , who presents to the ED complaining of increased shortness of breath. Patient had a recent ICU admission for pneumonia and was treated with Levaquin after ICU admission and intubation. Patient reports increased shortness of breath, cough with some blood streaked sputum, increased shortness of breath. Denies fever. No palliative or provocative factors. Has been using breathing treatments at home. Patient is on anticoagulation. REVIEW OF SYSTEMS  All systems reviewed, pertinent positives per HPI otherwise noted to be negative. Focused exam revealed   PHYSICAL EXAM  ED Triage Vitals   BP Temp Temp Source Heart Rate Resp SpO2 Height Weight   02/27/23 1130 02/27/23 1130 02/27/23 1130 02/27/23 1130 02/27/23 1130 02/27/23 1130 02/27/23 1143 02/27/23 1143   (!) 142/78 97.5 °F (36.4 °C) Oral (!) 114 18 100 % 6' (1.829 m) 190 lb (86.2 kg)     GENERAL APPEARANCE: Awake and alert. Cooperative. Ill-appearing  HENT: Normocephalic. Atraumatic. Mucous membranes are moist  NECK: Supple. Full range of motion of the neck without stiffness or pain. EYES: PERRL. EOM's grossly intact. HEART/CHEST: RR, tachycardia. No murmurs. Chest wall is not tender to palpation. LUNGS: Respirations labored. Diminished breath sounds bilaterally. Intermittent wheezing. ABDOMEN: No tenderness. Soft. Non-distended. No masses. No organomegaly. No guarding or rebound. MUSCULOSKELETAL: No extremity edema. Compartments soft. No deformity. No tenderness in the extremities. All extremities neurovascularly intact. SKIN: Warm and dry. No acute rashes. NEUROLOGICAL: Alert and oriented. No gross facial drooping. Strength 5/5, sensation intact. PSYCHIATRIC: Normal mood and affect. WORKUP     LABS  I have reviewed all labs for this visit.    Results for orders placed or performed during the hospital encounter of 02/27/23   COVID-19 & Influenza Combo    Specimen: Nasopharyngeal Swab   Result Value Ref Range    SARS-CoV-2 RNA, RT PCR NOT DETECTED NOT DETECTED    INFLUENZA A NOT DETECTED NOT DETECTED    INFLUENZA B NOT DETECTED NOT DETECTED   CBC with Auto Differential   Result Value Ref Range    WBC 6.0 4.0 - 11.0 K/uL    RBC 4.34 4.20 - 5.90 M/uL    Hemoglobin 11.9 (L) 13.5 - 17.5 g/dL    Hematocrit 37.0 (L) 40.5 - 52.5 %    MCV 85.2 80.0 - 100.0 fL    MCH 27.5 26.0 - 34.0 pg    MCHC 32.3 31.0 - 36.0 g/dL    RDW 15.9 (H) 12.4 - 15.4 %    Platelets 946 (L) 829 - 450 K/uL    MPV 9.4 5.0 - 10.5 fL    Neutrophils % 73.7 %    Lymphocytes % 18.5 %    Monocytes % 7.3 %    Eosinophils % 0.3 %    Basophils % 0.2 %    Neutrophils Absolute 4.4 1.7 - 7.7 K/uL    Lymphocytes Absolute 1.1 1.0 - 5.1 K/uL    Monocytes Absolute 0.4 0.0 - 1.3 K/uL    Eosinophils Absolute 0.0 0.0 - 0.6 K/uL    Basophils Absolute 0.0 0.0 - 0.2 K/uL   Comprehensive Metabolic Panel w/ Reflex to MG   Result Value Ref Range    Sodium 141 136 - 145 mmol/L    Potassium reflex Magnesium 4.1 3.5 - 5.1 mmol/L    Chloride 94 (L) 99 - 110 mmol/L    CO2 40 (H) 21 - 32 mmol/L    Anion Gap 7 3 - 16    Glucose 140 (H) 70 - 99 mg/dL    BUN 11 7 - 20 mg/dL    Creatinine 0.7 (L) 0.8 - 1.3 mg/dL    Est, Glom Filt Rate >60 >60    Calcium 9.3 8.3 - 10.6 mg/dL    Total Protein 7.1 6.4 - 8.2 g/dL    Albumin 4.2 3.4 - 5.0 g/dL    Albumin/Globulin Ratio 1.4 1.1 - 2.2    Total Bilirubin 0.8 0.0 - 1.0 mg/dL    Alkaline Phosphatase 69 40 - 129 U/L    ALT 14 10 - 40 U/L    AST 14 (L) 15 - 37 U/L   Procalcitonin   Result Value Ref Range    Procalcitonin 0.08 0.00 - 0.15 ng/mL   Lactate, Sepsis   Result Value Ref Range    Lactic Acid, Sepsis 1.0 0.4 - 1.9 mmol/L   Troponin   Result Value Ref Range    Troponin <0.01 <0.01 ng/mL   Blood gas, venous   Result Value Ref Range    pH, Joey 7.256 (L) 7.350 - 7.450    pCO2, Joey 83.7 (H) 40.0 - 50.0 mmHg    pO2, Joey 29.7 25.0 - 40.0 mmHg    HCO3, Venous 36.4 (H) 23.0 - 29.0 mmol/L    Base Excess, Joey 6.3 (H) -3.0 - 3.0 mmol/L    O2 Sat, Joey 45 Not Established %    Carboxyhemoglobin 4.0 (H) 0.0 - 1.5 %    MetHgb, Joey 0.3 <1.5 %    TC02 (Calc), Joey 39 Not Established mmol/L    O2 Therapy Unknown    Brain Natriuretic Peptide   Result Value Ref Range    Pro- 0 - 449 pg/mL   Urinalysis with Reflex to Culture    Specimen: Urine, clean catch   Result Value Ref Range    Color, UA Yellow Straw/Yellow    Clarity, UA Clear Clear    Glucose, Ur Negative Negative mg/dL    Bilirubin Urine Negative Negative    Ketones, Urine 15 (A) Negative mg/dL    Specific Gravity, UA <=1.005 1.005 - 1.030    Blood, Urine TRACE-INTACT (A) Negative    pH, UA 6.5 5.0 - 8.0    Protein, UA TRACE (A) Negative mg/dL    Urobilinogen, Urine 2.0 (A) <2.0 E.U./dL    Nitrite, Urine Negative Negative    Leukocyte Esterase, Urine Negative Negative    Microscopic Examination YES     Urine Type NotGiven     Urine Reflex to Culture Not Indicated    Blood gas, venous   Result Value Ref Range    pH, Joey 7.247 (L) 7.350 - 7.450    pCO2, Joey 81.5 (H) 40.0 - 50.0 mmHg    pO2, Joey 32.7 25.0 - 40.0 mmHg    HCO3, Venous 34.7 (H) 23.0 - 29.0 mmol/L    Base Excess, Joey 5.0 (H) -3.0 - 3.0 mmol/L    O2 Sat, Joey 51 Not Established %    Carboxyhemoglobin 3.7 (H) 0.0 - 1.5 %    MetHgb, Joey 0.3 <1.5 %    TC02 (Calc), Joey 37 Not Established mmol/L    O2 Therapy Unknown      ECG  The EKG, as interpreted by myself, in the emergency department in the absence of a cardiologist.  sinus tachycardia, mlcs=885   Axis is   Normal  QTc is  within an acceptable range  Intervals and Durations are unremarkable. ST Segments: nonspecific changes  Nonspecific change from prior EKG dated 2/27/23      RADIOLOGY  Non-plain film images such as CT, Ultrasound and MRI are read by the radiologist. Plain radiographic images are visualized and preliminarily interpreted by the ED Provider with the below findings:    Chest x-ray on my interpretation shows no evidence of pneumonia, pneumothorax, pleural effusion, pulmonary edema    Interpretation per the Radiologist below, if available at the time of this note:    CT CHEST PULMONARY EMBOLISM W CONTRAST   Final Result   1. No acute pulmonary arterial thromboembolic disease. 2.  No new pulmonary mass or consolidation. Emphysema. 3.  Heterogeneous material in the right middle and lower lobe airways is   slightly more pronounced than yesterday's chest CT and may relate to   aspiration or retained secretions. XR CHEST (2 VW)   Final Result   No acute process. EMERGENCY DEPARTMENT COURSE and DIFFERENTIAL DIAGNOSIS/MDM:   Patient seen and evaluated. Old records reviewed and pertinent information included in HPI. Labs and imaging reviewed and results discussed with patient. Overall ill appearing patient, presenting for shortness of breath and cough. History obtained from patient. Physical exam remarkable for diminished breath sounds and wheezing.      Patient's pertinent external medical records: Records Reviewed : Inpatient Notes recent ICU admission for pneumonia    Chronic Medical Conditions that may contribute to presentation today:  has a past medical history of Arthritis, CAD (coronary artery disease), COPD (chronic obstructive pulmonary disease) ([facility] [address] Diabetes mellitus ([facility] [address] Hepatitis A (2021), Hyperlipidemia, Hypertension, Influenza A (2017), Kidney calculi, MDRO (multiple drug resistant organisms) resistance (2017), ELAINE on CPAP, Osteoporosis, and Skin cancer of face.      Social Determinants affecting Dx or Tx: ;   Social History     Socioeconomic History    Marital status: Unknown     Spouse name: Jovany Hernandez    Number of children: 4    Years of education: Not on file    Highest education level: Not on file   Occupational History    Not on file   Tobacco Use    Smoking status: Former     Packs/day: 1.50     Years: 45.00     Pack years: 67.50     Types: Cigarettes     Quit date: 2005     Years since quittin.7    Smokeless tobacco: Never   Vaping Use    Vaping Use: Never used   Substance and Sexual Activity    Alcohol use: No    Drug use: No    Sexual activity: Not Currently   Other Topics Concern    Not on file   Social History Narrative    Not on file     Social Determinants of Health     Financial Resource Strain: Not on file   Food Insecurity: Not on file   Transportation Needs: Not on file   Physical Activity: Not on file   Stress: Not on file   Social Connections: Not on file   Intimate Partner Violence: Not on file   Housing Stability: Not on file     Differential diagnosis includes but is not limited to: COVID, Flu, Pneumonia, pneumothorax, pleural effusion, ACS, CHF exacerbation, COPD exacerbation, asthma, pulmonary embolism, arrhythmia, anemia    WORKUP INTERPRETATION:  ED Course as of 23 2215      1410 Flu and COVID swab negative [ER]   1410 BNP within normal limits, no evidence of fluid overload on exam [ER]   1410 Troponin within normal limits [ER]   1410 Blood gas shows acidemia to 7.25, hypercarbia to 83 [ER]   1511 Repeat blood gas after steroids and breathing treatments show stable hypercarbia to 81. Patient started on BiPAP. [ER]   1545 CT PE: IMPRESSION:  1. No acute pulmonary arterial thromboembolic disease. 2.  No new pulmonary mass or consolidation. Emphysema. 3.  Heterogeneous material in the right middle and lower lobe airways is  slightly more pronounced than yesterday's chest CT and may relate to aspiration or retained secretions. [ER]   4280 Chest x-ray on my interpretation shows no evidence of pneumonia, pneumothorax, pleural effusion, pulmonary edema    CT PE: IMPRESSION:  1. No acute pulmonary arterial thromboembolic disease. 2.  No new pulmonary mass or consolidation. Emphysema. 3.  Heterogeneous material in the right middle and lower lobe airways is  slightly more pronounced than yesterday's chest CT and may relate to aspiration or retained secretions. [ER]   Tue Feb 28, 2023   2209 Mild anemia 11.2. Thrombocytopenia 1008. No leukocytosis. [ER]   2212 Mild hypochloremia 94, elevated bicarb 40. No other electrolyte abnormalities or evidence of kidney dysfunction. [ER]   2212 Liver function testing unremarkable.  [ER]      ED Course User Index  [ER] Grace Toure MD        CONSULTS:   Midlevel provider spoke to hospitalist team, discussed presentation and work-up, patient excepted for admission    Given that patient is on BiPAP, pulmonology consulted given plan for patient to go to the ICU, patient excepted for admission      SCREENINGS:       [place] Coma Scale  Eye Opening: Spontaneous  Best Verbal Response: Oriented  Best Motor Response: Obeys commands  [place] Coma Scale Score: 15                CIWA Assessment  BP: 139/76  Heart Rate: 95           TREATMENT:  During the patient's ED course, the patient was given:  Medications   apixaban (ELIQUIS) tablet 5 mg ( Oral Automatically Held 3/5/23 2100) atorvastatin (LIPITOR) tablet 10 mg (10 mg Oral Given 2/28/23 0835)   dilTIAZem (CARDIZEM CD) extended release capsule 180 mg (180 mg Oral Given 2/28/23 0835)   pantoprazole (PROTONIX) tablet 40 mg (0 mg Oral Held 2/28/23 1019)   fluticasone (FLONASE) 50 MCG/ACT nasal spray 2 spray (0 sprays Nasal Held 2/28/23 0845)   sodium chloride flush 0.9 % injection 5-40 mL (10 mLs IntraVENous Given 2/28/23 2140)   sodium chloride flush 0.9 % injection 5-40 mL (has no administration in time range)   0.9 % sodium chloride infusion (has no administration in time range)   ondansetron (ZOFRAN-ODT) disintegrating tablet 4 mg (has no administration in time range)     Or   ondansetron (ZOFRAN) injection 4 mg (has no administration in time range)   polyethylene glycol (GLYCOLAX) packet 17 g (has no administration in time range)   acetaminophen (TYLENOL) tablet 650 mg (has no administration in time range)     Or   acetaminophen (TYLENOL) suppository 650 mg (has no administration in time range)   methylPREDNISolone sodium (SOLU-MEDROL) injection 40 mg (40 mg IntraVENous Given 2/28/23 2136)     Followed by   predniSONE (DELTASONE) tablet 40 mg (has no administration in time range)   glucose chewable tablet 16 g (has no administration in time range)   dextrose bolus 10% 125 mL (has no administration in time range)     Or   dextrose bolus 10% 250 mL (has no administration in time range)   glucagon (rDNA) injection 1 mg (has no administration in time range)   dextrose 10 % infusion (has no administration in time range)   levoFLOXacin (LEVAQUIN) 750 MG/150ML infusion 750 mg (0 mg IntraVENous Stopped 2/28/23 0022)   propafenone (RYTHMOL) tablet 150 mg (150 mg Oral Given 2/28/23 2136)   Roflumilast (DALIRESP) tablet 250 mcg (250 mcg Oral Given 2/28/23 2135)   budesonide-formoterol (SYMBICORT) 160-4.5 MCG/ACT inhaler 2 puff (2 puffs Inhalation Given 2/28/23 0853)   albuterol (PROVENTIL) nebulizer solution 2.5 mg (has no administration in time range)   gabapentin (NEURONTIN) capsule 400 mg (400 mg Oral Given 2/28/23 2136)   insulin lispro (HUMALOG) injection vial 0-4 Units (4 Units SubCUTAneous Given 2/28/23 1705)   insulin lispro (HUMALOG) injection vial 0-4 Units (0 Units SubCUTAneous Not Given 2/28/23 2137)   levalbuterol (XOPENEX) nebulizer solution 0.63 mg (0.63 mg Nebulization Given 2/28/23 1554)   ipratropium-albuterol (DUONEB) nebulizer solution 1 ampule (has no administration in time range)   sodium chloride (Inhalant) 3 % nebulizer solution 4 mL (has no administration in time range)   methylPREDNISolone sodium (SOLU-MEDROL) injection 125 mg (125 mg IntraVENous Given 2/27/23 1219)   ipratropium-albuterol (DUONEB) nebulizer solution 1 ampule (1 ampule Inhalation Given 2/27/23 1219)   0.9 % sodium chloride bolus (0 mLs IntraVENous Stopped 2/27/23 1431)   doxycycline (VIBRAMYCIN) 100 mg in sodium chloride 0.9 % 100 mL IVPB (0 mg IntraVENous Stopped 2/27/23 1756)        REASSESSMENT:  On reassessment, patient does appear more somnolent, patient placed on BiPAP. Patient will be admitted to ICU. Patient remains full code. Patient admitted to ICU    Vitals:    Vitals:    02/28/23 1430 02/28/23 1557 02/28/23 1645 02/28/23 2130   BP: 129/69  131/75 139/76   Pulse: (!) 118  (!) 112 95   Resp: 18  18 18   Temp: 98.5 °F (36.9 °C)   97.7 °F (36.5 °C)   TempSrc: Oral   Oral   SpO2:  98% 98% 98%   Weight:       Height:           CRITICAL CARE TIME     I personally spent a total of 30 minutes of critical care time in obtaining history, performing a physical exam, bedside monitoring of interventions, collecting and interpreting tests and discussion with consultants but excluding time spent performing procedures, treating other patients and teaching time. FINAL IMPRESSION      1. Acute respiratory failure with hypercapnia (HCC)    2.  Chronic obstructive pulmonary disease, unspecified COPD type ([facility]          DISPOSITION/PLAN   Disposition: DISPOSITION Admitted 02/27/2023 04:05:27 PM    Condition: fair     DISCLAIMER: This chart was created using Dragon dictation software. Efforts were made by me to ensure accuracy, however some errors may be present due to limitations of this technology and occasionally words are not transcribed correctly.     MD Aris Clements MD  02/28/23 0938

## 2023-03-01 NOTE — PROGRESS NOTES
IM Progress Note    Admit Date:  2/27/2023  2    Interval history:  copd exacerbation     Subjective:  Mr. Wiley Murray     Objective:   /72   Pulse (!) 104   Temp 98.1 °F (36.7 °C) (Oral)   Resp 16   Ht 6' (1.829 m)   Wt 174 lb 8 oz (79.2 kg)   SpO2 98%   BMI 23.67 kg/m²     Intake/Output Summary (Last 24 hours) at 3/1/2023 1249  Last data filed at 3/1/2023 0900  Gross per 24 hour   Intake 540 ml   Output 1050 ml   Net -510 ml       Physical Exam:   General: elderly male, up in bed,  Awake, alert and oriented. Appears to be not in any distress  Mucous Membranes:  Pink , anicteric  Neck: No JVD, no carotid bruit, no thyromegaly  Chest:  Clear to auscultation bilaterally, minimal basilar crackles  Cardiovascular:  RRR S1S2 heard, no murmurs or gallops  Abdomen:  Soft, undistended, non tender, no organomegaly, BS present  Extremities: No edema or cyanosis.  Distal pulses well felt  Neurological : grossly normal with gen weakness       Medications:   Scheduled Medications:    levoFLOXacin  750 mg Oral Daily    gabapentin  400 mg Oral TID    insulin lispro  0-4 Units SubCUTAneous TID WC    insulin lispro  0-4 Units SubCUTAneous Nightly    levalbuterol  0.63 mg Nebulization Q8H    sodium chloride (Inhalant)  4 mL Nebulization BID    [Held by provider] apixaban  5 mg Oral BID    atorvastatin  10 mg Oral Daily    dilTIAZem  180 mg Oral Daily    pantoprazole  40 mg Oral QAM AC    fluticasone  2 spray Nasal Daily    sodium chloride flush  5-40 mL IntraVENous 2 times per day    [START ON 3/2/2023] predniSONE  40 mg Oral Daily    propafenone  150 mg Oral 3 times per day    Roflumilast  250 mcg Oral Nightly    budesonide-formoterol  2 puff Inhalation BID     I   sodium chloride      dextrose       ipratropium-albuterol, sodium chloride flush, sodium chloride, ondansetron **OR** ondansetron, polyethylene glycol, acetaminophen **OR** acetaminophen, glucose, dextrose bolus **OR** dextrose bolus, glucagon (rDNA), dextrose, albuterol    Lab Data:  Recent Labs     02/27/23  1136 02/28/23  0816 03/01/23  0414   WBC 6.0 4.8 5.6   HGB 11.9* 11.2* 10.3*   HCT 37.0* 34.3* 30.6*   MCV 85.2 85.9 83.6   * 108* 86*     Recent Labs     02/27/23  1136 02/28/23  0816 03/01/23  0414    135* 138   K 4.1 4.2 4.2   CL 94* 95* 96*   CO2 40* 32 37*   BUN 11 15 18   CREATININE 0.7* 0.6* 0.6*     Recent Labs     02/27/23  1136   TROPONINI <0.01       Coagulation:   Lab Results   Component Value Date/Time    INR 1.32 02/12/2023 11:15 AM    APTT 29.8 01/25/2018 05:55 AM     Cardiac markers:   Lab Results   Component Value Date/Time    CKTOTAL 62 01/24/2018 03:22 PM    TROPONINI <0.01 02/27/2023 11:36 AM         Lab Results   Component Value Date    ALT 14 02/27/2023    AST 14 (L) 02/27/2023    ALKPHOS 69 02/27/2023    BILITOT 0.8 02/27/2023       Lab Results   Component Value Date    INR 1.32 (H) 02/12/2023    INR 1.10 11/06/2022    INR 1.20 (H) 01/07/2021    PROTIME 16.2 (H) 02/12/2023    PROTIME 14.1 11/06/2022    PROTIME 13.9 (H) 01/07/2021       Radiology       CULTURES  Pneumonia panel: ordered  Respiratory culture: ordered   Blood Culture: pending      EKG:   Sinus tachycardiaLow voltage QRSBorderline ECGWhen compared with ECG of 12-FEB-2023 12:20,Previous ECG has undetermined rhythm, needs reviewQRS axis Shifted rightCriteria for Inferior infarct are no longer PresentT wave inversion no longer evident in Inferior leadsNonspecific T wave abnormality, improved in Lateral leadsConfirmed by Ronney Apley MD, [facility] ([id number]) on 2/27/2023 5:46:19 PM          CT CHEST PULMONARY EMBOLISM W CONTRAST   Final Result   1. No acute pulmonary arterial thromboembolic disease. 2.  No new pulmonary mass or consolidation. Emphysema. 3.  Heterogeneous material in the right middle and lower lobe airways is   slightly more pronounced than yesterday's chest CT and may relate to   aspiration or retained secretions.            XR CHEST (2 VW)   Final Result No acute process. ASSESSMENT/PLAN:     Acute on chronic hypoxic and hypercapnia   - baseline oxygen 3 liters   - placed on BIPAP in the the ED,  - BIPAP removed and placed on 2 liters,  - remains stable over last 24 hrs   - pulmonary following         Hemoptysis-scant   - blood streaked sputum noted at home  - small amount  - continue eliquis and monitor   - sputum culture ordered         COPD  AE  - solumedrol for now, prednisone once improved   - inhaled bronchodilators   - Levaquin  D#3  - continue Daliresp   - check sputum cultures, pneumonia panel   - monitor on continuous pulse ox         Squamous Cell carcinoma   - noted on bronch washings completed on 2/12/23  - oncology consulted  - pulmonary following   - discussed with patient and family at bedside  - planned for bronch on Friday here   - pt does have PET scan scheduled outpatient         Hx of aspiration pneumonia  Hx of pseudomonas in sputum   - pulmonary following  - speech evaluation and treat  - barium swallow wnl      Tachycardia   - continue Cardizem  - change Duonebs to Xopenex   - CTPA negative for PE  - continue to monitor on telemetry      Dysphagia  - speech evaluation and treat  - barium swallow this am  - speech recommends no straws, meds whole or crushed in puree, strict aspiration precautions  - strict aspiration precautions ordered  - continue to monitor for s/sx of aspiration. PAF  - ST on EKG  - continue eliquis, Rythmol, Cardizem   - monitor on telemetry         DM type 2 with neuropathy   - hold Actos and metformin  - SSI--changed from NPO SSI to [facility] and HS  - monitor blood sugars  - continue Neurontin         Thrombocytopenia   - platelet 597  - monitor         HLD  - Continue statin       DVT Prophylaxis: eliquis   Diet: ADULT DIET;  Dysphagia - Soft and Bite Sized  Code Status: Full Code     Paul Butler MD, 3/1/2023 12:49 PM

## 2023-03-01 NOTE — PROGRESS NOTES
02/28/23 2248   Settings/Measurements   Resp 18   O2 Flow Rate (L/min) 3 L/min   Patient Observation   Observations patient refused bipap for the night   Oxygen Therapy/Pulse Ox   O2 Therapy Oxygen   O2 Device Nasal cannula   Heart Rate 89   SpO2 99 %

## 2023-03-01 NOTE — PLAN OF CARE
Problem: Discharge Planning  Goal: Discharge to home or other facility with appropriate resources  Outcome: Progressing  Flowsheets (Taken 2/28/2023 2130 by Margarita Briseno, RN)  Discharge to home or other facility with appropriate resources: Identify barriers to discharge with patient and caregiver     Problem: Pain  Goal: Verbalizes/displays adequate comfort level or baseline comfort level  Outcome: Progressing     Problem: Chronic Conditions and Co-morbidities  Goal: Patient's chronic conditions and co-morbidity symptoms are monitored and maintained or improved  Outcome: Progressing  Flowsheets (Taken 2/28/2023 2130 by Margarita Briseno, RN)  Care Plan - Patient's Chronic Conditions and Co-Morbidity Symptoms are Monitored and Maintained or Improved: Monitor and assess patient's chronic conditions and comorbid symptoms for stability, deterioration, or improvement

## 2023-03-01 NOTE — FLOWSHEET NOTE
02/28/23 2130   Vital Signs   Temp 97.7 °F (36.5 °C)   Temp Source Oral   Heart Rate 95   Heart Rate Source Monitor   Resp 18   /76   MAP (Calculated) 97   BP Location Left upper arm   BP Method Automatic   Patient Position Sitting   Level of Consciousness 0   MEWS Score 1   Pain Assessment   Pain Assessment None - Denies Pain   Oxygen Therapy   SpO2 98 %   Pulse Oximetry Type Intermittent   O2 Device Nasal cannula   O2 Flow Rate (L/min) 3 L/min   Patient vital signs taken and stable at this time. Patient shift assessment complete, see flow sheet. Patient denies c/o pain at this time. Patient one assist up with urinal and tolerated fairly well. Patient has call light within reach.

## 2023-03-01 NOTE — PROGRESS NOTES
PULMONARY PROGRESS NOTE  CC: COPD exacerbation    Subjective:   MBSS without aspiration. Breathing feels \"pretty good. \"  Congested cough, does not want a prolonged hospital stay. Feeling better since admission after being \"cleaned out. \"     IV line: Peripheral    PHYSICAL EXAM:   /69   Pulse 76   Temp 98.4 °F (36.9 °C) (Oral)   Resp 16   Ht 6' (1.829 m)   Wt 174 lb 8 oz (79.2 kg)   SpO2 99%   BMI 23.67 kg/m² ' on 3L  Constitutional:  No acute distress   HEENT:  No scleral icterus  Neck:  No tracheal deviation present. Cardiovascular:  Normal heart sounds. Pulmonary/Chest:  No wheezes. + rhonchi. No rales. + bilateral lower lobe decreased breath sounds. No accessory muscle usage or stridor. Abdominal:  Soft. Musculoskeletal:  No cyanosis. No clubbing. Skin:  Skin is warm and dry.      Scheduled Meds:   gabapentin  400 mg Oral TID    insulin lispro  0-4 Units SubCUTAneous TID WC    insulin lispro  0-4 Units SubCUTAneous Nightly    levalbuterol  0.63 mg Nebulization Q8H    sodium chloride (Inhalant)  4 mL Nebulization BID    [Held by provider] apixaban  5 mg Oral BID    atorvastatin  10 mg Oral Daily    dilTIAZem  180 mg Oral Daily    pantoprazole  40 mg Oral QAM AC    fluticasone  2 spray Nasal Daily    sodium chloride flush  5-40 mL IntraVENous 2 times per day    methylPREDNISolone  40 mg IntraVENous BID    Followed by    Beto Epstein ON 3/2/2023] predniSONE  40 mg Oral Daily    levofloxacin  750 mg IntraVENous Q24H    propafenone  150 mg Oral 3 times per day    Roflumilast  250 mcg Oral Nightly    budesonide-formoterol  2 puff Inhalation BID     Continuous Infusions:   sodium chloride      dextrose       PRN Meds:  ipratropium-albuterol, sodium chloride flush, sodium chloride, ondansetron **OR** ondansetron, polyethylene glycol, acetaminophen **OR** acetaminophen, glucose, dextrose bolus **OR** dextrose bolus, glucagon (rDNA), dextrose, albuterol    Labs:  CBC:   Recent Labs     02/27/23  1136 02/28/23  0816 03/01/23  0414   WBC 6.0 4.8 5.6   HGB 11.9* 11.2* 10.3*   HCT 37.0* 34.3* 30.6*   MCV 85.2 85.9 83.6   * 108* 86*     BMP:   Recent Labs     02/27/23  1136 02/28/23  0816 03/01/23  0414    135* 138   K 4.1 4.2 4.2   CL 94* 95* 96*   CO2 40* 32 37*   BUN 11 15 18   CREATININE 0.7* 0.6* 0.6*     Micro:   2/12/23 BAL Pensensitive Pseudomonas aeruginosa   2/12/23 Suctioned sputum Pseudomonas aeruginosa    2/27/23 SARS-CoV-2 & influenza not detected  2/27/23 BC NGTD  2/28/23 Pneumonia molecular panel sent  Sputum cx has been ordered  Procalcitonin 0.08    Cytology 2/12/23  Lung, Bronchial Washings:   - Positive for malignant cells, at least squamous cell carcinoma in situ. COMMENT:    Specimen B shows fragments of malignant squamous cells   consistent with squamous cell carcinoma. No definitive invasive   component is present. Imaging:   CTPA 2/27/2023  Pulmonary Arteries: Pulmonary arteries are adequately opacified for   evaluation. No evidence of intraluminal filling defect to suggest pulmonary   embolism. Main pulmonary artery is normal in caliber. Mediastinum: No evidence of mediastinal lymphadenopathy. The heart and   pericardium demonstrate no acute abnormality. There is no acute abnormality   of the thoracic aorta. Atherosclerosis in the thoracic aorta. Coronary   artery calcifications. Lungs/pleura: Respiratory motion. Mild biapical pleuroparenchymal scarring. Emphysema. Heterogeneous material in the right middle and lower lobe airways   is more pronounced than the prior study. No new pulmonary mass or   consolidation. No pleural effusion or pneumothorax. Upper Abdomen: Limited images of the upper abdomen are unremarkable. Soft Tissues/Bones: No acute bone or soft tissue abnormality. Impression   1. No acute pulmonary arterial thromboembolic disease. 2.  No new pulmonary mass or consolidation. Emphysema.        3. Heterogeneous material in the right middle and lower lobe airways is   slightly more pronounced than yesterday's chest CT and may relate to   aspiration or retained secretions. MBSS 2/28/23: deep penetration to level of vocal folds with thin liquid trials via straw. No aspiration    ASSESSMENT:  Acute on chronic hypercapnic respiratory failure   Mucus plugging of lower lobe airways   COPD f/b Dr. Hipolito Funk, with severe acute exacerbation   Recent cytology suggesting squamous cell carcinoma on bronch washing cytology 2/12/23 - no clear primary   Oropharyngeal dysphagia, mild-moderate  Chronic hypoxemic respiratory failure; baseline 3 L O2  Recent pseudomonas pneumonia, bronch 2/12/23 with blood suctioned RUL & right mainstem airways; on MV 2/12/23 - 2/13/23. pAFIB on Eliquis  Former smoker    PLAN:  Supplemental O2 to maintain SaO2 >92%; wean as tolerated    BiPAP - change to PRN    IV solumedrol with plan to convert to oral prednisone with improvement  Inhaled bronchodilators, start MetaNeb, Acapella, hypertonic nebs   On Daliresp recently started by Dr. Trish Adams D#3  Oncology has been consulted  Holding Eliquis, last dose AM 2/28/23 for possible therapeutic bronchoscopy and to re-evaluate for possible endobronchial lesion. Patient is contemplative, will plan family meeting tomorrow. Will need outpatient PET imaging, is currently scheduled 3/16/23   D/W IM & family at bedside     I spent a total of 15 minutes on this encounter personally obtaining the patient history, reviewing labs, imaging and other data. I independently performed the above physical exam and updated this encounter note, assessment and plan as needed. Maynor Garcia MD on 3/1/23 at 11:55 AM EST    I spent a total of 13 minutes on this encounter on chart review, history taking and review of data. I independently performed a physical exam and updated this encounter note as needed.    Tomy Roman PA-C on 3/1/23 at 7:08 AM EST

## 2023-03-01 NOTE — PROGRESS NOTES
RT Inhaler-Nebulizer Bronchodilator Protocol Note    There is a bronchodilator order in the chart from a provider indicating to follow the RT Bronchodilator Protocol and there is an Initiate RT Inhaler-Nebulizer Bronchodilator Protocol order as well (see protocol at bottom of note). CXR Findings:  No results found. The findings from the last RT Protocol Assessment were as follows:   History Pulmonary Disease: (P) Chronic pulmonary disease  Respiratory Pattern: (P) Dyspnea on exertion or RR 21-25 bpm  Breath Sounds: (P) Slightly diminished and/or crackles  Cough: (P) Strong, productive  Indication for Bronchodilator Therapy: (P) On home bronchodilators  Bronchodilator Assessment Score: (P) 7    Aerosolized bronchodilator medication orders have been revised according to the RT Inhaler-Nebulizer Bronchodilator Protocol below. Respiratory Therapist to perform RT Therapy Protocol Assessment initially then follow the protocol. Repeat RT Therapy Protocol Assessment PRN for score 0-3 or on second treatment, BID, and PRN for scores above 3. No Indications - adjust the frequency to every 6 hours PRN wheezing or bronchospasm, if no treatments needed after 48 hours then discontinue using Per Protocol order mode. If indication present, adjust the RT bronchodilator orders based on the Bronchodilator Assessment Score as indicated below. Use Inhaler orders unless patient has one or more of the following: on home nebulizer, not able to hold breath for 10 seconds, is not alert and oriented, cannot activate and use MDI correctly, or respiratory rate 25 breaths per minute or more, then use the equivalent nebulizer order(s) with same Frequency and PRN reasons based on the score. If a patient is on this medication at home then do not decrease Frequency below that used at home.     0-3 - enter or revise RT bronchodilator order(s) to equivalent RT Bronchodilator order with Frequency of every 4 hours PRN for wheezing or increased work of breathing using Per Protocol order mode. 4-6 - enter or revise RT Bronchodilator order(s) to two equivalent RT bronchodilator orders with one order with BID Frequency and one order with Frequency of every 4 hours PRN wheezing or increased work of breathing using Per Protocol order mode. 7-10 - enter or revise RT Bronchodilator order(s) to two equivalent RT bronchodilator orders with one order with TID Frequency and one order with Frequency of every 4 hours PRN wheezing or increased work of breathing using Per Protocol order mode. 11-13 - enter or revise RT Bronchodilator order(s) to one equivalent RT bronchodilator order with QID Frequency and an Albuterol order with Frequency of every 4 hours PRN wheezing or increased work of breathing using Per Protocol order mode. Greater than 13 - enter or revise RT Bronchodilator order(s) to one equivalent RT bronchodilator order with every 4 hours Frequency and an Albuterol order with Frequency of every 2 hours PRN wheezing or increased work of breathing using Per Protocol order mode.          Electronically signed by Sue Long RCP on 3/1/2023 at 7:54 AM

## 2023-03-01 NOTE — PROGRESS NOTES
Pulse oximetry assessment    85% at rest on room air (if 88% or less, skip next steps)  % while ambulating on room air  95% at rest on 3LPM  89% while ambulating on 3LPM

## 2023-03-02 ENCOUNTER — ANESTHESIA EVENT (OUTPATIENT)
Dept: ENDOSCOPY | Age: 83
End: 2023-03-02
Payer: MEDICARE

## 2023-03-02 LAB
ANION GAP SERPL CALCULATED.3IONS-SCNC: 11 MMOL/L (ref 3–16)
BASOPHILS ABSOLUTE: 0 K/UL (ref 0–0.2)
BASOPHILS RELATIVE PERCENT: 0 %
BUN BLDV-MCNC: 23 MG/DL (ref 7–20)
CALCIUM SERPL-MCNC: 8.5 MG/DL (ref 8.3–10.6)
CHLORIDE BLD-SCNC: 97 MMOL/L (ref 99–110)
CO2: 32 MMOL/L (ref 21–32)
CREAT SERPL-MCNC: 0.6 MG/DL (ref 0.8–1.3)
EOSINOPHILS ABSOLUTE: 0 K/UL (ref 0–0.6)
EOSINOPHILS RELATIVE PERCENT: 0 %
GFR SERPL CREATININE-BSD FRML MDRD: >60 ML/MIN/{1.73_M2}
GLUCOSE BLD-MCNC: 191 MG/DL (ref 70–99)
GLUCOSE BLD-MCNC: 220 MG/DL (ref 70–99)
GLUCOSE BLD-MCNC: 244 MG/DL (ref 70–99)
GLUCOSE BLD-MCNC: 263 MG/DL (ref 70–99)
GLUCOSE BLD-MCNC: 290 MG/DL (ref 70–99)
HCT VFR BLD CALC: 34.8 % (ref 40.5–52.5)
HEMOGLOBIN: 11.7 G/DL (ref 13.5–17.5)
LYMPHOCYTES ABSOLUTE: 0.6 K/UL (ref 1–5.1)
LYMPHOCYTES RELATIVE PERCENT: 6.7 %
MCH RBC QN AUTO: 28.1 PG (ref 26–34)
MCHC RBC AUTO-ENTMCNC: 33.5 G/DL (ref 31–36)
MCV RBC AUTO: 83.8 FL (ref 80–100)
MONOCYTES ABSOLUTE: 0.7 K/UL (ref 0–1.3)
MONOCYTES RELATIVE PERCENT: 6.9 %
NEUTROPHILS ABSOLUTE: 8.1 K/UL (ref 1.7–7.7)
NEUTROPHILS RELATIVE PERCENT: 86.4 %
PDW BLD-RTO: 16.1 % (ref 12.4–15.4)
PERFORMED ON: ABNORMAL
PLATELET # BLD: 104 K/UL (ref 135–450)
PMV BLD AUTO: 8.7 FL (ref 5–10.5)
POTASSIUM REFLEX MAGNESIUM: 3.7 MMOL/L (ref 3.5–5.1)
RBC # BLD: 4.15 M/UL (ref 4.2–5.9)
SODIUM BLD-SCNC: 140 MMOL/L (ref 136–145)
WBC # BLD: 9.4 K/UL (ref 4–11)

## 2023-03-02 PROCEDURE — 2580000003 HC RX 258: Performed by: NURSE PRACTITIONER

## 2023-03-02 PROCEDURE — 92526 ORAL FUNCTION THERAPY: CPT

## 2023-03-02 PROCEDURE — 99233 SBSQ HOSP IP/OBS HIGH 50: CPT | Performed by: INTERNAL MEDICINE

## 2023-03-02 PROCEDURE — 94640 AIRWAY INHALATION TREATMENT: CPT

## 2023-03-02 PROCEDURE — 2060000000 HC ICU INTERMEDIATE R&B

## 2023-03-02 PROCEDURE — 94669 MECHANICAL CHEST WALL OSCILL: CPT

## 2023-03-02 PROCEDURE — 6370000000 HC RX 637 (ALT 250 FOR IP): Performed by: INTERNAL MEDICINE

## 2023-03-02 PROCEDURE — 2700000000 HC OXYGEN THERAPY PER DAY

## 2023-03-02 PROCEDURE — 6360000002 HC RX W HCPCS: Performed by: NURSE PRACTITIONER

## 2023-03-02 PROCEDURE — 6370000000 HC RX 637 (ALT 250 FOR IP)

## 2023-03-02 PROCEDURE — 85025 COMPLETE CBC W/AUTO DIFF WBC: CPT

## 2023-03-02 PROCEDURE — 2580000003 HC RX 258

## 2023-03-02 PROCEDURE — 99232 SBSQ HOSP IP/OBS MODERATE 35: CPT | Performed by: INTERNAL MEDICINE

## 2023-03-02 PROCEDURE — 36415 COLL VENOUS BLD VENIPUNCTURE: CPT

## 2023-03-02 PROCEDURE — 80048 BASIC METABOLIC PNL TOTAL CA: CPT

## 2023-03-02 PROCEDURE — 94761 N-INVAS EAR/PLS OXIMETRY MLT: CPT

## 2023-03-02 RX ORDER — SODIUM CHLORIDE 0.9 % (FLUSH) 0.9 %
5-40 SYRINGE (ML) INJECTION PRN
Status: CANCELLED | OUTPATIENT
Start: 2023-03-02

## 2023-03-02 RX ORDER — GABAPENTIN 400 MG/1
800 CAPSULE ORAL 3 TIMES DAILY
Status: DISCONTINUED | OUTPATIENT
Start: 2023-03-02 | End: 2023-03-04 | Stop reason: HOSPADM

## 2023-03-02 RX ORDER — LANOLIN ALCOHOL/MO/W.PET/CERES
6 CREAM (GRAM) TOPICAL ONCE
Status: COMPLETED | OUTPATIENT
Start: 2023-03-02 | End: 2023-03-02

## 2023-03-02 RX ORDER — SODIUM CHLORIDE, SODIUM LACTATE, POTASSIUM CHLORIDE, CALCIUM CHLORIDE 600; 310; 30; 20 MG/100ML; MG/100ML; MG/100ML; MG/100ML
INJECTION, SOLUTION INTRAVENOUS CONTINUOUS
Status: CANCELLED | OUTPATIENT
Start: 2023-03-02

## 2023-03-02 RX ORDER — SODIUM CHLORIDE 9 MG/ML
INJECTION, SOLUTION INTRAVENOUS PRN
Status: CANCELLED | OUTPATIENT
Start: 2023-03-02

## 2023-03-02 RX ORDER — SODIUM CHLORIDE 0.9 % (FLUSH) 0.9 %
5-40 SYRINGE (ML) INJECTION EVERY 12 HOURS SCHEDULED
Status: CANCELLED | OUTPATIENT
Start: 2023-03-02

## 2023-03-02 RX ADMIN — SODIUM CHLORIDE, PRESERVATIVE FREE 10 ML: 5 INJECTION INTRAVENOUS at 09:12

## 2023-03-02 RX ADMIN — PANTOPRAZOLE SODIUM 40 MG: 40 TABLET, DELAYED RELEASE ORAL at 05:38

## 2023-03-02 RX ADMIN — Medication 2 PUFF: at 07:47

## 2023-03-02 RX ADMIN — DILTIAZEM HYDROCHLORIDE 180 MG: 180 CAPSULE, COATED, EXTENDED RELEASE ORAL at 07:58

## 2023-03-02 RX ADMIN — GABAPENTIN 400 MG: 400 CAPSULE ORAL at 07:58

## 2023-03-02 RX ADMIN — INSULIN LISPRO 1 UNITS: 100 INJECTION, SOLUTION INTRAVENOUS; SUBCUTANEOUS at 07:58

## 2023-03-02 RX ADMIN — PROPAFENONE HYDROCHLORIDE 150 MG: 150 TABLET, FILM COATED ORAL at 05:38

## 2023-03-02 RX ADMIN — ATORVASTATIN CALCIUM 10 MG: 10 TABLET, FILM COATED ORAL at 07:58

## 2023-03-02 RX ADMIN — GABAPENTIN 800 MG: 400 CAPSULE ORAL at 12:16

## 2023-03-02 RX ADMIN — ROFLUMILAST 250 MCG: 500 TABLET ORAL at 21:41

## 2023-03-02 RX ADMIN — LEVALBUTEROL 0.63 MG: 1.25 SOLUTION, CONCENTRATE RESPIRATORY (INHALATION) at 23:56

## 2023-03-02 RX ADMIN — LEVALBUTEROL 0.63 MG: 1.25 SOLUTION, CONCENTRATE RESPIRATORY (INHALATION) at 15:29

## 2023-03-02 RX ADMIN — SODIUM CHLORIDE, PRESERVATIVE FREE 10 ML: 5 INJECTION INTRAVENOUS at 22:38

## 2023-03-02 RX ADMIN — PREDNISONE 40 MG: 20 TABLET ORAL at 07:58

## 2023-03-02 RX ADMIN — INSULIN LISPRO 2 UNITS: 100 INJECTION, SOLUTION INTRAVENOUS; SUBCUTANEOUS at 16:40

## 2023-03-02 RX ADMIN — LEVALBUTEROL 0.63 MG: 1.25 SOLUTION, CONCENTRATE RESPIRATORY (INHALATION) at 07:45

## 2023-03-02 RX ADMIN — PROPAFENONE HYDROCHLORIDE 150 MG: 150 TABLET, FILM COATED ORAL at 21:59

## 2023-03-02 RX ADMIN — Medication 6 MG: at 21:41

## 2023-03-02 RX ADMIN — LEVOFLOXACIN 750 MG: 750 TABLET, FILM COATED ORAL at 07:58

## 2023-03-02 RX ADMIN — Medication 4 ML: at 07:47

## 2023-03-02 RX ADMIN — PROPAFENONE HYDROCHLORIDE 150 MG: 150 TABLET, FILM COATED ORAL at 12:16

## 2023-03-02 RX ADMIN — Medication 2 PUFF: at 20:00

## 2023-03-02 RX ADMIN — Medication 4 ML: at 23:55

## 2023-03-02 RX ADMIN — GABAPENTIN 800 MG: 400 CAPSULE ORAL at 21:40

## 2023-03-02 NOTE — FLOWSHEET NOTE
03/01/23 2115   Vital Signs   /74   MAP (Calculated) 94   Level of Consciousness 0   Pain Assessment   Pain Assessment None - Denies Pain   Patient shift assessment is complete, see flow sheet. Vital signs re-taken and stable at this time. PCA notified this nurse of Clear/yellow emesis the patient had at approximately 1949. Day shift nurse gave PRN Zofran at 1923. Patient able to tolerate  water and hs medications. Patient states it was acidic and that he is on medication for it. Patient denies any further needs at this time. Patient has call light within reach and uses appropriately.

## 2023-03-02 NOTE — PROGRESS NOTES
IM Progress Note    Admit Date:  2/27/2023  3    Interval history:  copd exacerbation     Subjective:  Mr. Sheri Emery seen sitting In bed, feels fine with no new issues  No new issues      Objective:   /83   Pulse (!) 104   Temp 97.8 °F (36.6 °C) (Oral)   Resp 18   Ht 6' (1.829 m)   Wt 174 lb 11.2 oz (79.2 kg)   SpO2 98%   BMI 23.69 kg/m²     Intake/Output Summary (Last 24 hours) at 3/2/2023 1894  Last data filed at 3/2/2023 2308  Gross per 24 hour   Intake 900 ml   Output 875 ml   Net 25 ml         Physical Exam:     General: elderly male, up in bed,  Awake, alert and oriented. Appears to be not in any distress  Mucous Membranes:  Pink , anicteric  Neck: No JVD, no carotid bruit, no thyromegaly  Chest:  Clear to auscultation bilaterally, minimal basilar crackles  Cardiovascular:  RRR S1S2 heard, no murmurs or gallops  Abdomen:  Soft, undistended, non tender, no organomegaly, BS present  Extremities: No edema or cyanosis.  Distal pulses well felt  Neurological : grossly normal with gen weakness       Medications:   Scheduled Medications:    levoFLOXacin  750 mg Oral Daily    gabapentin  400 mg Oral TID    insulin lispro  0-4 Units SubCUTAneous TID WC    insulin lispro  0-4 Units SubCUTAneous Nightly    levalbuterol  0.63 mg Nebulization Q8H    sodium chloride (Inhalant)  4 mL Nebulization BID    [Held by provider] apixaban  5 mg Oral BID    atorvastatin  10 mg Oral Daily    dilTIAZem  180 mg Oral Daily    pantoprazole  40 mg Oral QAM AC    fluticasone  2 spray Nasal Daily    sodium chloride flush  5-40 mL IntraVENous 2 times per day    predniSONE  40 mg Oral Daily    propafenone  150 mg Oral 3 times per day    Roflumilast  250 mcg Oral Nightly    budesonide-formoterol  2 puff Inhalation BID     I   sodium chloride      dextrose       ipratropium-albuterol, sodium chloride flush, sodium chloride, ondansetron **OR** ondansetron, polyethylene glycol, acetaminophen **OR** acetaminophen, glucose, dextrose bolus **OR** dextrose bolus, glucagon (rDNA), dextrose, albuterol    Lab Data:  Recent Labs     02/28/23  0816 03/01/23 0414 03/02/23 0413   WBC 4.8 5.6 9.4   HGB 11.2* 10.3* 11.7*   HCT 34.3* 30.6* 34.8*   MCV 85.9 83.6 83.8   * 86* 104*       Recent Labs     02/28/23  0816 03/01/23 0414 03/02/23 0413   * 138 140   K 4.2 4.2 3.7   CL 95* 96* 97*   CO2 32 37* 32   BUN 15 18 23*   CREATININE 0.6* 0.6* 0.6*       Recent Labs     02/27/23  1136   TROPONINI <0.01         Coagulation:   Lab Results   Component Value Date/Time    INR 1.32 02/12/2023 11:15 AM    APTT 29.8 01/25/2018 05:55 AM     Cardiac markers:   Lab Results   Component Value Date/Time    CKTOTAL 62 01/24/2018 03:22 PM    TROPONINI <0.01 02/27/2023 11:36 AM         Lab Results   Component Value Date    ALT 14 02/27/2023    AST 14 (L) 02/27/2023    ALKPHOS 69 02/27/2023    BILITOT 0.8 02/27/2023       Lab Results   Component Value Date    INR 1.32 (H) 02/12/2023    INR 1.10 11/06/2022    INR 1.20 (H) 01/07/2021    PROTIME 16.2 (H) 02/12/2023    PROTIME 14.1 11/06/2022    PROTIME 13.9 (H) 01/07/2021       Radiology       CULTURES  Pneumonia panel: ordered  Respiratory culture: ordered   Blood Culture: pending      EKG:   Sinus tachycardiaLow voltage QRSBorderline ECGWhen compared with ECG of 12-FEB-2023 12:20,Previous ECG has undetermined rhythm, needs reviewQRS axis Shifted rightCriteria for Inferior infarct are no longer PresentT wave inversion no longer evident in Inferior leadsNonspecific T wave abnormality, improved in Lateral leadsConfirmed by MELANIE Cardenas MD ([id number]) on 2/27/2023 5:46:19 PM          CT CHEST PULMONARY EMBOLISM W CONTRAST   Final Result   1. No acute pulmonary arterial thromboembolic disease. 2.  No new pulmonary mass or consolidation. Emphysema.        3.  Heterogeneous material in the right middle and lower lobe airways is   slightly more pronounced than yesterday's chest CT and may relate to   aspiration or retained secretions. XR CHEST (2 VW)   Final Result   No acute process. ASSESSMENT/PLAN:-     Acute on chronic hypoxic and hypercapnia   - baseline oxygen 3 liters   - placed on BIPAP in the the ED,  - BIPAP removed and placed on 3 liters  - remains stable over last 24 hrs   - pulmonary following         Hemoptysis-scant   - blood streaked sputum noted at home  - small amount  - holding  eliquis and monitor   - sputum culture ordered  -resolved         COPD  AE  - solumedrol for now, prednisone once improved   - inhaled bronchodilators   - Levaquin  D#4  - continue Daliresp   - check sputum cultures, pneumonia panel   - monitor on continuous pulse ox   - improved symptoms         Squamous Cell carcinoma   - noted on bronch washings completed on 2/12/23  - oncology consulted  - pulmonary following   - discussed with patient and family at bedside  - planned for bronch on Friday here   - pt does have PET scan scheduled outpatient         Hx of aspiration pneumonia  Hx of pseudomonas in sputum   - pulmonary following  - speech evaluation and treat  - barium swallow wnl      Tachycardia   - continue Cardizem  - change Duonebs to Xopenex   - CTPA negative for PE  - continue to monitor on telemetry      Dysphagia  - speech evaluation and treat  - barium swallow this am  - speech recommends no straws, meds whole or crushed in puree, strict aspiration precautions  - strict aspiration precautions ordered  - continue to monitor for s/sx of aspiration. PAF  - ST on EKG  - continue eliquis, Rythmol, Cardizem   - monitor on telemetry         DM type 2 with neuropathy   - hold Actos and metformin  - SSI--changed from NPO SSI to [facility] and HS  - monitor blood sugars  - continue Neurontin         Thrombocytopenia - chronic , stable  - platelet 527  - monitor         HLD  - Continue statin       DVT Prophylaxis: eliquis held  Diet: ADULT DIET;  Dysphagia - Soft and Bite Sized  Code Status: Full Code Frankie Dorado MD, 3/2/2023 7:33 AM

## 2023-03-02 NOTE — PROGRESS NOTES
AM assessment completed. Scheduled medications given per MAR. VSS 3 Liter NC, A/O x4, patient denies any needs at this time.  Call light in reach, will monitor,

## 2023-03-02 NOTE — FLOWSHEET NOTE
03/02/23 0538   Vital Signs   /83   MAP (Calculated) 101   Patient given am medications. Patient awake sitting on side of bed and given ice water per request. Patient has call light within reach and uses appropriately.

## 2023-03-02 NOTE — PROGRESS NOTES
PULMONARY PROGRESS NOTE  CC: COPD exacerbation    Subjective:   Congested cough. O2 requirement tested -- 3 L at rest, which is his baseline. Less fatigue and weakness. I saw pt with multiple family members to discuss fiberoptic bronchoscopy     IV line: Peripheral    PHYSICAL EXAM:   /83   Pulse (!) 104   Temp 97.8 °F (36.6 °C) (Oral)   Resp 18   Ht 6' (1.829 m)   Wt 174 lb 11.2 oz (79.2 kg)   SpO2 98%   BMI 23.69 kg/m² ' on 3L  Constitutional:  No acute distress   HEENT:  No scleral icterus  Neck:  No tracheal deviation present. Cardiovascular:  Normal heart sounds. Pulmonary/Chest:  No wheezes. + rhonchi. No rales. + bilateral lower lobe decreased breath sounds. No accessory muscle usage or stridor. Abdominal:  Soft. Musculoskeletal:  No cyanosis. No clubbing. Skin:  Skin is warm and dry.      Scheduled Meds:   levoFLOXacin  750 mg Oral Daily    gabapentin  400 mg Oral TID    insulin lispro  0-4 Units SubCUTAneous TID WC    insulin lispro  0-4 Units SubCUTAneous Nightly    levalbuterol  0.63 mg Nebulization Q8H    sodium chloride (Inhalant)  4 mL Nebulization BID    [Held by provider] apixaban  5 mg Oral BID    atorvastatin  10 mg Oral Daily    dilTIAZem  180 mg Oral Daily    pantoprazole  40 mg Oral QAM AC    fluticasone  2 spray Nasal Daily    sodium chloride flush  5-40 mL IntraVENous 2 times per day    predniSONE  40 mg Oral Daily    propafenone  150 mg Oral 3 times per day    Roflumilast  250 mcg Oral Nightly    budesonide-formoterol  2 puff Inhalation BID     Continuous Infusions:   sodium chloride      dextrose       PRN Meds:  ipratropium-albuterol, sodium chloride flush, sodium chloride, ondansetron **OR** ondansetron, polyethylene glycol, acetaminophen **OR** acetaminophen, glucose, dextrose bolus **OR** dextrose bolus, glucagon (rDNA), dextrose, albuterol    Labs:  CBC:   Recent Labs     02/28/23  0816 03/01/23  0414 03/02/23  0413   WBC 4.8 5.6 9.4   HGB 11.2* 10.3* 11.7* HCT 34.3* 30.6* 34.8*   MCV 85.9 83.6 83.8   * 86* 104*     BMP:   Recent Labs     02/28/23  0816 03/01/23  0414 03/02/23  0413   * 138 140   K 4.2 4.2 3.7   CL 95* 96* 97*   CO2 32 37* 32   BUN 15 18 23*   CREATININE 0.6* 0.6* 0.6*     Micro:   2/12/23 BAL Pensensitive Pseudomonas aeruginosa   2/12/23 Suctioned sputum Pseudomonas aeruginosa    2/27/23 SARS-CoV-2 & influenza not detected  2/27/23 BC NGTD  2/28/23 Pneumonia molecular panel pending  Sputum cx has been ordered  Procalcitonin 0.08    Cytology 2/12/23  Lung, Bronchial Washings:   - Positive for malignant cells, at least squamous cell carcinoma in situ. COMMENT:    Specimen B shows fragments of malignant squamous cells   consistent with squamous cell carcinoma. No definitive invasive   component is present. MBS 2/28/23: no aspiration, Deep penetration was seen to the level of the cords. Imaging:   CTPA 2/27/2023  Pulmonary Arteries: Pulmonary arteries are adequately opacified for   evaluation. No evidence of intraluminal filling defect to suggest pulmonary   embolism. Main pulmonary artery is normal in caliber. Mediastinum: No evidence of mediastinal lymphadenopathy. The heart and   pericardium demonstrate no acute abnormality. There is no acute abnormality   of the thoracic aorta. Atherosclerosis in the thoracic aorta. Coronary   artery calcifications. Lungs/pleura: Respiratory motion. Mild biapical pleuroparenchymal scarring. Emphysema. Heterogeneous material in the right middle and lower lobe airways   is more pronounced than the prior study. No new pulmonary mass or   consolidation. No pleural effusion or pneumothorax. Upper Abdomen: Limited images of the upper abdomen are unremarkable. Soft Tissues/Bones: No acute bone or soft tissue abnormality. Impression   1. No acute pulmonary arterial thromboembolic disease. 2.  No new pulmonary mass or consolidation. Emphysema. 3.  Heterogeneous material in the right middle and lower lobe airways is   slightly more pronounced than yesterday's chest CT and may relate to   aspiration or retained secretions. MBSS 2/28/23: deep penetration to level of vocal folds with thin liquid trials via straw. No aspiration    ASSESSMENT:  Acute on chronic hypercapnic respiratory failure; baseline is 3 L O2  Mucus plugging of lower lobe airways   COPD f/b Dr. Tim Sunshine, with severe acute exacerbation   Recent cytology suggesting squamous cell carcinoma on bronch washing cytology 2/12/23 - no clear primary, although prior scope with abnormal right sided (especially RUL) airways. Oropharyngeal dysphagia, mild-moderate  Chronic hypoxemic respiratory failure; baseline 3 L O2  Recent pseudomonas pneumonia, bronch 2/12/23 with blood suctioned RUL & right mainstem airways; on MV 2/12/23 - 2/13/23. pAFIB on Eliquis  Former smoker    PLAN:  Supplemental O2 to maintain SaO2 >92%; wean as tolerated    BiPAP PRN    IV solumedrol --> to prednisone today  Inhaled bronchodilators, start MetaNeb, Acapella, hypertonic nebs   [address] recently started by Dr. Tosha Mart D#4  Oncology has seen  Will need outpatient PET imaging, is currently scheduled 3/16/23   Planning therapeutic bronchoscopy under anesthesia tomorrow and to re-evaluate for possible endobronchial lesion. Probably will need endotracheal tube given need for biopsy/prolonged procedure. Holding Eliquis, last dose AM 2/28/23. NPO at midnight. Had family meeting today. The risks and benefits of fiberoptic bronchoscopy were specifically discussed, including the goal of obtaining a diagnosis, the risks of bleeding, infection, pneumothorax, lung collapse, hospitalization and death. We also discussed the risks of sedation/anesthesia. It is my assessment that the risk of the invasive procedure is outweighed by the benefit.   Patient was counseled regarding the recommended procedure, alternatives to the procedure, and possible consequences of not having any evaluation performed. Family was present and is aware that this is a high risk procedure that will require an ETT. I spent a total of 25 minutes on this encounter personally obtaining the patient history, reviewing labs, imaging and other data. I independently performed the above physical exam and updated this encounter note, assessment and plan as needed. Antoine Ornelas MD on 3/2/23 at 1:24 PM EST    I spent a total of 9 minutes on this encounter on chart review, history taking and review of data. I updated this encounter note as needed.    Lalito Salazar PA-C on 3/2/23 at 7:50 AM EST

## 2023-03-02 NOTE — PROGRESS NOTES
RT Inhaler-Nebulizer Bronchodilator Protocol Note    There is a bronchodilator order in the chart from a provider indicating to follow the RT Bronchodilator Protocol and there is an Initiate RT Inhaler-Nebulizer Bronchodilator Protocol order as well (see protocol at bottom of note). CXR Findings:  No results found. The findings from the last RT Protocol Assessment were as follows:   History Pulmonary Disease: Chronic pulmonary disease  Respiratory Pattern: Dyspnea on exertion or RR 21-25 bpm  Breath Sounds: Slightly diminished and/or crackles  Cough: Strong, productive  Indication for Bronchodilator Therapy: Decreased or absent breath sounds  Bronchodilator Assessment Score: 7    Aerosolized bronchodilator medication orders have been revised according to the RT Inhaler-Nebulizer Bronchodilator Protocol below. Respiratory Therapist to perform RT Therapy Protocol Assessment initially then follow the protocol. Repeat RT Therapy Protocol Assessment PRN for score 0-3 or on second treatment, BID, and PRN for scores above 3. No Indications - adjust the frequency to every 6 hours PRN wheezing or bronchospasm, if no treatments needed after 48 hours then discontinue using Per Protocol order mode. If indication present, adjust the RT bronchodilator orders based on the Bronchodilator Assessment Score as indicated below. Use Inhaler orders unless patient has one or more of the following: on home nebulizer, not able to hold breath for 10 seconds, is not alert and oriented, cannot activate and use MDI correctly, or respiratory rate 25 breaths per minute or more, then use the equivalent nebulizer order(s) with same Frequency and PRN reasons based on the score. If a patient is on this medication at home then do not decrease Frequency below that used at home.     0-3 - enter or revise RT bronchodilator order(s) to equivalent RT Bronchodilator order with Frequency of every 4 hours PRN for wheezing or increased work of breathing using Per Protocol order mode. 4-6 - enter or revise RT Bronchodilator order(s) to two equivalent RT bronchodilator orders with one order with BID Frequency and one order with Frequency of every 4 hours PRN wheezing or increased work of breathing using Per Protocol order mode. 7-10 - enter or revise RT Bronchodilator order(s) to two equivalent RT bronchodilator orders with one order with TID Frequency and one order with Frequency of every 4 hours PRN wheezing or increased work of breathing using Per Protocol order mode. 11-13 - enter or revise RT Bronchodilator order(s) to one equivalent RT bronchodilator order with QID Frequency and an Albuterol order with Frequency of every 4 hours PRN wheezing or increased work of breathing using Per Protocol order mode. Greater than 13 - enter or revise RT Bronchodilator order(s) to one equivalent RT bronchodilator order with every 4 hours Frequency and an Albuterol order with Frequency of every 2 hours PRN wheezing or increased work of breathing using Per Protocol order mode. PATIENT WILL BENEFIT FROM TREATMENTS.      Electronically signed by Chi Vasquez RCP on 3/1/2023 at 9:02 PM

## 2023-03-02 NOTE — PROGRESS NOTES
ONCOLOGY HEMATOLOGY CARE PROGRESS NOTE      SUBJECTIVE:    Remains admitted. Afebrile. Breathing overall improved. ROS:     Constitutional:  No weight loss, No fever, No chills, No night sweats. Energy level good.   Eyes:  No impairment or change in vision  ENT / Mouth:  No pain, abnormal ulceration, bleeding, nasal drip or change in voice or hearing  Cardiovascular:  No chest pain, palpitations, new edema, or calf discomfort  Respiratory:  No pain, hemoptysis, change to breathing  Breast:  No pain, discharge, change in appearance or texture  Gastrointestinal:  No pain, cramping, jaundice, change to eating and bowel habits  Urinary:  No pain, bleeding or change in continence  Genitalia: No pain, bleeding or discharge  Musculoskeletal:  No redness, pain, edema or weakness  Skin:  No pruritus, rash, change to nodules or lesions  Neurologic:  No discomfort, change in mental status, speech, sensory or motor activity  Psychiatric:  No change in concentration or change to affect or mood  Endocrine:  No hot flashes, increased thirst, or change to urine production  Hematologic: No petechiae, ecchymosis or bleeding  Lymphatic:  No lymphadenopathy or lymphedema  Allergy / Immunologic:  No eczema, hives, frequent or recurrent infections    OBJECTIVE        Physical    VITALS:  Patient Vitals for the past 24 hrs:   BP Temp Temp src Pulse Resp SpO2 Weight   03/02/23 0748 120/67 97.1 °F (36.2 °C) Oral (!) 106 16 98 % --   03/02/23 0538 138/83 -- -- -- -- -- --   03/02/23 0331 138/74 97.8 °F (36.6 °C) Oral (!) 104 18 98 % 174 lb 11.2 oz (79.2 kg)   03/01/23 2310 -- -- -- (!) 105 18 98 % --   03/01/23 2115 135/74 -- -- -- -- -- --   03/01/23 2100 -- -- -- (!) 107 18 98 % --   03/01/23 1949 (!) 156/109 98 °F (36.7 °C) Oral 98 19 98 % --   03/01/23 1531 -- -- -- -- -- 98 % --   03/01/23 1415 139/70 98.1 °F (36.7 °C) Oral 98 19 -- --   03/01/23 0900 128/72 98.1 °F (36.7 °C) Oral (!) 104 -- 98 % --       24HR INTAKE/OUTPUT:    Intake/Output Summary (Last 24 hours) at 3/2/2023 0803  Last data filed at 3/2/2023 5400  Gross per 24 hour   Intake 900 ml   Output 875 ml   Net 25 ml       CONSTITUTIONAL: awake, alert, cooperative, no apparent distress   EYES: pupils equal, round and reactive to light, sclera clear and conjunctiva normal  ENT: Normocephalic, without obvious abnormality, atraumatic  NECK: supple, symmetrical, no jugular venous distension and no carotid bruits   HEMATOLOGIC/LYMPHATIC: no cervical, supraclavicular or axillary lymphadenopathy   LUNGS: no increased work of breathing and clear to auscultation   CARDIOVASCULAR: regular rate and rhythm, normal S1 and S2, no murmur noted  ABDOMEN: normal bowel sounds x 4, soft, non-distended, non-tender, no masses palpated, no hepatosplenomgaly   MUSCULOSKELETAL: full range of motion noted, tone is normal  NEUROLOGIC: awake, alert, oriented to name, place and time. Motor skills grossly intact. SKIN: Normal skin color, texture, turgor and no jaundice. appears intact   EXTREMITIES: no LE edema     DATA:  CBC:    Recent Labs     03/02/23  0413 03/01/23  0414 02/28/23  0816 02/27/23  1136   WBC 9.4 5.6 4.8 6.0   NEUTROABS 8.1* 4.9 3.8 4.4   LYMPHOPCT 6.7 8.9 15.9 18.5   RBC 4.15* 3.66* 3.99* 4.34   HGB 11.7* 10.3* 11.2* 11.9*   HCT 34.8* 30.6* 34.3* 37.0*   MCV 83.8 83.6 85.9 85.2   MCH 28.1 28.0 28.1 27.5   MCHC 33.5 33.5 32.7 32.3   RDW 16.1* 15.5* 15.6* 15.9*   * 86* 108* 115*       PT/INR:    Recent Labs     02/27/23  1136 02/26/23  1330 02/12/23  1115   PROT 7.1 6.2* 7.3   INR  --   --  1.32*     PTT:  No results for input(s): APTT in the last 720 hours.     CMP:    Recent Labs     03/02/23  0413 03/01/23  0414 02/28/23  0816 02/27/23  1136 02/26/23  1330 02/15/23  0531 02/14/23  0741 02/13/23  0345 02/12/23  1115    138 135* 141 145 136 142   < > 137   K 3.7 4.2 4.2 4.1 4.0 4.3  4.3 3.7   < > 4.5   CL 97* 96* 95* 94* 100 95* 99   < > 94* CO2 32 37* 32 40* 39* 33* 36*   < > 39*   GLUCOSE 244* 222* 167* 140* 124* 175* 127*   < > 131*   BUN 23* 18 15 11 15 26* 24*   < > 25*   CREATININE 0.6* 0.6* 0.6* 0.7* 0.7* 0.7* 0.8   < > 1.2   LABGLOM >60 >60 >60 >60 >60 >60 >60   < > 60*   CALCIUM 8.5 8.5 8.8 9.3 9.3 9.1 8.3   < > 9.5   PROT  --   --   --  7.1 6.2*  --   --   --  7.3   LABALBU  --   --   --  4.2 3.8  --   --   --  4.4   AGRATIO  --   --   --  1.4 1.6  --   --   --  1.5   BILITOT  --   --   --  0.8 0.7  --   --   --  0.8   ALKPHOS  --   --   --  69 63  --   --   --  89   ALT  --   --   --  14 14  --   --   --  11   AST  --   --   --  14* 13*  --   --   --  14*   MG  --   --   --   --   --  1.70* 1.80  --  1.90    < > = values in this interval not displayed. Lab Results   Component Value Date    CALCIUM 8.5 03/02/2023    PHOS 3.6 02/15/2023       LDH:No results for input(s): LDH in the last 720 hours. Radiology Review:  FL MODIFIED BARIUM SWALLOW W VIDEO  Narrative: EXAMINATION:  MODIFIED BARIUM SWALLOW WAS PERFORMED IN CONJUNCTION WITH SPEECH PATHOLOGY  SERVICES    TECHNIQUE:  Under fluoroscopic evaluation cineradiography/videoradiography recordings  were performed in conjunction with the speech-language pathologist (SLP). Various liquid, solid and/or semi-solid barium preparations were used to  assess swallowing function. FLUOROSCOPY DOSE AND TYPE:    1 minute 34 seconds fluoroscopy    Total fluoroscopic dose 13.90 mGy    COMPARISON:  None    HISTORY:  ORDERING SYSTEM PROVIDED HISTORY: dysphagia  TECHNOLOGIST PROVIDED HISTORY:  Reason for exam:->dysphagia  Reason for Exam: dysphagia    FINDINGS:  Deep penetration was seen to the level of the cords. Impression: Deep penetration was seen to the level of the cords. Please see separate speech pathology report for full discussion of findings  and recommendations.       Problem List  Patient Active Problem List   Diagnosis    HTN (hypertension)    COPD, severe ([facility]    DM2 (diabetes mellitus, type 2) (HCC)    HLD (hyperlipidemia)    Gallstones    PAF (paroxysmal atrial fibrillation) (HCC)    Chronic respiratory failure with hypoxia and hypercapnia 2.5 L home O2    Acute respiratory insufficiency    Acute on chronic respiratory failure with hypercapnia (HCC)    Septic shock (HCC)    COPD exacerbation (HCC)    Former smoker    Acute hyperglycemia    Chronic normocytic anemia    Chronic thrombocytopenia    Falls at home    Ambulatory dysfunction    General weakness    Acute on chronic respiratory failure with hypoxia and hypercapnia (HCC)    Pleural effusion    Rapid atrial fibrillation (HCC)    Atrial fibrillation with RVR (HCC)    Acute respiratory failure with hypoxia and hypercapnia (HCC)    Chronic obstructive pulmonary disease (HCC)    Acute encephalopathy    Hyperglycemia    Pneumonia of left lower lobe due to infectious organism    Acute calculous cholecystitis    Acute cholecystitis    Atherosclerotic ulcer of aorta (HCC)    Closed compression fracture of body of L1 vertebra (HCC)    Hypomagnesemia    Abdominal aortic aneurysm (AAA) 30 to 34 mm in diameter    Elevated procalcitonin    Abdominal pain, right upper quadrant    Lactic acidosis    Elevated bilirubin    Bacteremia due to Klebsiella pneumoniae    Paroxysmal atrial fibrillation (HCC)    Moderate protein-calorie malnutrition ([address]    Debility    COVID-19    Acute on chronic respiratory failure with hypoxemia (HCC)    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)    Diabetes mellitus with skin ulcer ([address]    Other specified peripheral vascular diseases ([address]    Hypoxia    Atrial fibrillation, controlled ([address]    Community acquired pneumonia    Acute respiratory failure ([address]    Aspiration pneumonia of both lungs (HCC)    Hemoptysis    Aspiration pneumonia ([address]    Pneumonia of right lower lobe due to Pseudomonas species (HCC)    Mucus plugging of bronchi    Acute exacerbation of chronic obstructive pulmonary disease (COPD) ([facility] [address]       ASSESSMENT AND PLAN:    Squamous Cell Lung Cancer     - diagnosed on bronchoscopy when admitted for pneumonia 2/2023  - suspected R lung primary based on CT  - repeat bronchoscopy planned tomorrow per pulmonary  - needs PET scan for staging as next step   - this is scheduled 3/16/22  - once stage established, can consider for treatment options     2. Resp Failure/COPD     - per IM and pulmonary     3. DM2     4. PAF     5. HLD          ONCOLOGIC DISPOSITION: per IM and pulmonary.        Magnus Khanna MD  Please contact through [address]

## 2023-03-03 ENCOUNTER — ANESTHESIA (OUTPATIENT)
Dept: ENDOSCOPY | Age: 83
End: 2023-03-03
Payer: MEDICARE

## 2023-03-03 PROBLEM — C34.90 SQUAMOUS CELL CARCINOMA OF LUNG (HCC): Status: ACTIVE | Noted: 2023-01-01

## 2023-03-03 LAB
BLOOD CULTURE, ROUTINE: NORMAL
CULTURE, BLOOD 2: NORMAL
GLUCOSE BLD-MCNC: 167 MG/DL (ref 70–99)
GLUCOSE BLD-MCNC: 173 MG/DL (ref 70–99)
GLUCOSE BLD-MCNC: 218 MG/DL (ref 70–99)
GLUCOSE BLD-MCNC: 249 MG/DL (ref 70–99)
GLUCOSE BLD-MCNC: 270 MG/DL (ref 70–99)
GLUCOSE BLD-MCNC: 356 MG/DL (ref 70–99)
PERFORMED ON: ABNORMAL

## 2023-03-03 PROCEDURE — 6360000002 HC RX W HCPCS: Performed by: NURSE PRACTITIONER

## 2023-03-03 PROCEDURE — 6370000000 HC RX 637 (ALT 250 FOR IP): Performed by: INTERNAL MEDICINE

## 2023-03-03 PROCEDURE — 87205 SMEAR GRAM STAIN: CPT

## 2023-03-03 PROCEDURE — 0BC68ZZ EXTIRPATION OF MATTER FROM RIGHT LOWER LOBE BRONCHUS, VIA NATURAL OR ARTIFICIAL OPENING ENDOSCOPIC: ICD-10-PCS | Performed by: INTERNAL MEDICINE

## 2023-03-03 PROCEDURE — 2709999900 HC NON-CHARGEABLE SUPPLY: Performed by: INTERNAL MEDICINE

## 2023-03-03 PROCEDURE — 94669 MECHANICAL CHEST WALL OSCILL: CPT

## 2023-03-03 PROCEDURE — 2580000003 HC RX 258: Performed by: NURSE ANESTHETIST, CERTIFIED REGISTERED

## 2023-03-03 PROCEDURE — 3609010900 HC BRONCHOSCOPY THERAPUTIC ASPIRATION INITIAL: Performed by: INTERNAL MEDICINE

## 2023-03-03 PROCEDURE — 3609010800 HC BRONCHOSCOPY ALVEOLAR LAVAGE: Performed by: INTERNAL MEDICINE

## 2023-03-03 PROCEDURE — 99232 SBSQ HOSP IP/OBS MODERATE 35: CPT | Performed by: INTERNAL MEDICINE

## 2023-03-03 PROCEDURE — 31645 BRNCHSC W/THER ASPIR 1ST: CPT | Performed by: INTERNAL MEDICINE

## 2023-03-03 PROCEDURE — 31624 DX BRONCHOSCOPE/LAVAGE: CPT | Performed by: INTERNAL MEDICINE

## 2023-03-03 PROCEDURE — 94150 VITAL CAPACITY TEST: CPT

## 2023-03-03 PROCEDURE — 3609011100 HC BRONCHOSCOPY BRUSHINGS: Performed by: INTERNAL MEDICINE

## 2023-03-03 PROCEDURE — 2580000003 HC RX 258: Performed by: NURSE PRACTITIONER

## 2023-03-03 PROCEDURE — 6360000002 HC RX W HCPCS: Performed by: NURSE ANESTHETIST, CERTIFIED REGISTERED

## 2023-03-03 PROCEDURE — 7100000001 HC PACU RECOVERY - ADDTL 15 MIN: Performed by: INTERNAL MEDICINE

## 2023-03-03 PROCEDURE — 0B9F8ZX DRAINAGE OF RIGHT LOWER LUNG LOBE, VIA NATURAL OR ARTIFICIAL OPENING ENDOSCOPIC, DIAGNOSTIC: ICD-10-PCS | Performed by: INTERNAL MEDICINE

## 2023-03-03 PROCEDURE — 2060000000 HC ICU INTERMEDIATE R&B

## 2023-03-03 PROCEDURE — 2700000000 HC OXYGEN THERAPY PER DAY

## 2023-03-03 PROCEDURE — 31623 DX BRONCHOSCOPE/BRUSH: CPT | Performed by: INTERNAL MEDICINE

## 2023-03-03 PROCEDURE — 94640 AIRWAY INHALATION TREATMENT: CPT

## 2023-03-03 PROCEDURE — 7100000000 HC PACU RECOVERY - FIRST 15 MIN: Performed by: INTERNAL MEDICINE

## 2023-03-03 PROCEDURE — 88305 TISSUE EXAM BY PATHOLOGIST: CPT

## 2023-03-03 PROCEDURE — 0B938ZX DRAINAGE OF RIGHT MAIN BRONCHUS, VIA NATURAL OR ARTIFICIAL OPENING ENDOSCOPIC, DIAGNOSTIC: ICD-10-PCS | Performed by: INTERNAL MEDICINE

## 2023-03-03 PROCEDURE — 87070 CULTURE OTHR SPECIMN AEROBIC: CPT

## 2023-03-03 PROCEDURE — 94761 N-INVAS EAR/PLS OXIMETRY MLT: CPT

## 2023-03-03 PROCEDURE — 0B9C8ZX DRAINAGE OF RIGHT UPPER LUNG LOBE, VIA NATURAL OR ARTIFICIAL OPENING ENDOSCOPIC, DIAGNOSTIC: ICD-10-PCS | Performed by: INTERNAL MEDICINE

## 2023-03-03 PROCEDURE — 6370000000 HC RX 637 (ALT 250 FOR IP)

## 2023-03-03 PROCEDURE — 88112 CYTOPATH CELL ENHANCE TECH: CPT

## 2023-03-03 PROCEDURE — 2500000003 HC RX 250 WO HCPCS: Performed by: NURSE ANESTHETIST, CERTIFIED REGISTERED

## 2023-03-03 PROCEDURE — 2580000003 HC RX 258

## 2023-03-03 RX ORDER — SODIUM CHLORIDE 9 MG/ML
25 INJECTION, SOLUTION INTRAVENOUS PRN
Status: DISCONTINUED | OUTPATIENT
Start: 2023-03-03 | End: 2023-03-03

## 2023-03-03 RX ORDER — ROCURONIUM BROMIDE 10 MG/ML
INJECTION, SOLUTION INTRAVENOUS PRN
Status: DISCONTINUED | OUTPATIENT
Start: 2023-03-03 | End: 2023-03-03 | Stop reason: SDUPTHER

## 2023-03-03 RX ORDER — ONDANSETRON 2 MG/ML
4 INJECTION INTRAMUSCULAR; INTRAVENOUS EVERY 10 MIN PRN
Status: DISCONTINUED | OUTPATIENT
Start: 2023-03-03 | End: 2023-03-03

## 2023-03-03 RX ORDER — MEPERIDINE HYDROCHLORIDE 25 MG/ML
12.5 INJECTION INTRAMUSCULAR; INTRAVENOUS; SUBCUTANEOUS EVERY 5 MIN PRN
Status: DISCONTINUED | OUTPATIENT
Start: 2023-03-03 | End: 2023-03-03

## 2023-03-03 RX ORDER — DEXAMETHASONE SODIUM PHOSPHATE 4 MG/ML
INJECTION, SOLUTION INTRA-ARTICULAR; INTRALESIONAL; INTRAMUSCULAR; INTRAVENOUS; SOFT TISSUE PRN
Status: DISCONTINUED | OUTPATIENT
Start: 2023-03-03 | End: 2023-03-03 | Stop reason: SDUPTHER

## 2023-03-03 RX ORDER — PROPOFOL 10 MG/ML
INJECTION, EMULSION INTRAVENOUS PRN
Status: DISCONTINUED | OUTPATIENT
Start: 2023-03-03 | End: 2023-03-03 | Stop reason: SDUPTHER

## 2023-03-03 RX ORDER — DIPHENHYDRAMINE HYDROCHLORIDE 50 MG/ML
12.5 INJECTION INTRAMUSCULAR; INTRAVENOUS
Status: DISCONTINUED | OUTPATIENT
Start: 2023-03-03 | End: 2023-03-03

## 2023-03-03 RX ORDER — MIDAZOLAM HYDROCHLORIDE 1 MG/ML
1 INJECTION INTRAMUSCULAR; INTRAVENOUS EVERY 5 MIN PRN
Status: DISCONTINUED | OUTPATIENT
Start: 2023-03-03 | End: 2023-03-03

## 2023-03-03 RX ORDER — ONDANSETRON 2 MG/ML
INJECTION INTRAMUSCULAR; INTRAVENOUS PRN
Status: DISCONTINUED | OUTPATIENT
Start: 2023-03-03 | End: 2023-03-03 | Stop reason: SDUPTHER

## 2023-03-03 RX ORDER — SODIUM CHLORIDE 0.9 % (FLUSH) 0.9 %
5-40 SYRINGE (ML) INJECTION PRN
Status: DISCONTINUED | OUTPATIENT
Start: 2023-03-03 | End: 2023-03-03

## 2023-03-03 RX ORDER — SODIUM CHLORIDE 0.9 % (FLUSH) 0.9 %
5-40 SYRINGE (ML) INJECTION EVERY 12 HOURS SCHEDULED
Status: DISCONTINUED | OUTPATIENT
Start: 2023-03-03 | End: 2023-03-03

## 2023-03-03 RX ORDER — OXYCODONE HYDROCHLORIDE 5 MG/1
5 TABLET ORAL
Status: DISCONTINUED | OUTPATIENT
Start: 2023-03-03 | End: 2023-03-03

## 2023-03-03 RX ORDER — HYDRALAZINE HYDROCHLORIDE 20 MG/ML
5 INJECTION INTRAMUSCULAR; INTRAVENOUS
Status: DISCONTINUED | OUTPATIENT
Start: 2023-03-03 | End: 2023-03-03

## 2023-03-03 RX ORDER — SODIUM CHLORIDE, SODIUM LACTATE, POTASSIUM CHLORIDE, CALCIUM CHLORIDE 600; 310; 30; 20 MG/100ML; MG/100ML; MG/100ML; MG/100ML
INJECTION, SOLUTION INTRAVENOUS CONTINUOUS PRN
Status: DISCONTINUED | OUTPATIENT
Start: 2023-03-03 | End: 2023-03-03 | Stop reason: SDUPTHER

## 2023-03-03 RX ORDER — LIDOCAINE HYDROCHLORIDE 20 MG/ML
INJECTION, SOLUTION EPIDURAL; INFILTRATION; INTRACAUDAL; PERINEURAL PRN
Status: DISCONTINUED | OUTPATIENT
Start: 2023-03-03 | End: 2023-03-03 | Stop reason: SDUPTHER

## 2023-03-03 RX ADMIN — ROFLUMILAST 250 MCG: 500 TABLET ORAL at 20:10

## 2023-03-03 RX ADMIN — Medication 2 PUFF: at 07:51

## 2023-03-03 RX ADMIN — INSULIN LISPRO 4 UNITS: 100 INJECTION, SOLUTION INTRAVENOUS; SUBCUTANEOUS at 20:11

## 2023-03-03 RX ADMIN — PROPAFENONE HYDROCHLORIDE 150 MG: 150 TABLET, FILM COATED ORAL at 15:23

## 2023-03-03 RX ADMIN — ATORVASTATIN CALCIUM 10 MG: 10 TABLET, FILM COATED ORAL at 09:37

## 2023-03-03 RX ADMIN — ONDANSETRON HYDROCHLORIDE 4 MG: 2 INJECTION, SOLUTION INTRAMUSCULAR; INTRAVENOUS at 13:59

## 2023-03-03 RX ADMIN — LEVOFLOXACIN 750 MG: 750 TABLET, FILM COATED ORAL at 09:37

## 2023-03-03 RX ADMIN — GABAPENTIN 800 MG: 400 CAPSULE ORAL at 20:11

## 2023-03-03 RX ADMIN — DEXAMETHASONE SODIUM PHOSPHATE 8 MG: 4 INJECTION, SOLUTION INTRAMUSCULAR; INTRAVENOUS at 13:59

## 2023-03-03 RX ADMIN — PREDNISONE 40 MG: 20 TABLET ORAL at 09:37

## 2023-03-03 RX ADMIN — LEVALBUTEROL 0.63 MG: 1.25 SOLUTION, CONCENTRATE RESPIRATORY (INHALATION) at 23:33

## 2023-03-03 RX ADMIN — ROCURONIUM BROMIDE 30 MG: 10 INJECTION, SOLUTION INTRAVENOUS at 13:46

## 2023-03-03 RX ADMIN — LEVALBUTEROL 0.63 MG: 1.25 SOLUTION, CONCENTRATE RESPIRATORY (INHALATION) at 07:51

## 2023-03-03 RX ADMIN — DILTIAZEM HYDROCHLORIDE 180 MG: 180 CAPSULE, COATED, EXTENDED RELEASE ORAL at 09:37

## 2023-03-03 RX ADMIN — PROPOFOL 100 MG: 10 INJECTION, EMULSION INTRAVENOUS at 13:46

## 2023-03-03 RX ADMIN — SODIUM CHLORIDE, PRESERVATIVE FREE 20 ML: 5 INJECTION INTRAVENOUS at 20:18

## 2023-03-03 RX ADMIN — SUGAMMADEX 400 MG: 100 INJECTION, SOLUTION INTRAVENOUS at 14:12

## 2023-03-03 RX ADMIN — Medication 4 ML: at 23:33

## 2023-03-03 RX ADMIN — PROPAFENONE HYDROCHLORIDE 150 MG: 150 TABLET, FILM COATED ORAL at 20:10

## 2023-03-03 RX ADMIN — INSULIN LISPRO 2 UNITS: 100 INJECTION, SOLUTION INTRAVENOUS; SUBCUTANEOUS at 16:08

## 2023-03-03 RX ADMIN — LIDOCAINE HYDROCHLORIDE 5 ML: 20 INJECTION, SOLUTION EPIDURAL; INFILTRATION; INTRACAUDAL; PERINEURAL at 13:46

## 2023-03-03 RX ADMIN — SODIUM CHLORIDE, POTASSIUM CHLORIDE, SODIUM LACTATE AND CALCIUM CHLORIDE: 600; 310; 30; 20 INJECTION, SOLUTION INTRAVENOUS at 13:44

## 2023-03-03 RX ADMIN — LEVALBUTEROL 0.63 MG: 1.25 SOLUTION, CONCENTRATE RESPIRATORY (INHALATION) at 16:32

## 2023-03-03 RX ADMIN — Medication 4 ML: at 07:51

## 2023-03-03 RX ADMIN — GABAPENTIN 800 MG: 400 CAPSULE ORAL at 15:24

## 2023-03-03 RX ADMIN — SODIUM CHLORIDE, PRESERVATIVE FREE 10 ML: 5 INJECTION INTRAVENOUS at 07:52

## 2023-03-03 RX ADMIN — Medication 2 PUFF: at 20:16

## 2023-03-03 RX ADMIN — FLUTICASONE PROPIONATE 2 SPRAY: 50 SPRAY, METERED NASAL at 09:38

## 2023-03-03 RX ADMIN — Medication 10 ML: at 20:19

## 2023-03-03 ASSESSMENT — PAIN - FUNCTIONAL ASSESSMENT: PAIN_FUNCTIONAL_ASSESSMENT: NONE - DENIES PAIN

## 2023-03-03 ASSESSMENT — COPD QUESTIONNAIRES: CAT_SEVERITY: MODERATE

## 2023-03-03 NOTE — PROGRESS NOTES
Pt to PACU placed on bedside monitor. NSR 93, SPO2 91% on 4L O2 via NC w/ CSPO2 monitoring. Resp e/e unlabored. Occasional moist cough w/ small amount of hemoptysis noted. RN to bedside. Phase I (PACU)  1. Patient is identified using name and the date of birth. 2.  The patient is free from signs and symptoms of chemical, electrical, laser, radiation, positioning, or transfer/transport injury. 3.  The patient receives appropriate medication(s), safely administered during the Perioperative period. 4.  The patient has wound/tissue perfusion consistent with or improved from baseline levels established preoperatively. 5.  The patient is at or returning to normothermia at the conclusion of the immediate postoperative period. 6.  The patient's fluid, electrolyte, and acid base balances are consistent with or improved from baseline levels established preoperatively. 7.  The patient's pulmonary function is consistent with or improved from baseline levels established preoperatively. 8.  The patient's cardiovascular status is consistent with or improved from baseline levels established preoperatively. 9.  The patient/caregiver participates in decisions affecting his or her Perioperative plan of care. 10. The patient's care is consistent with the individualized Perioperative plan of care. 11. The patient's right to privacy is maintained. 12. The patient is the recipient of competent and ethical care within legal standards of practice. 13.  The patient's value system, lifestyle, ethnicity, and culture are considered, respected, and incorporated in the Perioperative plan of care. 14.  The patient demonstrates and/or reports adequate pain control throughout the the Perioperative period. 15. The patient's neurological status is consistent with or improved from baseline levels established preoperatively.   16.  The patient/caregiver demonstrates knowledge of the expected responses to the operative or invasive procedure.  17.  Patient/Caregiver has reduced anxiety.  Interventions- Familiarize with environment and equipment.  18.  Other:  19.Other:

## 2023-03-03 NOTE — PROGRESS NOTES
RT Inhaler-Nebulizer Bronchodilator Protocol Note    There is a bronchodilator order in the chart from a provider indicating to follow the RT Bronchodilator Protocol and there is an Initiate RT Inhaler-Nebulizer Bronchodilator Protocol order as well (see protocol at bottom of note). CXR Findings:  No results found. The findings from the last RT Protocol Assessment were as follows:   History Pulmonary Disease: Chronic pulmonary disease  Respiratory Pattern: Dyspnea on exertion or RR 21-25 bpm  Breath Sounds: Slightly diminished and/or crackles  Cough: Strong, spontaneous, non-productive  Indication for Bronchodilator Therapy: Decreased or absent breath sounds  Bronchodilator Assessment Score: 6    Aerosolized bronchodilator medication orders have been revised according to the RT Inhaler-Nebulizer Bronchodilator Protocol below. Respiratory Therapist to perform RT Therapy Protocol Assessment initially then follow the protocol. Repeat RT Therapy Protocol Assessment PRN for score 0-3 or on second treatment, BID, and PRN for scores above 3. No Indications - adjust the frequency to every 6 hours PRN wheezing or bronchospasm, if no treatments needed after 48 hours then discontinue using Per Protocol order mode. If indication present, adjust the RT bronchodilator orders based on the Bronchodilator Assessment Score as indicated below. Use Inhaler orders unless patient has one or more of the following: on home nebulizer, not able to hold breath for 10 seconds, is not alert and oriented, cannot activate and use MDI correctly, or respiratory rate 25 breaths per minute or more, then use the equivalent nebulizer order(s) with same Frequency and PRN reasons based on the score. If a patient is on this medication at home then do not decrease Frequency below that used at home.     0-3 - enter or revise RT bronchodilator order(s) to equivalent RT Bronchodilator order with Frequency of every 4 hours PRN for wheezing or increased work of breathing using Per Protocol order mode. 4-6 - enter or revise RT Bronchodilator order(s) to two equivalent RT bronchodilator orders with one order with BID Frequency and one order with Frequency of every 4 hours PRN wheezing or increased work of breathing using Per Protocol order mode. 7-10 - enter or revise RT Bronchodilator order(s) to two equivalent RT bronchodilator orders with one order with TID Frequency and one order with Frequency of every 4 hours PRN wheezing or increased work of breathing using Per Protocol order mode. 11-13 - enter or revise RT Bronchodilator order(s) to one equivalent RT bronchodilator order with QID Frequency and an Albuterol order with Frequency of every 4 hours PRN wheezing or increased work of breathing using Per Protocol order mode. Greater than 13 - enter or revise RT Bronchodilator order(s) to one equivalent RT bronchodilator order with every 4 hours Frequency and an Albuterol order with Frequency of every 2 hours PRN wheezing or increased work of breathing using Per Protocol order mode.          Electronically signed by Soraida Bangura RCP on 3/2/2023 at 8:04 PM

## 2023-03-03 NOTE — CARE COORDINATION
INTERDISCIPLINARY PLAN OF CARE CONFERENCE    Date/Time: 3/3/2023 12:12 PM  Completed by: Valarie Michel RN, Case Management      Patient Name:  Kelley Kwong  YOB: 1940  Admitting Diagnosis: COPD exacerbation ([facility] [address] [J44.1]  Acute respiratory failure with hypercapnia ([facility] [address] [J96.02]  Chronic obstructive pulmonary disease, unspecified COPD type ([facility] [J44.9]     Admit Date/Time:  2/27/2023 11:20 AM    Chart reviewed. Interdisciplinary team contacted or reviewed plan related to patient progress and discharge plans. Disciplines included Case Management, Nursing, and Dietitian. Current Status: IP 02/27/2023  PT/OT recommendation for discharge plan of care:     Expected D/C Disposition:  Home  Confirmed plan with patient /daughters  Discharge Plan Comments:   Plan: Bronchoscopy today. Possible DC home Saturday. CM following for potential DC home w/ [facility] [place] [id number] services w/ [facility] and [facility]. Pt will need OP PET scan and f/u with oncology. +Cm will follow for new needs.  +[facility] home O2 (3 liters baseline)    Home O2 in place on admit: Yes

## 2023-03-03 NOTE — PROCEDURES
PROCEDURE:  BRONCHOSCOPY WITH BRONCHOALVEOLAR LAVAGE & TRANSBRONCHIAL BRUSHINGS RUL AIRWAY IRREGULARITY & THERAPEUTIC ASPIRATION OF MUCUS PLUGGING IN THE RIGHT MAINSTEM, RIGHT LOWER LOBE AIRWAYS. Anesthesia team kindly provided general anesthesia. Placement of an 8.5 ETT was not possible due to irregularity of vocal cord. No clear mass. A 7.5 ETT was placed. The scope was passed with ease via the ETT. A complete airway inspection was performed. No endobronchial lesions were identified. There was an irregularity of the right upper lobe main airway with slightly heaped up mucosa. Brushings were obtained. There were copious thick secretions occluding the right mainstem with mucus plugging throughout the right lower lobe airways. Therapeutic aspiration was performed in the right sided airways. Washings were obtained throughout the airways. A Bronchoalveolar lavage was obtained from the right lower lobe with good return. Estimated blood loss was less than 5 ml. The patient tolerated the procedure well. Recovery will be per endoscopy protocol. FOLLOW UP:  Cultures and cytology in  three to five days. If brushings are negative, consider ENT evaluation of vocal cords.

## 2023-03-03 NOTE — PROGRESS NOTES
Speech Language Pathology    ST dysphagia f/u attempt. SLP discussed pt with RN and RN reported pt off the floor for bronch at this time. ST to re attempt as schedule allows. No charges filed at this time. Thank you.     Dilcia Ford MA. CF- SLP  Speech Language Pathologist  [id number]

## 2023-03-03 NOTE — PLAN OF CARE
Problem: Discharge Planning  Goal: Discharge to home or other facility with appropriate resources  3/3/2023 0754 by Elisa Perez RN  Outcome: Progressing  3/3/2023 0416 by Ralph Morley RN  Outcome: Progressing     Problem: Pain  Goal: Verbalizes/displays adequate comfort level or baseline comfort level  3/3/2023 0754 by Elisa Perez RN  Outcome: Progressing  3/3/2023 0416 by Ralph Morley RN  Outcome: Progressing     Problem: Chronic Conditions and Co-morbidities  Goal: Patient's chronic conditions and co-morbidity symptoms are monitored and maintained or improved  3/3/2023 0754 by Elisa Perez RN  Outcome: Progressing  3/3/2023 0416 by Ralph Morley RN  Outcome: Progressing

## 2023-03-03 NOTE — PROGRESS NOTES
IM Progress Note    Admit Date:  2/27/2023  4    Interval history:  copd exacerbation     Subjective:-    Mr. Washington Manual seen sitting In bed, feels fine with no new issues  Breathing the same       Objective:   /68   Pulse 87   Temp 98 °F (36.7 °C) (Oral)   Resp 16   Ht 6' (1.829 m)   Wt 175 lb 8 oz (79.6 kg)   SpO2 98%   BMI 23.80 kg/m²     Intake/Output Summary (Last 24 hours) at 3/3/2023 4522  Last data filed at 3/2/2023 2238  Gross per 24 hour   Intake 610 ml   Output --   Net 610 ml         Physical Exam:     General: elderly male, up in bed,  Awake, alert and oriented. Appears to be not in any distress  Mucous Membranes:  Pink , anicteric  Neck: No JVD, no carotid bruit, no thyromegaly  Chest:  Clear to auscultation bilaterally, minimal basilar crackles  Cardiovascular:  RRR S1S2 heard, no murmurs or gallops  Abdomen:  Soft, undistended, non tender, no organomegaly, BS present  Extremities: No edema or cyanosis.  Distal pulses well felt  Neurological : grossly normal with gen weakness       Medications:   Scheduled Medications:    gabapentin  800 mg Oral TID    levoFLOXacin  750 mg Oral Daily    insulin lispro  0-4 Units SubCUTAneous TID WC    insulin lispro  0-4 Units SubCUTAneous Nightly    levalbuterol  0.63 mg Nebulization Q8H    sodium chloride (Inhalant)  4 mL Nebulization BID    [Held by provider] apixaban  5 mg Oral BID    atorvastatin  10 mg Oral Daily    dilTIAZem  180 mg Oral Daily    pantoprazole  40 mg Oral QAM AC    fluticasone  2 spray Nasal Daily    sodium chloride flush  5-40 mL IntraVENous 2 times per day    predniSONE  40 mg Oral Daily    propafenone  150 mg Oral 3 times per day    Roflumilast  250 mcg Oral Nightly    budesonide-formoterol  2 puff Inhalation BID     I   sodium chloride      dextrose       ipratropium-albuterol, sodium chloride flush, sodium chloride, ondansetron **OR** ondansetron, polyethylene glycol, acetaminophen **OR** acetaminophen, glucose, dextrose bolus **OR** dextrose bolus, glucagon (rDNA), dextrose, albuterol    Lab Data:  Recent Labs     02/28/23  0816 03/01/23 0414 03/02/23 0413   WBC 4.8 5.6 9.4   HGB 11.2* 10.3* 11.7*   HCT 34.3* 30.6* 34.8*   MCV 85.9 83.6 83.8   * 86* 104*       Recent Labs     02/28/23 0816 03/01/23 0414 03/02/23 0413   * 138 140   K 4.2 4.2 3.7   CL 95* 96* 97*   CO2 32 37* 32   BUN 15 18 23*   CREATININE 0.6* 0.6* 0.6*       No results for input(s): CKTOTAL, CKMB, CKMBINDEX, TROPONINI in the last 72 hours. Coagulation:   Lab Results   Component Value Date/Time    INR 1.32 02/12/2023 11:15 AM    APTT 29.8 01/25/2018 05:55 AM     Cardiac markers:   Lab Results   Component Value Date/Time    CKTOTAL 62 01/24/2018 03:22 PM    TROPONINI <0.01 02/27/2023 11:36 AM         Lab Results   Component Value Date    ALT 14 02/27/2023    AST 14 (L) 02/27/2023    ALKPHOS 69 02/27/2023    BILITOT 0.8 02/27/2023       Lab Results   Component Value Date    INR 1.32 (H) 02/12/2023    INR 1.10 11/06/2022    INR 1.20 (H) 01/07/2021    PROTIME 16.2 (H) 02/12/2023    PROTIME 14.1 11/06/2022    PROTIME 13.9 (H) 01/07/2021       Radiology       CULTURES  Pneumonia panel: ordered  Respiratory culture: ordered   Blood Culture: pending      EKG:   Sinus tachycardiaLow voltage QRSBorderline ECGWhen compared with ECG of 12-FEB-2023 12:20,Previous ECG has undetermined rhythm, needs reviewQRS axis Shifted rightCriteria for Inferior infarct are no longer PresentT wave inversion no longer evident in Inferior leadsNonspecific T wave abnormality, improved in Lateral leadsConfirmed by Marylin Lobo MD, MELANIE ([id number]) on 2/27/2023 5:46:19 PM          CT CHEST PULMONARY EMBOLISM W CONTRAST   Final Result   1. No acute pulmonary arterial thromboembolic disease. 2.  No new pulmonary mass or consolidation. Emphysema.        3.  Heterogeneous material in the right middle and lower lobe airways is   slightly more pronounced than yesterday's chest CT and may relate to   aspiration or retained secretions.           XR CHEST (2 VW)   Final Result   No acute process.                      ASSESSMENT/PLAN:-     Acute on chronic hypoxic and hypercapnia   - baseline oxygen 3 liters   - placed on BIPAP in the the ED,  - BIPAP removed and placed on 3 liters  - remains stable over last 2 days  - pulmonary following         Hemoptysis-scant   - blood streaked sputum noted at home  - small amount  - holding  eliquis and monitor   - sputum culture ordered  -resolved         COPD  AE  - solumedrol for now, prednisone once improved   - inhaled bronchodilators   - Levaquin  D#5  - continue Daliresp   - check sputum cultures, pneumonia panel   - monitor on continuous pulse ox   - improved symptoms         Squamous Cell carcinoma   - noted on bronch washings completed on 2/12/23  - oncology consulted  - pulmonary following   - discussed with patient and family at bedside  - planned for bronch on Friday here   - pt does have PET scan scheduled outpatient         Hx of aspiration pneumonia  Hx of pseudomonas in sputum   - pulmonary following  - speech evaluation and treat  - barium swallow wnl      Tachycardia   - continue Cardizem  - change Duonebs to Xopenex   - CTPA negative for PE  - continue to monitor on telemetry      Dysphagia  - speech evaluation and treat  - barium swallow this am  - speech recommends no straws, meds whole or crushed in puree, strict aspiration precautions  - strict aspiration precautions ordered  - continue to monitor for s/sx of aspiration.      PAF  - ST on EKG  - continue eliquis, Rythmol, Cardizem   - monitor on telemetry         DM type 2 with neuropathy   - hold Actos and metformin  - SSI--changed from NPO SSI to AC and HS  - monitor blood sugars  - continue Neurontin         Thrombocytopenia - chronic , stable  - platelet 108  - monitor         HLD  - Continue statin       DVT Prophylaxis: eliquis held for bronch  Diet: ADULT DIET; Dysphagia - Soft and  Bite Sized  Code Status: Full Code     Dc planning in am if remains stable post bronch    Ced Guerra MD, 3/3/2023 7:21 AM

## 2023-03-03 NOTE — ANESTHESIA POSTPROCEDURE EVALUATION
Department of Anesthesiology  Postprocedure Note    Patient: Caren Robledo  MRN: [id number]  YOB: 1940  Date of evaluation: 3/3/2023      Procedure Summary     Date: 03/03/23 Room / Location: [address] [place] [address] / [facility]    Anesthesia Start: 8323 Anesthesia Stop: 1422    Procedures:       BRONCHOSCOPY ALVEOLAR LAVAGE      BRONCHOSCOPY BRUSHINGS      BRONCHOSCOPY THERAPUTIC ASPIRATION INITIAL Diagnosis:       Pneumonia due to infectious organism, unspecified laterality, unspecified part of lung      (PNA)    Surgeons: Mansi Collado MD Responsible Provider: Faizan Briseno MD    Anesthesia Type: general ASA Status: 3          Anesthesia Type: No value filed.     [place] Phase I: Akosua Score: 9    [place] Phase II:        Anesthesia Post Evaluation    Patient location during evaluation: bedside  Level of consciousness: awake  Airway patency: patent  Nausea & Vomiting: no nausea  Complications: no  Cardiovascular status: blood pressure returned to baseline  Respiratory status: acceptable  Hydration status: euvolemic

## 2023-03-03 NOTE — PROGRESS NOTES
Pt placed on tele and CSPO2 monitoring, CMU called to verify on and working, verified. Pt transported to [facility] via stretcher on portable O2 in stable condition.  Will transfer care to primary RN

## 2023-03-03 NOTE — FLOWSHEET NOTE
03/03/23 0737   Vital Signs   Temp 98.2 °F (36.8 °C)   Temp Source Oral   Heart Rate 82   Heart Rate Source Monitor   Resp 17   /66   MAP (Calculated) 84   BP Location Left upper arm   BP Method Automatic   Patient Position Semi fowlers   Level of Consciousness 0   MEWS Score 1   Oxygen Therapy   SpO2 99 %   O2 Device Nasal cannula   O2 Flow Rate (L/min) 3 L/min   Pt. Resting in bed. Call light in reach. Shift assessment completed see flow sheet. Denies any needs at this time. Will continue to monitor.

## 2023-03-03 NOTE — PROGRESS NOTES
ONCOLOGY HEMATOLOGY CARE PROGRESS NOTE      SUBJECTIVE:    Remains admitted. Afebrile. Breathing overall improved. Bronch planned today. ROS:     Constitutional:  No weight loss, No fever, No chills, No night sweats. Energy level good.   Eyes:  No impairment or change in vision  ENT / Mouth:  No pain, abnormal ulceration, bleeding, nasal drip or change in voice or hearing  Cardiovascular:  No chest pain, palpitations, new edema, or calf discomfort  Respiratory:  No pain, hemoptysis, change to breathing  Breast:  No pain, discharge, change in appearance or texture  Gastrointestinal:  No pain, cramping, jaundice, change to eating and bowel habits  Urinary:  No pain, bleeding or change in continence  Genitalia: No pain, bleeding or discharge  Musculoskeletal:  No redness, pain, edema or weakness  Skin:  No pruritus, rash, change to nodules or lesions  Neurologic:  No discomfort, change in mental status, speech, sensory or motor activity  Psychiatric:  No change in concentration or change to affect or mood  Endocrine:  No hot flashes, increased thirst, or change to urine production  Hematologic: No petechiae, ecchymosis or bleeding  Lymphatic:  No lymphadenopathy or lymphedema  Allergy / Immunologic:  No eczema, hives, frequent or recurrent infections    OBJECTIVE        Physical    VITALS:  Patient Vitals for the past 24 hrs:   BP Temp Temp src Pulse Resp SpO2 Weight   03/03/23 0752 -- -- -- -- -- 98 % --   03/03/23 0737 121/66 98.2 °F (36.8 °C) Oral 82 17 99 % --   03/03/23 0505 126/68 98 °F (36.7 °C) Oral 87 16 98 % 175 lb 8 oz (79.6 kg)   03/02/23 2359 -- -- -- -- 18 98 % --   03/02/23 2137 119/62 98.1 °F (36.7 °C) Oral 90 18 98 % --   03/02/23 2003 -- -- -- -- 18 98 % --   03/02/23 1500 126/62 97 °F (36.1 °C) Axillary (!) 102 16 98 % --         24HR INTAKE/OUTPUT:    Intake/Output Summary (Last 24 hours) at 3/3/2023 0805  Last data filed at 3/2/2023 2238  Gross per 24 hour Intake 610 ml   Output --   Net 610 ml         CONSTITUTIONAL: awake, alert, cooperative, no apparent distress   EYES: pupils equal, round and reactive to light, sclera clear and conjunctiva normal  ENT: Normocephalic, without obvious abnormality, atraumatic  NECK: supple, symmetrical, no jugular venous distension and no carotid bruits   HEMATOLOGIC/LYMPHATIC: no cervical, supraclavicular or axillary lymphadenopathy   LUNGS: no increased work of breathing and clear to auscultation   CARDIOVASCULAR: regular rate and rhythm, normal S1 and S2, no murmur noted  ABDOMEN: normal bowel sounds x 4, soft, non-distended, non-tender, no masses palpated, no hepatosplenomgaly   MUSCULOSKELETAL: full range of motion noted, tone is normal  NEUROLOGIC: awake, alert, oriented to name, place and time. Motor skills grossly intact. SKIN: Normal skin color, texture, turgor and no jaundice. appears intact   EXTREMITIES: no LE edema     DATA:  CBC:    Recent Labs     03/02/23  0413 03/01/23  0414 02/28/23  0816 02/27/23  1136   WBC 9.4 5.6 4.8 6.0   NEUTROABS 8.1* 4.9 3.8 4.4   LYMPHOPCT 6.7 8.9 15.9 18.5   RBC 4.15* 3.66* 3.99* 4.34   HGB 11.7* 10.3* 11.2* 11.9*   HCT 34.8* 30.6* 34.3* 37.0*   MCV 83.8 83.6 85.9 85.2   MCH 28.1 28.0 28.1 27.5   MCHC 33.5 33.5 32.7 32.3   RDW 16.1* 15.5* 15.6* 15.9*   * 86* 108* 115*         PT/INR:    Recent Labs     02/27/23  1136 02/26/23  1330 02/12/23  1115   PROT 7.1 6.2* 7.3   INR  --   --  1.32*       PTT:  No results for input(s): APTT in the last 720 hours.     CMP:    Recent Labs     03/02/23  0413 03/01/23  0414 02/28/23  0816 02/27/23  1136 02/26/23  1330 02/15/23  0531 02/14/23  0741 02/13/23  0345 02/12/23  1115    138 135* 141 145 136 142   < > 137   K 3.7 4.2 4.2 4.1 4.0 4.3  4.3 3.7   < > 4.5   CL 97* 96* 95* 94* 100 95* 99   < > 94*   CO2 32 37* 32 40* 39* 33* 36*   < > 39*   GLUCOSE 244* 222* 167* 140* 124* 175* 127*   < > 131*   BUN 23* 18 15 11 15 26* 24*   < > 25* CREATININE 0.6* 0.6* 0.6* 0.7* 0.7* 0.7* 0.8   < > 1.2   LABGLOM >60 >60 >60 >60 >60 >60 >60   < > 60*   CALCIUM 8.5 8.5 8.8 9.3 9.3 9.1 8.3   < > 9.5   PROT  --   --   --  7.1 6.2*  --   --   --  7.3   LABALBU  --   --   --  4.2 3.8  --   --   --  4.4   AGRATIO  --   --   --  1.4 1.6  --   --   --  1.5   BILITOT  --   --   --  0.8 0.7  --   --   --  0.8   ALKPHOS  --   --   --  69 63  --   --   --  89   ALT  --   --   --  14 14  --   --   --  11   AST  --   --   --  14* 13*  --   --   --  14*   MG  --   --   --   --   --  1.70* 1.80  --  1.90    < > = values in this interval not displayed. Lab Results   Component Value Date    CALCIUM 8.5 03/02/2023    PHOS 3.6 02/15/2023       LDH:No results for input(s): LDH in the last 720 hours. Radiology Review:  FL MODIFIED BARIUM SWALLOW W VIDEO  Narrative: EXAMINATION:  MODIFIED BARIUM SWALLOW WAS PERFORMED IN CONJUNCTION WITH SPEECH PATHOLOGY  SERVICES    TECHNIQUE:  Under fluoroscopic evaluation cineradiography/videoradiography recordings  were performed in conjunction with the speech-language pathologist (SLP). Various liquid, solid and/or semi-solid barium preparations were used to  assess swallowing function. FLUOROSCOPY DOSE AND TYPE:    1 minute 34 seconds fluoroscopy    Total fluoroscopic dose 13.90 mGy    COMPARISON:  None    HISTORY:  ORDERING SYSTEM PROVIDED HISTORY: dysphagia  TECHNOLOGIST PROVIDED HISTORY:  Reason for exam:->dysphagia  Reason for Exam: dysphagia    FINDINGS:  Deep penetration was seen to the level of the cords. Impression: Deep penetration was seen to the level of the cords. Please see separate speech pathology report for full discussion of findings  and recommendations.       Problem List  Patient Active Problem List   Diagnosis    HTN (hypertension)    COPD, severe (HCC)    DM2 (diabetes mellitus, type 2) ([facility]    HLD (hyperlipidemia)    Gallstones    PAF (paroxysmal atrial fibrillation) (HCC)    Chronic respiratory failure with hypoxia and hypercapnia 2.5 L home O2    Acute respiratory insufficiency    Acute on chronic respiratory failure with hypercapnia (HCC)    Septic shock (HCC)    COPD exacerbation (HCC)    Former smoker    Acute hyperglycemia    Chronic normocytic anemia    Chronic thrombocytopenia    Falls at home    Ambulatory dysfunction    General weakness    Acute on chronic respiratory failure with hypoxia and hypercapnia (HCC)    Pleural effusion    Rapid atrial fibrillation (HCC)    Atrial fibrillation with RVR (HCC)    Acute respiratory failure with hypoxia and hypercapnia (HCC)    Chronic obstructive pulmonary disease (HCC)    Acute encephalopathy    Hyperglycemia    Pneumonia of left lower lobe due to infectious organism    Acute calculous cholecystitis    Acute cholecystitis    Atherosclerotic ulcer of aorta (HCC)    Closed compression fracture of body of L1 vertebra (HCC)    Hypomagnesemia    Abdominal aortic aneurysm (AAA) 30 to 34 mm in diameter    Elevated procalcitonin    Abdominal pain, right upper quadrant    Lactic acidosis    Elevated bilirubin    Bacteremia due to Klebsiella pneumoniae    Paroxysmal atrial fibrillation (HCC)    Moderate protein-calorie malnutrition ([address]    Debility    COVID-19    Acute on chronic respiratory failure with hypoxemia (HCC)    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)    Diabetes mellitus with skin ulcer ([address]    Other specified peripheral vascular diseases (HCC)    Hypoxia    Atrial fibrillation, controlled ([address]    Community acquired pneumonia    Acute respiratory failure ([address]    Aspiration pneumonia of both lungs ([address]    Hemoptysis    Aspiration pneumonia ([address]    Pneumonia of right lower lobe due to Pseudomonas species ([address]    Mucus plugging of bronchi    Acute exacerbation of chronic obstructive pulmonary disease (COPD) ([address]       ASSESSMENT AND PLAN:    Squamous Cell Lung Cancer     - diagnosed on bronchoscopy when admitted for pneumonia 2/2023  - suspected R lung primary based on CT  - repeat bronchoscopy planned tomorrow per pulmonary today  - needs PET scan for staging as next step   - this is scheduled 3/16/22  - once stage established, can consider for treatment options     2. Resp Failure/COPD     - per IM and pulmonary     3. DM2     4. PAF     5. HLD          ONCOLOGIC DISPOSITION: per IM and pulmonary.        Anuradha Deutsch MD  Please contact through asia Arredondo

## 2023-03-03 NOTE — H&P
Fiberoptic bronchoscopy history:     Patient with recent bronchoscopy without clear pulmonary mass, but with cytology read as squamous cell carcinoma on 2/12/2023; requires repeat fiberoptic bronchoscopy with careful airway inspection, removal of mucous plugging, and probable biopsy. Recent CT scan showed right middle and lower lobe airway obstruction. I discussed with anesthesiologist yesterday and have requested anesthesia with placement of endotracheal tube due to probable need for prolonged procedure with secure airway. Patient has No Known Allergies.     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    ELAINE on CPAP     Osteoporosis     Skin cancer of face        Past Surgical History:   Procedure Laterality Date    BACK SURGERY      repair broken back L4 & L5    CHOLECYSTECTOMY, LAPAROSCOPIC N/A 1/6/2021    LAPAROSCOPIC CHOLECYSTECTOMY, WITH CHOLANGIOGRAMS (ATTEMPTED) OPEN CHOLECYSTECTOMY performed by Rupesh Michel MD at [address] [place] [address], OPEN N/A 01/06/2021    LAPAROSCOPIC CHOLECYSTECTOMY, WITH CHOLANGIOGRAMS (ATTEMPTED)     CYSTOSCOPY Right 10/9/15    RIght Ureteroscopy, Holmium Laser Lithotripsy with Stone Removal, Right Stent Placement    CYSTOSCOPY  05/01/2019    CYSTOSCOPY, RESECTION OF BLADDER NECK, URETHRAL DILATION    CYSTOSCOPY W BIOPSY OF BLADDER N/A 5/1/2019    CYSTOSCOPY, RESECTION OF BLADDER NECK, URETHRAL DILATION performed by Ami Boone MD at [address] [place] [address] Left 6/12/2016    cataract removal    GALLBLADDER SURGERY  01/2021    JOINT REPLACEMENT  6/29/2011    right knee    JOINT REPLACEMENT  11/2004    left knee    OTHER SURGICAL HISTORY  08/31/2015    wide excision basal cell carcinoma on the nose and bilateral auricles with frozen sections, plastic closure, full thickness skin graft    OTHER SURGICAL HISTORY  12/1/15    excision of lesion of lip    PROSTATE SURGERY      TURP  10/23/2015    with cystoscopy       No Known Allergies    No current facility-administered medications on file prior to encounter. Current Outpatient Medications on File Prior to Encounter   Medication Sig Dispense Refill    TRELEGY ELLIPTA 100-62.5-25 MCG/ACT AEPB inhaler INHALE 1 PUFF BY MOUTH EVERY DAY 60 each 1    predniSONE (DELTASONE) 20 MG tablet 2 qd- 2 days, 1 1/2 qd- 3 days, 1 qd- 3 days, 1/2 qd- 3 days 13 tablet 0    alendronate (FOSAMAX) 70 MG tablet Take 70 mg by mouth every 7 days      Roflumilast (DALIRESP) 250 MCG tablet Take 1 tablet by mouth daily 28 tablet 1    propafenone (RYTHMOL) 150 MG tablet TAKE ONE TABLET BY MOUTH EVERY 8 HOURS 270 tablet 3    apixaban (ELIQUIS) 5 MG TABS tablet Take 1 tablet by mouth 2 times daily 60 tablet 10    atorvastatin (LIPITOR) 10 MG tablet Take 1 tablet by mouth daily 90 tablet 2    dilTIAZem (CARDIZEM CD) 180 MG extended release capsule Take 1 capsule by mouth daily 90 capsule 3    albuterol (PROVENTIL) (2.5 MG/3ML) 0.083% nebulizer solution USE 1 VIAL IN NEBULIZER 4 TIMES DAILY 120 each 0    VENTOLIN  (90 Base) MCG/ACT inhaler Inhale 2 puffs into the lungs every 6 hours as needed for Wheezing orShortness of Breath 18 g 5    fluticasone (FLONASE) 50 MCG/ACT nasal spray 2 sprays by Nasal route daily       pioglitazone (ACTOS) 30 MG tablet Take 30 mg by mouth daily      gabapentin (NEURONTIN) 800 MG tablet Take 800 mg by mouth 3 times daily.        OXYGEN Inhale 2.5 L into the lungs nightly At 3.5lpm on 2/8/2022      metformin (GLUCOPHAGE) 500 MG tablet Take 500 mg by mouth 4 times daily       esomeprazole (NEXIUM) 40 MG capsule   Take 40 mg by mouth every morning (before breakfast) Indications: take dos reports that he quit smoking about 17 years ago. His smoking use included cigarettes. He has a 67.50 pack-year smoking history. He has never used smokeless tobacco.    family history includes Cancer in his brother, father, and mother; Diabetes in his mother and sister; Heart Disease in his mother. Blood pressure 121/66, pulse 82, temperature 98.2 °F (36.8 °C), temperature source Oral, resp. rate 17, height 6' (1.829 m), weight 175 lb 8 oz (79.6 kg), SpO2 98 %. HENT: Airway patent and reviewed. Cardiovascular: Normal rate, regular rhythm, normal heart sounds. Pulmonary/Chest: No increased work of breathing. CTA bilaterally  Abdominal: Soft. No distension. ASA CLASS      III. Severe Systemic Disease      Sedation plan: Anesthesia with placement of endotracheal tube. Plan is to extubate post procedurally, but patient is aware of the possibility of need to remain on mechanical ventilation. Post Procedure Plan   Return to same level of care   ______________________     The risks and benefits of fiberoptic bronchoscopy were specifically discussed, including the goal of obtaining a diagnosis, the risks of bleeding, infection, pneumothorax, lung collapse, hospitalization and death. We specifically discussed the potential for biopsy and complications related to biopsy. We also discussed the risks of sedation/anesthesia. It is my assessment that the risk of the invasive procedure is outweighed by the benefit. Patient was counseled regarding the recommended procedure, alternatives to the procedure, and possible consequences of not having any evaluation performed.

## 2023-03-03 NOTE — ANESTHESIA PRE PROCEDURE
Department of Anesthesiology  Preprocedure Note       Name:  Brenda Kearney   Age:  78 y.o.  :  1940                                          MRN:  [id number]         Date:  3/3/2023      Surgeon: Maksim Amador):  Ruchi Camacho MD    Procedure: Procedure(s):  BRONCH NF W/ANES. PROBABLE BX    Medications prior to admission:   Prior to Admission medications    Medication Sig Start Date End Date Taking? Authorizing Provider   Shannan Posada 100-62.5-25 MCG/ACT AEPB inhaler INHALE 1 PUFF BY MOUTH EVERY DAY 23   Myranda Rodriguez MD   predniSONE (DELTASONE) 20 MG tablet 2 qd- 2 days, 1 1/2 qd- 3 days, 1 qd- 3 days, 1/2 qd- 3 days 2/15/23   Asad Carmichael MD   alendronate (FOSAMAX) 70 MG tablet Take 70 mg by mouth every 7 days    Historical Provider, MD   Roflumilast (DALIRESP) 250 MCG tablet Take 1 tablet by mouth daily 23   Myranda Rodriguez MD   propafenone (RYTHMOL) 150 MG tablet TAKE ONE TABLET BY MOUTH EVERY 8 HOURS 23   Yessy Fernández MD   apixaban (ELIQUIS) 5 MG TABS tablet Take 1 tablet by mouth 2 times daily 23   Yessy Fernández MD   atorvastatin (LIPITOR) 10 MG tablet Take 1 tablet by mouth daily 23   Yessy Fernández MD   dilTIAZem (CARDIZEM CD) 180 MG extended release capsule Take 1 capsule by mouth daily 22   Yessy Fernández MD   albuterol (PROVENTIL) (2.5 MG/3ML) 0.083% nebulizer solution USE 1 VIAL IN NEBULIZER 4 TIMES DAILY 11/3/22   Myranda Rodriguez MD   VENTOLIN  (90 Base) MCG/ACT inhaler Inhale 2 puffs into the lungs every 6 hours as needed for Wheezing orShortness of Breath 22   Christy Lee MD   fluticasone (FLONASE) 50 MCG/ACT nasal spray 2 sprays by Nasal route daily  20   Historical Provider, MD   pioglitazone (ACTOS) 30 MG tablet Take 30 mg by mouth daily    Historical Provider, MD   gabapentin (NEURONTIN) 800 MG tablet Take 800 mg by mouth 3 times daily.      Historical Provider, MD   OXYGEN Inhale 2.5 L into the lungs nightly At 3.5lpm on 2/8/2022    Historical Provider, MD   metformin (GLUCOPHAGE) 500 MG tablet Take 500 mg by mouth 4 times daily     Historical Provider, MD   esomeprazole (NEXIUM) 40 MG capsule   Take 40 mg by mouth every morning (before breakfast) Indications: take [address] Provider, MD       Current medications:    Current Facility-Administered Medications   Medication Dose Route Frequency Provider Last Rate Last Admin    gabapentin (NEURONTIN) capsule 800 mg  800 mg Oral TID Alfred Tran MD   800 mg at 03/02/23 2140    levoFLOXacin (LEVAQUIN) tablet 750 mg  750 mg Oral Daily Claudeen Griffith, MD   750 mg at 03/03/23 0937    insulin lispro (HUMALOG) injection vial 0-4 Units  0-4 Units SubCUTAneous TID WC DEMOND Fields CNP   2 Units at 03/02/23 1640    insulin lispro (HUMALOG) injection vial 0-4 Units  0-4 Units SubCUTAneous Nightly DEMOND Fields CNP        levalbuterol (XOPENEX) nebulizer solution 0.63 mg  0.63 mg Nebulization Q8H DEMOND Fields CNP   0.63 mg at 03/03/23 0751    ipratropium-albuterol (DUONEB) nebulizer solution 1 ampule  1 ampule Inhalation Q4H PRN DEMOND Fields CNP        sodium chloride (Inhalant) 3 % nebulizer solution 4 mL  4 mL Nebulization BID DEMOND Fields CNP   4 mL at 03/03/23 0751    [Held by provider] apixaban (ELIQUIS) tablet 5 mg  5 mg Oral BID Ashley Lagos PA-C   5 mg at 02/28/23 0835    atorvastatin (LIPITOR) tablet 10 mg  10 mg Oral Daily Ashley Lagos PA-C   10 mg at 03/03/23 2185    dilTIAZem (CARDIZEM CD) extended release capsule 180 mg  180 mg Oral Daily Ashley Lagos PA-C   180 mg at 03/03/23 0401    pantoprazole (PROTONIX) tablet 40 mg  40 mg Oral QAM AC Ashley Lagos PA-C   40 mg at 03/02/23 0538    fluticasone (FLONASE) 50 MCG/ACT nasal spray 2 spray  2 spray Nasal Daily Ashley Lagos PA-C   2 spray at 03/03/23 9134    sodium chloride flush 0.9 % injection 5-40 mL  5-40 mL IntraVENous 2 times per day Ashley Lagos PA-C   10 mL at 03/03/23 0752    sodium chloride flush 0.9 % injection 5-40 mL  5-40 mL IntraVENous PRN Ashley Lagos PA-C        0.9 % sodium chloride infusion   IntraVENous PRN Ashley Lagos PA-C        ondansetron (ZOFRAN-ODT) disintegrating tablet 4 mg  4 mg Oral Q8H PRN Ashley Lagos PA-C   4 mg at 03/01/23 1923    Or    ondansetron (ZOFRAN) injection 4 mg  4 mg IntraVENous Q6H PRN Ashley Lagos PA-C        polyethylene glycol (GLYCOLAX) packet 17 g  17 g Oral Daily PRN Brittanie Muñoz PA-C        acetaminophen (TYLENOL) tablet 650 mg  650 mg Oral Q6H PRN Ashley Lagos PA-C        Or    acetaminophen (TYLENOL) suppository 650 mg  650 mg Rectal Q6H PRN Ashley Lagos PA-C        predniSONE (DELTASONE) tablet 40 mg  40 mg Oral Daily Ashley Lagos PA-C   40 mg at 03/03/23 3377    glucose chewable tablet 16 g  4 tablet Oral PRN Ashley Lagos PA-C        dextrose bolus 10% 125 mL  125 mL IntraVENous PRN Ashley Lagos PA-C        Or    dextrose bolus 10% 250 mL  250 mL IntraVENous PRN Ashley Lagos PA-C        glucagon (rDNA) injection 1 mg  1 mg SubCUTAneous PRN Ashley Lagos PA-C        dextrose 10 % infusion   IntraVENous Continuous PRN Ashley Lagos PA-C        propafenone (RYTHMOL) tablet 150 mg  150 mg Oral 3 times per day Brittanie Muñoz PA-C   150 mg at 03/02/23 2159    Roflumilast (DALIRESP) tablet 250 mcg  250 mcg Oral Nightly Ashley Lagos PA-C   250 mcg at 03/02/23 2141    budesonide-formoterol (SYMBICORT) 160-4.5 MCG/ACT inhaler 2 puff  2 puff Inhalation BID Ashley Lagos PA-C   2 puff at 03/03/23 0751    albuterol (PROVENTIL) nebulizer solution 2.5 mg  2.5 mg Nebulization Q4H PRN Joaquín Mckeon MD           Allergies:  No Known Allergies    Problem List:    Patient Active Problem List   Diagnosis Code    HTN (hypertension) I10    COPD, severe ([address] J44.9    DM2 (diabetes mellitus, type 2) ([facility] E11.9    HLD (hyperlipidemia) E78.5    Gallstones K80.20    PAF (paroxysmal atrial fibrillation) (HCC) I48.0    Chronic respiratory failure with hypoxia and hypercapnia 2.5 L home O2 J96.11, J96.12    Acute respiratory insufficiency R06.89    Acute on chronic respiratory failure with hypercapnia (HCC) J96.22    Septic shock (HCC) A41.9, R65.21    COPD exacerbation (HCC) J44.1    Former smoker Z87.891    Acute hyperglycemia R73.9    Chronic normocytic anemia D64.9    Chronic thrombocytopenia D69.6    Falls at home W19. Radha Vega, Y92.009    Ambulatory dysfunction R26.2    General weakness R53.1    Acute on chronic respiratory failure with hypoxia and hypercapnia (HCC) J96.21, J96.22    Pleural effusion J90    Rapid atrial fibrillation ([facility]) I48.91    Atrial fibrillation with RVR ([facility]) I48.91    Acute respiratory failure with hypoxia and hypercapnia ([facility]) J96.01, J96.02    Chronic obstructive pulmonary disease (HCC) J44.9    Acute encephalopathy G93.40    Hyperglycemia R73.9    Pneumonia of left lower lobe due to infectious organism J18.9    Acute calculous cholecystitis K80.00    Acute cholecystitis K81.0    Atherosclerotic ulcer of aorta ([facility]) I70.0, I71.9    Closed compression fracture of body of L1 vertebra ([facility]) S32.010A    Hypomagnesemia E83.42    Abdominal aortic aneurysm (AAA) 30 to 34 mm in diameter I71.40    Elevated procalcitonin R79.89    Abdominal pain, right upper quadrant R10.11    Lactic acidosis E87.20    Elevated bilirubin R17    Bacteremia due to Klebsiella pneumoniae R78.81, B96.1    Paroxysmal atrial fibrillation (HCC) I48.0    Moderate protein-calorie malnutrition ([facility]) E44.0    Debility R53.81    COVID-19 U07.1    Acute on chronic respiratory failure with hypoxemia ([facility]) J96.21    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 ([facility]) L97.313    Diabetes mellitus with skin ulcer ([facility]) E11.622, L98.499    Other specified peripheral vascular diseases ([facility]) I73.89    Hypoxia R09.02    Atrial fibrillation, controlled ([facility] I48.91    Community acquired pneumonia J18.9    Acute respiratory failure (HCC) J96.00    Aspiration pneumonia of both lungs ([facility]) J69.0    Hemoptysis R04.2    Aspiration pneumonia (HCC) J69.0    Pneumonia of right lower lobe due to Pseudomonas species ([address] J15.1    Mucus plugging of bronchi T17.500A    Acute exacerbation of chronic obstructive pulmonary disease (COPD) ([address] J44.1       Past Medical History:        Diagnosis Date    Arthritis     CAD (coronary artery disease)     [facility] disease) [facility]     Diabetes mellitus ([facility]     Hepatitis A 01/05/2021    Hyperlipidemia     Hypertension     Influenza A 12/29/2017    Kidney calculi     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    ELAINE on CPAP     Osteoporosis     Skin cancer of face        Past Surgical History:        Procedure Laterality Date    BACK SURGERY      repair broken back L4 & L5    CHOLECYSTECTOMY, LAPAROSCOPIC N/A 1/6/2021    LAPAROSCOPIC CHOLECYSTECTOMY, WITH CHOLANGIOGRAMS (ATTEMPTED) OPEN CHOLECYSTECTOMY performed by Ishaan Chappell MD at [address] [place] [address], OPEN N/A 01/06/2021    LAPAROSCOPIC CHOLECYSTECTOMY, WITH CHOLANGIOGRAMS (ATTEMPTED)     CYSTOSCOPY Right 10/9/15    RIght Ureteroscopy, Holmium Laser Lithotripsy with Stone Removal, Right Stent Placement    CYSTOSCOPY  05/01/2019    CYSTOSCOPY, RESECTION OF BLADDER NECK, URETHRAL DILATION    CYSTOSCOPY W BIOPSY OF BLADDER N/A 5/1/2019    CYSTOSCOPY, RESECTION OF BLADDER NECK, URETHRAL DILATION performed by Padma Walters MD at [address] [facility] Left 6/12/2016    cataract removal    GALLBLADDER SURGERY  01/2021    JOINT REPLACEMENT  6/29/2011    right knee    JOINT REPLACEMENT  11/2004    left knee    OTHER SURGICAL HISTORY  08/31/2015    wide excision basal cell carcinoma on the nose and bilateral auricles with frozen sections, plastic closure, full thickness skin graft    OTHER SURGICAL HISTORY  12/1/15    excision of lesion of lip    PROSTATE SURGERY      TURP  10/23/2015    with cystoscopy       Social History:    Social History     Tobacco Use    Smoking status: Former     Packs/day: 1.50     Years: 45.00     Pack years: 67.50     Types: Cigarettes     Quit date: 2005     Years since quittin.7    Smokeless tobacco: Never   Substance Use Topics    Alcohol use: No                                Counseling given: Not Answered      Vital Signs (Current):   Vitals:    23 0505 23 0737 23 0752 23 1258   BP: 126/68 121/66  133/74   Pulse: 87 82  96   Resp: 16 17  18   Temp: 98 °F (36.7 °C) 98.2 °F (36.8 °C)  97.2 °F (36.2 °C)   TempSrc: Oral Oral  Temporal   SpO2: 98% 99% 98% 100%   Weight: 175 lb 8 oz (79.6 kg)      Height:                                                  BP Readings from Last 3 Encounters:   23 133/74   23 136/61   02/15/23 (!) 143/76       NPO Status: Time of last liquid consumption: 1800                        Time of last solid consumption: 1800                        Date of last liquid consumption: 23                        Date of last solid food consumption: 23    BMI:   Wt Readings from Last 3 Encounters:   23 175 lb 8 oz (79.6 kg)   23 191 lb (86.6 kg)   23 191 lb (86.6 kg)     Body mass index is 23.8 kg/m².     CBC:   Lab Results   Component Value Date/Time    WBC 9.4 2023 04:13 AM    RBC 4.15 2023 04:13 AM    HGB 11.7 2023 04:13 AM    HCT 34.8 2023 04:13 AM    MCV 83.8 2023 04:13 AM    RDW 16.1 2023 04:13 AM     2023 04:13 AM       CMP:   Lab Results   Component Value Date/Time     2023 04:13 AM    K 3.7 2023 04:13 AM    CL 97 2023 04:13 AM    CO2 32 2023 04:13 AM    BUN 23 2023 04:13 AM    CREATININE 0.6 2023 04:13 AM    GFRAA >60 2022 05:05 AM    GFRAA >60 2011 05:20 AM    AGRATIO 1.4 02/27/2023 11:36 AM    LABGLOM >60 03/02/2023 04:13 AM    GLUCOSE 244 03/02/2023 04:13 AM    PROT 7.1 02/27/2023 11:36 AM    PROT 7.0 08/10/2010 09:25 PM    CALCIUM 8.5 03/02/2023 04:13 AM    BILITOT 0.8 02/27/2023 11:36 AM    ALKPHOS 69 02/27/2023 11:36 AM    AST 14 02/27/2023 11:36 AM    ALT 14 02/27/2023 11:36 AM       POC Tests:   Recent Labs     03/03/23  1115   POCGLU 167*       Coags:   Lab Results   Component Value Date/Time    PROTIME 16.2 02/12/2023 11:15 AM    INR 1.32 02/12/2023 11:15 AM    APTT 29.8 01/25/2018 05:55 AM       HCG (If Applicable): No results found for: PREGTESTUR, PREGSERUM, HCG, HCGQUANT     ABGs:   Lab Results   Component Value Date/Time    PHART 7.374 02/13/2023 10:25 AM    PO2ART 88.9 02/13/2023 10:25 AM    KNX4KDZ 46.2 02/13/2023 10:25 AM    EWI5HCM 26.3 02/13/2023 10:25 AM    BEART 0.8 02/13/2023 10:25 AM    V8ZSYTAM 96.5 02/13/2023 10:25 AM        Type & Screen (If Applicable):  Lab Results   Component Value Date    LABABO B 06/22/2011    LABRH Positive 06/22/2011       Drug/Infectious Status (If Applicable):  No results found for: HIV, HEPCAB    COVID-19 Screening (If Applicable):   Lab Results   Component Value Date/Time    COVID19 NOT DETECTED 02/27/2023 11:52 AM    COVID19 Not Detected 11/06/2022 07:42 AM           Anesthesia Evaluation  Patient summary reviewed and Nursing notes reviewed no history of anesthetic complications:   Airway: Mallampati: II  TM distance: >3 FB   Neck ROM: full  Mouth opening: > = 3 FB   Dental:    (+) edentulous      Pulmonary:   (+) pneumonia:  COPD (home O2): moderate and severe,  sleep apnea:                             Cardiovascular:    (+) hypertension:, CAD:, dysrhythmias: atrial fibrillation,                   Neuro/Psych:   (+) neuromuscular disease:,             GI/Hepatic/Renal:   (+) hepatitis: A, liver disease:,      (-) GERD and no renal disease       Endo/Other:    (+) DiabetesType II DM, using insulin, . Abdominal:             Vascular:   + PVD, aortic or cerebral (AAA), . Other Findings:           Anesthesia Plan      general     ASA 3     (I discussed with the patient the risks and benefits of PIV, general anesthesia, IV Narcotics, PACU. All questions were answered the patient agrees with the plan)  Induction: intravenous. MIPS: Prophylactic antiemetics administered. Anesthetic plan and risks discussed with patient. Plan discussed with CRNA.                     Martha Vasquez MD   3/3/2023

## 2023-03-04 VITALS
DIASTOLIC BLOOD PRESSURE: 73 MMHG | OXYGEN SATURATION: 98 % | BODY MASS INDEX: 23.28 KG/M2 | SYSTOLIC BLOOD PRESSURE: 132 MMHG | HEART RATE: 93 BPM | RESPIRATION RATE: 18 BRPM | WEIGHT: 171.9 LBS | HEIGHT: 72 IN | TEMPERATURE: 97.7 F

## 2023-03-04 LAB
GLUCOSE BLD-MCNC: 283 MG/DL (ref 70–99)
PERFORMED ON: ABNORMAL

## 2023-03-04 PROCEDURE — 6370000000 HC RX 637 (ALT 250 FOR IP): Performed by: INTERNAL MEDICINE

## 2023-03-04 PROCEDURE — 2580000003 HC RX 258

## 2023-03-04 PROCEDURE — 94640 AIRWAY INHALATION TREATMENT: CPT

## 2023-03-04 PROCEDURE — 2700000000 HC OXYGEN THERAPY PER DAY

## 2023-03-04 PROCEDURE — 99238 HOSP IP/OBS DSCHRG MGMT 30/<: CPT | Performed by: INTERNAL MEDICINE

## 2023-03-04 PROCEDURE — 6370000000 HC RX 637 (ALT 250 FOR IP)

## 2023-03-04 PROCEDURE — 6360000002 HC RX W HCPCS: Performed by: NURSE PRACTITIONER

## 2023-03-04 PROCEDURE — 99232 SBSQ HOSP IP/OBS MODERATE 35: CPT | Performed by: INTERNAL MEDICINE

## 2023-03-04 PROCEDURE — 2580000003 HC RX 258: Performed by: NURSE PRACTITIONER

## 2023-03-04 PROCEDURE — 94761 N-INVAS EAR/PLS OXIMETRY MLT: CPT

## 2023-03-04 RX ORDER — PREDNISONE 10 MG/1
TABLET ORAL
Qty: 30 TABLET | Refills: 0 | Status: ON HOLD
Start: 2023-03-04 | End: 2023-03-09 | Stop reason: HOSPADM

## 2023-03-04 RX ORDER — LEVOFLOXACIN 750 MG/1
750 TABLET ORAL DAILY
Qty: 2 TABLET | Refills: 0 | Status: SHIPPED | OUTPATIENT
Start: 2023-03-04 | End: 2023-03-06

## 2023-03-04 RX ADMIN — INSULIN LISPRO 2 UNITS: 100 INJECTION, SOLUTION INTRAVENOUS; SUBCUTANEOUS at 07:57

## 2023-03-04 RX ADMIN — ATORVASTATIN CALCIUM 10 MG: 10 TABLET, FILM COATED ORAL at 07:49

## 2023-03-04 RX ADMIN — PANTOPRAZOLE SODIUM 40 MG: 40 TABLET, DELAYED RELEASE ORAL at 06:02

## 2023-03-04 RX ADMIN — DILTIAZEM HYDROCHLORIDE 180 MG: 180 CAPSULE, COATED, EXTENDED RELEASE ORAL at 07:50

## 2023-03-04 RX ADMIN — Medication 2 PUFF: at 08:11

## 2023-03-04 RX ADMIN — GABAPENTIN 800 MG: 400 CAPSULE ORAL at 07:50

## 2023-03-04 RX ADMIN — Medication 4 ML: at 08:11

## 2023-03-04 RX ADMIN — APIXABAN 5 MG: 5 TABLET, FILM COATED ORAL at 07:52

## 2023-03-04 RX ADMIN — LEVOFLOXACIN 750 MG: 750 TABLET, FILM COATED ORAL at 07:50

## 2023-03-04 RX ADMIN — SODIUM CHLORIDE, PRESERVATIVE FREE 10 ML: 5 INJECTION INTRAVENOUS at 07:50

## 2023-03-04 RX ADMIN — LEVALBUTEROL 0.63 MG: 1.25 SOLUTION, CONCENTRATE RESPIRATORY (INHALATION) at 08:11

## 2023-03-04 RX ADMIN — PREDNISONE 40 MG: 20 TABLET ORAL at 07:50

## 2023-03-04 RX ADMIN — PROPAFENONE HYDROCHLORIDE 150 MG: 150 TABLET, FILM COATED ORAL at 06:02

## 2023-03-04 NOTE — CARE COORDINATION
DISCHARGE ORDER  Date/Time 3/4/2023 10:35 AM  Completed by: Whitney Connell RN, Case Management    Patient Name: Angel Walters      : 1940  Admitting Diagnosis: COPD exacerbation ([facility] [J44.1]  Acute respiratory failure with hypercapnia ([facility] [J96.02]  Chronic obstructive pulmonary disease, unspecified COPD type ([facility] [J44.9]      Admit order Date and Status:INPT 23  (verify MD's last order for status of admission)      Noted discharge order. If applicable PT/OT recommendation at Discharge: n/a  DME recommendation by PT/OT:n/a  Confirmed discharge plan  : Yes  with whom_with pt and family______________  If pt confirmed DC plan does family need to be contacted by CM Yes if yes who_family at bedside_____  Discharge Plan: Pt plans to return home at discharge and would like to resume services with [facility]. THERESE spoke with Michael Gutierrez with [facility] who states that pt is active with home RN and they will resume services on Monday 3/6/23. Orders and AVS faxed to [facility] at this time to [phone]. Pt and family deny further needs. Family will transport pt home. Date of Last IMM Given: 3/4/23    Reviewed chart. Role of discharge planner explained and patient verbalized understanding. Discharge order is noted. Has Home O2 in place on admit:  Yes  Informed of need to bring portable home O2 tank on day of discharge for nursing to connect prior to leaving:   Yes  Verbalized agreement/Understanding:   Yes  Pt is being d/c'd to home today. Pt's O2 sats are 98% on 3lpm.    Discharge timeout done with Clarisa Corey. All discharge needs and concerns addressed. 3/6/23

## 2023-03-04 NOTE — DISCHARGE INSTR - COC
Continuity of Care Form    Patient Name: Sheng Haywood   :  1940  MRN:  [id number]    Admit date:  2023  Discharge date:  3/4/23    Code Status Order: Full Code   Advance Directives:   [facility] Documentation       Date/Time Healthcare Directive Type of Healthcare Directive Copy in [facility] [address] Agent's Name Healthcare Agent's Phone Number    23 [phone] Yes, patient has an advance directive for healthcare treatment Living will -- -- -- --            Admitting Physician:  Izella Siemens, MD  PCP: Leodan Bailey MD    Discharging Nurse: Asad Macias [address] [place] [address] Unit/Room#: /0934-67  Discharging Unit Phone Number: [phone]      Emergency Contact:   Extended Emergency Contact Information  Primary Emergency Contact: Ver[place] [address] Phone: [phone]  Mobile Phone: [phone]  Relation: Child  Secondary Emergency Contact: [address] [place] [address] Phone: [phone]  Mobile Phone: [phone]  Relation: Child    Past Surgical History:  Past Surgical History:   Procedure Laterality Date    BACK SURGERY      repair broken back L4 & L5    BRONCHOSCOPY  3/3/2023    BRONCHOSCOPY ALVEOLAR LAVAGE performed by Kelley Stovall MD at [place] [address]  3/3/2023    BRONCHOSCOPY BRUSHINGS performed by Kelley Stovall MD at [place] [address]  3/3/2023    BRONCHOSCOPY THERAPUTIC ASPIRATION INITIAL performed by Kelley Stovall MD at [address] [place] [address], LAPAROSCOPIC N/A 2021    LAPAROSCOPIC CHOLECYSTECTOMY, WITH CHOLANGIOGRAMS (ATTEMPTED) OPEN CHOLECYSTECTOMY performed by Bo Sykes MD at [address] [place] [address], OPEN N/A 2021    LAPAROSCOPIC CHOLECYSTECTOMY, WITH CHOLANGIOGRAMS (ATTEMPTED)     CYSTOSCOPY Right 10/09/2015    RIght Ureteroscopy, Holmium Laser Lithotripsy with Stone Removal, Right Stent Placement    CYSTOSCOPY  2019 CYSTOSCOPY, RESECTION OF BLADDER NECK, URETHRAL DILATION    CYSTOSCOPY W BIOPSY OF BLADDER N/A 05/01/2019    CYSTOSCOPY, RESECTION OF BLADDER NECK, URETHRAL DILATION performed by Guerda Gardner MD at [facility] Left 06/12/2016    cataract removal    GALLBLADDER SURGERY  01/2021    JOINT REPLACEMENT  06/29/2011    right knee    JOINT REPLACEMENT  11/2004    left knee    OTHER SURGICAL HISTORY  08/31/2015    wide excision basal cell carcinoma on the nose and bilateral auricles with frozen sections, plastic closure, full thickness skin graft    OTHER SURGICAL HISTORY  12/01/2015    excision of lesion of lip    OTHER SURGICAL HISTORY      BRONCHOSCOPY ALVEOLAR LAVAGE    PROSTATE SURGERY      TURP  10/23/2015    with cystoscopy       Immunization History:   Immunization History   Administered Date(s) Administered    Influenza Vaccine, unspecified formulation 11/18/2009, 10/14/2010, 11/08/2011, 09/21/2012, 10/18/2013, 10/07/2016    Influenza Virus Vaccine 10/12/2015, 10/07/2016, 10/18/2017    Influenza Whole 10/01/2010    Influenza, FLUARIX, FLULAVAL, FLUZONE (age 10 mo+) AND AFLURIA, (age 1 y+), PF, 0.5mL 11/01/2017, 08/28/2018    Influenza, FLUZONE (age 72 y+), High Dose, 0.7mL 10/01/2021, 10/13/2022    Influenza, High Dose (Fluzone 65 yrs and older) 09/18/2018, 09/13/2019, 10/01/2020    Pneumococcal Conjugate 13-valent ([id number]) 08/02/2017, 02/14/2018    Pneumococcal Conjugate 7-valent (Prevnar7) 10/01/2006    Pneumococcal Polysaccharide (Wftwbcrsr07) 10/24/2015, 10/07/2016    Td vaccine (adult) 09/21/2012       Active Problems:  Patient Active Problem List   Diagnosis Code    HTN (hypertension) I10    COPD, severe ([facility] J44.9    DM2 (diabetes mellitus, type 2) ([facility] E11.9    HLD (hyperlipidemia) E78.5    Gallstones K80.20    PAF (paroxysmal atrial fibrillation) ([facility]) I48.0    Chronic respiratory failure with hypoxia and hypercapnia 2.5 L home O2 J96.11, J96.12    Acute respiratory insufficiency R06.89    Acute on chronic respiratory failure with hypercapnia (HCC) J96.22    Septic shock ([facility]) A41.9, R65.21    COPD exacerbation ([facility] J44.1    Former smoker Z87.891    Acute hyperglycemia R73.9    Chronic normocytic anemia D64.9    Chronic thrombocytopenia D69.6    Falls at home W19. Venancio Yap, Y92.009    Ambulatory dysfunction R26.2    General weakness R53.1    Acute on chronic respiratory failure with hypoxia and hypercapnia (HCC) J96.21, J96.22    Pleural effusion J90    Rapid atrial fibrillation ([facility]) I48.91    Atrial fibrillation with RVR ([facility]) I48.91    Acute respiratory failure with hypoxia and hypercapnia ([facility]) J96.01, J96.02    Chronic obstructive pulmonary disease ([facility]) J44.9    Acute encephalopathy G93.40    Hyperglycemia R73.9    Pneumonia of left lower lobe due to infectious organism J18.9    Acute calculous cholecystitis K80.00    Acute cholecystitis K81.0    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, 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.9    Closed compression fracture of body of L1 vertebra ([facility]) S32.010A    Hypomagnesemia E83.42    Abdominal aortic aneurysm (AAA) 30 to 34 mm in diameter I71.40    Elevated procalcitonin R79.89    Abdominal pain, right upper quadrant R10.11    Lactic acidosis E87.20    Elevated bilirubin R17    Bacteremia due to Klebsiella pneumoniae R78.81, B96.1    Paroxysmal atrial fibrillation ([facility]) I48.0    Moderate protein-calorie malnutrition ([facility]) E44.0    Debility R53.81    COVID-19 U07.1    Acute on chronic respiratory failure with hypoxemia ([facility]) J96.21    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 ([facility]) L97.313    Diabetes mellitus with skin ulcer ([facility]) E11.622, L98.499    Other specified peripheral vascular diseases ([facility]) I73.89    Hypoxia R09.02    Atrial fibrillation, controlled ([facility]) I48.91    Community acquired pneumonia J18.9    Acute respiratory failure ([facility]) J96.00    Aspiration pneumonia of both lungs ([facility]) J69.0    Hemoptysis R04.2    Aspiration pneumonia ([facility] J69.0    Pneumonia of right lower lobe due to Pseudomonas species ([facility]) J15.1    Mucus plugging of bronchi T17.500A    Acute exacerbation of chronic obstructive pulmonary disease (COPD) ([facility] J44.1    Squamous cell carcinoma of lung ([facility]) C34.90       Isolation/Infection:   Isolation            No Isolation          Patient Infection Status       Infection Onset Added Last Indicated Last Indicated By Review Planned Expiration Resolved Resolved By    None active    Resolved    COVID-19 (Rule Out) 23 COVID-19 & Influenza Combo (Ordered)   23 Rule-Out Test Resulted    COVID-19 (Rule Out) 23 COVID-19, Rapid (Ordered)   23 Rule-Out Test Resulted    COVID-19 (Rule Out) 22 COVID-19 (Ordered)   22 Rule-Out Test Resulted    COVID-19 (Rule Out) 22 COVID-19, Rapid (Ordered)   22 Rule-Out Test Resulted    COVID-19 22 COVID-19 & Influenza Combo   22     COVID-19 (Rule Out) 22 COVID-19 & Influenza Combo (Ordered)   22 Rule-Out Test Resulted    COVID-19 (Rule Out) 21 COVID-19 (Ordered)   21 Rule-Out Test Resulted    COVID-19 (Rule Out) 21 COVID-19 (Ordered)   21 Rule-Out Test Resulted    COVID-19 (Rule Out) 12/15/20 12/15/20 12/15/20 COVID-19 (Ordered)   12/15/20 Rule-Out Test Resulted    COVID-19 (Rule Out) 20 COVID-19 (Ordered)   04/17/20 Rule-Out Test Resulted    COVID-19 (Rule Out) 20 Emergent Disease Panel (Ordered)   20 Jong Bryant RN    MDRO (multi-drug resistant organism)  17 Rosa Haynes RN   18 Rosa Haynes RN            Nurse Assessment:  Last Vital Signs: /73   Pulse 93   Temp 97.7 °F (36.5 °C) (Oral)   Resp 18   Ht 6' (1.829 m)   Wt 171 lb 14.4 oz (78 kg)   SpO2 98%   BMI 23.31 kg/m²     Last documented pain score (0-10 scale): Pain Level: 0  Last Weight:   Wt Readings from Last 1 Encounters:   03/04/23 171 lb 14.4 oz (78 kg)     Mental Status:  oriented, alert, coherent, logical, thought processes intact, and able to concentrate and follow conversation    IV Access:  - None    Nursing Mobility/ADLs:  Walking   Assisted  Transfer  Independent  Bathing  Independent  Dressing  Independent  Toileting  Independent  Feeding  Independent  Med [address]  Med Delivery   whole    Wound Care Documentation and Therapy:  Wound 02/12/23 Coccyx Medial stage 2 (Active)   Wound Etiology Other 02/13/23 2256   Dressing Status Intact;Dry 02/28/23 0829   Dressing/Treatment Foam 02/28/23 0829   Dressing Change Due 02/14/23 02/13/23 0340   Wound Assessment Dry;Erythema;Pink/red 02/28/23 0403   Drainage Amount None 02/28/23 0403   Paula-wound Assessment Blanchable erythema 02/28/23 0403   Number of days: 19        Elimination:  Continence: Bowel: Yes  Bladder: Yes  Urinary Catheter: None   Colostomy/Ileostomy/Ileal Conduit: No       Date of Last BM:3/3/23    Intake/Output Summary (Last 24 hours) at 3/4/2023 1005  Last data filed at 3/3/2023 1922  Gross per 24 hour   Intake 420 ml   Output 450 ml   Net -30 ml     I/O last 3 completed shifts: In: 430 [P.O.:120; I.V.:310]  Out: 575 [Urine:575]    Safety Concerns: At Risk for Falls    Impairments/Disabilities:      None    Nutrition Therapy:  Current Nutrition Therapy:   - Oral Diet:  General and Carb Control 4 carbs/meal (1800kcals/day)    Routes of Feeding: Oral  Liquids: No Restrictions  Daily Fluid Restriction: no  Last Modified Barium Swallow with Video (Video Swallowing Test): not done    Treatments at the Time of Hospital Discharge:   Respiratory Treatments: Telergy, Ventolin inhaler, albuterol nebulizer   Oxygen Therapy:  is on oxygen at 2-3.5 L/min per nasal cannula.   Ventilator:    - No ventilator support    Rehab Therapies: Physical Therapy and Occupational Therapy  Weight Bearing Status/Restrictions: No weight bearing restrictions  Other Medical Equipment (for information only, NOT a DME order):  walker  Other Treatments: N/A    Patient's personal belongings (please select all that are sent with patient):  None    RN SIGNATURE:  Electronically signed by Alma Plummer RN on 3/4/23 at 10:24 AM EST    CASE MANAGEMENT/SOCIAL WORK SECTION    Inpatient Status Date: 2/27/23    Readmission Risk Assessment Score:  Readmission Risk              Risk of Unplanned Readmission:  34           Discharging to Facility/ Agency   Name: [facility]  Address:  Phone:  Fax: [phone]    Dialysis Facility (if applicable)   Name:  Address:  Dialysis Schedule:  Phone:  Fax:    / signature: Electronically signed by Seabron Bosworth, RN on 3/4/23 at 10:18 AM EST    PHYSICIAN SECTION    Prognosis: Fair    Condition at Discharge: Stable    Rehab Potential (if transferring to Rehab): Fair    Recommended Labs or Other Treatments After Discharge: -    Physician Certification: I certify the above information and transfer of Chaim Colin  is necessary for the continuing treatment of the diagnosis listed and that he requires [address] for less 30 days.      Update Admission H&P: No change in H&P    PHYSICIAN SIGNATURE:  Electronically signed by NOEMY Winn RN on 3/4/23 at 10:18 AM EST

## 2023-03-04 NOTE — PLAN OF CARE
Problem: Discharge Planning  Goal: Discharge to home or other facility with appropriate resources  3/4/2023 0754 by Krystyna Wheat RN  Outcome: Completed  3/3/2023 2317 by Darren Omer RN  Outcome: Progressing     Problem: Pain  Goal: Verbalizes/displays adequate comfort level or baseline comfort level  3/4/2023 0754 by Krystyna Wheat RN  Outcome: Completed  3/3/2023 2317 by Darren Omer RN  Outcome: Progressing     Problem: Chronic Conditions and Co-morbidities  Goal: Patient's chronic conditions and co-morbidity symptoms are monitored and maintained or improved  3/4/2023 0754 by Krystyna Wheat RN  Outcome: Completed  3/3/2023 2317 by Darren Omer RN  Outcome: Progressing Normal rate, regular rhythm.  Heart sounds S1, S2.

## 2023-03-04 NOTE — PROGRESS NOTES
Patient is refusing bed alarm, was educated on why the be alarm is important, patient stated he would call staff if he needed anything

## 2023-03-04 NOTE — FLOWSHEET NOTE
03/04/23 0743   Vital Signs   Temp 97.7 °F (36.5 °C)   Temp Source Oral   Heart Rate 80   Heart Rate Source Monitor   Resp 18   /73   MAP (Calculated) 93   Patient Position Sitting   Level of Consciousness 0   MEWS Score 1   Pain Assessment   Pain Assessment None - Denies Pain   Oxygen Therapy   SpO2 99 %   O2 Device Nasal cannula   O2 Flow Rate (L/min) 3 L/min   Pt. Resting in bed. Call light in reach. Shift assessment completed see flow sheet. Denies any needs at this time. Will continue to monitor.

## 2023-03-04 NOTE — DISCHARGE SUMMARY
Name:  Julius Jaime  Room:  /8540-44  MRN:    [id number]    Discharge Summary      This discharge summary is in conjunction with a complete physical exam done on the day of discharge. Discharging Physician: Ramona Vargas MD      Admit: 2/27/2023  Discharge:  3/4/2023     Diagnoses this Admission    Principal Problem:    COPD exacerbation ([facility]  Active Problems:    Mucus plugging of bronchi    Acute exacerbation of chronic obstructive pulmonary disease (COPD) ([facility]    Squamous cell carcinoma of lung (HCC)    Chronic obstructive pulmonary disease (HCC)  Resolved Problems:    * No resolved hospital problems. *      Procedures (Please Review Full Report for Details)    Therapeutic bronchoscopy    Consults    IP CONSULT TO PULMONOLOGY  IP CONSULT TO PULMONOLOGY  IP CONSULT TO ONCOLOGY      HPI:    The patient is a 78 y.o. male with PMH of atrial fibrillation, CAD, COPD, DM, HLD, HTN, MDRO in sputum, hx of pseudomonas in sputum, ELAINE on CPAP who presents to [facility] with fatigue. History obtained from the patient and review of EMR. Pt ws recently admitted this month with sepsis, failed BIPAP and was intubated. Sputum cultures at that time showed pseudomonas. He underwent a bronchoscopy and washings noted positive for malignant cells, squamous cell carcinoma in situ. andHe was discharged home on Levaquin. He returned to ED with complaints of fatigue, shortness of breath, blood streaked sputum. In ED he was placed on BIPAP for hypercapnia. He was initially admitted to ICU, but downgraded to PCU after being on BIPAP for a couple of hours and transitioned to 2 liters n/c. Pulmonary consulted, pt admitted for further care. Denies fever, chest pain, palpitations, abdominal pain, nausea, vomiting, constipation or diarrhea.        Physical Exam at Discharge:  /73   Pulse 80   Temp 97.7 °F (36.5 °C) (Oral)   Resp 18   Ht 6' (1.829 m)   Wt 171 lb 14.4 oz (78 kg)   SpO2 99%   BMI 23.31 kg/m² General: elderly male, up in bed,  Awake, alert and oriented. Appears to be not in any distress  Mucous Membranes:  Pink , anicteric  Neck: No JVD, no carotid bruit, no thyromegaly  Chest:  Clear to auscultation bilaterally, minimal basilar crackles  Cardiovascular:  RRR S1S2 heard, no murmurs or gallops  Abdomen:  Soft, undistended, non tender, no organomegaly, BS present  Extremities: No edema or cyanosis. Distal pulses well felt  Neurological : grossly normal with gen weakness      Hospital Course    Acute on chronic hypoxic and hypercapnia   - baseline oxygen 3 liters   - placed on BIPAP in the the ED,  - BIPAP removed and placed on 3 liters  - remains stable over last 2 days  - pulmonary following         Hemoptysis-scant   - blood streaked sputum noted at home  - small amount  - holding  eliquis and monitor   - sputum culture ordered  -resolved         COPD  AE  - solumedrol for now, prednisone once improved   - inhaled bronchodilators   - Levaquin  D#5. Discharge on Levaquin and Prednisone. - continue Daliresp   - checked sputum cultures, pneumonia panel   - monitor on continuous pulse ox   - improved symptoms         Squamous Cell carcinoma   - noted on bronch washings completed on 2/12/23  - oncology consulted  - pulmonary following   - discussed with patient and family at bedside  - Therapeutic bronchoscopy done  - pt does have PET scan scheduled outpatient         Hx of aspiration pneumonia  Hx of pseudomonas in sputum   - pulmonary following  - speech evaluation and treat  - barium swallow wnl      Tachycardia   - continue Cardizem  - change Duonebs to Xopenex   - CTPA negative for PE  - continue to monitor on telemetry      Dysphagia  - speech evaluation and treat  - barium swallow this am  - speech recommends no straws, meds whole or crushed in puree, strict aspiration precautions  - strict aspiration precautions ordered  - continue to monitor for s/sx of aspiration.       PAF  - ST on EKG  - continue eliquis, Rythmol, Cardizem   - monitor on telemetry         DM type 2 with neuropathy   - hold Actos and metformin  - SSI--changed from NPO SSI to [facility] and HS  - monitor blood sugars  - continue Neurontin         Thrombocytopenia - chronic , stable  - platelet 661  - monitor         HLD  - Continue statin         CBC:   Recent Labs     03/02/23  0413   WBC 9.4   HGB 11.7*   HCT 34.8*   MCV 83.8   *     BMP:   Recent Labs     03/02/23 0413      K 3.7   CL 97*   CO2 32   BUN 23*   CREATININE 0.6*       FL MODIFIED BARIUM SWALLOW W VIDEO   Final Result   Deep penetration was seen to the level of the cords. Please see separate speech pathology report for full discussion of findings   and recommendations. CT CHEST PULMONARY EMBOLISM W CONTRAST   Final Result   1. No acute pulmonary arterial thromboembolic disease. 2.  No new pulmonary mass or consolidation. Emphysema. 3.  Heterogeneous material in the right middle and lower lobe airways is   slightly more pronounced than yesterday's chest CT and may relate to   aspiration or retained secretions. XR CHEST (2 VW)   Final Result   No acute process. EKG:    Sinus tachycardiaLow voltage QRSBorderline ECGWhen compared with ECG of 12-FEB-2023 12:20,Previous ECG has undetermined rhythm, needs reviewQRS axis Shifted rightCriteria for Inferior infarct are no longer PresentT wave inversion no longer evident in Inferior leadsNonspecific T wave abnormality, improved in Lateral leadsConfirmed by Lesley Burroughs MD, [address] (1988) on 2/27/2023 5:46:19 PM         Discharge Medications     Medication List        START taking these medications      levoFLOXacin 750 MG tablet  Commonly known as: LEVAQUIN  Take 1 tablet by mouth daily for 2 doses            CHANGE how you take these medications      predniSONE 10 MG tablet  Commonly known as: DELTASONE  4 tabs for 3 days 3 tabs for 3 days 2 tabs for 3 days 1 tabs for 3 days  What changed:   medication strength  additional instructions            CONTINUE taking these medications      alendronate 70 MG tablet  Commonly known as: FOSAMAX     apixaban 5 MG Tabs tablet  Commonly known as: Eliquis  Take 1 tablet by mouth 2 times daily     atorvastatin 10 MG tablet  Commonly known as: LIPITOR  Take 1 tablet by mouth daily     dilTIAZem 180 MG extended release capsule  Commonly known as: CARDIZEM CD  Take 1 capsule by mouth daily     esomeprazole 40 MG delayed release capsule  Commonly known as: NEXIUM     fluticasone 50 MCG/ACT nasal spray  Commonly known as: FLONASE     gabapentin 800 MG tablet  Commonly known as: NEURONTIN     metFORMIN 500 MG tablet  Commonly known as: GLUCOPHAGE     OXYGEN     pioglitazone 30 MG tablet  Commonly known as: ACTOS     propafenone 150 MG tablet  Commonly known as: RYTHMOL  TAKE ONE TABLET BY MOUTH EVERY 8 HOURS     Roflumilast 250 MCG tablet  Commonly known as: Daliresp  Take 1 tablet by mouth daily     Trelegy Ellipta 100-62.5-25 MCG/ACT Aepb inhaler  Generic drug: fluticasone-umeclidin-vilant  INHALE 1 PUFF BY MOUTH EVERY DAY     * Ventolin  (90 Base) MCG/ACT inhaler  Generic drug: albuterol sulfate HFA  Inhale 2 puffs into the lungs every 6 hours as needed for Wheezing orShortness of Breath     * albuterol (2.5 MG/3ML) 0.083% nebulizer solution  Commonly known as: PROVENTIL  USE 1 VIAL IN NEBULIZER 4 TIMES DAILY           * This list has 2 medication(s) that are the same as other medications prescribed for you. Read the directions carefully, and ask your doctor or other care provider to review them with you.                    Where to Get Your Medications        These medications were sent to [facility] [id number] - [place], [place] - [address] - P [phone] [facility] [phone]  [address] [facility] [address]      Phone: [phone]   levoFLOXacin 750 MG tablet  predniSONE 10 MG tablet           Discharge Condition/Location: Stable    Follow Up: Follow up with PCP.         Hali Carlton MD 3/4/2023 7:53 AM

## 2023-03-04 NOTE — PROGRESS NOTES
RT Inhaler-Nebulizer Bronchodilator Protocol Note    There is a bronchodilator order in the chart from a provider indicating to follow the RT Bronchodilator Protocol and there is an Initiate RT Inhaler-Nebulizer Bronchodilator Protocol order as well (see protocol at bottom of note). CXR Findings:  No results found. The findings from the last RT Protocol Assessment were as follows:   History Pulmonary Disease: (P) Chronic pulmonary disease  Respiratory Pattern: (P) Dyspnea on exertion or RR 21-25 bpm  Breath Sounds: (P) Slightly diminished and/or crackles  Cough: (P) Strong, spontaneous, non-productive  Indication for Bronchodilator Therapy: (P) Decreased or absent breath sounds  Bronchodilator Assessment Score: (P) 6    Aerosolized bronchodilator medication orders have been revised according to the RT Inhaler-Nebulizer Bronchodilator Protocol below. Respiratory Therapist to perform RT Therapy Protocol Assessment initially then follow the protocol. Repeat RT Therapy Protocol Assessment PRN for score 0-3 or on second treatment, BID, and PRN for scores above 3. No Indications - adjust the frequency to every 6 hours PRN wheezing or bronchospasm, if no treatments needed after 48 hours then discontinue using Per Protocol order mode. If indication present, adjust the RT bronchodilator orders based on the Bronchodilator Assessment Score as indicated below. Use Inhaler orders unless patient has one or more of the following: on home nebulizer, not able to hold breath for 10 seconds, is not alert and oriented, cannot activate and use MDI correctly, or respiratory rate 25 breaths per minute or more, then use the equivalent nebulizer order(s) with same Frequency and PRN reasons based on the score. If a patient is on this medication at home then do not decrease Frequency below that used at home.     0-3 - enter or revise RT bronchodilator order(s) to equivalent RT Bronchodilator order with Frequency of every 4 hours PRN for wheezing or increased work of breathing using Per Protocol order mode. 4-6 - enter or revise RT Bronchodilator order(s) to two equivalent RT bronchodilator orders with one order with BID Frequency and one order with Frequency of every 4 hours PRN wheezing or increased work of breathing using Per Protocol order mode. 7-10 - enter or revise RT Bronchodilator order(s) to two equivalent RT bronchodilator orders with one order with TID Frequency and one order with Frequency of every 4 hours PRN wheezing or increased work of breathing using Per Protocol order mode. 11-13 - enter or revise RT Bronchodilator order(s) to one equivalent RT bronchodilator order with QID Frequency and an Albuterol order with Frequency of every 4 hours PRN wheezing or increased work of breathing using Per Protocol order mode. Greater than 13 - enter or revise RT Bronchodilator order(s) to one equivalent RT bronchodilator order with every 4 hours Frequency and an Albuterol order with Frequency of every 2 hours PRN wheezing or increased work of breathing using Per Protocol order mode. RT to enter RT Home Evaluation for COPD & MDI Assessment order using Per Protocol order mode.     Electronically signed by Aristeo Whalen RCP on 3/4/2023 at 8:16 AM

## 2023-03-04 NOTE — PROGRESS NOTES
PULMONARY PROGRESS NOTE  CC: COPD exacerbation    Subjective:   Remains on baseline 3 L O2. Breathing feels better after bronch yesterday. IV line: Peripheral    PHYSICAL EXAM:   /68   Pulse 84   Temp 97 °F (36.1 °C) (Oral)   Resp 16   Ht 6' (1.829 m)   Wt 171 lb 14.4 oz (78 kg)   SpO2 96%   BMI 23.31 kg/m² ' on 3L  Constitutional:  No acute distress   HEENT:  No scleral icterus  Neck:  No tracheal deviation present. Cardiovascular:  Normal heart sounds. Pulmonary/Chest:  No wheezes. No accessory muscle usage    Abdominal:  Soft. Musculoskeletal:  No cyanosis. No clubbing. Skin:  Skin is warm and dry.      Scheduled Meds:   gabapentin  800 mg Oral TID    levoFLOXacin  750 mg Oral Daily    insulin lispro  0-4 Units SubCUTAneous TID WC    insulin lispro  0-4 Units SubCUTAneous Nightly    levalbuterol  0.63 mg Nebulization Q8H    sodium chloride (Inhalant)  4 mL Nebulization BID    [Held by provider] apixaban  5 mg Oral BID    atorvastatin  10 mg Oral Daily    dilTIAZem  180 mg Oral Daily    pantoprazole  40 mg Oral QAM AC    fluticasone  2 spray Nasal Daily    sodium chloride flush  5-40 mL IntraVENous 2 times per day    predniSONE  40 mg Oral Daily    propafenone  150 mg Oral 3 times per day    Roflumilast  250 mcg Oral Nightly    budesonide-formoterol  2 puff Inhalation BID     Continuous Infusions:   sodium chloride      dextrose       PRN Meds:  ipratropium-albuterol, sodium chloride flush, sodium chloride, ondansetron **OR** ondansetron, polyethylene glycol, acetaminophen **OR** acetaminophen, glucose, dextrose bolus **OR** dextrose bolus, glucagon (rDNA), dextrose, albuterol    Labs:  CBC:   Recent Labs     03/02/23 0413   WBC 9.4   HGB 11.7*   HCT 34.8*   MCV 83.8   *     BMP:   Recent Labs     03/02/23 0413      K 3.7   CL 97*   CO2 32   BUN 23*   CREATININE 0.6*     Micro:   2/12/23 BAL Pensensitive Pseudomonas aeruginosa   2/12/23 Suctioned sputum Pseudomonas aeruginosa    2/27/23 SARS-CoV-2 & influenza not detected  2/27/23 BC NGTD  2/28/23 Pneumonia molecular panel reports collected. .. Procalcitonin 0.08    Cytology 2/12/23  Lung, Bronchial Washings:   - Positive for malignant cells, at least squamous cell carcinoma in situ. COMMENT:    Specimen B shows fragments of malignant squamous cells   consistent with squamous cell carcinoma. No definitive invasive   component is present. MBS 2/28/23: no aspiration, Deep penetration was seen to the level of the cords. Imaging:   CTPA 2/27/2023  Pulmonary Arteries: Pulmonary arteries are adequately opacified for   evaluation. No evidence of intraluminal filling defect to suggest pulmonary   embolism. Main pulmonary artery is normal in caliber. Mediastinum: No evidence of mediastinal lymphadenopathy. The heart and   pericardium demonstrate no acute abnormality. There is no acute abnormality   of the thoracic aorta. Atherosclerosis in the thoracic aorta. Coronary   artery calcifications. Lungs/pleura: Respiratory motion. Mild biapical pleuroparenchymal scarring. Emphysema. Heterogeneous material in the right middle and lower lobe airways   is more pronounced than the prior study. No new pulmonary mass or   consolidation. No pleural effusion or pneumothorax. Upper Abdomen: Limited images of the upper abdomen are unremarkable. Soft Tissues/Bones: No acute bone or soft tissue abnormality. Impression   1. No acute pulmonary arterial thromboembolic disease. 2.  No new pulmonary mass or consolidation. Emphysema. 3.  Heterogeneous material in the right middle and lower lobe airways is   slightly more pronounced than yesterday's chest CT and may relate to   aspiration or retained secretions. MBSS 2/28/23: deep penetration to level of vocal folds with thin liquid trials via straw.   No aspiration    Bronch 3/3/23: transbronchial brushings RUL airway irregularity & aspiration of copious thick secretions occluding the right mainstem with mucus plugging throughout the right lower lobe airways    ASSESSMENT:  Acute on chronic hypercapnic respiratory failure; baseline is 3 L O2  Mucus plugging of lower lobe airways   COPD f/b Dr. 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 Brushings taken from area of airway irregularity in RUL  Oropharyngeal dysphagia, mild-moderate  Chronic hypoxemic respiratory failure; baseline 3 L O2  Recent pseudomonas pneumonia, bronch 2/12/23 with blood suctioned RUL & right mainstem airways; on MV 2/12/23 - 2/13/23. 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 F/U cytology, s/p therapeutic bronchoscopy under anesthesia 3/3/23  OK for D/C, d/w Dr. Jalil Strong    F/U Plan: keep outpatient CT PET imaging scheduled 3/16/23. If brushings & PET are negative, consider ENT evaluation of vocal cords. Patient is aware of recommendations.

## 2023-03-04 NOTE — PROGRESS NOTES
Patient educated on discharge instructions as well as new medications use, dosage, administration and possible side effects. Patient verified knowledge. IV removed without difficulty and dry dressing in place. Telemetry monitor removed and returned to [facility]. Pt left facility in stable condition to Home with all of their personal belongings.

## 2023-03-04 NOTE — PROGRESS NOTES
RT Inhaler-Nebulizer Bronchodilator Protocol Note    There is a bronchodilator order in the chart from a provider indicating to follow the RT Bronchodilator Protocol and there is an Initiate RT Inhaler-Nebulizer Bronchodilator Protocol order as well (see protocol at bottom of note). CXR Findings:  No results found. The findings from the last RT Protocol Assessment were as follows:   History Pulmonary Disease: Chronic pulmonary disease  Respiratory Pattern: Dyspnea on exertion or RR 21-25 bpm  Breath Sounds: Slightly diminished and/or crackles  Cough: Strong, spontaneous, non-productive  Indication for Bronchodilator Therapy: Decreased or absent breath sounds  Bronchodilator Assessment Score: 6    Aerosolized bronchodilator medication orders have been revised according to the RT Inhaler-Nebulizer Bronchodilator Protocol below. Respiratory Therapist to perform RT Therapy Protocol Assessment initially then follow the protocol. Repeat RT Therapy Protocol Assessment PRN for score 0-3 or on second treatment, BID, and PRN for scores above 3. No Indications - adjust the frequency to every 6 hours PRN wheezing or bronchospasm, if no treatments needed after 48 hours then discontinue using Per Protocol order mode. If indication present, adjust the RT bronchodilator orders based on the Bronchodilator Assessment Score as indicated below. Use Inhaler orders unless patient has one or more of the following: on home nebulizer, not able to hold breath for 10 seconds, is not alert and oriented, cannot activate and use MDI correctly, or respiratory rate 25 breaths per minute or more, then use the equivalent nebulizer order(s) with same Frequency and PRN reasons based on the score. If a patient is on this medication at home then do not decrease Frequency below that used at home.     0-3 - enter or revise RT bronchodilator order(s) to equivalent RT Bronchodilator order with Frequency of every 4 hours PRN for wheezing or increased work of breathing using Per Protocol order mode. 4-6 - enter or revise RT Bronchodilator order(s) to two equivalent RT bronchodilator orders with one order with BID Frequency and one order with Frequency of every 4 hours PRN wheezing or increased work of breathing using Per Protocol order mode. 7-10 - enter or revise RT Bronchodilator order(s) to two equivalent RT bronchodilator orders with one order with TID Frequency and one order with Frequency of every 4 hours PRN wheezing or increased work of breathing using Per Protocol order mode. 11-13 - enter or revise RT Bronchodilator order(s) to one equivalent RT bronchodilator order with QID Frequency and an Albuterol order with Frequency of every 4 hours PRN wheezing or increased work of breathing using Per Protocol order mode. Greater than 13 - enter or revise RT Bronchodilator order(s) to one equivalent RT bronchodilator order with every 4 hours Frequency and an Albuterol order with Frequency of every 2 hours PRN wheezing or increased work of breathing using Per Protocol order mode.        Electronically signed by Sharyle Nordmann, RCP on 3/3/2023 at 8:19 PM

## 2023-03-05 LAB
CULTURE, RESPIRATORY: NORMAL
CULTURE, RESPIRATORY: NORMAL
GRAM STAIN RESULT: NORMAL
GRAM STAIN RESULT: NORMAL

## 2023-03-06 NOTE — TELEPHONE ENCOUNTER
Inpatient Notes  Received: 2 days ago  MD Irineo Lobato MA  Look for cytology results - forward to me and [place]   PET as scheduled, then see Cynthia Pantoja   Put on reason for visit, f/u abnormal cytology AND may need to see ENT

## 2023-03-07 ENCOUNTER — TELEPHONE (OUTPATIENT)
Dept: PULMONOLOGY | Age: 83
End: 2023-03-07

## 2023-03-07 DIAGNOSIS — C34.90 SQUAMOUS CELL CARCINOMA OF LUNG, UNSPECIFIED LATERALITY (HCC): Primary | ICD-10-CM

## 2023-03-07 NOTE — TELEPHONE ENCOUNTER
Per result note from dr. Agusto Willard: I called and discussed fiberoptic bronchoscopy results with both the patient and his daughter. He is planning CT PET which is already scheduled. He is aware to f/u with Dr. Ari Velasco after. During intubation for fiberoptic bronchoscopy, anesthesia noted an irregularity to the vocal cords, no clear mass. Because this is squamous cell cancer of unknown origin, I'd like him to see ENT to have vocal cords visualized. Please refer to Dr. Leonor Sung, dx squamous cell cancer on bronchoscopy fluids, no clear origin, abnormal vocal cord noted by anesthesia during intubation.     Referral pended for ENT

## 2023-03-07 NOTE — RESULT ENCOUNTER NOTE
I called and discussed fiberoptic bronchoscopy results with both the patient and his daughter. He is planning CT PET which is already scheduled. He is aware to f/u with Dr. Quigley Ugo after. During intubation for fiberoptic bronchoscopy, anesthesia noted an irregularity to the vocal cords, no clear mass. Because this is squamous cell cancer of unknown origin, I'd like him to see ENT to have vocal cords visualized. Please refer to Dr. Smith Pope, dx squamous cell cancer on bronchoscopy fluids, no clear origin, abnormal vocal cord noted by anesthesia during intubation.

## 2023-03-07 NOTE — TELEPHONE ENCOUNTER
Cytology final from 3/3/23. Watching for PET result.  Left message asking patient to return call to office to bring follow up appt sooner than scheduled

## 2023-03-08 ENCOUNTER — HOSPITAL ENCOUNTER (INPATIENT)
Age: 83
LOS: 1 days | Discharge: HOME | End: 2023-03-09
Attending: STUDENT IN AN ORGANIZED HEALTH CARE EDUCATION/TRAINING PROGRAM | Admitting: INTERNAL MEDICINE
Payer: MEDICARE

## 2023-03-08 ENCOUNTER — APPOINTMENT (OUTPATIENT)
Dept: GENERAL RADIOLOGY | Age: 83
End: 2023-03-08
Payer: MEDICARE

## 2023-03-08 DIAGNOSIS — J96.01 ACUTE RESPIRATORY FAILURE WITH HYPOXIA (HCC): Primary | ICD-10-CM

## 2023-03-08 DIAGNOSIS — J96.21 ACUTE ON CHRONIC RESPIRATORY FAILURE WITH HYPOXIA (HCC): ICD-10-CM

## 2023-03-08 DIAGNOSIS — J38.3 LESION OF TRUE VOCAL CORD: ICD-10-CM

## 2023-03-08 DIAGNOSIS — R09.02 HYPOXIA: ICD-10-CM

## 2023-03-08 LAB
A/G RATIO: 1.4 (ref 1.1–2.2)
ALBUMIN SERPL-MCNC: 3.9 G/DL (ref 3.4–5)
ALP BLD-CCNC: 57 U/L (ref 40–129)
ALT SERPL-CCNC: 29 U/L (ref 10–40)
ANION GAP SERPL CALCULATED.3IONS-SCNC: 9 MMOL/L (ref 3–16)
AST SERPL-CCNC: 26 U/L (ref 15–37)
BASE EXCESS VENOUS: 10.5 MMOL/L (ref -3–3)
BASOPHILS ABSOLUTE: 0 K/UL (ref 0–0.2)
BASOPHILS RELATIVE PERCENT: 0.3 %
BILIRUB SERPL-MCNC: 0.8 MG/DL (ref 0–1)
BUN BLDV-MCNC: 15 MG/DL (ref 7–20)
CALCIUM SERPL-MCNC: 9.3 MG/DL (ref 8.3–10.6)
CARBOXYHEMOGLOBIN: 12.8 % (ref 0–1.5)
CHLORIDE BLD-SCNC: 89 MMOL/L (ref 99–110)
CO2: 37 MMOL/L (ref 21–32)
CREAT SERPL-MCNC: 0.6 MG/DL (ref 0.8–1.3)
EKG ATRIAL RATE: 103 BPM
EKG DIAGNOSIS: NORMAL
EKG P AXIS: 95 DEGREES
EKG P-R INTERVAL: 186 MS
EKG Q-T INTERVAL: 310 MS
EKG QRS DURATION: 84 MS
EKG QTC CALCULATION (BAZETT): 406 MS
EKG R AXIS: 41 DEGREES
EKG T AXIS: 95 DEGREES
EKG VENTRICULAR RATE: 103 BPM
EOSINOPHILS ABSOLUTE: 0 K/UL (ref 0–0.6)
EOSINOPHILS RELATIVE PERCENT: 0.1 %
GFR SERPL CREATININE-BSD FRML MDRD: >60 ML/MIN/{1.73_M2}
GLUCOSE BLD-MCNC: 139 MG/DL (ref 70–99)
GLUCOSE BLD-MCNC: 293 MG/DL (ref 70–99)
GLUCOSE BLD-MCNC: 348 MG/DL (ref 70–99)
GLUCOSE BLD-MCNC: 383 MG/DL (ref 70–99)
HCO3 VENOUS: 35.2 MMOL/L (ref 23–29)
HCT VFR BLD CALC: 37.2 % (ref 40.5–52.5)
HEMOGLOBIN: 12.5 G/DL (ref 13.5–17.5)
INFLUENZA A: NOT DETECTED
INFLUENZA B: NOT DETECTED
LACTIC ACID, SEPSIS: 1.5 MMOL/L (ref 0.4–1.9)
LYMPHOCYTES ABSOLUTE: 0.9 K/UL (ref 1–5.1)
LYMPHOCYTES RELATIVE PERCENT: 11 %
MCH RBC QN AUTO: 28.1 PG (ref 26–34)
MCHC RBC AUTO-ENTMCNC: 33.6 G/DL (ref 31–36)
MCV RBC AUTO: 83.8 FL (ref 80–100)
METHEMOGLOBIN VENOUS: 0.3 %
MONOCYTES ABSOLUTE: 0.5 K/UL (ref 0–1.3)
MONOCYTES RELATIVE PERCENT: 6.1 %
NEUTROPHILS ABSOLUTE: 6.8 K/UL (ref 1.7–7.7)
NEUTROPHILS RELATIVE PERCENT: 82.5 %
O2 SAT, VEN: 92 %
O2 THERAPY: ABNORMAL
PCO2, VEN: 46.7 MMHG (ref 40–50)
PDW BLD-RTO: 16.2 % (ref 12.4–15.4)
PERFORMED ON: ABNORMAL
PH VENOUS: 7.5 (ref 7.35–7.45)
PLATELET # BLD: 91 K/UL (ref 135–450)
PMV BLD AUTO: 8.3 FL (ref 5–10.5)
PNEUMONIA PANEL MOLECULAR: NORMAL
PO2, VEN: 57.6 MMHG (ref 25–40)
POTASSIUM REFLEX MAGNESIUM: 4.6 MMOL/L (ref 3.5–5.1)
PRO-BNP: 323 PG/ML (ref 0–449)
PROCALCITONIN: 0.06 NG/ML (ref 0–0.15)
RBC # BLD: 4.44 M/UL (ref 4.2–5.9)
REPORT: NORMAL
SARS-COV-2 RNA, RT PCR: NOT DETECTED
SODIUM BLD-SCNC: 135 MMOL/L (ref 136–145)
TCO2 CALC VENOUS: 37 MMOL/L
TOTAL PROTEIN: 6.6 G/DL (ref 6.4–8.2)
TROPONIN: <0.01 NG/ML
WBC # BLD: 8.3 K/UL (ref 4–11)

## 2023-03-08 PROCEDURE — 6370000000 HC RX 637 (ALT 250 FOR IP): Performed by: INTERNAL MEDICINE

## 2023-03-08 PROCEDURE — 93010 ELECTROCARDIOGRAM REPORT: CPT | Performed by: INTERNAL MEDICINE

## 2023-03-08 PROCEDURE — 2700000000 HC OXYGEN THERAPY PER DAY

## 2023-03-08 PROCEDURE — 82803 BLOOD GASES ANY COMBINATION: CPT

## 2023-03-08 PROCEDURE — 87636 SARSCOV2 & INF A&B AMP PRB: CPT

## 2023-03-08 PROCEDURE — 80053 COMPREHEN METABOLIC PANEL: CPT

## 2023-03-08 PROCEDURE — 94640 AIRWAY INHALATION TREATMENT: CPT

## 2023-03-08 PROCEDURE — 87040 BLOOD CULTURE FOR BACTERIA: CPT

## 2023-03-08 PROCEDURE — 84484 ASSAY OF TROPONIN QUANT: CPT

## 2023-03-08 PROCEDURE — 6370000000 HC RX 637 (ALT 250 FOR IP): Performed by: PHYSICIAN ASSISTANT

## 2023-03-08 PROCEDURE — 1200000000 HC SEMI PRIVATE

## 2023-03-08 PROCEDURE — 36415 COLL VENOUS BLD VENIPUNCTURE: CPT

## 2023-03-08 PROCEDURE — 6370000000 HC RX 637 (ALT 250 FOR IP): Performed by: NURSE PRACTITIONER

## 2023-03-08 PROCEDURE — 99223 1ST HOSP IP/OBS HIGH 75: CPT | Performed by: INTERNAL MEDICINE

## 2023-03-08 PROCEDURE — 2580000003 HC RX 258: Performed by: NURSE PRACTITIONER

## 2023-03-08 PROCEDURE — 96374 THER/PROPH/DIAG INJ IV PUSH: CPT

## 2023-03-08 PROCEDURE — 71045 X-RAY EXAM CHEST 1 VIEW: CPT

## 2023-03-08 PROCEDURE — 87205 SMEAR GRAM STAIN: CPT

## 2023-03-08 PROCEDURE — 31575 DIAGNOSTIC LARYNGOSCOPY: CPT | Performed by: OTOLARYNGOLOGY

## 2023-03-08 PROCEDURE — 85025 COMPLETE CBC W/AUTO DIFF WBC: CPT

## 2023-03-08 PROCEDURE — 6360000002 HC RX W HCPCS: Performed by: PHYSICIAN ASSISTANT

## 2023-03-08 PROCEDURE — 99221 1ST HOSP IP/OBS SF/LOW 40: CPT | Performed by: OTOLARYNGOLOGY

## 2023-03-08 PROCEDURE — 83605 ASSAY OF LACTIC ACID: CPT

## 2023-03-08 PROCEDURE — 93005 ELECTROCARDIOGRAM TRACING: CPT | Performed by: PHYSICIAN ASSISTANT

## 2023-03-08 PROCEDURE — 83880 ASSAY OF NATRIURETIC PEPTIDE: CPT

## 2023-03-08 PROCEDURE — 87633 RESP VIRUS 12-25 TARGETS: CPT

## 2023-03-08 PROCEDURE — 84145 PROCALCITONIN (PCT): CPT

## 2023-03-08 PROCEDURE — 99285 EMERGENCY DEPT VISIT HI MDM: CPT

## 2023-03-08 PROCEDURE — 87070 CULTURE OTHR SPECIMN AEROBIC: CPT

## 2023-03-08 RX ORDER — ATORVASTATIN CALCIUM 10 MG/1
10 TABLET, FILM COATED ORAL DAILY
Status: DISCONTINUED | OUTPATIENT
Start: 2023-03-08 | End: 2023-03-09 | Stop reason: HOSPADM

## 2023-03-08 RX ORDER — METHYLPREDNISOLONE SODIUM SUCCINATE 125 MG/2ML
125 INJECTION, POWDER, LYOPHILIZED, FOR SOLUTION INTRAMUSCULAR; INTRAVENOUS ONCE
Status: COMPLETED | OUTPATIENT
Start: 2023-03-08 | End: 2023-03-08

## 2023-03-08 RX ORDER — ONDANSETRON 4 MG/1
4 TABLET, ORALLY DISINTEGRATING ORAL EVERY 8 HOURS PRN
Status: DISCONTINUED | OUTPATIENT
Start: 2023-03-08 | End: 2023-03-09 | Stop reason: HOSPADM

## 2023-03-08 RX ORDER — SODIUM CHLORIDE 0.9 % (FLUSH) 0.9 %
5-40 SYRINGE (ML) INJECTION PRN
Status: DISCONTINUED | OUTPATIENT
Start: 2023-03-08 | End: 2023-03-09 | Stop reason: HOSPADM

## 2023-03-08 RX ORDER — ACETAMINOPHEN 325 MG/1
650 TABLET ORAL EVERY 6 HOURS PRN
Status: DISCONTINUED | OUTPATIENT
Start: 2023-03-08 | End: 2023-03-09 | Stop reason: HOSPADM

## 2023-03-08 RX ORDER — PREDNISONE 20 MG/1
40 TABLET ORAL DAILY
Status: DISCONTINUED | OUTPATIENT
Start: 2023-03-11 | End: 2023-03-08

## 2023-03-08 RX ORDER — ALBUTEROL SULFATE 2.5 MG/3ML
2.5 SOLUTION RESPIRATORY (INHALATION) ONCE
Status: COMPLETED | OUTPATIENT
Start: 2023-03-08 | End: 2023-03-08

## 2023-03-08 RX ORDER — METHYLPREDNISOLONE SODIUM SUCCINATE 40 MG/ML
40 INJECTION, POWDER, LYOPHILIZED, FOR SOLUTION INTRAMUSCULAR; INTRAVENOUS EVERY 12 HOURS
Status: DISCONTINUED | OUTPATIENT
Start: 2023-03-09 | End: 2023-03-09 | Stop reason: HOSPADM

## 2023-03-08 RX ORDER — IPRATROPIUM BROMIDE AND ALBUTEROL SULFATE 2.5; .5 MG/3ML; MG/3ML
1 SOLUTION RESPIRATORY (INHALATION) ONCE
Status: COMPLETED | OUTPATIENT
Start: 2023-03-08 | End: 2023-03-08

## 2023-03-08 RX ORDER — ROFLUMILAST 500 UG/1
250 TABLET ORAL DAILY
Status: DISCONTINUED | OUTPATIENT
Start: 2023-03-08 | End: 2023-03-09 | Stop reason: HOSPADM

## 2023-03-08 RX ORDER — SODIUM CHLORIDE 0.9 % (FLUSH) 0.9 %
5-40 SYRINGE (ML) INJECTION EVERY 12 HOURS SCHEDULED
Status: DISCONTINUED | OUTPATIENT
Start: 2023-03-08 | End: 2023-03-09 | Stop reason: HOSPADM

## 2023-03-08 RX ORDER — HYDROCODONE BITARTRATE AND ACETAMINOPHEN 5; 325 MG/1; MG/1
1 TABLET ORAL EVERY 6 HOURS PRN
Status: DISCONTINUED | OUTPATIENT
Start: 2023-03-08 | End: 2023-03-09 | Stop reason: HOSPADM

## 2023-03-08 RX ORDER — IPRATROPIUM BROMIDE AND ALBUTEROL SULFATE 2.5; .5 MG/3ML; MG/3ML
1 SOLUTION RESPIRATORY (INHALATION)
Status: DISCONTINUED | OUTPATIENT
Start: 2023-03-08 | End: 2023-03-08

## 2023-03-08 RX ORDER — BUDESONIDE AND FORMOTEROL FUMARATE DIHYDRATE 160; 4.5 UG/1; UG/1
2 AEROSOL RESPIRATORY (INHALATION) 2 TIMES DAILY
Status: DISCONTINUED | OUTPATIENT
Start: 2023-03-08 | End: 2023-03-09 | Stop reason: HOSPADM

## 2023-03-08 RX ORDER — GABAPENTIN 400 MG/1
800 CAPSULE ORAL 3 TIMES DAILY
Status: DISCONTINUED | OUTPATIENT
Start: 2023-03-08 | End: 2023-03-08

## 2023-03-08 RX ORDER — FLUTICASONE PROPIONATE 50 MCG
2 SPRAY, SUSPENSION (ML) NASAL DAILY
Status: DISCONTINUED | OUTPATIENT
Start: 2023-03-08 | End: 2023-03-09 | Stop reason: HOSPADM

## 2023-03-08 RX ORDER — INSULIN GLARGINE 100 [IU]/ML
10 INJECTION, SOLUTION SUBCUTANEOUS ONCE
Status: COMPLETED | OUTPATIENT
Start: 2023-03-08 | End: 2023-03-08

## 2023-03-08 RX ORDER — INSULIN LISPRO 100 [IU]/ML
0-4 INJECTION, SOLUTION INTRAVENOUS; SUBCUTANEOUS NIGHTLY
Status: DISCONTINUED | OUTPATIENT
Start: 2023-03-08 | End: 2023-03-09 | Stop reason: HOSPADM

## 2023-03-08 RX ORDER — DILTIAZEM HYDROCHLORIDE 180 MG/1
180 CAPSULE, COATED, EXTENDED RELEASE ORAL DAILY
Status: DISCONTINUED | OUTPATIENT
Start: 2023-03-08 | End: 2023-03-09 | Stop reason: HOSPADM

## 2023-03-08 RX ORDER — METHYLPREDNISOLONE SODIUM SUCCINATE 40 MG/ML
40 INJECTION, POWDER, LYOPHILIZED, FOR SOLUTION INTRAMUSCULAR; INTRAVENOUS EVERY 12 HOURS
Status: DISCONTINUED | OUTPATIENT
Start: 2023-03-08 | End: 2023-03-08

## 2023-03-08 RX ORDER — PROPAFENONE HYDROCHLORIDE 150 MG/1
150 TABLET, FILM COATED ORAL EVERY 8 HOURS SCHEDULED
Status: DISCONTINUED | OUTPATIENT
Start: 2023-03-08 | End: 2023-03-09 | Stop reason: HOSPADM

## 2023-03-08 RX ORDER — SODIUM CHLORIDE 9 MG/ML
INJECTION, SOLUTION INTRAVENOUS PRN
Status: DISCONTINUED | OUTPATIENT
Start: 2023-03-08 | End: 2023-03-09 | Stop reason: HOSPADM

## 2023-03-08 RX ORDER — POLYETHYLENE GLYCOL 3350 17 G/17G
17 POWDER, FOR SOLUTION ORAL DAILY PRN
Status: DISCONTINUED | OUTPATIENT
Start: 2023-03-08 | End: 2023-03-09 | Stop reason: HOSPADM

## 2023-03-08 RX ORDER — PANTOPRAZOLE SODIUM 40 MG/1
40 TABLET, DELAYED RELEASE ORAL
Status: DISCONTINUED | OUTPATIENT
Start: 2023-03-09 | End: 2023-03-09 | Stop reason: HOSPADM

## 2023-03-08 RX ORDER — INSULIN LISPRO 100 [IU]/ML
0-4 INJECTION, SOLUTION INTRAVENOUS; SUBCUTANEOUS
Status: DISCONTINUED | OUTPATIENT
Start: 2023-03-08 | End: 2023-03-09 | Stop reason: HOSPADM

## 2023-03-08 RX ORDER — DEXTROSE MONOHYDRATE 100 MG/ML
INJECTION, SOLUTION INTRAVENOUS CONTINUOUS PRN
Status: DISCONTINUED | OUTPATIENT
Start: 2023-03-08 | End: 2023-03-09 | Stop reason: HOSPADM

## 2023-03-08 RX ORDER — GABAPENTIN 400 MG/1
400 CAPSULE ORAL 3 TIMES DAILY
Status: DISCONTINUED | OUTPATIENT
Start: 2023-03-08 | End: 2023-03-09 | Stop reason: HOSPADM

## 2023-03-08 RX ORDER — ACETAMINOPHEN 650 MG/1
650 SUPPOSITORY RECTAL EVERY 6 HOURS PRN
Status: DISCONTINUED | OUTPATIENT
Start: 2023-03-08 | End: 2023-03-09 | Stop reason: HOSPADM

## 2023-03-08 RX ORDER — PREDNISONE 20 MG/1
40 TABLET ORAL DAILY
Status: DISCONTINUED | OUTPATIENT
Start: 2023-03-11 | End: 2023-03-09 | Stop reason: HOSPADM

## 2023-03-08 RX ORDER — ONDANSETRON 2 MG/ML
4 INJECTION INTRAMUSCULAR; INTRAVENOUS EVERY 6 HOURS PRN
Status: DISCONTINUED | OUTPATIENT
Start: 2023-03-08 | End: 2023-03-09 | Stop reason: HOSPADM

## 2023-03-08 RX ORDER — ALBUTEROL SULFATE 2.5 MG/3ML
2.5 SOLUTION RESPIRATORY (INHALATION) 4 TIMES DAILY PRN
Status: DISCONTINUED | OUTPATIENT
Start: 2023-03-08 | End: 2023-03-09 | Stop reason: HOSPADM

## 2023-03-08 RX ORDER — IPRATROPIUM BROMIDE AND ALBUTEROL SULFATE 2.5; .5 MG/3ML; MG/3ML
1 SOLUTION RESPIRATORY (INHALATION) 3 TIMES DAILY
Status: DISCONTINUED | OUTPATIENT
Start: 2023-03-08 | End: 2023-03-09 | Stop reason: HOSPADM

## 2023-03-08 RX ADMIN — ALBUTEROL SULFATE 2.5 MG: 2.5 SOLUTION RESPIRATORY (INHALATION) at 10:08

## 2023-03-08 RX ADMIN — ATORVASTATIN CALCIUM 10 MG: 10 TABLET, FILM COATED ORAL at 14:05

## 2023-03-08 RX ADMIN — INSULIN GLARGINE 10 UNITS: 100 INJECTION, SOLUTION SUBCUTANEOUS at 17:18

## 2023-03-08 RX ADMIN — INSULIN LISPRO 4 UNITS: 100 INJECTION, SOLUTION INTRAVENOUS; SUBCUTANEOUS at 20:42

## 2023-03-08 RX ADMIN — GABAPENTIN 800 MG: 400 CAPSULE ORAL at 14:05

## 2023-03-08 RX ADMIN — METHYLPREDNISOLONE SODIUM SUCCINATE 125 MG: 125 INJECTION, POWDER, FOR SOLUTION INTRAMUSCULAR; INTRAVENOUS at 10:07

## 2023-03-08 RX ADMIN — Medication 10 ML: at 20:37

## 2023-03-08 RX ADMIN — IPRATROPIUM BROMIDE AND ALBUTEROL SULFATE 1 AMPULE: 2.5; .5 SOLUTION RESPIRATORY (INHALATION) at 20:24

## 2023-03-08 RX ADMIN — IPRATROPIUM BROMIDE AND ALBUTEROL SULFATE 1 AMPULE: 2.5; .5 SOLUTION RESPIRATORY (INHALATION) at 14:55

## 2023-03-08 RX ADMIN — Medication 2 PUFF: at 20:24

## 2023-03-08 RX ADMIN — IPRATROPIUM BROMIDE AND ALBUTEROL SULFATE 1 AMPULE: .5; 2.5 SOLUTION RESPIRATORY (INHALATION) at 10:08

## 2023-03-08 RX ADMIN — PROPAFENONE HYDROCHLORIDE 150 MG: 150 TABLET, FILM COATED ORAL at 14:05

## 2023-03-08 RX ADMIN — APIXABAN 5 MG: 5 TABLET, FILM COATED ORAL at 14:05

## 2023-03-08 RX ADMIN — PROPAFENONE HYDROCHLORIDE 150 MG: 150 TABLET, FILM COATED ORAL at 20:37

## 2023-03-08 RX ADMIN — HYDROCODONE BITARTRATE AND ACETAMINOPHEN 1 TABLET: 5; 325 TABLET ORAL at 18:59

## 2023-03-08 RX ADMIN — INSULIN LISPRO 4 UNITS: 100 INJECTION, SOLUTION INTRAVENOUS; SUBCUTANEOUS at 17:19

## 2023-03-08 RX ADMIN — ROFLUMILAST 250 MCG: 500 TABLET ORAL at 14:05

## 2023-03-08 RX ADMIN — DILTIAZEM HYDROCHLORIDE 180 MG: 180 CAPSULE, COATED, EXTENDED RELEASE ORAL at 14:05

## 2023-03-08 RX ADMIN — GABAPENTIN 400 MG: 400 CAPSULE ORAL at 20:37

## 2023-03-08 ASSESSMENT — PAIN - FUNCTIONAL ASSESSMENT
PAIN_FUNCTIONAL_ASSESSMENT: NONE - DENIES PAIN
PAIN_FUNCTIONAL_ASSESSMENT: ACTIVITIES ARE NOT PREVENTED

## 2023-03-08 ASSESSMENT — PAIN DESCRIPTION - LOCATION: LOCATION: THROAT

## 2023-03-08 ASSESSMENT — LIFESTYLE VARIABLES
HOW OFTEN DO YOU HAVE A DRINK CONTAINING ALCOHOL: NEVER
HOW MANY STANDARD DRINKS CONTAINING ALCOHOL DO YOU HAVE ON A TYPICAL DAY: PATIENT DOES NOT DRINK

## 2023-03-08 ASSESSMENT — ENCOUNTER SYMPTOMS
COUGH: 1
SHORTNESS OF BREATH: 1
GASTROINTESTINAL NEGATIVE: 1

## 2023-03-08 ASSESSMENT — PAIN SCALES - GENERAL: PAINLEVEL_OUTOF10: 7

## 2023-03-08 ASSESSMENT — PAIN DESCRIPTION - DESCRIPTORS: DESCRIPTORS: ACHING

## 2023-03-08 NOTE — PROGRESS NOTES
Patient c/o pain marlene throat. States throat spray will not work and needs something stronger.  Perfect blayne SAM. New orders for prn norco every 6 hours prn

## 2023-03-08 NOTE — PROGRESS NOTES
RT Inhaler-Nebulizer Bronchodilator Protocol Note    There is a bronchodilator order in the chart from a provider indicating to follow the RT Bronchodilator Protocol and there is an Initiate RT Inhaler-Nebulizer Bronchodilator Protocol order as well (see protocol at bottom of note). CXR Findings:  XR CHEST PORTABLE    Result Date: 3/8/2023  No acute cardiopulmonary findings       The findings from the last RT Protocol Assessment were as follows:   History Pulmonary Disease: (P) Chronic pulmonary disease  Respiratory Pattern: (P) Dyspnea on exertion or RR 21-25 bpm  Breath Sounds: (P) Slightly diminished and/or crackles  Cough: (P) Strong, productive  Indication for Bronchodilator Therapy: (P) Decreased or absent breath sounds  Bronchodilator Assessment Score: (P) 7    Aerosolized bronchodilator medication orders have been revised according to the RT Inhaler-Nebulizer Bronchodilator Protocol below. Respiratory Therapist to perform RT Therapy Protocol Assessment initially then follow the protocol. Repeat RT Therapy Protocol Assessment PRN for score 0-3 or on second treatment, BID, and PRN for scores above 3. No Indications - adjust the frequency to every 6 hours PRN wheezing or bronchospasm, if no treatments needed after 48 hours then discontinue using Per Protocol order mode. If indication present, adjust the RT bronchodilator orders based on the Bronchodilator Assessment Score as indicated below. Use Inhaler orders unless patient has one or more of the following: on home nebulizer, not able to hold breath for 10 seconds, is not alert and oriented, cannot activate and use MDI correctly, or respiratory rate 25 breaths per minute or more, then use the equivalent nebulizer order(s) with same Frequency and PRN reasons based on the score. If a patient is on this medication at home then do not decrease Frequency below that used at home.     0-3 - enter or revise RT bronchodilator order(s) to equivalent RT Bronchodilator order with Frequency of every 4 hours PRN for wheezing or increased work of breathing using Per Protocol order mode. 4-6 - enter or revise RT Bronchodilator order(s) to two equivalent RT bronchodilator orders with one order with BID Frequency and one order with Frequency of every 4 hours PRN wheezing or increased work of breathing using Per Protocol order mode. 7-10 - enter or revise RT Bronchodilator order(s) to two equivalent RT bronchodilator orders with one order with TID Frequency and one order with Frequency of every 4 hours PRN wheezing or increased work of breathing using Per Protocol order mode. 11-13 - enter or revise RT Bronchodilator order(s) to one equivalent RT bronchodilator order with QID Frequency and an Albuterol order with Frequency of every 4 hours PRN wheezing or increased work of breathing using Per Protocol order mode. Greater than 13 - enter or revise RT Bronchodilator order(s) to one equivalent RT bronchodilator order with every 4 hours Frequency and an Albuterol order with Frequency of every 2 hours PRN wheezing or increased work of breathing using Per Protocol order mode.        Electronically signed by Harshad Alston RCP on 3/8/2023 at 2:59 PM

## 2023-03-08 NOTE — PLAN OF CARE
Admit to 2w with telemetry    COPD Ae-- was placed on vapotherm originally, now on 3-5 liters  Recent dx of squamous cell carcinoma   Solumedrol  Pulmonary consulted    DEMOND Durand - CNP

## 2023-03-08 NOTE — PLAN OF CARE
Problem: Discharge Planning  Goal: Discharge to home or other facility with appropriate resources  Outcome: Progressing  Flowsheets (Taken 3/8/2023 1340)  Discharge to home or other facility with appropriate resources: Identify barriers to discharge with patient and caregiver

## 2023-03-08 NOTE — ED PROVIDER NOTES
[address] ED  EMERGENCY DEPARTMENT ENCOUNTER        Pt Name: Sonny Arechiga  MRN: [id number]  [place] 1940  Date of evaluation: 3/8/2023  Provider: Sukhwinder Patel PA-C  PCP: Nakia Yousif MD  Note Started: 9:30 AM EST 3/8/23       I have seen and evaluated this patient with my supervising physician Va Portillo MD.      [address] [place] [address]       Chief Complaint   Patient presents with    Shortness of Breath     Patient reports feeling increasingly SOB. States he was diagnosed with PNA x2 weeks ago. Wears 3L baseline. Productive cough. HISTORY OF PRESENT ILLNESS: 1 or more Elements     History From: patient      Sonny Arechiga is a 78 y.o. 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 Patient recently admitted completed course of Levaquin. States over the past several days he has been more short of breath. Last time he was discharged she was discharged home on oxygen typically uses about 3 L. Onset of the shortness of breath over the past several days. Duration of symptoms have been persistent since onset. Context includes increased shortness of breath with a nonproductive cough. Denies any fevers or chills. Denies nausea vomiting diarrhea. Denies chest pain. Denies lower leg swelling. He has not tried anything at this time for symptomatic relief. No aggravating or alleviating complaints. Otherwise denies any other complaints. Nothing seems to make symptoms better or worse. Nursing Notes were all reviewed and agreed with or any disagreements were addressed in the HPI. REVIEW OF SYSTEMS :      Review of Systems   Constitutional: Negative. HENT: Negative. Respiratory:  Positive for cough and shortness of breath. Cardiovascular: Negative. Gastrointestinal: Negative. Genitourinary: Negative. Musculoskeletal: Negative. Skin: Negative. Neurological: Negative. Positives and Pertinent negatives as per HPI.      SURGICAL HISTORY     Past Surgical History:   Procedure Laterality Date    BACK SURGERY      repair broken back L4 & L5    BRONCHOSCOPY  3/3/2023    BRONCHOSCOPY ALVEOLAR LAVAGE performed by Jackson Cuevas MD at [place] [address]  3/3/2023    BRONCHOSCOPY BRUSHINGS performed by Jackson Cuevas MD at [place] [address]  3/3/2023    BRONCHOSCOPY THERAPUTIC ASPIRATION INITIAL performed by Jackson Cuevas MD at [address] [place] [address], LAPAROSCOPIC N/A 01/06/2021    LAPAROSCOPIC CHOLECYSTECTOMY, WITH CHOLANGIOGRAMS (ATTEMPTED) OPEN CHOLECYSTECTOMY performed by Felice Son MD at [address] [place] [address], OPEN N/A 01/06/2021    LAPAROSCOPIC CHOLECYSTECTOMY, WITH CHOLANGIOGRAMS (ATTEMPTED)     CYSTOSCOPY Right 10/09/2015    RIght Ureteroscopy, Holmium Laser Lithotripsy with Stone Removal, Right Stent Placement    CYSTOSCOPY  05/01/2019    CYSTOSCOPY, RESECTION OF BLADDER NECK, URETHRAL DILATION    CYSTOSCOPY W BIOPSY OF BLADDER N/A 05/01/2019    CYSTOSCOPY, RESECTION OF BLADDER NECK, URETHRAL DILATION performed by Thomas Ya MD at [facility] Left 06/12/2016    cataract removal    GALLBLADDER SURGERY  01/2021    JOINT REPLACEMENT  06/29/2011    right knee    JOINT REPLACEMENT  11/2004    left knee    OTHER SURGICAL HISTORY  08/31/2015    wide excision basal cell carcinoma on the nose and bilateral auricles with frozen sections, plastic closure, full thickness skin graft    OTHER SURGICAL HISTORY  12/01/2015    excision of lesion of lip    OTHER SURGICAL HISTORY      BRONCHOSCOPY ALVEOLAR LAVAGE    PROSTATE SURGERY      TURP  10/23/2015    with cystoscopy       CURRENTMEDICATIONS       Previous Medications    ALBUTEROL (PROVENTIL) (2.5 MG/3ML) 0.083% NEBULIZER SOLUTION    USE 1 VIAL IN NEBULIZER 4 TIMES DAILY    ALENDRONATE (FOSAMAX) 70 MG TABLET    Take 70 mg by mouth every 7 days    APIXABAN (ELIQUIS) 5 MG TABS TABLET    Take 1 tablet by mouth 2 times daily    ATORVASTATIN (LIPITOR) 10 MG TABLET    Take 1 tablet by mouth daily    DILTIAZEM (CARDIZEM CD) 180 MG EXTENDED RELEASE CAPSULE    Take 1 capsule by mouth daily    ESOMEPRAZOLE (NEXIUM) 40 MG CAPSULE      Take 40 mg by mouth every morning (before breakfast) Indications: take dos     FLUTICASONE (FLONASE) 50 MCG/ACT NASAL SPRAY    2 sprays by Nasal route daily     GABAPENTIN (NEURONTIN) 800 MG TABLET    Take 800 mg by mouth 3 times daily. METFORMIN (GLUCOPHAGE) 500 MG TABLET    Take 500 mg by mouth 4 times daily     OXYGEN    Inhale 2.5 L into the lungs nightly At 3.5lpm on 2022    PIOGLITAZONE (ACTOS) 30 MG TABLET    Take 30 mg by mouth daily    PREDNISONE (DELTASONE) 10 MG TABLET    4 tabs for 3 days 3 tabs for 3 days 2 tabs for 3 days 1 tabs for 3 days    PROPAFENONE (RYTHMOL) 150 MG TABLET    TAKE ONE TABLET BY MOUTH EVERY 8 HOURS    ROFLUMILAST (DALIRESP) 250 MCG TABLET    Take 1 tablet by mouth daily    TRELEGY ELLIPTA 100-62.5-25 MCG/ACT AEPB INHALER    INHALE 1 PUFF BY MOUTH EVERY DAY    VENTOLIN  (90 BASE) MCG/ACT INHALER    Inhale 2 puffs into the lungs every 6 hours as needed for Wheezing orShortness of Breath       ALLERGIES     Patient has no known allergies.     FAMILYHISTORY       Family History   Problem Relation Age of Onset    Cancer Mother     Diabetes Mother     Heart Disease Mother     Cancer Father     Diabetes Sister     Cancer Brother         SOCIAL HISTORY       Social History     Tobacco Use    Smoking status: Former     Packs/day: 1.50     Years: 45.00     Pack years: 67.50     Types: Cigarettes     Quit date: 2005     Years since quittin.7    Smokeless tobacco: Never   Vaping Use    Vaping Use: Never used   Substance Use Topics Alcohol use: No    Drug use: No       SCREENINGS        [place] Coma Scale  Eye Opening: Spontaneous  Best Verbal Response: Oriented  Best Motor Response: Obeys commands  [place] Coma Scale Score: 15                CIWA Assessment  BP: 139/70  Heart Rate: (!) 103           PHYSICAL EXAM  1 or more Elements     ED Triage Vitals   BP Temp Temp src Pulse Resp SpO2 Height Weight   -- -- -- -- -- -- -- --       Physical Exam  Vitals and nursing note reviewed. Constitutional:       General: He is awake. He is not in acute distress. Appearance: Normal appearance. He is well-developed and overweight. He is ill-appearing. He is not toxic-appearing or diaphoretic. Interventions: Nasal cannula in place. Comments: 3 L NC    HENT:      Head: Normocephalic and atraumatic. Nose: Nose normal.   Eyes:      General:         Right eye: No discharge. Left eye: No discharge. Cardiovascular:      Rate and Rhythm: Normal rate and regular rhythm. Pulses:           Radial pulses are 2+ on the right side and 2+ on the left side. Heart sounds: Normal heart sounds. No murmur heard. No gallop. Pulmonary:      Effort: Pulmonary effort is normal. No respiratory distress. Breath sounds: Decreased breath sounds and wheezing present. No rhonchi or rales. Chest:      Chest wall: No tenderness. Musculoskeletal:         General: No deformity. Normal range of motion. Cervical back: Normal range of motion and neck supple. Right lower leg: No edema. Left lower leg: No edema. Skin:     General: Skin is warm and dry. Neurological:      Mental Status: He is alert and oriented to person, place, and time. Psychiatric:         Behavior: Behavior normal. Behavior is cooperative.            DIAGNOSTIC RESULTS   LABS:    Labs Reviewed   CBC WITH AUTO DIFFERENTIAL - Abnormal; Notable for the following components:       Result Value    Hemoglobin 12.5 (*)     Hematocrit 37.2 (*)     RDW 16.2 (*)     Platelets 91 (*)     Lymphocytes Absolute 0.9 (*)     All other components within normal limits   COMPREHENSIVE METABOLIC PANEL W/ REFLEX TO MG FOR LOW K - Abnormal; Notable for the following components:    Sodium 135 (*)     Chloride 89 (*)     CO2 37 (*)     Glucose 139 (*)     Creatinine 0.6 (*)     All other components within normal limits   BLOOD GAS, VENOUS - Abnormal; Notable for the following components:    pH, Joey 7.495 (*)     pO2, Joey 57.6 (*)     HCO3, Venous 35.2 (*)     Base Excess, Joey 10.5 (*)     Carboxyhemoglobin 12.8 (*)     All other components within normal limits   COVID-19 & INFLUENZA COMBO   CULTURE, BLOOD 1   CULTURE, BLOOD 2   TROPONIN   BRAIN NATRIURETIC PEPTIDE   PROCALCITONIN   LACTATE, SEPSIS       When ordered only abnormal lab results are displayed. All other labs were within normal range or not returned as of this dictation. EKG: When ordered, EKG's are interpreted by the Emergency Department Physician in the absence of a cardiologist.  Please see their note for interpretation of EKG. RADIOLOGY:   Non-plain film images such as CT, Ultrasound and MRI are read by the radiologist. Plain radiographic images are visualized and preliminarily interpreted by the ED Provider with the below findings:      Interpretation per the Radiologist below, if available at the time of this note:    XR CHEST PORTABLE   Final Result   No acute cardiopulmonary findings           No results found. No results found. PROCEDURES   Unless otherwise noted below, none     Procedures    CRITICAL CARE TIME (.cctime)     Total Critical Care time was 35 minutes, excluding separately reportable procedures. There was a high probability of clinically significant/life threatening deterioration in the patient's condition which required my urgent intervention.         PAST MEDICAL HISTORY      has a past medical history of Arthritis, CAD (coronary artery disease), COPD (chronic obstructive pulmonary disease) ([facility] Diabetes mellitus ([facility] Hepatitis A (01/05/2021), Hyperlipidemia, Hypertension, Influenza A (12/29/2017), Kidney calculi, MDRO (multiple drug resistant organisms) resistance (03/09/2017), ELAINE on CPAP, Osteoporosis, and Skin cancer of face. EMERGENCY DEPARTMENT COURSE and DIFFERENTIAL DIAGNOSIS/MDM:   Vitals:    Vitals:    03/08/23 1014 03/08/23 1029 03/08/23 1044 03/08/23 1059   BP: 132/68 (!) 152/69 132/73 139/70   Pulse: (!) 109 94 (!) 108 (!) 103   Resp: 25 26 26 23   Temp:       TempSrc:       SpO2:   96% 97%   Weight:       Height:           Patient was given the following medications:  Medications   albuterol (PROVENTIL) nebulizer solution 2.5 mg (2.5 mg Nebulization Given 3/8/23 1008)   ipratropium-albuterol (DUONEB) nebulizer solution 1 ampule (1 ampule Inhalation Given 3/8/23 1008)   methylPREDNISolone sodium (SOLU-MEDROL) injection 125 mg (125 mg IntraVENous Given 3/8/23 1007)             Is this patient to be included in the SEP-1 Core Measure due to severe sepsis or septic shock? No   Exclusion criteria - the patient is NOT to be included for SEP-1 Core Measure due to:  2+ SIRS criteria are not met    Chronic Conditions affecting care: Severe COPD   has a past medical history of Arthritis, CAD (coronary artery disease), COPD (chronic obstructive pulmonary disease) ([facility] Diabetes mellitus ([facility] Hepatitis A (01/05/2021), Hyperlipidemia, Hypertension, Influenza A (12/29/2017), Kidney calculi, MDRO (multiple drug resistant organisms) resistance (03/09/2017), ELAINE on CPAP, Osteoporosis, and Skin cancer of face. CONSULTS: (Who and What was discussed)  None    Hospitalist for admission        Records Reviewed (External and Source) recent admission from 2/27    CC/HPI Summary, DDx, ED Course, and Reassessment:     Patient brought in today by EMS from home with increased shortness of breath.   On exam he is appears chronically ill he is hypoxic on his 3 L nasal cannula at this time will increase oxygen afebrile. Old labs and records reviewed. Patient seen by myself as well as my attending Dr. Lenard Lynch. EKG was reviewed by my attending please see note. Patient given IV steroids and breathing treatments. Respiratory was consulted patient initially placed on Vapotherm. Will reevaluate. Chest x-ray was read and interpreted by myself and confirmed by radiology shows no acute findings. CBC shows no white count. Hemoglobin of 12.5. VBG shows a pH of 7.495 with a CO2 of 46. Lactic acid of 1.5. COVID and flu negative. Troponin less than 0.01. BNP of 323. Procalcitonin is negative. Disposition Considerations (tests considered but not done, Admit vs D/C, Shared Decision Making, Pt Expectation of Test or Tx.):     Plan at this time will be to admit for acute respiratory failure with hypoxia. Hospitalist will be consulted to arrange for admission. I am the Primary Clinician of Record. FINAL IMPRESSION      1. Acute respiratory failure with hypoxia [facility])          DISPOSITION/PLAN     DISPOSITION Decision To Admit 03/08/2023 10:42:00 AM      PATIENT REFERRED TO:  No follow-up provider specified.     DISCHARGE MEDICATIONS:  New Prescriptions    No medications on file       DISCONTINUED MEDICATIONS:  Discontinued Medications    No medications on file              (Please note that portions of this note were completed with a voice recognition program.  Efforts were made to edit the dictations but occasionally words are mis-transcribed.)    Carlita Miller PA-C (electronically signed)        Carlita Miller PA-C  03/08/23 [id number]

## 2023-03-08 NOTE — CONSULTS
[facility]      Patient Name: [address] Record Number:  [id number]  Primary Care Physician:  Romy Chavez MD  Date of Consultation: 3/8/2023    Chief Complaint: SOB, productive cough    HISTORY OF PRESENT ILLNESS  Susana Kirkland is a(n) 78 y.o. male admitted for shortness of breath and productive cough. Has recently had 2 bronchoscopies performed which demonstrated squamous cell carcinoma but no identifiable mass. On recent intubation was noted to have an irregular vocal cord ENT consulted for evaluation.       Patient Active Problem List   Diagnosis    HTN (hypertension)    COPD, severe (HCC)    DM2 (diabetes mellitus, type 2) ([facility]    HLD (hyperlipidemia)    Gallstones    PAF (paroxysmal atrial fibrillation) ([facility])    Chronic respiratory failure with hypoxia and hypercapnia 2.5 L home O2    Acute respiratory insufficiency    Acute on chronic respiratory failure with hypercapnia (HCC)    Septic shock (HCC)    COPD exacerbation (HCC)    Former smoker    Acute hyperglycemia    Chronic normocytic anemia    Chronic thrombocytopenia    Falls at home    Ambulatory dysfunction    General weakness    Acute on chronic respiratory failure with hypoxia and hypercapnia (HCC)    Pleural effusion    Rapid atrial fibrillation (HCC)    Atrial fibrillation with RVR (HCC)    Acute respiratory failure with hypoxia and hypercapnia (HCC)    Chronic obstructive pulmonary disease (HCC)    Acute encephalopathy    Hyperglycemia    Pneumonia of left lower lobe due to infectious organism    Acute calculous cholecystitis    Acute cholecystitis    Atherosclerotic ulcer of aorta (HCC)    Closed compression fracture of body of L1 vertebra (HCC)    Hypomagnesemia    Abdominal aortic aneurysm (AAA) 30 to 34 mm in diameter    Elevated procalcitonin    Abdominal pain, right upper quadrant    Lactic acidosis    Elevated bilirubin    Bacteremia due to Klebsiella pneumoniae Paroxysmal atrial fibrillation (HCC)    Moderate protein-calorie malnutrition ([address]    Debility    COVID-19    Acute on chronic respiratory failure with hypoxemia (HCC)    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)    Diabetes mellitus with skin ulcer (HCC)    Other specified peripheral vascular diseases (HCC)    Hypoxia    Atrial fibrillation, controlled ([address]    Community acquired pneumonia    Acute respiratory failure ([address]    Aspiration pneumonia of both lungs (HCC)    Hemoptysis    Aspiration pneumonia (HCC)    Pneumonia of right lower lobe due to Pseudomonas species ([address]    Mucus plugging of bronchi    Acute exacerbation of chronic obstructive pulmonary disease (COPD) ([address]    Squamous cell carcinoma of lung (HCC)    Acute on chronic respiratory failure with hypoxia ([address]     Past Surgical History:   Procedure Laterality Date    BACK SURGERY      repair broken back L4 & L5    BRONCHOSCOPY  3/3/2023    BRONCHOSCOPY ALVEOLAR LAVAGE performed by Lizzie Fuentes MD at [place] [address]  3/3/2023    BRONCHOSCOPY BRUSHINGS performed by Lizzie Fuentes MD at [place] [address]  3/3/2023    BRONCHOSCOPY [facility] performed by Lizzie Fuentes MD at [facility], LAPAROSCOPIC N/A 01/06/2021    LAPAROSCOPIC CHOLECYSTECTOMY, WITH CHOLANGIOGRAMS (ATTEMPTED) OPEN CHOLECYSTECTOMY performed by Ludivina Maguire MD at [address] [place] [address], OPEN N/A 01/06/2021    LAPAROSCOPIC CHOLECYSTECTOMY, WITH CHOLANGIOGRAMS (ATTEMPTED)     CYSTOSCOPY Right 10/09/2015    RIght Ureteroscopy, Holmium Laser Lithotripsy with Stone Removal, Right Stent Placement    CYSTOSCOPY  05/01/2019    CYSTOSCOPY, RESECTION OF BLADDER NECK, URETHRAL DILATION    CYSTOSCOPY W BIOPSY OF BLADDER N/A 05/01/2019    CYSTOSCOPY, RESECTION OF BLADDER NECK, URETHRAL DILATION performed by Cleo Mejia MD at [facility] Left 06/12/2016 cataract removal    GALLBLADDER SURGERY  2021    JOINT REPLACEMENT  2011    right knee    JOINT REPLACEMENT  2004    left knee    OTHER SURGICAL HISTORY  2015    wide excision basal cell carcinoma on the nose and bilateral auricles with frozen sections, plastic closure, full thickness skin graft    OTHER SURGICAL HISTORY  2015    excision of lesion of lip    OTHER SURGICAL HISTORY      BRONCHOSCOPY ALVEOLAR LAVAGE    PROSTATE SURGERY      TURP  10/23/2015    with cystoscopy     Family History   Problem Relation Age of Onset    Cancer Mother     Diabetes Mother     Heart Disease Mother     Cancer Father     Diabetes Sister     Cancer Brother      Social History     Tobacco Use    Smoking status: Former     Packs/day: 1.50     Years: 45.00     Pack years: 67.50     Types: Cigarettes     Quit date: 2005     Years since quittin.7    Smokeless tobacco: Never   Vaping Use    Vaping Use: Never used   Substance Use Topics    Alcohol use: No    Drug use: No       DRUG/FOOD ALLERGIES: Patient has no known allergies. CURRENT MEDICATIONS  Prior to Admission medications    Medication Sig Start Date End Date Taking?  Authorizing Provider   predniSONE (DELTASONE) 10 MG tablet 4 tabs for 3 days 3 tabs for 3 days 2 tabs for 3 days 1 tabs for 3 days 3/4/23   MD ESEQUIEL Storm ELLIPTA 100-62.5-25 MCG/ACT AEPB inhaler INHALE 1 PUFF BY MOUTH EVERY DAY 23   Myranda James MD   alendronate (FOSAMAX) 70 MG tablet Take 70 mg by mouth every 7 days    Historical Provider, MD   Roflumilast (DALIRESP) 250 MCG tablet Take 1 tablet by mouth daily 23   Myranda James MD   propafenone (RYTHMOL) 150 MG tablet TAKE ONE TABLET BY MOUTH EVERY 8 HOURS 23   Violette Paiz MD   apixaban (ELIQUIS) 5 MG TABS tablet Take 1 tablet by mouth 2 times daily 23   Violette Paiz MD   atorvastatin (LIPITOR) 10 MG tablet Take 1 tablet by mouth daily 23   Violette Paiz MD   dilTIAZem (CARDIZEM CD) 180 MG extended release capsule Take 1 capsule by mouth daily 12/7/22   Luc Foley MD   albuterol (PROVENTIL) (2.5 MG/3ML) 0.083% nebulizer solution USE 1 VIAL IN NEBULIZER 4 TIMES DAILY 11/3/22   Myranda Swift MD   VENTOLIN  (90 Base) MCG/ACT inhaler Inhale 2 puffs into the lungs every 6 hours as needed for Wheezing orShortness of Breath 8/26/22   Myranda Swift MD   fluticasone (FLONASE) 50 MCG/ACT nasal spray 2 sprays by Nasal route daily  2/12/20   Historical Provider, MD   pioglitazone (ACTOS) 30 MG tablet Take 30 mg by mouth daily    Historical Provider, MD   gabapentin (NEURONTIN) 800 MG tablet Take 800 mg by mouth 3 times daily.      Historical Provider, MD   OXYGEN Inhale 2.5 L into the lungs nightly At 3.5lpm on 2/8/2022    Historical Provider, MD   metformin (GLUCOPHAGE) 500 MG tablet Take 500 mg by mouth 4 times daily     Historical Provider, MD   esomeprazole (NEXIUM) 40 MG capsule   Take 40 mg by mouth every morning (before breakfast) Indications: take [address] Provider, MD       REVIEW OF SYSTEMS  The following systems were reviewed and revealed the following in addition to any already discussed in the HPI:    CONSTITUTIONAL: denies weight loss, no fever, no night sweats, no chills  EYES: no vision changes, no blurry vision  EARS: no changes in hearing, no otalgia  NOSE: no epistaxis, no rhinorrhea  RESPIRATORY: + Shortness of breath  CV: no chest pain, no palpitations  HEME: No coagulation disorder, no bleeding disorder  NEURO: no TIA or stroke-like symptoms  SKIN: No new rashes in the head and neck, no recent skin cancers  MOUTH: No new ulcers, no recent teeth infections  GASTROINTESTINAL: No diarrhea, stomach pain  PSYCH: No anxiety, no depression      PHYSICAL EXAM  /74   Pulse (!) 107   Temp 97.7 °F (36.5 °C) (Oral)   Resp 19   Ht 6' (1.829 m)   Wt 180 lb (81.6 kg)   SpO2 90%   BMI 24.41 kg/m²     GENERAL: No Acute Distress, Alert and Oriented, hoarseness  EYES: EOMI, Anti-icteric  NOSE: No epistaxis, nasal mucosa within normal limits, no purulent drainage  EARS: Normal external canal appearance, EAC patent bilaterally  FACE: 1/6 House-Brackmann Scale, symmetric, sensation equal bilaterally  ORAL CAVITY: No masses or lesions palpated, uvula is midline, moist mucous membranes  NECK: Normal range of motion, no thyromegaly, trachea is midline, no lymphadenopathy, no neck masses, no crepitus  CHEST: Normal respiratory effort, no retractions, breathing comfortably  SKIN: No rashes, normal appearing skin, no evidence of skin lesions/tumors    RADIOLOGY  Summary of findings:  N/A    PROCEDURE  Flexible Laryngoscopy    Pre op: Lesion of vocal cord. Post op: Left vocal cord mass  Procedure : Flexible Laryngoscopy  Surgeon: Latoya Russell DO  Anesthesia: Afrin with 4% lidocaine  Indication: 41-year-old male with hemoptysis laryngeal mirror examination was not tolerated due to gag reflex  Description:  The scope was passed along the floor of the right naris to the level of the larynx. The base of tongue, vallecula, epiglottis, aryepiglottic folds, arytenoids, false vocal folds, true vocal folds, or pyriform sinuses were all evaluated. The scope was removed. The patient tolerated the procedure without difficulty. There were no complications. Pertinent findings: 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 Examination limited secondary to coughing, and hemoptysis. Vocal cord motion present bilaterally    ASSESSMENT/PLAN  Vik Palacio is a very pleasant 78 y.o. male with left true vocal cord mass. Findings discussed with patient and son. Patient indicates he is sick of testing and would consider hospice rather than treatment. 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   Briefly discussed what potential treatment options    Patient wishes to consider his options. Once he is optimized could consider direct laryngoscopy with biopsy for further evaluation. He does wish to proceed with further work-up recommend CT neck with contrast for further evaluation. Please contact service with any further questions or concerns    Cecille Mcqueen, DO  Otolaryngology    Medical Decision Making:   The following items were considered in medical decision making:  Independent review of images  Review / order clinical lab tests  Review / order radiology tests  Decision to obtain old records

## 2023-03-08 NOTE — PROGRESS NOTES
4 Eyes Skin Assessment     The patient is being assess for   Admission    I agree that 2 RN's have performed a thorough Head to Toe Skin Assessment on the patient. ALL assessment sites listed below have been assessed. Areas assessed for pressure by both nurses:   [x]   Head, Face, and Ears   [x]   Shoulders, Back, and Chest, Abdomen  [x]   Arms, Elbows, and Hands   [x]   Coccyx, Sacrum, and Ischium  [x]   Legs, Feet, and Heels        Skin Assessed Under all Medical Devices by both nurses:    Stage 1 non blanchable buttocks. Bruising to bilateral upper arms. All Mepilex Borders were peeled back and area peeked at by both nurses:  No: n/a  Please list where Mepilex Borders are located:  n/a             **SHARE this note so that the co-signing nurse is able to place an eSignature**    Co-signer eSignature: Electronically signed by Keshia Almaraz RN on 3/8/23 at 7:40 PM EST    Does the Patient have Skin Breakdown related to pressure?   No     (Insert Photo here n/a)         Santiago Prevention initiated:  yes   Wound Care Orders initiated:  No      [facility] nurse consulted for Pressure Injury (Stage 3,4, Unstageable, DTI, NWPT, Complex wounds)and New or Established Ostomies:  No      Primary Nurse eSignature: Electronically signed by Mu Velez RN on 3/8/23 at 2:17 PM EST

## 2023-03-08 NOTE — H&P
Hospital Medicine History & Physical      PCP: Wilman Eller MD    Date of Admission: 3/8/2023    Date of Service: Pt seen/examined on 03/08/23      Chief Complaint:    Chief Complaint   Patient presents with    Shortness of Breath     Patient reports feeling increasingly SOB. States he was diagnosed with PNA x2 weeks ago. Wears 3L baseline. Productive cough. History Of Present Illness: The patient is a 78 y.o. male with COPD, ELAINE, PAF, T2DM and other chronic conditions as listed below who presented to [facility] ED via EMS with complaint of SOB. Pt reports sob was sudden this am with worsening progressively making ER visit . Was in severe distress needing vapotherm in ER but quickly improved with breathing tx and steroids. Pt was just admitted in a similar way in feb needing vent support at which time Bronch with BAL showed squamous cell cancer of unknown origin . Repeat bronch last week with similar cancer cells but no mass. Abnormal thickening of vocal cords noted during bronch and was referred to ENT and awaiting consult    Pt reports he has intermittent bloody sputum since last month but not any worse. No fevers or chills.  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    ELAINE on CPAP     Osteoporosis     Skin cancer of face        Past Surgical History:        Procedure Laterality Date    BACK SURGERY      repair broken back L4 & L5    BRONCHOSCOPY  3/3/2023    BRONCHOSCOPY ALVEOLAR LAVAGE performed by Colby Chapa MD at [place] [address]  3/3/2023    BRONCHOSCOPY BRUSHINGS performed by Ursula Gaviria MD at [facility]  3/3/2023    BRONCHOSCOPY THERAPUTIC ASPIRATION INITIAL performed by Ursula Gaviria MD at [address] [facility], LAPAROSCOPIC N/A 01/06/2021    LAPAROSCOPIC CHOLECYSTECTOMY, WITH CHOLANGIOGRAMS (ATTEMPTED) OPEN CHOLECYSTECTOMY performed by Chelita Cerna MD at [address] [facility], OPEN N/A 01/06/2021    LAPAROSCOPIC CHOLECYSTECTOMY, WITH CHOLANGIOGRAMS (ATTEMPTED)     CYSTOSCOPY Right 10/09/2015    RIght Ureteroscopy, Holmium Laser Lithotripsy with Stone Removal, Right Stent Placement    CYSTOSCOPY  05/01/2019    CYSTOSCOPY, RESECTION OF BLADDER NECK, URETHRAL DILATION    CYSTOSCOPY W BIOPSY OF BLADDER N/A 05/01/2019    CYSTOSCOPY, RESECTION OF BLADDER NECK, URETHRAL DILATION performed by Diamond Mcclendon MD at [address] [facility] Left 06/12/2016    cataract removal    GALLBLADDER SURGERY  01/2021    JOINT REPLACEMENT  06/29/2011    right knee    JOINT REPLACEMENT  11/2004    left knee    OTHER SURGICAL HISTORY  08/31/2015    wide excision basal cell carcinoma on the nose and bilateral auricles with frozen sections, plastic closure, full thickness skin graft    OTHER SURGICAL HISTORY  12/01/2015    excision of lesion of lip    OTHER SURGICAL HISTORY      BRONCHOSCOPY ALVEOLAR LAVAGE    PROSTATE SURGERY      TURP  10/23/2015    with cystoscopy       Medications Prior to Admission:    Prior to Admission medications    Medication Sig Start Date End Date Taking?  Authorizing Provider   predniSONE (DELTASONE) 10 MG tablet 4 tabs for 3 days 3 tabs for 3 days 2 tabs for 3 days 1 tabs for 3 days 3/4/23   Candice Gitelman, MD   TRELEGY ELLIPTA 100-62.5-25 MCG/ACT AEPB inhaler INHALE 1 PUFF BY MOUTH EVERY DAY 2/22/23   Myranda Hung MD   alendronate (FOSAMAX) 70 MG tablet Take 70 mg by mouth every 7 days    Historical Provider, MD   Roflumilast (DALIRESP) 250 MCG tablet Take 1 tablet by mouth daily 1/27/23   Myranda Medina MD   propafenone (RYTHMOL) 150 MG tablet TAKE ONE TABLET BY MOUTH EVERY 8 HOURS 1/5/23   Edwin Baker MD   apixaban Catalino Nim) 5 MG TABS tablet Take 1 tablet by mouth 2 times daily 1/5/23   Edwin Baker MD   atorvastatin (LIPITOR) 10 MG tablet Take 1 tablet by mouth daily 1/5/23   Edwin Baker MD   dilTIAZem (CARDIZEM CD) 180 MG extended release capsule Take 1 capsule by mouth daily 12/7/22   Edwin Baker MD   albuterol (PROVENTIL) (2.5 MG/3ML) 0.083% nebulizer solution USE 1 VIAL IN NEBULIZER 4 TIMES DAILY 11/3/22   Myranda Medina MD   VENTOLIN  (90 Base) MCG/ACT inhaler Inhale 2 puffs into the lungs every 6 hours as needed for Wheezing orShortness of Breath 8/26/22   Luis Ding MD   fluticasone (FLONASE) 50 MCG/ACT nasal spray 2 sprays by Nasal route daily  2/12/20   Historical Provider, MD   pioglitazone (ACTOS) 30 MG tablet Take 30 mg by mouth daily    Historical Provider, MD   gabapentin (NEURONTIN) 800 MG tablet Take 800 mg by mouth 3 times daily. Historical Provider, MD   OXYGEN Inhale 2.5 L into the lungs nightly At 3.5lpm on 2/8/2022    Historical Provider, MD   metformin (GLUCOPHAGE) 500 MG tablet Take 500 mg by mouth 4 times daily     Historical Provider, MD   esomeprazole (NEXIUM) 40 MG capsule   Take 40 mg by mouth every morning (before breakfast) Indications: take [address] Provider, MD       Allergies:  Patient has no known allergies. Social History:  The patient currently lives at home with family       TOBACCO:   reports that he quit smoking about 17 years ago. His smoking use included cigarettes. He has a 67.50 pack-year smoking history. He has never used smokeless tobacco.  ETOH:   reports no history of alcohol use.       Family History:   Positive as follows:        Problem Relation Age of Onset    Cancer Mother     Diabetes Mother     Heart Disease Mother     Cancer Father     Diabetes Sister     Cancer Brother        REVIEW OF SYSTEMS:       Constitutional: Negative for fever   HENT: Negative for sore throat   Eyes: Negative for redness   Respiratory: +ve dyspnea, cough +ve as above  Cardiovascular: Negative for chest pain   Gastrointestinal: Negative for vomiting, diarrhea   Genitourinary: Negative for hematuria   Musculoskeletal: Negative for arthralgias   Skin: Negative for rash   Neurological: Negative for syncope   Hematological: Negative for adenopathy   Psychiatric/Behavorial: Negative for anxiety    PHYSICAL EXAM:    /70   Pulse (!) 103   Temp 97.6 °F (36.4 °C) (Oral)   Resp 23   Ht 6' (1.829 m)   Wt 180 lb (81.6 kg)   SpO2 97%   BMI 24.41 kg/m²         General: elderly male up in bed,  Awake, alert and oriented. Appears to be not in any distress  Mucous Membranes:  Pink , anicteric  Neck: No JVD, no carotid bruit, no thyromegaly  Chest:  Clear to auscultation bilaterally, minimal crackles in bases  No airway stridor  Cardiovascular:  RRR S1S2 heard, no murmurs or gallops  Abdomen:  Soft, undistended, non tender, no organomegaly, BS present  Extremities: No edema or cyanosis. Distal pulses well felt  Neurological : grossly normal      CBC:   Recent Labs     03/08/23  0944   WBC 8.3   HGB 12.5*   HCT 37.2*   MCV 83.8   PLT 91*     BMP:   Recent Labs     03/08/23 0944   *   K 4.6   CL 89*   CO2 37*   BUN 15   CREATININE 0.6*     LIVER PROFILE:   Recent Labs     03/08/23  0944   AST 26   ALT 29   BILITOT 0.8   ALKPHOS 57     PT/INR: No results for input(s): PROTIME, INR in the last 72 hours. APTT: No results for input(s): APTT in the last 72 hours. UA:No results for input(s): NITRITE, COLORU, PHUR, LABCAST, WBCUA, RBCUA, MUCUS, TRICHOMONAS, YEAST, BACTERIA, CLARITYU, SPECGRAV, LEUKOCYTESUR, UROBILINOGEN, BILIRUBINUR, BLOODU, GLUCOSEU, AMORPHOUS in the last 72 hours.     Invalid input(s): [id number] Winslow Karthik ENZYMES  Recent Labs     03/08/23  0944   TROPONINI <0.01       U/A:    Lab Results   Component Value Date/Time    COLORU Yellow 02/27/2023 04:50 PM    WBCUA None seen 02/27/2023 04:50 PM    RBCUA 5-10 02/27/2023 04:50 PM    MUCUS Rare 01/04/2021 09:55 PM    BACTERIA Rare 12/14/2020 10:58 PM    CLARITYU Clear 02/27/2023 04:50 PM    SPECGRAV <=1.005 02/27/2023 04:50 PM    LEUKOCYTESUR Negative 02/27/2023 04:50 PM    BLOODU TRACE-INTACT 02/27/2023 04:50 PM    GLUCOSEU Negative 02/27/2023 04:50 PM    AMORPHOUS 1+ 01/04/2021 09:55 PM       ABG    Lab Results   Component Value Date/Time    YNC0DEE 26.3 02/13/2023 10:25 AM    BEART 0.8 02/13/2023 10:25 AM    U1BZLZRX 96.5 02/13/2023 10:25 AM    PHART 7.374 02/13/2023 10:25 AM    ZDL7ITG 46.2 02/13/2023 10:25 AM    PO2ART 88.9 02/13/2023 10:25 AM    UVZ5WGC 27.8 02/13/2023 10:25 AM       CULTURES  Covid/influenza not detected   Sputum cultures pending  Pneumonia panel pending   Blood cultures x2 pending     EKG:    Sinus tachycardiaNonspecific ST & T wave changesAbnormal ECGWhen compared with ECG of 27-FEB-2023 11:58,No significant change was foundConfirmed by Lane Goddard MD, ANTONIO ([id number]) on 3/8/2023 11:13:23 AM    RADIOLOGY  XR CHEST PORTABLE   Final Result   No acute cardiopulmonary findings           BAL - squamous cell cancer cells per cytology      ASSESSMENT/PLAN:    Acute on chronic respiratory failure w/ hypoxia  COPD  AE  - presented in severe distress- was requiring vapotherm, now on 3-5L  - CXR negative for any acute infiltrates  - ?  Vocal cord issue   - recent bronch with vocal cord thickening and irregular margins   - respiratory culture and pneumonia panel pending   - solumedrol, duonebs, and albuterol prn  - continue Daliresp   - pulmonology consulted  - monitor on continuous pulse ox     Squamous Cell carcinoma   - noted on bronch washings completed on 2/12/23 and in 3/4  - PET CT scheduled for 3/16/23  - referred to ENT for vocal cord irregularity noted by anesthesia during intubation    Tachycardia   - continue Cardizem  - continue to monitor on telemetry Dysphagia  - speech evaluation and treat last admission  - recommendations for no straws, meds whole or crushed in puree  - strict aspiration precautions ordered  - continue to monitor for s/sx of aspiration     PAF  - continue eliquis, Rythmol, Cardizem      DM type 2 with neuropathy   - hold home regimen  - SSI  - monitor blood sugars  - continue Neurontin      Thrombocytopenia - chronic, stable  - platelet 91  - monitor      HLD  - continue statin     DVT Prophylaxis: Eliquis  Diet: ADULT DIET;  Regular; 4 carb choices (60 gm/meal)  Code Status: Full Code    Updated family in room    Jose Luis Ruby MD, 3/8/2023 2:19 PM

## 2023-03-08 NOTE — FLOWSHEET NOTE
03/08/23 1330   Vital Signs   Temp 97.7 °F (36.5 °C)   Temp Source Oral   Heart Rate (!) 115   Heart Rate Source Monitor   Resp 19   /74   MAP (Calculated) 94   BP Location Right upper arm   BP Method Automatic   Patient Position Semi fowlers   Level of Consciousness 0   MEWS Score 3   Pain Assessment   Pain Assessment None - Denies Pain   Opioid-Induced Sedation   POSS Score 1   RASS   Corral Agitation Sedation Scale (RASS) 0   Oxygen Therapy   SpO2 91 %   O2 Device Nasal cannula   O2 Flow Rate (L/min) 5 L/min     Alert and oriented. Arrived from ER. Skin w/d. Resp e/e unlabored. Sputum culture obtained.  Sent to lab.  Oxygen at 5L per N/C.  CHG wipes completed. No s/s distress noted. Family at bedside.

## 2023-03-08 NOTE — CONSULTS
Reason for referral and CC: SOB    HISTORY OF PRESENT ILLNESS: 79 yo male with a h/o COPD and recent admissions requiring intubation. Has had recurrent hemoptysis and 2 bronchoscopies found malignant cells in the washings but no source. He returned with feelings of congestion and SOB and was initially severely hypoxic in the ED but is now on 5L and feels close to baseline. Abn VC seen during last bronch and ENT today confirmed there is a lesion suspicious for malignancy. Also bloody secretions were again seen. The pt is leaning toward no treatment.     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    ELAINE on CPAP     Osteoporosis     Skin cancer of face      Past Surgical History:   Procedure Laterality Date    BACK SURGERY      repair broken back L4 & L5    BRONCHOSCOPY  3/3/2023    BRONCHOSCOPY ALVEOLAR LAVAGE performed by Meng White MD at [place] [address]  3/3/2023    BRONCHOSCOPY BRUSHINGS performed by Meng White MD at [place] [address]  3/3/2023    BRONCHOSCOPY THERAPUTIC [place] performed by Meng White MD at [address] [place] [address], LAPAROSCOPIC N/A 01/06/2021    LAPAROSCOPIC CHOLECYSTECTOMY, WITH CHOLANGIOGRAMS (ATTEMPTED) OPEN CHOLECYSTECTOMY performed by Emilie Castrejon MD at [address] [place] [address], OPEN N/A 01/06/2021    LAPAROSCOPIC CHOLECYSTECTOMY, WITH CHOLANGIOGRAMS (ATTEMPTED)     CYSTOSCOPY Right 10/09/2015    RIght Ureteroscopy, Holmium Laser Lithotripsy with Stone Removal, Right Stent Placement    CYSTOSCOPY  05/01/2019    CYSTOSCOPY, RESECTION OF BLADDER NECK, URETHRAL DILATION    CYSTOSCOPY W BIOPSY OF BLADDER N/A 05/01/2019    CYSTOSCOPY, RESECTION OF BLADDER NECK, URETHRAL DILATION performed by Criss Mcnair MD at [address] Left 06/12/2016    cataract removal    GALLBLADDER SURGERY  01/2021    JOINT REPLACEMENT  06/29/2011    right knee    JOINT REPLACEMENT  11/2004    left knee    OTHER SURGICAL HISTORY  08/31/2015    wide excision basal cell carcinoma on the nose and bilateral auricles with frozen sections, plastic closure, full thickness skin graft    OTHER SURGICAL HISTORY  12/01/2015    excision of lesion of lip    OTHER SURGICAL HISTORY      BRONCHOSCOPY ALVEOLAR LAVAGE    PROSTATE SURGERY      TURP  10/23/2015    with cystoscopy     Family History  family history includes Cancer in his brother, father, and mother; Diabetes in his mother and sister; Heart Disease in his mother. Social History:  reports that he quit smoking about 17 years ago. His smoking use included cigarettes. He has a 67.50 pack-year smoking history. He has never used smokeless tobacco.   reports no history of alcohol use. ALLERGIES:  Patient has No Known Allergies.   Continuous Infusions:   sodium chloride      dextrose       Scheduled Meds:   apixaban  5 mg Oral BID    atorvastatin  10 mg Oral Daily    dilTIAZem  180 mg Oral Daily    [START ON 3/9/2023] pantoprazole  40 mg Oral QAM AC    fluticasone  2 spray Nasal Daily    propafenone  150 mg Oral 3 times per day    Roflumilast  250 mcg Oral Daily    sodium chloride flush  5-40 mL IntraVENous 2 times per day    insulin lispro  0-4 Units SubCUTAneous TID WC    insulin lispro  0-4 Units SubCUTAneous Nightly    budesonide-formoterol  2 puff Inhalation BID    And    tiotropium  2 puff Inhalation Daily    gabapentin  400 mg Oral TID    ipratropium-albuterol  1 ampule Inhalation TID    [START ON 3/9/2023] methylPREDNISolone  40 mg IntraVENous Q12H    Followed by    Chapis Huff ON 3/11/2023] predniSONE  40 mg Oral Daily     PRN Meds:  albuterol, sodium chloride flush, sodium chloride, ondansetron **OR** ondansetron, polyethylene glycol, acetaminophen **OR** acetaminophen, glucose, dextrose bolus **OR** dextrose bolus, glucagon (rDNA), dextrose    REVIEW OF SYSTEMS:  Constitutional: Negative for fever  HENT: Negative for sore throat  Eyes: Negative for redness   Respiratory: +for dyspnea, cough  Cardiovascular: Negative for chest pain  Gastrointestinal: Negative for vomiting, diarrhea   Genitourinary: Negative for hematuria   Musculoskeletal: Negative for arthralgias   Skin: Negative for rash  Neurological: Negative for syncope  Hematological: Negative for adenopathy  Psychiatric/Behavorial: Negative for anxiety    PHYSICAL EXAM: /74   Pulse (!) 107   Temp 97.7 °F (36.5 °C) (Oral)   Resp 19   Ht 6' (1.829 m)   Wt 180 lb (81.6 kg)   SpO2 90%   BMI 24.41 kg/m²  on 5L  Constitutional:  No acute distress. Eyes: PERRL. Conjunctivae anicteric. ENT: Normal nose. Normal tongue. Neck:  Trachea is midline. No thyroid tenderness. Respiratory: No accessory muscle usage. No wheezes. No rales. + Rhonchi. Cardiovascular: Normal S1S2. No digit clubbing. No digit cyanosis. No LE edema. Gastrointestinal: No mass palpated. No tenderness palpated. No umbilical hernia. Lymphatic: No cervical or supraclavicular adenopathy. Skin: No rash on the exposed extremities. No Nodules or induration on exposed extremities. Psychiatric: No anxiety or Agitation. Alert and Oriented to person, place and time. CBC:   Recent Labs     03/08/23  0944   WBC 8.3   HGB 12.5*   HCT 37.2*   MCV 83.8   PLT 91*     BMP:   Recent Labs     03/08/23  0944   *   K 4.6   CL 89*   CO2 37*   BUN 15   CREATININE 0.6*        Recent Labs     03/08/23 0944   AST 26   ALT 29   BILITOT 0.8   ALKPHOS 57     No results for input(s): PROTIME, INR in the last 72 hours.   No results for input(s): NITRITE, COLORU, PHUR, LABCAST, WBCUA, RBCUA, MUCUS, TRICHOMONAS, YEAST, BACTERIA, CLARITYU, Gisela Never, UROBILINOGEN, BILIRUBINUR, BLOODU, GLUCOSEU, AMORPHOUS in the last 72 hours. Invalid input(s): KETONESU  No results for input(s): PHART, WUV1MLE, PO2ART in the last 72 hours. Chest imaging was reviewed by me and showed CXR  The patient is rotated. With that said the cardiomediastinal silhouette   appears stable. No acute airspace infiltrate. No pneumothorax or pleural   effusion       Impression   No acute cardiopulmonary findings     Bronch 3/3/23: transbronchial brushings RUL airway irregularity & aspiration of copious thick secretions occluding the right mainstem with mucus plugging throughout the right lower lobe airways   - brushings were negative but BAL+    ASSESSMENT:  COPD - currently appear improved but had evidence of exacerbation in the ED  Acute hypoxic respiratory failure - currently on 5L and on 3-4L at home  Squamous cell carcinoma cells collected during 2 bronchoscopies without a source lesion  Hemoptysis - recurrent and likely related to malignancy  VC lesion suspicious for malignancy - ENT believes the lesion is too small to cause respiratory symptoms  PAF on Eliquis  DM2  TMP    PLAN:  Supplemental oxygen to maintain SaO2 >92%; wean as tolerated  Intensive inhaled bronchodilator therapy.   Prednisone taper; monitor glucose  Doxycycline   Pt seen by ENT for the VC lesion  Stop Eliquis due to recurrent hemoptysis  Pt needs to have a PET CT if he desires to purse dx and treatment  Currently, the pt is leaning toward no treatment    Thank you Rolando Smith MD for this consult

## 2023-03-09 VITALS
RESPIRATION RATE: 20 BRPM | DIASTOLIC BLOOD PRESSURE: 73 MMHG | SYSTOLIC BLOOD PRESSURE: 123 MMHG | WEIGHT: 168.4 LBS | HEIGHT: 72 IN | TEMPERATURE: 97 F | BODY MASS INDEX: 22.81 KG/M2 | OXYGEN SATURATION: 96 % | HEART RATE: 103 BPM

## 2023-03-09 DIAGNOSIS — J96.12 CHRONIC RESPIRATORY FAILURE WITH HYPOXIA AND HYPERCAPNIA (HCC): ICD-10-CM

## 2023-03-09 DIAGNOSIS — J96.11 CHRONIC RESPIRATORY FAILURE WITH HYPOXIA AND HYPERCAPNIA (HCC): ICD-10-CM

## 2023-03-09 LAB
ANION GAP SERPL CALCULATED.3IONS-SCNC: 5 MMOL/L (ref 3–16)
BASOPHILS ABSOLUTE: 0 K/UL (ref 0–0.2)
BASOPHILS RELATIVE PERCENT: 0.1 %
BUN BLDV-MCNC: 20 MG/DL (ref 7–20)
CALCIUM SERPL-MCNC: 9.3 MG/DL (ref 8.3–10.6)
CHLORIDE BLD-SCNC: 86 MMOL/L (ref 99–110)
CO2: 41 MMOL/L (ref 21–32)
CREAT SERPL-MCNC: 0.6 MG/DL (ref 0.8–1.3)
EOSINOPHILS ABSOLUTE: 0 K/UL (ref 0–0.6)
EOSINOPHILS RELATIVE PERCENT: 0 %
GFR SERPL CREATININE-BSD FRML MDRD: >60 ML/MIN/{1.73_M2}
GLUCOSE BLD-MCNC: 219 MG/DL (ref 70–99)
GLUCOSE BLD-MCNC: 264 MG/DL (ref 70–99)
GLUCOSE BLD-MCNC: 327 MG/DL (ref 70–99)
HCT VFR BLD CALC: 34.4 % (ref 40.5–52.5)
HEMOGLOBIN: 11.5 G/DL (ref 13.5–17.5)
LYMPHOCYTES ABSOLUTE: 0.6 K/UL (ref 1–5.1)
LYMPHOCYTES RELATIVE PERCENT: 11.3 %
MCH RBC QN AUTO: 27.8 PG (ref 26–34)
MCHC RBC AUTO-ENTMCNC: 33.5 G/DL (ref 31–36)
MCV RBC AUTO: 83.1 FL (ref 80–100)
MONOCYTES ABSOLUTE: 0.5 K/UL (ref 0–1.3)
MONOCYTES RELATIVE PERCENT: 7.8 %
NEUTROPHILS ABSOLUTE: 4.6 K/UL (ref 1.7–7.7)
NEUTROPHILS RELATIVE PERCENT: 80.8 %
PDW BLD-RTO: 16 % (ref 12.4–15.4)
PERFORMED ON: ABNORMAL
PERFORMED ON: ABNORMAL
PLATELET # BLD: 93 K/UL (ref 135–450)
PMV BLD AUTO: 8.6 FL (ref 5–10.5)
POTASSIUM REFLEX MAGNESIUM: 4.6 MMOL/L (ref 3.5–5.1)
RBC # BLD: 4.15 M/UL (ref 4.2–5.9)
SODIUM BLD-SCNC: 132 MMOL/L (ref 136–145)
WBC # BLD: 5.7 K/UL (ref 4–11)

## 2023-03-09 PROCEDURE — 6370000000 HC RX 637 (ALT 250 FOR IP): Performed by: INTERNAL MEDICINE

## 2023-03-09 PROCEDURE — 6360000002 HC RX W HCPCS: Performed by: INTERNAL MEDICINE

## 2023-03-09 PROCEDURE — 99233 SBSQ HOSP IP/OBS HIGH 50: CPT | Performed by: INTERNAL MEDICINE

## 2023-03-09 PROCEDURE — 80048 BASIC METABOLIC PNL TOTAL CA: CPT

## 2023-03-09 PROCEDURE — 94761 N-INVAS EAR/PLS OXIMETRY MLT: CPT

## 2023-03-09 PROCEDURE — 2700000000 HC OXYGEN THERAPY PER DAY

## 2023-03-09 PROCEDURE — 2580000003 HC RX 258: Performed by: NURSE PRACTITIONER

## 2023-03-09 PROCEDURE — 85025 COMPLETE CBC W/AUTO DIFF WBC: CPT

## 2023-03-09 PROCEDURE — 36415 COLL VENOUS BLD VENIPUNCTURE: CPT

## 2023-03-09 PROCEDURE — 94640 AIRWAY INHALATION TREATMENT: CPT

## 2023-03-09 PROCEDURE — 99238 HOSP IP/OBS DSCHRG MGMT 30/<: CPT | Performed by: INTERNAL MEDICINE

## 2023-03-09 PROCEDURE — 6370000000 HC RX 637 (ALT 250 FOR IP): Performed by: NURSE PRACTITIONER

## 2023-03-09 RX ORDER — ALPRAZOLAM 0.25 MG/1
0.25 TABLET ORAL 2 TIMES DAILY PRN
Qty: 10 TABLET | Refills: 0 | Status: SHIPPED | OUTPATIENT
Start: 2023-03-09 | End: 2023-03-14

## 2023-03-09 RX ORDER — ALBUTEROL SULFATE 90 UG/1
AEROSOL, METERED RESPIRATORY (INHALATION)
Qty: 1 EACH | Refills: 5 | Status: SHIPPED | OUTPATIENT
Start: 2023-03-09

## 2023-03-09 RX ORDER — OXYCODONE HYDROCHLORIDE AND ACETAMINOPHEN 5; 325 MG/1; MG/1
1 TABLET ORAL EVERY 8 HOURS PRN
Qty: 12 TABLET | Refills: 0 | Status: SHIPPED | OUTPATIENT
Start: 2023-03-09 | End: 2023-03-14

## 2023-03-09 RX ADMIN — INSULIN LISPRO 2 UNITS: 100 INJECTION, SOLUTION INTRAVENOUS; SUBCUTANEOUS at 08:13

## 2023-03-09 RX ADMIN — IPRATROPIUM BROMIDE AND ALBUTEROL SULFATE 1 AMPULE: 2.5; .5 SOLUTION RESPIRATORY (INHALATION) at 14:14

## 2023-03-09 RX ADMIN — HYDROCODONE BITARTRATE AND ACETAMINOPHEN 1 TABLET: 5; 325 TABLET ORAL at 15:12

## 2023-03-09 RX ADMIN — TIOTROPIUM BROMIDE INHALATION SPRAY 2 PUFF: 3.12 SPRAY, METERED RESPIRATORY (INHALATION) at 07:39

## 2023-03-09 RX ADMIN — HYDROCODONE BITARTRATE AND ACETAMINOPHEN 1 TABLET: 5; 325 TABLET ORAL at 08:16

## 2023-03-09 RX ADMIN — IPRATROPIUM BROMIDE AND ALBUTEROL SULFATE 1 AMPULE: 2.5; .5 SOLUTION RESPIRATORY (INHALATION) at 07:34

## 2023-03-09 RX ADMIN — ATORVASTATIN CALCIUM 10 MG: 10 TABLET, FILM COATED ORAL at 08:16

## 2023-03-09 RX ADMIN — PROPAFENONE HYDROCHLORIDE 150 MG: 150 TABLET, FILM COATED ORAL at 06:28

## 2023-03-09 RX ADMIN — DILTIAZEM HYDROCHLORIDE 180 MG: 180 CAPSULE, COATED, EXTENDED RELEASE ORAL at 08:16

## 2023-03-09 RX ADMIN — Medication 2 PUFF: at 07:40

## 2023-03-09 RX ADMIN — Medication 10 ML: at 08:17

## 2023-03-09 RX ADMIN — PROPAFENONE HYDROCHLORIDE 150 MG: 150 TABLET, FILM COATED ORAL at 13:36

## 2023-03-09 RX ADMIN — INSULIN LISPRO 3 UNITS: 100 INJECTION, SOLUTION INTRAVENOUS; SUBCUTANEOUS at 11:44

## 2023-03-09 RX ADMIN — ROFLUMILAST 250 MCG: 500 TABLET ORAL at 08:16

## 2023-03-09 RX ADMIN — PANTOPRAZOLE SODIUM 40 MG: 40 TABLET, DELAYED RELEASE ORAL at 06:28

## 2023-03-09 RX ADMIN — GABAPENTIN 400 MG: 400 CAPSULE ORAL at 13:36

## 2023-03-09 RX ADMIN — METHYLPREDNISOLONE SODIUM SUCCINATE 40 MG: 40 INJECTION, POWDER, FOR SOLUTION INTRAMUSCULAR; INTRAVENOUS at 08:16

## 2023-03-09 RX ADMIN — GABAPENTIN 400 MG: 400 CAPSULE ORAL at 08:16

## 2023-03-09 ASSESSMENT — PAIN DESCRIPTION - LOCATION
LOCATION: THROAT
LOCATION: THROAT

## 2023-03-09 ASSESSMENT — PAIN DESCRIPTION - DESCRIPTORS: DESCRIPTORS: SHARP

## 2023-03-09 ASSESSMENT — PAIN SCALES - GENERAL: PAINLEVEL_OUTOF10: 8

## 2023-03-09 NOTE — PROGRESS NOTES
Shift assessment completed, see flow sheet. Pt is A/O sitting at side of bed eating breakfast.  Pt c/o pain 8/10 in throat. Prn medication given. Pt also expresses would like to go home today and would like hospice. Will discuss with MD     . Respirations are easy, even, and unlabored. Bilateral lung sounds diminished on 3L NC.     VSS  NSR w/1st degree block on the monitor    PIV, WNL     All lines and monitoring devices in place. Bed in lowest position with wheels locked. No needs expressed at this time. Will continue to monitor.

## 2023-03-09 NOTE — PROGRESS NOTES
Patient okay for discharge per MD. Discharge instructions and script given. IV removed and site assessment clean, dry, and intact. Telebox removed and [facility] notified. Patient discharged home in stable conditions with all belongings   No questions or concerns at this time. Patient escorted to personal car.

## 2023-03-09 NOTE — PROGRESS NOTES
Pulmonary Progress Note  CC: SOB    Subjective:  pt states SOB back to baseline      EXAM: BP (!) 147/77   Pulse 84   Temp 98.1 °F (36.7 °C) (Oral)   Resp 16   Ht 6' (1.829 m)   Wt 168 lb 6.4 oz (76.4 kg)   SpO2 99%   BMI 22.84 kg/m²  on 3L  Constitutional:  No acute distress. Eyes: PERRL. Conjunctivae anicteric. ENT: Normal nose. Normal tongue. Neck:  Trachea is midline. No thyroid tenderness. Respiratory: No accessory muscle usage. decreased breath sounds. No wheezes. No rales. No Rhonchi. Cardiovascular: Normal S1S2. No digit clubbing. No digit cyanosis. No LE edema. Psychiatric: No anxiety or Agitation. Alert and Oriented to person, place and time.     Scheduled Meds:   [Held by provider] apixaban  5 mg Oral BID    atorvastatin  10 mg Oral Daily    dilTIAZem  180 mg Oral Daily    pantoprazole  40 mg Oral QAM AC    fluticasone  2 spray Nasal Daily    propafenone  150 mg Oral 3 times per day    Roflumilast  250 mcg Oral Daily    sodium chloride flush  5-40 mL IntraVENous 2 times per day    insulin lispro  0-4 Units SubCUTAneous TID WC    insulin lispro  0-4 Units SubCUTAneous Nightly    budesonide-formoterol  2 puff Inhalation BID    And    tiotropium  2 puff Inhalation Daily    gabapentin  400 mg Oral TID    ipratropium-albuterol  1 ampule Inhalation TID    methylPREDNISolone  40 mg IntraVENous Q12H    Followed by    Graciela Weiss ON 3/11/2023] predniSONE  40 mg Oral Daily     Continuous Infusions:   sodium chloride      dextrose       PRN Meds:  albuterol, sodium chloride flush, sodium chloride, ondansetron **OR** ondansetron, polyethylene glycol, acetaminophen **OR** acetaminophen, glucose, dextrose bolus **OR** dextrose bolus, glucagon (rDNA), dextrose, HYDROcodone 5 mg - acetaminophen    Labs:  CBC:   Recent Labs     03/08/23  0944   WBC 8.3   HGB 12.5*   HCT 37.2*   MCV 83.8   PLT 91*     BMP:   Recent Labs     03/08/23  0944   *   K 4.6   CL 89*   CO2 37*   BUN 15   CREATININE 0.6*     Chest imaging was reviewed by me and showed CXR  The patient is rotated.  With that said the cardiomediastinal silhouette   appears stable.  No acute airspace infiltrate.  No pneumothorax or pleural   effusion       Impression   No acute cardiopulmonary findings      Bronch 3/3/23: transbronchial brushings RUL airway irregularity & aspiration of copious thick secretions occluding the right mainstem with mucus plugging throughout the right lower lobe airways   - brushings were negative but BAL+     ASSESSMENT:  COPD - currently appear improved but had evidence of exacerbation in the ED  Acute hypoxic respiratory failure - currently on 5L and on 3-4L at home  Squamous cell carcinoma cells collected during 2 bronchoscopies without a source lesion  Hemoptysis - recurrent and likely related to malignancy  VC lesion suspicious for malignancy - ENT believes the lesion is too small to cause respiratory symptoms  PAF on Eliquis  DM2  TMP     PLAN:  Supplemental oxygen to maintain SaO2 >92%; wean as tolerated  Intensive inhaled bronchodilator therapy.  Prednisone taper; monitor glucose  Pt seen by ENT for the VC lesion  Stop Eliquis due to recurrent hemoptysis  Pt needs to have a PET CT if he desires to purse dx and treatment  Currently, the pt is leaning toward no treatment  Dc planning

## 2023-03-09 NOTE — CARE COORDINATION
Case Management Assessment  Initial Evaluation and Discharge Summary     Date/Time of Evaluation: 3/9/2023 10:31 AM  Assessment Completed by: BETITO Huizar  Case Management Department  Phone: [phone] Fax: [phone]      If patient is discharged prior to next notation, then this note serves as note for discharge by case management. Patient Name: Akin Martinez                   YOB: 1940  Diagnosis: Acute respiratory failure with hypoxia ([address] [J96.01]  Acute on chronic respiratory failure with hypoxia ([address] [J96.21]                   Date / Time: 3/8/2023  9:29 AM    Patient Admission Status: Inpatient   Readmission Risk (Low < 19, Mod (19-27), High > 27): Readmission Risk Score: 25.1    Current PCP: Daniel Mendez MD  PCP verified by CM? Yes    Chart Reviewed: Yes      History Provided by: Patient  Patient Orientation: Alert and Oriented    Patient Cognition: Alert    Hospitalization in the last 30 days (Readmission):  Yes    If yes, Readmission Assessment in CM Navigator will be completed.     Advance Directives:      Code Status: Full Code   Patient's Primary Decision Maker is: Named in Scanned ACP Document    Primary Decision MakerAdrimy Michaud Child - [phone]    Secondary Decision Maker: Sergio Neves - Child - [phone]    Supplemental (Other) Decision Maker: Favio Guillen - Child - [phone]    Discharge Planning:    Patient lives with: Alone (he stated his kids live with him at times) Type of Home: House  Primary Care Giver: Self  Patient Support Systems include: Children, Family Members   Current Financial resources: Medicare, Medicaid  Current community resources: None  Current services prior to admission: Durable Medical Equipment            Current DME: Oxygen Therapy (Comment), [address] (he stated he wears 3 liters through a company in [place])            Type of Home Care services:  PT, OT, Nursing Services    ADLS  Prior functional level: Independent in ADLs/IADLs  Current functional level: Independent in ADLs/IADLs    PT AM-PAC:   /24  OT AM-PAC:   /24    Family can provide assistance at DC: Yes  Would you like Case Management to discuss the discharge plan with any other family members/significant others, and if so, who? Yes  Plans to Return to Present Housing: Yes  Other Identified Issues/Barriers to RETURNING to current housing: n/a  Potential Assistance needed at discharge: N/A            Potential DME:    Patient expects to discharge to: [address] [place] [address] for transportation at discharge:      Financial    Payor: [address] [place] [address] / Plan: MEDICARE PART A AND B / Product Type: *No Product type* /     Does insurance require precert for SNF: No    Potential assistance Purchasing Medications: No  Meds-to-Beds request:        [facility], [address] [phone] - f [phone]  [address] [facility]   Phone: [phone] Fax: [address], [address] [place] [address] Drive [phone]  [address]  [address]  Phone: [phone] Fax: [phone]    [address] [facility], [address] [place] [address] Avenue [phone] [facility] [address] [facility] [address]  Phone: [phone] Fax: Clare Sanchez [phone] - 145 [facility], [address] [phone] [address] [facility] [phone]  [address] [place] [address]  Phone: [phone] Fax: [phone] [address], [address] [phone] - F [phone]  [facility] Kevin.   [address] [facility] [address]  Phone: [phone] Fax: [phone]      Notes:    Factors facilitating achievement of predicted outcomes: Family support, Good insight into deficits, and Has needed Durable Medical Equipment at home    Barriers to discharge: n/a    Additional Case Management Notes: Chart review completed. Met with pt at bedside. Pt stated he is independent with his ADL's and will return home when discharged. He stated that he has good support. After CM met with pt at bedside, received notification from PA that pt's son was requested CM come to bedside. Met with pt and pt's son Magalis Springer at bedside with pt permission. Son Magalis Springer asking if CM can provide legal advice which CM stated no as CM is a . Inquired if it was hospital related and son Magalis Springer declined to answer stating \"nevermind\". Encouraged son Magalis Springer to call [employer] as they can provide legal advice. He stated understanding and denied needing the number. Called and spoke with Meghan at [facility] ([phone]) who states pt is active with them for 2 liters continuous. [facility] at ([phone]) but there was no answer and the voicemail was full; unable to leave a message    The Plan for Transition of Care is related to the following treatment goals of Acute respiratory failure with hypoxia ([address] [J96.01]  Acute on chronic respiratory failure with hypoxia ([address] [J96.21]    BETITO Green  Case Management Department  Phone: [phone] Fax: [phone]    Addendum at 3:09pm: Discharge order noted. Spoke with RN who stated pt met with [facility] and signed consents. Per RN, pt has all needed equipment at home. Met with pt , pt's daughter and son at bedside with pt permission. Pt confirmed he signed consents with [facility] and they are his ride home. His daughter stated he has the portable tank in the car for his o2. Date of Last IMM Given: 3/8/2023    Has Home O2 in place on admit:  Yes  Informed of need to bring portable home O2 tank on day of discharge for nursing to connect prior to leaving:   Yes  Verbalized agreement/Understanding:   Yes    Pt is being d/c'd to home today. Pt's O2 sats are 96% on 3 liters per vitals in epic  .     Discharge timeout done with Lisandra Christiansen RN. All discharge needs and concerns addressed.

## 2023-03-09 NOTE — PROGRESS NOTES
Handoff report and transfer of care given at bedside [facility]  [facility]. Patient in stable condition, denies needs/concerns at this time. Call light within reach. No s/s distress noted.

## 2023-03-09 NOTE — PROGRESS NOTES
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 Анна Javed is aware of vocal cord mass and plans for home hospice   Breathing back to normal       Objective:   /73   Pulse 91   Temp 97 °F (36.1 °C) (Oral)   Resp 16   Ht 6' (1.829 m)   Wt 168 lb 6.4 oz (76.4 kg)   SpO2 97%   BMI 22.84 kg/m²     Intake/Output Summary (Last 24 hours) at 3/9/2023 0328  Last data filed at 3/9/2023 7954  Gross per 24 hour   Intake 360 ml   Output 750 ml   Net -390 ml       Physical Exam:    General: elderly male up in bed,  Awake, alert and oriented. Appears to be not in any distress  Mucous Membranes:  Pink , anicteric  Neck: No JVD, no carotid bruit, no thyromegaly  Hoarse voice noted  Chest:  Clear to auscultation bilaterally, minimal crackles in bases  No airway stridor  Cardiovascular:  RRR S1S2 heard, no murmurs or gallops  Abdomen:  Soft, undistended, non tender, no organomegaly, BS present  Extremities: No edema or cyanosis.  Distal pulses well felt  Neurological : grossly normal    Medications:   Scheduled Medications:    [Held by provider] apixaban  5 mg Oral BID    atorvastatin  10 mg Oral Daily    dilTIAZem  180 mg Oral Daily    pantoprazole  40 mg Oral QAM AC    fluticasone  2 spray Nasal Daily    propafenone  150 mg Oral 3 times per day    Roflumilast  250 mcg Oral Daily    sodium chloride flush  5-40 mL IntraVENous 2 times per day    insulin lispro  0-4 Units SubCUTAneous TID WC    insulin lispro  0-4 Units SubCUTAneous Nightly    budesonide-formoterol  2 puff Inhalation BID    And    tiotropium  2 puff Inhalation Daily    gabapentin  400 mg Oral TID    ipratropium-albuterol  1 ampule Inhalation TID    methylPREDNISolone  40 mg IntraVENous Q12H    Followed by    Neil Burciaga ON 3/11/2023] predniSONE  40 mg Oral Daily     I   sodium chloride      dextrose       albuterol, sodium chloride flush, sodium chloride, ondansetron **OR** ondansetron, polyethylene glycol, acetaminophen **OR** acetaminophen, glucose, dextrose bolus **OR** dextrose bolus, glucagon (rDNA), dextrose, HYDROcodone 5 mg - acetaminophen    Lab Data:  Recent Labs     03/08/23 0944 03/09/23 0633   WBC 8.3 5.7   HGB 12.5* 11.5*   HCT 37.2* 34.4*   MCV 83.8 83.1   PLT 91* 93*     Recent Labs     03/08/23 0944 03/09/23 0633   * 132*   K 4.6 4.6   CL 89* 86*   CO2 37* 41*   BUN 15 20   CREATININE 0.6* 0.6*     Recent Labs     03/08/23 0944   TROPONINI <0.01       Coagulation:   Lab Results   Component Value Date/Time    INR 1.32 02/12/2023 11:15 AM    APTT 29.8 01/25/2018 05:55 AM     Cardiac markers:   Lab Results   Component Value Date/Time    CKTOTAL 62 01/24/2018 03:22 PM    TROPONINI <0.01 03/08/2023 09:44 AM         Lab Results   Component Value Date    ALT 29 03/08/2023    AST 26 03/08/2023    ALKPHOS 57 03/08/2023    BILITOT 0.8 03/08/2023       Lab Results   Component Value Date    INR 1.32 (H) 02/12/2023    INR 1.10 11/06/2022    INR 1.20 (H) 01/07/2021    PROTIME 16.2 (H) 02/12/2023    PROTIME 14.1 11/06/2022    PROTIME 13.9 (H) 01/07/2021       Radiology    Chest xray       ENT eval - 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 Examination limited secondary to coughing, and hemoptysis.   Vocal cord motion present bilaterally      CULTURES  Covid/influenza not detected   Sputum cultures pending  Pneumonia panel pending   Blood cultures x2 pending      EKG:    Sinus tachycardiaNonspecific ST & T wave changesAbnormal ECGWhen compared with ECG of 27-FEB-2023 11:58,No significant change was foundConfirmed by ANTONIO Winter MD ([id number]) on 3/8/2023 11:13:23 AM     RADIOLOGY  XR CHEST PORTABLE   Final Result   No acute cardiopulmonary findings              BAL - squamous cell cancer cells per cytology        ASSESSMENT/PLAN:     Acute on chronic respiratory failure w/ hypoxia  COPD  AE  - presented in severe distress- was requiring vapotherm in ER , quickly improved to 3-5L with nebs and steroids    - recent bronch with vocal cord thickening and irregular margins     - CXR negative for any acute infiltrates  - recurrent presentation of acute resp distress 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 seen by ENT    - this explains his cancer cells found on BAL x 2 in 2/23  - pt and family given information about vocal cord mass and they decided on hospice care at home  - will add prn anxiety and pain meds at dc         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   - noted on bronch washings completed on 2/12/23 and in 3/4  - PET CT scheduled for 3/16/23  -plans for home hospice noted     Tachycardia   - continue Cardizem  resolved     Dysphagia  - speech evaluation and treat last admission  - recommendations for no straws, meds whole or crushed in puree  - strict aspiration precautions ordered  - continue to monitor for s/sx of aspiration     PAF  - continue eliquis, Rythmol, Cardizem      DM type 2 with neuropathy   - hold home regimen  - SSI  - monitor blood sugars  - continue Neurontin      Thrombocytopenia - chronic, stable  - platelet 91  - monitor      HLD  - continue statin      DVT Prophylaxis: Eliquis  Diet: ADULT DIET;  Regular; 4 carb choices (60 gm/meal)  Code Status: Full Code    Dc to home       Julissa Eckert MD, 3/9/2023 8:33 AM

## 2023-03-09 NOTE — PROGRESS NOTES
03/08/23 2000   RT Protocol   History Pulmonary Disease 2   Respiratory pattern 2   Breath sounds 2   Cough 1   Indications for Bronchodilator Therapy Decreased or absent breath sounds   Bronchodilator Assessment Score 7   RT Inhaler-Nebulizer Bronchodilator Protocol Note    There is a bronchodilator order in the chart from a provider indicating to follow the RT Bronchodilator Protocol and there is an Initiate RT Inhaler-Nebulizer Bronchodilator Protocol order as well (see protocol at bottom of note). CXR Findings:  XR CHEST PORTABLE    Result Date: 3/8/2023  No acute cardiopulmonary findings       The findings from the last RT Protocol Assessment were as follows:   History Pulmonary Disease: Chronic pulmonary disease  Respiratory Pattern: Dyspnea on exertion or RR 21-25 bpm  Breath Sounds: Slightly diminished and/or crackles  Cough: Strong, productive  Indication for Bronchodilator Therapy: Decreased or absent breath sounds  Bronchodilator Assessment Score: 7    Aerosolized bronchodilator medication orders have been revised according to the RT Inhaler-Nebulizer Bronchodilator Protocol below. Respiratory Therapist to perform RT Therapy Protocol Assessment initially then follow the protocol. Repeat RT Therapy Protocol Assessment PRN for score 0-3 or on second treatment, BID, and PRN for scores above 3. No Indications - adjust the frequency to every 6 hours PRN wheezing or bronchospasm, if no treatments needed after 48 hours then discontinue using Per Protocol order mode. If indication present, adjust the RT bronchodilator orders based on the Bronchodilator Assessment Score as indicated below.   Use Inhaler orders unless patient has one or more of the following: on home nebulizer, not able to hold breath for 10 seconds, is not alert and oriented, cannot activate and use MDI correctly, or respiratory rate 25 breaths per minute or more, then use the equivalent nebulizer order(s) with same Frequency and PRN reasons based on the score. If a patient is on this medication at home then do not decrease Frequency below that used at home. 0-3 - enter or revise RT bronchodilator order(s) to equivalent RT Bronchodilator order with Frequency of every 4 hours PRN for wheezing or increased work of breathing using Per Protocol order mode. 4-6 - enter or revise RT Bronchodilator order(s) to two equivalent RT bronchodilator orders with one order with BID Frequency and one order with Frequency of every 4 hours PRN wheezing or increased work of breathing using Per Protocol order mode. 7-10 - enter or revise RT Bronchodilator order(s) to two equivalent RT bronchodilator orders with one order with TID Frequency and one order with Frequency of every 4 hours PRN wheezing or increased work of breathing using Per Protocol order mode. 11-13 - enter or revise RT Bronchodilator order(s) to one equivalent RT bronchodilator order with QID Frequency and an Albuterol order with Frequency of every 4 hours PRN wheezing or increased work of breathing using Per Protocol order mode. Greater than 13 - enter or revise RT Bronchodilator order(s) to one equivalent RT bronchodilator order with every 4 hours Frequency and an Albuterol order with Frequency of every 2 hours PRN wheezing or increased work of breathing using Per Protocol order mode.        Electronically signed by Paras Santana RCP on 3/8/2023 at 8:28 PM

## 2023-03-09 NOTE — DISCHARGE INSTR - COC
Continuity of Care Form    Patient Name: Brian Bae   :  1940  MRN:  [id number]    Admit date:  3/8/2023  Discharge date:  3/9/2023    Code Status Order: Full Code   Advance Directives:     Admitting Physician:  Simone Denny MD  PCP: Eusebia Butler MD    Discharging Nurse: [facility] Unit/Room#: 0225/0225-02  Discharging Unit Phone Number: [phone]    Emergency Contact:   Extended Emergency Contact Information  Primary Emergency Contact: [address] Phone: [phone]  Mobile Phone: [phone]  Relation: Child  Secondary Emergency Contact: [address] [place] [address] Phone: [phone]  Mobile Phone: [phone]  Relation: Child    Past Surgical History:  Past Surgical History:   Procedure Laterality Date    BACK SURGERY      repair broken back L4 & L5    BRONCHOSCOPY  3/3/2023    BRONCHOSCOPY ALVEOLAR LAVAGE performed by Nakia Dahl MD at [place] [address]  3/3/2023    BRONCHOSCOPY BRUSHINGS performed by Nakia Dahl MD at [place] [address]  3/3/2023    BRONCHOSCOPY THERAPUTIC ASPIRATION INITIAL performed by Nakia Dahl MD at [address] [place] [address], LAPAROSCOPIC N/A 2021    LAPAROSCOPIC CHOLECYSTECTOMY, WITH CHOLANGIOGRAMS (ATTEMPTED) OPEN CHOLECYSTECTOMY performed by Yara Nguyen MD at [address] [place] [address], OPEN N/A 2021    LAPAROSCOPIC CHOLECYSTECTOMY, WITH CHOLANGIOGRAMS (ATTEMPTED)     CYSTOSCOPY Right 10/09/2015    RIght Ureteroscopy, Holmium Laser Lithotripsy with Stone Removal, Right Stent Placement    CYSTOSCOPY  2019    CYSTOSCOPY, RESECTION OF BLADDER NECK, URETHRAL DILATION    CYSTOSCOPY W BIOPSY OF BLADDER N/A 2019    CYSTOSCOPY, RESECTION OF BLADDER NECK, URETHRAL DILATION performed by Betzy Cornelius MD at [facility] Left 2016    cataract removal    GALLBLADDER SURGERY  2021    JOINT REPLACEMENT 06/29/2011    right knee    JOINT REPLACEMENT  11/2004    left knee    OTHER SURGICAL HISTORY  08/31/2015    wide excision basal cell carcinoma on the nose and bilateral auricles with frozen sections, plastic closure, full thickness skin graft    OTHER SURGICAL HISTORY  12/01/2015    excision of lesion of lip    OTHER SURGICAL HISTORY      BRONCHOSCOPY ALVEOLAR LAVAGE    PROSTATE SURGERY      TURP  10/23/2015    with cystoscopy       Immunization History:   Immunization History   Administered Date(s) Administered    Influenza Vaccine, unspecified formulation 11/18/2009, 10/14/2010, 11/08/2011, 09/21/2012, 10/18/2013, 10/07/2016    Influenza Virus Vaccine 10/12/2015, 10/07/2016, 10/18/2017    Influenza Whole 10/01/2010    Influenza, FLUARIX, FLULAVAL, FLUZONE (age 10 mo+) AND AFLURIA, (age 1 y+), PF, 0.5mL 11/01/2017, 08/28/2018    Influenza, FLUZONE (age 72 y+), High Dose, 0.7mL 10/01/2021, 10/13/2022    Influenza, High Dose (Fluzone 65 yrs and older) 09/18/2018, 09/13/2019, 10/01/2020    Pneumococcal Conjugate 13-valent (Ryuvany45) 08/02/2017, 02/14/2018    Pneumococcal Conjugate 7-valent (Prevnar7) 10/01/2006    Pneumococcal Polysaccharide (Gijdjmfdl53) 10/24/2015, 10/07/2016    Td vaccine (adult) 09/21/2012       Active Problems:  Patient Active Problem List   Diagnosis Code    HTN (hypertension) I10    COPD, severe ([facility] J44.9    DM2 (diabetes mellitus, type 2) [facility] E11.9    HLD (hyperlipidemia) E78.5    Gallstones K80.20    PAF (paroxysmal atrial fibrillation) ([facility]) I48.0    Chronic respiratory failure with hypoxia and hypercapnia 2.5 L home O2 J96.11, J96.12    Acute respiratory insufficiency R06.89    Acute on chronic respiratory failure with hypercapnia ([facility]) J96.22    Septic shock ([facility]) A41.9, R65.21    COPD exacerbation ([facility] J44.1    Former smoker Z87.891    Acute hyperglycemia R73.9    Chronic normocytic anemia D64.9    Chronic thrombocytopenia D69.6    Falls at home W19. Herbert Cavazos, Y92.009    Ambulatory dysfunction R26.2    General weakness R53.1    Acute on chronic respiratory failure with hypoxia and hypercapnia (HCC) J96.21, J96.22    Pleural effusion J90    Rapid atrial fibrillation (HCC) I48.91    Atrial fibrillation with RVR ([facility]) I48.91    Acute respiratory failure with hypoxia and hypercapnia (HCC) J96.01, J96.02    Chronic obstructive pulmonary disease (HCC) J44.9    Acute encephalopathy G93.40    Hyperglycemia R73.9    Pneumonia of left lower lobe due to infectious organism J18.9    Acute calculous cholecystitis K80.00    Acute cholecystitis K81.0    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, 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.9    Closed compression fracture of body of L1 vertebra ([facility]) S32.010A    Hypomagnesemia E83.42    Abdominal aortic aneurysm (AAA) 30 to 34 mm in diameter I71.40    Elevated procalcitonin R79.89    Abdominal pain, right upper quadrant R10.11    Lactic acidosis E87.20    Elevated bilirubin R17    Bacteremia due to Klebsiella pneumoniae R78.81, B96.1    Paroxysmal atrial fibrillation ([facility]) I48.0    Moderate protein-calorie malnutrition ([facility]) E44.0    Debility R53.81    COVID-19 U07.1    Acute on chronic respiratory failure with hypoxemia ([facility]) J96.21    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 ([facility]) L97.313    Diabetes mellitus with skin ulcer ([facility]) E11.622, L98.499    Other specified peripheral vascular diseases ([facility]) I73.89    Hypoxia R09.02    Atrial fibrillation, controlled ([facility]) I48.91    Community acquired pneumonia J18.9    Acute respiratory failure ([facility]) J96.00    Aspiration pneumonia of both lungs ([facility]) J69.0    Hemoptysis R04.2    Aspiration pneumonia ([facility]) J69.0    Pneumonia of right lower lobe due to Pseudomonas species ([facility]) J15.1    Mucus plugging of bronchi T17.500A    Acute exacerbation of chronic obstructive pulmonary disease (COPD) ([facility] J44.1    Squamous cell carcinoma of lung ([facility]) C34.90    Acute on chronic respiratory failure with hypoxia (HCC) J96.21    Lesion of true vocal cord J38.3       Isolation/Infection:   Isolation            No Isolation          Patient Infection Status       Infection Onset Added Last Indicated Last Indicated By Review Planned Expiration Resolved Resolved By    None active    Resolved    COVID-19 (Rule Out) 23 COVID-19 & Influenza Combo (Ordered)   23 Rule-Out Test Resulted    COVID-19 (Rule Out) 23 COVID-19 & Influenza Combo (Ordered)   23 Rule-Out Test Resulted    COVID-19 (Rule Out) 23 COVID-19, Rapid (Ordered)   23 Rule-Out Test Resulted    COVID-19 (Rule Out) 22 COVID-19 (Ordered)   22 Rule-Out Test Resulted    COVID-19 (Rule Out) 22 COVID-19, Rapid (Ordered)   22 Rule-Out Test Resulted    COVID-19 22 COVID-19 & Influenza Combo   22     COVID-19 (Rule Out) 22 COVID-19 & Influenza Combo (Ordered)   22 Rule-Out Test Resulted    COVID-19 (Rule Out) 21 COVID-19 (Ordered)   21 Rule-Out Test Resulted    COVID-19 (Rule Out) 21 COVID-19 (Ordered)   21 Rule-Out Test Resulted    COVID-19 (Rule Out) 12/15/20 12/15/20 12/15/20 COVID-19 (Ordered)   12/15/20 Rule-Out Test Resulted    COVID-19 (Rule Out) 20 COVID-19 (Ordered)   20 Rule-Out Test Resulted    COVID-19 (Rule Out) 20 Emergent Disease Panel (Ordered)   20 Taye Roberts RN    MDRO (multi-drug resistant organism)  17 Dejuan Meier RN   18 Dejuan Meier RN            Nurse Assessment:  Last Vital Signs: /73   Pulse 91   Temp 97 °F (36.1 °C) (Oral)   Resp 16   Ht 6' (1.829 m)   Wt 168 lb 6.4 oz (76.4 kg)   SpO2 97%   BMI 22.84 kg/m²     Last documented pain score (0-10 scale): Pain Level: 8  Last Weight:   Wt Readings from Last 1 Encounters:   03/09/23 168 lb 6.4 oz (76.4 kg)     Mental Status:  oriented, alert, coherent, logical, and thought processes intact    IV Access:  - None    Nursing Mobility/ADLs:  Walking   Assisted  Transfer  Assisted  Bathing  Assisted  Dressing  Assisted  Toileting  Independent  Feeding  Independent  Med Admin  Assisted  Med Delivery   whole    Wound Care Documentation and Therapy:  Wound 02/12/23 Coccyx Medial stage 2 (Active)   Wound Image   03/08/23 1422   Wound Etiology Pressure Stage 1 03/09/23 0819   Dressing Status Dry; Intact 03/09/23 0819   Dressing/Treatment Foam 03/09/23 0819   Dressing Change Due 03/08/23 03/09/23 0819   Wound Assessment Dry;Erythema;Pink/red 02/28/23 0403   Drainage Amount None 02/28/23 0403   Paula-wound Assessment Blanchable erythema 02/28/23 0403   Number of days: 24        Elimination:  Continence: Bowel: Yes  Bladder: Yes  Urinary Catheter: None   Colostomy/Ileostomy/Ileal Conduit: No       Date of Last BM:     Intake/Output Summary (Last 24 hours) at 3/9/2023 1330  Last data filed at 3/9/2023 0829  Gross per 24 hour   Intake 360 ml   Output 750 ml   Net -390 ml     I/O last 3 completed shifts:  In: -   Out: 500 [Urine:500]    Safety Concerns: At Risk for Falls    Impairments/Disabilities:      None    Nutrition Therapy:  Current Nutrition Therapy:   - Oral Diet:  Carb Control 4 carbs/meal (1800kcals/day)    Routes of Feeding: Oral  Liquids: No Restrictions  Daily Fluid Restriction: no  Last Modified Barium Swallow with Video (Video Swallowing Test): not done    Treatments at the Time of Hospital Discharge:   Respiratory Treatments: MEGHANAN, MDI  Oxygen Therapy:  is on oxygen at 3 L/min per nasal cannula. Ventilator:    - No ventilator support    Rehab Therapies:   Weight Bearing Status/Restrictions: No weight bearing restrictions  Other Medical Equipment (for information only, NOT a DME order):     Other Treatments:     Patient's personal belongings (please select all that are sent with patient):  Anselmo    RN SIGNATURE:  Electronically signed by Chace Guzmán RN on 3/9/23 at 1:34 PM EST    CASE MANAGEMENT/SOCIAL WORK SECTION    Inpatient Status Date: ***    Readmission Risk Assessment Score:  Readmission Risk              Risk of Unplanned Readmission:  34           Discharging to Facility/ Agency   Name:   Address:  Phone:  Fax:    Dialysis Facility (if applicable)   Name:  Address:  Dialysis Schedule:  Phone:  Fax:    / signature: {Esignature:[id number]}    PHYSICIAN SECTION    Prognosis: {Prognosis:3249003729}    Condition at Discharge: [facility] Patient Condition:740333465}    Rehab Potential (if transferring to Rehab): {Prognosis:9285583749}    Recommended Labs or Other Treatments After Discharge: ***    Physician Certification: I certify the above information and transfer of [place] [facility]  is necessary for the continuing treatment of the diagnosis listed and that he requires {Admit to Appropriate Level of Care:75208} for {GREATER/LESS:888666784} 30 days.      Update Admission H&P: {CHP DME Changes in [facility]:614138527}    PHYSICIAN SIGNATURE:  {Esignature:[id number]}

## 2023-03-09 NOTE — CARE COORDINATION
Addressed Palliative care Consult. Met with pt, pt's son Shawnee Charles and daughter Nasim Miller at bedside with pt permission. Addressed the palliative care consult. Pt stated he is ready to go home and be comfortable. Pt requesting hospice and he wants to go home with hospice today. They were given a hospice list and they requested referral to [facility]) for home hospice. Pt requested his daughter be called for the meeting. Offered supportive listening throughout the conversation. Referral called to Ángela at [facility] ([address] [facility]) who is aware pt wants to return home today [facility] and already has home o2. Received call from Addy Goins at [address] [facility] stating they will be here to meeting pt and family around 3pm. RN updated. ELIOT Lakhani-S  Case Management Department  Phone: [phone] Fax: [phone]    Addendum at 4:09pm: Received voicemail from Tatyφόρekta Β. Αλεξάνδρsueυ Faisal at [address] [facility] stating she met with pt and family earlier. Ángela stated pt signed consents and has all needed DME at home.  Ángela stated they will follow up with pt once home

## 2023-03-09 NOTE — DISCHARGE INSTR - DIET
Good nutrition is important when healing from an illness, injury, or surgery. Follow any nutrition recommendations given to you during your hospital stay. If you were given an oral nutrition supplement while in the hospital, continue to take this supplement at home. You can take it with meals, in-between meals, and/or before bedtime. These supplements can be purchased at most local grocery stores, pharmacies, and chain [employer]-stores. If you have any questions about your diet or nutrition, call the hospital and ask for the dietitian.   Diet 4 mg carb control

## 2023-03-09 NOTE — DISCHARGE SUMMARY
Name:  Sirena Martinez  Room:  7896/8666-10  MRN:    [id number]    Discharge Summary      This discharge summary is in conjunction with a complete physical exam done on the day of discharge. Discharging Physician: Dr. Shen Deal: 3/8/2023  Discharge:  3/9/2023    HPI taken from admission H&P:    The patient is a 78 y.o. male with COPD, ELAINE, PAF, T2DM and other chronic conditions as listed below who presented to [facility] ED via EMS with complaint of SOB. Pt reports sob was sudden this am with worsening progressively making ER visit . Was in severe distress needing vapotherm in ER but quickly improved with breathing tx and steroids. Pt was just admitted in a similar way in feb needing vent support at which time Bronch with BAL showed squamous cell cancer of unknown origin . Repeat bronch last week with similar cancer cells but no mass. Abnormal thickening of vocal cords noted during bronch and was referred to ENT and awaiting consult     Pt reports he has intermittent bloody sputum since last month but not any worse. No fevers or chills.  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 seen by ENT     - this explains his cancer cells found on BAL x 2 in 2/23  - pt and family given information about vocal cord mass and they decided on hospice care at home  - will add prn anxiety and pain meds at [place]           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   - noted on bronch washings completed on 2/12/23 and in 3/4  - PET CT scheduled for 3/16/23  -plans for home hospice noted     Tachycardia   - continue Cardizem  resolved     Dysphagia  - speech evaluation and treat last admission  - recommendations for no straws, meds whole or crushed in puree  - strict aspiration precautions ordered  - continue to monitor for s/sx of aspiration     PAF  - continue eliquis, Rythmol, Cardizem      DM type 2 with neuropathy   - hold home regimen  - SSI  - monitor blood sugars  - continue Neurontin      Thrombocytopenia - chronic, stable  - platelet 91  - monitor      HLD  - continue statin     Procedures (Please Review Full Report for Details)  N/A    Consults    Pulmonology   ENT      Physical Exam at Discharge:    /73   Pulse 91   Temp 97 °F (36.1 °C) (Oral)   Resp 16   Ht 6' (1.829 m)   Wt 168 lb 6.4 oz (76.4 kg)   SpO2 97%   BMI 22.84 kg/m²   General: elderly male up in bed,  Awake, alert and oriented. Appears to be not in any distress  Mucous Membranes:  Pink , anicteric  Neck: No JVD, no carotid bruit, no thyromegaly  Hoarse voice noted  Chest:  Clear to auscultation bilaterally, minimal crackles in bases  No airway stridor  Cardiovascular:  RRR S1S2 heard, no murmurs or gallops  Abdomen:  Soft, undistended, non tender, no organomegaly, BS present  Extremities: No edema or cyanosis.  Distal pulses well felt  Neurological : grossly normal    Lab Results   Component Value Date    WBC 5.7 03/09/2023    HGB 11.5 (L) 03/09/2023    HCT 34.4 (L) 03/09/2023    MCV 83.1 03/09/2023    PLT 93 (L) 03/09/2023     Lab Results   Component Value Date     (L) 03/09/2023    K 4.6 03/09/2023    CL 86 (L) 03/09/2023    CO2 41 (H) 03/09/2023    BUN 20 03/09/2023    CREATININE 0.6 (L) 03/09/2023    GLUCOSE 219 (H) 03/09/2023    CALCIUM 9.3 03/09/2023    PROT 6.6 03/08/2023    LABALBU 3.9 03/08/2023    BILITOT 0.8 03/08/2023    ALKPHOS 57 03/08/2023    AST 26 03/08/2023    ALT 29 03/08/2023    LABGLOM >60 03/09/2023    GFRAA >60 02/07/2022    AGRATIO 1.4 03/08/2023    GLOB 2.8 01/15/2021         CULTURES  Covid/influenza not detected   Sputum cultures   Gram Stain Result  1+ WBC's (Polymorphonuclear)   2+ Epithelial Cells   4+ Gram positive cocci   4+ Gram positive rods   2+ Gram negative rods      Pneumonia panel negative   Blood cultures x2 NGTD     RADIOLOGY  XR CHEST PORTABLE   Final Result   No acute cardiopulmonary findings               Discharge Medications     Medication List        START taking these medications      ALPRAZolam 0.25 MG tablet  Commonly known as: Xanax  Take 1 tablet by mouth 2 times daily as needed for Anxiety for up to 5 days. Max Daily Amount: 0.5 mg     oxyCODONE-acetaminophen 5-325 MG per tablet  Commonly known as: Percocet  Take 1 tablet by mouth every 8 hours as needed for Pain for up to 5 days. Intended supply: 3 days.  Take lowest dose possible to manage pain Max Daily Amount: 3 tablets            CONTINUE taking these medications      alendronate 70 MG tablet  Commonly known as: FOSAMAX     apixaban 5 MG Tabs tablet  Commonly known as: Eliquis  Take 1 tablet by mouth 2 times daily     atorvastatin 10 MG tablet  Commonly known as: LIPITOR  Take 1 tablet by mouth daily     dilTIAZem 180 MG extended release capsule  Commonly known as: CARDIZEM CD  Take 1 capsule by mouth daily     esomeprazole 40 MG delayed release capsule  Commonly known as: NEXIUM     fluticasone 50 MCG/ACT nasal spray  Commonly known as: FLONASE     gabapentin 800 MG tablet  Commonly known as: NEURONTIN     metFORMIN 500 MG tablet  Commonly known as: GLUCOPHAGE     OXYGEN     pioglitazone 30 MG tablet  Commonly known as: ACTOS     propafenone 150 MG tablet  Commonly known as: RYTHMOL  TAKE ONE TABLET BY MOUTH EVERY 8 HOURS     Roflumilast 250 MCG tablet  Commonly known as: Daliresp  Take 1 tablet by mouth daily     Trelegy Ellipta 100-62.5-25 MCG/ACT Aepb inhaler  Generic drug: fluticasone-umeclidin-vilant  INHALE 1 PUFF BY MOUTH EVERY DAY     * Ventolin  (90 Base) MCG/ACT inhaler  Generic drug: albuterol sulfate HFA  Inhale 2 puffs into the lungs every 6 hours as needed for Wheezing orShortness of Breath     * albuterol (2.5 MG/3ML) 0.083% nebulizer solution  Commonly known as: PROVENTIL  USE 1 VIAL IN NEBULIZER 4 TIMES DAILY           * This list has 2 medication(s) that are the same as other medications prescribed for you. Read the directions carefully, and ask your doctor or other care provider to review them with you. STOP taking these medications      predniSONE 10 MG tablet  Commonly known as: Rubens Stanford               Where to Get Your Medications        You can get these medications from any pharmacy    Bring a paper prescription for each of these medications  ALPRAZolam 0.25 MG tablet  oxyCODONE-acetaminophen 5-325 MG per tablet           Discharged in stable condition to home     Follow Up:   Follow up with PCP in 1 week and hospice care     ***

## 2023-03-09 NOTE — ACP (ADVANCE CARE PLANNING)
Advance Care Planning     General Advance Care Planning (ACP) Conversation    Date of Conversation: 3/8/2023  Conducted with: Patient with Decision Making Capacity    Healthcare Decision Maker:    Primary Decision Maker: Mitzi Ruddy Child - [phone]    Secondary Decision Maker: Seymour Simpson - Child - [phone]    Supplemental (Other) Decision Maker: Antonia Reynoso - Child - [phone]  Click here to complete Healthcare Decision Makers including selection of the Healthcare Decision Maker Relationship (ie \"Primary\"). Today we documented Decision Maker(s) consistent with ACP documents on file. See documents under media tab 9/2/2021; pt confirmed this is still the correct order for his decision makers    Content/Action Overview:    Attempted code status discussion with pt and what Full Code means with pt. Pt stated he didn't want to talk about code status at this time. CM explained the importance of confirming code status and he stated understanding but declined conversation.  Pt aware until he notifies staff if he wants changes made, his code status would remain the same which he stated agreement    Length of Voluntary ACP Conversation in minutes:  <16 minutes (Non-Billable)    BETITO Valentin  Case Management Department  Phone: [phone] Fax: [phone]

## 2023-03-10 LAB
CULTURE, RESPIRATORY: NORMAL
GRAM STAIN RESULT: NORMAL

## 2023-03-12 LAB
BLOOD CULTURE, ROUTINE: NORMAL
CULTURE, BLOOD 2: NORMAL

## 2023-04-10 NOTE — PROCEDURES
See History and Physical or progress note for additional findings. Pertinent changes recorded below if present. Pre/post procedure diagnosis: hemoptysis and pneumonia    Allergies and medications have been reviewed    HENT: Airway patent and reviewed. ETT in place. Cardiovascular: Normal rate, regular rhythm, normal heart sounds. Pulmonary/Chest: No wheezes. No rhonchi. No rales. Abdominal: Soft. Bowel sounds are normal. No distension. ASA: Class 4 - A patient with an incapacitating systemic disease that is a constant threat to life  Level of Sedation Plan: Continue ICU sedation    Post Procedure Plan   Continue ICU care.   ______________________     The risks and benefits as well as alternatives to the procedure have been discussed with the POA. The  POA understands and agrees to proceed. Signed Consent in chart. PROCEDURE:  BRONCHOSCOPY      The risks and benefits as well as alternatives to the procedure have been discussed with the patient or POA. The patient or POA understands and agrees to proceed. Consent signed. DESCRIPTION OF PROCEDURE: A time out was taken. ICU sedation continued. The scope was passed with ease via the ETT. A complete airway inspection was performed. Normal anatomy. No endobronchial lesion was identified. Mucosa appeared erythematous in the RUL and Right mainstem/bronchus intermedius. There were yellow thick and bloody secretions in the RUL/RML/RLL. Therapeutic aspiration completed. There were no food particles seen. No source of bleeding seen. No active bleeding seen. There was blood present in the RUL and right mainstem airways that was suctioned. Washings were obtained throughout the airways. A Bronchoalveolar lavage was obtained from the RML with good return. The patient tolerated the procedure well. [id number]. Recovery will be per endoscopy protocol. Will discharge home or return to same level of care per recovery protocol.     FOLLOW UP:  Cultures and cytology  The pt could have [address] on the right side or could have had bleeding from irritation from aspiration of gastric acidic contents given history of vomiting at home. Suturegard Intro: Intraoperative tissue expansion was performed, utilizing the SUTUREGARD device, in order to reduce wound tension.

## 2025-04-04 NOTE — PROGRESS NOTES
[facility]... Speech Language Pathology    Speech Language Pathology  Clinical Bedside Swallow Assessment  Facility/Department: [facility] ICU    Recommendations: Instrumentation: will continue to monitor for need  Diet recommendation: IDDSI 6 Soft and Bite Sized Solids; IDDSI 0 Thin Liquids; Meds PO as tolerated  Risk management: upright for all intake, stay upright for at least 30 mins after intake, small bites/sips, distant supervision with intake, oral care 2-3x/day to reduce adverse affects in the event of aspiration, alternate bites/sips, slow rate of intake, general GERD precautions, general aspiration precautions, and hold PO and contact SLP if s/s of aspiration or worsening respiratory status develop. Pt also may benefit from ENT consult. Pt's son at bedside reports weak vocal quality \"for several weeks now\". Pt s/p 1-day intubation. NAME:Jarrett Shields  : 1940 (78 y.o.)   MRN: [id number]  ROOM: [address] [place] [address]  ADMISSION DATE: 2023  PATIENT DIAGNOSIS(ES): Acute respiratory failure (HCC) [J96.00]  Acute on chronic respiratory failure with hypoxia and hypercapnia (HCC) [J96.21, J96.22]  Aspiration pneumonia of both lungs, unspecified aspiration pneumonia type, unspecified part of lung ([address] [J69.0]  Chief Complaint   Patient presents with    Hemoptysis     Pt to ED with CC of hemoptysis for the past several weeks, worsening over the past two days. Pt denies CP or SOB.      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 ([address] 2022    Diabetes mellitus with skin ulcer ([address] 03/22/2022    Other specified peripheral vascular diseases ([address] 03/22/2022    Acute on chronic respiratory failure with hypoxemia ([address]     COVID-19 02/05/2022    Debility 01/14/2021    Moderate protein-calorie malnutrition ([address] 01/12/2021    Bacteremia due to Klebsiella pneumoniae     Paroxysmal atrial fibrillation (HCC)     Abdominal pain, right upper quadrant     Lactic acidosis     Elevated bilirubin     Elevated procalcitonin     Acute calculous cholecystitis 12/15/2020    Acute cholecystitis 12/15/2020    Atherosclerotic ulcer of aorta (HCC)     Closed compression fracture of body of L1 vertebra (HCC)     Hypomagnesemia     Abdominal aortic aneurysm (AAA) 30 to 34 mm in diameter     Pneumonia of left lower lobe due to infectious organism     Atrial fibrillation with RVR ([address]     Acute respiratory failure with hypoxia and hypercapnia (HCC)     COPD with acute exacerbation (HCC)     Acute encephalopathy     Hyperglycemia     Rapid atrial fibrillation ([address] 04/07/2020    Pleural effusion     Acute on chronic respiratory failure with hypoxia and hypercapnia ([address] 04/01/2020    Former smoker 01/24/2018    Acute hyperglycemia 01/24/2018    Chronic normocytic anemia 01/24/2018    Chronic thrombocytopenia 01/24/2018    Falls at home 01/24/2018    Ambulatory dysfunction 01/24/2018    General weakness 01/24/2018    Acute respiratory insufficiency 12/29/2017    Acute on chronic respiratory failure with hypercapnia (HCC)     Septic shock (HCC)     COPD exacerbation ([address]     Chronic respiratory failure with hypoxia and hypercapnia 2.5 L home O2 07/13/2017    PAF (paroxysmal atrial fibrillation) ([address] 10/11/2015    Gallstones      Past Medical History:   Diagnosis Date    Arthritis     CAD (coronary artery disease)     COPD (chronic obstructive pulmonary disease) ([address]     Diabetes mellitus ([address]     Hepatitis A 01/05/2021    Hyperlipidemia     Hypertension     Influenza A 12/29/2017 Kidney calculi     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    ELAINE on CPAP     Osteoporosis     Skin cancer of face      Past Surgical History:   Procedure Laterality Date    BACK SURGERY      repair broken back L4 & L5    CHOLECYSTECTOMY, LAPAROSCOPIC N/A 1/6/2021    LAPAROSCOPIC CHOLECYSTECTOMY, WITH CHOLANGIOGRAMS (ATTEMPTED) OPEN CHOLECYSTECTOMY performed by Dallin Clark MD at [facility], OPEN N/A 01/06/2021    LAPAROSCOPIC CHOLECYSTECTOMY, WITH CHOLANGIOGRAMS (ATTEMPTED)     CYSTOSCOPY Right 10/9/15    RIght Ureteroscopy, Holmium Laser Lithotripsy with Stone Removal, Right Stent Placement    CYSTOSCOPY  05/01/2019    CYSTOSCOPY, RESECTION OF BLADDER NECK, URETHRAL DILATION    CYSTOSCOPY W BIOPSY OF BLADDER N/A 5/1/2019    CYSTOSCOPY, RESECTION OF BLADDER NECK, URETHRAL DILATION performed by Antonette Cobb MD at [address] [place] [address] Left 6/12/2016    cataract removal    GALLBLADDER SURGERY  01/2021    JOINT REPLACEMENT  6/29/2011    right knee    JOINT REPLACEMENT  11/2004    left knee    OTHER SURGICAL HISTORY  08/31/2015    wide excision basal cell carcinoma on the nose and bilateral auricles with frozen sections, plastic closure, full thickness skin graft    OTHER SURGICAL HISTORY  12/1/15    excision of lesion of lip    PROSTATE SURGERY      TURP  10/23/2015    with cystoscopy     No Known Allergies    DATE ONSET: Pt admitted to [facility] on 2/12/23    Date of Evaluation: 2/13/2023   Evaluating Therapist: MAUREEN Banda    Chart Reviewed: : [x] Yes [] No    Current Diet: Diet NPO    Recent Chest Radiography: [x] Chest XR   [] CT of Chest  Date: 2/13/23  Impressions  Impression   Persistent patchy right-sided airspace disease, with aeration slightly   improved at the right base as compared to prior.      Pain: The patient does not complain of pain    Reason for Referral  Wing Torre was referred for a bedside swallow evaluation to assess the efficiency of their swallow function, identify signs and symptoms of aspiration and make recommendations regarding safe dietary consistencies, effective compensatory strategies, and safe eating environment. Assessment    Medical record review/interview: Per MD H&P: \"78 y.o. male with afib, DM,COPD on 3 lpm O2,presents with increased sob,cough, hemoptysis. Presents hypercapnic,hypoxemic,was trialed on bipap, required intubation. Currently patient is seen in ICU, sedated, intubated, tolerating vent, decreased in FiO2, off pressors\". Patient Complaints:  Odynophagia: [] Yes [x] No  Globus Sensation: [] Yes [x] No  SOB with PO intake: [] Yes  [x]No  Increased WOB with PO intake: [] Yes [x] No  Reflux Sx's: [] Yes [x] No  Weight loss: [] Yes [x] No  Coughing/Choking with PO intake: [] Yes [x] No  Reduced Appetite: [] Yes [x] No  Trouble with Mastication:  [] Yes [x] No  Avoidance of Certain Foods:  [] Yes [x] No  Premature Satiety:  [] Yes [x] No  Recent PNA:  [] Yes [x] No  Recurring PNA:  [] Yes [x] No  Changes in vocal quality: [x] Yes [] No    Baseline Method of Oral Meds:  [x] Whole with liquid    [] Cut with liquid    [] Whole with puree    [] Cut in puree    [] Crushed in puree    [] Crushed in liquid    [] Via TF    Additional Reported Symptoms/Complaints: Pt admitted d/t acute respiratory failure with hypoxia and hypercapnia, aspiration PNA (pt with reported hemoptysis, gagging, and vomiting at home PTA). Pt s/p emergent bronchoscopy 2/12, intubated 2/12-2/13. Pt has PMHx of COPD and CAD per chart. Pt seen by ST in February 2022 for BSE with recommendations for a regular solid diet with thin liquids. Pt had MBSS completed in April 2020 with recommendations for a soft and bite sized diet with thin liquids.        Predisposing dysphagia risk factors: COPD, Age, and Hx of dysphagia  Clinical signs of possible chronic dysphagia: hx of dysphagia  Precipitating dysphagia risk factors: increased O2 demands and recent intubation    Vitals/labs:     Vitals:    02/13/23 1148 02/13/23 1200 02/13/23 1300 02/13/23 1400   BP:  (!) 140/71 136/63 130/65   Pulse:  (!) 121 (!) 119 (!) 119   Resp:  15 19 21   Temp:       TempSrc:       SpO2: 90% 95% 98% 96%   Weight:       Height:            CBC:   Recent Labs     02/13/23  0345   WBC 5.2   HGB 9.6*   *      BMP:  Recent Labs     02/13/23  0345      K 4.3   CL 94*   CO2 29   BUN 31*   CREATININE 1.2   GLUCOSE 193*          Cranial nerve exam:   CN V (trigeminal): ophthalmic, maxillary, and mandibular facial sensation- WFL  CN VII (facial): WFL  CN IX/X (glossopharyngeal/vagus): MPT: Reduced; pitch range: Reduced; vocal quality: weak; cough: Weak- perceptually, Non-Productive, and Dry  CN XII (hypoglossal): WFL    Laryngeal function exam:   Secretions: n/a; although RN reported pt has had productive cough this date s/p extubation, Maranda at bedside  Vocal quality: See CN exam above  MPT: See CN exam above  S/Z ratio: DNT  Pitch range: See CN exam above  Cough: See CN exam above    Oral Care Status:    [] Oral Care [facility]  [] Poor oral care status  [x] Edentulous  [] Upper Dentures  [] Lower Dentures  [] Missing/Broken Teeth  [] Evidence of dental cavities/carries    PO trials:   Ice: x4: adequate bolus manipulation and control, grossly timely swallow initiation, no clinical s/s of aspiration throughout. IDDSI 0 (thin):  - Cup and straw: no anterior bolus loss , swallow timing subjectively appears timely, dry vocal quality, and vitals stable. X1 immediate dry cough post-swallow with TL via cup, x1 via straw across all TL trials (>6 oz total). Pt impulsive with PO intake at times and benefited from intermittent cues for small, single sips throughout.     IDDSI 4 (puree): no anterior bolus loss , suspect functional A-P bolus transit, grossly functional mastication, oral clearance grossly WFL, no clinical s/s of aspiration, and vitals stable    IDDSI 7 (regular): no anterior bolus loss , suspect functional A-P bolus transit, grossly functional mastication, oral clearance grossly WFL, no clinical s/s of aspiration, and vitals stable    3 oz water: DNT    Impressions:    Clinical signs of oropharyngeal dysphagia likely acute-on-chronic related to increased O2 demands, respiratory illness, fatigue, Progressive nature of disease/condition, impaired respiratory-swallow coordination, the aging swallow, intubation, and co-morbidities. Swallow prognosis is good. Instrumental swallow study is not indicated at this time, will continue to monitor. Given tolerance to PO at bedside, lack of clinical s/s of aspiration at bedside, stable respiratory status, willingness to participate in therapy, and caregiver support, pt is safe for oral diet at this time    Recommendations: Instrumentation: will continue to monitor for need  Diet recommendation: IDDSI 6 Soft and Bite Sized Solids; IDDSI 0 Thin Liquids; Meds PO as tolerated  Risk management: upright for all intake, stay upright for at least 30 mins after intake, small bites/sips, distant supervision with intake, oral care 2-3x/day to reduce adverse affects in the event of aspiration, alternate bites/sips, slow rate of intake, general GERD precautions, general aspiration precautions, and hold PO and contact SLP if s/s of aspiration or worsening respiratory status develop. Pt also may benefit from ENT consult. Pt's son at bedside reports weak vocal quality \"for several weeks now\". Pt s/p 1-day intubation.     Prognosis: Good    Recommended Intervention:  [x] Dysphagia tx  [] Videostroboscopy                      [] NPO   [] MBS       [] Speech/Cog Eval    [] Therapeutic PO Trials     [] Ice Chips   [] Other:  [] FEES                                                 Dysphagia Therapeutic Intervention:  []  Bolus control Exercises  []  Oral Motor Exercises  []  [employer] Protocol  []  Thermal Stimulation  [x]  Oral Care    []  Vital Stim/NMES  []  Laryngeal Exercises  [x]  Patient/Family Education  []  Pharyngeal Exercises  []  Therapeutic PO trials with SLP  [x]  Diet tolerance monitoring  []  Other:     Referrals:  [x] ENT    [] PT  [] Pulmonology [] GI  [] Neurology  [] RD  [] OT   []     Goals:  Short Term Goals:  Timeframe for Short Term Goals: (5 days, 2/18/23)  Goal 1: The patient will tolerate recommended diet with no clinical s/s of aspiration 5/5  Goal 2: The patient/caregiver will demonstrate understanding of compensatory swallow strategies, for improved swallow safety  Goal 3: The patient will tolerate instrumental assessment when able     Long Term Goals:   Timeframe for Long Term Goals: (7 days, 2/20/23)  Goal 1: The patient will tolerate least restrictive diet with no clinical s/s of aspiration or worsening respiratory/pulmonary status    Treatment:  Skilled instruction completed with patient re: evidenced based practice regarding recommendations and POC, importance of oral care to reduce adverse affects in the event of aspiration, and instruction of recommended compensatory strategies developed based upon clinical exam. Pt able to recall/demonstrate compensatory strategies with min cues.       Pt Education: SLP educated the patient re: Role of SLP, rationale for completion of assessment, results of assessment, recommendations, and POC  Pt Education Response: verbalized understanding, would benefit from ongoing education, RN aware, and caregiver aware    Duration/Frequency of Tx: 2-4x/wk    Individuals Consulted:   [x]  Patient     []  NP         [x]  RN   []  RD                   []  MD      [x]  Family Member  (pt's son)                     []  PA    []  Other:      Safety Devices / Report:  [x]  All fall risk precautions in place [x]  Safety handoff completed with RN  [x]  Bed alarm in place  [x]  Left in bed     []  Chair alarm in place  []  Left in chair   [x]  Call light in reach   []  Other:                             Total Treatment Time / Charges       Time in Time out Total Time / units   Swallow Eval/Tx Time  1445 1510 25 min / 2 units (DE/DT)       Signature:  Krista Pemberton M.S. [id number] [facility]  Speech-language pathologist  [id number]

## (undated) DEVICE — TUBING, SUCTION, 3/16" X 10', STRAIGHT: Brand: MEDLINE

## (undated) DEVICE — BAG URIN STRL FOR URO CTCH SYS

## (undated) DEVICE — 3M™ TEGADERM™ TRANSPARENT FILM DRESSING FRAME STYLE, 1624W, 2-3/8 IN X 2-3/4 IN (6 CM X 7 CM), 100/CT 4CT/CASE: Brand: 3M™ TEGADERM™

## (undated) DEVICE — NEEDLE INSUF L120MM DIA2MM DISP FOR PNEUMOPERI ENDOPATH

## (undated) DEVICE — SYRINGE MED 10ML SLIP TIP BLNT FILL AND LUERLOCK DISP

## (undated) DEVICE — SHEET,DRAPE,40X58,STERILE: Brand: MEDLINE

## (undated) DEVICE — CATHETER CHOLGM 4.5FR L18IN W/ MTL SUPP TB

## (undated) DEVICE — SINGLE USE BIOPSY VALVE MAJ-210: Brand: SINGLE USE BIOPSY VALVE (STERILE)

## (undated) DEVICE — GLOVE ORANGE PI 7 1/2   MSG9075

## (undated) DEVICE — AGENT HEMSTAT W2XL4IN OXIDIZED REGENERATED CELOS ABSRB

## (undated) DEVICE — STAPLER INT TI LIN STD TISS 30MM 2 ROW 11 STPL NONCUTTING

## (undated) DEVICE — BRUSH CYTO L150CM CHN L2MM BRIST DIA1.9MM PTFE S STL BULL

## (undated) DEVICE — 3M™ STERI-STRIP™ COMPOUND BENZOIN TINCTURE 40 BAGS/CARTON 4 CARTONS/CASE C1544: Brand: 3M™ STERI-STRIP™

## (undated) DEVICE — SYRINGE MED 50ML LUERSLIP TIP

## (undated) DEVICE — CORD ES L10FT MPLR LAP

## (undated) DEVICE — APPLICATOR PREP 26ML 0.7% IOD POVACRYLEX 74% ISO ALC ST

## (undated) DEVICE — NEBULIZER AEROSOL TBNG 7 FT FOR ACUTE CARE NEBUTECH

## (undated) DEVICE — TISSUE RETRIEVAL SYSTEM: Brand: INZII RETRIEVAL SYSTEM

## (undated) DEVICE — SUTURE SZ 0 27IN 5/8 CIR UR-6  TAPER PT VIOLET ABSRB VICRYL J603H

## (undated) DEVICE — CIRCUIT ANES L72IN 3L BACT AND VIR FLTR EL CONN SGL LIMB

## (undated) DEVICE — CO2 INSUFFLATION NEEDLE: Brand: CORE DYNAMICS

## (undated) DEVICE — DISPOSABLE BIOPSY FORCEPS: Brand: DISPOSABLE BIOPSY FORCEPS

## (undated) DEVICE — GOWN,AURORA,NONREINF,RAGLAN,XXL,STERILE: Brand: MEDLINE

## (undated) DEVICE — KIT,ANTI FOG,W/SPONGE & FLUID,SOFT PACK: Brand: MEDLINE

## (undated) DEVICE — Device: Brand: MEDEX

## (undated) DEVICE — INTENDED FOR TISSUE SEPARATION, AND OTHER PROCEDURES THAT REQUIRE A SHARP SURGICAL BLADE TO PUNCTURE OR CUT.: Brand: BARD-PARKER ® STAINLESS STEEL BLADES

## (undated) DEVICE — DRAPE,ABDOMINAL,MAJOR,STERILE: Brand: MEDLINE

## (undated) DEVICE — PREP SOL PVP IODINE 4%  4 OZ/BTL

## (undated) DEVICE — STANDARD HYPODERMIC NEEDLE,POLYPROPYLENE HUB: Brand: MONOJECT

## (undated) DEVICE — SOLUTION IV IRRIG 500ML 0.9% SODIUM CHL 2F7123

## (undated) DEVICE — KIT EVAC 100CC W10MMXL20CM SIL FULL PERF HUBLESS FLAT DRN

## (undated) DEVICE — TROCAR ENDOSCP L100MM DIA5MM BLDELSS STBL SL OBT RADLUC

## (undated) DEVICE — APPLIER CLP M L L11.4IN DIA10MM ENDOSCP ROT MULT FOR LIG

## (undated) DEVICE — SUTURE VCRL SZ 4-0 L18IN ABSRB UD L19MM PS-2 3/8 CIR PRIM J496H

## (undated) DEVICE — YANKAUER,BULB TIP,W/O VENT,RIGID,STERILE: Brand: MEDLINE

## (undated) DEVICE — TROCAR ENDOSCP L100MM DIA11MM STBL SL BLDELSS DISP ENDOPATH

## (undated) DEVICE — AIRLIFE™ ADULT AEROSOL MASK VINYL, UNDER-THE-CHIN STYLE: Brand: AIRLIFE™

## (undated) DEVICE — DBD-PACK,CYSTOSCOPY,PK VI,AURORA: Brand: MEDLINE

## (undated) DEVICE — CYSTO/BLADDER IRRIGATION SET, REGULATING CLAMP

## (undated) DEVICE — DRAPE C ARM UNIV W41XL74IN CLR PLAS XR VELC CLSR POLY STRP

## (undated) DEVICE — MEDI-VAC NON-CONDUCTIVE SUCTION TUBING: Brand: CARDINAL HEALTH

## (undated) DEVICE — TUBING INSUF ISO CONN DISP

## (undated) DEVICE — TROCAR ENDOSCP L100MM DIA5MM BLDELSS STBL SL THRD OPT VW

## (undated) DEVICE — ELECTRODE PT RET AD L9FT HI MOIST COND ADH HYDRGEL CORDED

## (undated) DEVICE — GAUZE,SPONGE,4"X4",8PLY,STRL,LF,10/TRAY: Brand: MEDLINE

## (undated) DEVICE — CATHETER IV 14GA L1.25IN PTFE ORNG FEP SFTY STR HUB RADPQ

## (undated) DEVICE — Z DUP USE 2522782 SOLUTION IRRIG 1000ML STRL H2O PLAS CONTAINER UROMATIC

## (undated) DEVICE — SYRINGE MED 10ML LUERLOCK TIP W/O SFTY DISP

## (undated) DEVICE — MAJOR SET UP PK

## (undated) DEVICE — BOWL MED L 32OZ PLAS W/ MOLD GRAD EZ OPN PEEL PCH

## (undated) DEVICE — PUMP SUC IRR TBNG L10FT W/ HNDPC ASSEMB STRYKEFLOW 2

## (undated) DEVICE — SINGLE USE SUCTION VALVE MAJ-209: Brand: SINGLE USE SUCTION VALVE (STERILE)

## (undated) DEVICE — GOWN SIRUS NONREIN LG W/TWL: Brand: MEDLINE INDUSTRIES, INC.

## (undated) DEVICE — GLOVE ORANGE PI 8 1/2   MSG9085

## (undated) DEVICE — GAUZE SPONGES,8 PLY: Brand: CURITY